# Patient Record
Sex: FEMALE | Race: WHITE
[De-identification: names, ages, dates, MRNs, and addresses within clinical notes are randomized per-mention and may not be internally consistent; named-entity substitution may affect disease eponyms.]

---

## 2017-08-21 ENCOUNTER — HOSPITAL ENCOUNTER (OUTPATIENT)
Dept: HOSPITAL 75 - LAB | Age: 52
End: 2017-08-21
Attending: INTERNAL MEDICINE
Payer: MEDICAID

## 2017-08-21 DIAGNOSIS — E10.65: Primary | ICD-10-CM

## 2017-08-21 LAB
ALBUMIN SERPL-MCNC: 3.6 GM/DL (ref 3.2–4.5)
ALT SERPL-CCNC: 109 U/L (ref 0–55)
ANION GAP SERPL CALC-SCNC: 6 MMOL/L (ref 5–14)
AST SERPL-CCNC: 54 U/L (ref 5–34)
BILIRUB SERPL-MCNC: 0.7 MG/DL (ref 0.1–1)
BUN SERPL-MCNC: 25 MG/DL (ref 7–18)
BUN/CREAT SERPL: 21
CALCIUM SERPL-MCNC: 8.8 MG/DL (ref 8.5–10.1)
CHLORIDE SERPL-SCNC: 110 MMOL/L (ref 98–107)
CHOLEST SERPL-MCNC: 153 MG/DL (ref ?–200)
CO2 SERPL-SCNC: 21 MMOL/L (ref 21–32)
CREAT SERPL-MCNC: 1.17 MG/DL (ref 0.6–1.3)
GFR SERPLBLD BASED ON 1.73 SQ M-ARVRAT: 48 ML/MIN
GLUCOSE SERPL-MCNC: 201 MG/DL (ref 70–105)
LDLC SERPL DIRECT ASSAY-MCNC: 97 MG/DL (ref 1–129)
POTASSIUM SERPL-SCNC: 4.6 MMOL/L (ref 3.6–5)
PROT SERPL-MCNC: 6.6 GM/DL (ref 6.4–8.2)
SODIUM SERPL-SCNC: 137 MMOL/L (ref 135–145)
TRIGL SERPL-MCNC: 169 MG/DL (ref ?–150)
VLDLC SERPL CALC-MCNC: 34 MG/DL (ref 5–40)

## 2017-08-21 PROCEDURE — 80053 COMPREHEN METABOLIC PANEL: CPT

## 2017-08-21 PROCEDURE — 80061 LIPID PANEL: CPT

## 2017-08-21 PROCEDURE — 82607 VITAMIN B-12: CPT

## 2017-08-21 PROCEDURE — 36415 COLL VENOUS BLD VENIPUNCTURE: CPT

## 2017-08-21 PROCEDURE — 82306 VITAMIN D 25 HYDROXY: CPT

## 2017-08-22 LAB — VITAMIN D 25-HYDROXY (TOTAL): 14 NG/ML (ref 30–100)

## 2017-09-05 ENCOUNTER — HOSPITAL ENCOUNTER (EMERGENCY)
Dept: HOSPITAL 75 - ER | Age: 52
Discharge: HOME | End: 2017-09-05
Payer: MEDICAID

## 2017-09-05 VITALS — DIASTOLIC BLOOD PRESSURE: 106 MMHG | SYSTOLIC BLOOD PRESSURE: 162 MMHG

## 2017-09-05 VITALS — WEIGHT: 205 LBS | BODY MASS INDEX: 34.16 KG/M2 | HEIGHT: 65 IN

## 2017-09-05 DIAGNOSIS — K21.9: ICD-10-CM

## 2017-09-05 DIAGNOSIS — Z86.718: ICD-10-CM

## 2017-09-05 DIAGNOSIS — R94.5: Primary | ICD-10-CM

## 2017-09-05 DIAGNOSIS — Z79.01: ICD-10-CM

## 2017-09-05 DIAGNOSIS — Z79.4: ICD-10-CM

## 2017-09-05 DIAGNOSIS — Z87.19: ICD-10-CM

## 2017-09-05 DIAGNOSIS — Z90.89: ICD-10-CM

## 2017-09-05 DIAGNOSIS — Z90.710: ICD-10-CM

## 2017-09-05 DIAGNOSIS — J45.909: ICD-10-CM

## 2017-09-05 DIAGNOSIS — Z82.49: ICD-10-CM

## 2017-09-05 DIAGNOSIS — R11.2: ICD-10-CM

## 2017-09-05 DIAGNOSIS — M06.9: ICD-10-CM

## 2017-09-05 DIAGNOSIS — M19.90: ICD-10-CM

## 2017-09-05 DIAGNOSIS — Z87.01: ICD-10-CM

## 2017-09-05 DIAGNOSIS — E11.9: ICD-10-CM

## 2017-09-05 LAB
ALBUMIN SERPL-MCNC: 4.1 GM/DL (ref 3.2–4.5)
ALT SERPL-CCNC: 230 U/L (ref 0–55)
ANION GAP SERPL CALC-SCNC: 14 MMOL/L (ref 5–14)
AST SERPL-CCNC: 99 U/L (ref 5–34)
BASOPHILS # BLD AUTO: 0 10^3/UL (ref 0–0.1)
BASOPHILS NFR BLD AUTO: 0 % (ref 0–10)
BILIRUB SERPL-MCNC: 0.9 MG/DL (ref 0.1–1)
BILIRUB UR QL STRIP: NEGATIVE
BUN SERPL-MCNC: 25 MG/DL (ref 7–18)
BUN/CREAT SERPL: 19
CALCIUM SERPL-MCNC: 9.6 MG/DL (ref 8.5–10.1)
CHLORIDE SERPL-SCNC: 104 MMOL/L (ref 98–107)
CO2 SERPL-SCNC: 24 MMOL/L (ref 21–32)
CREAT SERPL-MCNC: 1.29 MG/DL (ref 0.6–1.3)
EOSINOPHIL # BLD AUTO: 0.2 10^3/UL (ref 0–0.3)
EOSINOPHIL NFR BLD AUTO: 3 % (ref 0–10)
ERYTHROCYTE [DISTWIDTH] IN BLOOD BY AUTOMATED COUNT: 13.8 % (ref 10–14.5)
GFR SERPLBLD BASED ON 1.73 SQ M-ARVRAT: 43 ML/MIN
GLUCOSE SERPL-MCNC: 102 MG/DL (ref 70–105)
KETONES UR QL STRIP: (no result)
LEUKOCYTE ESTERASE UR QL STRIP: (no result)
LIPASE SERPL-CCNC: 144 U/L (ref 8–78)
LYMPHOCYTES # BLD AUTO: 2.4 X 10^3 (ref 1–4)
LYMPHOCYTES NFR BLD AUTO: 25 % (ref 12–44)
MCH RBC QN AUTO: 31 PG (ref 25–34)
MCHC RBC AUTO-ENTMCNC: 34 G/DL (ref 32–36)
MCV RBC AUTO: 93 FL (ref 80–99)
MONOCYTES # BLD AUTO: 0.8 X 10^3 (ref 0–1)
MONOCYTES NFR BLD AUTO: 8 % (ref 0–12)
NEUTROPHILS # BLD AUTO: 6 X 10^3 (ref 1.8–7.8)
NEUTROPHILS NFR BLD AUTO: 64 % (ref 42–75)
NITRITE UR QL STRIP: NEGATIVE
PH UR STRIP: 8 [PH] (ref 5–9)
PLATELET # BLD: 237 10^3/UL (ref 130–400)
PMV BLD AUTO: 10.1 FL (ref 7.4–10.4)
POTASSIUM SERPL-SCNC: 4.2 MMOL/L (ref 3.6–5)
PROT SERPL-MCNC: 7.9 GM/DL (ref 6.4–8.2)
PROT UR QL STRIP: (no result)
RBC # BLD AUTO: 4.92 10^6/UL (ref 4.35–5.85)
SODIUM SERPL-SCNC: 142 MMOL/L (ref 135–145)
SP GR UR STRIP: 1.01 (ref 1.02–1.02)
SQUAMOUS #/AREA URNS HPF: (no result) /HPF
UROBILINOGEN UR-MCNC: NORMAL MG/DL
WBC # BLD AUTO: 9.4 10^3/UL (ref 4.3–11)
WBC #/AREA URNS HPF: (no result) /HPF

## 2017-09-05 PROCEDURE — 81000 URINALYSIS NONAUTO W/SCOPE: CPT

## 2017-09-05 PROCEDURE — 80306 DRUG TEST PRSMV INSTRMNT: CPT

## 2017-09-05 PROCEDURE — 74176 CT ABD & PELVIS W/O CONTRAST: CPT

## 2017-09-05 PROCEDURE — 36415 COLL VENOUS BLD VENIPUNCTURE: CPT

## 2017-09-05 PROCEDURE — 80053 COMPREHEN METABOLIC PANEL: CPT

## 2017-09-05 PROCEDURE — 85025 COMPLETE CBC W/AUTO DIFF WBC: CPT

## 2017-09-05 PROCEDURE — 96361 HYDRATE IV INFUSION ADD-ON: CPT

## 2017-09-05 PROCEDURE — 83690 ASSAY OF LIPASE: CPT

## 2017-09-05 PROCEDURE — 96375 TX/PRO/DX INJ NEW DRUG ADDON: CPT

## 2017-09-05 PROCEDURE — 96374 THER/PROPH/DIAG INJ IV PUSH: CPT

## 2017-09-05 NOTE — DIAGNOSTIC IMAGING REPORT
PROCEDURE: CT urinary tract, rule out kidney stone.



TECHNIQUE: Multiple contiguous axial images were obtained through

the abdomen and pelvis without the use of intravenous contrast.



INDICATION: Lower abdominal pain with nausea and vomiting.

History of kidney failure.



COMPARISON is made to a prior CT from February 15, 2013.



FINDINGS:  

The visualized lung bases demonstrate minimal dependent

atelectasis and otherwise are clear. There is no pleural or

pericardial effusion.



The noncontrast appearance of the liver is unremarkable.

Gallbladder nondistended without radiodense gallstone or biliary

dilatation. The spleen is normal in size. Pancreas demonstrates

no focal abnormality. There is no adrenal mass. There are right

sided exophytic renal cysts. The kidneys appear nonobstructed

without evidence of urolithiasis. No stone evident within the

ureters or within the urinary bladder.



There are fluid-filled loops of both small and proximal large

bowel but no abnormal bowel dilation to suggest obstruction or

evidence of significant abnormal bowel thickening. There is no

focal inflammation evident within the omentum or mesentery and no

evidence of free fluid within the pelvis.



The patient is status post previous hysterectomy. There is no

pathologic adenopathy evident. Aorta demonstrates advanced

atherosclerosis. An IVC filter is noted.



There are no acute or suspicious osseous abnormalities.



IMPRESSION:

1. Fluid-filled loops of small bowel and proximal colon without

evidence of bowel dilation to suggest obstruction. There is no

significant bowel thickening demonstrated or evidence of focal

inflammation within the omentum or mesentery. No free air or free

fluid demonstrated

2. No evidence of urolithiasis or hydronephrosis. There is a

simple appearing low density right renal cyst.

3. Atherosclerosis An IVC filter is noted.

4. Previous hysterectomy.









Dictated by: 



  Dictated on workstation # [id number]

## 2017-09-05 NOTE — XMS REPORT
Clinical Summary

 Created on: 2017



Radha Kelly

External Reference #: [id number]

: 1965

Sex: Female



Demographics







 Address  [address] [place]  [address]

 

 Home Phone  [phone]

 

 Preferred Language  English

 

 Marital Status  Unknown

 

 Sabianist Affiliation  NON

 

 Race  Unknown

 

 Ethnic Group  Not  or 





Author







 Author  [facility]

 

 Organization  [facility]

 

 Address  Unknown

 

 Phone  Unavailable







Support







 Name  Relationship  Address  Phone

 

 , Contact,No  ECON  Unknown  [phone]







Care Team Providers







 Care Team Member Name  Role  Phone

 

  PCP  Unavailable







Source Comments

Some departments are not documenting in the electronic medical record.  If you 
do not see the information that you expected, contact Release of Information in 
the Health Information Management department at [phone] for further 
assistance in locating additional records.[facility]



Allergies

Not on File



Current Medications

Not on file



Active Problems





Not on file



Social History







    



  Tobacco Use   Types   Packs/Day   Years Used   Date

 

    



  Never Assessed    









 



  Sex Assigned at Birth   Date Recorded

 

 



  Not on file 







Last Filed Vital Signs

Not on file



Plan of Treatment







   



  Health Maintenance   Due Date   Last Done   Comments

 

   



  HEPATITIS C SCREENING   1965  

 

   



  PHYSICAL (COMPREHENSIVE)   1970  



  EXAM   

 

   



  PERTUSSIS VACCINE   1974  

 

   



  TETANUS VACCINE   1980  

 

   



  CERVICAL CANCER SCREENING   1993  

 

   



  BREAST CANCER SCREENING   2003  

 

   



  COLORECTAL CANCER   2013  



  SCREENING   

 

   



  INFLUENZA VACCINE   2017  







Results

Not on filefrom Last 3 Months

## 2017-09-05 NOTE — ED ABDOMINAL PAIN
General


Stated Complaint:  VOMITING


Source of Information:  Patient


Exam Limitations:  No Limitations





History of Present Illness


Time Seen By Provider:  19:45


Initial Comments


To ER with bilateral lower abdominal pain for the past several days.  She 

developed nausea and vomiting today.  No fevers or chills.  She reports a 

history of gastroparesis.


Timing/Duration:  1 Week, Getting Worse


Severity/Quality:  Moderate


Location:  Suprapubic


Radiation:  No Radiation


Activities at Onset:  None


Associated Symptoms:  Nausea/Vomiting





Allergies and Home Medications


Allergies


Coded Allergies:  


     Sulfa (Sulfonamide Antibiotics) (Unverified  Allergy, Unknown, 6/25/15)


     codeine (Verified  Allergy, Unknown, TAKES ULTRAM AT HOME, 1/2/09)


     ketorolac (Verified  Allergy, Unknown, 11/19/08)


     tramadol (Verified  Allergy, Unknown, 12/17/11)





Home Medications


Albuterol 8.5 Gm Hfa.aer.ad, 2 PUFF IH TID PRN for SHORTNESS OF BREATH, (

Reported)


   1 PUFFS 


Alprazolam 2 Mg Tab.rapdis, 2 MG PO TID PRN for ANXIETY, (Reported)


Amlodipine/Valsartan 1 Each Tablet, 1 EACH PO DAILY, (Reported)


Cetirizine HCl 10 Mg Tablet, 10 MG PO HS, (Reported)


Ezetimibe 10 Mg Tablet, 10 MG PO DAILY, (Reported)


Fluticasone Propionate 16 Gm Naspr, 2 SPRAYS NS DAILY, (Reported)


Hydrocodone Bit/Acetaminophen 1 Each Tablet, 1 EACH PO BID PRN for PAIN, (

Reported)


Insulin Aspart 100 Unit/1 Ml Insuln.pen, SQ PER SLIDING SCALE, (Reported)


Insulin Glargine,Hum.rec.anlog 100 Unit/1 Ml Vial, 22 UNITS SQ BID, (Reported)


Latanoprost 2.5 Ml Drops, 1 DROP OU HS, (Reported)


Metoprolol Tartrate 50 Mg Tablet, 50 MG PO BID, (Reported)


Nystatin 30 Gm Oint..gm., 0 TOP TID, (Reported)


   APPLY TO AFFECTED AREA(S) 


Omeprazole 40 Mg Capsule.dr, 40 MG PO before breakfast, (Reported)


Simvastatin 40 Mg Tablet, 40 MG PO HS, (Reported)


Timolol Maleate 15 Ml Btl, 1 DROP OU DAILY, #15 (Reported)


Warfarin Sodium 1 Mg Tablet, 3 MG PO 6 pm, (Reported)


   TAKE 3 (1 MG) TABLETS WITH THE 5 MG DOSE AT 6 PM 


Warfarin Sodium 5 Mg Tablet, 5 MG PO 6 pm, (Reported)


   TAKE 1 (5 MG) TABLET WITH 3 (1 MG) TABLETS AT 6 PM 





Review of Systems


Constitutional:  see HPI


EENTM:  No Symptoms Reported


Respiratory:  No Symptoms Reported


Cardiovascular:  No Symptoms Reported


Gastrointestinal:  See HPI, Abdominal Pain, Nausea, Vomiting


Genitourinary:  No Symptoms Reported


Musculoskeletal:  no symptoms reported


Skin:  no symptoms reported


Psychiatric/Neurological:  No Symptoms Reported





Past Medical-Social-Family Hx


Patient Social History


Recent Foreign Travel:  No


Contact w/Someone Who Travel:  No





Immunizations Up To Date


Tetanus Booster (TDap):  Unknown


Date of Pneumonia Vaccine:  Oct 1, 2012


Date of Influenza Vaccine:  Oct 1, 2012





Surgeries


Surgeries:  Adenoidectomy, Ear Surgery, Hysterectomy, Tonsillectomy





Respiratory


Respiratory Disorders:  Asthma, Pneumonia, Chronic Bronchitis





Cardiovascular


Cardiac Disorders:  Deep Vein Thrombosis





Reproductive System


Hx Reproductive Disorders:  Yes


Female Reproductive Disorders:  Menstrual Problems





Genitourinary


Genitourinary Disorders:  Renal Failure





Gastrointestinal


Gastrointestinal Disorders:  Gastroesophageal Reflux, Pancreatitis





Musculoskeletal


Musculoskeletal Disorders:  Arthritis, Rheumatoid Arthritis, Fractures





Endocrine


Endocrine Disorders:  Diabetes, Insulin dep





HEENT


HEENT Disorders:  Tonsilitis


Loss of Vision:  Denies


Hearing Impairment:  Denies





Psychosocial


Behavioral Health Disorders:  Personality Disorder





Blood Transfusions


Adverse Reaction to a Blood Tr:  No





Family Medical History


Significant Family History:  Heart Disease, Cancer, Diabetes





Physical Exam


Vital Signs





 VS - Last 72 Hours, by Label








 9/5/17





 19:27


 


Temp 97.3


 


Pulse 115


 


Resp 24


 


B/P (MAP) 178/120


 


Pulse Ox 97


 


O2 Delivery Room Air





Capillary Refill :


General Appearance:  WD/WN, no apparent distress


HEENT:  PERRL/EOMI, normal ENT inspection


Neck:  non-tender, full range of motion


Respiratory:  normal breath sounds, no respiratory distress, no accessory 

muscle use


Cardiovascular:  regular rate, rhythm, no murmur


Gastrointestinal:  normal bowel sounds, non tender, soft


Extremities:  normal range of motion, non-tender, normal inspection, no pedal 

edema


Pelvic:  normal external exam


Neurologic/Psychiatric:  alert, normal mood/affect, oriented x 3


Skin:  normal color, warm/dry





Progress/Results/Core Measures


Results/Orders


Lab Results





Laboratory Tests








Test


  9/5/17


19:45 9/5/17


20:00 Range/Units


 


 


White Blood Count


  9.4 


  


  4.3-11.0


10^3/uL


 


Red Blood Count


  4.92 


  


  4.35-5.85


10^6/uL


 


Hemoglobin 15.4   11.5-16.0  G/DL


 


Hematocrit 46   35-52  %


 


Mean Corpuscular Volume 93   80-99  FL


 


Mean Corpuscular Hemoglobin 31   25-34  PG


 


Mean Corpuscular Hemoglobin


Concent 34 


  


  32-36  G/DL


 


 


Red Cell Distribution Width 13.8   10.0-14.5  %


 


Platelet Count


  237 


  


  130-400


10^3/uL


 


Mean Platelet Volume 10.1   7.4-10.4  FL


 


Neutrophils (%) (Auto) 64   42-75  %


 


Lymphocytes (%) (Auto) 25   12-44  %


 


Monocytes (%) (Auto) 8   0-12  %


 


Eosinophils (%) (Auto) 3   0-10  %


 


Basophils (%) (Auto) 0   0-10  %


 


Neutrophils # (Auto) 6.0   1.8-7.8  X 10^3


 


Lymphocytes # (Auto) 2.4   1.0-4.0  X 10^3


 


Monocytes # (Auto) 0.8   0.0-1.0  X 10^3


 


Eosinophils # (Auto)


  0.2 


  


  0.0-0.3


10^3/uL


 


Basophils # (Auto)


  0.0 


  


  0.0-0.1


10^3/uL


 


Sodium Level 142   135-145  MMOL/L


 


Potassium Level 4.2   3.6-5.0  MMOL/L


 


Chloride Level 104     MMOL/L


 


Carbon Dioxide Level 24   21-32  MMOL/L


 


Anion Gap 14   5-14  MMOL/L


 


Blood Urea Nitrogen 25 H  7-18  MG/DL


 


Creatinine


  1.29 


  


  0.60-1.30


MG/DL


 


Estimat Glomerular Filtration


Rate 43 


  


   


 


 


BUN/Creatinine Ratio 19    


 


Glucose Level 102     MG/DL


 


Calcium Level 9.6   8.5-10.1  MG/DL


 


Total Bilirubin 0.9   0.1-1.0  MG/DL


 


Aspartate Amino Transf


(AST/SGOT) 99 H


  


  5-34  U/L


 


 


Alanine Aminotransferase


(ALT/SGPT) 230 H


  


  0-55  U/L


 


 


Alkaline Phosphatase 83     U/L


 


Total Protein 7.9   6.4-8.2  GM/DL


 


Albumin 4.1   3.2-4.5  GM/DL


 


Lipase 144 H  8-78  U/L


 


Urine Color  YELLOW   


 


Urine Clarity


  


  SLIGHTLY


CLOUDY  


 


 


Urine pH  8  5-9  


 


Urine Specific Gravity  1.010 L 1.016-1.022  


 


Urine Protein  3+ H NEGATIVE  


 


Urine Glucose (UA)  NEGATIVE  NEGATIVE  


 


Urine Ketones  1+ H NEGATIVE  


 


Urine Nitrite  NEGATIVE  NEGATIVE  


 


Urine Bilirubin  NEGATIVE  NEGATIVE  


 


Urine Urobilinogen  NORMAL  NORMAL  MG/DL


 


Urine Leukocyte Esterase  1+ H NEGATIVE  


 


Urine RBC (Auto)  2+ H NEGATIVE  


 


Urine RBC  5-10 H  /HPF


 


Urine WBC  0-2   /HPF


 


Urine Squamous Epithelial


Cells 


  0-2 


   /HPF


 


 


Urine Crystals  NONE   /LPF


 


Urine Bacteria  NONE   /HPF


 


Urine Casts  NONE   /LPF


 


Urine Mucus  NEGATIVE   /LPF


 


Urine Culture Indicated  NO   


 


Urine Opiates Screen  NEGATIVE  NEGATIVE  


 


Urine Oxycodone Screen  NEGATIVE  NEGATIVE  


 


Urine Methadone Screen  NEGATIVE  NEGATIVE  


 


Urine Propoxyphene Screen  NEGATIVE  NEGATIVE  


 


Urine Barbiturates Screen  NEGATIVE  NEGATIVE  


 


Ur Tricyclic Antidepressants


Screen 


  NEGATIVE 


  NEGATIVE  


 


 


Urine Phencyclidine Screen  NEGATIVE  NEGATIVE  


 


Urine Amphetamines Screen  NEGATIVE  NEGATIVE  


 


Urine Methamphetamines Screen  NEGATIVE  NEGATIVE  


 


Urine Benzodiazepines Screen  NEGATIVE  NEGATIVE  


 


Urine Cocaine Screen  NEGATIVE  NEGATIVE  


 


Urine Cannabinoids Screen  POSITIVE H NEGATIVE  








My Orders





Orders - LIAM GAONA APRN


Cbc With Automated Diff (9/5/17 19:44)


Comprehensive Metabolic Panel (9/5/17 19:44)


Ua Culture If Indicated (9/5/17 19:44)


Drug Screen Stat (Urine) (9/5/17 19:44)


Saline Lock/Iv-Start (9/5/17 19:44)


Lipase (9/5/17 19:44)


Ns Iv 1000 Ml (Sodium Chloride 0.9%) (9/5/17 19:45)


Prochlorperazine Injection (Compazine In (9/5/17 19:45)


Ct Abd/Pelvis Wo(Kidney Stone) (9/5/17 20:29)





Medications Given in ED





Current Medications








 Medications  Dose


 Ordered  Sig/Corin


 Route  Start Time


 Stop Time Status Last Admin


Dose Admin


 


 Prochlorperazine


 Edisylate  10 mg  ONCE  ONCE


 IV  9/5/17 19:45


 9/5/17 19:46 DC 9/5/17 19:59


10 MG








Vital Signs/I&O





Vital Sign - Last 12Hours








 9/5/17





 19:27


 


Temp 97.3


 


Pulse 115


 


Resp 24


 


B/P (MAP) 178/120


 


Pulse Ox 97


 


O2 Delivery Room Air














Intake and Output 


 


 9/6/17





 00:00


 


Intake Total 1000 ml


 


Balance 1000 ml











Departure


Communication (Admissions)


Progress Notes


2241-  patient is feeling better at this time.  Her abdominal pain is gone that 

was not given any pain medications.  She reports some intermittent lower 

abdominal cramping.  We'll give her some Zofran IV.





Impression


Impression:  


 Primary Impression:  


 Elevated liver enzymes


 Additional Impression:  


 Nausea


Disposition:  01 HOME, SELF-CARE


Condition:  Stable





Departure-Patient Inst.


Decision time for Depature:  22:42


Referrals:  


JULY VOSS DO (PCP/Family)


Primary Care Physician


Patient Instructions:  NO INSTRUCTIONS GIVEN





Add. Discharge Instructions:  


1.  Follow-up with Dr. Dr. Voss


2.  Return to ER for any concerns


3.


Scripts


Ondansetron (Zofran Odt) 8 Mg Tab.rapdis


8 MG PO Q6H Y for NAUSEA/VOMITING-1ST LINE, #14 TAB


   Prov: LIAM GAONA         9/5/17











LIAM GAONA Sep 5, 2017 19:46

## 2017-10-11 ENCOUNTER — HOSPITAL ENCOUNTER (OUTPATIENT)
Dept: HOSPITAL 75 - LAB | Age: 52
End: 2017-10-11
Attending: FAMILY MEDICINE
Payer: MEDICAID

## 2017-10-11 DIAGNOSIS — R74.8: Primary | ICD-10-CM

## 2017-10-11 DIAGNOSIS — R73.9: ICD-10-CM

## 2017-10-11 LAB
ALBUMIN SERPL-MCNC: 4.1 GM/DL (ref 3.2–4.5)
ALT SERPL-CCNC: 144 U/L (ref 0–55)
ANION GAP SERPL CALC-SCNC: 9 MMOL/L (ref 5–14)
AST SERPL-CCNC: 73 U/L (ref 5–34)
BASOPHILS # BLD AUTO: 0 10^3/UL (ref 0–0.1)
BASOPHILS NFR BLD AUTO: 1 % (ref 0–10)
BILIRUB SERPL-MCNC: 1.2 MG/DL (ref 0.1–1)
BUN SERPL-MCNC: 23 MG/DL (ref 7–18)
BUN/CREAT SERPL: 17
CALCIUM SERPL-MCNC: 9.9 MG/DL (ref 8.5–10.1)
CHLORIDE SERPL-SCNC: 103 MMOL/L (ref 98–107)
CHOLEST SERPL-MCNC: 191 MG/DL (ref ?–200)
CO2 SERPL-SCNC: 26 MMOL/L (ref 21–32)
CREAT SERPL-MCNC: 1.33 MG/DL (ref 0.6–1.3)
EOSINOPHIL # BLD AUTO: 0.2 10^3/UL (ref 0–0.3)
EOSINOPHIL NFR BLD AUTO: 3 % (ref 0–10)
ERYTHROCYTE [DISTWIDTH] IN BLOOD BY AUTOMATED COUNT: 13.7 % (ref 10–14.5)
GFR SERPLBLD BASED ON 1.73 SQ M-ARVRAT: 42 ML/MIN
GLUCOSE SERPL-MCNC: 186 MG/DL (ref 70–105)
LDLC SERPL DIRECT ASSAY-MCNC: 108 MG/DL (ref 1–129)
LYMPHOCYTES # BLD AUTO: 2.3 X 10^3 (ref 1–4)
LYMPHOCYTES NFR BLD AUTO: 33 % (ref 12–44)
MCH RBC QN AUTO: 31 PG (ref 25–34)
MCHC RBC AUTO-ENTMCNC: 34 G/DL (ref 32–36)
MCV RBC AUTO: 92 FL (ref 80–99)
MONOCYTES # BLD AUTO: 0.9 X 10^3 (ref 0–1)
MONOCYTES NFR BLD AUTO: 13 % (ref 0–12)
NEUTROPHILS # BLD AUTO: 3.6 X 10^3 (ref 1.8–7.8)
NEUTROPHILS NFR BLD AUTO: 51 % (ref 42–75)
PLATELET # BLD: 227 10^3/UL (ref 130–400)
PMV BLD AUTO: 10.3 FL (ref 7.4–10.4)
POTASSIUM SERPL-SCNC: 4.2 MMOL/L (ref 3.6–5)
PROT SERPL-MCNC: 8.3 GM/DL (ref 6.4–8.2)
RBC # BLD AUTO: 5.49 10^6/UL (ref 4.35–5.85)
SODIUM SERPL-SCNC: 138 MMOL/L (ref 135–145)
TRIGL SERPL-MCNC: 168 MG/DL (ref ?–150)
VLDLC SERPL CALC-MCNC: 34 MG/DL (ref 5–40)
WBC # BLD AUTO: 7 10^3/UL (ref 4.3–11)

## 2017-10-11 PROCEDURE — 80053 COMPREHEN METABOLIC PANEL: CPT

## 2017-10-11 PROCEDURE — 36415 COLL VENOUS BLD VENIPUNCTURE: CPT

## 2017-10-11 PROCEDURE — 85025 COMPLETE CBC W/AUTO DIFF WBC: CPT

## 2017-10-11 PROCEDURE — 83036 HEMOGLOBIN GLYCOSYLATED A1C: CPT

## 2017-10-11 PROCEDURE — 80061 LIPID PANEL: CPT

## 2018-05-30 ENCOUNTER — HOSPITAL ENCOUNTER (OUTPATIENT)
Dept: HOSPITAL 75 - LAB | Age: 53
End: 2018-05-30
Attending: INTERNAL MEDICINE
Payer: MEDICAID

## 2018-05-30 DIAGNOSIS — E10.65: Primary | ICD-10-CM

## 2018-05-30 DIAGNOSIS — N18.9: ICD-10-CM

## 2018-05-30 LAB
ALBUMIN SERPL-MCNC: 3.9 GM/DL (ref 3.2–4.5)
ALP SERPL-CCNC: 66 U/L (ref 40–136)
ALT SERPL-CCNC: 133 U/L (ref 0–55)
BILIRUB SERPL-MCNC: 0.8 MG/DL (ref 0.1–1)
BUN/CREAT SERPL: 21
CALCIUM SERPL-MCNC: 9.3 MG/DL (ref 8.5–10.1)
CHLORIDE SERPL-SCNC: 104 MMOL/L (ref 98–107)
CO2 SERPL-SCNC: 23 MMOL/L (ref 21–32)
CREAT SERPL-MCNC: 1.21 MG/DL (ref 0.6–1.3)
ERYTHROCYTE [DISTWIDTH] IN BLOOD BY AUTOMATED COUNT: 13.3 % (ref 10–14.5)
GFR SERPLBLD BASED ON 1.73 SQ M-ARVRAT: 46 ML/MIN
GLUCOSE SERPL-MCNC: 310 MG/DL (ref 70–105)
HCT VFR BLD CALC: 43 % (ref 35–52)
HGB BLD-MCNC: 15 G/DL (ref 11.5–16)
MCH RBC QN AUTO: 32 PG (ref 25–34)
MCHC RBC AUTO-ENTMCNC: 35 G/DL (ref 32–36)
MCV RBC AUTO: 93 FL (ref 80–99)
PLATELET # BLD: 232 10^3/UL (ref 130–400)
PMV BLD AUTO: 10.2 FL (ref 7.4–10.4)
POTASSIUM SERPL-SCNC: 4.5 MMOL/L (ref 3.6–5)
PROT SERPL-MCNC: 7.3 GM/DL (ref 6.4–8.2)
RBC # BLD AUTO: 4.66 10^6/UL (ref 4.35–5.85)
SODIUM SERPL-SCNC: 135 MMOL/L (ref 135–145)
WBC # BLD AUTO: 5.2 10^3/UL (ref 4.3–11)

## 2018-05-30 PROCEDURE — 82607 VITAMIN B-12: CPT

## 2018-05-30 PROCEDURE — 84443 ASSAY THYROID STIM HORMONE: CPT

## 2018-05-30 PROCEDURE — 82043 UR ALBUMIN QUANTITATIVE: CPT

## 2018-05-30 PROCEDURE — 85027 COMPLETE CBC AUTOMATED: CPT

## 2018-05-30 PROCEDURE — 82306 VITAMIN D 25 HYDROXY: CPT

## 2018-05-30 PROCEDURE — 80053 COMPREHEN METABOLIC PANEL: CPT

## 2018-05-30 PROCEDURE — 36415 COLL VENOUS BLD VENIPUNCTURE: CPT

## 2018-09-06 ENCOUNTER — HOSPITAL ENCOUNTER (OUTPATIENT)
Dept: HOSPITAL 75 - RAD | Age: 53
End: 2018-09-06
Attending: FAMILY MEDICINE
Payer: MEDICAID

## 2018-09-06 DIAGNOSIS — M25.522: ICD-10-CM

## 2018-09-06 DIAGNOSIS — M61.48: Primary | ICD-10-CM

## 2018-09-06 PROCEDURE — 73080 X-RAY EXAM OF ELBOW: CPT

## 2018-09-06 NOTE — DIAGNOSTIC IMAGING REPORT
EXAM: Left elbow at 11:22 a.m.



INDICATION: Injury, elbow pain



TECHNIQUE: 3 views were obtained. 



COMPARISON: There are no prior studies available for comparison.



FINDINGS:  

There is no fracture, dislocation or acute bony abnormality

evident. The lateral view does show a minute calcific density in

the soft tissues adjacent to the olecranon process of the ulna.

There is also a small amount of soft tissue edema in this area.

This finding could represent a very small avulsion fracture

although the age of this injury is indeterminate. Clinical

followup is recommended.



The elbow joint is fairly well-maintained. The posterior fat-pad

is not elevated.



IMPRESSION:

1. There is a small calcific density in the soft tissues along

the posterior aspect of the olecranon process of the ulna. This

may represent a minute avulsion fracture although the age of this

injury is indeterminate. Clinical followup is recommended.

2. There is no acute bony abnormality noted otherwise. 



Dictated by: 



  Dictated on workstation # [id number]

## 2019-01-15 ENCOUNTER — HOSPITAL ENCOUNTER (OUTPATIENT)
Dept: HOSPITAL 75 - LAB | Age: 54
End: 2019-01-15
Attending: INTERNAL MEDICINE
Payer: MEDICAID

## 2019-01-15 DIAGNOSIS — E10.65: Primary | ICD-10-CM

## 2019-01-15 LAB
ALBUMIN SERPL-MCNC: 4.2 GM/DL (ref 3.2–4.5)
ALP SERPL-CCNC: 92 U/L (ref 40–136)
ALT SERPL-CCNC: 81 U/L (ref 0–55)
BILIRUB SERPL-MCNC: 0.8 MG/DL (ref 0.1–1)
BUN/CREAT SERPL: 13
CALCIUM SERPL-MCNC: 10.1 MG/DL (ref 8.5–10.1)
CHLORIDE SERPL-SCNC: 97 MMOL/L (ref 98–107)
CHOLEST SERPL-MCNC: 280 MG/DL (ref ?–200)
CO2 SERPL-SCNC: 26 MMOL/L (ref 21–32)
CREAT SERPL-MCNC: 1.78 MG/DL (ref 0.6–1.3)
ERYTHROCYTE [DISTWIDTH] IN BLOOD BY AUTOMATED COUNT: 13.2 % (ref 10–14.5)
GFR SERPLBLD BASED ON 1.73 SQ M-ARVRAT: 30 ML/MIN
GLUCOSE SERPL-MCNC: 112 MG/DL (ref 70–105)
HCT VFR BLD CALC: 44 % (ref 35–52)
HDLC SERPL-MCNC: 54 MG/DL (ref 40–60)
HGB BLD-MCNC: 14.7 G/DL (ref 11.5–16)
MCH RBC QN AUTO: 31 PG (ref 25–34)
MCHC RBC AUTO-ENTMCNC: 33 G/DL (ref 32–36)
MCV RBC AUTO: 93 FL (ref 80–99)
PLATELET # BLD: 254 10^3/UL (ref 130–400)
PMV BLD AUTO: 10 FL (ref 7.4–10.4)
POTASSIUM SERPL-SCNC: 3.6 MMOL/L (ref 3.6–5)
PROT SERPL-MCNC: 8.7 GM/DL (ref 6.4–8.2)
RBC # BLD AUTO: 4.76 10^6/UL (ref 4.35–5.85)
SODIUM SERPL-SCNC: 137 MMOL/L (ref 135–145)
TRIGL SERPL-MCNC: 231 MG/DL (ref ?–150)
VLDLC SERPL CALC-MCNC: 46 MG/DL (ref 5–40)
WBC # BLD AUTO: 9.4 10^3/UL (ref 4.3–11)

## 2019-01-15 PROCEDURE — 84443 ASSAY THYROID STIM HORMONE: CPT

## 2019-01-15 PROCEDURE — 85027 COMPLETE CBC AUTOMATED: CPT

## 2019-01-15 PROCEDURE — 36415 COLL VENOUS BLD VENIPUNCTURE: CPT

## 2019-01-15 PROCEDURE — 80061 LIPID PANEL: CPT

## 2019-01-15 PROCEDURE — 80053 COMPREHEN METABOLIC PANEL: CPT

## 2019-02-19 ENCOUNTER — HOSPITAL ENCOUNTER (OUTPATIENT)
Dept: HOSPITAL 75 - LAB | Age: 54
End: 2019-02-19
Attending: INTERNAL MEDICINE
Payer: MEDICAID

## 2019-02-19 DIAGNOSIS — N18.9: Primary | ICD-10-CM

## 2019-08-11 ENCOUNTER — HOSPITAL ENCOUNTER (INPATIENT)
Dept: HOSPITAL 75 - ER | Age: 54
LOS: 3 days | Discharge: HOME | DRG: 637 | End: 2019-08-14
Attending: FAMILY MEDICINE | Admitting: INTERNAL MEDICINE
Payer: MEDICAID

## 2019-08-11 VITALS — SYSTOLIC BLOOD PRESSURE: 115 MMHG | DIASTOLIC BLOOD PRESSURE: 66 MMHG

## 2019-08-11 VITALS — DIASTOLIC BLOOD PRESSURE: 77 MMHG | SYSTOLIC BLOOD PRESSURE: 141 MMHG

## 2019-08-11 VITALS — DIASTOLIC BLOOD PRESSURE: 77 MMHG | SYSTOLIC BLOOD PRESSURE: 151 MMHG

## 2019-08-11 VITALS — DIASTOLIC BLOOD PRESSURE: 97 MMHG | SYSTOLIC BLOOD PRESSURE: 173 MMHG

## 2019-08-11 VITALS — DIASTOLIC BLOOD PRESSURE: 80 MMHG | SYSTOLIC BLOOD PRESSURE: 133 MMHG

## 2019-08-11 VITALS — DIASTOLIC BLOOD PRESSURE: 73 MMHG | SYSTOLIC BLOOD PRESSURE: 115 MMHG

## 2019-08-11 VITALS — SYSTOLIC BLOOD PRESSURE: 137 MMHG | DIASTOLIC BLOOD PRESSURE: 82 MMHG

## 2019-08-11 VITALS — SYSTOLIC BLOOD PRESSURE: 133 MMHG | DIASTOLIC BLOOD PRESSURE: 75 MMHG

## 2019-08-11 VITALS — DIASTOLIC BLOOD PRESSURE: 77 MMHG | SYSTOLIC BLOOD PRESSURE: 135 MMHG

## 2019-08-11 VITALS — SYSTOLIC BLOOD PRESSURE: 143 MMHG | DIASTOLIC BLOOD PRESSURE: 75 MMHG

## 2019-08-11 VITALS — SYSTOLIC BLOOD PRESSURE: 136 MMHG | DIASTOLIC BLOOD PRESSURE: 80 MMHG

## 2019-08-11 VITALS — WEIGHT: 151.06 LBS | HEIGHT: 65 IN | BODY MASS INDEX: 25.17 KG/M2

## 2019-08-11 VITALS — DIASTOLIC BLOOD PRESSURE: 85 MMHG | SYSTOLIC BLOOD PRESSURE: 162 MMHG

## 2019-08-11 VITALS — DIASTOLIC BLOOD PRESSURE: 86 MMHG | SYSTOLIC BLOOD PRESSURE: 158 MMHG

## 2019-08-11 VITALS — DIASTOLIC BLOOD PRESSURE: 76 MMHG | SYSTOLIC BLOOD PRESSURE: 148 MMHG

## 2019-08-11 DIAGNOSIS — Z88.2: ICD-10-CM

## 2019-08-11 DIAGNOSIS — M54.9: ICD-10-CM

## 2019-08-11 DIAGNOSIS — D72.829: ICD-10-CM

## 2019-08-11 DIAGNOSIS — G89.29: ICD-10-CM

## 2019-08-11 DIAGNOSIS — N18.9: ICD-10-CM

## 2019-08-11 DIAGNOSIS — J44.9: ICD-10-CM

## 2019-08-11 DIAGNOSIS — Z87.11: ICD-10-CM

## 2019-08-11 DIAGNOSIS — Z86.718: ICD-10-CM

## 2019-08-11 DIAGNOSIS — Z95.828: ICD-10-CM

## 2019-08-11 DIAGNOSIS — M06.9: ICD-10-CM

## 2019-08-11 DIAGNOSIS — R07.89: ICD-10-CM

## 2019-08-11 DIAGNOSIS — R11.10: ICD-10-CM

## 2019-08-11 DIAGNOSIS — J30.2: ICD-10-CM

## 2019-08-11 DIAGNOSIS — Z87.19: ICD-10-CM

## 2019-08-11 DIAGNOSIS — M19.91: ICD-10-CM

## 2019-08-11 DIAGNOSIS — E86.0: ICD-10-CM

## 2019-08-11 DIAGNOSIS — Z88.5: ICD-10-CM

## 2019-08-11 DIAGNOSIS — N17.0: ICD-10-CM

## 2019-08-11 DIAGNOSIS — E87.2: ICD-10-CM

## 2019-08-11 DIAGNOSIS — E10.43: ICD-10-CM

## 2019-08-11 DIAGNOSIS — Z79.4: ICD-10-CM

## 2019-08-11 DIAGNOSIS — Z91.14: ICD-10-CM

## 2019-08-11 DIAGNOSIS — F60.9: ICD-10-CM

## 2019-08-11 DIAGNOSIS — E10.10: Primary | ICD-10-CM

## 2019-08-11 DIAGNOSIS — F17.210: ICD-10-CM

## 2019-08-11 DIAGNOSIS — K21.9: ICD-10-CM

## 2019-08-11 DIAGNOSIS — E10.40: ICD-10-CM

## 2019-08-11 DIAGNOSIS — E78.5: ICD-10-CM

## 2019-08-11 LAB
ALBUMIN SERPL-MCNC: 3.4 GM/DL (ref 3.2–4.5)
ALP SERPL-CCNC: 127 U/L (ref 40–136)
ALT SERPL-CCNC: 74 U/L (ref 0–55)
APTT PPP: YELLOW S
ARTERIAL PATENCY WRIST A: (no result)
BACTERIA #/AREA URNS HPF: (no result) /HPF
BARBITURATES UR QL: NEGATIVE
BASE EXCESS STD BLDA CALC-SCNC: -25.7 MMOL/L (ref -2.5–2.5)
BASOPHILS # BLD AUTO: 0 10^3/UL (ref 0–0.1)
BASOPHILS NFR BLD AUTO: 0 % (ref 0–10)
BASOPHILS NFR BLD MANUAL: 0 %
BDY SITE: (no result)
BENZODIAZ UR QL SCN: NEGATIVE
BILIRUB SERPL-MCNC: 0.4 MG/DL (ref 0.1–1)
BILIRUB UR QL STRIP: NEGATIVE
BODY TEMPERATURE: 97.6
BUN/CREAT SERPL: 17
BUN/CREAT SERPL: 19
BUN/CREAT SERPL: 20
BUN/CREAT SERPL: 20
BUN/CREAT SERPL: 21
BUN/CREAT SERPL: 23
CALCIUM SERPL-MCNC: 8.3 MG/DL (ref 8.5–10.1)
CALCIUM SERPL-MCNC: 8.4 MG/DL (ref 8.5–10.1)
CALCIUM SERPL-MCNC: 8.5 MG/DL (ref 8.5–10.1)
CALCIUM SERPL-MCNC: 8.8 MG/DL (ref 8.5–10.1)
CALCIUM SERPL-MCNC: 8.9 MG/DL (ref 8.5–10.1)
CALCIUM SERPL-MCNC: 9.6 MG/DL (ref 8.5–10.1)
CHLORIDE SERPL-SCNC: 100 MMOL/L (ref 98–107)
CHLORIDE SERPL-SCNC: 104 MMOL/L (ref 98–107)
CHLORIDE SERPL-SCNC: 105 MMOL/L (ref 98–107)
CHLORIDE SERPL-SCNC: 107 MMOL/L (ref 98–107)
CHLORIDE SERPL-SCNC: 88 MMOL/L (ref 98–107)
CHLORIDE SERPL-SCNC: 94 MMOL/L (ref 98–107)
CO2 BLDA CALC-SCNC: 3.4 MMOL/L (ref 21–31)
CO2 SERPL-SCNC: 14 MMOL/L (ref 21–32)
CO2 SERPL-SCNC: 5 MMOL/L (ref 21–32)
CO2 SERPL-SCNC: < 5 MMOL/L (ref 21–32)
COCAINE UR QL: NEGATIVE
CREAT SERPL-MCNC: 1.89 MG/DL (ref 0.6–1.3)
CREAT SERPL-MCNC: 2.28 MG/DL (ref 0.6–1.3)
CREAT SERPL-MCNC: 2.42 MG/DL (ref 0.6–1.3)
CREAT SERPL-MCNC: 2.6 MG/DL (ref 0.6–1.3)
CREAT SERPL-MCNC: 2.74 MG/DL (ref 0.6–1.3)
CREAT SERPL-MCNC: 2.96 MG/DL (ref 0.6–1.3)
EOSINOPHIL # BLD AUTO: 0 10^3/UL (ref 0–0.3)
EOSINOPHIL NFR BLD AUTO: 0 % (ref 0–10)
EOSINOPHIL NFR BLD MANUAL: 0 %
ERYTHROCYTE [DISTWIDTH] IN BLOOD BY AUTOMATED COUNT: 13.3 % (ref 10–14.5)
ERYTHROCYTE [DISTWIDTH] IN BLOOD BY AUTOMATED COUNT: 13.7 % (ref 10–14.5)
FIBRINOGEN PPP-MCNC: CLEAR MG/DL
GFR SERPLBLD BASED ON 1.73 SQ M-ARVRAT: 16 ML/MIN
GFR SERPLBLD BASED ON 1.73 SQ M-ARVRAT: 18 ML/MIN
GFR SERPLBLD BASED ON 1.73 SQ M-ARVRAT: 19 ML/MIN
GFR SERPLBLD BASED ON 1.73 SQ M-ARVRAT: 21 ML/MIN
GFR SERPLBLD BASED ON 1.73 SQ M-ARVRAT: 22 ML/MIN
GFR SERPLBLD BASED ON 1.73 SQ M-ARVRAT: 28 ML/MIN
GLUCOSE SERPL-MCNC: 263 MG/DL (ref 70–105)
GLUCOSE SERPL-MCNC: 507 MG/DL (ref 70–105)
GLUCOSE SERPL-MCNC: 649 MG/DL (ref 70–105)
GLUCOSE SERPL-MCNC: 691 MG/DL (ref 70–105)
GLUCOSE SERPL-MCNC: 852 MG/DL (ref 70–105)
GLUCOSE SERPL-MCNC: 919 MG/DL (ref 70–105)
GLUCOSE UR STRIP-MCNC: (no result) MG/DL
HCT VFR BLD CALC: 40 % (ref 35–52)
HCT VFR BLD CALC: 44 % (ref 35–52)
HGB BLD-MCNC: 13.2 G/DL (ref 11.5–16)
HGB BLD-MCNC: 14.1 G/DL (ref 11.5–16)
KETONES UR QL STRIP: (no result)
LEUKOCYTE ESTERASE UR QL STRIP: NEGATIVE
LIPASE SERPL-CCNC: 215 U/L (ref 8–78)
LYMPHOCYTES # BLD AUTO: 3 X 10^3 (ref 1–4)
LYMPHOCYTES NFR BLD AUTO: 11 % (ref 12–44)
MANUAL DIFFERENTIAL PERFORMED BLD QL: YES
MCH RBC QN AUTO: 31 PG (ref 25–34)
MCH RBC QN AUTO: 31 PG (ref 25–34)
MCHC RBC AUTO-ENTMCNC: 32 G/DL (ref 32–36)
MCHC RBC AUTO-ENTMCNC: 33 G/DL (ref 32–36)
MCV RBC AUTO: 95 FL (ref 80–99)
MCV RBC AUTO: 97 FL (ref 80–99)
METHADONE UR QL SCN: NEGATIVE
METHAMPHETAMINE SCREEN URINE S: NEGATIVE
MONOCYTES # BLD AUTO: 1.8 X 10^3 (ref 0–1)
MONOCYTES NFR BLD AUTO: 7 % (ref 0–12)
MONOCYTES NFR BLD: 5 %
NEUTROPHILS # BLD AUTO: 21.2 X 10^3 (ref 1.8–7.8)
NEUTROPHILS NFR BLD AUTO: 81 % (ref 42–75)
NEUTS BAND NFR BLD MANUAL: 83 %
NEUTS BAND NFR BLD: 0 %
NITRITE UR QL STRIP: NEGATIVE
OPIATES UR QL SCN: NEGATIVE
OXYCODONE UR QL: NEGATIVE
PCO2 BLDA: 11 MMHG (ref 35–45)
PH BLDA: 7.07 [PH] (ref 7.37–7.43)
PH UR STRIP: 5 [PH] (ref 5–9)
PLATELET # BLD: 290 10^3/UL (ref 130–400)
PLATELET # BLD: 349 10^3/UL (ref 130–400)
PMV BLD AUTO: 10.6 FL (ref 7.4–10.4)
PMV BLD AUTO: 10.6 FL (ref 7.4–10.4)
PO2 BLDA: 127 MMHG (ref 79–93)
POTASSIUM SERPL-SCNC: 4.5 MMOL/L (ref 3.6–5)
POTASSIUM SERPL-SCNC: 4.7 MMOL/L (ref 3.6–5)
POTASSIUM SERPL-SCNC: 4.9 MMOL/L (ref 3.6–5)
POTASSIUM SERPL-SCNC: 5.1 MMOL/L (ref 3.6–5)
POTASSIUM SERPL-SCNC: 6 MMOL/L (ref 3.6–5)
POTASSIUM SERPL-SCNC: 6.3 MMOL/L (ref 3.6–5)
PROPOXYPH UR QL: NEGATIVE
PROT SERPL-MCNC: 7.4 GM/DL (ref 6.4–8.2)
PROT UR QL STRIP: (no result)
RBC #/AREA URNS HPF: (no result) /HPF
RBC MORPH BLD: NORMAL
SAO2 % BLDA FROM PO2: 98 % (ref 94–100)
SODIUM SERPL-SCNC: 129 MMOL/L (ref 135–145)
SODIUM SERPL-SCNC: 132 MMOL/L (ref 135–145)
SODIUM SERPL-SCNC: 134 MMOL/L (ref 135–145)
SODIUM SERPL-SCNC: 136 MMOL/L (ref 135–145)
SODIUM SERPL-SCNC: 136 MMOL/L (ref 135–145)
SODIUM SERPL-SCNC: 137 MMOL/L (ref 135–145)
SP GR UR STRIP: 1.02 (ref 1.02–1.02)
SQUAMOUS #/AREA URNS HPF: (no result) /HPF
TRICYCLICS UR QL SCN: NEGATIVE
UROBILINOGEN UR-MCNC: NORMAL MG/DL
VARIANT LYMPHS NFR BLD MANUAL: 12 %
VENTILATION MODE VENT: NO
WBC # BLD AUTO: 26.1 10^3/UL (ref 4.3–11)
WBC # BLD AUTO: 29 10^3/UL (ref 4.3–11)
WBC #/AREA URNS HPF: (no result) /HPF

## 2019-08-11 PROCEDURE — 84484 ASSAY OF TROPONIN QUANT: CPT

## 2019-08-11 PROCEDURE — 87040 BLOOD CULTURE FOR BACTERIA: CPT

## 2019-08-11 PROCEDURE — 83036 HEMOGLOBIN GLYCOSYLATED A1C: CPT

## 2019-08-11 PROCEDURE — 80053 COMPREHEN METABOLIC PANEL: CPT

## 2019-08-11 PROCEDURE — 83605 ASSAY OF LACTIC ACID: CPT

## 2019-08-11 PROCEDURE — 81000 URINALYSIS NONAUTO W/SCOPE: CPT

## 2019-08-11 PROCEDURE — 85027 COMPLETE CBC AUTOMATED: CPT

## 2019-08-11 PROCEDURE — 80048 BASIC METABOLIC PNL TOTAL CA: CPT

## 2019-08-11 PROCEDURE — 84100 ASSAY OF PHOSPHORUS: CPT

## 2019-08-11 PROCEDURE — 82962 GLUCOSE BLOOD TEST: CPT

## 2019-08-11 PROCEDURE — 83690 ASSAY OF LIPASE: CPT

## 2019-08-11 PROCEDURE — 36600 WITHDRAWAL OF ARTERIAL BLOOD: CPT

## 2019-08-11 PROCEDURE — 93005 ELECTROCARDIOGRAM TRACING: CPT

## 2019-08-11 PROCEDURE — 85610 PROTHROMBIN TIME: CPT

## 2019-08-11 PROCEDURE — 80306 DRUG TEST PRSMV INSTRMNT: CPT

## 2019-08-11 PROCEDURE — 87081 CULTURE SCREEN ONLY: CPT

## 2019-08-11 PROCEDURE — 36415 COLL VENOUS BLD VENIPUNCTURE: CPT

## 2019-08-11 PROCEDURE — 83735 ASSAY OF MAGNESIUM: CPT

## 2019-08-11 PROCEDURE — 71045 X-RAY EXAM CHEST 1 VIEW: CPT

## 2019-08-11 PROCEDURE — 85025 COMPLETE CBC W/AUTO DIFF WBC: CPT

## 2019-08-11 PROCEDURE — 82805 BLOOD GASES W/O2 SATURATION: CPT

## 2019-08-11 PROCEDURE — 85007 BL SMEAR W/DIFF WBC COUNT: CPT

## 2019-08-11 PROCEDURE — 82010 KETONE BODYS QUAN: CPT

## 2019-08-11 RX ADMIN — METOCLOPRAMIDE SCH MG: 10 TABLET ORAL at 16:39

## 2019-08-11 RX ADMIN — SODIUM CHLORIDE SCH MLS/HR: 4.5 INJECTION, SOLUTION INTRAVENOUS at 17:45

## 2019-08-11 RX ADMIN — POTASSIUM CHLORIDE SCH MLS/HR: 200 INJECTION, SOLUTION INTRAVENOUS at 17:45

## 2019-08-11 RX ADMIN — SODIUM CHLORIDE SCH MLS/HR: 4.5 INJECTION, SOLUTION INTRAVENOUS at 18:47

## 2019-08-11 RX ADMIN — SODIUM CHLORIDE SCH MLS/HR: 900 INJECTION, SOLUTION INTRAVENOUS at 08:31

## 2019-08-11 RX ADMIN — ONDANSETRON PRN MG: 2 INJECTION, SOLUTION INTRAMUSCULAR; INTRAVENOUS at 11:48

## 2019-08-11 RX ADMIN — SODIUM CHLORIDE SCH MLS/HR: 4.5 INJECTION, SOLUTION INTRAVENOUS at 21:36

## 2019-08-11 RX ADMIN — ATORVASTATIN CALCIUM SCH MG: 40 TABLET, FILM COATED ORAL at 19:59

## 2019-08-11 RX ADMIN — SODIUM CHLORIDE SCH MLS/HR: 900 INJECTION, SOLUTION INTRAVENOUS at 09:40

## 2019-08-11 RX ADMIN — POTASSIUM CHLORIDE SCH MLS/HR: 200 INJECTION, SOLUTION INTRAVENOUS at 16:39

## 2019-08-11 RX ADMIN — DEXTROSE MONOHYDRATE AND SODIUM CHLORIDE SCH MLS/HR: 5; .45 INJECTION, SOLUTION INTRAVENOUS at 13:51

## 2019-08-11 RX ADMIN — SODIUM CHLORIDE SCH MLS/HR: 900 INJECTION, SOLUTION INTRAVENOUS at 11:53

## 2019-08-11 RX ADMIN — SODIUM CHLORIDE SCH MLS/HR: 900 INJECTION, SOLUTION INTRAVENOUS at 10:53

## 2019-08-11 RX ADMIN — DEXTROSE MONOHYDRATE AND SODIUM CHLORIDE SCH MLS/HR: 5; .45 INJECTION, SOLUTION INTRAVENOUS at 23:57

## 2019-08-11 RX ADMIN — POTASSIUM CHLORIDE SCH MLS/HR: 200 INJECTION, SOLUTION INTRAVENOUS at 21:08

## 2019-08-11 RX ADMIN — POTASSIUM CHLORIDE SCH MLS/HR: 200 INJECTION, SOLUTION INTRAVENOUS at 14:23

## 2019-08-11 RX ADMIN — POTASSIUM CHLORIDE SCH MLS/HR: 200 INJECTION, SOLUTION INTRAVENOUS at 18:47

## 2019-08-11 RX ADMIN — METOCLOPRAMIDE SCH MG: 10 TABLET ORAL at 19:59

## 2019-08-11 RX ADMIN — SODIUM CHLORIDE SCH MLS/HR: 4.5 INJECTION, SOLUTION INTRAVENOUS at 14:23

## 2019-08-11 RX ADMIN — DEXTROSE MONOHYDRATE AND SODIUM CHLORIDE SCH MLS/HR: 5; .45 INJECTION, SOLUTION INTRAVENOUS at 17:36

## 2019-08-11 RX ADMIN — ONDANSETRON PRN MG: 2 INJECTION, SOLUTION INTRAMUSCULAR; INTRAVENOUS at 16:39

## 2019-08-11 RX ADMIN — SODIUM CHLORIDE SCH MLS/HR: 900 INJECTION, SOLUTION INTRAVENOUS at 13:37

## 2019-08-11 RX ADMIN — SODIUM CHLORIDE SCH MLS/HR: 900 INJECTION, SOLUTION INTRAVENOUS at 13:50

## 2019-08-11 RX ADMIN — DEXTROSE MONOHYDRATE AND SODIUM CHLORIDE SCH MLS/HR: 5; .45 INJECTION, SOLUTION INTRAVENOUS at 19:53

## 2019-08-11 RX ADMIN — POTASSIUM CHLORIDE SCH MLS/HR: 200 INJECTION, SOLUTION INTRAVENOUS at 23:30

## 2019-08-11 NOTE — XMS REPORT
Clinical Summary

                             Created on: 2019



Radha Kelly

External Reference #: [id number]

: 1965

Sex: Female



Demographics







                          Address                   [address] [place]  [address]

 

                          Home Phone                [phone]

 

                          Preferred Language        English

 

                          Marital Status            Unknown

 

                          Buddhism Affiliation     NON

 

                          Race                      Unknown

 

                          Ethnic Group              Not  or 





Author







                          Author                    [facility]

 

                          Organization              [facility]

 

                          Address                   Unknown

 

                          Phone                     Unavailable







Support







                Name            Relationship    Address         Phone

 

                No Contact      [employer]            Unknown         [phone]







Care Team Providers







                    Care Team Member Name    Role                Phone

 

                    No Pcp, Na          PCP                 Unavailable







Source Comments

Some departments are not documenting in the electronic medical record.  If you d
o not see the information that you expected, contact Release of Information in [facility] Health Information Management department at [phone] for further assistan
ce in locating additional records.[facility]



Allergies

Not on File



Medications

Not on file



Active Problems





Not on file



Social History







                                        Date



                 Tobacco Use     Types           Packs/Day       Years Used 

 

                                         



                                         Never Assessed    









 



                           Sex Assigned at Birth     Date Recorded

 

 



                                         Not on file 









                                        Industry



                           Job Start Date            Occupation 

 

                                        Not on file



                           Not on file               Not on file 









                                        Travel End



                           Travel History            Travel Start 













                                         No recent travel history available.







Last Filed Vital Signs

Not on file



Plan of Treatment







   



                 Health Maintenance     Due Date        Last Done       Comments

 

   



                           HEPATITIS C SCREENING     1965  

 

   



                           PHYSICAL (COMPREHENSIVE)     1970  



                                         EXAM   

 

   



                           HIV SCREENING             1978  

 

   



                           DTAP/TDAP VACCINES (1 -     1981  



                                         Tdap)   

 

   



                           CERVICAL CANCER SCREENING     1993  

 

   



                           BREAST CANCER SCREENING     2003  

 

   



                           COLORECTAL CANCER         2013  



                                         SCREENING   

 

   



                           SHINGLES RECOMBINANT      2013  



                                         VACCINE (1 of 2)   

 

   



                           INFLUENZA VACCINE         10/01/2019  







Results

Not on filefrom Last 3 Months



Insurance







                                        Type



            Payer      Benefit     Subscriber ID     Effective     Phone      Address 



                           Plan /                    Dates   



                                         Group     

 

                                         



                 [place]          xxxxxxxxx       4/15/2016-   



                           HEALTH                    Present   



                                         SERVICES     









     



            Guarantor Name     Account     Relation to     Date of     Phone      Billing Address



                     Type                Patient             Birth  

 

     



            Radha Kelly     Personal/F     Self       1965     [phone]     [address]               (Home)              [place] [address]







Advance Directives







                          Patient Representative    Explanation



                           Type                      Date Recorded  

 

                                                     



                           Advance                   2016 11:45 AM  



                                         Directive/DPOA

## 2019-08-11 NOTE — DIAGNOSTIC IMAGING REPORT
Examination: Single frontal view of the chest.



Indication: Vomiting.



Comparison: Multiple priors, most recent performed on 06/25/2015.



Findings:

The lungs are clear and the pulmonary vasculature is normal. No

pneumothorax or a large pleural effusion. The cardio mediastinal

silhouette is normal. No acute osseous abnormality is

appreciated. Radiopaque metallic density overlies the visualized

left upper abdomen, presumably outside the patient.



Impression:

No radiographic evidence of acute chest disease.



Radiopaque metallic density overlies the left upper abdomen,

presumably outside the patient.



Dictated by: 



  Dictated on workstation # [id number]

## 2019-08-11 NOTE — XMS REPORT
Continuity of Care Document

                             Created on: 2019



DAYTON BLISS

External Reference #: [id number]

: [id number]

Sex: Female



Demographics







                          Address                   [address] [place]  [address]

 

                          Home Phone                [phone] x

 

                          Preferred Language        Unknown

 

                          Marital Status            Unknown

 

                          Mormonism Affiliation     Unknown

 

                          Race                      Unknown

 

                          Ethnic Group              Unknown





Author







                          Organization              Unknown

 

                          Address                   Unknown

 

                          Phone                     Unavailable



              



Allergies

      





             Active              Description              Code              Type              Severity

                Reaction              Onset              Reported/Identified              Relationship

 to Patient                             Clinical Status        

 

                Yes              ketorolac              [id number]              Drug Allergy          

             Unknown              N/A                             2008                        

                                                 

 

             Yes              codeine              [id number]              Drug Allergy              

Unknown              TAKES ULTRAM AT                              2009              

                                                 

 

                Yes              tramadol              [id number]              Drug Allergy           

             Unknown              N/A                             2011                         

                                                 

 

                Yes              Sulfa (Sulfonamide Antibiotics)              [id number]              

Drug Allergy              Unknown              N/A                              2015

                                                             



                        



Medications

      



There is no data.                  



Problems

      





             Date Dx Coded              Attending              Type              Code              Diagnosis

                                        Diagnosed By        

 

             2011                             Ot              716.90              ARTHROPATHY NOS-

UNSPEC                                           

 

             2011                             Ot              787.03              VOMITING ALONE

                                                 

 

             2011                             Ot              789.00              ABDOMINAL PAIN,

 UNSPECIFIED SITE                                

 

             2011                             Ot              250.80              DIAB W OTH SPEC

 MANIFEST, TYPE II OR UNS                        

 

             2011                             Ot              305.20              CANNABIS ABUSE-

UNSPEC                                           

 

             2011                             Ot              305.50              OPIOID ABUSE-

UNSPEC                                           

 

             2011                             Ot              305.90              DRUG ABUSE NEC-

UNSPEC                                           

 

             2011                             Ot              V58.67              LONG-TERM (CURRENT)

 USE OF INSULIN                                  

 

             2011                             Ot              V58.69              OTH MED,LT,CURRENT

 USE                                             

 

             2011                             Ot              346.90              MIGRAINE UNSPECIFIED

 W/O INTRACT MGRN W/                             

 

             2011                             Ot              784.0              HEADACHE      

                                                 

 

             10/24/2011                             Ot              825.25              FX METATARSAL-

CLOSED                                           

 

             10/24/2011                             Ot              959.7              LOWER LEG INJURY

 NOS                                             

 

             10/24/2011                             Ot              E000.8              OTHER EXTERNAL

 CAUSE STATUS                                    

 

             10/24/2011                             Ot              E849.0              ACCIDENT IN HOME

                                                 

 

             10/24/2011                             Ot              E885.9              FALL FROM SLIPPING,

 TRIPPING, OR STUMBLI                            

 

             2011                             Ot              041.89              BACTERIAL INFECTION

 DUE TO OTHER SPECIFI                            

 

             2011                             Ot              250.00              DIAB KATHERIN WO 

COMPL, TYPE II OR UNSPEC TY                       

 

             2011                             Ot              272.4              HYPERLIPIDEMIA

 NEC/NOS                                         

 

             2011                             Ot              300.4              DYSTHYMIC DISORDER

                                                 

 

             2011                             Ot              401.9              HYPERTENSION NOS

                                                 

 

             2011                             Ot              453.41              ACUTE VENOUS 

EMBOLISM   THROMBOSIS DEEP                        

 

             2011                             Ot              530.81              ESOPHAGEAL REFLUX

                                                 

 

             2011                             Ot              593.9              RENAL   URETERAL

 DIS NOS                                         

 

             2011                             Ot              599.0              URIN TRACT INFECTION

 NOS                                             

 

             2011                             Ot              V45.89              POSTSURGICAL 

STATES NEC                                       

 

             2011                             Ot              V54.19              AFTERCARE HEALING

 TRAUMATIC FX OTHER BON                          

 

             2012                             Ot              453.40              ACUTE VENOUS 

EMBOLISM   THROMBOSIS UNSP                        

 

             2012                             Ot              346.90              MIGRAINE UNSPECIFIED

 W/O INTRACT MGRN W/                             

 

             2012                             Ot              453.40              ACUTE VENOUS 

EMBOLISM   THROMBOSIS UNSP                        

 

             2013                             Ot              250.61              DIAB W NEURO 

MANIFEST, TYPE I [JUVENILE                        

 

             2013                             Ot              305.1              TOBACCO USE DISORDER

                                                 

 

             2013                             Ot              401.9              HYPERTENSION NOS

                                                 

 

             2013                             Ot              493.90              ASTHMA, UNSPECIFIED

                                                 

 

             2013                             Ot              536.3              GASTROPARESIS 

                                                 

 

             2013                             Ot              558.9              NONINF GASTROENTERIT

 NEC                                             

 

             2013                             Ot              577.0              ACUTE PANCREATITIS

                                                 

 

             2013                             Ot              453.40              ACUTE VENOUS 

EMBOLISM   THROMBOSIS UNSP                        

 

                2014              JULY VOSS DO              Ot              453.40   

                          ACUTE VENOUS EMBOLISM   THROMBOSIS UNSP                        

 

                2014              JULY VOSS DO              Ot              453.40   

                          ACUTE VENOUS EMBOLISM   THROMBOSIS UNSP                        

 

           2015                             Ot              250.03                             

         

 

           2015                             Ot              250.41                             

         

 

           2015                             Ot              272.4                              

        

 

           2015                             Ot              403.10                             

         

 

           2015                             Ot              583.81                             

         

 

           2015                             Ot              585.2                              

        

 

           2015                             Ot              791.0                              

        

 

           2015                             Ot              453.40                             

         

 

                2015              JULY VOSS DO              Ot              453.40   

                                                             

 

                2015              JULY VOSS DO              Ot              250.80   

                          DIAB W OTH SPEC MANIFEST, TYPE II OR UNS                       

 

                2015              JULY VOSS DO              Ot              300.14   

                          DISSOCIATIVE IDENTITY DISORDER                       

 

                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   

                          BORDERLINE PERSONALITY DISORDER                       

 

                2015              JULY VOSS DO              Ot              458.9    

                          HYPOTENSION NOS                       

 

                2015              JULY VOSS DO              Ot              530.81   

                          ESOPHAGEAL REFLUX                       

 

                2015              SUMITDER , JULY QUIROS              Ot              584.9    

                          ACUTE RENAL FAILURE, UNSPECIFIED                       

 

                [facility]              [facility]              Ot              714.0    

                          RHEUMATOID ARTHRITIS                       

 

                2015              [facility], JULY QUIROS              Ot              780.97   

                          ALTERED MENTAL STATUS                       

 

                2015              [facility]              Ot              965.09   

                          POISONING-OPIATES NEC                       

 

                2015              [facility] DO, JULY QUIROS              Ot              969.4    

                          POIS-BENZODIAZEPINE RUBIO                       

 

                2015              [facility], JULY QUIROS              Ot              E849.0   

                          ACCIDENT IN HOME                       

 

                2015              [facility], JULY QUIROS              Ot              E850.2   

                          ACC POISON-OPIATES NEC                       

 

                2015              [facility]              Ot              E853.2   

                          ACC POISN-BENZDIAZ TRANQ                       

 

                2015              [facility], JULY QUIROS              Ot              V12.51   

                          HX-VENOUS THROMBOSIS EMBOLISM                       

 

                2015              [facility], JULY QUIROS              Ot              V58.67   

                          LONG-TERM (CURRENT) USE OF INSULIN                       

 

                2016              NANCY MENDEZ, YU S              Ot              E11.29    

                                                             

 

                2016              NANCY MENDEZ, JULIANNEMED S              Ot              E78.5     

                                                             

 

                2016              NANCY MENDEZ, JULIANNEMED S              Ot              I12.9     

                                                             

 

                2016              NANCY MENDEZ, AHMED S              Ot              N18.3     

                                                             

 

                2016              NANCY MENDEZ, AHMED S              Ot              R80.9     

                                                             

 

                2016              TIMOTHY MENDEZ, MELODY GRIJALVA              Ot              E86.0     

                          DEHYDRATION                        

 

                2016              TIMOTHY MENDEZ, MELODY GRIJALVA              Ot              F11.10    

                          OPIOID ABUSE, UNCOMPLICATED                       

 

                2016              TIMOTHY MENDEZ, MELODY GRIJALVA              Ot              F12.10    

                          CANNABIS ABUSE, UNCOMPLICATED                       

 

                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    

                          OTHER STIMULANT ABUSE, UNCOMPLICATED                       

 

           2016                             Ot              250.01                             

         

 

           2016                             Ot              272.4                              

        

 

           2016                             Ot              403.90                             

         

 

           2016                             Ot              583.81                             

         

 

           2016                             Ot              585.2                              

        

 

           2016                             Ot              791.0                              

        

 

           2016                             Ot              250.01                             

         

 

           2016                             Ot              272.4                              

        

 

           2016                             Ot              403.90                             

         

 

           2016                             Ot              583.81                             

         

 

           2016                             Ot              585.2                              

        

 

           2016                             Ot              791.0                              

        

 

           2016                             Ot              250.41                             

         

 

           2016                             Ot              272.4                              

        

 

           2016                             Ot              403.90                             

         

 

           2016                             Ot              583.81                             

         

 

           2016                             Ot              585.2                              

        

 

           2016                             Ot              791.0                              

        

 

           2016                             Ot              250.41                             

         

 

           2016                             Ot              272.4                              

        

 

           2016                             Ot              403.90                             

         

 

           2016                             Ot              583.81                             

         

 

           2016                             Ot              585.2                              

        

 

           2016                             Ot              791.0                              

        

 

           2016                             Ot              250.01                             

         

 

           2016                             Ot              272.4                              

        

 

           2016                             Ot              403.10                             

         

 

           2016                             Ot              583.81                             

         

 

           2016                             Ot              585.2                              

        

 

           2016                             Ot              791.0                              

        

 

           2016                             Ot              250.01                             

         

 

           2016                             Ot              272.4                              

        

 

           2016                             Ot              403.10                             

         

 

           2016                             Ot              583.81                             

         

 

           2016                             Ot              585.2                              

        

 

           2016                             Ot              791.0                              

        

 

           2016                             Ot              250.40                             

         

 

           2016                             Ot              272.4                              

        

 

           2016                             Ot              585.2                              

        

 

           2016                             Ot              791.0                              

        

 

           2016                             Ot              453.40                             

         

 

                2016              NANCY MENDEZ, GENESIS MELENDEZ              Ot              585.3     

                                                             

 

                2016              NANCY MENDEZ, GENESIS S              Ot              250.40    

                                                             

 

                2016              NANCY MENDEZ, GENESIS MELENDEZ              Ot              272.4     

                                                             

 

                2016              NANCY MENDEZ, GENESIS MELENDEZ              Ot              585.2     

                                                             

 

                2016              NANCY MENDEZ, GENESIS MELENDEZ              Ot              791.0     

                                                             

 

                2016              JULY VOSS DO              Ot              V76.12   

                                                             

 

                2016              JULY VOSS DO              Ot              611.72   

                                                             

 

                2016              NANCY MENDEZ, GENESIS MELENDEZ              Ot              250.01    

                                                             

 

                2016              NANCY MENDEZ, GENESIS MELENDEZ              Ot              272.4     

                                                             

 

                2016              NANCY MENDEZ, GENESIS MELENDEZ              Ot              403.10    

                                                             

 

                2016              NANCY MENDEZ, MED S              Ot              583.81    

                                                             

 

                2016              NANCY MENDEZ, MED S              Ot              585.2     

                                                             

 

                2016              NANCY MENDEZ, MED S              Ot              791.0     

                                                             

 

                2016              FELIZ GERMAIN DO              Ot              250.03    

                                                             

 

                2016              NANCY MENDEZ, MED S              Ot              250.01    

                                                             

 

                2016              NANCY MENDEZ, MED S              Ot              272.4     

                                                             

 

                2016              NANCY MENDEZ, MED S              Ot              403.10    

                                                             

 

                2016              NANCY MENDEZ, MED S              Ot              583.81    

                                                             

 

                2016              NANCY MENDZE, MED S              Ot              585.2     

                                                             

 

                2016              NANCY MENDEZ, [facility] S              Ot              791.0     

                                                             

 

                2016              FELIZ GERMAIN DO              Ot              250.03    

                                                             

 

                2016              JULY VOSS DO              Ot              453.40   

                                                             

 

           2016                             Ot              250.03                             

         

 

           2016                             Ot              250.41                             

         

 

           2016                             Ot              272.4                              

        

 

           2016                             Ot              403.10                             

         

 

           2016                             Ot              583.81                             

         

 

           2016                             Ot              585.2                              

        

 

           2016                             Ot              791.0                              

        

 

                2016              NANCY MENDEZ, GENESIS S              Ot              E11.29    

                                                             

 

                2016              NANCY MENDEZ, [facility] S              Ot              E78.5     

                                                             

 

                2016              NANCY MENDEZ, [facility] S              Ot              I12.9     

                                                             

 

                2016              NANCY MENDEZ, [facility] S              Ot              N18.3     

                                                             

 

                2016              NANCY MENDEZ, [facility] S              Ot              R80.9     

                                                             

 

           2016                             Ot              250.01                             

         

 

           2016                             Ot              272.4                              

        

 

           2016                             Ot              403.90                             

         

 

           2016                             Ot              583.81                             

         

 

           2016                             Ot              585.2                              

        

 

           2016                             Ot              791.0                              

        

 

           2016                             Ot              250.01                             

         

 

           2016                             Ot              272.4                              

        

 

           2016                             Ot              403.90                             

         

 

           2016                             Ot              583.81                             

         

 

           2016                             Ot              585.2                              

        

 

           2016                             Ot              791.0                              

        

 

           2016                             Ot              250.41                             

         

 

           2016                             Ot              272.4                              

        

 

           2016                             Ot              403.90                             

         

 

           2016                             Ot              583.81                             

         

 

           2016                             Ot              585.2                              

        

 

           2016                             Ot              791.0                              

        

 

           2016                             Ot              250.41                             

         

 

           2016                             Ot              272.4                              

        

 

           2016                             Ot              403.90                             

         

 

           2016                             Ot              583.81                             

         

 

           2016                             Ot              585.2                              

        

 

           2016                             Ot              791.0                              

        

 

           2016                             Ot              250.01                             

         

 

           2016                             Ot              272.4                              

        

 

           2016                             Ot              403.10                             

         

 

           2016                             Ot              583.81                             

         

 

           2016                             Ot              585.2                              

        

 

           2016                             Ot              791.0                              

        

 

           2016                             Ot              250.01                             

         

 

           2016                             Ot              272.4                              

        

 

           2016                             Ot              403.10                             

         

 

           2016                             Ot              583.81                             

         

 

           2016                             Ot              585.2                              

        

 

           2016                             Ot              791.0                              

        

 

           2016                             Ot              250.40                             

         

 

           2016                             Ot              272.4                              

        

 

           2016                             Ot              585.2                              

        

 

           2016                             Ot              791.0                              

        

 

           2016                             Ot              453.40                             

         

 

                2016              NANCY MENDEZ, GENESIS MELENDEZ              Ot              585.3     

                                                             

 

                2016              NANCY MENDEZ, GENESIS S              Ot              250.40    

                                                             

 

                2016              NANCY MENDEZ, GENESIS MELENDEZ              Ot              272.4     

                                                             

 

                2016              NANCY MENDEZ, GENESIS S              Ot              585.2     

                                                             

 

                2016              NANCY MENDEZ, GENESIS S              Ot              791.0     

                                                             

 

                2016              JULY VOSS DO A              Ot              V76.12   

                                                             

 

                2016              JULY VOSS DO A              Ot              611.72   

                                                             

 

                2016              NANCY MENDEZ, GENESIS MELENDEZ              Ot              250.01    

                                                             

 

                2016              NANCY MENDEZ, YU MELENDEZ              Ot              272.4     

                                                             

 

                2016              NANCY MENDEZ, GENESIS S              Ot              403.10    

                                                             

 

                2016              NANCY MENDEZ, GENESIS S              Ot              583.81    

                                                             

 

                2016              NANCY MENDEZ, MED S              Ot              585.2     

                                                             

 

                2016              NANCY MENDEZ, MED S              Ot              791.0     

                                                             

 

                2016              FELIZ GERMAIN DO              Ot              250.03    

                                                             

 

                2016              NANCY MENDEZ, MED S              Ot              250.01    

                                                             

 

                2016              NANCY MENDEZ, AHMED S              Ot              272.4     

                                                             

 

                2016              NANCY MENDEZ, MED S              Ot              403.10    

                                                             

 

                2016              NANCY MENDEZ, MED S              Ot              583.81    

                                                             

 

                2016              NANCY MENDEZ, MED S              Ot              585.2     

                                                             

 

                2016              NANCY MENDEZ, MED S              Ot              791.0     

                                                             

 

                2016              FELIZ GERMAIN DO              Ot              250.03    

                                                             

 

                2016              JULY VOSS DO              Ot              453.40   

                                                             

 

           2016                             Ot              250.03                             

         

 

           2016                             Ot              250.41                             

         

 

           2016                             Ot              272.4                              

        

 

           2016                             Ot              403.10                             

         

 

           2016                             Ot              583.81                             

         

 

           2016                             Ot              585.2                              

        

 

           2016                             Ot              791.0                              

        

 

                2016              NANCY MENDEZ, MED S              Ot              E11.29    

                                                             

 

                2016              NANCY MENDEZ, MED S              Ot              E78.5     

                                                             

 

                2016              NANCY MENDEZ, MED S              Ot              I12.9     

                                                             

 

                2016              NANCY MENDEZ, MED S              Ot              N18.3     

                                                             

 

                2016              NANCY MENDEZ, MED S              Ot              R80.9     

                                                             

 

                2016              TIMOTHY MENDEZ, MELODY GRIJALVA              Ot              E86.0     

                                                             

 

                2016              TIMOTHY MENDEZ, MELODY GRIJALVA              Ot              F11.10    

                                                             

 

                2016              TIMOTHY MENDEZ, MELODY GRIJALVA              Ot              F12.10    

                                                             

 

                2016              TIMOTHY MENDEZ, MELODY GRIJALVA              Ot              F15.10    

                                                             

 

                04/15/2016              TIMOTHY MENDEZ, MELODY GRIJALVA              Ot              E86.0     

                          DEHYDRATION                        

 

                04/15/2016              TIMOTHY MENDEZ, MELODY GRIJALVA              Ot              F11.10    

                          OPIOID ABUSE, UNCOMPLICATED                       

 

                04/15/2016              TIMOTHY MENDEZ, MELODY GRIJALVA              Ot              F12.10    

                          CANNABIS ABUSE, UNCOMPLICATED                       

 

                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    

                          OTHER STIMULANT ABUSE, UNCOMPLICATED                       

 

                2016              TIMOTHY MENDEZ, MELODY GRIJALVA              Ot              E86.0     

                          DEHYDRATION                        

 

                2016              MELODY AGUIRRE MD              Ot              F11.10    

                          OPIOID ABUSE, UNCOMPLICATED                       

 

                2016              TIMOTHY MENDEZ, MELODY GRIJALVA              Ot              F12.10    

                          CANNABIS ABUSE, UNCOMPLICATED                       

 

                2016              TIMOTHY MENDEZ, MELODY GRIAJLVA              Ot              F15.10    

                          OTHER STIMULANT ABUSE, UNCOMPLICATED                       

 

             2016                             Ot              250.01              DIAB KATHERIN WO 

COMPL, TYPE I [JUVENILE TYP                       

 

             2016                             Ot              272.4              HYPERLIPIDEMIA

 NEC/NOS                                         

 

             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

 DIS, UNSPEC, W CHR KD ST                        

 

             2016                             Ot              583.81              NEPHRITIS NOS

 IN OTH DIS                                      

 

             2016                             Ot              585.2              CHRONIC KIDNEY

 DISEASE, STAGE II (MILD)                        

 

             2016                             Ot              791.0              PROTEINURIA   

                                                 

 

             2016                             Ot              250.01              DIAB KATHERIN WO 

COMPL, TYPE I [JUVENILE TYP                       

 

             2016                             Ot              272.4              HYPERLIPIDEMIA

 NEC/NOS                                         

 

             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

 DIS, UNSPEC, W CHR KD ST                        

 

             2016                             Ot              583.81              NEPHRITIS NOS

 IN OTH DIS                                      

 

             2016                             Ot              585.2              CHRONIC KIDNEY

 DISEASE, STAGE II (MILD)                        

 

             2016                             Ot              791.0              PROTEINURIA   

                                                 

 

             2016                             Ot              250.41              DIAB W RENAL 

MANIFEST, TYPE I [JUVENILE                        

 

             2016                             Ot              272.4              HYPERLIPIDEMIA

 NEC/NOS                                         

 

             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

 DIS, UNSPEC, W CHR KD ST                        

 

             2016                             Ot              583.81              NEPHRITIS NOS

 IN OTH DIS                                      

 

             2016                             Ot              585.2              CHRONIC KIDNEY

 DISEASE, STAGE II (MILD)                        

 

             2016                             Ot              791.0              PROTEINURIA   

                                                 

 

             2016                             Ot              250.41              DIAB W RENAL 

MANIFEST, TYPE I [JUVENILE                        

 

             2016                             Ot              272.4              HYPERLIPIDEMIA

 NEC/NOS                                         

 

             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

 DIS, UNSPEC, W CHR KD ST                        

 

             2016                             Ot              583.81              NEPHRITIS NOS

 IN OTH DIS                                      

 

             2016                             Ot              585.2              CHRONIC KIDNEY

 DISEASE, STAGE II (MILD)                        

 

             2016                             Ot              791.0              PROTEINURIA   

                                                 

 

             2016                             Ot              250.01              DIAB KATHERIN WO 

COMPL, TYPE I [JUVENILE TYP                       

 

             2016                             Ot              272.4              HYPERLIPIDEMIA

 NEC/NOS                                         

 

             2016                             Ot              403.10              HYPTNSV CHR KID

 DIS, BENIGN, W CHR KD ST                        

 

             2016                             Ot              583.81              NEPHRITIS NOS

 IN OTH DIS                                      

 

             2016                             Ot              585.2              CHRONIC KIDNEY

 DISEASE, STAGE II (MILD)                        

 

             2016                             Ot              791.0              PROTEINURIA   

                                                 

 

             2016                             Ot              250.01              DIAB KATHERIN WO 

COMPL, TYPE I [JUVENILE TYP                       

 

             2016                             Ot              272.4              HYPERLIPIDEMIA

 NEC/NOS                                         

 

             2016                             Ot              403.10              HYPTNSV CHR KID

 DIS, BENIGN, W CHR KD ST                        

 

             2016                             Ot              583.81              NEPHRITIS NOS

 IN OTH DIS                                      

 

             2016                             Ot              585.2              CHRONIC KIDNEY

 DISEASE, STAGE II (MILD)                        

 

             2016                             Ot              791.0              PROTEINURIA   

                                                 

 

             2016                             Ot              250.40              DIAB W RENAL 

MANIFEST, TYPE II OR UNSPEC                       

 

             2016                             Ot              272.4              HYPERLIPIDEMIA

 NEC/NOS                                         

 

             2016                             Ot              585.2              CHRONIC KIDNEY

 DISEASE, STAGE II (MILD)                        

 

             2016                             Ot              791.0              PROTEINURIA   

                                                 

 

             2016                             Ot              453.40              ACUTE VENOUS 

EMBOLISM   THROMBOSIS UNSP                        

 

                2016              NANCY MENDEZ, JULIANNEMED S              Ot              585.3     

                          CHRONIC KIDNEY DISEASE, STAGE III (MODER                       

 

                2016              NANCY MENDEZ, AHMED S              Ot              250.40    

                          DIAB W RENAL MANIFEST, TYPE II OR UNSPEC                       

 

                2016              NANCY MENDEZ, AHMED S              Ot              272.4     

                          HYPERLIPIDEMIA NEC/NOS                       

 

                2016              NANCY MENDEZ, AHMED S              Ot              585.2     

                          CHRONIC KIDNEY DISEASE, STAGE II (MILD)                       

 

                2016              NANCY MENDEZ, AHMED S              Ot              791.0     

                          PROTEINURIA                        

 

                2016              JULY VOSS DO              Ot              V76.12   

                          OTH SCREEN MAMMO-MALIGN NEOPLASM OF BABATUNDE                       

 

                2016              JULY VOSS DO              Ot              611.72   

                          LUMP OR MASS IN BREAST                       

 

                2016              NANCY MENDEZ, JULIANNEMED S              Ot              250.01    

                          DIAB KATHERIN WO COMPL, TYPE I [JUVENILE TYP                       

 

                2016              NANCY MENDEZ, AHGENESIS S              Ot              272.4     

                          HYPERLIPIDEMIA NEC/NOS                       

 

                2016              NANCY MENDEZ, YU S              Ot              403.10    

                          HYPTNSV CHR KID DIS, BENIGN, W CHR KD ST                       

 

                2016              NANCY MENDEZ, AHMED S              Ot              583.81    

                          NEPHRITIS NOS IN OTH DIS                       

 

                2016              NANCY MENDEZ, AHMED S              Ot              585.2     

                          CHRONIC KIDNEY DISEASE, STAGE II (MILD)                       

 

                2016              NANCY MENDEZ, AHMED S              Ot              791.0     

                          PROTEINURIA                        

 

                2016              FELIZ GERMAIN DO              Ot              250.03    

                          DIAB KATHERIN WO COMPL, TYPE I [JUVENILE TYP                       

 

                2016              NANCY MENDEZ, AHMED S              Ot              250.01    

                          DIAB KATHERIN WO COMPL, TYPE I [JUVENILE TYP                       

 

                2016              NANCY MENDEZ, AHMED S              Ot              272.4     

                          HYPERLIPIDEMIA NEC/NOS                       

 

                2016              NANCY MENDEZ, AHMED S              Ot              403.10    

                          HYPTNSV [facility] KID DIS, BENIGN, W CHR KD ST                       

 

                2016              NANCY MENDEZ, JULIANNEMED S              Ot              583.81    

                          NEPHRITIS NOS IN OTH DIS                       

 

                2016              NANCY MENDEZ, AHMED S              Ot              585.2     

                          CHRONIC KIDNEY DISEASE, STAGE II (MILD)                       

 

                2016              NANCY MENDEZ, JULIANNEMED S              Ot              791.0     

                          PROTEINURIA                        

 

                2016              FELIZ GERMAIN DO              Ot              250.03    

                          DIAB KATHERIN WO COMPL, TYPE I [JUVENILE TYP                       

 

                2016              JULY VOSS DO              Ot              453.40   

                          ACUTE VENOUS EMBOLISM   THROMBOSIS UNSP                        

 

             2016                             Ot              250.03              DIAB KATHERIN WO 

COMPL, TYPE I [JUVENILE TYP                       

 

             2016                             Ot              250.41              DIAB W RENAL 

MANIFEST, TYPE I [JUVENILE                        

 

             2016                             Ot              272.4              HYPERLIPIDEMIA

 NEC/NOS                                         

 

             2016                             Ot              403.10              HYPTNSV [facility] KID

 DIS, BENIGN, W CHR KD ST                        

 

             2016                             Ot              583.81              NEPHRITIS NOS

 IN OTH DIS                                      

 

             2016                             Ot              585.2              CHRONIC KIDNEY

 DISEASE, STAGE II (MILD)                        

 

             2016                             Ot              791.0              PROTEINURIA   

                                                 

 

                2016              NANCY MENDEZ, AHMED S              Ot              E11.29    

                          TYPE 2 DIABETES MELLITUS W OT DIABETIC                        

 

                2016              NANCY MENDEZ, AHMED S              Ot              E78.5     

                          HYPERLIPIDEMIA, UNSPECIFIED                       

 

                2016              NANCY MENDEZ, JULIANNEMED S              Ot              I12.9     

                          HYPERTENSIVE CHRONIC KIDNEY DISEASE W ST                       

 

                2016              NANCY MENDEZ, AHMED S              Ot              N18.3     

                          CHRONIC KIDNEY DISEASE, STAGE 3 (MODERAT                       

 

                2016              NANCY MENDEZ, JULIANNEMED S              Ot              R80.9     

                          PROTEINURIA, UNSPECIFIED                       

 

                2016              NANCY MENDEZ, AHMED S              Ot              E11.21    

                          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                       

 

                2016              NANCY MENDEZ, YU S              Ot              E11.22    

                          TYPE 2 DIABETES MELLITUS W DIABETIC                        

 

                2016              NANCY MENDEZ, YU S              Ot              E78.5     

                          HYPERLIPIDEMIA, UNSPECIFIED                       

 

                2016              NANCY MENDEZ, AHMED S              Ot              I12.9     

                          HYPERTENSIVE CHRONIC KIDNEY DISEASE W ST                       

 

                2016              NANCY MENDEZ, JULIANNEMED S              Ot              N18.2     

                          CHRONIC KIDNEY DISEASE, STAGE 2 (MILD)                       

 

                2016              JULIANNE CRUZ MDMED S              Ot              R80.9     

                          PROTEINURIA, UNSPECIFIED                       

 

                2016              NANCY MENDEZ, AHMED S              Ot              Z79.899   

                          OTHER LONG TERM (CURRENT) DRUG THERAPY                       

 

                05/10/2016              NANCY MENDEZ, JULIANNEMED S              Ot              E11.21    

                          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                       

 

                05/10/2016              NANCY MENDEZ, AHMED S              Ot              E11.22    

                          TYPE 2 DIABETES MELLITUS W DIABETIC                        

 

                05/10/2016              YU CRUZ MD S              Ot              E78.5     

                          HYPERLIPIDEMIA, UNSPECIFIED                       

 

                05/10/2016              NANCY MENDEZ, JULIANNEMED S              Ot              I12.9     

                          HYPERTENSIVE CHRONIC KIDNEY DISEASE W ST                       

 

                05/10/2016              NANCY MENDEZ, AHMED S              Ot              N18.2     

                          CHRONIC KIDNEY DISEASE, STAGE 2 (MILD)                       

 

                05/10/2016              JULIANNE CRUZ MDMED S              Ot              R80.9     

                          PROTEINURIA, UNSPECIFIED                       

 

                05/10/2016              NANCY MENDEZ, AHMED S              Ot              Z79.899   

                          OTHER LONG TERM (CURRENT) DRUG THERAPY                       

 

                2016              FELIZ GERMAIN DO              Ot              E10.65    

                          TYPE 1 DIABETES MELLITUS WITH HYPERGLYCE                       

 

                2016              GREMAIN DO, FELIZ L              Ot              E78.5     

                          HYPERLIPIDEMIA, UNSPECIFIED                       

 

                2016              FELIZ GERMAIN DO L              Ot              E10.65    

                          TYPE 1 DIABETES MELLITUS WITH HYPERGLYCE                       

 

                2016              FELIZ GERMAIN DO L              Ot              E78.5     

                          HYPERLIPIDEMIA, UNSPECIFIED                       

 

             2016                             Ot              250.01              DIAB KATHERIN WO 

COMPL, TYPE I [JUVENILE TYP                       

 

             2016                             Ot              272.4              HYPERLIPIDEMIA

 NEC/NOS                                         

 

             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

 DIS, UNSPEC, W CHR KD ST                        

 

             2016                             Ot              583.81              NEPHRITIS NOS

 IN OTH DIS                                      

 

             2016                             Ot              585.2              CHRONIC KIDNEY

 DISEASE, STAGE II (MILD)                        

 

             2016                             Ot              791.0              PROTEINURIA   

                                                 

 

             2016                             Ot              250.41              DIAB W RENAL 

MANIFEST, TYPE I [JUVENILE                        

 

             2016                             Ot              272.4              HYPERLIPIDEMIA

 NEC/NOS                                         

 

             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

 DIS, UNSPEC, W CHR KD ST                        

 

             2016                             Ot              583.81              NEPHRITIS NOS

 IN OTH DIS                                      

 

             2016                             Ot              585.2              CHRONIC KIDNEY

 DISEASE, STAGE II (MILD)                        

 

             2016                             Ot              791.0              PROTEINURIA   

                                                 

 

             2016                             Ot              250.41              DIAB W RENAL 

MANIFEST, TYPE I [JUVENILE                        

 

             2016                             Ot              272.4              HYPERLIPIDEMIA

 NEC/NOS                                         

 

             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

 DIS, UNSPEC, W CHR KD ST                        

 

             2016                             Ot              583.81              NEPHRITIS NOS

 IN OTH DIS                                      

 

             2016                             Ot              585.2              CHRONIC KIDNEY

 DISEASE, STAGE II (MILD)                        

 

             2016                             Ot              791.0              PROTEINURIA   

                                                 

 

             2016                             Ot              250.01              DIAB KATHERIN WO 

COMPL, TYPE I [JUVENILE TYP                       

 

             2016                             Ot              272.4              HYPERLIPIDEMIA

 NEC/NOS                                         

 

             2016                             Ot              403.10              HYPTNSV CHR KID

 DIS, BENIGN, W CHR KD ST                        

 

             2016                             Ot              583.81              NEPHRITIS NOS

 IN OTH DIS                                      

 

             2016                             Ot              585.2              CHRONIC KIDNEY

 DISEASE, STAGE II (MILD)                        

 

             2016                             Ot              791.0              PROTEINURIA   

                                                 

 

             2016                             Ot              250.01              DIAB KATHERIN WO 

COMPL, TYPE I [JUVENILE TYP                       

 

             2016                             Ot              272.4              HYPERLIPIDEMIA

 NEC/NOS                                         

 

             2016                             Ot              403.10              HYPTNSV CHR KID

 DIS, BENIGN, W CHR KD ST                        

 

             2016                             Ot              583.81              NEPHRITIS NOS

 IN OTH DIS                                      

 

             2016                             Ot              585.2              CHRONIC KIDNEY

 DISEASE, STAGE II (MILD)                        

 

             2016                             Ot              791.0              PROTEINURIA   

                                                 

 

             2016                             Ot              250.40              DIAB W RENAL 

MANIFEST, TYPE II OR UNSPEC                       

 

             2016                             Ot              272.4              HYPERLIPIDEMIA

 NEC/NOS                                         

 

             2016                             Ot              585.2              CHRONIC KIDNEY

 DISEASE, STAGE II (MILD)                        

 

             2016                             Ot              791.0              PROTEINURIA   

                                                 

 

             2016                             Ot              453.40              ACUTE VENOUS 

EMBOLISM   THROMBOSIS UNSP                        

 

                2016              NANYC MENDEZ, YU S              Ot              585.3     

                          CHRONIC KIDNEY DISEASE, STAGE III (MODER                       

 

                2016              NANCY MENDEZ, AHGENESIS S              Ot              250.40    

                          DIAB W RENAL MANIFEST, TYPE II OR UNSPEC                       

 

                2016              NANCY MENDEZ, AHMED S              Ot              272.4     

                          HYPERLIPIDEMIA NEC/NOS                       

 

                2016              NANCY MENDEZ, AHMED S              Ot              585.2     

                          CHRONIC KIDNEY DISEASE, STAGE II (MILD)                       

 

                2016              NANCY MENDEZ, YU S              Ot              791.0     

                          PROTEINURIA                        

 

                2016              JULY VOSS DO              Ot              V76.12   

                          OTH SCREEN MAMMO-MALIGN NEOPLASM OF BABATUNDE                       

 

                2016              JULY VOSS DO              Ot              611.72   

                          LUMP OR MASS IN BREAST                       

 

                2016              NANCY MENDEZ, AHMED S              Ot              250.01    

                          DIAB KATHERIN WO COMPL, TYPE I [JUVENILE TYP                       

 

                2016              NANCY MENDEZ, JULIANNEMED S              Ot              272.4     

                          HYPERLIPIDEMIA NEC/NOS                       

 

                2016              NANCY MENDEZ, AHMED S              Ot              403.10    

                          HYPTNSV CHR KID DIS, BENIGN, W CHR KD ST                       

 

                2016              NANCY MENDEZ, YU S              Ot              583.81    

                          NEPHRITIS NOS IN OTH DIS                       

 

                2016              NANCY MENDEZ, AHMED S              Ot              585.2     

                          CHRONIC KIDNEY DISEASE, STAGE II (MILD)                       

 

                2016              NANCY MENDEZ, AHMED S              Ot              791.0     

                          PROTEINURIA                        

 

                2016              FELIZ GERMAIN DO              Ot              250.03    

                          DIAB KATHERIN WO COMPL, TYPE I [JUVENILE TYP                       

 

                2016              NANCY MENDEZ, AHMED S              Ot              250.01    

                          DIAB KATHERIN WO COMPL, TYPE I [JUVENILE TYP                       

 

                2016              NANCY MENDEZ, AHMED S              Ot              272.4     

                          HYPERLIPIDEMIA NEC/NOS                       

 

                2016              NANCY MENDEZ, AHMED S              Ot              403.10    

                          HYPTNSV CHR KID DIS, BENIGN, W CHR KD ST                       

 

                2016              NANCY MENDEZ, AHMED S              Ot              583.81    

                          NEPHRITIS NOS IN OTH DIS                       

 

                2016              NANCY MENDEZ, AHMED S              Ot              585.2     

                          CHRONIC KIDNEY DISEASE, STAGE II (MILD)                       

 

                2016              NANCY MENDEZ, YU S              Ot              791.0     

                          PROTEINURIA                        

 

                2016              FELIZ GERMAIN DO              Ot              250.03    

                          DIAB KATHERIN WO COMPL, TYPE I [JUVENILE TYP                       

 

                2016              BIPIN RAMIRESJULY              Ot              453.40   

                          ACUTE VENOUS EMBOLISM   THROMBOSIS UNSP                        

 

             2016                             Ot              250.03              DIAB KATHERIN WO 

COMPL, TYPE I [JUVENILE TYP                       

 

             2016                             Ot              250.41              DIAB W RENAL 

MANIFEST, TYPE I [JUVENILE                        

 

             2016                             Ot              272.4              HYPERLIPIDEMIA

 NEC/NOS                                         

 

             2016                             Ot              403.10              HYPTNSV [facility] KID

 DIS, BENIGN, W CHR KD ST                        

 

             2016                             Ot              583.81              NEPHRITIS NOS

 IN OTH DIS                                      

 

             2016                             Ot              585.2              CHRONIC KIDNEY

 DISEASE, STAGE II (MILD)                        

 

             2016                             Ot              791.0              PROTEINURIA   

                                                 

 

                2016              NANCY MENDEZ, YU S              Ot              E11.29    

                          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                        

 

                2016              NANCY MENDEZ, AHMED S              Ot              E78.5     

                          HYPERLIPIDEMIA, UNSPECIFIED                       

 

                2016              NANCY MENDEZ, AHMED S              Ot              I12.9     

                          HYPERTENSIVE CHRONIC KIDNEY DISEASE W ST                       

 

                2016              NANCY MENDEZ, AHMED S              Ot              N18.3     

                          CHRONIC KIDNEY DISEASE, STAGE 3 (MODERAT                       

 

                2016              NANCY MENDEZ, AHMED S              Ot              R80.9     

                          PROTEINURIA, UNSPECIFIED                       

 

                2016              NANCY MENDEZ, AHMED S              Ot              E11.21    

                          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                       

 

                2016              NANCY MENDEZ, AHMED S              Ot              E11.22    

                          TYPE 2 DIABETES MELLITUS W DIABETIC                        

 

                2016              NANCY MENDEZ, YU S              Ot              E78.5     

                          HYPERLIPIDEMIA, UNSPECIFIED                       

 

                2016              NANCY MENDEZ, YU MELENDEZ              Ot              I12.9     

                          HYPERTENSIVE CHRONIC KIDNEY DISEASE W ST                       

 

                2016              NACNY MENDEZ, YU MELENDEZ              Ot              N18.2     

                          CHRONIC KIDNEY DISEASE, STAGE 2 (MILD)                       

 

                2016              NANCY MENDEZ, YU MELENDEZ              Ot              R80.9     

                          PROTEINURIA, UNSPECIFIED                       

 

                2016              NANCY MENDEZ, YU S              Ot              Z79.899   

                          OTHER LONG TERM (CURRENT) DRUG THERAPY                       

 

                2016              GERMAIN DO, FELIZ L              Ot              E10.65    

                          TYPE 1 DIABETES MELLITUS WITH HYPERGLYCE                       

 

                2016              GERMAIN DO, FELIZ L              Ot              E78.5     

                          HYPERLIPIDEMIA, UNSPECIFIED                       

 

                2016              GERMAIN DO, FELIZ L              Ot              E10.65    

                          TYPE 1 DIABETES MELLITUS WITH HYPERGLYCE                       

 

                2016              GERMAIN DO, FELIZ L              Ot              E78.5     

                          HYPERLIPIDEMIA, UNSPECIFIED                       

 

                2016              GERMAIN DO, FELIZ L              Ot              E10.65    

                          TYPE 1 DIABETES MELLITUS WITH HYPERGLYCE                       

 

                2016              GERMAIN DO, FELIZ L              Ot              E78.5     

                          HYPERLIPIDEMIA, UNSPECIFIED                       

 

             2017                             Ot              250.01              DIAB KATHERIN WO 

COMPL, TYPE I [JUVENILE TYP                       

 

             2017                             Ot              272.4              HYPERLIPIDEMIA

 NEC/NOS                                         

 

             2017                             Ot              403.10              HYPTNSV CHR KID

 DIS, BENIGN, W CHR KD ST                        

 

             2017                             Ot              583.81              NEPHRITIS NOS

 IN OTH DIS                                      

 

             2017                             Ot              585.2              CHRONIC KIDNEY

 DISEASE, STAGE II (MILD)                        

 

             2017                             Ot              791.0              PROTEINURIA   

                                                 

 

             2017                             Ot              250.40              DIAB W RENAL 

MANIFEST, TYPE II OR UNSPEC                       

 

             2017                             Ot              272.4              HYPERLIPIDEMIA

 NEC/NOS                                         

 

             2017                             Ot              585.2              CHRONIC KIDNEY

 DISEASE, STAGE II (MILD)                        

 

             2017                             Ot              791.0              PROTEINURIA   

                                                 

 

             2017                             Ot              453.40              ACUTE VENOUS 

EMBOLISM   THROMBOSIS UNSP                        

 

                2017              NANCY MENDEZ, YU MELENDEZ              Ot              585.3     

                          CHRONIC KIDNEY DISEASE, STAGE III (MODER                       

 

                2017              NANCY MENDEZ, AHMED S              Ot              250.40    

                          DIAB W RENAL MANIFEST, TYPE II OR UNSPEC                       

 

                2017              NANCY MENDEZ, AHMED S              Ot              272.4     

                          HYPERLIPIDEMIA NEC/NOS                       

 

                2017              NANCY MENDEZ, AHMED S              Ot              585.2     

                          CHRONIC KIDNEY DISEASE, STAGE II (MILD)                       

 

                2017              NANCY MENDEZ, AHMED S              Ot              791.0     

                          PROTEINURIA                        

 

                2017              GELLENPETER RAMIRES JULY A              Ot              V76.12   

                          OTH SCREEN MAMMO-MALIGN NEOPLASM OF BABATUNDE                       

 

                2017              GELLOSDER DO, JULY A              Ot              611.72   

                          LUMP OR MASS IN BREAST                       

 

                2017              NANCY MENDEZ, AHMED S              Ot              250.01    

                          DIAB KATHERIN WO COMPL, TYPE I [JUVENILE TYP                       

 

                2017              NANCY MENDEZ, AHMED S              Ot              272.4     

                          HYPERLIPIDEMIA NEC/NOS                       

 

                2017              NANCY MENDEZ, AHMED S              Ot              403.10    

                          HYPTNSV [facility] KID DIS, BENIGN, W CHR KD ST                       

 

                2017              NANCY MENDEZ, AHMED S              Ot              583.81    

                          NEPHRITIS NOS IN OTH DIS                       

 

                2017              NANCY MENDEZ, AHMED S              Ot              585.2     

                          CHRONIC KIDNEY DISEASE, STAGE II (MILD)                       

 

                2017              NANCY MENDEZ, AHMED S              Ot              791.0     

                          PROTEINURIA                        

 

                2017              GERMAIN DO, FELIZ L              Ot              250.03    

                          DIAB KATHERIN WO COMPL, TYPE I [JUVENILE TYP                       

 

                2017              NANCY MENDEZ, AHMED S              Ot              250.01    

                          DIAB KATHERIN WO COMPL, TYPE I [JUVENILE TYP                       

 

                2017              NANCY MENDEZ, AHMED S              Ot              272.4     

                          HYPERLIPIDEMIA NEC/NOS                       

 

                2017              NANCY MENDEZ, AHMED S              Ot              403.10    

                          HYPTNSV [facility] KID DIS, BENIGN, W CHR KD ST                       

 

                2017              NANCY MENDEZ, AHMED S              Ot              583.81    

                          NEPHRITIS NOS IN OTH DIS                       

 

                2017              NANCY MENDEZ, AHMED S              Ot              585.2     

                          CHRONIC KIDNEY DISEASE, STAGE II (MILD)                       

 

                2017              NANCY MENDEZ, AHMED S              Ot              791.0     

                          PROTEINURIA                        

 

                2017              GERMAIN DO, FELIZ L              Ot              250.03    

                          DIAB KATHERIN WO COMPL, TYPE I [JUVENILE TYP                       

 

                2017              GELJULY WEEMS DO              Ot              453.40   

                          ACUTE VENOUS EMBOLISM   THROMBOSIS UNSP                        

 

             2017                             Ot              250.03              DIAB KATHERIN WO 

COMPL, TYPE I [JUVENILE TYP                       

 

             2017                             Ot              250.41              DIAB W RENAL 

MANIFEST, TYPE I [JUVENILE                        

 

             2017                             Ot              272.4              HYPERLIPIDEMIA

 NEC/NOS                                         

 

             2017                             Ot              403.10              HYPTNSV CHR KID

 DIS, BENIGN, W CHR KD ST                        

 

             2017                             Ot              583.81              NEPHRITIS NOS

 IN OTH DIS                                      

 

             2017                             Ot              585.2              CHRONIC KIDNEY

 DISEASE, STAGE II (MILD)                        

 

             2017                             Ot              791.0              PROTEINURIA   

                                                 

 

                2017              NANCY MENDEZ, JULIANNEMED S              Ot              E11.29    

                          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                        

 

                2017              NANCY MENDEZ, JULIANNEMED S              Ot              E78.5     

                          HYPERLIPIDEMIA, UNSPECIFIED                       

 

                2017              NANCY MENDEZ, AHMED S              Ot              I12.9     

                          HYPERTENSIVE CHRONIC KIDNEY DISEASE W ST                       

 

                2017              NANCY MENDEZ, AHMED S              Ot              N18.3     

                          CHRONIC KIDNEY DISEASE, STAGE 3 (MODERAT                       

 

                2017              NANCY MENDEZ, AHMED S              Ot              R80.9     

                          PROTEINURIA, UNSPECIFIED                       

 

                2017              NANCY MENDEZ, AHMED S              Ot              E11.21    

                          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                       

 

                2017              NANCY MENDEZ, AHMED S              Ot              E11.22    

                          TYPE 2 DIABETES MELLITUS W DIABETIC                        

 

                2017              NANCY MENDEZ, AHMED S              Ot              E78.5     

                          HYPERLIPIDEMIA, UNSPECIFIED                       

 

                2017              NANCY MENDEZ, AHMED S              Ot              I12.9     

                          HYPERTENSIVE CHRONIC KIDNEY DISEASE W ST                       

 

                2017              NANCY MENDEZ, AHMED S              Ot              N18.2     

                          CHRONIC KIDNEY DISEASE, STAGE 2 (MILD)                       

 

                2017              NANCY MENDEZ, AHMED S              Ot              R80.9     

                          PROTEINURIA, UNSPECIFIED                       

 

                2017              NANCY MENDEZ, AHMED S              Ot              Z79.899   

                          OTHER LONG TERM (CURRENT) DRUG THERAPY                       

 

                2017              FELIZ GERMAIN DO              Ot              E10.65    

                          TYPE 1 DIABETES MELLITUS WITH HYPERGLYCE                       

 

                2017              FELIZ GERMAIN DO              Ot              E78.5     

                          HYPERLIPIDEMIA, UNSPECIFIED                       

 

                2017              FELIZ GERMAIN DO              Ot              E10.65    

                          TYPE 1 DIABETES MELLITUS WITH HYPERGLYCE                       

 

                2017              FELIZ GERMAIN DO              Ot              E78.5     

                          HYPERLIPIDEMIA, UNSPECIFIED                       

 

                2017              LIAM GAONA              Ot              E11.9        

                          TYPE 2 DIABETES MELLITUS WITHOUT COMPLIC                       

 

                2017              LIAM GAONA              Ot              J45.909      

                          UNSPECIFIED ASTHMA, UNCOMPLICATED                       

 

                2017              LIAM GAONA              Ot              K21.9        

                          GASTRO-ESOPHAGEAL REFLUX DISEASE WITHOUT                       

 

                2017              LIAM GAONA              Ot              M06.9        

                          RHEUMATOID ARTHRITIS, UNSPECIFIED                       

 

                2017              LIAM GAONA              Ot              M19.90       

                          UNSPECIFIED OSTEOARTHRITIS, UNSPECIFIED                        

 

                2017              LIAM GAONA              Ot              R10.31       

                          RIGHT LOWER QUADRANT PAIN                       

 

                2017              LIAM AGONA              Ot              R11.2        

                          NAUSEA WITH VOMITING, UNSPECIFIED                       

 

                2017              LIAM GAONA              Ot              R94.5        

                          ABNORMAL RESULTS OF LIVER FUNCTION STUDI                       

 

                2017              LIAM GAONA              Ot              Z79.01       

                          LONG TERM (CURRENT) USE OF ANTICOAGULANT                       

 

                2017              LIAM GAONA              Ot              Z79.4        

                          LONG TERM (CURRENT) USE OF INSULIN                       

 

                2017              LIAM GAONA              Ot              Z82.49       

                          FAMILY HX OF ISCHEM HEART DIS AND OTH DI                       

 

                2017              LIAM GAONA              Ot              Z86.718      

                          PERSONAL HISTORY OF OTHER VENOUS THROMBO                       

 

                2017              LIAM GAONA              Ot              Z87.01       

                          PERSONAL HISTORY OF PNEUMONIA (RECURRENT                       

 

                2017              LIAM GAONA              Ot              Z87.19       

                          PERSONAL HISTORY OF OTHER DISEASES OF TH                       

 

                2017              LIAM GAONA              Ot              Z90.710      

                          ACQUIRED ABSENCE OF BOTH CERVIX AND UTER                       

 

                2017              LIAM GAONA              Ot              Z90.89       

                          ACQUIRED ABSENCE OF OTHER ORGANS                       

 

                2017              FELIZ GERMAIN DO              Ot              E10.65    

                          TYPE 1 DIABETES MELLITUS WITH HYPERGLYCE                       

 

                10/24/2017              JULY VOSS DO              Ot              R73.9    

                          HYPERGLYCEMIA, UNSPECIFIED                       

 

                10/24/2017              GELLENJULY GREEN DO              Ot              R74.8    

                          ABNORMAL LEVELS OF OTHER SERUM ENZYMES                       

 

                2018              GERMAIN DO, FELIZ L              Ot              E10.65    

                          TYPE 1 DIABETES MELLITUS WITH HYPERGLYCE                       

 

                2018              GERMAIN DO, FELIZ L              Ot              N18.9     

                          CHRONIC KIDNEY DISEASE, UNSPECIFIED                       

 

                2018              GERMAIN DO, FELIZ L              Ot              E10.65    

                          TYPE 1 DIABETES MELLITUS WITH HYPERGLYCE                       

 

                2018              GERMAIN DO, FELIZ L              Ot              N18.9     

                          CHRONIC KIDNEY DISEASE, UNSPECIFIED                       

 

             2018                             Ot              453.40              ACUTE VENOUS 

EMBOLISM   THROMBOSIS UNSP                        

 

                2018              NANCY MENDEZ, YU S              Ot              585.3     

                          CHRONIC KIDNEY DISEASE, STAGE III (MODER                       

 

                2018              NANCY MENDEZ, JULIANNEMED S              Ot              250.40    

                          DIAB W RENAL MANIFEST, TYPE II OR UNSPEC                       

 

                2018              NANCY MENDEZ, AHMED S              Ot              272.4     

                          HYPERLIPIDEMIA NEC/NOS                       

 

                2018              NANCY MENDEZ, YU S              Ot              585.2     

                          CHRONIC KIDNEY DISEASE, STAGE II (MILD)                       

 

                2018              NANCY MENDEZ, AHMED S              Ot              791.0     

                          PROTEINURIA                        

 

                2018              BIPIN JULY RAMIRES              Ot              V76.12   

                          OTH SCREEN MAMMO-MALIGN NEOPLASM OF BABATUNDE                       

 

                2018              JULY VOSS DO              Ot              611.72   

                          LUMP OR MASS IN BREAST                       

 

                2018              NANCY MENDEZ, AHMED S              Ot              250.01    

                          DIAB KATHERIN WO COMPL, TYPE I [JUVENILE TYP                       

 

                2018              NANCY MENDEZ, YU S              Ot              272.4     

                          HYPERLIPIDEMIA NEC/NOS                       

 

                2018              NANCY MENDEZ, AHMED S              Ot              403.10    

                          HYPTNSV [facility] KID DIS, BENIGN, W CHR KD ST                       

 

                2018              NANCY MENDEZ, AHMED S              Ot              583.81    

                          NEPHRITIS NOS IN OTH DIS                       

 

                2018              NANCY MENDEZ, AHGENESIS S              Ot              585.2     

                          CHRONIC KIDNEY DISEASE, STAGE II (MILD)                       

 

                2018              NANCY MENDEZ, JULIANNEMED S              Ot              791.0     

                          PROTEINURIA                        

 

                2018              GERMAIN DO, FELIZ L              Ot              250.03    

                          DIAB KATHERIN WO COMPL, TYPE I [JUVENILE TYP                       

 

                2018              NANCY MENDEZ, AHMED S              Ot              250.01    

                          DIAB KATHERIN WO COMPL, TYPE I [JUVENILE TYP                       

 

                2018              NANCY MENDEZ, AHMED S              Ot              272.4     

                          HYPERLIPIDEMIA NEC/NOS                       

 

                2018              NANCY MENDEZ, AHMED S              Ot              403.10    

                          HYPTNSV CHR KID DIS, BENIGN, W CHR KD ST                       

 

                2018              NANYC MENDEZ, AHMED S              Ot              583.81    

                          NEPHRITIS NOS IN OTH DIS                       

 

                2018              NANCY MENDEZ, AHMED S              Ot              585.2     

                          CHRONIC KIDNEY DISEASE, STAGE II (MILD)                       

 

                2018              NANCY MENDEZ, AHMED S              Ot              791.0     

                          PROTEINURIA                        

 

                2018              MARCELLUS RAMIRES FELIZ JL              Ot              250.03    

                          DIAB KATHERIN WO COMPL, TYPE I [JUVENILE TYP                       

 

                2018              BIPIN RAMIRES JULY A              Ot              453.40   

                          ACUTE VENOUS EMBOLISM   THROMBOSIS UNSP                        

 

             2018                             Ot              250.03              DIAB KATHERIN WO 

COMPL, TYPE I [JUVENILE TYP                       

 

             2018                             Ot              250.41              DIAB W RENAL 

MANIFEST, TYPE I [JUVENILE                        

 

             2018                             Ot              272.4              HYPERLIPIDEMIA

 NEC/NOS                                         

 

             2018                             Ot              403.10              HYPTNSV CHR KID

 DIS, BENIGN, W CHR KD ST                        

 

             2018                             Ot              583.81              NEPHRITIS NOS

 IN OTH DIS                                      

 

             2018                             Ot              585.2              CHRONIC KIDNEY

 DISEASE, STAGE II (MILD)                        

 

             2018                             Ot              791.0              PROTEINURIA   

                                                 

 

                2018              NANCY MENDEZ, AHMED S              Ot              E11.29    

                          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                        

 

                2018              NANCY MENDEZ, AHMED S              Ot              E78.5     

                          HYPERLIPIDEMIA, UNSPECIFIED                       

 

                2018              NANCY MENDEZ, AHMED S              Ot              I12.9     

                          HYPERTENSIVE CHRONIC KIDNEY DISEASE W ST                       

 

                2018              NANCY MENDEZ, AHMED S              Ot              N18.3     

                          CHRONIC KIDNEY DISEASE, STAGE 3 (MODERAT                       

 

                2018              NANCY MENDEZ, AHMED S              Ot              R80.9     

                          PROTEINURIA, UNSPECIFIED                       

 

                2018              NANCY MENDEZ, AHMED S              Ot              E11.21    

                          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                       

 

                2018              NANCY MENDEZ, AHMED S              Ot              E11.22    

                          TYPE 2 DIABETES MELLITUS W DIABETIC                        

 

                2018              NANCY MENDEZ, YU MELENDEZ              Ot              E78.5     

                          HYPERLIPIDEMIA, UNSPECIFIED                       

 

                2018              NANCY MENDEZ, YU MELENDEZ              Ot              I12.9     

                          HYPERTENSIVE CHRONIC KIDNEY DISEASE W ST                       

 

                2018              NANCY MENDEZ, YU S              Ot              N18.2     

                          CHRONIC KIDNEY DISEASE, STAGE 2 (MILD)                       

 

                2018              NANCY MENDEZ, YU MELENDEZ              Ot              R80.9     

                          PROTEINURIA, UNSPECIFIED                       

 

                2018              NANCY MENDEZ, YU S              Ot              Z79.899   

                          OTHER LONG TERM (CURRENT) DRUG THERAPY                       

 

                2018              GERMAIN DO, FELIZ L              Ot              E10.65    

                          TYPE 1 DIABETES MELLITUS WITH HYPERGLYCE                       

 

                2018              GERMAIN DO, FELIZ L              Ot              E78.5     

                          HYPERLIPIDEMIA, UNSPECIFIED                       

 

                2018              GERMAIN DO, FELIZ L              Ot              E10.65    

                          TYPE 1 DIABETES MELLITUS WITH HYPERGLYCE                       

 

                2018              GERMAIN DO, FELIZ L              Ot              E78.5     

                          HYPERLIPIDEMIA, UNSPECIFIED                       

 

                2018              GERMAIN DO, FELIZ L              Ot              E10.65    

                          TYPE 1 DIABETES MELLITUS WITH HYPERGLYCE                       

 

                2018              GELLENDER DO, JULY A              Ot              R73.9    

                          HYPERGLYCEMIA, UNSPECIFIED                       

 

                2018              GELLENDER DO, JULY A              Ot              R74.8    

                          ABNORMAL LEVELS OF OTHER SERUM ENZYMES                       

 

                2018              GERMAIN DO, FELIZ L              Ot              E10.65    

                          TYPE 1 DIABETES MELLITUS WITH HYPERGLYCE                       

 

                2018              GERMAIN DO, FELIZ L              Ot              N18.9     

                          CHRONIC KIDNEY DISEASE, UNSPECIFIED                       

 

                2018              GELLENDER DO, JULY A              Ot              M25.522  

                          PAIN IN LEFT ELBOW                       

 

                2018              GELLENDER DO, JULY A              Ot              M61.48   

                          OTHER CALCIFICATION OF MUSCLE, OTHER SIT                       

 

                01/15/2019              NANCY MENDEZ, YU S              Ot              585.3     

                          CHRONIC KIDNEY DISEASE, STAGE III (MODER                       

 

                01/15/2019              NANCY MENDEZ, YU S              Ot              250.40    

                          DIAB W RENAL MANIFEST, TYPE II OR UNSPEC                       

 

                01/15/2019              NANCY MENDEZ, YU S              Ot              272.4     

                          HYPERLIPIDEMIA NEC/NOS                       

 

                01/15/2019              NANCY MENDEZ, YU MELENDEZ              Ot              585.2     

                          CHRONIC KIDNEY DISEASE, STAGE II (MILD)                       

 

                01/15/2019              NANCY MENDEZ, AHMED S              Ot              791.0     

                          PROTEINURIA                        

 

                01/15/2019              BIPIN RAMIRESJULY              Ot              V76.12   

                          OTH SCREEN MAMMO-MALIGN NEOPLASM OF BABATUNDE                       

 

                01/15/2019              BIPIN JULY RAMIRES              Ot              611.72   

                          LUMP OR MASS IN BREAST                       

 

                01/15/2019              NANCY MENDEZ, AHMED S              Ot              250.01    

                          DIAB KATHERIN WO COMPL, TYPE I [JUVENILE TYP                       

 

                01/15/2019              NANCY MENDEZ, AHMED S              Ot              272.4     

                          HYPERLIPIDEMIA NEC/NOS                       

 

                01/15/2019              NANCY MENDEZ, AHMED S              Ot              403.10    

                          HYPTNSV [facility] KID DIS, BENIGN, W CHR KD ST                       

 

                01/15/2019              NANCY MENDEZ, AHMED S              Ot              583.81    

                          NEPHRITIS NOS IN OTH DIS                       

 

                01/15/2019              NANCY MENDEZ, AHMED S              Ot              585.2     

                          CHRONIC KIDNEY DISEASE, STAGE II (MILD)                       

 

                01/15/2019              NANCY MENDEZ, AHMED S              Ot              791.0     

                          PROTEINURIA                        

 

                01/15/2019              SHERIE GERMAIN DOISON L              Ot              250.03    

                          DIAB KATHERIN WO COMPL, TYPE I [JUVENILE TYP                       

 

                01/15/2019              NANCY MENDEZ, AHMED S              Ot              250.01    

                          DIAB KATHERIN WO COMPL, TYPE I [JUVENILE TYP                       

 

                01/15/2019              NANCY MENDEZ, AHMED S              Ot              272.4     

                          HYPERLIPIDEMIA NEC/NOS                       

 

                01/15/2019              NANCY MENDEZ, AHMED S              Ot              403.10    

                          HYPTNSV [facility] KID DIS, BENIGN, W CHR KD ST                       

 

                01/15/2019              NANCY MENDEZ, AHMED S              Ot              583.81    

                          NEPHRITIS NOS IN OTH DIS                       

 

                01/15/2019              NANCY MENDEZ, AHMED S              Ot              585.2     

                          CHRONIC KIDNEY DISEASE, STAGE II (MILD)                       

 

                01/15/2019              NANCY MENDEZ, AHMED S              Ot              791.0     

                          PROTEINURIA                        

 

                01/15/2019              SHERIE GERMAIN DOISON L              Ot              250.03    

                          DIAB KATHERIN WO COMPL, TYPE I [JUVENILE TYP                       

 

                01/15/2019              ASYALOSPETER RAMIRESJULY              Ot              453.40   

                          ACUTE VENOUS EMBOLISM   THROMBOSIS UNSP                        

 

             01/15/2019                             Ot              250.03              DIAB KATHERIN WO 

COMPL, TYPE I [JUVENILE TYP                       

 

             01/15/2019                             Ot              250.41              DIAB W RENAL 

MANIFEST, TYPE I [JUVENILE                        

 

             01/15/2019                             Ot              272.4              HYPERLIPIDEMIA

 NEC/NOS                                         

 

             01/15/2019                             Ot              403.10              HYPTNSV CHR KID

 DIS, BENIGN, W CHR KD ST                        

 

             01/15/2019                             Ot              583.81              NEPHRITIS NOS

 IN OTH DIS                                      

 

             01/15/2019                             Ot              585.2              CHRONIC KIDNEY

 DISEASE, STAGE II (MILD)                        

 

             01/15/2019                             Ot              791.0              PROTEINURIA   

                                                 

 

                01/15/2019              NANCY MENDEZ, AHMED S              Ot              E11.29    

                          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                        

 

                01/15/2019              NANCY MENDEZ, AHMED S              Ot              E78.5     

                          HYPERLIPIDEMIA, UNSPECIFIED                       

 

                01/15/2019              NANCY MENEDZ, AHMED S              Ot              I12.9     

                          HYPERTENSIVE CHRONIC KIDNEY DISEASE W ST                       

 

                01/15/2019              NANCY MENDEZ, AHMED S              Ot              N18.3     

                          CHRONIC KIDNEY DISEASE, STAGE 3 (MODERAT                       

 

                01/15/2019              NANCY MENDEZ, AHMED S              Ot              R80.9     

                          PROTEINURIA, UNSPECIFIED                       

 

                01/15/2019              NANCY MENDEZ, AHMED S              Ot              E11.21    

                          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                       

 

                01/15/2019              NANCY MENDEZ, AHMED S              Ot              E11.22    

                          TYPE 2 DIABETES MELLITUS W DIABETIC                        

 

                01/15/2019              NANCY MENDEZ, AHMED S              Ot              E78.5     

                          HYPERLIPIDEMIA, UNSPECIFIED                       

 

                01/15/2019              NANCY MENDEZ, AHMED S              Ot              I12.9     

                          HYPERTENSIVE CHRONIC KIDNEY DISEASE W ST                       

 

                01/15/2019              NANCY MENDEZ, AHMED S              Ot              N18.2     

                          CHRONIC KIDNEY DISEASE, STAGE 2 (MILD)                       

 

                01/15/2019              NANCY MENDEZ, AHMED S              Ot              R80.9     

                          PROTEINURIA, UNSPECIFIED                       

 

                01/15/2019              NANCY MENDEZ, AHMED S              Ot              Z79.899   

                          OTHER LONG TERM (CURRENT) DRUG THERAPY                       

 

                01/15/2019              GERMAIN DO, FELIZ L              Ot              E10.65    

                          TYPE 1 DIABETES MELLITUS WITH HYPERGLYCE                       

 

                01/15/2019              GERMAIN DO, FELIZ L              Ot              E78.5     

                          HYPERLIPIDEMIA, UNSPECIFIED                       

 

                01/15/2019              GERMAIN DO, FELIZ L              Ot              E10.65    

                          TYPE 1 DIABETES MELLITUS WITH HYPERGLYCE                       

 

                01/15/2019              GERMAIN DO, FELIZ L              Ot              E78.5     

                          HYPERLIPIDEMIA, UNSPECIFIED                       

 

                01/15/2019              GERMAIN DO, FELIZ L              Ot              E10.65    

                          TYPE 1 DIABETES MELLITUS WITH HYPERGLYCE                       

 

                01/15/2019              JULY VOSS DO              Ot              R73.9    

                          HYPERGLYCEMIA, UNSPECIFIED                       

 

                01/15/2019              JULY VOSS DO              Ot              R74.8    

                          ABNORMAL LEVELS OF OTHER SERUM ENZYMES                       

 

                01/15/2019              FELIZ GERMAIN DO              Ot              E10.65    

                          TYPE 1 DIABETES MELLITUS WITH HYPERGLYCE                       

 

                01/15/2019              MARCELLUS RAMIRES FELIZ PARIKH              Ot              N18.9     

                          CHRONIC KIDNEY DISEASE, UNSPECIFIED                       

 

                01/15/2019              BIPIN RAMIRES JULY QUIROS              Ot              M25.522  

                          PAIN IN LEFT ELBOW                       

 

                01/15/2019              BIPIN RAMIRES JULY QUIROS              Ot              M61.48   

                          OTHER CALCIFICATION OF MUSCLE, OTHER SIT                       

 

                2019              MARCELLUS RAMIRES FELIZ PARIKH              Ot              E10.65    

                          TYPE 1 DIABETES MELLITUS WITH HYPERGLYCE                       

 

                2019              MARCELLUS RAMIRES FELIZ PARIKH              Ot              E10.65    

                          TYPE 1 DIABETES MELLITUS WITH HYPERGLYCE                       

 

                2019              MARCELLUS RAMIRES FELIZ PARIKH              Ot              N18.9     

                          CHRONIC KIDNEY DISEASE, UNSPECIFIED                       



                                                                                
                                                                                
                                                                                
                                                                                
                                                                                
                                                                                
                                                                                
                                                                                
                                                                                
                                                                                
                                                                                
                                                                                
                                                                                
                                                                                
                                                                                
          



Procedures

      





                Code              Description              Performed By              Performed On     

   

 

                                38.7                                                                    

                                        12/15/2011        



                  



Results

      





                    Test                Result              Range        









                                        Comprehensive metabolic panel - 16 08:48         









                          Serum or plasma sodium measurement (moles/volume)              138 mmol/L         

                                        135-145        

 

                          Serum or plasma potassium measurement (moles/volume)              4.9 mmol/L      

                                        3.6-5.0        

 

                          Serum or plasma chloride measurement (moles/volume)              103 mmol/L       

                                                

 

                    Carbon dioxide              29 mmol/L              21-32        

 

                          Serum or plasma anion gap determination (moles/volume)              6 mmol/L      

                                        5-14        

 

                          Serum or plasma urea nitrogen measurement (mass/volume)              26 mg/dL     

                                        7-18        

 

                          Serum or plasma creatinine measurement (mass/volume)              1.30 mg/dL      

                                        0.60-1.30        

 

                    Serum or plasma urea nitrogen/creatinine mass ratio              20                  NRG

        

 

                                        Serum or plasma creatinine measurement with calculation of estimated glomerular 

filtration rate              43                        NRG        

 

                          Serum or plasma glucose measurement (mass/volume)              223 mg/dL          

                                                

 

                          Serum or plasma calcium measurement (mass/volume)              9.8 mg/dL          

                                        8.5-10.1        

 

                          Serum or plasma total bilirubin measurement (mass/volume)              0.6 mg/dL  

                                        0.1-1.0        

 

                                        Serum or plasma alkaline phosphatase measurement (enzymatic activity/volume)    

                          72 U/L                            

 

                                        Serum or plasma aspartate aminotransferase measurement (enzymatic activity/volume)

                          20 U/L                    5-34        

 

                                        Serum or plasma alanine aminotransferase measurement (enzymatic activity/volume)

                          28 U/L                    0-55        

 

                          Serum or plasma protein measurement (mass/volume)              7.2 g/dL           

                                        6.4-8.2        

 

                          Serum or plasma albumin measurement (mass/volume)              4.2 g/dL           

                                        3.2-4.5        









                                        Lipid 1996 panel - 16 08:48         









                          Serum or plasma triglyceride measurement (mass/volume)              118 mg/dL     

                                        <150        

 

                          Serum or plasma cholesterol measurement (mass/volume)              213 mg/dL      

                                        < 200        

 

                          Serum or plasma cholesterol in HDL measurement (mass/volume)              55 mg/dL

                                        40-60        

 

                          Cholesterol in LDL [mass/volume] in serum or plasma by direct assay              132

 mg/dL                                  1-129        

 

                          Serum or plasma cholesterol in VLDL measurement (mass/volume)              24 mg/dL

                                        5-40        









                                        THYROID STIMULATING HORMONE - 16 08:48         









                    THYROID STIMULATING HORMONE              0.80 u[iU]/mL              0.35-4.94       

 









                                        Comprehensive metabolic panel - 17 07:58         









                          Serum or plasma sodium measurement (moles/volume)              137 mmol/L         

                                        135-145        

 

                          Serum or plasma potassium measurement (moles/volume)              4.6 mmol/L      

                                        3.6-5.0        

 

                          Serum or plasma chloride measurement (moles/volume)              110 mmol/L       

                                                

 

                    Carbon dioxide              21 mmol/L              21-32        

 

                          Serum or plasma anion gap determination (moles/volume)              6 mmol/L      

                                        5-14        

 

                          Serum or plasma urea nitrogen measurement (mass/volume)              25 mg/dL     

                                        7-18        

 

                          Serum or plasma creatinine measurement (mass/volume)              1.17 mg/dL      

                                        0.60-1.30        

 

                    Serum or plasma urea nitrogen/creatinine mass ratio              21                  NRG

        

 

                                        Serum or plasma creatinine measurement with calculation of estimated glomerular 

filtration rate              48                        NRG        

 

                          Serum or plasma glucose measurement (mass/volume)              201 mg/dL          

                                                

 

                          Serum or plasma calcium measurement (mass/volume)              8.8 mg/dL          

                                        8.5-10.1        

 

                          Serum or plasma total bilirubin measurement (mass/volume)              0.7 mg/dL  

                                        0.1-1.0        

 

                                        Serum or plasma alkaline phosphatase measurement (enzymatic activity/volume)    

                          66 U/L                            

 

                                        Serum or plasma aspartate aminotransferase measurement (enzymatic activity/volume)

                          54 U/L                    5-34        

 

                                        Serum or plasma alanine aminotransferase measurement (enzymatic activity/volume)

                          109 U/L                   0-55        

 

                          Serum or plasma protein measurement (mass/volume)              6.6 g/dL           

                                        6.4-8.2        

 

                          Serum or plasma albumin measurement (mass/volume)              3.6 g/dL           

                                        3.2-4.5        









                                        Lipid 1996 panel - 17 07:58         









                          Serum or plasma triglyceride measurement (mass/volume)              169 mg/dL     

                                        <150        

 

                          Serum or plasma cholesterol measurement (mass/volume)              153 mg/dL      

                                        < 200        

 

                          Serum or plasma cholesterol in HDL measurement (mass/volume)              37 mg/dL

                                        40-60        

 

                          Cholesterol in LDL [mass/volume] in serum or plasma by direct assay              97

 mg/dL                                  1-129        

 

                          Serum or plasma cholesterol in VLDL measurement (mass/volume)              34 mg/dL

                                        5-40        









                                        Cyanocobalamin measurement - 17 07:58         









                    Vitamin B12              1904 pg/mL              200-1000        









                                        25-hydroxyvitamin D measurement - 17 07:58         









                    25-hydroxy vitamin D measurement              14 %                        









                                        Complete blood count (CBC) with automated white blood cell (WBC) differential - 

17 19:45         









                          Blood leukocytes automated count (number/volume)              9.4 10*3/uL         

                                        4.3-11.0        

 

                          Blood erythrocytes automated count (number/volume)              4.92 10*6/uL      

                                        4.35-5.85        

 

                          Venous blood hemoglobin measurement (mass/volume)              15.4 g/dL          

                                        11.5-16.0        

 

                    Blood hematocrit (volume fraction)              46 %                35-52        

 

                    Automated erythrocyte mean corpuscular volume              93 [foz_us]              

80-99        

 

                                        Automated erythrocyte mean corpuscular hemoglobin (mass per erythrocyte)        

                          31 pg                     25-34        

 

                                        Automated erythrocyte mean corpuscular hemoglobin concentration measurement (mass/volume)

                          34 g/dL                   32-36        

 

                    Automated erythrocyte distribution width ratio              13.8 %              10.0-

14.5        

 

                    Automated blood platelet count (count/volume)              237 10*3/uL              

130-400        

 

                          Automated blood platelet mean volume measurement              10.1 [foz_us]       

                                        7.4-10.4        

 

                    Automated blood neutrophils/100 leukocytes              64 %                42-75     

   

 

                    Automated blood lymphocytes/100 leukocytes              25 %                12-44     

   

 

                    Blood monocytes/100 leukocytes              8 %                 0-12        

 

                    Automated blood eosinophils/100 leukocytes              3 %                 0-10       

 

 

                    Automated blood basophils/100 leukocytes              0 %                 0-10        

 

                          Blood neutrophils automated count (number/volume)              6.0 10*3           

                                        1.8-7.8        

 

                          Blood lymphocytes automated count (number/volume)              2.4 10*3           

                                        1.0-4.0        

 

                    Blood monocytes automated count (number/volume)              0.8 10*3              0.0-

1.0        

 

                    Automated eosinophil count              0.2 10*3/uL              0.0-0.3        

 

                    Automated blood basophil count (count/volume)              0.0 10*3/uL              

0.0-0.1        









                                        Comprehensive metabolic panel - 17 19:45         









                          Serum or plasma sodium measurement (moles/volume)              142 mmol/L         

                                        135-145        

 

                          Serum or plasma potassium measurement (moles/volume)              4.2 mmol/L      

                                        3.6-5.0        

 

                          Serum or plasma chloride measurement (moles/volume)              104 mmol/L       

                                                

 

                    Carbon dioxide              24 mmol/L              21-32        

 

                          Serum or plasma anion gap determination (moles/volume)              14 mmol/L     

                                        5-14        

 

                          Serum or plasma urea nitrogen measurement (mass/volume)              25 mg/dL     

                                        7-18        

 

                          Serum or plasma creatinine measurement (mass/volume)              1.29 mg/dL      

                                        0.60-1.30        

 

                    Serum or plasma urea nitrogen/creatinine mass ratio              19                  NRG

        

 

                                        Serum or plasma creatinine measurement with calculation of estimated glomerular 

filtration rate              43                        NRG        

 

                          Serum or plasma glucose measurement (mass/volume)              102 mg/dL          

                                                

 

                          Serum or plasma calcium measurement (mass/volume)              9.6 mg/dL          

                                        8.5-10.1        

 

                          Serum or plasma total bilirubin measurement (mass/volume)              0.9 mg/dL  

                                        0.1-1.0        

 

                                        Serum or plasma alkaline phosphatase measurement (enzymatic activity/volume)    

                          83 U/L                            

 

                                        Serum or plasma aspartate aminotransferase measurement (enzymatic activity/volume)

                          99 U/L                    5-34        

 

                                        Serum or plasma alanine aminotransferase measurement (enzymatic activity/volume)

                          230 U/L                   0-55        

 

                          Serum or plasma protein measurement (mass/volume)              7.9 g/dL           

                                        6.4-8.2        

 

                          Serum or plasma albumin measurement (mass/volume)              4.1 g/dL           

                                        3.2-4.5        









                                        Lipase - 17 19:45         









                    Lipase              144 U/L              8-78        









                                        Urine drug screening test - 17 20:00         









                          Urine phencyclidine detection by screening method              NEGATIVE           

                                        NEGATIVE        

 

                          Urine benzodiazepines detection by screening method              NEGATIVE         

                                        NEGATIVE        

 

                    Urine cocaine detection              NEGATIVE               NEGATIVE        

 

                          Urine amphetamines detection by screening method              NEGATIVE            

                                        NEGATIVE        

 

                          Urine methamphetamine detection by screening method              NEGATIVE         

                                        NEGATIVE        

 

                          Urine cannabinoids detection by screening method              POSITIVE            

                                        NEGATIVE        

 

                    Urine opiates detection by screening method              NEGATIVE               NEGATIVE

        

 

                    Urine barbiturates detection              NEGATIVE               NEGATIVE        

 

                          Screening urine tricyclic antidepressants detection              NEGATIVE         

                                        NEGATIVE        

 

                    Urine methadone detection by screening method              NEGATIVE               NEGATIVE

        

 

                    Urine oxycodone detection              NEGATIVE               NEGATIVE        

 

                    Urine propoxyphene detection              NEGATIVE               NEGATIVE        









                                        Complete urinalysis with reflex to culture - 17 20:00         









                    Urine color determination              YELLOW               NRG        

 

                    Urine clarity determination              SLIGHTLY CLOUDY               NRG        

 

                    Urine pH measurement by test strip              8                   5-9        

 

                    Specific gravity of urine by test strip              1.010               1.016-1.022

        

 

                    Urine protein assay by test strip, semi-quantitative              3+                  NEGATIVE

        

 

                    Urine glucose detection by automated test strip              NEGATIVE               

NEGATIVE        

 

                          Erythrocytes detection in urine sediment by light microscopy              2+      

                                        NEGATIVE        

 

                    Urine ketones detection by automated test strip              1+                  NEGATIVE

        

 

                    Urine nitrite detection by test strip              NEGATIVE               NEGATIVE  

      

 

                    Urine total bilirubin detection by test strip              NEGATIVE               NEGATIVE

        

 

                          Urine urobilinogen measurement by automated test strip (mass/volume)              

NORMAL                                  NORMAL        

 

                    Urine leukocyte esterase detection by dipstick              1+                  NEGATIVE

        

 

                                        Automated urine sediment erythrocyte count by microscopy (number/high power field)

                           [HPF]                    NRG        

 

                                        Automated urine sediment leukocyte count by microscopy (number/high power field)

                           [HPF]                    NRG        

 

                          Bacteria detection in urine sediment by light microscopy              NONE        

                                        NRG        

 

                                        Squamous epithelial cells detection in urine sediment by light microscopy       

                          0-2                       NRG        

 

                          Crystals detection in urine sediment by light microscopy              NONE        

                                        NRG        

 

                          Casts detection in urine sediment by light microscopy              NONE           

                                        NRG        

 

                          Mucus detection in urine sediment by light microscopy              NEGATIVE       

                                        NRG        

 

                    Complete urinalysis with reflex to culture              NO                  NRG        











                                        Complete blood count (CBC) with automated white blood cell (WBC) differential - 

10/11/17 08:55         









                          Blood leukocytes automated count (number/volume)              7.0 10*3/uL         

                                        4.3-11.0        

 

                          Blood erythrocytes automated count (number/volume)              5.49 10*6/uL      

                                        4.35-5.85        

 

                          Venous blood hemoglobin measurement (mass/volume)              17.0 g/dL          

                                        11.5-16.0        

 

                    Blood hematocrit (volume fraction)              51 %                35-52        

 

                    Automated erythrocyte mean corpuscular volume              92 [foz_us]              

80-99        

 

                                        Automated erythrocyte mean corpuscular hemoglobin (mass per erythrocyte)        

                          31 pg                     25-34        

 

                                        Automated erythrocyte mean corpuscular hemoglobin concentration measurement (mass/volume)

                          34 g/dL                   32-36        

 

                    Automated erythrocyte distribution width ratio              13.7 %              10.0-

14.5        

 

                    Automated blood platelet count (count/volume)              227 10*3/uL              

130-400        

 

                          Automated blood platelet mean volume measurement              10.3 [foz_us]       

                                        7.4-10.4        

 

                    Automated blood neutrophils/100 leukocytes              51 %                42-75     

   

 

                    Automated blood lymphocytes/100 leukocytes              33 %                12-44     

   

 

                    Blood monocytes/100 leukocytes              13 %                0-12        

 

                    Automated blood eosinophils/100 leukocytes              3 %                 0-10       

 

 

                    Automated blood basophils/100 leukocytes              1 %                 0-10        

 

                          Blood neutrophils automated count (number/volume)              3.6 10*3           

                                        1.8-7.8        

 

                          Blood lymphocytes automated count (number/volume)              2.3 10*3           

                                        1.0-4.0        

 

                    Blood monocytes automated count (number/volume)              0.9 10*3              0.0-

1.0        

 

                    Automated eosinophil count              0.2 10*3/uL              0.0-0.3        

 

                    Automated blood basophil count (count/volume)              0.0 10*3/uL              

0.0-0.1        









                                        Comprehensive metabolic panel - 10/11/17 08:55         









                          Serum or plasma sodium measurement (moles/volume)              138 mmol/L         

                                        135-145        

 

                          Serum or plasma potassium measurement (moles/volume)              4.2 mmol/L      

                                        3.6-5.0        

 

                          Serum or plasma chloride measurement (moles/volume)              103 mmol/L       

                                                

 

                    Carbon dioxide              26 mmol/L              21-32        

 

                          Serum or plasma anion gap determination (moles/volume)              9 mmol/L      

                                        5-14        

 

                          Serum or plasma urea nitrogen measurement (mass/volume)              23 mg/dL     

                                        7-18        

 

                          Serum or plasma creatinine measurement (mass/volume)              1.33 mg/dL      

                                        0.60-1.30        

 

                    Serum or plasma urea nitrogen/creatinine mass ratio              17                  NRG

        

 

                                        Serum or plasma creatinine measurement with calculation of estimated glomerular 

filtration rate              42                        NRG        

 

                          Serum or plasma glucose measurement (mass/volume)              186 mg/dL          

                                                

 

                          Serum or plasma calcium measurement (mass/volume)              9.9 mg/dL          

                                        8.5-10.1        

 

                          Serum or plasma total bilirubin measurement (mass/volume)              1.2 mg/dL  

                                        0.1-1.0        

 

                                        Serum or plasma alkaline phosphatase measurement (enzymatic activity/volume)    

                          83 U/L                            

 

                                        Serum or plasma aspartate aminotransferase measurement (enzymatic activity/volume)

                          73 U/L                    5-34        

 

                                        Serum or plasma alanine aminotransferase measurement (enzymatic activity/volume)

                          144 U/L                   0-55        

 

                          Serum or plasma protein measurement (mass/volume)              8.3 g/dL           

                                        6.4-8.2        

 

                          Serum or plasma albumin measurement (mass/volume)              4.1 g/dL           

                                        3.2-4.5        









                                        Lipid 1996 panel - 10/11/17 08:55         









                          Serum or plasma triglyceride measurement (mass/volume)              168 mg/dL     

                                        <150        

 

                          Serum or plasma cholesterol measurement (mass/volume)              191 mg/dL      

                                        < 200        

 

                          Serum or plasma cholesterol in HDL measurement (mass/volume)              42 mg/dL

                                        40-60        

 

                          Cholesterol in LDL [mass/volume] in serum or plasma by direct assay              108

 mg/dL                                  1-129        

 

                          Serum or plasma cholesterol in VLDL measurement (mass/volume)              34 mg/dL

                                        5-40        









                                        Hemoglobin A1c - 10/11/17 08:55         









                    Hemoglobin A1c              10.7 %              4.5-6.2        









                                        Automated blood complete blood count (hemogram) panel - 18 08:18         









                          Blood leukocytes automated count (number/volume)              5.2 10*3/uL         

                                        4.3-11.0        

 

                          Blood erythrocytes automated count (number/volume)              4.66 10*6/uL      

                                        4.35-5.85        

 

                          Venous blood hemoglobin measurement (mass/volume)              15.0 g/dL          

                                        11.5-16.0        

 

                    Blood hematocrit (volume fraction)              43 %                35-52        

 

                    Automated erythrocyte mean corpuscular volume              93 [foz_us]              

80-99        

 

                                        Automated erythrocyte mean corpuscular hemoglobin (mass per erythrocyte)        

                          32 pg                     25-34        

 

                                        Automated erythrocyte mean corpuscular hemoglobin concentration measurement (mass/volume)

                          35 g/dL                   32-36        

 

                    Automated erythrocyte distribution width ratio              13.3 %              10.0-

14.5        

 

                    Automated blood platelet count (count/volume)              232 10*3/uL              

130-400        

 

                          Automated blood platelet mean volume measurement              10.2 [foz_us]       

                                        7.4-10.4        









                                        Comprehensive metabolic panel - 18 08:18         









                          Serum or plasma sodium measurement (moles/volume)              135 mmol/L         

                                        135-145        

 

                          Serum or plasma potassium measurement (moles/volume)              4.5 mmol/L      

                                        3.6-5.0        

 

                          Serum or plasma chloride measurement (moles/volume)              104 mmol/L       

                                                

 

                    Carbon dioxide              23 mmol/L              21-32        

 

                          Serum or plasma anion gap determination (moles/volume)              8 mmol/L      

                                        5-14        

 

                          Serum or plasma urea nitrogen measurement (mass/volume)              25 mg/dL     

                                        7-18        

 

                          Serum or plasma creatinine measurement (mass/volume)              1.21 mg/dL      

                                        0.60-1.30        

 

                    Serum or plasma urea nitrogen/creatinine mass ratio              21                  NRG

        

 

                                        Serum or plasma creatinine measurement with calculation of estimated glomerular 

filtration rate              46                        NRG        

 

                          Serum or plasma glucose measurement (mass/volume)              310 mg/dL          

                                                

 

                          Serum or plasma calcium measurement (mass/volume)              9.3 mg/dL          

                                        8.5-10.1        

 

                          Serum or plasma total bilirubin measurement (mass/volume)              0.8 mg/dL  

                                        0.1-1.0        

 

                                        Serum or plasma alkaline phosphatase measurement (enzymatic activity/volume)    

                          66 U/L                            

 

                                        Serum or plasma aspartate aminotransferase measurement (enzymatic activity/volume)

                          68 U/L                    5-34        

 

                                        Serum or plasma alanine aminotransferase measurement (enzymatic activity/volume)

                          133 U/L                   0-55        

 

                          Serum or plasma protein measurement (mass/volume)              7.3 g/dL           

                                        6.4-8.2        

 

                          Serum or plasma albumin measurement (mass/volume)              3.9 g/dL           

                                        3.2-4.5        









                                        THYROID STIMULATING HORMONE - 18 08:18         









                    THYROID STIMULATING HORMONE              0.73 u[iU]/mL              0.35-4.94       

 









                                        Urine microalbumin measurement by test strip (mass/volume) - 18 08:18     

    









                    Urine creatinine measurement (mass/volume)              166 %               NRG      

  

 

                    Microalbumin [mass/volume] in urine              739.8 %              0.0-20.0      

  

 

                    Microalbumin/creatinine [ratio] in urine              445.7 mg/g{Cre}              0.0-

30.0        









                                        Cyanocobalamin measurement - 18 08:18         









                    Vitamin B12              952 pg/mL              190-1100        









                                        VITAMIN D 25-HYDROXY - 18 08:18         









                    VITAMIN D 25-HYDROXY (TOTAL)              25.2 %              30.0-100.0        









                                        Automated blood complete blood count (hemogram) panel - 01/15/19 17:23         









                          Blood leukocytes automated count (number/volume)              9.4 10*3/uL         

                                        4.3-11.0        

 

                          Blood erythrocytes automated count (number/volume)              4.76 10*6/uL      

                                        4.35-5.85        

 

                          Venous blood hemoglobin measurement (mass/volume)              14.7 g/dL          

                                        11.5-16.0        

 

                    Blood hematocrit (volume fraction)              44 %                35-52        

 

                    Automated erythrocyte mean corpuscular volume              93 [foz_us]              

80-99        

 

                                        Automated erythrocyte mean corpuscular hemoglobin (mass per erythrocyte)        

                          31 pg                     25-34        

 

                                        Automated erythrocyte mean corpuscular hemoglobin concentration measurement (mass/volume)

                          33 g/dL                   32-36        

 

                    Automated erythrocyte distribution width ratio              13.2 %              10.0-

14.5        

 

                    Automated blood platelet count (count/volume)              254 10*3/uL              

130-400        

 

                          Automated blood platelet mean volume measurement              10.0 [foz_us]       

                                        7.4-10.4        









                                        Comprehensive metabolic panel - 01/15/19 17:23         









                          Serum or plasma sodium measurement (moles/volume)              137 mmol/L         

                                        135-145        

 

                          Serum or plasma potassium measurement (moles/volume)              3.6 mmol/L      

                                        3.6-5.0        

 

                          Serum or plasma chloride measurement (moles/volume)              97 mmol/L        

                                                

 

                    Carbon dioxide              26 mmol/L              21-32        

 

                          Serum or plasma anion gap determination (moles/volume)              14 mmol/L     

                                        5-14        

 

                          Serum or plasma urea nitrogen measurement (mass/volume)              24 mg/dL     

                                        7-18        

 

                          Serum or plasma creatinine measurement (mass/volume)              1.78 mg/dL      

                                        0.60-1.30        

 

                    Serum or plasma urea nitrogen/creatinine mass ratio              13                  NRG

        

 

                                        Serum or plasma creatinine measurement with calculation of estimated glomerular 

filtration rate              30                        NRG        

 

                          Serum or plasma glucose measurement (mass/volume)              112 mg/dL          

                                                

 

                          Serum or plasma calcium measurement (mass/volume)              10.1 mg/dL         

                                        8.5-10.1        

 

                          Serum or plasma total bilirubin measurement (mass/volume)              0.8 mg/dL  

                                        0.1-1.0        

 

                                        Serum or plasma alkaline phosphatase measurement (enzymatic activity/volume)    

                          92 U/L                            

 

                                        Serum or plasma aspartate aminotransferase measurement (enzymatic activity/volume)

                          42 U/L                    5-34        

 

                                        Serum or plasma alanine aminotransferase measurement (enzymatic activity/volume)

                          81 U/L                    0-55        

 

                          Serum or plasma protein measurement (mass/volume)              8.7 g/dL           

                                        6.4-8.2        

 

                          Serum or plasma albumin measurement (mass/volume)              4.2 g/dL           

                                        3.2-4.5        

 

                    CALCIUM CORRECTED              9.9 mg/dL              8.5-10.1        









                                        Lipid 1996 panel - 01/15/19 17:23         









                          Serum or plasma triglyceride measurement (mass/volume)              231 mg/dL     

                                        <150        

 

                          Serum or plasma cholesterol measurement (mass/volume)              280 mg/dL      

                                        < 200        

 

                          Serum or plasma cholesterol in HDL measurement (mass/volume)              54 mg/dL

                                        40-60        

 

                          Cholesterol in LDL [mass/volume] in serum or plasma by direct assay              179

 mg/dL                                  1-129        

 

                          Serum or plasma cholesterol in VLDL measurement (mass/volume)              46 mg/dL

                                        5-40        









                                        THYROID STIMULATING HORMONE - 01/15/19 17:23         









                    THYROID STIMULATING HORMONE              4.84 u[iU]/mL              0.35-4.94       

 



                                                                  



Encounters

      





                ACCT No.              Visit Date/Time              Discharge              Status      

                Pt. Type              Provider              Facility              Loc./Unit      

                                        Complaint        

 

                    [id number]              2019 13:00:00              2019 23:59:59      

                CLS              Preadmit              BRUCE VILLALOBOS MD              [facility]              RAD                       CKD STAGE 3        

 

                    [id number]              2019 10:30:00              2019 23:59:59      

                CLS              Outpatient              MARCELLUS RAMIRES FELIZ L              [facility] 

[facility]              LAB                       N18.9        

 

                    [id number]              01/15/2019 17:11:00              01/15/2019 23:59:59      

                CLS              Outpatient              MARCELLUS DO FELIZ L              [facility] 

[facility]              LAB                       E10.65        

 

                    [id number]              2018 10:23:00              2018 23:59:59      

                CLS              Outpatient              JULY VOSS DO A              [facility]

 [facility]              RAD                       TRAUMA LEFT ELBOW        



 

                    [id number]              2018 08:02:00              2018 23:59:59      

                CLS              Outpatient              MARCELLUS DO FELIZ L              [facility] 

[facility]              LAB                       E10.65        

 

                    [id number]              10/11/2017 08:40:00              10/11/2017 23:59:59      

                CLS              Outpatient              JULY VOSS DO              [facility]

 [facility]              LAB                       INC LIVER ENZYMES, HYPERGLYCEMIA

        

 

                    [id number]              2017 19:16:00              2017 23:17:00      

                DIS              Emergency              LIAM GAONA APRN              [facility]              ER                        VOMITING        

 

                    [id number]              2017 07:38:00              2017 23:59:59      

                CLS              Outpatient              MARCELLUS DO FELIZ L              [facility] 

[facility]              LAB                       E10.65        

 

                    [id number]              2016 08:31:00              2016 23:59:59      

                CLS              Outpatient              MARCELLUS DO FELIZ L              [facility] 

[facility]              LAB                       DIABETES MELLITUS TYPE 1  

      

 

                    [id number]              2016 09:58:00              2016 23:59:59      

                CLS              Outpatient              FELIZ GERMAIN DO              [facility] 

[facility]              LAB                                

 

                    [id number]              2016 09:39:00              2016 23:59:59      

                CLS              Outpatient              YU CRUZ MD              [facility] 

[facility]              LAB                       CHRONIC KIDNEY DISEASE,DIABETES

 TYPE I,PROTEINURIA        

 

                    [id number]              2016 15:41:00              2016 18:19:00      

                DIS              Emergency              TIMOTHY MENDEZ, MELODY GRIJALVA              [facility]              ER                        LOW BLOOD SUGAR,WITHDRAWAL        



 

                    [id number]              2016 13:02:00              2016 23:59:59      

                CLS              Outpatient              YU CRUZ MD              [facility] 

[facility]              LAB                       LONG TERM MED USE, CKD, HYPERLIPIDEMIA,

        

 

                    [id number]              2015 23:17:00              2015 14:00:00      

                DIS              Inpatient              JULY VOSS DO              [facility] 

[facility]              ICU                       OVERDOSE,AMS,HYPOTENSION,ACUTE

 KIDNEY INJURY        

 

                    [id number]              2014 00:41:00              2014 23:59:59      

                CLS              Preadmit              JULY VOSS DO              [facility]              LAB                       DVT        

 

                    [id number]              2014 13:24:00              2014 00:01:00      

                DIS              Outpatient              JULY VOSS DO              [facility]

 [facility]              LAB                       DVT        

 

                    [id number]              2014 13:19:00              2014 23:59:59      

                CLS              Outpatient              FELIZ GERMAIN DO              [facility] 

[facility]              LAB                       DIABETES        

 

                    [id number]              2014 11:53:00              2014 23:59:59      

                CLS              Outpatient              YU CRUZ MD              [facility] 

[facility]              LAB                       DIABETES,PROTIENURIA, HTN 

       

 

                    [id number]              2014 07:48:00              2014 23:59:59      

                CLS              Outpatient              FELIZ GERMAIN DO              [facility] 

[facility]              LAB                       DM I        

 

                    [id number]              2014 11:10:00              2014 23:59:59      

                CLS              Outpatient              YU CRUZ MD              Via 

[facility]              LAB                       DIABETES UNCOMPL TYPEI,PROTEINURIA,BENIGN

 ESS[place]        

 

                    [id number]              2014 11:12:00              2014 00:01:00      

                DIS              Outpatient              SUMITPETER  JULY CHULA              Via

 [facility]              LAB                       DVT        

 

                    [id number]              2013 08:20:00              2013 23:59:59      

                CLS              Outpatient              JULY VOSS DO              Via

 [facility]              RAD                       BREAST LUMP        

 

                    [id number]              10/10/2013 08:37:00              10/10/2013 23:59:59      

                CLS              Outpatient              ASYADANK  JULY QUIROS              Via

 [facility]              RAD                       SCREENING        

 

                    [id number]              10/09/2013 15:05:00              10/09/2013 23:59:59      

                CLS              Outpatient              YU CRUZ MD              Via 

[facility]              RAD                       CHRONIC KIDNEY        

 

                    [id number]              10/03/2013 11:01:00              10/03/2013 23:59:59      

                CLS              Outpatient              YU CRUZ MD              Via 

[facility]              LAB                       ABNORMALITIES OF BLOOD    

    

 

                [id number]              2015 07:49:00                                          

             Document Registration                                                                   

 

 

                [id number]              2015 07:45:00                                          

             Document Registration                                                                   

 

 

                [id number]              2013 00:00:00                                          

             Document Registration                                                                   

 

 

                [id number]              02/15/2013 06:00:00                                          

             Document Registration                                                                   

 

 

                [id number]              2013 12:50:00                                          

             Document Registration                                                                   

 

 

                [id number]              2013 12:44:00                                          

             Document Registration                                                                   

 

 

                [id number]              2012 11:00:00                                          

             Document Registration                                                                   

 

 

                [id number]              2012 08:25:00                                          

             Document Registration                                                                   

 

 

                [id number]              2012 11:54:00                                          

             Document Registration                                                                   

 

 

                [id number]              2012 11:44:00                                          

             Document Registration                                                                   

 

 

                [id number]              2012 12:34:00                                          

             Document Registration                                                                   

 

 

                [id number]              2012 08:50:00                                          

             Document Registration                                                                   

 

 

                [id number]              2011 14:13:00                                          

             Document Registration                                                                   

 

 

                [id number]              2011 10:27:00                                          

             Document Registration                                                                   

 

 

                [id number]              10/24/2011 11:35:00                                          

             Document Registration                                                                   

 

 

                [id number]              2011 10:06:00                                          

             Document Registration                                                                   

 

 

                [id number]              2011 11:54:00                                          

             Document Registration                                                                   

 

 

                [id number]              2011 11:35:00                                          

             Document Registration                                                                   

 

 

                [id number]              2011 11:51:00                                          

             Document Registration                                                                   

 

 

                [id number]              2011 12:55:00                                          

             Document Registration                                                                   

 

 

             [id number]              2015 21:59:41                             ACT              Document

 Registration

## 2019-08-11 NOTE — NUR
UNABLE TO GET 1200 BLOOD SUGAR RESULTS FROM GLUCOMETER, BLOOD DRAWN AND SENT TO LAB, RESULTS 
RECEIVED AT THIS TIME. NO CHANGE IN GTT PER PROTOCOL.

## 2019-08-11 NOTE — HISTORY & PHYSICAL-HOSPITALIST
History of Present Illness


HPI/Chief Complaint


Radha Kelly is a 54yoF with PMH IDDM (likely type 1) with a history of non-

adherence, HLD, seasonal allergies, methamphetamine abuse, who presented to the 

ER with weakness and vomiting. She reports a two day history of feeling ill. She

reports subjective fevers. She reports dyspnea. She denies cough. She denies 

chest pain. She denies dysuria. She reports vague abdominal pain. She also 

reports diffuse back pain. She is requesting narcotics to treat her pain. She 

reports taking her long acting insulin, but says she hasn't taken her short 

acting because she hasn't been eating. She denies methamphetamine use and other 

IV drug use. She reports using CBD oils and maybe some marijuana.


Source:  patient


Exam Limitations:  no limitations


Date Seen


19


Time Seen by a Provider:  16:00


Attending Physician


Campbell Hoang MD


PCP


Andrade Alvarado DO


Referring Physician





Date of Admission


Aug 11, 2019 at 09:18





Home Medications & Allergies


Home Medications


Reviewed patient Home Medication Reconciliation performed by pharmacy medication

reconciliations technician and/or nursing.


Patients Allergies have been reviewed.





Allergies





Allergies


Coded Allergies


  Sulfa (Sulfonamide Antibiotics) (Unverified Allergy, Unknown, 6/25/15)


  codeine (Verified Allergy, Unknown, TAKES ULTRAM AT HOME, 09)


  ketorolac (Verified Allergy, Unknown, 08)


  tramadol (Verified Allergy, Unknown, 11)








Past Medical-Social-Family Hx


Past Med/Social Hx:  Reviewed Nursing Past Med/Soc Hx


Patient Social History


Alcohol Use:  Occasionally Uses


Recreational Drug Use:  Yes


Drug of Choice:  THC


Smoking Status:  Current Everyday Smoker


Type Used:  Cigarettes


2nd Hand Smoke Exposure:  No


Recent Foreign Travel:  No


Contact w/other who traveled:  No


Recent Hopitalizations:  No


Recent Infectious Disease Expo:  No





Immunizations Up To Date


Tetanus Booster (TDap):  Unknown


Date of Pneumonia Vaccine:  Oct 1, 2012


Date of Influenza Vaccine:  Oct 1, 2012





Seasonal Allergies


Seasonal Allergies:  No





Past Medical History


Surgeries:  Adenoidectomy, Ear Surgery, Hysterectomy, Tonsillectomy


Cardiac:  Deep Vein Thrombosis


Neurological:  Neuropathy


Reproductive:  Yes


Female Reproductive Disorders:  Menstrual Problems


Genitourinary:  Renal Failure


Gastrointestinal:  Gastroesophageal Reflux, Pancreatitis, Ulcer


Musculoskeletal:  Arthritis, Rheumatoid Arthritis, Fractures


Endocrine:  Diabetes, Insulin dep


HEENT:  Tonsilitis


Loss of Vision:  Denies


Hearing Impairment:  Denies


Psychosocial:  Personality Disorder


History of Blood Disorders:  No


Adverse Reaction to Blood Ramirez:  No





Family History





FH: thyroid cancer


  G8 SISTER


FHx: macular degeneration


  19 MOTHER


Glaucoma


  G8 BROTHER


Heart Disease, Cancer, Diabetes





Review of Systems


Constitutional:  fever, malaise, weakness


EENTM:  no symptoms reported


Respiratory:  No cough; short of breath


Cardiovascular:  No chest pain


Gastrointestinal:  abdominal pain; No constipation, No diarrhea; nausea, 

vomiting


Genitourinary:  No dysuria


Musculoskeletal:  back pain, muscle pain


Skin:  no symptoms reported


Psychiatric/Neurological:  No Symptoms Reported





Physical Exam


Physical Exam


Vital Signs





Vital Signs - First Documented








 19





 08:26


 


Temp 97.4


 


Pulse 131


 


Resp 28


 


B/P (MAP) 129/65 (86)


 


Pulse Ox 100


 


O2 Delivery Room Air





Capillary Refill : Less Than 3 Seconds


Height, Weight, BMI


Height: 5'5.00"


Weight: 139lbs. 0oz. 63.599202rf; 33.30 BMI


Method:Stated


General Appearance:  No Apparent Distress, Thin, Other (resting comfortably on 

arrival)


HEENT:  PERRL/EOMI; No Moist Mucous Membranes


Neck:  Full Range of Motion, Supple


Respiratory:  Lungs Clear, Normal Breath Sounds, No Respiratory Distress; No 

Crackles, No Wheezing


Cardiovascular:  No Edema, No Murmur, Tachycardia


Gastrointestinal:  Normal Bowel Sounds, Soft, Tenderness


Back:  Normal Inspection, Vertebral Tenderness


Extremity:  Normal Inspection, Non Tender, No Pedal Edema


Neurologic/Psychiatric:  Alert, No Motor/Sensory Deficits; No Disoriented


Skin:  Normal Color, Warm/Dry


Lymphatic:  No Adenopathy





Results


Results/Procedures


Labs


Laboratory Tests


19 08:19








19 09:45








19 11:00








19 12:20








19 15:10








Patient resulted labs reviewed.


Imaging:  Reviewed Imaging Report





Assessment/Plan


Admission Diagnosis


Diabetic ketoacidosis


Admission Status:  Inpatient Order (span 2 midnights)


Reason for Inpatient Admission:  


Lactic acidosis


Acute kidney injury superimposed on chronic kidney disease


Leukocytosis





Assessment and Plan


Type 1 diabetes mellitus with ketoacidosis


-Blood sugar >900 on arrival


-Severely elevated anion gap acidosis, bicarb <5


-Beta hydroxybutyrate significantly elevated >12


-ABG with pH 7.1


-IV fluid bolus 2 L in ER


-Continue IV fluids per DKA protocol


-Started on insulin gtt


-Monitor BMP closely





SIRS


Leukocytosis


-SIRS+ with leukocytosis and tachycardia


-Infectious workup negative thus far, but with significant leukocytosis will 

start broad spectrum antibiotics with Vanc/Cefepime/Flagyl


-Plan to pare antibiotics as able once cultures return


-Blood cultures drawn





Lactic acidosis


-Lactic acid 5 on arrival, repeat down to 2





Acute kidney injury superimposed on chronic kidney disease


-Cr 2.96 on admission


-Likely a mixture of prerenal and ATN due to dehydration


-IV fluids ordered


-Continue to monitor





Critical Care


Critically Ill Patient





Diagnosis/Problems


Diagnosis/Problems





(1) DKA (diabetic ketoacidoses)


Status:  Acute


Qualifiers:  


   Diabetes mellitus type:  type 1  Diabetes mellitus complication detail:  

without coma  Qualified Codes:  E10.10 - Type 1 diabetes mellitus with ketoa

cidosis without coma


(2) Acute kidney injury superimposed on chronic kidney disease


Status:  Acute


(3) Lactic acidosis


Status:  Acute


(4) SIRS (systemic inflammatory response syndrome)


Status:  Acute


(5) Leukocytosis


Status:  Acute


(6) High anion gap metabolic acidosis


Status:  Acute





Clinical Quality Measures


DVT/VTE Risk/Contraindication:


Risk Factor Score Per Nursin


RFS Level Per Nursing on Admit:  2=Moderate











CAMPBELL HOANG MD              Aug 11, 2019 16:41

## 2019-08-11 NOTE — XMS REPORT
[facility]

                             Created on: 10/08/2017



AnjelicaRadha cordoba

External Reference #: [id number]

: 1965

Sex: Female



Demographics







                          Address                   [address] [place]  [address]

 

                          Preferred Language        Unknown

 

                          Marital Status            Unknown

 

                          Rastafari Affiliation     Unknown

 

                          Race                      Unknown

 

                          Ethnic Group              Unknown





Author







                          Author                    DEMETRIUS Woody

 

                          [facility]

 

                          Address                   Unknown

 

                          Phone                     [phone]







Care Team Providers







                    Care Team Member Name    Role                Phone

 

                    DEMETRIUS Woody    Unavailable         [phone]







PROBLEMS

Unknown Problems



ALLERGIES







             Substance    Reaction     Event Type    Date         Status

 

             Penicillin G Sodium    Unknown      Drug Allergy        Active

 

             methotrexate    Unknown      Non Drug Allergy        Active

 

             ultrum       Unknown      Non Drug Allergy        Active

 

             morphine     Unknown      Non Drug Allergy        Active

 

             sulfa        Unknown      Non Drug Allergy        Active







SOCIAL HISTORY

Never Assessed



PLAN OF CARE







                          Activity                  Details









                                         









                          Follow Up                 prn Reason:hygiene/santa







VITAL SIGNS







                    Blood pressure systolic    144 mmHg            2017

 

                    Blood pressure diastolic    69 mmHg             2017







MEDICATIONS







        Medication    Instructions    Dosage    Frequency    Start Date    End Date    Duration    Status



 

        Latanoprost                                                    Active

 

           Norco 5-325 MG    Orally every 6 hrs    1 tablet as needed    6h                              4 days                    Active

 

           Keflex 500 MG    Orally Four times a day    1 capsule    6h         

                          7 days                    Active

 

        Timolol                                                    Active

 

        Brimonidine Tartrate                                                    Active

 

        Saphris                                                    Active

 

        Lice Treatment                                                    Active

 

        Amlodipine-Valsartan-HCTZ                                                    Active

 

        Alprazolam                                                    Active

 

        Zetia                                                    Active

 

        Jantoven                                                    Active

 

        Oxcarbazepine                                                    Active

 

        Hydrocodone-APAP-Dietary Prod                                                    Active







RESULTS

No Results



PROCEDURES







                Procedure       Date Ordered    Result          Body Site

 

                LTD ORAL EVALUATION - PROBLEM FOCUS    2017                     

 

                INTRAORL-PERIAPICAL 1 FILM 06145    2017                     







IMMUNIZATIONS

No Known Immunizations



MEDICAL (GENERAL) HISTORY







                    Type                Description         Date

 

                    Medical History     High blood pressure     

 

                    Medical History     [place]             

 

                    Medical History     Asthma               

 

                    Medical History     Diabetes             

 

                    Medical History     Pt takes Blood thinners     

 

                    Medical History     Arthritits           

 

                    Medical History     9 chronic kidney diesease

## 2019-08-11 NOTE — XMS REPORT
Clinical Summary

                             Created on: 2019



Radha Kelly

External Reference #: [id number]

: 1965

Sex: Female



Demographics







                          Address                   [address] [place]  [address]

 

                          Home Phone                [phone]

 

                          Preferred Language        Unknown

 

                          Marital Status            

 

                          [employer]ian Affiliation     Unknown

 

                          Race                      Unknown

 

                          Ethnic Group              Unknown





Author







                          Author                    [facility]

 

                          Address                   Unknown

 

                          Phone                     Unavailable







Support







                Name            Relationship    Address         Phone

 

                Cameron Whittington    Unavailable     Unknown         [phone]







Care Team Providers







                    Care Team Member Name    Role                Phone

 

                          PP                        Unavailable







Allergies

Not on File



Medications

Not on file



Active Problems





Not on file



Social History







                                        Date



                 Tobacco Use     Types           Packs/Day       Years Used 

 

                                         



                                         Never Assessed    









 



                           Sex Assigned at Birth     Date Recorded

 

 



                                         Not on file 









                                        Industry



                           Job Start Date            Occupation 

 

                                        Not on file



                           Not on file               Not on file 









                                        Travel End



                           Travel History            Travel Start 













                                         No recent travel history available.







Plan of Treatment







   



                 Health Maintenance     Due Date        Last Done       Comments

 

   



                           Hepatitis C Screening     1965  

 

   



                           DTaP,Tdap,and Td Vaccines     1982  



                                         (1 - Tdap)   

 

   



                           MMR Vaccines-Adult        1982  

 

   



                           Cervical Cancer Screening     1984  

 

   



                           Breast Cancer             2013  



                                         Screening-Mammogram   

 

   



                           Colon Cancer Screening     2013  

 

   



                           Zoster Recombinant        2013  



                                         Vaccine (RZV,Shingrix) (1   



                                         of 2 - [facility] 2 Dose   



                                         Standard)   

 

   



                           Influenza Vaccine (#1)     2019  

 

   



                     Pneumo-Vaccine: Peds (0-5     Aged Out            No longer eligible based



                           Yrs) & At-Risk Patients       on patient's age to



                           (6-64 Yrs)                complete this topic







Results

Not on filefrom Last 3 Months

## 2019-08-11 NOTE — ED GI
General


Stated Complaint:  LETHARGY


Source of Information:  Patient, EMS


Exam Limitations:  No Limitations





History of Present Illness


Date Seen by Provider:  Aug 11, 2019


Time Seen by Provider:  08:20


Initial Comments


This 54-year-old white female diabetic presents with 2-3 day history of 

persistent vomiting.  This diabetic denies associated hematemesis or black or 

tarry stools.  The patient states that she's been noncompliant on her insulin.





Patient denies productive cough, dysuria, or flank pain.





Allergies and Home Medications


Allergies


Coded Allergies:  


     Sulfa (Sulfonamide Antibiotics) (Unverified  Allergy, Unknown, 6/25/15)


     codeine (Verified  Allergy, Unknown, TAKES ULTRAM AT HOME, 1/2/09)


     ketorolac (Verified  Allergy, Unknown, 11/19/08)


     tramadol (Verified  Allergy, Unknown, 12/17/11)





Home Medications


Albuterol 8.5 Gm Hfa.aer.ad, 2 PUFF IH TID PRN for SHORTNESS OF BREATH, (Rep

orted)


   1 PUFFS 


Alprazolam 2 Mg Tab.rapdis, 2 MG PO TID PRN for ANXIETY, (Reported)


Amlodipine/Valsartan 1 Each Tablet, 1 EACH PO DAILY, (Reported)


Cetirizine HCl 10 Mg Tablet, 10 MG PO HS, (Reported)


Ezetimibe 10 Mg Tablet, 10 MG PO DAILY, (Reported)


Fluticasone Propionate 16 Gm Naspr, 2 SPRAYS NS DAILY, (Reported)


Hydrocodone Bit/Acetaminophen 1 Each Tablet, 1 EACH PO BID PRN for PAIN, 

(Reported)


Insulin Aspart 100 Unit/1 Ml Insuln.pen, SQ PER SLIDING SCALE, (Reported)


Insulin Glargine,Hum.rec.anlog 100 Unit/1 Ml Vial, 22 UNITS SQ BID, (Reported)


Latanoprost 2.5 Ml Drops, 1 DROP OU HS, (Reported)


Metoprolol Tartrate 50 Mg Tablet, 50 MG PO BID, (Reported)


Nystatin 30 Gm Oint..gm., 0 TOP TID, (Reported)


   APPLY TO AFFECTED AREA(S) 


Omeprazole 40 Mg Capsule.dr, 40 MG PO before breakfast, (Reported)


Ondansetron 8 Mg Tab.rapdis, 8 MG PO Q6H PRN for NAUSEA/VOMITING-1ST LINE


   Prescribed by: LIAM GAONA on 9/5/17 7613


Simvastatin 40 Mg Tablet, 40 MG PO HS, (Reported)


Timolol Maleate 15 Ml Btl, 1 DROP OU DAILY, (Reported)


Warfarin Sodium 1 Mg Tablet, 3 MG PO 6 pm, (Reported)


   TAKE 3 (1 MG) TABLETS WITH THE 5 MG DOSE AT 6 PM 


Warfarin Sodium 5 Mg Tablet, 5 MG PO 6 pm, (Reported)


   TAKE 1 (5 MG) TABLET WITH 3 (1 MG) TABLETS AT 6 PM 





Patient Home Medication List


Home Medication List Reviewed:  Yes





Review of Systems


Review of Systems


Constitutional:  No chills, No fever; malaise


EENTM:  No Blurred Vision


Respiratory:  Denies Cough


Cardiovascular:  Denies Chest Pain


Gastrointestinal:  Denies Abdominal Pain, Denies Diarrhea; Nausea, Vomiting


Genitourinary:  Denies Burning, Denies Frequency


Musculoskeletal:  No back pain


Skin:  No change in color, No rash


Psychiatric/Neurological:  No Symptoms Reported


Endocrine:  No Symptoms Reported


Hematologic/Lymphatic:  No Symptoms Reported





Past Medical-Social-Family Hx


Past Med/Social Hx:  Reviewed Nursing Past Med/Soc Hx


Patient Social History


Type Used:  Cigarettes


2nd Hand Smoke Exposure:  No


Recent Foreign Travel:  No


Contact w/Someone Who Travel:  No


Recent Hopitalizations:  No





Immunizations Up To Date


Tetanus Booster (TDap):  Unknown


Date of Pneumonia Vaccine:  Oct 1, 2012


Date of Influenza Vaccine:  Oct 1, 2012





Seasonal Allergies


Seasonal Allergies:  No





Past Medical History


Surgeries:  Yes (renal stents)


Adenoidectomy, Ear Surgery, Hysterectomy, Tonsillectomy


Respiratory:  Yes


Asthma, Pneumonia, Chronic Bronchitis


Cardiac:  Yes


Deep Vein Thrombosis


Neurological:  Yes


Neuropathy


Reproductive Disorders:  Yes


Female Reproductive Disorders:  Menstrual Problems


Genitourinary:  Yes


Renal Failure


Gastrointestinal:  Yes (gastroparesis)


Gastroesophageal Reflux, Pancreatitis, Ulcer


Musculoskeletal:  Yes


Arthritis, Rheumatoid Arthritis, Fractures


Endocrine:  Yes


Diabetes, Insulin dep


HEENT:  No


Tonsilitis


Loss of Vision:  Denies


Hearing Impairment:  Denies


Cancer:  No


Psychosocial:  Yes


Personality Disorder


Integumentary:  No


Blood Disorders:  No


Adverse Reaction/Blood Tranf:  No





Family Medical History


Heart Disease, Cancer, Diabetes





Physical Exam


Vital Signs





Vital Signs - First Documented








 8/11/19





 08:26


 


Temp 97.4


 


Pulse 131


 


Resp 28


 


B/P (MAP) 129/65 (86)


 


Pulse Ox 100


 


O2 Delivery Room Air





Capillary Refill :


Height/Weight/BMI


Height: 5'5.00"


Weight: 205lbs. 0.0oz. 92.085230wv;  BMI


Method:Stated


General Appearance:  WD/WN, mild distress


HEENT:  normal ENT inspection


Neck:  full range of motion, supple


Respiratory:  lungs clear, normal breath sounds


Cardiovascular:  normal peripheral pulses, regular rate, rhythm, no murmur


Gastrointestinal:  normal bowel sounds, non tender, soft


Extremities:  normal range of motion, non-tender, normal inspection


Back:  normal inspection


Neurologic/Psychiatric:  no motor/sensory deficits, alert


Skin:  normal color, warm/dry





Progress/Results/Core Measures


Results/Orders


Lab Results





Laboratory Tests








Test


 8/11/19


08:19 Range/Units


 


 


White Blood Count


 26.1 H


 4.3-11.0


10^3/uL


 


Red Blood Count


 4.51 


 4.35-5.85


10^6/uL


 


Hemoglobin 14.1  11.5-16.0  G/DL


 


Hematocrit 44  35-52  %


 


Mean Corpuscular Volume 97  80-99  FL


 


Mean Corpuscular Hemoglobin 31  25-34  PG


 


Mean Corpuscular Hemoglobin


Concent 32 


 32-36  G/DL





 


Red Cell Distribution Width 13.7  10.0-14.5  %


 


Platelet Count


 349 


 130-400


10^3/uL


 


Mean Platelet Volume 10.6 H 7.4-10.4  FL


 


Neutrophils (%) (Auto) 81 H 42-75  %


 


Lymphocytes (%) (Auto) 11 L 12-44  %


 


Monocytes (%) (Auto) 7  0-12  %


 


Eosinophils (%) (Auto) 0  0-10  %


 


Basophils (%) (Auto) 0  0-10  %


 


Neutrophils # (Auto) 21.2 H 1.8-7.8  X 10^3


 


Lymphocytes # (Auto) 3.0  1.0-4.0  X 10^3


 


Monocytes # (Auto) 1.8 H 0.0-1.0  X 10^3


 


Eosinophils # (Auto)


 0.0 


 0.0-0.3


10^3/uL


 


Basophils # (Auto)


 0.0 


 0.0-0.1


10^3/uL


 


Neutrophils % (Manual) 83   %


 


Lymphocytes % (Manual) 12   %


 


Monocytes % (Manual) 5   %


 


Eosinophils % (Manual) 0   %


 


Basophils % (Manual) 0   %


 


Band Neutrophils 0   %


 


Blood Morphology Comment NORMAL   


 


Sodium Level 129 L 135-145  MMOL/L


 


Potassium Level 6.3 H 3.6-5.0  MMOL/L


 


Chloride Level 88 L   MMOL/L


 


Carbon Dioxide Level < 5 *L 21-32  MMOL/L


 


Anion Gap 37 H 5-14  MMOL/L


 


Blood Urea Nitrogen 51 H 7-18  MG/DL


 


Creatinine


 2.96 H


 0.60-1.30


MG/DL


 


Estimat Glomerular Filtration


Rate 16 


  





 


BUN/Creatinine Ratio 17   


 


Glucose Level 919 *H   MG/DL


 


Calcium Level 9.6  8.5-10.1  MG/DL


 


Corrected Calcium 10.1  8.5-10.1  MG/DL


 


Total Bilirubin 0.4  0.1-1.0  MG/DL


 


Aspartate Amino Transf


(AST/SGOT) 32 


 5-34  U/L





 


Alanine Aminotransferase


(ALT/SGPT) 74 H


 0-55  U/L





 


Alkaline Phosphatase 127    U/L


 


Total Protein 7.4  6.4-8.2  GM/DL


 


Albumin 3.4  3.2-4.5  GM/DL


 


Lipase 215 H 8-78  U/L








My Orders





Orders - RANDY CARDOSO MD


Cbc With Automated Diff (8/11/19 08:17)


Comprehensive Metabolic Panel (8/11/19 08:17)


Ua Culture If Indicated (8/11/19 08:17)


Ns Iv 1000 Ml (Sodium Chloride 0.9%) (8/11/19 08:30)


Lipase (8/11/19 08:17)


Ondansetron Injection (Zofran Injectio (8/11/19 08:30)


Manual Differential (8/11/19 08:19)


Arterial Blood Gas (8/11/19 09:08)


Insulin (Regular) Human (Humulin R (Per (8/11/19 09:15)


Ekg Tracing (8/11/19 09:15)


Troponin I (8/11/19 09:15)


Chest 1 View, Ap/Pa Only (8/11/19 09:15)


Blood Culture (8/11/19 09:15)


Lactic Acid Analyzer (8/11/19 09:15)





Medications Given in ED





Current Medications








 Medications  Dose


 Ordered  Sig/Corin


 Route  Start Time


 Stop Time Status Last Admin


Dose Admin


 


 Insulin Human


 Regular  10 unit  ONCE  ONCE


 IV  8/11/19 09:15


 8/11/19 09:16 DC 8/11/19 09:23


10 UNIT


 


 Ondansetron HCl  4 mg  ONCE  ONCE


 IVP  8/11/19 08:30


 8/11/19 08:31 DC 8/11/19 08:31


4 MG








Vital Signs/I&O











 8/11/19





 08:26


 


Temp 97.4


 


Pulse 131


 


Resp 28


 


B/P (MAP) 129/65 (86)


 


Pulse Ox 100


 


O2 Delivery Room Air











Progress


Progress Note :  


   Time:  09:30


Progress Note


The patient's glucose was 919.  Her potassium was north of 6.  Her CO2 was less 

than 5.  ABGs are pending.





Initial treatment consisted of 2 L bolus of normal saline.  10 units of regular 

insulin IV were given.





Telephone consultation with Dr. Garcia was undertaken.  The patient was 

admitted to the ICU.  I've written orders for her to have a third liter bolus of

normal saline.  I initiated her insulin drip at 5 units per hour with an 

additional 5 unit bolus of regular insulin IV.





Critical Care Note


Critical Care


Start Time:  09:32


Stop Time:  10:15


Total Time (minutes)


The patient's DKA was treated with rapid 2 L fluid  bolus of normal saline, IV 

regular insulin, an admission to the ICU.





Patient's nausea was treated with IV Zofran.





Repeat glucose at the bedside prior to transfer to the ICU was requested.





Departure


Communication (Admissions)


Time/Spoke to Admitting Phy:  09:34


Dr. Zavala





Impression





   Primary Impression:  


   DKA (diabetic ketoacidoses)


   Qualified Codes:  E10.10 - Type 1 diabetes mellitus with ketoacidosis without

   coma


Disposition:  09 ADMITTED AS INPATIENT


Condition:  Improved





Admissions


Decision to Admit Reason:  Admit from ER (General)


Decision to Admit/Date:  Aug 11, 2019


Time/Decision to Admit Time:  09:33





Departure-Patient Inst.


Referrals:  


JULY VOSS DO (PCP/Family)


Primary Care Physician











RANDY CARDOSO MD             Aug 11, 2019 08:25

## 2019-08-12 VITALS — DIASTOLIC BLOOD PRESSURE: 69 MMHG | SYSTOLIC BLOOD PRESSURE: 104 MMHG

## 2019-08-12 VITALS — DIASTOLIC BLOOD PRESSURE: 85 MMHG | SYSTOLIC BLOOD PRESSURE: 132 MMHG

## 2019-08-12 VITALS — DIASTOLIC BLOOD PRESSURE: 86 MMHG | SYSTOLIC BLOOD PRESSURE: 129 MMHG

## 2019-08-12 VITALS — DIASTOLIC BLOOD PRESSURE: 90 MMHG | SYSTOLIC BLOOD PRESSURE: 142 MMHG

## 2019-08-12 VITALS — SYSTOLIC BLOOD PRESSURE: 199 MMHG | DIASTOLIC BLOOD PRESSURE: 109 MMHG

## 2019-08-12 VITALS — DIASTOLIC BLOOD PRESSURE: 85 MMHG | SYSTOLIC BLOOD PRESSURE: 150 MMHG

## 2019-08-12 VITALS — SYSTOLIC BLOOD PRESSURE: 107 MMHG | DIASTOLIC BLOOD PRESSURE: 63 MMHG

## 2019-08-12 VITALS — DIASTOLIC BLOOD PRESSURE: 88 MMHG | SYSTOLIC BLOOD PRESSURE: 147 MMHG

## 2019-08-12 VITALS — DIASTOLIC BLOOD PRESSURE: 82 MMHG | SYSTOLIC BLOOD PRESSURE: 113 MMHG

## 2019-08-12 VITALS — DIASTOLIC BLOOD PRESSURE: 92 MMHG | SYSTOLIC BLOOD PRESSURE: 134 MMHG

## 2019-08-12 VITALS — SYSTOLIC BLOOD PRESSURE: 156 MMHG | DIASTOLIC BLOOD PRESSURE: 102 MMHG

## 2019-08-12 VITALS — SYSTOLIC BLOOD PRESSURE: 127 MMHG | DIASTOLIC BLOOD PRESSURE: 92 MMHG

## 2019-08-12 VITALS — SYSTOLIC BLOOD PRESSURE: 154 MMHG | DIASTOLIC BLOOD PRESSURE: 80 MMHG

## 2019-08-12 VITALS — SYSTOLIC BLOOD PRESSURE: 150 MMHG | DIASTOLIC BLOOD PRESSURE: 81 MMHG

## 2019-08-12 VITALS — SYSTOLIC BLOOD PRESSURE: 147 MMHG | DIASTOLIC BLOOD PRESSURE: 89 MMHG

## 2019-08-12 VITALS — DIASTOLIC BLOOD PRESSURE: 101 MMHG | SYSTOLIC BLOOD PRESSURE: 159 MMHG

## 2019-08-12 VITALS — SYSTOLIC BLOOD PRESSURE: 152 MMHG | DIASTOLIC BLOOD PRESSURE: 100 MMHG

## 2019-08-12 LAB
ALBUMIN SERPL-MCNC: 3 GM/DL (ref 3.2–4.5)
ALP SERPL-CCNC: 84 U/L (ref 40–136)
ALT SERPL-CCNC: 51 U/L (ref 0–55)
BASOPHILS # BLD AUTO: 0 10^3/UL (ref 0–0.1)
BASOPHILS NFR BLD AUTO: 0 % (ref 0–10)
BILIRUB SERPL-MCNC: 0.5 MG/DL (ref 0.1–1)
BUN/CREAT SERPL: 22
BUN/CREAT SERPL: 23
CALCIUM SERPL-MCNC: 8.3 MG/DL (ref 8.5–10.1)
CALCIUM SERPL-MCNC: 8.5 MG/DL (ref 8.5–10.1)
CHLORIDE SERPL-SCNC: 109 MMOL/L (ref 98–107)
CHLORIDE SERPL-SCNC: 109 MMOL/L (ref 98–107)
CO2 SERPL-SCNC: 19 MMOL/L (ref 21–32)
CO2 SERPL-SCNC: 19 MMOL/L (ref 21–32)
CREAT SERPL-MCNC: 1.4 MG/DL (ref 0.6–1.3)
CREAT SERPL-MCNC: 1.49 MG/DL (ref 0.6–1.3)
EOSINOPHIL # BLD AUTO: 0 10^3/UL (ref 0–0.3)
EOSINOPHIL NFR BLD AUTO: 0 % (ref 0–10)
ERYTHROCYTE [DISTWIDTH] IN BLOOD BY AUTOMATED COUNT: 13.3 % (ref 10–14.5)
GFR SERPLBLD BASED ON 1.73 SQ M-ARVRAT: 36 ML/MIN
GFR SERPLBLD BASED ON 1.73 SQ M-ARVRAT: 39 ML/MIN
GLUCOSE SERPL-MCNC: 112 MG/DL (ref 70–105)
GLUCOSE SERPL-MCNC: 159 MG/DL (ref 70–105)
HCT VFR BLD CALC: 37 % (ref 35–52)
HGB BLD-MCNC: 12.8 G/DL (ref 11.5–16)
INR PPP: 0.9 (ref 0.8–1.4)
LYMPHOCYTES # BLD AUTO: 2.2 X 10^3 (ref 1–4)
LYMPHOCYTES NFR BLD AUTO: 13 % (ref 12–44)
MAGNESIUM SERPL-MCNC: 1.8 MG/DL (ref 1.8–2.4)
MAGNESIUM SERPL-MCNC: 1.8 MG/DL (ref 1.8–2.4)
MANUAL DIFFERENTIAL PERFORMED BLD QL: NO
MCH RBC QN AUTO: 31 PG (ref 25–34)
MCHC RBC AUTO-ENTMCNC: 35 G/DL (ref 32–36)
MCV RBC AUTO: 90 FL (ref 80–99)
MONOCYTES # BLD AUTO: 0.6 X 10^3 (ref 0–1)
MONOCYTES NFR BLD AUTO: 4 % (ref 0–12)
NEUTROPHILS # BLD AUTO: 14 X 10^3 (ref 1.8–7.8)
NEUTROPHILS NFR BLD AUTO: 83 % (ref 42–75)
PHOSPHATE SERPL-MCNC: 1.7 MG/DL (ref 2.3–4.7)
PHOSPHATE SERPL-MCNC: 1.9 MG/DL (ref 2.3–4.7)
PLATELET # BLD: 230 10^3/UL (ref 130–400)
PMV BLD AUTO: 10.1 FL (ref 7.4–10.4)
POTASSIUM SERPL-SCNC: 4.2 MMOL/L (ref 3.6–5)
POTASSIUM SERPL-SCNC: 4.4 MMOL/L (ref 3.6–5)
PROT SERPL-MCNC: 6.1 GM/DL (ref 6.4–8.2)
PROTHROMBIN TIME: 12.1 SEC (ref 12.2–14.7)
SODIUM SERPL-SCNC: 136 MMOL/L (ref 135–145)
SODIUM SERPL-SCNC: 137 MMOL/L (ref 135–145)
WBC # BLD AUTO: 16.9 10^3/UL (ref 4.3–11)

## 2019-08-12 RX ADMIN — MONTELUKAST SCH MG: 10 TABLET, FILM COATED ORAL at 09:33

## 2019-08-12 RX ADMIN — ATORVASTATIN CALCIUM SCH MG: 40 TABLET, FILM COATED ORAL at 22:09

## 2019-08-12 RX ADMIN — METOCLOPRAMIDE SCH MG: 10 TABLET ORAL at 09:33

## 2019-08-12 RX ADMIN — METOCLOPRAMIDE SCH MG: 10 TABLET ORAL at 06:02

## 2019-08-12 RX ADMIN — INSULIN ASPART SCH UNIT: 100 INJECTION, SOLUTION INTRAVENOUS; SUBCUTANEOUS at 22:11

## 2019-08-12 RX ADMIN — INSULIN ASPART SCH UNIT: 100 INJECTION, SOLUTION INTRAVENOUS; SUBCUTANEOUS at 16:13

## 2019-08-12 RX ADMIN — DEXTROSE MONOHYDRATE AND SODIUM CHLORIDE SCH MLS/HR: 5; .45 INJECTION, SOLUTION INTRAVENOUS at 11:30

## 2019-08-12 RX ADMIN — SODIUM CHLORIDE SCH MLS/HR: 4.5 INJECTION, SOLUTION INTRAVENOUS at 11:29

## 2019-08-12 RX ADMIN — SODIUM CHLORIDE SCH MLS/HR: 4.5 INJECTION, SOLUTION INTRAVENOUS at 05:20

## 2019-08-12 RX ADMIN — POTASSIUM CHLORIDE SCH MLS/HR: 200 INJECTION, SOLUTION INTRAVENOUS at 06:03

## 2019-08-12 RX ADMIN — METOCLOPRAMIDE SCH MG: 10 TABLET ORAL at 22:10

## 2019-08-12 RX ADMIN — POTASSIUM CHLORIDE SCH MLS/HR: 200 INJECTION, SOLUTION INTRAVENOUS at 11:31

## 2019-08-12 RX ADMIN — METOPROLOL TARTRATE SCH MG: 50 TABLET, FILM COATED ORAL at 09:33

## 2019-08-12 RX ADMIN — DEXTROSE MONOHYDRATE AND SODIUM CHLORIDE SCH MLS/HR: 5; .45 INJECTION, SOLUTION INTRAVENOUS at 04:50

## 2019-08-12 RX ADMIN — FLUTICASONE PROPIONATE SCH SPRAY: 50 SPRAY, METERED NASAL at 11:29

## 2019-08-12 RX ADMIN — POTASSIUM CHLORIDE SCH MLS/HR: 200 INJECTION, SOLUTION INTRAVENOUS at 04:51

## 2019-08-12 RX ADMIN — INSULIN ASPART SCH UNIT: 100 INJECTION, SOLUTION INTRAVENOUS; SUBCUTANEOUS at 11:00

## 2019-08-12 RX ADMIN — METOCLOPRAMIDE SCH MG: 10 TABLET ORAL at 16:12

## 2019-08-12 RX ADMIN — POTASSIUM CHLORIDE SCH MLS/HR: 200 INJECTION, SOLUTION INTRAVENOUS at 11:30

## 2019-08-12 RX ADMIN — POTASSIUM CHLORIDE SCH MLS/HR: 200 INJECTION, SOLUTION INTRAVENOUS at 01:42

## 2019-08-12 NOTE — NUR
CM/SS spoke with patient in regards to SS consult. Patient states she is in need of a 
glucose monitor and supplies. She stated that she follows with Dr. Tipton ([place]) as an 
endocrinologist and sees her at [facility], [place], operated by [facility]. Patient does not have a PCP. She stated 
that she fired Dr. Alvarado this am, she refuses to see anyone at [facility], Dr. Estrada 
would not accept her as a patient and she is going to apply for Dr. Arambula.

## 2019-08-12 NOTE — PROGRESS NOTE
Progress Note


Assessment/Plan


Time Seen by Provider:  07:42


Events since last exam


Patient does not want to see me.


Dr. Andrade to take over patient


Assessment/Plan


Unable to assess





Vitals


Last set of Vitals Signs





Vital Signs








  Date Time  Temp Pulse Resp B/P (MAP) Pulse Ox O2 Delivery O2 Flow Rate FiO2


 


19 06:00  113 17 156/102 (120) 95 Room Air  


 


19 04:00 98.0       











I&O


I&O











Intake and Output 


 


 19





 00:00


 


Intake Total 5357.5 ml


 


Output Total 2200 ml


 


Balance 3157.5 ml


 


 


 


Intake Oral 820 ml


 


IV Total 4537.5 ml


 


Output Urine Total 2200 ml


 


Daily Weight Change Yes, 24-33 lbs











Labs


Laboratory Tests


19 08:19: 


White Blood Count 26.1H, Red Blood Count 4.51, Hemoglobin 14.1, Hematocrit 44, 

Mean Corpuscular Volume 97, Mean Corpuscular Hemoglobin 31, Mean Corpuscular H

emoglobin Concent 32, Red Cell Distribution Width 13.7, Platelet Count 349, Mean

Platelet Volume 10.6H, Neutrophils (%) (Auto) 81H, Lymphocytes (%) (Auto) 11L, 

Monocytes (%) (Auto) 7, Eosinophils (%) (Auto) 0, Basophils (%) (Auto) 0, 

Neutrophils # (Auto) 21.2H, Lymphocytes # (Auto) 3.0, Monocytes # (Auto) 1.8H, 

Eosinophils # (Auto) 0.0, Basophils # (Auto) 0.0, Neutrophils % (Manual) 83, 

Lymphocytes % (Manual) 12, Monocytes % (Manual) 5, Eosinophils % (Manual) 0, 

Basophils % (Manual) 0, Band Neutrophils 0, Blood Morphology Comment NORMAL, 

Sodium Level 129L, Potassium Level 6.3H, Chloride Level 88L, Carbon Dioxide 

Level < 5*L, Anion Gap 37H, Blood Urea Nitrogen 51H, Creatinine 2.96H, Estimat 

Glomerular Filtration Rate 16, BUN/Creatinine Ratio 17, Glucose Level 919*H, 

Calcium Level 9.6, Corrected Calcium 10.1, Total Bilirubin 0.4, Aspartate Amino 

Transf (AST/SGOT) 32, Alanine Aminotransferase (ALT/SGPT) 74H, Alkaline 

Phosphatase 127, Total Protein 7.4, Albumin 3.4, Lipase 215H


19 09:33: Glucometer > 600*H


19 09:34: 


Blood Gas Puncture Site R BRACHIAL, Blood Gas Patient Temperature 97.6, Arterial

Blood pH 7.07*L, Arterial Blood Partial Pressure CO2 11*L, Arterial Blood 

Partial Pressure O2 127H, Arterial Blood HCO3 3*L, Arterial Blood Total CO2 3.4L

, Arterial Blood Oxygen Saturation 98, Arterial Blood Base Excess -25.7L, Trent 

Test YES-POS, Blood Gas Ventilator Setting NO, Blood Gas Inspired Oxygen NA


19 09:45: 


Sodium Level 132L, Potassium Level 6.0H, Chloride Level 94L, Carbon Dioxide 

Level < 5*L, Anion Gap 33H, Blood Urea Nitrogen 52H, Creatinine 2.74H, Estimat 

Glomerular Filtration Rate 18, BUN/Creatinine Ratio 19, Glucose Level 852*H, 

Calcium Level 8.9, Lactic Acid Level 5.75*H, Troponin I < 0.028


19 11:00: 


White Blood Count 29.0H, Red Blood Count 4.23L, Hemoglobin 13.2, Hematocrit 40, 

Mean Corpuscular Volume 95, Mean Corpuscular Hemoglobin 31, Mean Corpuscular 

Hemoglobin Concent 33, Red Cell Distribution Width 13.3, Platelet Count 290, 

Mean Platelet Volume 10.6H, Sodium Level 134L, Potassium Level 5.1H, Chloride 

Level 100, Carbon Dioxide Level < 5*L, Anion Gap 29H, Blood Urea Nitrogen 51H, 

Creatinine 2.60H, Estimat Glomerular Filtration Rate 19, BUN/Creatinine Ratio 

20, Glucose Level 691*H, Calcium Level 8.3L, Beta-Hydroxybutyrate (Chem panel) 

12.57H


19 12:00: Lactic Acid Level 2.87*H


19 12:05: Glucometer > 600*H


19 12:20: 


Sodium Level 136, Potassium Level 4.5, Chloride Level 104, Carbon Dioxide Level 

< 5*L, Anion Gap 27H, Blood Urea Nitrogen 48H, Creatinine 2.42H, Estimat 

Glomerular Filtration Rate 21, BUN/Creatinine Ratio 20, Glucose Level 649*H, 

Calcium Level 8.4L


19 13:29: Glucometer 541*H


19 14:20: 


Urine Color YELLOW, Urine Clarity CLEAR, Urine pH 5, Urine Specific Gravity 

1.020, Urine Protein 3+H, Urine Glucose (UA) 4+H, Urine Ketones 4+H, Urine 

Nitrite NEGATIVE, Urine Bilirubin NEGATIVE, Urine Urobilinogen NORMAL, Urine 

Leukocyte Esterase NEGATIVE, Urine RBC (Auto) 2+H, Urine RBC 0-2, Urine WBC 

NONE, Urine Squamous Epithelial Cells 5-10, Urine Crystals NONE, Urine Bacteria 

TRACE, Urine Casts NONE, Urine Mucus NEGATIVE, Urine Culture Indicated NO, Urine

Opiates Screen NEGATIVE, Urine Oxycodone Screen NEGATIVE, Urine Methadone Screen

NEGATIVE, Urine Propoxyphene Screen NEGATIVE, Urine Barbiturates Screen 

NEGATIVE, Ur Tricyclic Antidepressants Screen NEGATIVE, Urine Phencyclidine 

Screen NEGATIVE, Urine Amphetamines Screen NEGATIVE, Urine Methamphetamines 

Screen NEGATIVE, Urine Benzodiazepines Screen NEGATIVE, Urine Cocaine Screen 

NEGATIVE, Urine Cannabinoids Screen NEGATIVE


19 14:32: Glucometer 464*H


19 15:10: 


Sodium Level 137, Potassium Level 4.7, Chloride Level 105, Carbon Dioxide Level 

5*L, Anion Gap 27H, Blood Urea Nitrogen 47H, Creatinine 2.28H, Estimat Glom

erular Filtration Rate 22, BUN/Creatinine Ratio 21, Glucose Level 507*H, Calcium

Level 8.8


19 15:37: Glucometer 432*H


19 16:30: Glucometer 377H


19 17:35: Glucometer 382H


19 18:41: Glucometer 282H


19 19:37: Glucometer 210H


19 20:05: 


Sodium Level 136, Potassium Level 4.9, Chloride Level 107, Carbon Dioxide Level 

14L, Anion Gap 15H, Blood Urea Nitrogen 43H, Creatinine 1.89H, Estimat 

Glomerular Filtration Rate 28, BUN/Creatinine Ratio 23, Glucose Level 263H, Chris

cium Level 8.5


19 20:26: Glucometer 286H


19 21:31: Glucometer 238H


19 22:44: Glucometer 244H


19 23:28: Glucometer 208H


19 00:35: Glucometer 173H


19 01:38: Glucometer 160H


19 02:34: Glucometer 155H


19 03:31: Glucometer 119H


19 03:39: 


White Blood Count 16.9H, Red Blood Count 4.07L, Hemoglobin 12.8, Hematocrit 37, 

Mean Corpuscular Volume 90, Mean Corpuscular Hemoglobin 31, Mean Corpuscular 

Hemoglobin Concent 35, Red Cell Distribution Width 13.3, Platelet Count 230, 

Mean Platelet Volume 10.1, Neutrophils (%) (Auto) 83H, Lymphocytes (%) (Auto) 

13, Monocytes (%) (Auto) 4, Eosinophils (%) (Auto) 0, Basophils (%) (Auto) 0, 

Neutrophils # (Auto) 14.0H, Lymphocytes # (Auto) 2.2, Monocytes # (Auto) 0.6, 

Eosinophils # (Auto) 0.0, Basophils # (Auto) 0.0, Sodium Level 136, Potassium 

Level 4.2, Chloride Level 109H, Carbon Dioxide Level 19L, Anion Gap 8, Blood 

Urea Nitrogen 35H, Creatinine 1.49H, Estimat Glomerular Filtration Rate 36, 

BUN/Creatinine Ratio 23, Glucose Level 112H, Calcium Level 8.5, Corrected 

Calcium 9.3, Phosphorus Level 1.9L, Magnesium Level 1.8, Total Bilirubin 0.5, 

Aspartate Amino Transf (AST/SGOT) 36H, Alanine Aminotransferase (ALT/SGPT) 51, 

Alkaline Phosphatase 84, Total Protein 6.1L, Albumin 3.0L, Beta-Hydroxybutyrate 

(Chem panel) 0.12


19 04:31: Glucometer 97


19 06:00: Glucometer 125H


19 06:59: Glucometer 129H


19 07:05: 


Sodium Level 137, Potassium Level 4.4, Chloride Level 109H, Carbon Dioxide Level

19L, Anion Gap 9, Blood Urea Nitrogen 31H, Creatinine 1.40H, Estimat Glomerular 

Filtration Rate 39, BUN/Creatinine Ratio 22, Glucose Level 159H, Calcium Level 

8.3L, Phosphorus Level 1.7L, Magnesium Level 1.8, Beta-Hydroxybutyrate (Chem 

panel) 0.85H





Focused Exam


Lactate Level


19 09:45: Lactic Acid Level 5.75*H


19 12:00: Lactic Acid Level 2.87*H





Clinical Quality Measures


DVT/VTE Risk/Contraindication:


Risk Factor Score Per Nursin


RFS Level Per Nursing on Admit:  2=Moderate











JULY VOSS DO         Aug 12, 2019 07:45

## 2019-08-12 NOTE — PROGRESS NOTE - HOSPITALIST
Subjective


HPI/CC On Admission


Date Seen by Provider:  Aug 12, 2019


Time Seen by Provider:  08:00


Radha Kelly is a 54yoF with PMH IDDM (likely type 1) with a history of non-

adherence, HLD, seasonal allergies, methamphetamine abuse, who presented to the 

ER with weakness and vomiting. She reports a two day history of feeling ill. She

reports subjective fevers. She reports dyspnea. She denies cough. She denies 

chest pain. She denies dysuria. She reports vague abdominal pain. She also 

reports diffuse back pain. She is requesting narcotics to treat her pain. She 

reports taking her long acting insulin, but says she hasn't taken her short 

acting because she hasn't been eating. She denies methamphetamine use and other 

IV drug use. She reports using CBD oils and maybe some marijuana.


Subjective/Events-last exam


Patient fired Dr Alvarado so I assumed care of patient.





Pt reports having back pain. This is chronic for which she normally state CBD 

oil only for it. She states she was told by her nephrologist that she cannot 

take Tylenol due to her kidneys. I attempted to clarify this and she simply 

responded by saying she had to refuse Tylenol. She otherwise has no complaints.





Focused Exam


Lactate Level


19 09:45: Lactic Acid Level 5.75*H


19 12:00: Lactic Acid Level 2.87*H








Objective


Exam


Vital Signs





Vital Signs








  Date Time  Temp Pulse Resp B/P (MAP) Pulse Ox O2 Delivery O2 Flow Rate FiO2


 


19 14:00  93 17 129/86 (100) 91 Room Air  


 


19 04:00 98.0       





Capillary Refill : Less Than 3 Seconds


General Appearance:  No Apparent Distress, Chronically ill


Respiratory:  Lungs Clear, No Respiratory Distress


Cardiovascular:  Regular Rate, Rhythm, No Murmur


Gastrointestinal:  Normal Bowel Sounds, Soft


Neurologic/Psychiatric:  Alert, Oriented x3, Normal Mood/Affect





Results/Procedures


Lab


Laboratory Tests


19 15:10








19 20:05








19 03:39








19 07:05








Patient resulted labs reviewed.


Imaging:  Reviewed Imaging Report





Assessment/Plan


Assessment and Plan


Assess & Plan/Chief Complaint


DKA- resolved


IDDMI


- transitioned to basal and prandial insulin


- A1c pending


transfer to the floor once off gtt





SIRS


Leukocytosis


- no evidence of sepsis


- Deescalate abx- currently on Cefepime only


- Cultures pending





Lactic acidosis


-Trended down





Acute kidney injury superimposed on chronic kidney disease


-Back to baseline- 1.4





Critical Care


Critically Ill Patient





Clinical Quality Measures


DVT/VTE Risk/Contraindication:


Risk Factor Score Per Nursin


RFS Level Per Nursing on Admit:  2=Moderate











MISHA DEVRIES MD             Aug 12, 2019 9:44 am

## 2019-08-12 NOTE — PULMONARY CONSULTATION
History of Present Illness


History of Present Illness


Date of Consultation


8/12/19


 08:25


Time Seen by Provider:  08:25


Date of Admission





History of Present Illness


55yo with PMH of IDDM type 1, medical noncompliance, methamphetamine use 

presented to ED secondary to worsening weakness, and vomiting. Symptoms started 

2 days prior. Denies CP, SOB, or productive cough. Denies current drug use. UDS 

is negative. I am consulted for ICU management.





Allergies and Home Medications


Allergies


Coded Allergies:  


     Sulfa (Sulfonamide Antibiotics) (Unverified  Allergy, Unknown, 6/25/15)


     codeine (Verified  Allergy, Unknown, TAKES ULTRAM AT HOME, 1/2/09)


     ketorolac (Verified  Allergy, Unknown, 11/19/08)


     tramadol (Verified  Allergy, Unknown, 12/17/11)





Home Medications


Albuterol 8.5 Gm Hfa.aer.ad, 2 PUFF IH TID PRN for SHORTNESS OF BREATH, 

(Reported)


   1 PUFFS 


Atorvastatin Calcium 40 Mg Tablet, 40 MG PO HS, (Reported)


Fluticasone Propionate 16 Gm Naspr, 2 SPRAYS NS DAILY, (Reported)


Insulin Aspart (Niacinamide) 100 Unit/1 Ml Insuln.pen, 15 UNITS SQ AC, 

(Reported)


Insulin Glargine,Hum.rec.anlog 300 Unit/1 Ml Insuln.pen, 50 UNITS SQ DAILY, 

(Reported)


Magnesium Chloride 64 Mg Tab, 2 TAB PO Q48H, (Reported)


Metoclopramide HCl 10 Mg Tablet, 10 MG PO QIDACHS, (Reported)


Metoprolol Tartrate 50 Mg Tablet, 50 MG PO DAILY, (Reported)


Montelukast Sodium 10 Mg Tablet, 10 MG PO DAILY, (Reported)


Niacinamide 500 Mg Tablet, 500 MG PO Q48H, (Reported)


Potassium Gluconate 99 Mg Tablet, 99 MG PO Q48H, (Reported)





Past Medical-Social-Family Hx


Past Med/Social Hx:  Reviewed Nursing Past Med/Soc Hx


Patient Social History


Alcohol Use:  Occasionally Uses


Recreational Drug Use:  Yes


Drug of Choice:  THC


Smoking Status:  Current Everyday Smoker


Type Used:  Cigarettes


2nd Hand Smoke Exposure:  No


Recent Foreign Travel:  No


Contact w/Someone Who Travel:  No


Recent Infectious Disease Expo:  No


Recent Hopitalizations:  No





Immunizations Up To Date


Tetanus Booster (TDap):  Unknown


Date of Pneumonia Vaccine:  Oct 1, 2012


Date of Influenza Vaccine:  Oct 1, 2012





Seasonal Allergies


Seasonal Allergies:  No





Past Medical History


Surgeries:  Yes (renal stents)


Adenoidectomy, Ear Surgery, Hysterectomy, Tonsillectomy


Respiratory:  Yes


Asthma, Pneumonia, Chronic Bronchitis


Cardiac:  Yes


Deep Vein Thrombosis


Neurological:  Yes


Neuropathy


Reproductive Disorders:  Yes


Female Reproductive Disorders:  Menstrual Problems


Genitourinary:  Yes


Renal Failure


Gastrointestinal:  Yes (gastroparesis)


Gastroesophageal Reflux, Pancreatitis, Ulcer


Musculoskeletal:  Yes


Arthritis, Rheumatoid Arthritis, Fractures


Endocrine:  Yes


Diabetes, Insulin dep


HEENT:  No


Tonsilitis


Loss of Vision:  Denies


Hearing Impairment:  Denies


Cancer:  No


Psychosocial:  Yes


Personality Disorder


Integumentary:  No


Blood Disorders:  No


Adverse Reaction/Blood Tranf:  No





Family Medical History





FH: thyroid cancer


  G8 SISTER


FHx: macular degeneration


  19 MOTHER


Glaucoma


  G8 BROTHER


Heart Disease, Cancer, Diabetes





Review of Systems


Time Seen by Provider:  08:28


Constitutional:  Weakness, Malaise; No: Fever, Chills, Sweats, Other


Eyes:  No: Pain, Vision change, Conjunctivae inflammation, Eyelid inflammation, 

Other, Redness


ENT:  Nose congestion; No: Ear pain, Ear discharge, Nose pain, Nose discharge, 

Mouth pain, Mouth swelling, Throat pain, Throat swelling, Other


Respiratory:  Shortness of breath, SOB with excertion


Gastrointestinal:  Nausea, Abdominal Pain, Constipation; No: Vomiting, Diarrhea


Genitourinary:  No Dysuria; Frequency; No Incontinence, No Hematuria, No 

Retention, No Other





Sepsis Event


Evaluation


Height, Weight, BMI


Height: 5'5.00"


Weight: 151lbs. 1.0oz. 68.329722mt; 33.30 BMI


Method:Stated





Exam


Exam





Vital Signs








  Date Time  Temp Pulse Resp B/P (MAP) Pulse Ox O2 Delivery O2 Flow Rate FiO2


 


8/12/19 07:00  106      


 


8/12/19 06:00  113 17 156/102 (120) 95 Room Air  


 


8/12/19 05:00  105  152/100 (117) 96 Room Air  


 


8/12/19 04:00 98.0       


 


8/12/19 04:00  100 16 134/92 (106) 94 Room Air  


 


8/12/19 04:00     95 Room Air  


 


8/12/19 03:00  98 20 132/85 (101) 93 Room Air  


 


8/12/19 02:00  105 15 147/88 (107) 93 Room Air  


 


8/12/19 01:00  107      


 


8/12/19 01:00  107 15 150/81 (104) 94 Room Air  


 


8/12/19 00:00 97.0       


 


8/12/19 00:00  105 16 142/90 (107) 93 Room Air  


 


8/12/19 00:00     95 Room Air  


 


8/11/19 23:00  104 16 115/66 (82) 94 Room Air  


 


8/11/19 22:00  114 13 115/73 (87) 92 Room Air  


 


8/11/19 21:00  113 16 133/80 (97) 92 Room Air  


 


8/11/19 20:00  107 17 137/82 (100) 92 Room Air  


 


8/11/19 20:00     95 Room Air  


 


8/11/19 19:00 98.3 121 13 135/77 (96) 95 Room Air  


 


8/11/19 19:00  122      


 


8/11/19 18:00  128 16 158/86 (110) 95 Room Air  


 


8/11/19 17:00  129 21 173/97 (122) 96 Room Air  


 


8/11/19 16:00  129 23 162/85 (110) 96 Room Air  


 


8/11/19 16:00     100 Room Air  


 


8/11/19 15:00  125 20 151/77 (101) 95 Room Air  


 


8/11/19 14:00  129 26 148/76 (100) 94 Room Air  


 


8/11/19 13:00  131 22 136/80 (98) 98 Room Air  


 


8/11/19 12:41  130      


 


8/11/19 12:00  131 21 133/75 (94) 100 Room Air  


 


8/11/19 12:00     98 Room Air  


 


8/11/19 11:00  130 20 143/75 (97) 100 Room Air  


 


8/11/19 10:52 97.4       


 


8/11/19 10:42  134      


 


8/11/19 10:20  136  141/77 (98) 99 Room Air  


 


8/11/19 10:20  136  141/77 (98) 99 Room Air  


 


8/11/19 10:15      Room Air  


 


8/11/19 10:03  133 22 149/75 (99) 100   


 


8/11/19 08:26 97.4 131 28 129/65 (86) 100 Room Air  














I & O 


 


 8/12/19





 07:00


 


Intake Total 6757.5 ml


 


Output Total 3900 ml


 


Balance 2857.5 ml








Height & Weight


Height: 5'5.00"


Weight: 151lbs. 1.0oz. 68.093228au; 33.30 BMI


Method:Stated


General Appearance:  No Apparent Distress, Thin, Other (resting comfortably on 

arrival)


HEENT:  PERRL/EOMI; No Moist Mucous Membranes


Neck:  Full Range of Motion, Supple


Respiratory:  Lungs Clear, Normal Breath Sounds, No Respiratory Distress; No 

Crackles, No Wheezing


Cardiovascular:  No Edema, No Murmur, Tachycardia


Capillary Refill:  Less Than 3 Seconds


Gastrointestinal:  normal bowel sounds, non tender, soft


Extremity:  Normal Inspection, Non Tender, No Pedal Edema


Neurologic/Psychiatric:  Alert, No Motor/Sensory Deficits; No Disoriented


Skin:  Normal Color, Warm/Dry


Lymphatic:  No Adenopathy





Results


Lab


Laboratory Tests


8/11/19 08:19








8/11/19 09:45








8/11/19 11:00








8/11/19 12:20








8/11/19 15:10








8/11/19 20:05








8/12/19 03:39








8/12/19 07:05











Assessment/Plan


Assessment/Plan


Acute DKA


   -Currently on DKA protocol 


   -repeat labs, 


Leukocytosis - probably secondary to DKA 


   -Currently on Zosyn, and Vanco


      -Will d/c Vancomycin 


   -UA is negative 


Acute on chronic  renal failure 


   -Monitor


   -JUAN ENGLAND DO              Aug 12, 2019 08:31

## 2019-08-12 NOTE — NUR
Arrived from ICU. report given by Hattie BOBO. Oriented to room and surroundings. instructed 
to call before getting up

## 2019-08-12 NOTE — NUR
MED REC WAS COMPLETED PRIOR TO MED REC TECH AVAILABILITY. I WENT OVER THE EXT MED HX AND 
VERIFIED HOW SHE TAKES EACH MEDICATION.



SHE TAKES THE FOLLOWING OTC:

POTASSIUM Q48H 

NIACIN Q48H

SLOW MAG Q48H



SHE ALSO STATES SHE USES A FLONASE NASAL SPRAY DAILY

## 2019-08-13 VITALS — SYSTOLIC BLOOD PRESSURE: 120 MMHG | DIASTOLIC BLOOD PRESSURE: 74 MMHG

## 2019-08-13 VITALS — DIASTOLIC BLOOD PRESSURE: 75 MMHG | SYSTOLIC BLOOD PRESSURE: 138 MMHG

## 2019-08-13 VITALS — DIASTOLIC BLOOD PRESSURE: 66 MMHG | SYSTOLIC BLOOD PRESSURE: 116 MMHG

## 2019-08-13 LAB
BASOPHILS # BLD AUTO: 0 10^3/UL (ref 0–0.1)
BASOPHILS NFR BLD AUTO: 0 % (ref 0–10)
BUN/CREAT SERPL: 22
CALCIUM SERPL-MCNC: 8.5 MG/DL (ref 8.5–10.1)
CHLORIDE SERPL-SCNC: 112 MMOL/L (ref 98–107)
CO2 SERPL-SCNC: 21 MMOL/L (ref 21–32)
CREAT SERPL-MCNC: 0.96 MG/DL (ref 0.6–1.3)
EOSINOPHIL # BLD AUTO: 0 10^3/UL (ref 0–0.3)
EOSINOPHIL NFR BLD AUTO: 0 % (ref 0–10)
ERYTHROCYTE [DISTWIDTH] IN BLOOD BY AUTOMATED COUNT: 14.9 % (ref 10–14.5)
GFR SERPLBLD BASED ON 1.73 SQ M-ARVRAT: 60 ML/MIN
GLUCOSE SERPL-MCNC: 93 MG/DL (ref 70–105)
HCT VFR BLD CALC: 37 % (ref 35–52)
HGB BLD-MCNC: 12.2 G/DL (ref 11.5–16)
LYMPHOCYTES # BLD AUTO: 1.7 X 10^3 (ref 1–4)
LYMPHOCYTES NFR BLD AUTO: 16 % (ref 12–44)
MANUAL DIFFERENTIAL PERFORMED BLD QL: NO
MCH RBC QN AUTO: 31 PG (ref 25–34)
MCHC RBC AUTO-ENTMCNC: 33 G/DL (ref 32–36)
MCV RBC AUTO: 93 FL (ref 80–99)
MONOCYTES # BLD AUTO: 0.5 X 10^3 (ref 0–1)
MONOCYTES NFR BLD AUTO: 5 % (ref 0–12)
NEUTROPHILS # BLD AUTO: 8.4 X 10^3 (ref 1.8–7.8)
NEUTROPHILS NFR BLD AUTO: 79 % (ref 42–75)
PLATELET # BLD: 194 10^3/UL (ref 130–400)
PMV BLD AUTO: 10 FL (ref 7.4–10.4)
POTASSIUM SERPL-SCNC: 3.5 MMOL/L (ref 3.6–5)
SODIUM SERPL-SCNC: 142 MMOL/L (ref 135–145)
WBC # BLD AUTO: 10.7 10^3/UL (ref 4.3–11)

## 2019-08-13 RX ADMIN — METOCLOPRAMIDE SCH MG: 10 TABLET ORAL at 06:25

## 2019-08-13 RX ADMIN — INSULIN ASPART SCH UNIT: 100 INJECTION, SOLUTION INTRAVENOUS; SUBCUTANEOUS at 11:25

## 2019-08-13 RX ADMIN — METOCLOPRAMIDE SCH MG: 10 TABLET ORAL at 20:30

## 2019-08-13 RX ADMIN — METOCLOPRAMIDE SCH MG: 10 TABLET ORAL at 11:20

## 2019-08-13 RX ADMIN — INSULIN ASPART SCH UNIT: 100 INJECTION, SOLUTION INTRAVENOUS; SUBCUTANEOUS at 06:20

## 2019-08-13 RX ADMIN — MONTELUKAST SCH MG: 10 TABLET, FILM COATED ORAL at 08:24

## 2019-08-13 RX ADMIN — FLUTICASONE PROPIONATE SCH SPRAY: 50 SPRAY, METERED NASAL at 11:22

## 2019-08-13 RX ADMIN — ATORVASTATIN CALCIUM SCH MG: 40 TABLET, FILM COATED ORAL at 20:30

## 2019-08-13 RX ADMIN — INSULIN ASPART SCH UNIT: 100 INJECTION, SOLUTION INTRAVENOUS; SUBCUTANEOUS at 16:42

## 2019-08-13 RX ADMIN — METOCLOPRAMIDE SCH MG: 10 TABLET ORAL at 16:38

## 2019-08-13 RX ADMIN — METOPROLOL TARTRATE SCH MG: 50 TABLET, FILM COATED ORAL at 11:03

## 2019-08-13 RX ADMIN — METOPROLOL TARTRATE SCH MG: 50 TABLET, FILM COATED ORAL at 08:24

## 2019-08-13 RX ADMIN — FLUTICASONE PROPIONATE SCH SPRAY: 50 SPRAY, METERED NASAL at 08:25

## 2019-08-13 NOTE — PROGRESS NOTE - HOSPITALIST
Subjective


HPI/CC On Admission


Date Seen by Provider:  Aug 13, 2019


Time Seen by Provider:  08:50


Radha Kelly is a 54yoF with PMH IDDM (likely type 1) with a history of non-

adherence, HLD, seasonal allergies, methamphetamine abuse, who presented to the 

ER with weakness and vomiting. She reports a two day history of feeling ill. She

reports subjective fevers. She reports dyspnea. She denies cough. She denies 

chest pain. She denies dysuria. She reports vague abdominal pain. She also 

reports diffuse back pain. She is requesting narcotics to treat her pain. She 

reports taking her long acting insulin, but says she hasn't taken her short 

acting because she hasn't been eating. She denies methamphetamine use and other 

IV drug use. She reports using CBD oils and maybe some marijuana.


Subjective/Events-last exam


Pt reports feeling better but still with muscles aches and abdominal pain. 

States some chest discomfort too. Thinks it's all due to retching from vomiting.





Focused Exam


Lactate Level


19 09:45: Lactic Acid Level 5.75*H


19 12:00: Lactic Acid Level 2.87*H








Objective


Exam


Vital Signs





Vital Signs








  Date Time  Temp Pulse Resp B/P (MAP) Pulse Ox O2 Delivery O2 Flow Rate FiO2


 


19 07:49 98.5 98 20 116/66 (83) 99 Room Air  





Capillary Refill : Less Than 3 SecondsLess Than 3 Seconds


General Appearance:  No Apparent Distress, Chronically ill


Respiratory:  Lungs Clear, No Respiratory Distress


Cardiovascular:  Regular Rate, Rhythm, No Murmur


Gastrointestinal:  Normal Bowel Sounds, Soft


Neurologic/Psychiatric:  Alert, Oriented x3, Normal Mood/Affect





Results/Procedures


Lab


Patient resulted labs reviewed.


Imaging:  Reviewed Imaging Report





Assessment/Plan


Assessment and Plan


Assess & Plan/Chief Complaint


DKA- resolved


IDDMI


- Had hypoglycemia this AM- will decreased Levemir dose


- A1c 14.3


- Needs outpatient follow up with Endocrinology








SIRS


Leukocytosis


- no evidence of sepsis


- DC abx and monitor 


- Cultures NGTD





Lactic acidosis- resolved





Acute kidney injury superimposed on chronic kidney disease


-Today's level pending





Chronic Pain


- Currently on oxycodone but takes CBD oil at home for managaement


- Pt refused Tylenol or Ibuprofen





Chest discomfort


- Like MSK related


- Will check troponin





Diagnosis/Problems


Diagnosis/Problems





(1) Chronic pain


(2) Acute kidney injury superimposed on chronic kidney disease


Status:  Acute


(3) DKA (diabetic ketoacidoses)


Status:  Acute


Qualifiers:  


   Diabetes mellitus type:  type 1  Diabetes mellitus complication detail:  

without coma  Qualified Codes:  E10.10 - Type 1 diabetes mellitus with 

ketoacidosis without coma


(4) SIRS (systemic inflammatory response syndrome)


Status:  Acute


(5) Leukocytosis


Status:  Acute





Clinical Quality Measures


DVT/VTE Risk/Contraindication:


Risk Factor Score Per Nursin


RFS Level Per Nursing on Admit:  2=Moderate











MISHA DEVRIES MD             Aug 13, 2019 8:56 am

## 2019-08-13 NOTE — NUR
pt accu ck this am is 42 denies s/s of hypoglycemia 240cc milk and 2 pkg fernanda crackers 
given. as well as pt is ordering breakfast..  notified dr jacob

## 2019-08-14 VITALS — SYSTOLIC BLOOD PRESSURE: 127 MMHG | DIASTOLIC BLOOD PRESSURE: 71 MMHG

## 2019-08-14 VITALS — DIASTOLIC BLOOD PRESSURE: 66 MMHG | SYSTOLIC BLOOD PRESSURE: 114 MMHG

## 2019-08-14 LAB
BUN/CREAT SERPL: 17
CALCIUM SERPL-MCNC: 7.8 MG/DL (ref 8.5–10.1)
CHLORIDE SERPL-SCNC: 105 MMOL/L (ref 98–107)
CO2 SERPL-SCNC: 24 MMOL/L (ref 21–32)
CREAT SERPL-MCNC: 0.92 MG/DL (ref 0.6–1.3)
GFR SERPLBLD BASED ON 1.73 SQ M-ARVRAT: > 60 ML/MIN
GLUCOSE SERPL-MCNC: 187 MG/DL (ref 70–105)
POTASSIUM SERPL-SCNC: 3.7 MMOL/L (ref 3.6–5)
SODIUM SERPL-SCNC: 136 MMOL/L (ref 135–145)

## 2019-08-14 RX ADMIN — METOCLOPRAMIDE SCH MG: 10 TABLET ORAL at 11:45

## 2019-08-14 RX ADMIN — MONTELUKAST SCH MG: 10 TABLET, FILM COATED ORAL at 08:03

## 2019-08-14 RX ADMIN — INSULIN ASPART SCH UNIT: 100 INJECTION, SOLUTION INTRAVENOUS; SUBCUTANEOUS at 00:28

## 2019-08-14 RX ADMIN — INSULIN ASPART SCH UNIT: 100 INJECTION, SOLUTION INTRAVENOUS; SUBCUTANEOUS at 11:45

## 2019-08-14 RX ADMIN — METOCLOPRAMIDE SCH MG: 10 TABLET ORAL at 06:12

## 2019-08-14 RX ADMIN — FLUTICASONE PROPIONATE SCH SPRAY: 50 SPRAY, METERED NASAL at 08:04

## 2019-08-14 RX ADMIN — INSULIN ASPART SCH UNIT: 100 INJECTION, SOLUTION INTRAVENOUS; SUBCUTANEOUS at 06:13

## 2019-08-14 RX ADMIN — METOPROLOL TARTRATE SCH MG: 50 TABLET, FILM COATED ORAL at 08:03

## 2019-08-14 RX ADMIN — FLUTICASONE PROPIONATE SCH SPRAY: 50 SPRAY, METERED NASAL at 08:03

## 2019-08-14 NOTE — NUR
PAIN MED GIVEN.. PT UPSET STATED SHE ASKED FOR A PAIN PILL AT 0400.. PT DID NOT CALL FOR 
PAIN MEDS SINCE 2230 WHEN LAST MED WAS GIVEN. FAMILY HAS BEEN IN AND OUT OF ROOM/FLOOR ALL 
THROUGH THE NIGHT.

## 2019-08-14 NOTE — DISCHARGE INST-SIMPLE/STANDARD
Discharge Inst-Standard


Patient Instructions/Follow Up


Plan of Care/Instructions/FU:  


Please continue to take your medications as written.  I have decreased


your insulin dose due to low blood sugars here in the hospital while on a


decreased dose from yourhome dose even.  Should your blood sugars


consistently stay above 200 please contact your endocrinologist.  Please


follow-up with primary care provider to follow up this hospital stay


Activity as Tolerated:  Yes


Discharge Diet:  ADA Diet


Return to The Hospital For:  


Blood sugars consistently over 300, confusion, fever, abdominal pain,


vomiting, chest pain, shortness of breath, if you feel you are getting


worse.











MISHA DEVRIES MD             Aug 14, 2019 12:51 pm

## 2019-08-14 NOTE — NUR
CM/SS, respond to consult and final discharge needs addressed.



DME:  Provided diabetic supplies through [facility] [place] [facility]:  glucometer, test strips, 
lancets, lancing device.  Patient understands she can get test strips and lancet 
replacements at [facility] as needed.



SUMMARY:  Patient indicates she doesn't have a PCP but that her daughter has an application 
for review from Dr. Arambula's office.  Patient stated she has not had diabetic 
monitor/maintenance supplies for over 4 months.  She has terminated service with 
physician(s) and/or limited choices, hopeful she will persistently pursue getting 
established with care because of chronic needs.



Daughter to provide transport home.

## 2019-08-14 NOTE — DISCHARGE SUMMARY
Diagnosis/Chief Complaint


Date of Admission


Aug 11, 2019 at 9:18 am


Date of Discharge





Discharge Date:  Aug 14, 2019


Admission Diagnosis


Diabetic ketoacidosis


Discharge Diagnosis





(1) DKA (diabetic ketoacidoses)


Status:  Acute


(2) Acute kidney injury superimposed on chronic kidney disease


Status:  Acute


(3) Chronic pain


(4) SIRS (systemic inflammatory response syndrome)


Status:  Acute


(5) Leukocytosis


Status:  Acute





Discharge Summary


Discharge Physical Exam


Allergies:  


Coded Allergies:  


     Sulfa (Sulfonamide Antibiotics) (Unverified  Allergy, Unknown, 6/25/15)


     codeine (Verified  Allergy, Unknown, TAKES ULTRAM AT HOME, 09)


     ketorolac (Verified  Allergy, Unknown, 08)


     tramadol (Verified  Allergy, Unknown, 11)


Vitals & I&Os





Vital Signs








  Date Time  Temp Pulse Resp B/P (MAP) Pulse Ox O2 Delivery O2 Flow Rate FiO2


 


19 08:05     99 Room Air  


 


19 07:35 98.9 96 18 114/66 (82)    








General Appearance:  No Apparent Distress, Chronically ill


Respiratory:  Lungs Clear, No Respiratory Distress


Cardiovascular:  Regular Rate, Rhythm, No Murmur


Neurologic/Psychiatric:  Alert, Oriented x3





Hospital Course


Pt was a 54yoCF with a PMH of IDDMI who presented to the ER with abdominal pain,

nausea, vomiting and found to be in severe DKA. She was admitted to the ICU for 

insulin gtt and responded well. She was transitioned to prandial and basal 

insulin and did well. Her a1c was 14.3%. She did have some hypoglycemia with 

transition back to bolus insulin so her home dose was decreased with return 

precautions for persistently elevated blood sugars. She states she is going to 

follow up with Dr Arambula once she is discharged. She will also follow up with 

her endocrinologist.


Labs (last 24 hrs)


Laboratory Tests


19 15:53: Glucometer 292H


19 20:15: Glucometer 152H


19 06:00: Glucometer 306H


19 11:29: Glucometer 184H


19 11:55: 


Sodium Level 136, Potassium Level 3.7, Chloride Level 105, Carbon Dioxide Level 

24, Anion Gap 7, Blood Urea Nitrogen 16, Creatinine 0.92, Estimat Glomerular 

Filtration Rate > 60, BUN/Creatinine Ratio 17, Glucose Level 187H, Calcium Level

7.8L, Beta-Hydroxybutyrate (Chem panel) 0.12





Microbiology


19 Blood Culture - Preliminary, Resulted


          No growth


19 MRSA Screen - Final, Complete


          MRSA not isolated


Patient resulted labs reviewed.


Pending Labs


Laboratory Tests


19 11:29: Glucometer 184


19 11:55: 


Sodium Level 136, Potassium Level 3.7, Chloride Level 105, Carbon Dioxide Level 

24, Anion Gap 7, Blood Urea Nitrogen 16, Creatinine 0.92, Estimat Glomerular 

Filtration Rate > 60, BUN/Creatinine Ratio 17, Glucose Level 187, Calcium Level 

7.8, Beta-Hydroxybutyrate (Chem panel) 0.12





Imaging:  Reviewed Imaging Report





Discussion & Recommendations


Discharge Planning:  >30 minutes discharge planning





Discharge


Home Medications:





Active Scripts


Active


Toujeo Solostar (Insulin Glargine,Hum.rec.anlog) 300 Unit/1 Ml Insuln.pen 5 

Units SQ DAILY


Fiasp 100 Unit/ml Flextouch (Insulin Aspart (Niacinamide)) 100 Unit/1 Ml 

Insuln.pen 5 Units SQ AC


Reported


Fluticasone Propionate 16 Gm Spray.susp 2 Sprays NS DAILY


Metoprolol Tartrate 50 Mg Tablet 50 Mg PO DAILY


Proair Hfa (Albuterol Sulfate) 1 Puff Puff 2 Puff IH TID PRN


Niacin (Niacinamide) 500 Mg Tablet 500 Mg PO Q48H


Slow-Mag (Magnesium Chloride) 64 Mg Tab 2 Tab PO Q48H


Potassium (Potassium Gluconate) 99 Mg Tablet 99 Mg PO Q48H


Atorvastatin Calcium 40 Mg Tablet 40 Mg PO HS


Metoclopramide HCl 10 Mg Tablet 10 Mg PO ACHS


Montelukast Sodium 10 Mg Tablet 10 Mg PO DAILY





Instructions to patient/family


Please see electronic discharge instructions given to patient.





Clinical Quality Measures


DVT/VTE Risk/Contraindication:


Risk Factor Score Per Nursin


RFS Level Per Nursing on Admit:  2=Moderate





Problem Qualifiers





(1) DKA (diabetic ketoacidoses):  


Diabetes mellitus type:  type 1  Diabetes mellitus complication detail:  without

coma  Qualified Codes:  E10.10 - Type 1 diabetes mellitus with ketoacidosis 

without coma








MISHA DEVRIES MD             Aug 14, 2019 1:05 pm

## 2020-01-03 ENCOUNTER — HOSPITAL ENCOUNTER (EMERGENCY)
Dept: HOSPITAL 75 - ER | Age: 55
Discharge: OTHER INSTITUTION | End: 2020-01-03
Payer: MEDICAID

## 2020-01-03 VITALS — DIASTOLIC BLOOD PRESSURE: 78 MMHG | SYSTOLIC BLOOD PRESSURE: 150 MMHG

## 2020-01-03 VITALS — WEIGHT: 159.17 LBS | HEIGHT: 64.96 IN | BODY MASS INDEX: 26.52 KG/M2

## 2020-01-03 DIAGNOSIS — F60.9: ICD-10-CM

## 2020-01-03 DIAGNOSIS — F17.210: ICD-10-CM

## 2020-01-03 DIAGNOSIS — Z88.2: ICD-10-CM

## 2020-01-03 DIAGNOSIS — Z90.710: ICD-10-CM

## 2020-01-03 DIAGNOSIS — J44.9: ICD-10-CM

## 2020-01-03 DIAGNOSIS — Z88.5: ICD-10-CM

## 2020-01-03 DIAGNOSIS — Z87.01: ICD-10-CM

## 2020-01-03 DIAGNOSIS — Z90.89: ICD-10-CM

## 2020-01-03 DIAGNOSIS — E11.43: ICD-10-CM

## 2020-01-03 DIAGNOSIS — K21.9: ICD-10-CM

## 2020-01-03 DIAGNOSIS — K31.84: ICD-10-CM

## 2020-01-03 DIAGNOSIS — Z80.8: ICD-10-CM

## 2020-01-03 DIAGNOSIS — Z79.4: ICD-10-CM

## 2020-01-03 DIAGNOSIS — Z86.718: ICD-10-CM

## 2020-01-03 DIAGNOSIS — E11.10: Primary | ICD-10-CM

## 2020-01-03 DIAGNOSIS — M06.9: ICD-10-CM

## 2020-01-03 LAB
ALBUMIN SERPL-MCNC: 2.6 GM/DL (ref 3.2–4.5)
ALP SERPL-CCNC: 160 U/L (ref 40–136)
ALT SERPL-CCNC: 131 U/L (ref 0–55)
AMORPH SED URNS QL MICRO: (no result) /LPF
APTT PPP: YELLOW S
ARTERIAL PATENCY WRIST A: POSITIVE
BACTERIA #/AREA URNS HPF: (no result) /HPF
BARBITURATES UR QL: NEGATIVE
BASE EXCESS STD BLDA CALC-SCNC: -10.7 MMOL/L (ref -2.5–2.5)
BASOPHILS # BLD AUTO: 0 10^3/UL (ref 0–0.1)
BASOPHILS NFR BLD AUTO: 0 % (ref 0–10)
BDY SITE: (no result)
BENZODIAZ UR QL SCN: NEGATIVE
BILIRUB SERPL-MCNC: 0.5 MG/DL (ref 0.1–1)
BILIRUB UR QL STRIP: NEGATIVE
BODY TEMPERATURE: 36.8
BUN/CREAT SERPL: 16
CALCIUM SERPL-MCNC: 8.8 MG/DL (ref 8.5–10.1)
CHLORIDE SERPL-SCNC: 94 MMOL/L (ref 98–107)
CO2 BLDA CALC-SCNC: 14.7 MMOL/L (ref 21–31)
CO2 SERPL-SCNC: 13 MMOL/L (ref 21–32)
COCAINE UR QL: NEGATIVE
CREAT SERPL-MCNC: 1.68 MG/DL (ref 0.6–1.3)
EOSINOPHIL # BLD AUTO: 0 10^3/UL (ref 0–0.3)
EOSINOPHIL NFR BLD AUTO: 0 % (ref 0–10)
ERYTHROCYTE [DISTWIDTH] IN BLOOD BY AUTOMATED COUNT: 13.3 % (ref 10–14.5)
FIBRINOGEN PPP-MCNC: CLEAR MG/DL
GFR SERPLBLD BASED ON 1.73 SQ M-ARVRAT: 32 ML/MIN
GLUCOSE SERPL-MCNC: 690 MG/DL (ref 70–105)
GLUCOSE UR STRIP-MCNC: (no result) MG/DL
HCT VFR BLD CALC: 39 % (ref 35–52)
HGB BLD-MCNC: 12.7 G/DL (ref 11.5–16)
INHALED O2 FLOW RATE: (no result) L/MIN
KETONES UR QL STRIP: (no result)
LEUKOCYTE ESTERASE UR QL STRIP: NEGATIVE
LYMPHOCYTES # BLD AUTO: 2.2 X 10^3 (ref 1–4)
LYMPHOCYTES NFR BLD AUTO: 24 % (ref 12–44)
MANUAL DIFFERENTIAL PERFORMED BLD QL: NO
MCH RBC QN AUTO: 30 PG (ref 25–34)
MCHC RBC AUTO-ENTMCNC: 33 G/DL (ref 32–36)
MCV RBC AUTO: 93 FL (ref 80–99)
METHADONE UR QL SCN: NEGATIVE
METHAMPHETAMINE SCREEN URINE S: POSITIVE
MONOCYTES # BLD AUTO: 0.6 X 10^3 (ref 0–1)
MONOCYTES NFR BLD AUTO: 7 % (ref 0–12)
NEUTROPHILS # BLD AUTO: 6.4 X 10^3 (ref 1.8–7.8)
NEUTROPHILS NFR BLD AUTO: 69 % (ref 42–75)
NITRITE UR QL STRIP: NEGATIVE
OPIATES UR QL SCN: NEGATIVE
OXYCODONE UR QL: NEGATIVE
PCO2 BLDA: 26 MMHG (ref 35–45)
PH BLDA: 7.34 [PH] (ref 7.37–7.43)
PH UR STRIP: 5.5 [PH] (ref 5–9)
PLATELET # BLD: 359 10^3/UL (ref 130–400)
PMV BLD AUTO: 9.6 FL (ref 7.4–10.4)
PO2 BLDA: 98 MMHG (ref 79–93)
POTASSIUM SERPL-SCNC: 3.7 MMOL/L (ref 3.6–5)
PROPOXYPH UR QL: NEGATIVE
PROT SERPL-MCNC: 6.4 GM/DL (ref 6.4–8.2)
PROT UR QL STRIP: (no result)
RBC #/AREA URNS HPF: (no result) /HPF
SAO2 % BLDA FROM PO2: 97 % (ref 94–100)
SODIUM SERPL-SCNC: 127 MMOL/L (ref 135–145)
SP GR UR STRIP: 1.02 (ref 1.02–1.02)
T4 FREE SERPL-MCNC: 0.84 NG/DL (ref 0.7–1.48)
TRICYCLICS UR QL SCN: NEGATIVE
VENTILATION MODE VENT: NO
WBC # BLD AUTO: 9.2 10^3/UL (ref 4.3–11)
WBC #/AREA URNS HPF: (no result) /HPF

## 2020-01-03 PROCEDURE — 82010 KETONE BODYS QUAN: CPT

## 2020-01-03 PROCEDURE — 74176 CT ABD & PELVIS W/O CONTRAST: CPT

## 2020-01-03 PROCEDURE — 87088 URINE BACTERIA CULTURE: CPT

## 2020-01-03 PROCEDURE — 83880 ASSAY OF NATRIURETIC PEPTIDE: CPT

## 2020-01-03 PROCEDURE — 84439 ASSAY OF FREE THYROXINE: CPT

## 2020-01-03 PROCEDURE — 81000 URINALYSIS NONAUTO W/SCOPE: CPT

## 2020-01-03 PROCEDURE — 36415 COLL VENOUS BLD VENIPUNCTURE: CPT

## 2020-01-03 PROCEDURE — 80306 DRUG TEST PRSMV INSTRMNT: CPT

## 2020-01-03 PROCEDURE — 96365 THER/PROPH/DIAG IV INF INIT: CPT

## 2020-01-03 PROCEDURE — 84443 ASSAY THYROID STIM HORMONE: CPT

## 2020-01-03 PROCEDURE — 82805 BLOOD GASES W/O2 SATURATION: CPT

## 2020-01-03 PROCEDURE — 85025 COMPLETE CBC W/AUTO DIFF WBC: CPT

## 2020-01-03 PROCEDURE — 80053 COMPREHEN METABOLIC PANEL: CPT

## 2020-01-03 NOTE — DIAGNOSTIC IMAGING REPORT
PROCEDURE: CT abdomen and pelvis without contrast.



TECHNIQUE: Multiple contiguous axial images were obtained through

the abdomen and pelvis without the use of intravenous contrast.

Auto Exposure Controls were utilized during the CT exam to meet

ALARA standards for radiation dose reduction. 



INDICATION:  Abdominal pain which radiates into the back.



Comparison is made study of 09/05/2017.



FINDINGS: Unenhanced images of the liver and spleen reveal no

focal abnormality. Gallbladder is surgically absent. No definite

pancreatic lesion is seen on the noncontrasted study. No adrenal

gland lesion is appreciated. There appears to be stents involving

both main renal arteries. Inferior vena caval filter is in place.

There is extensive aortoiliac atherosclerotic calcification.

There is diffuse edema and/or inflammation within the lower

mesentery and omentum with surgical suture in the expected

location of the cecum. There does appear to be mild pelvic free

fluid as well. An organized fluid collection is not seen to

indicate significant hematoma or abscess. The bladder is

distended without evidence of internal calcification. There is

mild diffuse subcutaneous edema and/or inflammation. There are

bulging discs seen at L1-L2 and L5-S1 levels.



IMPRESSION: Edema and/or inflammation in the omentum and

mesentery of the lower abdomen and pelvis which may be related to

surgery at the level of the cecum. Clinical correlation is

recommended. No drainable abscess is identified. No other acute

abnormality is seen.



Dictated by: 



  Dictated on workstation # [id number]

## 2020-01-03 NOTE — ED GI
General


Chief Complaint:  Abdominal/GI Problems


Stated Complaint:  KIDNEY PAIN


Nursing Triage Note:  


COMPLAINS OF BILAT LOWER ABD PAIN THAT RADIATES INTO BACK.  STATES SHE HAS 


GAINED 30# IN THE LAST FIVE DAYS.  COMPLAINS OF HEAD AND NECK PAIN ET DENIES 


INJURY.


Sepsis Screen:  No Definite Risk


Source of Information:  Patient


Exam Limitations:  No Limitations





History of Present Illness


Date Seen by Provider:  Ozzy 3, 2020


Time Seen by Provider:  18:44


Initial Comments


To ER with reports of lower abdominal pain that radiates into her back, that 

began a few days ago and has since now affected bilateral lower extremities she 

complains of pain to both legs. She states that she's had an unintentional 

weight gain over the past few days, reports a history of kidney failure. She 

states that she's been generally weak and in bed for about 2 weeks. She has only

had access to her fast acting insulin and has not had access to her Tujeo ins

ulin. Unclear whether or not she has actually been taking it. Her conversation 

is rambling and hard to follow.


Timing/Duration:  Other


Severity/Quality:  Moderate


Location:  Generalized Abdomen


Activities at Onset:  Rest


Modifying Factors:  Worsens With Resting


Associated Symptoms:  Weakness





Allergies and Home Medications


Allergies


Coded Allergies:  


     Sulfa (Sulfonamide Antibiotics) (Unverified  Allergy, Unknown, 6/25/15)


     codeine (Verified  Allergy, Unknown, TAKES ULTRAM AT HOME, 1/2/09)


     ketorolac (Verified  Allergy, Unknown, 11/19/08)


     tramadol (Verified  Allergy, Unknown, 12/17/11)





Home Medications


Albuterol Sulfate 1 Puff Puff, 2 PUFF IH TID PRN for SHORTNESS OF BREATH, 

(Reported)


Atorvastatin Calcium 40 Mg Tablet, 40 MG PO HS, (Reported)


Fluticasone Propionate 16 Gm Spray.susp, 2 SPRAYS NS DAILY, (Reported)


Insulin Aspart (Niacinamide) 100 Unit/1 Ml Insuln.pen, 5 UNITS SQ AC


   Prescribed by: MISHA DEVRIES on 8/14/19 1247


Insulin Glargine,Hum.rec.anlog 300 Unit/1 Ml Insuln.pen, 5 UNITS SQ DAILY


   Prescribed by: MISHA DEVRIES on 8/14/19 1247


Magnesium Chloride 64 Mg Tab, 2 TAB PO Q48H, (Reported)


Metoclopramide HCl 10 Mg Tablet, 10 MG PO ACHS, (Reported)


Metoprolol Tartrate 50 Mg Tablet, 50 MG PO DAILY, (Reported)


Montelukast Sodium 10 Mg Tablet, 10 MG PO DAILY, (Reported)


Niacinamide 500 Mg Tablet, 500 MG PO Q48H, (Reported)


Potassium Gluconate 99 Mg Tablet, 99 MG PO Q48H, (Reported)





Patient Home Medication List


Home Medication List Reviewed:  Yes





Review of Systems


Review of Systems


Constitutional:  see HPI


EENTM:  No Symptoms Reported


Respiratory:  No Symptoms Reported


Cardiovascular:  No Symptoms Reported


Gastrointestinal:  See HPI, Abdominal Pain


Genitourinary:  No Symptoms Reported


Musculoskeletal:  no symptoms reported


Skin:  no symptoms reported


Psychiatric/Neurological:  No Symptoms Reported


Endocrine:  No Symptoms Reported


Hematologic/Lymphatic:  No Symptoms Reported





Past Medical-Social-Family Hx


Patient Social History


Alcohol Use:  Occasionally Uses


Recreational Drug Use:  Yes (CBD OIL)


Drug of Choice:  THC


Smoking Status:  Current Everyday Smoker


Type Used:  Cigarettes


2nd Hand Smoke Exposure:  No


Recent Foreign Travel:  No


Contact w/Someone Who Travel:  No


Recent Infectious Disease Expo:  No


Recent Hopitalizations:  No





Immunizations Up To Date


Tetanus Booster (TDap):  Unknown


Date of Pneumonia Vaccine:  Oct 1, 2012


Date of Influenza Vaccine:  Oct 1, 2012





Seasonal Allergies


Seasonal Allergies:  No





Past Medical History


Surgeries:  Yes (renal stents)


Adenoidectomy, Ear Surgery, Hysterectomy, Tonsillectomy


Respiratory:  Yes


Asthma, Pneumonia, Chronic Bronchitis


Cardiac:  Yes


Deep Vein Thrombosis


Neurological:  Yes


Neuropathy


Reproductive Disorders:  Yes


Female Reproductive Disorders:  Menstrual Problems


Genitourinary:  Yes


Renal Failure


Gastrointestinal:  Yes (gastroparesis)


Gastroesophageal Reflux, Pancreatitis, Ulcer


Musculoskeletal:  Yes


Arthritis, Rheumatoid Arthritis, Fractures


Endocrine:  Yes


Diabetes, Insulin dep


HEENT:  No


Tonsilitis


Loss of Vision:  Denies


Hearing Impairment:  Denies


Cancer:  No


Psychosocial:  Yes


Personality Disorder


Integumentary:  No


Blood Disorders:  No


Adverse Reaction/Blood Tranf:  No





Family Medical History





FH: thyroid cancer


  G8 SISTER


FHx: macular degeneration


  19 MOTHER


Glaucoma


  G8 BROTHER


Heart Disease, Cancer, Diabetes





Physical Exam


Vital Signs





Vital Signs - First Documented








 1/3/20





 18:20


 


Temp 36.8


 


Pulse 100


 


Resp 16


 


B/P (MAP) 160/63 (95)


 


Pulse Ox 99


 


O2 Delivery Room Air





Capillary Refill : Less Than 3 Seconds


Height/Weight/BMI


Height: 5'5.00"


Weight: 151lbs. 1.0oz. [id number]; 26.00 BMI


Method:Stated


General Appearance:  WD/WN, no apparent distress


HEENT:  PERRL/EOMI, normal ENT inspection


Respiratory:  normal breath sounds, no respiratory distress, no accessory muscle

use


Cardiovascular:  no murmur, tachycardia


Gastrointestinal:  normal bowel sounds, non tender, soft


Extremities:  normal range of motion, non-tender


Neurologic/Psychiatric:  alert, other (conversation is rambling and difficult to

follow.)


Skin:  normal color, warm/dry





Progress/Results/Core Measures


Results/Orders


Lab Results





Laboratory Tests








Test


 1/3/20


18:40 1/3/20


18:45 Range/Units


 


 


White Blood Count


 9.2 


 


 4.3-11.0


10^3/uL


 


Red Blood Count


 4.17 L


 


 4.35-5.85


10^6/uL


 


Hemoglobin 12.7   11.5-16.0  G/DL


 


Hematocrit 39   35-52  %


 


Mean Corpuscular Volume 93   80-99  FL


 


Mean Corpuscular Hemoglobin 30   25-34  PG


 


Mean Corpuscular Hemoglobin


Concent 33 


 


 32-36  G/DL





 


Red Cell Distribution Width 13.3   10.0-14.5  %


 


Platelet Count


 359 


 


 130-400


10^3/uL


 


Mean Platelet Volume 9.6   7.4-10.4  FL


 


Neutrophils (%) (Auto) 69   42-75  %


 


Lymphocytes (%) (Auto) 24   12-44  %


 


Monocytes (%) (Auto) 7   0-12  %


 


Eosinophils (%) (Auto) 0   0-10  %


 


Basophils (%) (Auto) 0   0-10  %


 


Neutrophils # (Auto) 6.4   1.8-7.8  X 10^3


 


Lymphocytes # (Auto) 2.2   1.0-4.0  X 10^3


 


Monocytes # (Auto) 0.6   0.0-1.0  X 10^3


 


Eosinophils # (Auto)


 0.0 


 


 0.0-0.3


10^3/uL


 


Basophils # (Auto)


 0.0 


 


 0.0-0.1


10^3/uL


 


Sodium Level 127 L  135-145  MMOL/L


 


Potassium Level 3.7   3.6-5.0  MMOL/L


 


Chloride Level 94 L    MMOL/L


 


Carbon Dioxide Level 13 L  21-32  MMOL/L


 


Anion Gap 20 H  5-14  MMOL/L


 


Blood Urea Nitrogen 27 H  7-18  MG/DL


 


Creatinine


 1.68 H


 


 0.60-1.30


MG/DL


 


Estimat Glomerular Filtration


Rate 32 


 


  





 


BUN/Creatinine Ratio 16    


 


Glucose Level 690 *H    MG/DL


 


Calcium Level 8.8   8.5-10.1  MG/DL


 


Corrected Calcium 9.9   8.5-10.1  MG/DL


 


Total Bilirubin 0.5   0.1-1.0  MG/DL


 


Aspartate Amino Transf


(AST/SGOT) 80 H


 


 5-34  U/L





 


Alanine Aminotransferase


(ALT/SGPT) 131 H


 


 0-55  U/L





 


Alkaline Phosphatase 160 H    U/L


 


B-Type Natriuretic Peptide 244.9 H  <100.0  PG/ML


 


Total Protein 6.4   6.4-8.2  GM/DL


 


Albumin 2.6 L  3.2-4.5  GM/DL


 


Blood Gas Puncture Site  LEFT RADIAL   


 


Blood Gas Patient Temperature  36.8   


 


Arterial Blood pH  7.34 *L 7.37-7.43  


 


Arterial Blood Partial


Pressure CO2 


 26 L


 35-45  MMHG





 


Arterial Blood Partial


Pressure O2 


 98 H


 79-93  MMHG





 


Arterial Blood HCO3  14 *L 23-27  MMOL/L


 


Arterial Blood Total CO2


 


 14.7 L


 21.0-31.0


MMOL/L


 


Arterial Blood Oxygen


Saturation 


 97 


   %





 


Arterial Blood Base Excess


 


 -10.7 L


 -2.5-2.5


MMOL/L


 


Trent Test  POSITIVE   


 


Blood Gas Ventilator Setting  NO   


 


Blood Gas Inspired Oxygen  21% FI02   








My Orders





Orders - LIAM GAONA


Ua Culture If Indicated (1/3/20 18:24)


Drug Screen Stat (Urine) (1/3/20 18:24)


BNP (1/3/20 18:24)


Thyroid Stimulating Hormone (1/3/20 18:24)


Free T4 (Free Thyroxine) (1/3/20 18:24)


Cbc With Automated Diff (1/3/20 18:24)


Comprehensive Metabolic Panel (1/3/20 18:24)


Ed Iv/Invasive Line Start (1/3/20 18:24)


Ct Abdomen/Pelvis Wo (1/3/20 18:35)


Ns Iv 1000 Ml (Sodium Chloride 0.9%) (1/3/20 18:45)


Arterial Blood Gas (1/3/20 18:41)


Ns Iv 1000 Ml (Sodium Chloride 0.9%) (1/3/20 19:15)


Insulin Regular Tpn/Drip Only (Humulin R (1/3/20 19:15)


Potassium Cl 10meq/50ml Ivpb (Kcl 10 Meq (1/3/20 19:15)





Vital Signs/I&O











 1/3/20





 18:20


 


Temp 36.8


 


Pulse 100


 


Resp 16


 


B/P (MAP) 160/63 (95)


 


Pulse Ox 99


 


O2 Delivery Room Air














Blood Pressure Mean:                    95











Departure


Impression





   Primary Impression:  


   DKA (diabetic ketoacidoses)


   Qualified Codes:  E08.10 - Diabetes mellitus due to underlying condition with

   ketoacidosis without coma


Disposition:  09 ADMITTED AS INPATIENT


Condition:  Stable





Admissions


Decision to Admit Reason:  Admit from ER (General)


Decision to Admit/Date:  Ozzy 3, 2020


Time/Decision to Admit Time:  19:26





Departure-Patient Inst.


Referrals:  


NO,LOCAL PHYSICIAN (PCP/Family)


Primary Care Physician











LIAM GAONA              Ozzy 3, 2020 18:47

## 2020-02-10 ENCOUNTER — HOSPITAL ENCOUNTER (INPATIENT)
Dept: HOSPITAL 75 - ER | Age: 55
LOS: 5 days | Discharge: HOME | DRG: 208 | End: 2020-02-15
Attending: INTERNAL MEDICINE | Admitting: INTERNAL MEDICINE
Payer: MEDICAID

## 2020-02-10 VITALS — SYSTOLIC BLOOD PRESSURE: 67 MMHG | DIASTOLIC BLOOD PRESSURE: 41 MMHG

## 2020-02-10 VITALS — SYSTOLIC BLOOD PRESSURE: 104 MMHG | DIASTOLIC BLOOD PRESSURE: 48 MMHG

## 2020-02-10 VITALS — DIASTOLIC BLOOD PRESSURE: 56 MMHG | SYSTOLIC BLOOD PRESSURE: 90 MMHG

## 2020-02-10 VITALS — DIASTOLIC BLOOD PRESSURE: 41 MMHG | SYSTOLIC BLOOD PRESSURE: 90 MMHG

## 2020-02-10 VITALS — SYSTOLIC BLOOD PRESSURE: 169 MMHG | DIASTOLIC BLOOD PRESSURE: 53 MMHG

## 2020-02-10 VITALS — DIASTOLIC BLOOD PRESSURE: 67 MMHG | SYSTOLIC BLOOD PRESSURE: 135 MMHG

## 2020-02-10 VITALS — SYSTOLIC BLOOD PRESSURE: 99 MMHG | DIASTOLIC BLOOD PRESSURE: 45 MMHG

## 2020-02-10 VITALS — DIASTOLIC BLOOD PRESSURE: 47 MMHG | SYSTOLIC BLOOD PRESSURE: 98 MMHG

## 2020-02-10 VITALS — SYSTOLIC BLOOD PRESSURE: 97 MMHG | DIASTOLIC BLOOD PRESSURE: 44 MMHG

## 2020-02-10 VITALS — DIASTOLIC BLOOD PRESSURE: 48 MMHG | SYSTOLIC BLOOD PRESSURE: 114 MMHG

## 2020-02-10 VITALS — DIASTOLIC BLOOD PRESSURE: 47 MMHG | SYSTOLIC BLOOD PRESSURE: 133 MMHG

## 2020-02-10 VITALS — DIASTOLIC BLOOD PRESSURE: 53 MMHG | SYSTOLIC BLOOD PRESSURE: 156 MMHG

## 2020-02-10 VITALS — DIASTOLIC BLOOD PRESSURE: 49 MMHG | SYSTOLIC BLOOD PRESSURE: 138 MMHG

## 2020-02-10 VITALS — SYSTOLIC BLOOD PRESSURE: 111 MMHG | DIASTOLIC BLOOD PRESSURE: 48 MMHG

## 2020-02-10 VITALS — DIASTOLIC BLOOD PRESSURE: 46 MMHG | SYSTOLIC BLOOD PRESSURE: 81 MMHG

## 2020-02-10 VITALS — DIASTOLIC BLOOD PRESSURE: 48 MMHG | SYSTOLIC BLOOD PRESSURE: 105 MMHG

## 2020-02-10 VITALS — WEIGHT: 176.81 LBS | BODY MASS INDEX: 28.76 KG/M2 | HEIGHT: 65.75 IN

## 2020-02-10 VITALS — DIASTOLIC BLOOD PRESSURE: 48 MMHG | SYSTOLIC BLOOD PRESSURE: 129 MMHG

## 2020-02-10 VITALS — SYSTOLIC BLOOD PRESSURE: 76 MMHG | DIASTOLIC BLOOD PRESSURE: 45 MMHG

## 2020-02-10 VITALS — DIASTOLIC BLOOD PRESSURE: 49 MMHG | SYSTOLIC BLOOD PRESSURE: 140 MMHG

## 2020-02-10 VITALS — SYSTOLIC BLOOD PRESSURE: 135 MMHG | DIASTOLIC BLOOD PRESSURE: 50 MMHG

## 2020-02-10 VITALS — SYSTOLIC BLOOD PRESSURE: 75 MMHG | DIASTOLIC BLOOD PRESSURE: 43 MMHG

## 2020-02-10 VITALS — SYSTOLIC BLOOD PRESSURE: 112 MMHG | DIASTOLIC BLOOD PRESSURE: 48 MMHG

## 2020-02-10 VITALS — SYSTOLIC BLOOD PRESSURE: 80 MMHG | DIASTOLIC BLOOD PRESSURE: 54 MMHG

## 2020-02-10 VITALS — SYSTOLIC BLOOD PRESSURE: 81 MMHG | DIASTOLIC BLOOD PRESSURE: 46 MMHG

## 2020-02-10 VITALS — SYSTOLIC BLOOD PRESSURE: 152 MMHG | DIASTOLIC BLOOD PRESSURE: 53 MMHG

## 2020-02-10 VITALS — DIASTOLIC BLOOD PRESSURE: 44 MMHG | SYSTOLIC BLOOD PRESSURE: 71 MMHG

## 2020-02-10 VITALS — SYSTOLIC BLOOD PRESSURE: 90 MMHG | DIASTOLIC BLOOD PRESSURE: 54 MMHG

## 2020-02-10 VITALS — DIASTOLIC BLOOD PRESSURE: 44 MMHG | SYSTOLIC BLOOD PRESSURE: 108 MMHG

## 2020-02-10 VITALS — DIASTOLIC BLOOD PRESSURE: 42 MMHG | SYSTOLIC BLOOD PRESSURE: 100 MMHG

## 2020-02-10 VITALS — DIASTOLIC BLOOD PRESSURE: 64 MMHG | SYSTOLIC BLOOD PRESSURE: 134 MMHG

## 2020-02-10 VITALS — SYSTOLIC BLOOD PRESSURE: 123 MMHG | DIASTOLIC BLOOD PRESSURE: 106 MMHG

## 2020-02-10 VITALS — SYSTOLIC BLOOD PRESSURE: 103 MMHG | DIASTOLIC BLOOD PRESSURE: 48 MMHG

## 2020-02-10 DIAGNOSIS — I95.9: ICD-10-CM

## 2020-02-10 DIAGNOSIS — B37.1: ICD-10-CM

## 2020-02-10 DIAGNOSIS — E87.5: ICD-10-CM

## 2020-02-10 DIAGNOSIS — F60.9: ICD-10-CM

## 2020-02-10 DIAGNOSIS — K21.9: ICD-10-CM

## 2020-02-10 DIAGNOSIS — E10.11: ICD-10-CM

## 2020-02-10 DIAGNOSIS — E10.43: ICD-10-CM

## 2020-02-10 DIAGNOSIS — E87.1: ICD-10-CM

## 2020-02-10 DIAGNOSIS — E10.649: ICD-10-CM

## 2020-02-10 DIAGNOSIS — R57.1: ICD-10-CM

## 2020-02-10 DIAGNOSIS — M06.9: ICD-10-CM

## 2020-02-10 DIAGNOSIS — K92.2: ICD-10-CM

## 2020-02-10 DIAGNOSIS — N18.9: ICD-10-CM

## 2020-02-10 DIAGNOSIS — Z79.4: ICD-10-CM

## 2020-02-10 DIAGNOSIS — G93.49: ICD-10-CM

## 2020-02-10 DIAGNOSIS — N17.9: ICD-10-CM

## 2020-02-10 DIAGNOSIS — D64.9: ICD-10-CM

## 2020-02-10 DIAGNOSIS — F15.10: ICD-10-CM

## 2020-02-10 DIAGNOSIS — R00.0: ICD-10-CM

## 2020-02-10 DIAGNOSIS — Z86.718: ICD-10-CM

## 2020-02-10 DIAGNOSIS — Z91.81: ICD-10-CM

## 2020-02-10 DIAGNOSIS — J96.00: Primary | ICD-10-CM

## 2020-02-10 DIAGNOSIS — F17.210: ICD-10-CM

## 2020-02-10 DIAGNOSIS — J44.9: ICD-10-CM

## 2020-02-10 DIAGNOSIS — M19.91: ICD-10-CM

## 2020-02-10 DIAGNOSIS — E87.6: ICD-10-CM

## 2020-02-10 DIAGNOSIS — Z87.11: ICD-10-CM

## 2020-02-10 DIAGNOSIS — J13: ICD-10-CM

## 2020-02-10 LAB
ALBUMIN SERPL-MCNC: 1.9 GM/DL (ref 3.2–4.5)
ALBUMIN SERPL-MCNC: 2 GM/DL (ref 3.2–4.5)
ALBUMIN SERPL-MCNC: 2.1 GM/DL (ref 3.2–4.5)
ALBUMIN SERPL-MCNC: 2.2 GM/DL (ref 3.2–4.5)
ALBUMIN SERPL-MCNC: 2.8 GM/DL (ref 3.2–4.5)
ALP SERPL-CCNC: 105 U/L (ref 40–136)
ALP SERPL-CCNC: 130 U/L (ref 40–136)
ALP SERPL-CCNC: 83 U/L (ref 40–136)
ALP SERPL-CCNC: 86 U/L (ref 40–136)
ALP SERPL-CCNC: 86 U/L (ref 40–136)
ALP SERPL-CCNC: 89 U/L (ref 40–136)
ALP SERPL-CCNC: 95 U/L (ref 40–136)
ALT SERPL-CCNC: 28 U/L (ref 0–55)
ALT SERPL-CCNC: 29 U/L (ref 0–55)
ALT SERPL-CCNC: 29 U/L (ref 0–55)
ALT SERPL-CCNC: 30 U/L (ref 0–55)
ALT SERPL-CCNC: 31 U/L (ref 0–55)
ALT SERPL-CCNC: 32 U/L (ref 0–55)
ALT SERPL-CCNC: 39 U/L (ref 0–55)
AMORPH SED URNS QL MICRO: (no result) /LPF
AMORPH SED URNS QL MICRO: (no result) /LPF
APTT BLD: 27 SEC (ref 24–35)
APTT PPP: YELLOW S
APTT PPP: YELLOW S
ARTERIAL PATENCY WRIST A: (no result)
ARTERIAL PATENCY WRIST A: (no result)
ARTERIAL PATENCY WRIST A: POSITIVE
ARTERIAL PATENCY WRIST A: POSITIVE
BACTERIA #/AREA URNS HPF: (no result) /HPF
BACTERIA #/AREA URNS HPF: (no result) /HPF
BARBITURATES UR QL: NEGATIVE
BASE EXCESS STD BLDA CALC-SCNC: -11.7 MMOL/L (ref -2.5–2.5)
BASE EXCESS STD BLDA CALC-SCNC: -19.9 MMOL/L (ref -2.5–2.5)
BASE EXCESS STD BLDA CALC-SCNC: -26.3 MMOL/L (ref -2.5–2.5)
BASE EXCESS STD BLDA CALC-SCNC: -7.2 MMOL/L (ref -2.5–2.5)
BASOPHILS # BLD AUTO: 0 10^3/UL (ref 0–0.1)
BASOPHILS # BLD AUTO: 0.1 10^3/UL (ref 0–0.1)
BASOPHILS # BLD AUTO: 0.1 10^3/UL (ref 0–0.1)
BASOPHILS NFR BLD AUTO: 0 % (ref 0–10)
BDY SITE: (no result)
BENZODIAZ UR QL SCN: NEGATIVE
BILIRUB SERPL-MCNC: 0.2 MG/DL (ref 0.1–1)
BILIRUB SERPL-MCNC: 0.3 MG/DL (ref 0.1–1)
BILIRUB SERPL-MCNC: 0.3 MG/DL (ref 0.1–1)
BILIRUB UR QL STRIP: NEGATIVE
BILIRUB UR QL STRIP: NEGATIVE
BODY TEMPERATURE: 34.4
BODY TEMPERATURE: 36.5
BODY TEMPERATURE: 36.7
BODY TEMPERATURE: 37.3
BUN/CREAT SERPL: 19
BUN/CREAT SERPL: 21
BUN/CREAT SERPL: 22
CALCIUM SERPL-MCNC: 7.5 MG/DL (ref 8.5–10.1)
CALCIUM SERPL-MCNC: 7.5 MG/DL (ref 8.5–10.1)
CALCIUM SERPL-MCNC: 7.6 MG/DL (ref 8.5–10.1)
CALCIUM SERPL-MCNC: 7.9 MG/DL (ref 8.5–10.1)
CALCIUM SERPL-MCNC: 9.6 MG/DL (ref 8.5–10.1)
CHLORIDE SERPL-SCNC: 101 MMOL/L (ref 98–107)
CHLORIDE SERPL-SCNC: 103 MMOL/L (ref 98–107)
CHLORIDE SERPL-SCNC: 105 MMOL/L (ref 98–107)
CHLORIDE SERPL-SCNC: 82 MMOL/L (ref 98–107)
CHLORIDE SERPL-SCNC: 96 MMOL/L (ref 98–107)
CHLORIDE SERPL-SCNC: 96 MMOL/L (ref 98–107)
CHLORIDE SERPL-SCNC: 98 MMOL/L (ref 98–107)
CK SERPL-CCNC: 118 U/L (ref 29–168)
CO2 BLDA CALC-SCNC: 13.5 MMOL/L (ref 21–31)
CO2 BLDA CALC-SCNC: 17.8 MMOL/L (ref 21–31)
CO2 BLDA CALC-SCNC: 3.3 MMOL/L (ref 21–31)
CO2 BLDA CALC-SCNC: 8 MMOL/L (ref 21–31)
CO2 SERPL-SCNC: 15 MMOL/L (ref 21–32)
CO2 SERPL-SCNC: 17 MMOL/L (ref 21–32)
CO2 SERPL-SCNC: 17 MMOL/L (ref 21–32)
CO2 SERPL-SCNC: 19 MMOL/L (ref 21–32)
CO2 SERPL-SCNC: 7 MMOL/L (ref 21–32)
CO2 SERPL-SCNC: < 5 MMOL/L (ref 21–32)
CO2 SERPL-SCNC: < 5 MMOL/L (ref 21–32)
COCAINE UR QL: NEGATIVE
CREAT SERPL-MCNC: 2.53 MG/DL (ref 0.6–1.3)
CREAT SERPL-MCNC: 2.54 MG/DL (ref 0.6–1.3)
CREAT SERPL-MCNC: 2.57 MG/DL (ref 0.6–1.3)
CREAT SERPL-MCNC: 2.59 MG/DL (ref 0.6–1.3)
CREAT SERPL-MCNC: 2.6 MG/DL (ref 0.6–1.3)
CREAT SERPL-MCNC: 2.64 MG/DL (ref 0.6–1.3)
CREAT SERPL-MCNC: 3.05 MG/DL (ref 0.6–1.3)
EOSINOPHIL # BLD AUTO: 0 10^3/UL (ref 0–0.3)
EOSINOPHIL # BLD AUTO: 0.1 10^3/UL (ref 0–0.3)
EOSINOPHIL # BLD AUTO: 0.1 10^3/UL (ref 0–0.3)
EOSINOPHIL NFR BLD AUTO: 0 % (ref 0–10)
ERYTHROCYTE [DISTWIDTH] IN BLOOD BY AUTOMATED COUNT: 14 % (ref 10–14.5)
ERYTHROCYTE [DISTWIDTH] IN BLOOD BY AUTOMATED COUNT: 14 % (ref 10–14.5)
ERYTHROCYTE [DISTWIDTH] IN BLOOD BY AUTOMATED COUNT: 14.1 % (ref 10–14.5)
FIBRINOGEN PPP-MCNC: (no result) MG/DL
FIBRINOGEN PPP-MCNC: CLEAR MG/DL
GFR SERPLBLD BASED ON 1.73 SQ M-ARVRAT: 16 ML/MIN
GFR SERPLBLD BASED ON 1.73 SQ M-ARVRAT: 19 ML/MIN
GFR SERPLBLD BASED ON 1.73 SQ M-ARVRAT: 20 ML/MIN
GFR SERPLBLD BASED ON 1.73 SQ M-ARVRAT: 20 ML/MIN
GLUCOSE SERPL-MCNC: 1040 MG/DL (ref 70–105)
GLUCOSE SERPL-MCNC: 240 MG/DL (ref 70–105)
GLUCOSE SERPL-MCNC: 415 MG/DL (ref 70–105)
GLUCOSE SERPL-MCNC: 421 MG/DL (ref 70–105)
GLUCOSE SERPL-MCNC: 524 MG/DL (ref 70–105)
GLUCOSE SERPL-MCNC: 787 MG/DL (ref 70–105)
GLUCOSE SERPL-MCNC: 788 MG/DL (ref 70–105)
GLUCOSE UR STRIP-MCNC: (no result) MG/DL
GLUCOSE UR STRIP-MCNC: (no result) MG/DL
HCT VFR BLD CALC: 28 % (ref 35–52)
HCT VFR BLD CALC: 29 % (ref 35–52)
HCT VFR BLD CALC: 31 % (ref 35–52)
HCT VFR BLD CALC: 37 % (ref 35–52)
HGB BLD-MCNC: 10 G/DL (ref 11.5–16)
HGB BLD-MCNC: 12.1 G/DL (ref 11.5–16)
HGB BLD-MCNC: 9.6 G/DL (ref 11.5–16)
HGB BLD-MCNC: 9.9 G/DL (ref 11.5–16)
INHALED O2 FLOW RATE: (no result) L/MIN
INR PPP: 1 (ref 0.8–1.4)
KETONES UR QL STRIP: (no result)
KETONES UR QL STRIP: (no result)
LEUKOCYTE ESTERASE UR QL STRIP: NEGATIVE
LEUKOCYTE ESTERASE UR QL STRIP: NEGATIVE
LYMPHOCYTES # BLD AUTO: 4.8 X 10^3 (ref 1–4)
LYMPHOCYTES # BLD AUTO: 6.2 X 10^3 (ref 1–4)
LYMPHOCYTES # BLD AUTO: 6.9 X 10^3 (ref 1–4)
LYMPHOCYTES NFR BLD AUTO: 15 % (ref 12–44)
LYMPHOCYTES NFR BLD AUTO: 17 % (ref 12–44)
LYMPHOCYTES NFR BLD AUTO: 18 % (ref 12–44)
MAGNESIUM SERPL-MCNC: 1.7 MG/DL (ref 1.6–2.4)
MAGNESIUM SERPL-MCNC: 1.8 MG/DL (ref 1.6–2.4)
MAGNESIUM SERPL-MCNC: 1.9 MG/DL (ref 1.6–2.4)
MAGNESIUM SERPL-MCNC: 2 MG/DL (ref 1.6–2.4)
MAGNESIUM SERPL-MCNC: 2.3 MG/DL (ref 1.6–2.4)
MANUAL DIFFERENTIAL PERFORMED BLD QL: NO
MANUAL DIFFERENTIAL PERFORMED BLD QL: NO
MANUAL DIFFERENTIAL PERFORMED BLD QL: YES
MCH RBC QN AUTO: 32 PG (ref 25–34)
MCH RBC QN AUTO: 32 PG (ref 25–34)
MCH RBC QN AUTO: 33 PG (ref 25–34)
MCHC RBC AUTO-ENTMCNC: 33 G/DL (ref 32–36)
MCHC RBC AUTO-ENTMCNC: 33 G/DL (ref 32–36)
MCHC RBC AUTO-ENTMCNC: 34 G/DL (ref 32–36)
MCV RBC AUTO: 100 FL (ref 80–99)
MCV RBC AUTO: 96 FL (ref 80–99)
MCV RBC AUTO: 98 FL (ref 80–99)
METAMYELOCYTES NFR BLD: 2 %
METHADONE UR QL SCN: NEGATIVE
METHAMPHETAMINE SCREEN URINE S: POSITIVE
MONOCYTES # BLD AUTO: 1.7 X 10^3 (ref 0–1)
MONOCYTES # BLD AUTO: 1.7 X 10^3 (ref 0–1)
MONOCYTES # BLD AUTO: 1.9 X 10^3 (ref 0–1)
MONOCYTES NFR BLD AUTO: 5 % (ref 0–12)
MONOCYTES NFR BLD: 5 %
NEUTROPHILS # BLD AUTO: 25.9 X 10^3 (ref 1.8–7.8)
NEUTROPHILS # BLD AUTO: 27.5 X 10^3 (ref 1.8–7.8)
NEUTROPHILS # BLD AUTO: 28.5 X 10^3 (ref 1.8–7.8)
NEUTROPHILS NFR BLD AUTO: 76 % (ref 42–75)
NEUTROPHILS NFR BLD AUTO: 78 % (ref 42–75)
NEUTROPHILS NFR BLD AUTO: 80 % (ref 42–75)
NEUTS BAND NFR BLD MANUAL: 72 %
NEUTS BAND NFR BLD: 10 %
NITRITE UR QL STRIP: NEGATIVE
NITRITE UR QL STRIP: NEGATIVE
OPIATES UR QL SCN: NEGATIVE
OXYCODONE UR QL: NEGATIVE
PCO2 BLDA: 11 MMHG (ref 35–45)
PCO2 BLDA: 22 MMHG (ref 35–45)
PCO2 BLDA: 23 MMHG (ref 35–45)
PCO2 BLDA: 30 MMHG (ref 35–45)
PH BLDA: 7.05 [PH] (ref 7.37–7.43)
PH BLDA: 7.16 [PH] (ref 7.37–7.43)
PH BLDA: 7.36 [PH] (ref 7.37–7.43)
PH BLDA: 7.38 [PH] (ref 7.37–7.43)
PH UR STRIP: 5 [PH] (ref 5–9)
PH UR STRIP: 5 [PH] (ref 5–9)
PHOSPHATE SERPL-MCNC: 2.8 MG/DL (ref 2.3–4.7)
PHOSPHATE SERPL-MCNC: 3.4 MG/DL (ref 2.3–4.7)
PHOSPHATE SERPL-MCNC: 4 MG/DL (ref 2.3–4.7)
PHOSPHATE SERPL-MCNC: 4.3 MG/DL (ref 2.3–4.7)
PHOSPHATE SERPL-MCNC: 9.1 MG/DL (ref 2.3–4.7)
PLATELET # BLD: 262 10^3/UL (ref 130–400)
PLATELET # BLD: 293 10^3/UL (ref 130–400)
PLATELET # BLD: 326 10^3/UL (ref 130–400)
PMV BLD AUTO: 10 FL (ref 7.4–10.4)
PMV BLD AUTO: 10.2 FL (ref 7.4–10.4)
PMV BLD AUTO: 9.8 FL (ref 7.4–10.4)
PO2 BLDA: 114 MMHG (ref 79–93)
PO2 BLDA: 130 MMHG (ref 79–93)
PO2 BLDA: 79 MMHG (ref 79–93)
PO2 BLDA: 95 MMHG (ref 79–93)
POTASSIUM SERPL-SCNC: 4.3 MMOL/L (ref 3.6–5)
POTASSIUM SERPL-SCNC: 4.3 MMOL/L (ref 3.6–5)
POTASSIUM SERPL-SCNC: 4.4 MMOL/L (ref 3.6–5)
POTASSIUM SERPL-SCNC: 4.4 MMOL/L (ref 3.6–5)
POTASSIUM SERPL-SCNC: 4.8 MMOL/L (ref 3.6–5)
POTASSIUM SERPL-SCNC: 5 MMOL/L (ref 3.6–5)
POTASSIUM SERPL-SCNC: 6.1 MMOL/L (ref 3.6–5)
PROPOXYPH UR QL: NEGATIVE
PROT SERPL-MCNC: 4.4 GM/DL (ref 6.4–8.2)
PROT SERPL-MCNC: 4.6 GM/DL (ref 6.4–8.2)
PROT SERPL-MCNC: 4.7 GM/DL (ref 6.4–8.2)
PROT SERPL-MCNC: 4.7 GM/DL (ref 6.4–8.2)
PROT SERPL-MCNC: 4.9 GM/DL (ref 6.4–8.2)
PROT SERPL-MCNC: 5 GM/DL (ref 6.4–8.2)
PROT SERPL-MCNC: 6.4 GM/DL (ref 6.4–8.2)
PROT UR QL STRIP: (no result)
PROT UR QL STRIP: (no result)
PROTHROMBIN TIME: 14 SEC (ref 12.2–14.7)
RBC #/AREA URNS HPF: (no result) /HPF
RBC #/AREA URNS HPF: (no result) /HPF
RBC MORPH BLD: NORMAL
SAO2 % BLDA FROM PO2: 95 % (ref 94–100)
SAO2 % BLDA FROM PO2: 97 % (ref 94–100)
SAO2 % BLDA FROM PO2: 97 % (ref 94–100)
SAO2 % BLDA FROM PO2: 98 % (ref 94–100)
SODIUM SERPL-SCNC: 123 MMOL/L (ref 135–145)
SODIUM SERPL-SCNC: 128 MMOL/L (ref 135–145)
SODIUM SERPL-SCNC: 128 MMOL/L (ref 135–145)
SODIUM SERPL-SCNC: 132 MMOL/L (ref 135–145)
SODIUM SERPL-SCNC: 133 MMOL/L (ref 135–145)
SODIUM SERPL-SCNC: 134 MMOL/L (ref 135–145)
SODIUM SERPL-SCNC: 134 MMOL/L (ref 135–145)
SP GR UR STRIP: >=1.03 (ref 1.02–1.02)
SP GR UR STRIP: >=1.03 (ref 1.02–1.02)
SQUAMOUS #/AREA URNS HPF: (no result) /HPF
SQUAMOUS #/AREA URNS HPF: (no result) /HPF
TRICYCLICS UR QL SCN: NEGATIVE
TRIGL SERPL-MCNC: 927 MG/DL (ref ?–150)
VARIANT LYMPHS NFR BLD MANUAL: 11 %
VENTILATION MODE VENT: NO
VENTILATION MODE VENT: YES
WBC # BLD AUTO: 32.5 10^3/UL (ref 4.3–11)
WBC # BLD AUTO: 35.4 10^3/UL (ref 4.3–11)
WBC # BLD AUTO: 37.4 10^3/UL (ref 4.3–11)
WBC #/AREA URNS HPF: (no result) /HPF
WBC #/AREA URNS HPF: (no result) /HPF

## 2020-02-10 PROCEDURE — 85027 COMPLETE CBC AUTOMATED: CPT

## 2020-02-10 PROCEDURE — 83605 ASSAY OF LACTIC ACID: CPT

## 2020-02-10 PROCEDURE — 85025 COMPLETE CBC W/AUTO DIFF WBC: CPT

## 2020-02-10 PROCEDURE — 0BH17EZ INSERTION OF ENDOTRACHEAL AIRWAY INTO TRACHEA, VIA NATURAL OR ARTIFICIAL OPENING: ICD-10-PCS | Performed by: EMERGENCY MEDICINE

## 2020-02-10 PROCEDURE — 96375 TX/PRO/DX INJ NEW DRUG ADDON: CPT

## 2020-02-10 PROCEDURE — 80053 COMPREHEN METABOLIC PANEL: CPT

## 2020-02-10 PROCEDURE — 94640 AIRWAY INHALATION TREATMENT: CPT

## 2020-02-10 PROCEDURE — 85018 HEMOGLOBIN: CPT

## 2020-02-10 PROCEDURE — 87081 CULTURE SCREEN ONLY: CPT

## 2020-02-10 PROCEDURE — 5A1945Z RESPIRATORY VENTILATION, 24-96 CONSECUTIVE HOURS: ICD-10-PCS | Performed by: EMERGENCY MEDICINE

## 2020-02-10 PROCEDURE — 82550 ASSAY OF CK (CPK): CPT

## 2020-02-10 PROCEDURE — 81000 URINALYSIS NONAUTO W/SCOPE: CPT

## 2020-02-10 PROCEDURE — 31500 INSERT EMERGENCY AIRWAY: CPT

## 2020-02-10 PROCEDURE — 82805 BLOOD GASES W/O2 SATURATION: CPT

## 2020-02-10 PROCEDURE — 87040 BLOOD CULTURE FOR BACTERIA: CPT

## 2020-02-10 PROCEDURE — 87184 SC STD DISK METHOD PER PLATE: CPT

## 2020-02-10 PROCEDURE — 83036 HEMOGLOBIN GLYCOSYLATED A1C: CPT

## 2020-02-10 PROCEDURE — 87804 INFLUENZA ASSAY W/OPTIC: CPT

## 2020-02-10 PROCEDURE — 87181 SC STD AGAR DILUTION PER AGT: CPT

## 2020-02-10 PROCEDURE — 84100 ASSAY OF PHOSPHORUS: CPT

## 2020-02-10 PROCEDURE — 87205 SMEAR GRAM STAIN: CPT

## 2020-02-10 PROCEDURE — 94760 N-INVAS EAR/PLS OXIMETRY 1: CPT

## 2020-02-10 PROCEDURE — 80320 DRUG SCREEN QUANTALCOHOLS: CPT

## 2020-02-10 PROCEDURE — 84478 ASSAY OF TRIGLYCERIDES: CPT

## 2020-02-10 PROCEDURE — 85730 THROMBOPLASTIN TIME PARTIAL: CPT

## 2020-02-10 PROCEDURE — 96374 THER/PROPH/DIAG INJ IV PUSH: CPT

## 2020-02-10 PROCEDURE — 80306 DRUG TEST PRSMV INSTRMNT: CPT

## 2020-02-10 PROCEDURE — 36415 COLL VENOUS BLD VENIPUNCTURE: CPT

## 2020-02-10 PROCEDURE — 87077 CULTURE AEROBIC IDENTIFY: CPT

## 2020-02-10 PROCEDURE — 80048 BASIC METABOLIC PNL TOTAL CA: CPT

## 2020-02-10 PROCEDURE — 72125 CT NECK SPINE W/O DYE: CPT

## 2020-02-10 PROCEDURE — 36600 WITHDRAWAL OF ARTERIAL BLOOD: CPT

## 2020-02-10 PROCEDURE — 94799 UNLISTED PULMONARY SVC/PX: CPT

## 2020-02-10 PROCEDURE — 82962 GLUCOSE BLOOD TEST: CPT

## 2020-02-10 PROCEDURE — 85014 HEMATOCRIT: CPT

## 2020-02-10 PROCEDURE — 70450 CT HEAD/BRAIN W/O DYE: CPT

## 2020-02-10 PROCEDURE — 71045 X-RAY EXAM CHEST 1 VIEW: CPT

## 2020-02-10 PROCEDURE — 93005 ELECTROCARDIOGRAM TRACING: CPT

## 2020-02-10 PROCEDURE — 94002 VENT MGMT INPAT INIT DAY: CPT

## 2020-02-10 PROCEDURE — 84484 ASSAY OF TROPONIN QUANT: CPT

## 2020-02-10 PROCEDURE — 82010 KETONE BODYS QUAN: CPT

## 2020-02-10 PROCEDURE — 87088 URINE BACTERIA CULTURE: CPT

## 2020-02-10 PROCEDURE — 85610 PROTHROMBIN TIME: CPT

## 2020-02-10 PROCEDURE — 83735 ASSAY OF MAGNESIUM: CPT

## 2020-02-10 PROCEDURE — 85007 BL SMEAR W/DIFF WBC COUNT: CPT

## 2020-02-10 PROCEDURE — 87070 CULTURE OTHR SPECIMN AEROBIC: CPT

## 2020-02-10 RX ADMIN — SODIUM CHLORIDE SCH MLS/HR: 900 INJECTION, SOLUTION INTRAVENOUS at 07:45

## 2020-02-10 RX ADMIN — IPRATROPIUM BROMIDE AND ALBUTEROL SULFATE SCH ML: .5; 3 SOLUTION RESPIRATORY (INHALATION) at 13:38

## 2020-02-10 RX ADMIN — SODIUM CHLORIDE, SODIUM LACTATE, POTASSIUM CHLORIDE, AND CALCIUM CHLORIDE SCH MLS/HR: 600; 310; 30; 20 INJECTION, SOLUTION INTRAVENOUS at 22:23

## 2020-02-10 RX ADMIN — DEXMEDETOMIDINE HYDROCHLORIDE SCH MLS/HR: 100 INJECTION, SOLUTION, CONCENTRATE INTRAVENOUS at 23:12

## 2020-02-10 RX ADMIN — Medication SCH MLS/HR: at 06:27

## 2020-02-10 RX ADMIN — POTASSIUM CHLORIDE SCH MLS/HR: 200 INJECTION, SOLUTION INTRAVENOUS at 10:08

## 2020-02-10 RX ADMIN — Medication SCH MLS/HR: at 11:41

## 2020-02-10 RX ADMIN — SODIUM CHLORIDE, SODIUM LACTATE, POTASSIUM CHLORIDE, CALCIUM CHLORIDE, AND DEXTROSE MONOHYDRATE SCH MLS/HR: 600; 310; 30; 20; 5 INJECTION, SOLUTION INTRAVENOUS at 16:44

## 2020-02-10 RX ADMIN — DEXTROSE MONOHYDRATE AND SODIUM CHLORIDE SCH MLS/HR: 5; .45 INJECTION, SOLUTION INTRAVENOUS at 05:56

## 2020-02-10 RX ADMIN — SODIUM CHLORIDE, SODIUM LACTATE, POTASSIUM CHLORIDE, AND CALCIUM CHLORIDE SCH MLS/HR: 600; 310; 30; 20 INJECTION, SOLUTION INTRAVENOUS at 16:44

## 2020-02-10 RX ADMIN — POTASSIUM CHLORIDE SCH MLS/HR: 200 INJECTION, SOLUTION INTRAVENOUS at 12:35

## 2020-02-10 RX ADMIN — POTASSIUM CHLORIDE SCH MLS/HR: 200 INJECTION, SOLUTION INTRAVENOUS at 20:55

## 2020-02-10 RX ADMIN — DEXTROSE MONOHYDRATE AND SODIUM CHLORIDE SCH MLS/HR: 5; .45 INJECTION, SOLUTION INTRAVENOUS at 12:56

## 2020-02-10 RX ADMIN — PANTOPRAZOLE SODIUM SCH MG: 40 INJECTION, POWDER, FOR SOLUTION INTRAVENOUS at 10:30

## 2020-02-10 RX ADMIN — POTASSIUM CHLORIDE SCH MLS/HR: 200 INJECTION, SOLUTION INTRAVENOUS at 20:27

## 2020-02-10 RX ADMIN — IPRATROPIUM BROMIDE AND ALBUTEROL SULFATE PRN ML: .5; 3 SOLUTION RESPIRATORY (INHALATION) at 07:06

## 2020-02-10 RX ADMIN — SODIUM CHLORIDE, SODIUM LACTATE, POTASSIUM CHLORIDE, AND CALCIUM CHLORIDE SCH MLS/HR: 600; 310; 30; 20 INJECTION, SOLUTION INTRAVENOUS at 15:26

## 2020-02-10 RX ADMIN — DEXTROSE MONOHYDRATE SCH MLS/HR: 100 INJECTION, SOLUTION INTRAVENOUS at 15:26

## 2020-02-10 RX ADMIN — POTASSIUM CHLORIDE SCH MLS/HR: 200 INJECTION, SOLUTION INTRAVENOUS at 14:34

## 2020-02-10 RX ADMIN — SODIUM CHLORIDE, SODIUM LACTATE, POTASSIUM CHLORIDE, CALCIUM CHLORIDE, AND DEXTROSE MONOHYDRATE SCH MLS/HR: 600; 310; 30; 20; 5 INJECTION, SOLUTION INTRAVENOUS at 19:36

## 2020-02-10 RX ADMIN — Medication SCH MLS/HR: at 21:05

## 2020-02-10 RX ADMIN — IPRATROPIUM BROMIDE AND ALBUTEROL SULFATE SCH ML: .5; 3 SOLUTION RESPIRATORY (INHALATION) at 21:39

## 2020-02-10 RX ADMIN — POTASSIUM CHLORIDE SCH MLS/HR: 200 INJECTION, SOLUTION INTRAVENOUS at 18:49

## 2020-02-10 RX ADMIN — IPRATROPIUM BROMIDE AND ALBUTEROL SULFATE SCH ML: .5; 3 SOLUTION RESPIRATORY (INHALATION) at 18:09

## 2020-02-10 RX ADMIN — POTASSIUM CHLORIDE SCH MLS/HR: 200 INJECTION, SOLUTION INTRAVENOUS at 05:57

## 2020-02-10 RX ADMIN — SODIUM CHLORIDE SCH MLS/HR: 900 INJECTION, SOLUTION INTRAVENOUS at 14:37

## 2020-02-10 RX ADMIN — PANTOPRAZOLE SODIUM SCH MG: 40 INJECTION, POWDER, FOR SOLUTION INTRAVENOUS at 21:08

## 2020-02-10 RX ADMIN — SODIUM CHLORIDE, SODIUM LACTATE, POTASSIUM CHLORIDE, AND CALCIUM CHLORIDE SCH MLS/HR: 600; 310; 30; 20 INJECTION, SOLUTION INTRAVENOUS at 06:44

## 2020-02-10 RX ADMIN — DEXTROSE MONOHYDRATE AND SODIUM CHLORIDE SCH MLS/HR: 5; .45 INJECTION, SOLUTION INTRAVENOUS at 10:18

## 2020-02-10 RX ADMIN — DEXTROSE MONOHYDRATE SCH MLS/HR: 100 INJECTION, SOLUTION INTRAVENOUS at 05:56

## 2020-02-10 RX ADMIN — POTASSIUM CHLORIDE SCH MLS/HR: 200 INJECTION, SOLUTION INTRAVENOUS at 08:56

## 2020-02-10 RX ADMIN — Medication SCH MLS/HR: at 14:32

## 2020-02-10 RX ADMIN — POTASSIUM CHLORIDE SCH MLS/HR: 200 INJECTION, SOLUTION INTRAVENOUS at 16:44

## 2020-02-10 RX ADMIN — POTASSIUM CHLORIDE SCH MLS/HR: 200 INJECTION, SOLUTION INTRAVENOUS at 08:12

## 2020-02-10 RX ADMIN — Medication SCH MLS/HR: at 10:09

## 2020-02-10 RX ADMIN — SODIUM CHLORIDE, SODIUM LACTATE, POTASSIUM CHLORIDE, AND CALCIUM CHLORIDE SCH MLS/HR: 600; 310; 30; 20 INJECTION, SOLUTION INTRAVENOUS at 10:31

## 2020-02-10 RX ADMIN — DEXMEDETOMIDINE HYDROCHLORIDE SCH MLS/HR: 100 INJECTION, SOLUTION, CONCENTRATE INTRAVENOUS at 14:37

## 2020-02-10 RX ADMIN — DEXMEDETOMIDINE HYDROCHLORIDE SCH MLS/HR: 100 INJECTION, SOLUTION, CONCENTRATE INTRAVENOUS at 08:07

## 2020-02-10 RX ADMIN — IPRATROPIUM BROMIDE AND ALBUTEROL SULFATE SCH ML: .5; 3 SOLUTION RESPIRATORY (INHALATION) at 09:42

## 2020-02-10 NOTE — NUR
0220 IV 20Ga LAC established per EMANUEL Pedroza RN. Multiple attempts per EMS prior 
to arrival without success. Patients family member presented with EMS, assisted 
to the family room. 

0235 Mathur 18 fr./ UA collected

0240 lab at the bedside obtaining second set of cultures

Patient GCS continues to decline, patient thrashing in the exam bed.

0237 0.5mg Ativan administered

0239 50mcg Fentanyl administered

Dr. Santacruz elects to intubate the patient. 

0253 ABG obtained

0255 RT called

0307 20mg Etomidate administered

0309 50mg Rocuronium administered

0312 Intubation per Dr. Santacruz x 2, 8.0 ETT, 25 at the teeth

0317 16 Ga OG

0318 100mcg Fentanyl

0332 2.5mg Versed 

0337 diprivan drip initiated 30mcg/kg/min, 16ml/hr

0340 Patient taken to CT

0415 Patient returned from CT

0420 diprivan drip decreased to 22mcg/kg/min, 11.9 ml/hr

Patient pain and sedation control continued. Vitals as documented. Patients 
family member returned to the bedside. 

0445 Report called to Jabier B[facility]. 

0450 RT contacted to assist with transport to the floor

0515 Patient transported to the floor, diprivan infusing.

## 2020-02-10 NOTE — NUR
VANCOMYCIN DOSING



SCR 2.64; IBW 58KG; CRCL ~ 21; BOLUS VANC 20 MG/KG X 65 KG ~ 1250 MG THEN VANC 15 MG/KG ~ 1 
GM Q24H

CHECK TROUGH LEVEL 2/12 AT 0600

HOLD DOSE AND CONTACT PHARMACY IF LEVEL IS GREATER THAN 20

## 2020-02-10 NOTE — ED GENERAL
General


Stated Complaint:  ALT MENTAL STATUS


Source of Information:  Patient, EMS, Family (mom)


Exam Limitations:  Other (altered mental status clinical condition)





History of Present Illness


Date Seen by Provider:  Feb 10, 2020


Time Seen by Provider:  01:50


Initial Comments


Patient resents to ER by EMS from home where she is found by her son around 5:00

in the afternoon and had been laying on the floor for they suspect about 4-5 

hours. Patient was moaning and not answering questions appropriately. Release 

ago she was in the hospital for diabetic ketoacidosis. Her blood sugar read high

for EMS. Patient was profoundly thirsty and requesting lots of fluids so family 

had been giving her water by mouth. No vomiting or diarrhea. Had a fever ye

sterday according to family. Temperature of 93F per EMS on arrival.





Allergies and Home Medications


Allergies


Coded Allergies:  


     Sulfa (Sulfonamide Antibiotics) (Unverified  Allergy, Unknown, 6/25/15)


     codeine (Verified  Allergy, Unknown, TAKES ULTRAM AT HOME, 1/2/09)


     ketorolac (Verified  Allergy, Unknown, 11/19/08)


     tramadol (Verified  Allergy, Unknown, 12/17/11)





Home Medications


Albuterol Sulfate 1 Puff Puff, 2 PUFF IH TID PRN for SHORTNESS OF BREATH, 

(Reported)


Atorvastatin Calcium 40 Mg Tablet, 40 MG PO HS, (Reported)


Fluticasone Propionate 16 Gm Spray.susp, 2 SPRAYS NS DAILY, (Reported)


Insulin Aspart (Niacinamide) 100 Unit/1 Ml Insuln.pen, 5 UNITS SQ AC


   Prescribed by: MISHA DEVRIES on 8/14/19 1247


Insulin Glargine,Hum.rec.anlog 300 Unit/1 Ml Insuln.pen, 5 UNITS SQ DAILY


   Prescribed by: MISHA DEVRIES on 8/14/19 1247


Magnesium Chloride 64 Mg Tab, 2 TAB PO Q48H, (Reported)


Metoclopramide HCl 10 Mg Tablet, 10 MG PO ACHS, (Reported)


Metoprolol Tartrate 50 Mg Tablet, 50 MG PO DAILY, (Reported)


Montelukast Sodium 10 Mg Tablet, 10 MG PO DAILY, (Reported)


Niacinamide 500 Mg Tablet, 500 MG PO Q48H, (Reported)


Potassium Gluconate 99 Mg Tablet, 99 MG PO Q48H, (Reported)





Patient Home Medication List


Home Medication List Reviewed:  Yes





Review of Systems


Review of Systems


Constitutional:  see HPI (review of systems per mom); No chills, No diaphoresis;

fever


EENTM:  No ear discharge, No ear pain


Respiratory:  No cough, No short of breath


Cardiovascular:  No chest pain, No edema


Gastrointestinal:  No abdominal pain, No nausea, No vomiting


Genitourinary:  No discharge, No dysuria


Musculoskeletal:  No back pain, No joint pain


Skin:  No pruritus, No rash


Psychiatric/Neurological:  Denies Headache, Denies Numbness





All Other Systems Reviewed


Negative Unless Noted:  Yes





Past Medical-Social-Family Hx


Patient Social History


Recreational Drug Use:  Yes


Drug of Choice:  THC


Smoking Status:  Current Everyday Smoker


Type Used:  Cigarettes


2nd Hand Smoke Exposure:  No


Recent Foreign Travel:  No


Contact w/Someone Who Travel:  No


Recent Hopitalizations:  No





Immunizations Up To Date


Tetanus Booster (TDap):  Unknown


Date of Pneumonia Vaccine:  Oct 1, 2012


Date of Influenza Vaccine:  Oct 1, 2012





Seasonal Allergies


Seasonal Allergies:  No





Past Medical History


Surgeries:  Yes (renal stents)


Adenoidectomy, Ear Surgery, Hysterectomy, Tonsillectomy


Respiratory:  Yes


Asthma, Pneumonia, Chronic Bronchitis


Cardiac:  Yes


Deep Vein Thrombosis


Neurological:  Yes


Neuropathy


Reproductive Disorders:  Yes


Female Reproductive Disorders:  Menstrual Problems


Genitourinary:  Yes


Renal Failure


Gastrointestinal:  Yes (gastroparesis)


Gastroesophageal Reflux, Pancreatitis, Ulcer


Musculoskeletal:  Yes


Arthritis, Rheumatoid Arthritis, Fractures


Endocrine:  Yes


Diabetes, Insulin dep


HEENT:  No


Tonsilitis


Loss of Vision:  Denies


Hearing Impairment:  Denies


Cancer:  No


Psychosocial:  Yes


Personality Disorder


Integumentary:  No


Blood Disorders:  No


Adverse Reaction/Blood Tranf:  No





Family Medical History





FH: thyroid cancer


  G8 SISTER


FHx: macular degeneration


  19 MOTHER


Glaucoma


  G8 BROTHER


Heart Disease, Cancer, Diabetes





Physical Exam


Vital Signs





Vital Signs - First Documented








 2/10/20





 04:02


 


Pulse 130


 


Resp 16


 


B/P (MAP) 119/58


 


Pulse Ox 99


 


O2 Delivery Mechanical Ventilator


 


O2 Flow Rate 30.00





Capillary Refill :


Height, Weight, BMI


Height: 5'5.00"


Weight: 151lbs. 1.0oz. 68.364907lh; 26.00 BMI


Method:Stated


General Appearance:  Chronically ill, Moderate Distress


Eyes:  Bilateral Eye Normal Inspection, Bilateral Eye PERRL, Bilateral Eye EOMI


HEENT:  PERRL/EOMI, TMs Normal, Normal ENT Inspection; No Moist Mucous Membranes

(exceedingly dry oral mucosa)


Neck:  Full Range of Motion, Normal Inspection


Respiratory:  Lungs Clear, Normal Breath Sounds, No Accessory Muscle Use, No 

Respiratory Distress


Cardiovascular:  Regular Rate, Rhythm, No Edema, Normal Peripheral Pulses


Gastrointestinal:  Normal Bowel Sounds, Non Tender, Soft


Extremity:  Normal Capillary Refill, Normal Inspection, Non Tender, No Pedal 

Edema


Neurologic/Psychiatric:  Alert, No Motor/Sensory Deficits, Other (GCS 10)





Focused Exam


Sepsis Stage:  Ruled Out


Reason for ruling out sepsis:  DKA with amphetamines and methamphetamines.


Lactate Level


2/10/20 02:19: Lactic Acid Level 6.69*H


2/10/20 04:23: 





Lactic Acid Level





Laboratory Tests








Test


 2/10/20


02:19 2/10/20


04:23


 


Lactic Acid Level


 6.69 MMOL/L


(0.50-2.00)  *H 














Procedures/Interventions


Reason for Intubation:  respiratory failure secondary to DKA


Date of ETT Placement:  Feb 10, 2020


Time of ETT Placement:  03:12


Intubation Method:  orotracheal


Tube Size:  8.0


Medications:  Etomidate (20 mg), Rocuronium (50 mg)


Positive End Tide CO2:  Yes


Breath Sounds after Intubation:  bilateral-equal


Intubation Complications:  oral-unsuccessful attempt (1)


Post Intubation Xray:  Yes


1/2 cm above the cyrus in good position


Patient was sedated with 20 mg etomidate adequately and 100 mg of fentanyl. We 

then gave her 50 mg of rocuronium which achieved paralysis. First attempt using 

a Anais we were unable to achieve good view of the vocal cords because the 

patient's airway was so dry her epiglottis seem to be sticking. Would continued 

using the bag valve mask and her oxygen sats never fell below 100%. Second 

attempt we used the Fabian vision video laryngoscope. We're able to obtain a 

excellent view of the entire vocal cords and witnessed the 8.0 ET tube pass the 

vocal cords. Capnography paper then changed color and the tube fogged on 

expiration. We heard good, symmetric breath sounds bilaterally and no air sounds

over the epigastric region. Patient's oxygen sats still stayed at 100%.





Progress/Results/Core Measures


Suspected Sepsis


SIRS


Temperature: 


Pulse:  


Respiratory Rate: 


 


Laboratory Tests


2/10/20 02:19: White Blood Count 32.5*H


Blood Pressure  / 


Mean: 


 





2/10/20 02:19: Lactic Acid Level 6.69*H


2/10/20 04:23: 


Laboratory Tests


2/10/20 02:19: 


Creatinine 3.05H, INR Comment 1.0, Platelet Count 326, Total Bilirubin 0.2








Results/Orders


Lab Results





Laboratory Tests








Test


 2/10/20


02:13 2/10/20


02:19 2/10/20


02:31 2/10/20


02:53 Range/Units


 


 


Glucometer > 600 *H      MG/DL


 


White Blood Count


 


 32.5 *H


 


 


 4.3-11.0


10^3/uL


 


Red Blood Count


 


 3.73 L


 


 


 4.35-5.85


10^6/uL


 


Hemoglobin  12.1    11.5-16.0  G/DL


 


Hematocrit  37    35-52  %


 


Mean Corpuscular Volume  100 H   80-99  FL


 


Mean Corpuscular Hemoglobin  32    25-34  PG


 


Mean Corpuscular Hemoglobin


Concent 


 33 


 


 


 32-36  G/DL





 


Red Cell Distribution Width  14.1    10.0-14.5  %


 


Platelet Count


 


 326 


 


 


 130-400


10^3/uL


 


Mean Platelet Volume  10.2    7.4-10.4  FL


 


Neutrophils (%) (Auto)  80 H   42-75  %


 


Lymphocytes (%) (Auto)  15    12-44  %


 


Monocytes (%) (Auto)  5    0-12  %


 


Eosinophils (%) (Auto)  0    0-10  %


 


Basophils (%) (Auto)  0    0-10  %


 


Neutrophils # (Auto)  25.9 H   1.8-7.8  X 10^3


 


Lymphocytes # (Auto)  4.8 H   1.0-4.0  X 10^3


 


Monocytes # (Auto)  1.7 H   0.0-1.0  X 10^3


 


Eosinophils # (Auto)


 


 0.0 


 


 


 0.0-0.3


10^3/uL


 


Basophils # (Auto)


 


 0.1 


 


 


 0.0-0.1


10^3/uL


 


Neutrophils % (Manual)  72     %


 


Lymphocytes % (Manual)  11     %


 


Monocytes % (Manual)  5     %


 


Metamyelocytes %  2     %


 


Band Neutrophils  10     %


 


Blood Morphology Comment  NORMAL     


 


Prothrombin Time  14.0    12.2-14.7  SEC


 


INR Comment  1.0    0.8-1.4  


 


Activated Partial


Thromboplast Time 


 27 


 


 


 24-35  SEC





 


Sodium Level  123 *L   135-145  MMOL/L


 


Potassium Level  6.1 H   3.6-5.0  MMOL/L


 


Chloride Level  82 L     MMOL/L


 


Carbon Dioxide Level  < 5 *L   21-32  MMOL/L


 


Anion Gap  36 H   5-14  MMOL/L


 


Blood Urea Nitrogen  58 H   7-18  MG/DL


 


Creatinine


 


 3.05 H


 


 


 0.60-1.30


MG/DL


 


Estimat Glomerular Filtration


Rate 


 16 


 


 


  





 


BUN/Creatinine Ratio  19     


 


Glucose Level  1040 *H     MG/DL


 


Lactic Acid Level


 


 6.69 *H


 


 


 0.50-2.00


MMOL/L


 


Calcium Level  9.6    8.5-10.1  MG/DL


 


Corrected Calcium  10.6 H   8.5-10.1  MG/DL


 


Total Bilirubin  0.2    0.1-1.0  MG/DL


 


Aspartate Amino Transf


(AST/SGOT) 


 23 


 


 


 5-34  U/L





 


Alanine Aminotransferase


(ALT/SGPT) 


 39 


 


 


 0-55  U/L





 


Alkaline Phosphatase  130      U/L


 


Total Creatine Kinase  118      U/L


 


Troponin I  < 0.028    <0.028  NG/ML


 


Total Protein  6.4    6.4-8.2  GM/DL


 


Albumin  2.8 L   3.2-4.5  GM/DL


 


Serum Alcohol  < 10    <10  MG/DL


 


Urine Color   YELLOW    


 


Urine Clarity   SL CLOUDY    


 


Urine pH   5.0   5-9  


 


Urine Specific Gravity   >=1.030   1.016-1.022  


 


Urine Protein   3+ H  NEGATIVE  


 


Urine Glucose (UA)   3+ H  NEGATIVE  


 


Urine Ketones   2+ H  NEGATIVE  


 


Urine Nitrite   NEGATIVE   NEGATIVE  


 


Urine Bilirubin   NEGATIVE   NEGATIVE  


 


Urine Urobilinogen   0.2   < = 1.0  MG/DL


 


Urine Leukocyte Esterase   NEGATIVE   NEGATIVE  


 


Urine RBC (Auto)   1+ H  NEGATIVE  


 


Urine RBC   0-2    /HPF


 


Urine WBC   0-2    /HPF


 


Urine Squamous Epithelial


Cells 


 


 0-2 


 


  /HPF





 


Urine Crystals   PRESENT H   /LPF


 


Urine Amorphous Sediment


 


 


 MOD NATALIE


URATES H 


  /LPF





 


Urine Bacteria   TRACE    /HPF


 


Urine Casts   NONE    /LPF


 


Urine Mucus   NEGATIVE    /LPF


 


Urine Culture Indicated   YES    


 


Urine Opiates Screen   NEGATIVE   NEGATIVE  


 


Urine Oxycodone Screen   NEGATIVE   NEGATIVE  


 


Urine Methadone Screen   NEGATIVE   NEGATIVE  


 


Urine Propoxyphene Screen   NEGATIVE   NEGATIVE  


 


Urine Barbiturates Screen   NEGATIVE   NEGATIVE  


 


Ur Tricyclic Antidepressants


Screen 


 


 NEGATIVE 


 


 NEGATIVE  





 


Urine Phencyclidine Screen   NEGATIVE   NEGATIVE  


 


Urine Amphetamines Screen   POSITIVE H  NEGATIVE  


 


Urine Methamphetamines Screen   POSITIVE H  NEGATIVE  


 


Urine Benzodiazepines Screen   NEGATIVE   NEGATIVE  


 


Urine Cocaine Screen   NEGATIVE   NEGATIVE  


 


Urine Cannabinoids Screen   NEGATIVE   NEGATIVE  


 


Blood Gas Puncture Site    LEFT RADIAL   


 


Blood Gas Patient Temperature    34.4   


 


Arterial Blood pH    7.05 *L 7.37-7.43  


 


Arterial Blood Partial


Pressure CO2 


 


 


 11 *L


 35-45  MMHG





 


Arterial Blood Partial


Pressure O2 


 


 


 130 H


 79-93  MMHG





 


Arterial Blood HCO3    3 *L 23-27  MMOL/L


 


Arterial Blood Total CO2


 


 


 


 3.3 L


 21.0-31.0


MMOL/L


 


Arterial Blood Oxygen


Saturation 


 


 


 98 


   %





 


Arterial Blood Base Excess


 


 


 


 -26.3 L


 -2.5-2.5


MMOL/L


 


Trent Test    POSITIVE   


 


Blood Gas Ventilator Setting    NO   


 


Blood Gas Inspired Oxygen    2L O2   


 


Test


 2/10/20


04:20 2/10/20


04:23 


 


 Range/Units


 


 


Glucometer > 600 *H      MG/DL








Micro Results





Microbiology


2/10/20 Influenza Types A,B Antigen (KEYA) - Final, Complete


          





My Orders





Orders - SARAHI JOSEPH


Cbc With Automated Diff (2/10/20 02:07)


Comprehensive Metabolic Panel (2/10/20 02:07)


Blood Culture (2/10/20 02:07)


Sputum Culture (2/10/20 02:07)


Urinalysis (2/10/20 02:07)


Urine Culture (2/10/20 02:07)


Protime With Inr (2/10/20 02:07)


Partial Thromboplastin Time (2/10/20 02:07)


Chest 1 View, Ap/Pa Only (2/10/20 02:07)


Ed Iv/Invasive Line Start (2/10/20 02:07)


Ed Iv/Invasive Line Start (2/10/20 02:07)


Ekg Tracing (2/10/20 02:07)


Troponin I (2/10/20 02:07)


Vital Signs Adult Sepsis Patie Q15M (2/10/20 02:07)


Ondansetron Injection (Zofran Injectio (2/10/20 02:15)


O2 (2/10/20 02:07)


Remove Rings In Anticipation O (2/10/20 02:07)


Lactic Acid Analyzer (2/10/20 02:07)


Influenza A And B Antigens (2/10/20 02:07)


Ns Iv 1000 Ml (Sodium Chloride 0.9%) (2/10/20 02:07)


Cefepime Injection (Maxipime Injection) (2/10/20 02:15)


Ed Iv/Invasive Line Start (2/10/20 02:07)


Ns Iv 500 Ml (Sodium Chloride 0.9%) (2/10/20 02:07)


Ns Iv 1000 Ml (Sodium Chloride 0.9%) (2/10/20 02:07)


Creatine Kinase (2/10/20 02:07)


Ct Head/Cervical Spine Wo (2/10/20 02:07)


Catheter(Urinary) Insert & Ass 03,15 (2/10/20 02:29)


Manual Differential (2/10/20 02:19)


Lorazepam Injection (Ativan Injection) (2/10/20 02:45)


Fentanyl  Injection (Sublimaze Injection (2/10/20 02:45)


Arterial Blood Gas (2/10/20 02:52)


Drug Screen Stat (Urine) (2/10/20 03:02)


Alcohol (2/10/20 03:02)


Insulin (Regular) Human (Humulin R (Per (2/10/20 03:15)


Propofol Drip (Icu) (Diprivan Drip (Icu) (2/10/20 03:19)





Medications Given in ED





Current Medications








 Medications  Dose


 Ordered  Sig/Corin


 Route  Start Time


 Stop Time Status Last Admin


Dose Admin


 


 Cefepime HCl 1000


 mg/Sterile Water  10 ml @ 


 200 mls/hr  ONCE  ONCE


 IV  2/10/20 02:15


 2/10/20 02:17 DC 2/10/20 02:49


200 MLS/HR


 


 Fentanyl Citrate  50 mcg  ONCE  ONCE


 IVP  2/10/20 02:45


 2/10/20 02:46 DC 2/10/20 02:43


50 MCG


 


 Insulin Human


 Regular  10 unit  ONCE  ONCE


 IV  2/10/20 03:15


 2/10/20 03:16 DC 2/10/20 04:32


10 UNIT


 


 Lorazepam  0.5 mg  ONCE  ONCE


 IVP  2/10/20 02:45


 2/10/20 02:46 DC 2/10/20 02:36


0.5 MG


 


 Ondansetron HCl  4 mg  PRN  PRN


 IV  2/10/20 02:15


 2/10/20 02:22 DC 2/10/20 02:22


4 MG


 


 Propofol  100 ml @ ud  STK-MED ONCE


 IV  2/10/20 03:19


 2/10/20 03:24 DC 2/10/20 04:02


16 MLS/HR


 


 Sodium Chloride  500 ml @ 0


 mls/hr  Q0M ONCE


 IV  2/10/20 02:07


 2/10/20 02:12 DC 2/10/20 02:22


0 MLS/HR








Vital Signs/I&O











 2/10/20





 04:02


 


Pulse 130


 


Resp 16


 


B/P (MAP) 119/58


 


Pulse Ox 99


 


O2 Delivery Mechanical Ventilator


 


O2 Flow Rate 30.00





Capillary Refill :


Progress Note :  


   Time:  02:24


Progress Note


The patient is encephalopathic likely from diabetic ketoacidosis. We'll start 

with 30 mL/kg or 2500 cc. Once we get her electrolytes we can initiate insulin. 

Plan to get a CT of her head and neck case she had a fall and to rule out 

bleeds. We'll check alcohol and drug screen as well. Family denies that she uses

any recreational drugs or alcohol. Finally she is unable to answer so we'll 

obtain troponin and EKG. She is borderline needing intubation however if we can 

get some interventions and time she continued to vocalize and has good airway 

protection on her own. ABG





ECG


Initial ECG Impression Date:  Feb 10, 2020


Initial ECG Impression Time:  02:51


Initial ECG Rate:  127


Initial ECG Rhythm:  S.Tach


Initial ECG Intervals:  ND (244 ms)


Initial ECG Impression:  Normal, Nonspecific Changes


Comment


First-degree AV block with ND of 244 ms. No clinically relevant ST elevation or 

depression. Sinus tachycardia.





Diagnostic Imaging





   Diagonstic Imaging:  Xray


   Plain Films/CT/US/NM/MRI:  chest


Comments


ET tube is 1-1/2 cm above the cyrus with good position. Good parenchymal 

markings to the margins of the lung shadows. No evidence pneumothorax. No 

evidence of infiltrate or consolidation. No soft tissue acute findings. No acute

osseous findings.


   Reviewed:  Reviewed by Me








   Diagonstic Imaging:  CT (without IV contrast)


   Plain Films/CT/US/NM/MRI:  c-spine, head


Comments


CT head without intracranial hemorrhage, mass effect, tumor or midline shift. No

calvarial fracture.


CT C-spine without fracture, malalignment or subluxation.


   Reviewed:  Reviewed Night [facility] Study, Reviewed by Me





Departure


Communication (Admissions)


Time/Spoke to Admitting Phy:  04:05


Discussed the case lab imaging findings and intubation with Dr. Zavala and he 

agrees with ICU placement on DKA insulin drip protocol.





Impression





   Primary Impression:  


   Diabetic ketoacidosis


   Qualified Codes:  E11.11 - Type 2 diabetes mellitus with ketoacidosis with 

   coma


   Additional Impressions:  


   Methamphetamine abuse


   Acute respiratory failure requiring reintubation


Disposition:  09 ADMITTED AS INPATIENT


Condition:  Stable





Admissions


Decision to Admit Reason:  Admit from ER (General)


Decision to Admit/Date:  Feb 10, 2020


Time/Decision to Admit Time:  03:00





Departure-Patient Inst.


Referrals:  


NO,LOCAL PHYSICIAN (PCP/Family)


Primary Care Physician











SARAHI JOSEPH                 Feb 10, 2020 02:17

## 2020-02-10 NOTE — OCC THERAPY PROGRESS NOTE
Therapy Progress Note


Pt intubated/ sedated. OT to hold therapy on this day and complete eval/ treat 

when pt medically stable and able to participate in skilled therapy session.











BETTIE TORRES OTR                Feb 10, 2020 09:29

## 2020-02-10 NOTE — NUR
UNABLE TO SPEAK WITH THE PT, HOWEVER I DID GET A MED LIST FROM [facility] WHICH I WILL ATTACH 
TO THE PT'S CHART. USING THE MED LIST I COMPLETED THE MED REC AND WILL UPDATE IF/WHEN I CAN 
SPEAK WITH THE PT.

## 2020-02-10 NOTE — NUR
timeline note- central line placement

0550- 4 of versed administered per dr florence

0552- 5 ml propofol administered per dr florence

0600- central line placed by dr florence. stat chest xray ordered to confirm placement

## 2020-02-10 NOTE — DIAGNOSTIC IMAGING REPORT
EXAMINATION: Chest 1 view



INDICATION: Central line placement.



COMPARISON: Chest radiograph performed earlier the same date.



FINDINGS: 



There has been interval placement of a right internal jugular

central line with the tip overlying the mid SVC. Stable

configuration of the endotracheal tube and enteric tube.



Lung volumes are normal. Stable mildly prominent perihilar

interstitial markings. No focal consolidation. No large pleural

effusion or pneumothorax. The cardiac silhouette is stable. No

acute osseous abnormalities.



IMPRESSION: 



1. Interval placement of a right internal jugular central line

with the tip overlying the mid SVC. No pneumothorax. The

remainder of the chest is stable.



Dictated by: 



  Dictated on workstation # [id number]

## 2020-02-10 NOTE — NUR
Received dietary consult regarding pt's vent status. 



PMH: DVT; DM; GERD; pancreatitis; RA; CKD



Est. kcal needs: 0397-5219 kcal/kg

Est. Pro needs:  39-52 g Pro | 0.6-0.8 g Pro/kg (for renal pts)



If pt is to remain NPO for more than 3d, would recommend initiation of the following TF: 



Suplena 1.8 at a goal rate of 40ml/hr. Begin at 10ml/hr and increase by 10ml 16h as 
tolerated. Monitor gastric residuals for tolerance. 

At goal rate, provides 1728 kcal (27 kcal/kg); 43 g Pro (0.7 g Pro/kg); and 708ml free 
water. 

Flush with 75ml H2O q4h for hydration status. With flushes, provides 1158ml free water. 



Will continue to follow and reassess as pt needs and status change. 



NIKKI Lao MS, RD, LD

[id number]

C: [phone]

## 2020-02-10 NOTE — PHYSICAL THERAPY PROGRESS NOTE
Therapy Progress Note


Patient currently sedated and on mechanical ventilator.  PT to assess patient 

when medically stable and able to actively participate with skilled therapy.











HALIE SHERIDAN PT              Feb 10, 2020 09:58

## 2020-02-10 NOTE — PULMONARY CONSULTATION
History of Present Illness


History of Present Illness


Date Seen by Provider:  Feb 10, 2020


Time Seen by Provider:  06:06


Date of Admission





History of Present Illness


53yo found unresponsive around 5:00. suspect pt was unresponsive for 4-5hours 

prior to being found. While in the ED pt was found to have acute DKA and UDS is 

positive for methamphetamines. Pt was intubated in ED secondary to acute 

respiratory failure. Upon ICU admission pt is hypotensive and sedated on vent. 

Pt has limited IV access. Unable to obtain info from pt. Family is not currently

around.  I am consulted for ICU management.





Allergies and Home Medications


Allergies


Coded Allergies:  


     Sulfa (Sulfonamide Antibiotics) (Unverified  Allergy, Unknown, 6/25/15)


     codeine (Verified  Allergy, Unknown, TAKES ULTRAM AT HOME, 1/2/09)


     ketorolac (Verified  Allergy, Unknown, 11/19/08)


     tramadol (Verified  Allergy, Unknown, 12/17/11)





Home Medications


Albuterol Sulfate 1 Puff Puff, 2 PUFF IH TID PRN for SHORTNESS OF BREATH, 

(Reported)


Insulin Glargine,Hum.rec.anlog 100 Unit/1 Ml Insuln.pen, 20 UNITS SQ DAILY W/ 

BREAKFAST, (Reported)


Insulin Lispro 100 Unit/1 Ml Insuln.pen, 5 UNITS SQ TIDWM, (Reported)


Magnesium Chloride 64 Mg Tab, 2 TAB PO Q48H, (Reported)


Montelukast Sodium 10 Mg Tablet, 5 MG PO DAILY, (Reported)


   TAKES  OF A 10 MG TO EQUAL 5MG DAILY 


Niacinamide 500 Mg Tablet, 500 MG PO Q48H, (Reported)


Potassium Gluconate 99 Mg Tablet, 99 MG PO Q48H, (Reported)





Past Medical-Social-Family Hx


Patient Social History


Alcohol Use:  Regular Use


Recreational Drug Use:  Yes


Drug of Choice:  THC


Smoking Status:  Current Everyday Smoker


Type Used:  Cigarettes


2nd Hand Smoke Exposure:  No


Recent Foreign Travel:  No


Contact w/Someone Who Travel:  No


Recent Infectious Disease Expo:  No


Recent Hopitalizations:  No





Immunizations Up To Date


Tetanus Booster (TDap):  Unknown


Date of Pneumonia Vaccine:  Oct 1, 2012


Date of Influenza Vaccine:  Oct 1, 2012





Seasonal Allergies


Seasonal Allergies:  No





Past Medical History


Surgeries:  Yes (renal stents)


Adenoidectomy, Ear Surgery, Hysterectomy, Tonsillectomy


Respiratory:  Yes


Asthma, Pneumonia, Chronic Bronchitis


Cardiac:  Yes


Deep Vein Thrombosis


Neurological:  Yes


Neuropathy


Reproductive Disorders:  Yes


Female Reproductive Disorders:  Menstrual Problems


Genitourinary:  Yes


Renal Failure


Gastrointestinal:  Yes (gastroparesis)


Gastroesophageal Reflux, Pancreatitis, Ulcer


Musculoskeletal:  Yes


Arthritis, Rheumatoid Arthritis, Fractures


Endocrine:  Yes


Diabetes, Insulin dep


HEENT:  No


Tonsilitis


Loss of Vision:  Denies


Hearing Impairment:  Denies


Cancer:  No


Psychosocial:  Yes


Personality Disorder


Integumentary:  No


Blood Disorders:  No


Adverse Reaction/Blood Tranf:  No





Family Medical History





FH: thyroid cancer


  G8 SISTER


FHx: macular degeneration


  19 MOTHER


Glaucoma


  G8 BROTHER


Heart Disease, Cancer, Diabetes





Review of Systems


Time Seen by Provider:  06:11





Sepsis Event


Evaluation


Height, Weight, BMI


Height: 5'5.00"


Weight: 151lbs. 1.0oz. 68.319700rc; 32.00 BMI


Method:Stated





Exam


Exam





Vital Signs








  Date Time  Temp Pulse Resp B/P (MAP) Pulse Ox O2 Delivery O2 Flow Rate FiO2


 


2/10/20 05:58    80/54    


 


2/10/20 05:15 35.3 113 16 107/68 100 Mechanical Ventilator  


 


2/10/20 04:50     100 Nasal Cannula 2.00 100


 


2/10/20 04:02  130 16 119/58 99 Mechanical Ventilator 30.00 


 


2/10/20 03:18 34.4 113 30 135/67 (89) 100 Nasal Cannula  


 


2/10/20 02:08 34.4 130 30 135/67 (89) 99 Room Air  














I & O 


 


 2/10/20





 07:00


 


Intake Total 10 ml


 


Balance 10 ml








Height & Weight


Height: 5'5.00"


Weight: 151lbs. 1.0oz. 68.178383wq; 32.00 BMI


Method:Stated


General Appearance:  Chronically ill, Moderate Distress


HEENT:  PERRL/EOMI, TMs Normal, Normal ENT Inspection; No Moist Mucous Membranes

(exceedingly dry oral mucosa)


Neck:  Full Range of Motion, Normal Inspection


Respiratory:  Lungs Clear, Normal Breath Sounds, No Accessory Muscle Use, No 

Respiratory Distress


Cardiovascular:  Regular Rate, Rhythm, No Edema, Normal Peripheral Pulses


Capillary Refill:  Less Than 3 Seconds


Extremity:  Normal Capillary Refill, Normal Inspection, Non Tender, No Pedal 

Edema


Neurologic/Psychiatric:  Alert, No Motor/Sensory Deficits, Other (GCS 10)


Skin:  Normal Color, Warm/Dry


Lymphatic:  No Adenopathy





Results


Lab


Laboratory Tests


2/10/20 02:19











Assessment/Plan


Assessment/Plan


Acute respiratory failure


   -Intubated in ED


   -Repeat ABG 


Severe DKA 


   -DKA protocol 


   -Repeat labs pending 


   -Pt only received 10units in ED 


   -Will give 15 units reg insulin now then start insulin gtt 


   -Repeat Labs 


Hyperkalemia


   -DKA protocol 


Severe sepsis 


   -Start Zosyn and Vanco 


Hypotension 


   -Will place central line 


   -Give a 30cc/kg bolus of LR then start IVF per DKA protocol 


   -Start Levophed 


Sinus tach 


   -Monitor 


Methamphetamine use


   -education 


Acute renal failure 


   -IVF and monitor 


Metabolic acidosis 


   -IVF 


   -repeat LA 


Hyponatremia 


   -Monitor 


   -IVF NS 


Anemia


   -Monitor











JUAN ASHRAF DO              Feb 10, 2020 06:11

## 2020-02-10 NOTE — DIAGNOSTIC IMAGING REPORT
PROCEDURE: CT head and CT cervical spine without contrast.



TECHNIQUE: Multiple contiguous axial images were obtained through

the brain and cervical spine without the use of intravenous

contrast. Sagittal and coronal reformations through the cervical

spine were then performed. Auto Exposure Controls were utilized

during the CT exam to meet ALARA standards for radiation dose

reduction. 



INDICATION:  Altered mental status. Intubated.



COMPARISON: CT head on 01/21/2009



FINDINGS: 

CT head: The ventricles and cortical sulci are age-appropriate. 

There is no midline shift or mass-effect. No acute intracranial

hemorrhage is seen. There is no CT evidence of acute territorial

ischemia. No focal masses or collections are present.  



The calvarium is intact. The visualized paranasal sinuses are

clear.



CT cervical spine: No acute fracture or dislocation is seen in

the cervical spine. No focal osseous lesions. There is reversal

of the normal lordotic curvature of the cervical spine centered

at the C4 level. There is grade 1 anterolisthesis of C3 on C4.

Vertebral body heights are well-maintained. The craniocervical

junction is well-maintained. Moderate degenerative changes are

seen in the cervical spine with disc osteophyte complexes and

uncovertebral arthropathy. Soft tissues of the neck are

unremarkable. Endotracheal tube and enteric tube are noted.



IMPRESSION:  

1. No hemorrhage or focal intra-axial mass.  No CT evidence of

large acute territorial ischemia.  

2. No acute fracture or dislocation in the cervical spine.

3. Moderate degenerative changes in the cervical spine with grade

1 anterolisthesis of C3 on C4.



Agree with overnight report. 



 



Dictated by: 



  Dictated on workstation # [id number]

## 2020-02-10 NOTE — HISTORY & PHYSICAL-HOSPITALIST
History of Present Illness


HPI/Chief Complaint


Pt is a 54yoCF witha PMH of IDDMII, methamphetamine use, CKD who presented to 

the ER due to altered mental status. She is currently treated and sedated and 

unable to provide any review of systems.  Her mother is at bedside but is 

limited use in history other than that she is a type I diabetic and had been 

very thirsty lately..  All history is obtained from review of records.  

Reportedly she was found by her son on the ground unresponsive and moaning.  EMS

was called and she was found to have a temperature of 93F.  She was also found 

to have a blood sugar reading of high per their device.  On arrival to the ER evan broderick was found to be in severe DKA with blood sugars are greater than 1000 and a 

bicarbonate of less than 5.  She was found to be 7.05.  She was intubated in the

ER and admitted to the ICU for DKA protocol.


Source:  patient


Date Seen


2/10/20


Time Seen by a Provider:  08:00


Attending Physician


Inessa Zavala MD


PCP


No,Local Physician


Referring Physician





Date of Admission


Feb 10, 2020 at 04:00





Home Medications & Allergies


Home Medications


Reviewed patient Home Medication Reconciliation performed by pharmacy medication

reconciliations technician and/or nursing.


Patients Allergies have been reviewed.





Allergies





Allergies


Coded Allergies


  Sulfa (Sulfonamide Antibiotics) (Unverified Allergy, Unknown, 6/25/15)


  codeine (Verified Allergy, Unknown, TAKES ULTRAM AT HOME, 09)


  ketorolac (Verified Allergy, Unknown, 08)


  tramadol (Verified Allergy, Unknown, 11)








Past Medical-Social-Family Hx


Past Med/Social Hx:  Reviewed Nursing Past Med/Soc Hx


Patient Social History


Alcohol Use:  Regular Use


Recreational Drug Use:  Yes


Drug of Choice:  THC


Smoking Status:  Current Everyday Smoker


Type Used:  Cigarettes


2nd Hand Smoke Exposure:  No


Recent Foreign Travel:  No


Contact w/other who traveled:  No


Recent Hopitalizations:  No


Recent Infectious Disease Expo:  No





Immunizations Up To Date


Tetanus Booster (TDap):  Unknown


Date of Pneumonia Vaccine:  Oct 1, 2012


Date of Influenza Vaccine:  Oct 1, 2012





Seasonal Allergies


Seasonal Allergies:  No





Past Medical History


Surgeries:  Adenoidectomy, Ear Surgery, Hysterectomy, Tonsillectomy


Cardiac:  Deep Vein Thrombosis


Neurological:  Neuropathy


Reproductive:  Yes


Female Reproductive Disorders:  Menstrual Problems


Genitourinary:  Renal Failure


Gastrointestinal:  Gastroesophageal Reflux, Pancreatitis, Ulcer


Musculoskeletal:  Arthritis, Rheumatoid Arthritis, Fractures


Endocrine:  Diabetes, Insulin dep


HEENT:  Tonsilitis


Loss of Vision:  Denies


Hearing Impairment:  Denies


Psychosocial:  Personality Disorder


History of Blood Disorders:  No


Adverse Reaction to Blood Ramirez:  No





Family History





FH: thyroid cancer


  G8 SISTER


FHx: macular degeneration


  19 MOTHER


Glaucoma


  G8 BROTHER


Heart Disease, Cancer, Diabetes





Review of Systems


ROS-Unable to Obtain:  intuabted and sedated


Constitutional:  see HPI





Physical Exam


Physical Exam


Vital Signs





Vital Signs - First Documented








 2/10/20 2/10/20 2/10/20





 02:08 03:30 04:02


 


Temp 34.4  


 


Pulse 130  


 


Resp 30  


 


B/P (MAP) 135/67 (89)  


 


Pulse Ox 99  


 


O2 Delivery Room Air  


 


O2 Flow Rate   30.00


 


FiO2  30 





Capillary Refill : Less Than 3 Seconds


Height, Weight, BMI


Height: 5'5.00"


Weight: 151lbs. 1.0oz. 68.853350lq; 32.00 BMI


Method:Stated


General Appearance:  Moderate Distress, Other (intubated and sedated)


HEENT:  Other (ETT tube in place, PERRLA, dry mucus mebranes, OGT with dark 

contents)


Neck:  No Thyromegaly; Other (ETT in place)


Respiratory:  Lungs Clear, Other (on vent)


Cardiovascular:  Regular Rate, Rhythm, No Murmur


Gastrointestinal:  Normal Bowel Sounds, Non Tender, Soft


Extremity:  Swelling (LLE > RLE)


Neurologic/Psychiatric:  Other (sedated on vent)


Skin:  Normal Color, Warm/Dry





Results


Results/Procedures


Labs


Laboratory Tests


2/10/20 02:19








2/10/20 06:00








2/10/20 07:25








Patient resulted labs reviewed.


Imaging:  Reviewed Imaging Report


Imaging


Date of Exam:02/10/20





CT HEAD/CERVICAL SPINE WO








PROCEDURE: CT head and CT cervical spine without contrast.





TECHNIQUE: Multiple contiguous axial images were obtained through


the brain and cervical spine without the use of intravenous


contrast. Sagittal and coronal reformations through the cervical


spine were then performed. Auto Exposure Controls were utilized


during the CT exam to meet ALARA standards for radiation dose


reduction. 





INDICATION:  Altered mental status. Intubated.





COMPARISON: CT head on 2009





FINDINGS: 


CT head: The ventricles and cortical sulci are age-appropriate. 


There is no midline shift or mass-effect. No acute intracranial


hemorrhage is seen. There is no CT evidence of acute territorial


ischemia. No focal masses or collections are present.  





The calvarium is intact. The visualized paranasal sinuses are


clear.





CT cervical spine: No acute fracture or dislocation is seen in


the cervical spine. No focal osseous lesions. There is reversal


of the normal lordotic curvature of the cervical spine centered


at the C4 level. There is grade 1 anterolisthesis of C3 on C4.


Vertebral body heights are well-maintained. The craniocervical


junction is well-maintained. Moderate degenerative changes are


seen in the cervical spine with disc osteophyte complexes and


uncovertebral arthropathy. Soft tissues of the neck are


unremarkable. Endotracheal tube and enteric tube are noted.





IMPRESSION:  


1. No hemorrhage or focal intra-axial mass.  No CT evidence of


large acute territorial ischemia.  


2. No acute fracture or dislocation in the cervical spine.


3. Moderate degenerative changes in the cervical spine with grade


1 anterolisthesis of C3 on C4.





Assessment/Plan


Admission Diagnosis


DKA


Admission Status:  Inpatient Order (span 2 midnights)


Reason for Inpatient Admission:  


severe DKA, on vent, will take more than two midnights to stabilize for


discharge





Assessment and Plan


DKA


IDDMI


   Continue on insulin gtt


   s/p bicarb pushes x2


   LR for fluids given hypotension- transition to protocol fluids when HDS


   Check a1c


      last a1c from 2019 was 14.3


   Appears poor controlled as was just admitted at [facility] for DKA


   Q2 BMP





Hypovolemic shock


ADITI on CKD


   no evidence of sepsis


   Lactic elevation and leukocytosis due to DKA


   Continue LR


   Received 30cc/kg bolus in ER


   Monitor UOP





Acute Respiratory Failure


   On vent


   Management per Dr Andrade, appreciate recs





LLE edema


h/o of DVT


   USG ordered





Normocytic anemia


   OGT with dark content


   PPI started


   Consult Surgery, appreciate recs





Methamphetamine use


   Will discuss sedation when alert





Critical Care


Critically Ill Patient





Diagnosis/Problems


Diagnosis/Problems





(1) Acute respiratory failure requiring reintubation


Status:  Acute


(2) Methamphetamine abuse


Status:  Acute


(3) Diabetic ketoacidosis


Status:  Acute


Qualifiers:  


   Diabetes mellitus type:  type 2  Diabetes mellitus complication detail:  with

coma  Qualified Codes:  E11.11 - Type 2 diabetes mellitus with ketoacidosis with

coma


(4) Acute kidney injury superimposed on chronic kidney disease


Status:  Acute


(5) High anion gap metabolic acidosis


Status:  Acute


(6) Leukocytosis


Status:  Acute


(7) Lactic acidosis


Status:  Acute


(8) Dehydration


Status:  Acute


(9) Hypotension


Status:  Acute


(10) Altered mental status


Status:  Acute





Clinical Quality Measures


DVT/VTE Risk/Contraindication:


Risk Factor Score Per Nursin


RFS Level Per Nursing on Admit:  4+=Very High











MISHA DEVRIES MD              Feb 10, 2020 08:35

## 2020-02-10 NOTE — CONSULTATION - SURGERY
FARIDEH ARANGO [facility] 2/10/20 0915:


History of Present Illness


History of Present Illness


Patient Consulted On(denys/time)


2/10/20


 09:08


Date Seen by Provider:  Feb 10, 2020


Time Seen by Provider:  09:08


Reason for Visit:  hemoccult gastric content


History of Present Illness


This is a 53 y/o female w PMH of DM type I, chronic bronchitis, peptic ulcer dz,

GERD, Methamphetamine use, and CKD who presented to the ED w AMS and was 

admitted to ICU for DKA and severe sepsis. Pt has Acute respiratory failure and 

was intubated at the ED. She has tested positive for methamphetamines on UDS. 

Surgery was consulted to evaluate +hemoccult gastric content. Past abdominal 

surgical hx includes hysterectomy.  History of present illness is limited 2/2 

pt's clinical condition, All history is obtained from review of records.





Allergies and Home Medications


Allergies


Coded Allergies:  


     Sulfa (Sulfonamide Antibiotics) (Unverified  Allergy, Unknown, 6/25/15)


     codeine (Verified  Allergy, Unknown, TAKES ULTRAM AT HOME, 09)


     ketorolac (Verified  Allergy, Unknown, 08)


     tramadol (Verified  Allergy, Unknown, 11)





Home Medications


Albuterol Sulfate 1 Puff Puff, 2 PUFF IH TID PRN for SHORTNESS OF BREATH, 

(Reported)


Insulin Glargine,Hum.rec.anlog 100 Unit/1 Ml Insuln.pen, 20 UNITS SQ DAILY W/ 

BREAKFAST, (Reported)


Insulin Lispro 100 Unit/1 Ml Insuln.pen, 5 UNITS SQ TIDWM, (Reported)


Magnesium Chloride 64 Mg Tab, 2 TAB PO Q48H, (Reported)


Montelukast Sodium 10 Mg Tablet, 5 MG PO DAILY, (Reported)


   TAKES  OF A 10 MG TO EQUAL 5MG DAILY 


Niacinamide 500 Mg Tablet, 500 MG PO Q48H, (Reported)


Potassium Gluconate 99 Mg Tablet, 99 MG PO Q48H, (Reported)





Past Medical-Social-Family Hx


Patient Social History


Alcohol Use:  Regular Use


Recreational Drug Use:  Yes


Drug of Choice:  THC


Smoking Status:  Current Everyday Smoker


Type Used:  Cigarettes


2nd Hand Smoke Exposure:  No


Recent Foreign Travel:  No


Contact w/Someone Who Travel:  No


Recent Infectious Disease Expo:  No


Recent Hopitalizations:  No





Immunizations Up To Date


Tetanus Booster (TDap):  Unknown


Date of Pneumonia Vaccine:  Oct 1, 2012


Date of Influenza Vaccine:  Oct 1, 2012





Seasonal Allergies


Seasonal Allergies:  No





Surgeries


History of Surgeries:  Yes (renal stents)


Surgeries:  Adenoidectomy, Ear Surgery, Hysterectomy, Tonsillectomy





Respiratory


History of Respiratory Disorde:  Yes


Respiratory Disorders:  Asthma, Pneumonia, Chronic Bronchitis





Cardiovascular


History of Cardiac Disorders:  Yes


Cardiac Disorders:  Deep Vein Thrombosis





Neurological


History of Neurological Disord:  Yes


Neurological Disorders:  Neuropathy





Reproductive System


Hx Reproductive Disorders:  Yes


Female Reproductive Disorders:  Menstrual Problems





Genitourinary


History of Genitourinary Disor:  Yes


Genitourinary Disorders:  Renal Failure





Gastrointestinal


History of Gastrointestinal Di:  Yes (gastroparesis)


Gastrointestinal Disorders:  Gastroesophageal Reflux, Pancreatitis, Ulcer





Musculoskeletal


History of Musculoskeletal Dis:  Yes


Musculoskeletal Disorders:  Arthritis, Rheumatoid Arthritis, Fractures





Endocrine


History of Endocrine Disorders:  Yes


Endocrine Disorders:  Diabetes, Insulin dep





HEENT


History of HEENT Disorders:  No


HEENT Disorders:  Tonsilitis


Loss of Vision:  Denies


Hearing Impairment:  Denies





Cancer


History of Cancer:  No





Psychosocial


History of Psychiatric Problem:  Yes


Behavioral Health Disorders:  Personality Disorder





Integumentary


History of Skin or Integumenta:  No





Blood Transfusions


History of Blood Disorders:  No


Adverse Reaction to a Blood Tr:  No





Family Medical History


Significant Family History:  Heart Disease, Cancer, Diabetes


Family Medial History:  


FH: thyroid cancer


  G8 SISTER


FHx: macular degeneration


  19 MOTHER


Glaucoma


  G8 BROTHER





Review of Systems-General


ROS-Unable to Obtain:   clincial condition





Physical Exam-General Problems


Physical Exam


Vital Signs





Vital Signs - First Documented








 2/10/20 2/10/20 2/10/20





 02:08 03:30 04:02


 


Temp 34.4  


 


Pulse 130  


 


Resp 30  


 


B/P (MAP) 135/67 (89)  


 


Pulse Ox 99  


 


O2 Delivery Room Air  


 


O2 Flow Rate   30.00


 


FiO2  30 





Capillary Refill : Less Than 3 Seconds


General Appearance:  other (critically ill, Intubated, responds to pain)


Respiratory:  chest non-tender, respiratory distress


Cardiovascular:  tachycardia (sinus)


Gastrointestinal:  non tender, soft; No distended


Extremities:  normal inspection, no pedal edema


Neurologic/Psychiatric:  No alert (sedated with ET in place)


Skin:  pallor


Lymphatic:  no adenopathy





Data Review


Labs


Laboratory Tests


2/10/20 02:13: Glucometer > 600*H


2/10/20 02:19: 


White Blood Count 32.5*H, Red Blood Count 3.73L, Hemoglobin 12.1, Hematocrit 37,

Mean Corpuscular Volume 100H, Mean Corpuscular Hemoglobin 32, Mean Corpuscular 

Hemoglobin Concent 33, Red Cell Distribution Width 14.1, Platelet Count 326, 

Mean Platelet Volume 10.2, Neutrophils (%) (Auto) 80H, Lymphocytes (%) (Auto) 1

5, Monocytes (%) (Auto) 5, Eosinophils (%) (Auto) 0, Basophils (%) (Auto) 0, 

Neutrophils # (Auto) 25.9H, Lymphocytes # (Auto) 4.8H, Monocytes # (Auto) 1.7H, 

Eosinophils # (Auto) 0.0, Basophils # (Auto) 0.1, Neutrophils % (Manual) 72, 

Lymphocytes % (Manual) 11, Monocytes % (Manual) 5, Metamyelocytes % 2, Band 

Neutrophils 10, Blood Morphology Comment NORMAL, Prothrombin Time 14.0, INR 

Comment 1.0, Activated Partial Thromboplast Time 27, Sodium Level 123*L, 

Potassium Level 6.1H, Chloride Level 82L, Carbon Dioxide Level < 5*L, Anion Gap 

36H, Blood Urea Nitrogen 58H, Creatinine 3.05H, Estimat Glomerular Filtration 

Rate 16, BUN/Creatinine Ratio 19, Glucose Level 1040*H, Lactic Acid Level 6.69*H

, Calcium Level 9.6, Corrected Calcium 10.6H, Total Bilirubin 0.2, Aspartate 

Amino Transf (AST/SGOT) 23, Alanine Aminotransferase (ALT/SGPT) 39, Alkaline 

Phosphatase 130, Total Creatine Kinase 118, Troponin I < 0.028, Total Protein 

6.4, Albumin 2.8L, Serum Alcohol < 10


2/10/20 02:31: 


Urine Color YELLOW, Urine Clarity SL CLOUDY, Urine pH 5.0, Urine Specific 

Gravity >=1.030, Urine Protein 3+H, Urine Glucose (UA) 3+H, Urine Ketones 2+H, 

Urine Nitrite NEGATIVE, Urine Bilirubin NEGATIVE, Urine Urobilinogen 0.2, Urine 

Leukocyte Esterase NEGATIVE, Urine RBC (Auto) 1+H, Urine RBC 0-2, Urine WBC 0-2,

Urine Squamous Epithelial Cells 0-2, Urine Crystals PRESENTH, Urine Amorphous 

Sediment MOD NATALIE URATESH, Urine Bacteria TRACE, Urine Casts NONE, Urine Mucus 

NEGATIVE, Urine Culture Indicated YES, Urine Opiates Screen NEGATIVE, Urine 

Oxycodone Screen NEGATIVE, Urine Methadone Screen NEGATIVE, Urine Propoxyphene 

Screen NEGATIVE, Urine Barbiturates Screen NEGATIVE, Ur Tricyclic 

Antidepressants Screen NEGATIVE, Urine Phencyclidine Screen NEGATIVE, Urine 

Amphetamines Screen POSITIVEH, Urine Methamphetamines Screen POSITIVEH, Urine 

Benzodiazepines Screen NEGATIVE, Urine Cocaine Screen NEGATIVE, Urine 

Cannabinoids Screen NEGATIVE


2/10/20 02:53: 


Blood Gas Puncture Site LEFT RADIAL, Blood Gas Patient Temperature 34.4, 

Arterial Blood pH 7.05*L, Arterial Blood Partial Pressure CO2 11*L, Arterial 

Blood Partial Pressure O2 130H, Arterial Blood HCO3 3*L, Arterial Blood Total 

CO2 3.3L, Arterial Blood Oxygen Saturation 98, Arterial Blood Base Excess -26.3L

, Trent Test POSITIVE, Blood Gas Ventilator Setting NO, Blood Gas Inspired 

Oxygen 2L O2


2/10/20 04:20: Glucometer > 600*H


2/10/20 04:23: Lactic Acid Level 2.72*H


2/10/20 05:23: Glucometer > 600*H


2/10/20 06:00: 


White Blood Count 37.4*H, Red Blood Count 3.16L, Hemoglobin 10.0L, Hematocrit 

31L, Mean Corpuscular Volume 98, Mean Corpuscular Hemoglobin 32, Mean Co

rpuscular Hemoglobin Concent 33, Red Cell Distribution Width 14.0, Platelet 

Count 293, Mean Platelet Volume 10.0, Neutrophils (%) (Auto) 76H, Lymphocytes 

(%) (Auto) 18, Monocytes (%) (Auto) 5, Eosinophils (%) (Auto) 0, Basophils (%) 

(Auto) 0, Neutrophils # (Auto) 28.5H, Lymphocytes # (Auto) 6.9H, Monocytes # 

(Auto) 1.9H, Eosinophils # (Auto) 0.1, Basophils # (Auto) 0.1, Sodium Level 128L

, Potassium Level 5.0, Chloride Level 96L, Carbon Dioxide Level < 5*L, Anion Gap

27H, Blood Urea Nitrogen 56H, Creatinine 2.64#H, Estimat Glomerular Filtration 

Rate 19, BUN/Creatinine Ratio 21, Glucose Level 787*H, Calcium Level 7.6L, 

Corrected Calcium 9.0, Phosphorus Level 9.1H, Magnesium Level 2.3, Total 

Bilirubin 0.2, Aspartate Amino Transf (AST/SGOT) 21, Alanine Aminotransferase 

(ALT/SGPT) 32, Alkaline Phosphatase 105, Total Protein 5.0L, Albumin 2.2L, 

Triglycerides Level 927H, Beta-Hydroxybutyrate (Chem panel) 12.29H


2/10/20 06:19: 


Urine Color YELLOW, Urine Clarity CLEAR, Urine pH 5.0, Urine Specific Gravity 

>=1.030, Urine Protein 2+H, Urine Glucose (UA) 3+H, Urine Ketones 2+H, Urine 

Nitrite NEGATIVE, Urine Bilirubin NEGATIVE, Urine Urobilinogen 0.2, Urine 

Leukocyte Esterase NEGATIVE, Urine RBC (Auto) 1+H, Urine RBC 0-2, Urine WBC 0-2,

Urine Squamous Epithelial Cells 2-5, Urine Crystals PRESENTH, Urine Amorphous 

Sediment MOD NATALIE URATESH, Urine Bacteria TRACE, Urine Casts NONE, Urine Mucus 

NEGATIVE, Urine Culture Indicated NO


2/10/20 06:30: Glucometer > 600*H


2/10/20 07:00: 


Blood Gas Puncture Site L RADIAL, Blood Gas Patient Temperature 36.5, Arterial 

Blood pH 7.16*L, Arterial Blood Partial Pressure CO2 22L, Arterial Blood Partial

Pressure O2 95H, Arterial Blood HCO3 7*L, Arterial Blood Total CO2 8.0L, 

Arterial Blood Oxygen Saturation 97, Arterial Blood Base Excess -19.9L, Trent 

Test POSITIVE, Blood Gas Ventilator Setting YES, Blood Gas Inspired Oxygen 30% 

O2


2/10/20 07:25: 


White Blood Count 35.4*H, Red Blood Count 3.03L, Hemoglobin 9.9L, Hematocrit 29L

, Mean Corpuscular Volume 96, Mean Corpuscular Hemoglobin 33, Mean Corpuscular 

Hemoglobin Concent 34, Red Cell Distribution Width 14.0, Platelet Count 262, 

Mean Platelet Volume 9.8, Neutrophils (%) (Auto) 78H, Lymphocytes (%) (Auto) 17,

Monocytes (%) (Auto) 5, Eosinophils (%) (Auto) 0, Basophils (%) (Auto) 0, 

Neutrophils # (Auto) 27.5H, Lymphocytes # (Auto) 6.2H, Monocytes # (Auto) 1.7H, 

Eosinophils # (Auto) 0.1, Basophils # (Auto) 0.0, Sodium Level 128L, Potassium 

Level 4.3, Chloride Level 96L, Carbon Dioxide Level 7*L, Anion Gap 25H, Blood 

Urea Nitrogen 56H, Creatinine 2.59H, Estimat Glomerular Filtration Rate 19, 

BUN/Creatinine Ratio 22, Glucose Level 788*H, Lactic Acid Level 2.33*H, Calcium 

Level 7.9L, Corrected Calcium 9.4, Total Bilirubin 0.2, Aspartate Amino Transf 

(AST/SGOT) 23, Alanine Aminotransferase (ALT/SGPT) 31, Alkaline Phosphatase 95, 

Total Protein 4.9L, Albumin 2.1L


2/10/20 08:44: 


Blood Gas Puncture Site LR, Blood Gas Patient Temperature 36.7, Arterial Blood 

pH 7.36L, Arterial Blood Partial Pressure CO2 23L, Arterial Blood Partial 

Pressure O2 114H, Arterial Blood HCO3 13*L, Arterial Blood Total CO2 13.5L, 

Arterial Blood Oxygen Saturation 97, Arterial Blood Base Excess -11.7L, Trent T

est YES-POS, Blood Gas Ventilator Setting YES, Blood Gas Inspired Oxygen 30%





Microbiology


2/10/20 Influenza Types A,B Antigen (KEYA) - Final, Complete





Assessment/Plan


Severe DKA, metabolic acidosis, Managed by primary care team and Dr. Andrade





Acute respiratory failure, intubated, Managed by Dr. Andrade





Leukocytosis, On ABX, Managed by primary care team and Dr. Andrade





Hypernatremia, likely dilutional 2/2 hyperglycemia





Hypotension, central line in place, managed by Dr. Andrade


    


Acute on chronic renal failure





Anemia, +hemoccult gastric content, PPI, IV access, fluid resuscitation, type 

and cross, hx of GERD and PUD, endoscopic eval not recommended until more h

emodynamically stable





Clinical Quality Measures


DVT/VTE Risk/Contraindication:


Risk Factor Score Per Nursin


RFS Level Per Nursing on Admit:  4+=Very High





RIVERA CHAPMAN DO 2/10/20 1902:


History of Present Illness


Consult requested by Dr. Young for Anemia, coffee ground og tube drainage.





Patient is a 54 year old female type one diabetic.  She is intubated and sedated

at this time.  She had an OG place that returned coffee ground appearing gastric

content.  Patient requiring levophed.


Patient Hgb has had drop in hgb and was started on Protonix.  Patient not able 

to provide any information.





Allergies and Home Medications


Allergies


Coded Allergies:  


     Sulfa (Sulfonamide Antibiotics) (Unverified  Allergy, Unknown, 6/25/15)


     codeine (Verified  Allergy, Unknown, TAKES ULTRAM AT HOME, 09)


     ketorolac (Verified  Allergy, Unknown, 08)


     tramadol (Verified  Allergy, Unknown, 11)





Home Medications


Albuterol Sulfate 1 Puff Puff, 2 PUFF IH TID PRN for SHORTNESS OF BREATH, 

(Reported)


Insulin Glargine,Hum.rec.anlog 100 Unit/1 Ml Insuln.pen, 20 UNITS SQ DAILY W/ 

BREAKFAST, (Reported)


Insulin Lispro 100 Unit/1 Ml Insuln.pen, 5 UNITS SQ TIDWM, (Reported)


Magnesium Chloride 64 Mg Tab, 2 TAB PO Q48H, (Reported)


Montelukast Sodium 10 Mg Tablet, 5 MG PO DAILY, (Reported)


   TAKES  OF A 10 MG TO EQUAL 5MG DAILY 


Niacinamide 500 Mg Tablet, 500 MG PO Q48H, (Reported)


Potassium Gluconate 99 Mg Tablet, 99 MG PO Q48H, (Reported)





Patient Home Medication List


Home Medication List Reviewed:  Yes





Past Medical-Social-Family Hx


Family Medical History


Family Medial History:  


FH: thyroid cancer


  G8 SISTER


FHx: macular degeneration


  19 MOTHER


Glaucoma


  G8 BROTHER





Review of Systems-General


ROS-Unable to Obtain:  unable to provide secondary to being intubated.





Physical Exam-General Problems


Physical Exam


General Appearance:  other (critically ill, Intubated and sedated)


HEENT:  PERRL/EOMI, normal ENT inspection, TMs normal, pharynx normal


Neck:  normal inspection (intubated   contents of og tube coffeground 

appearance)


Respiratory:  respiratory distress (intubated/sedated)


Cardiovascular:  tachycardia (sinus)


Gastrointestinal:  non tender, soft; No distended


Rectal:  deferred


Neurologic/Psychiatric:  No alert, No normal mood/affect, No oriented x 3


Skin:  warm/dry, pallor


Lymphatic:  no adenopathy





Assessment/Plan


Severe DKA, metabolic acidosis, 


Acute respiratory failure, intubated


Leukocytosis


Hypernatremia,


Hypotension,     


Acute on chronic renal failure


Anemia,upper gi bleed with hx of ulcers,


PPI, IV access, fluid resuscitation, follow hgb and transfuse prbc as needed, hx

of GERD and PUD, 


no surgical intervention if continue to drop hgb would do egd, if stable 

continue conservative measures


will follow





Supervisory-Addendum Brief


Verification & Attestation


Participated in pt care:  history, MDM, physical


Personally performed:  exam, history, MDM, supervision of care


Care discussed with:  Medical Student


Procedures:  n/a


Results interpretation:  Verified all documentation


Verification and Attestation of Medical Student E/M Service





A medical student performed and documented this service in my presence. I 

reviewed and verified all information documented by the medical student and made

modifications to such information, when appropriate. I personally performed the 

physical exam and medical decision making. 





 Rivera Chapman, Feb 10, 2020,19:07











FARIDEH ARANGO [facility]  Feb 10, 2020 09:15


RIVERA CHAPMAN DO              Feb 10, 2020 19:02

## 2020-02-10 NOTE — DIAGNOSTIC IMAGING REPORT
PROCEDURE: US left lower extremity venous.



TECHNIQUE: Multiple real-time grayscale images were obtained over

the left lower extremity in various projections. Additional

duplex Doppler and color Doppler images were also obtained.



INDICATION: Pain and swelling.



FINDINGS: The left common femoral, superficial femoral, popliteal

veins and tibial veins demonstrate normal response to

compression, augmentation, and Valsalva. There are no abnormal

left lower extremity fluid collections or masses.



IMPRESSION: No evidence of deep venous thrombosis in the left

lower extremity.



Dictated by: 



  Dictated on workstation # [id number]

## 2020-02-10 NOTE — DIAGNOSTIC IMAGING REPORT
EXAMINATION: Chest 1 view



HISTORY: Intubation.



COMPARISON: 08/11/2019.



FINDINGS: Enteric tube and endotracheal tube are visualized in

appropriate configuration.



The lung volumes are normal. No focal consolidation is seen.

Mildly prominent perihilar interstitial markings are seen

bilaterally. No large pleural effusion or pneumothorax is seen.

The cardiomediastinal silhouette is normal in size and contour. 

There is calcified aortic atherosclerotic plaque. No acute

osseous abnormality is seen.



IMPRESSION: 



1. Mildly prominent perihilar interstitial markings, which may

represent edema or infection.

2. Appropriate configuration of the enteric tube and endotracheal

tube.



Dictated by: 



  Dictated on workstation # [id number]

## 2020-02-11 VITALS — SYSTOLIC BLOOD PRESSURE: 136 MMHG | DIASTOLIC BLOOD PRESSURE: 65 MMHG

## 2020-02-11 VITALS — DIASTOLIC BLOOD PRESSURE: 52 MMHG | SYSTOLIC BLOOD PRESSURE: 129 MMHG

## 2020-02-11 VITALS — SYSTOLIC BLOOD PRESSURE: 119 MMHG | DIASTOLIC BLOOD PRESSURE: 74 MMHG

## 2020-02-11 VITALS — SYSTOLIC BLOOD PRESSURE: 131 MMHG | DIASTOLIC BLOOD PRESSURE: 71 MMHG

## 2020-02-11 VITALS — DIASTOLIC BLOOD PRESSURE: 53 MMHG | SYSTOLIC BLOOD PRESSURE: 123 MMHG

## 2020-02-11 VITALS — DIASTOLIC BLOOD PRESSURE: 83 MMHG | SYSTOLIC BLOOD PRESSURE: 132 MMHG

## 2020-02-11 VITALS — SYSTOLIC BLOOD PRESSURE: 121 MMHG | DIASTOLIC BLOOD PRESSURE: 62 MMHG

## 2020-02-11 VITALS — DIASTOLIC BLOOD PRESSURE: 44 MMHG | SYSTOLIC BLOOD PRESSURE: 113 MMHG

## 2020-02-11 VITALS — SYSTOLIC BLOOD PRESSURE: 125 MMHG | DIASTOLIC BLOOD PRESSURE: 47 MMHG

## 2020-02-11 VITALS — SYSTOLIC BLOOD PRESSURE: 126 MMHG | DIASTOLIC BLOOD PRESSURE: 52 MMHG

## 2020-02-11 VITALS — DIASTOLIC BLOOD PRESSURE: 74 MMHG | SYSTOLIC BLOOD PRESSURE: 118 MMHG

## 2020-02-11 VITALS — DIASTOLIC BLOOD PRESSURE: 50 MMHG | SYSTOLIC BLOOD PRESSURE: 100 MMHG

## 2020-02-11 VITALS — DIASTOLIC BLOOD PRESSURE: 49 MMHG | SYSTOLIC BLOOD PRESSURE: 126 MMHG

## 2020-02-11 VITALS — DIASTOLIC BLOOD PRESSURE: 46 MMHG | SYSTOLIC BLOOD PRESSURE: 85 MMHG

## 2020-02-11 VITALS — SYSTOLIC BLOOD PRESSURE: 145 MMHG | DIASTOLIC BLOOD PRESSURE: 57 MMHG

## 2020-02-11 VITALS — DIASTOLIC BLOOD PRESSURE: 52 MMHG | SYSTOLIC BLOOD PRESSURE: 131 MMHG

## 2020-02-11 VITALS — DIASTOLIC BLOOD PRESSURE: 48 MMHG | SYSTOLIC BLOOD PRESSURE: 143 MMHG

## 2020-02-11 VITALS — SYSTOLIC BLOOD PRESSURE: 152 MMHG | DIASTOLIC BLOOD PRESSURE: 55 MMHG

## 2020-02-11 VITALS — SYSTOLIC BLOOD PRESSURE: 114 MMHG | DIASTOLIC BLOOD PRESSURE: 50 MMHG

## 2020-02-11 VITALS — SYSTOLIC BLOOD PRESSURE: 101 MMHG | DIASTOLIC BLOOD PRESSURE: 42 MMHG

## 2020-02-11 VITALS — DIASTOLIC BLOOD PRESSURE: 63 MMHG | SYSTOLIC BLOOD PRESSURE: 169 MMHG

## 2020-02-11 VITALS — DIASTOLIC BLOOD PRESSURE: 52 MMHG | SYSTOLIC BLOOD PRESSURE: 125 MMHG

## 2020-02-11 VITALS — DIASTOLIC BLOOD PRESSURE: 72 MMHG | SYSTOLIC BLOOD PRESSURE: 119 MMHG

## 2020-02-11 VITALS — DIASTOLIC BLOOD PRESSURE: 51 MMHG | SYSTOLIC BLOOD PRESSURE: 96 MMHG

## 2020-02-11 VITALS — DIASTOLIC BLOOD PRESSURE: 50 MMHG | SYSTOLIC BLOOD PRESSURE: 103 MMHG

## 2020-02-11 LAB
ALBUMIN SERPL-MCNC: 1.9 GM/DL (ref 3.2–4.5)
ALBUMIN SERPL-MCNC: 2.1 GM/DL (ref 3.2–4.5)
ALP SERPL-CCNC: 68 U/L (ref 40–136)
ALP SERPL-CCNC: 79 U/L (ref 40–136)
ALT SERPL-CCNC: 29 U/L (ref 0–55)
ALT SERPL-CCNC: 29 U/L (ref 0–55)
AMORPH SED URNS QL MICRO: (no result) /LPF
APTT PPP: YELLOW S
ARTERIAL PATENCY WRIST A: (no result)
BACTERIA #/AREA URNS HPF: (no result) /HPF
BASE EXCESS STD BLDA CALC-SCNC: -3 MMOL/L (ref -2.5–2.5)
BASOPHILS # BLD AUTO: 0 10^3/UL (ref 0–0.1)
BASOPHILS NFR BLD AUTO: 0 % (ref 0–10)
BDY SITE: (no result)
BILIRUB SERPL-MCNC: 0.2 MG/DL (ref 0.1–1)
BILIRUB SERPL-MCNC: 0.3 MG/DL (ref 0.1–1)
BILIRUB UR QL STRIP: (no result)
BODY TEMPERATURE: 36.9
BUN/CREAT SERPL: 19
BUN/CREAT SERPL: 20
CALCIUM SERPL-MCNC: 7.4 MG/DL (ref 8.5–10.1)
CALCIUM SERPL-MCNC: 7.6 MG/DL (ref 8.5–10.1)
CHLORIDE SERPL-SCNC: 106 MMOL/L (ref 98–107)
CHLORIDE SERPL-SCNC: 108 MMOL/L (ref 98–107)
CO2 BLDA CALC-SCNC: 22 MMOL/L (ref 21–31)
CO2 SERPL-SCNC: 18 MMOL/L (ref 21–32)
CO2 SERPL-SCNC: 19 MMOL/L (ref 21–32)
CREAT SERPL-MCNC: 2.16 MG/DL (ref 0.6–1.3)
CREAT SERPL-MCNC: 2.4 MG/DL (ref 0.6–1.3)
EOSINOPHIL # BLD AUTO: 0.1 10^3/UL (ref 0–0.3)
EOSINOPHIL NFR BLD AUTO: 0 % (ref 0–10)
ERYTHROCYTE [DISTWIDTH] IN BLOOD BY AUTOMATED COUNT: 14.4 % (ref 10–14.5)
FIBRINOGEN PPP-MCNC: CLEAR MG/DL
GFR SERPLBLD BASED ON 1.73 SQ M-ARVRAT: 21 ML/MIN
GFR SERPLBLD BASED ON 1.73 SQ M-ARVRAT: 24 ML/MIN
GLUCOSE SERPL-MCNC: 179 MG/DL (ref 70–105)
GLUCOSE SERPL-MCNC: 290 MG/DL (ref 70–105)
GLUCOSE UR STRIP-MCNC: NEGATIVE MG/DL
HCT VFR BLD CALC: 30 % (ref 35–52)
HGB BLD-MCNC: 10.4 G/DL (ref 11.5–16)
INHALED O2 FLOW RATE: (no result) L/MIN
KETONES UR QL STRIP: (no result)
LEUKOCYTE ESTERASE UR QL STRIP: NEGATIVE
LYMPHOCYTES # BLD AUTO: 2.5 X 10^3 (ref 1–4)
LYMPHOCYTES NFR BLD AUTO: 19 % (ref 12–44)
MAGNESIUM SERPL-MCNC: 1.6 MG/DL (ref 1.6–2.4)
MAGNESIUM SERPL-MCNC: 1.8 MG/DL (ref 1.6–2.4)
MANUAL DIFFERENTIAL PERFORMED BLD QL: NO
MCH RBC QN AUTO: 32 PG (ref 25–34)
MCHC RBC AUTO-ENTMCNC: 35 G/DL (ref 32–36)
MCV RBC AUTO: 91 FL (ref 80–99)
MONOCYTES # BLD AUTO: 0.7 X 10^3 (ref 0–1)
MONOCYTES NFR BLD AUTO: 5 % (ref 0–12)
NEUTROPHILS # BLD AUTO: 10.1 X 10^3 (ref 1.8–7.8)
NEUTROPHILS NFR BLD AUTO: 76 % (ref 42–75)
NITRITE UR QL STRIP: NEGATIVE
PCO2 BLDA: 34 MMHG (ref 35–45)
PH BLDA: 7.4 [PH] (ref 7.37–7.43)
PH UR STRIP: 6 [PH] (ref 5–9)
PHOSPHATE SERPL-MCNC: 2 MG/DL (ref 2.3–4.7)
PHOSPHATE SERPL-MCNC: 2.7 MG/DL (ref 2.3–4.7)
PLATELET # BLD: 216 10^3/UL (ref 130–400)
PMV BLD AUTO: 9.6 FL (ref 7.4–10.4)
PO2 BLDA: 87 MMHG (ref 79–93)
POTASSIUM SERPL-SCNC: 3.5 MMOL/L (ref 3.6–5)
POTASSIUM SERPL-SCNC: 4.4 MMOL/L (ref 3.6–5)
PROT SERPL-MCNC: 4.5 GM/DL (ref 6.4–8.2)
PROT SERPL-MCNC: 4.9 GM/DL (ref 6.4–8.2)
PROT UR QL STRIP: (no result)
RBC #/AREA URNS HPF: (no result) /HPF
SAO2 % BLDA FROM PO2: 96 % (ref 94–100)
SODIUM SERPL-SCNC: 134 MMOL/L (ref 135–145)
SODIUM SERPL-SCNC: 135 MMOL/L (ref 135–145)
SP GR UR STRIP: 1.02 (ref 1.02–1.02)
SQUAMOUS #/AREA URNS HPF: (no result) /HPF
VENTILATION MODE VENT: NO
WBC # BLD AUTO: 13.3 10^3/UL (ref 4.3–11)
WBC #/AREA URNS HPF: (no result) /HPF

## 2020-02-11 RX ADMIN — SODIUM CHLORIDE, SODIUM LACTATE, POTASSIUM CHLORIDE, AND CALCIUM CHLORIDE SCH MLS/HR: 600; 310; 30; 20 INJECTION, SOLUTION INTRAVENOUS at 02:29

## 2020-02-11 RX ADMIN — MAGNESIUM SULFATE IN DEXTROSE SCH MLS/HR: 10 INJECTION, SOLUTION INTRAVENOUS at 18:45

## 2020-02-11 RX ADMIN — MONTELUKAST SODIUM SCH MG: 5 TABLET, CHEWABLE ORAL at 19:57

## 2020-02-11 RX ADMIN — IPRATROPIUM BROMIDE AND ALBUTEROL SULFATE SCH ML: .5; 3 SOLUTION RESPIRATORY (INHALATION) at 18:02

## 2020-02-11 RX ADMIN — SODIUM CHLORIDE SCH MLS/HR: 900 INJECTION, SOLUTION INTRAVENOUS at 12:07

## 2020-02-11 RX ADMIN — PANTOPRAZOLE SODIUM SCH MG: 40 INJECTION, POWDER, FOR SOLUTION INTRAVENOUS at 09:56

## 2020-02-11 RX ADMIN — SODIUM CHLORIDE SCH MLS/HR: 900 INJECTION, SOLUTION INTRAVENOUS at 05:10

## 2020-02-11 RX ADMIN — SODIUM CHLORIDE SCH MLS/HR: 900 INJECTION, SOLUTION INTRAVENOUS at 19:57

## 2020-02-11 RX ADMIN — IPRATROPIUM BROMIDE AND ALBUTEROL SULFATE SCH ML: .5; 3 SOLUTION RESPIRATORY (INHALATION) at 10:35

## 2020-02-11 RX ADMIN — HYDROXYZINE HYDROCHLORIDE PRN MG: 10 TABLET ORAL at 12:47

## 2020-02-11 RX ADMIN — Medication SCH MLS/HR: at 05:00

## 2020-02-11 RX ADMIN — SODIUM CHLORIDE, SODIUM LACTATE, POTASSIUM CHLORIDE, AND CALCIUM CHLORIDE SCH MLS/HR: 600; 310; 30; 20 INJECTION, SOLUTION INTRAVENOUS at 18:44

## 2020-02-11 RX ADMIN — IPRATROPIUM BROMIDE AND ALBUTEROL SULFATE SCH ML: .5; 3 SOLUTION RESPIRATORY (INHALATION) at 06:49

## 2020-02-11 RX ADMIN — SODIUM CHLORIDE, SODIUM LACTATE, POTASSIUM CHLORIDE, AND CALCIUM CHLORIDE SCH MLS/HR: 600; 310; 30; 20 INJECTION, SOLUTION INTRAVENOUS at 14:40

## 2020-02-11 RX ADMIN — INSULIN ASPART SCH UNIT: 100 INJECTION, SOLUTION INTRAVENOUS; SUBCUTANEOUS at 16:15

## 2020-02-11 RX ADMIN — ACETAMINOPHEN PRN MG: 325 TABLET ORAL at 16:04

## 2020-02-11 RX ADMIN — PANTOPRAZOLE SODIUM SCH MG: 40 INJECTION, POWDER, FOR SOLUTION INTRAVENOUS at 19:57

## 2020-02-11 RX ADMIN — INSULIN ASPART SCH UNIT: 100 INJECTION, SOLUTION INTRAVENOUS; SUBCUTANEOUS at 09:56

## 2020-02-11 RX ADMIN — INSULIN ASPART SCH UNIT: 100 INJECTION, SOLUTION INTRAVENOUS; SUBCUTANEOUS at 23:19

## 2020-02-11 RX ADMIN — SODIUM CHLORIDE, SODIUM LACTATE, POTASSIUM CHLORIDE, AND CALCIUM CHLORIDE SCH MLS/HR: 600; 310; 30; 20 INJECTION, SOLUTION INTRAVENOUS at 22:15

## 2020-02-11 RX ADMIN — MAGNESIUM SULFATE IN DEXTROSE SCH MLS/HR: 10 INJECTION, SOLUTION INTRAVENOUS at 15:47

## 2020-02-11 RX ADMIN — Medication SCH MLS/HR: at 10:41

## 2020-02-11 RX ADMIN — IPRATROPIUM BROMIDE AND ALBUTEROL SULFATE SCH ML: .5; 3 SOLUTION RESPIRATORY (INHALATION) at 21:26

## 2020-02-11 RX ADMIN — SODIUM CHLORIDE, SODIUM LACTATE, POTASSIUM CHLORIDE, AND CALCIUM CHLORIDE SCH MLS/HR: 600; 310; 30; 20 INJECTION, SOLUTION INTRAVENOUS at 06:24

## 2020-02-11 RX ADMIN — INSULIN ASPART SCH UNIT: 100 INJECTION, SOLUTION INTRAVENOUS; SUBCUTANEOUS at 05:00

## 2020-02-11 RX ADMIN — SODIUM CHLORIDE, SODIUM LACTATE, POTASSIUM CHLORIDE, AND CALCIUM CHLORIDE SCH MLS/HR: 600; 310; 30; 20 INJECTION, SOLUTION INTRAVENOUS at 10:40

## 2020-02-11 RX ADMIN — Medication SCH MLS/HR: at 00:28

## 2020-02-11 RX ADMIN — DICLOFENAC SCH GM: 10 GEL TOPICAL at 19:57

## 2020-02-11 RX ADMIN — ENOXAPARIN SODIUM SCH MG: 30 INJECTION SUBCUTANEOUS at 09:56

## 2020-02-11 RX ADMIN — IPRATROPIUM BROMIDE AND ALBUTEROL SULFATE SCH ML: .5; 3 SOLUTION RESPIRATORY (INHALATION) at 14:18

## 2020-02-11 RX ADMIN — INSULIN ASPART SCH UNIT: 100 INJECTION, SOLUTION INTRAVENOUS; SUBCUTANEOUS at 00:28

## 2020-02-11 RX ADMIN — IPRATROPIUM BROMIDE AND ALBUTEROL SULFATE SCH ML: .5; 3 SOLUTION RESPIRATORY (INHALATION) at 02:03

## 2020-02-11 RX ADMIN — INSULIN ASPART SCH UNIT: 100 INJECTION, SOLUTION INTRAVENOUS; SUBCUTANEOUS at 19:56

## 2020-02-11 RX ADMIN — INSULIN ASPART SCH UNIT: 100 INJECTION, SOLUTION INTRAVENOUS; SUBCUTANEOUS at 12:07

## 2020-02-11 RX ADMIN — MAGNESIUM SULFATE IN DEXTROSE SCH MLS/HR: 10 INJECTION, SOLUTION INTRAVENOUS at 17:38

## 2020-02-11 NOTE — PULMONARY PROGRESS NOTE
Subjective


Time Seen by a Provider:  05:23


Subjective/Events-last exam


Sedated on vent





Sepsis Event


Evaluation


Height, Weight, BMI


Height: 5'5.00"


Weight: 151lbs. 1.0oz. [id number]; 32.00 BMI


Method:Stated





Focused Exam


Lactate Level


2/10/20 04:23: Lactic Acid Level 2.72*H


2/10/20 07:25: Lactic Acid Level 2.33*H


2/10/20 11:03: Lactic Acid Level 1.78





Exam


Exam





Vital Signs








  Date Time  Temp Pulse Resp B/P (MAP) Pulse Ox O2 Delivery O2 Flow Rate FiO2


 


2/11/20 04:00     95 Mechanical Ventilator  21


 


2/11/20 02:29 37.74779 92 16 125/52 97 Mechanical Ventilator 2.00 


 


2/11/20 02:04  92 16  97   21


 


2/11/20 01:00  98      


 


2/11/20 01:00 36.8 98 15 129/52 (77) 97 Mechanical Ventilator 21.00 


 


2/11/20 00:00 37.0 105 16 152/55 (87) 97 Mechanical Ventilator 21.00 


 


2/11/20 00:00     100 Mechanical Ventilator  21


 


2/10/20 23:00 37.1 103 16 152/53 (86) 97 Mechanical Ventilator 21.00 


 


2/10/20 22:00 37.2 99 16 133/47 (75) 96 Mechanical Ventilator 21.00 


 


2/10/20 21:39  98 15  100   21


 


2/10/20 21:00 37.3 102 16 169/53 (91) 100 Mechanical Ventilator 21.00 


 


2/10/20 20:00     100 Mechanical Ventilator  21


 


2/10/20 20:00 37.4 108 15 135/50 (78) 100 Mechanical Ventilator 21.00 


 


2/10/20 19:26 37.4 103 16 138/49 (78) 100 Mechanical Ventilator 21.00 


 


2/10/20 19:00 37.5 112 20 156/53 (87) 100 Mechanical Ventilator 21.00 


 


2/10/20 19:00  112      


 


2/10/20 18:50  115  88/38    


 


2/10/20 18:09  96 16  100   21


 


2/10/20 18:00  99 19 97/44 (61) 100 Mechanical Ventilator 30.00 


 


2/10/20 17:00  101 22 100/42 (61) 100 Mechanical Ventilator 30.00 


 


2/10/20 16:00  105 22 108/44 (65) 100 Mechanical Ventilator 30.00 


 


2/10/20 16:00     100 Mechanical Ventilator  21


 


2/10/20 15:00  105 23 114/48 (70) 100 Mechanical Ventilator 30.00 


 


2/10/20 14:00  102 18 112/48 (69) 100 Mechanical Ventilator 30.00 


 


2/10/20 13:38  101 19  100   21


 


2/10/20 13:00  103 21 98/47 (64) 100 Mechanical Ventilator 30.00 


 


2/10/20 12:40  103      


 


2/10/20 12:30     100 Mechanical Ventilator  21


 


2/10/20 12:00  105 19 105/48 (67) 100 Mechanical Ventilator 30.00 


 


2/10/20 11:10 37.2       


 


2/10/20 11:00  109 24 104/48 (66) 100 Mechanical Ventilator 30.00 


 


2/10/20 10:00  117 22 129/48 (75) 100 Mechanical Ventilator 30.00 


 


2/10/20 09:42  112 22  100   30


 


2/10/20 09:28  125      


 


2/10/20 09:00  113 24 103/48 (66) 100 Mechanical Ventilator 30.00 


 


2/10/20 08:11  121  123/106    


 


2/10/20 08:00 36.6 130 23 123/106 (112) 100 Mechanical Ventilator 30.00 


 


2/10/20 07:38     100 Mechanical Ventilator  30


 


2/10/20 07:30     100 Mechanical Ventilator  30


 


2/10/20 07:25 36.5       


 


2/10/20 07:06  125 29  100   30


 


2/10/20 07:00 36.4 126 29 81/46 (58) 100 Mechanical Ventilator 30.00 


 


2/10/20 06:45 36.5 125 24 76/45 (55) 100 Mechanical Ventilator 30.00 


 


2/10/20 06:30 36.5 125 26 71/44 (53) 100 Mechanical Ventilator 30.00 


 


2/10/20 06:15 36.4 122 23 67/41 (50) 98 Mechanical Ventilator 30.00 


 


2/10/20 06:00 36.4 126 22 81/46 (58) 99 Mechanical Ventilator 30.00 


 


2/10/20 05:58    80/54    


 


2/10/20 05:45 36.3 128 25 80/54 (63) 97 Mechanical Ventilator 30.00 


 


2/10/20 05:30 35.9 129 24 75/43 (54)  Mechanical Ventilator 30.00 














I & O 


 


 2/11/20





 07:00


 


Intake Total 6952.5 ml


 


Output Total 560 ml


 


Balance 6392.5 ml








Height & Weight


Height: 5'5.00"


Weight: 151lbs. 1.0oz. 68.382978zb; 32.00 BMI


Method:Stated


General Appearance:  Moderate Distress, Other (intubated and sedated)


HEENT:  Other (ETT tube in place, PERRLA, dry mucus mebranes, OGT with dark 

contents)


Neck:  No Thyromegaly; Other (ETT in place)


Respiratory:  Lungs Clear, Other (on vent)


Cardiovascular:  Regular Rate, Rhythm, No Murmur


Capillary Refill:  Less Than 3 Seconds


Gastrointestinal:  non tender, soft; No distended


Extremity:  Swelling (LLE > RLE)


Neurologic/Psychiatric:  Other (sedated on vent)


Skin:  Normal Color, Warm/Dry


Lymphatic:  No Adenopathy





Results


Lab


Laboratory Tests


2/10/20 02:19








2/10/20 06:00








2/10/20 07:25








2/10/20 11:03








2/10/20 13:30








2/10/20 16:20








2/10/20 21:45








2/11/20 03:00











Assessment/Plan


Assessment/Plan


Acute respiratory failure


   -currently on vent 


      -Wean vent


      -D/C propofol 


s/p Severe DKA 


   -DKA protocol - d/c'd 


Severe sepsis 


   -Continue Zosyn and Vanco 


Hypotension -improved 


   -monitor 


Sinus tach 


   -Monitor 


Methamphetamine use


   -education 


Acute renal failure 


   -IVF and monitor 


Metabolic acidosis 


   -IVF 


   -repeat LA 


Hyponatremia 


   -Monitor 


   -IVF NS 


Anemia











JUAN ASHRAF DO              Feb 11, 2020 05:28

## 2020-02-11 NOTE — PROGRESS NOTE - SURGERY
Subjective


Date Seen by a Provider:  2020


Time Seen by a Provider:  07:49


Subjective/Events-last exam


Sedated on vent








Patient has been extubated.  Having mild discomfort epigastric and into pain.  

Tolerating diet.  Hgb increased.


No other complaints at this time. Denies n/v fever sweats chills shortness of 

breath or chest pain.


Review of Systems


Sedated on vent





Focused Exam


Lactate Level


2/10/20 04:23: Lactic Acid Level 2.72*H


2/10/20 07:25: Lactic Acid Level 2.33*H


2/10/20 11:03: Lactic Acid Level 1.78





Objective


Exam





Vital Signs








  Date Time  Temp Pulse Resp B/P (MAP) Pulse Ox O2 Delivery O2 Flow Rate FiO2


 


20 07:15      Nasal Cannula 2.00 


 


20 06:49  121 29  96   21


 


20 06:00 36.9 91 16 136/65 (88) 97 Mechanical Ventilator 21.00 


 


20 05:00 36.9 89 16 123/53 (76) 97 Mechanical Ventilator 21.00 


 


20 04:00     95 Mechanical Ventilator  21


 


20 04:00 36.9 90 17 145/57 (86) 97 Mechanical Ventilator 21.00 


 


20 03:00 36.9 93 16 103/50 (67) 97 Mechanical Ventilator 21.00 


 


20 02:29 37.32525 92 16 125/52 97 Mechanical Ventilator 2.00 


 


20 02:04  92 16  97   21


 


20 02:00 36.9 91 15 126/52 (76) 97 Mechanical Ventilator 21.00 


 


20 01:00  98      


 


20 01:00 36.8 98 15 129/52 (77) 97 Mechanical Ventilator 21.00 


 


20 00:00 37.0 105 16 152/55 (87) 97 Mechanical Ventilator 21.00 


 


20 00:00     100 Mechanical Ventilator  21


 


2/10/20 23:00 37.1 103 16 152/53 (86) 97 Mechanical Ventilator 21.00 


 


2/10/20 22:00 37.2 99 16 133/47 (75) 96 Mechanical Ventilator 21.00 


 


2/10/20 21:39  98 15  100   21


 


2/10/20 21:00 37.3 102 16 169/53 (91) 100 Mechanical Ventilator 21.00 


 


2/10/20 20:00     100 Mechanical Ventilator  21


 


2/10/20 20:00 37.4 108 15 135/50 (78) 100 Mechanical Ventilator 21.00 


 


2/10/20 19:26 37.4 103 16 138/49 (78) 100 Mechanical Ventilator 21.00 


 


2/10/20 19:00 37.5 112 20 156/53 (87) 100 Mechanical Ventilator 21.00 


 


2/10/20 19:00  112      


 


2/10/20 18:50  115  88/38    


 


2/10/20 18:09  96 16  100   21


 


2/10/20 18:00  99 19 97/44 (61) 100 Mechanical Ventilator 30.00 


 


2/10/20 17:00  101 22 100/42 (61) 100 Mechanical Ventilator 30.00 


 


2/10/20 16:00  105 22 108/44 (65) 100 Mechanical Ventilator 30.00 


 


2/10/20 16:00     100 Mechanical Ventilator  21


 


2/10/20 15:00  105 23 114/48 (70) 100 Mechanical Ventilator 30.00 


 


2/10/20 14:00  102 18 112/48 (69) 100 Mechanical Ventilator 30.00 


 


2/10/20 13:38  101 19  100   21


 


2/10/20 13:00  103 21 98/47 (64) 100 Mechanical Ventilator 30.00 


 


2/10/20 12:40  103      


 


2/10/20 12:30     100 Mechanical Ventilator  21


 


2/10/20 12:00  105 19 105/48 (67) 100 Mechanical Ventilator 30.00 


 


2/10/20 11:10 37.2       


 


2/10/20 11:00  109 24 104/48 (66) 100 Mechanical Ventilator 30.00 


 


2/10/20 10:00  117 22 129/48 (75) 100 Mechanical Ventilator 30.00 


 


2/10/20 09:42  112 22  100   30


 


2/10/20 09:28  125      


 


2/10/20 09:00  113 24 103/48 (66) 100 Mechanical Ventilator 30.00 


 


2/10/20 08:11  121  123/106    


 


2/10/20 08:00 36.6 130 23 123/106 (112) 100 Mechanical Ventilator 30.00 














I & O 


 


 20





 07:00


 


Intake Total 6952.5 ml


 


Output Total 685 ml


 


Balance 6267.5 ml





Capillary Refill : Less Than 3 Seconds


General Appearance:  No Apparent Distress


HEENT:  PERRL/EOMI


Neck:  Carotid Bruit; No Thyromegaly


Respiratory:  Chest Non Tender, No Accessory Muscle Use, No Respiratory Distress


Cardiovascular:  Tachycardia


Gastrointestinal:  non tender, soft, no organomegaly; No distended


Extremity:  Swelling (LLE > RLE)


Neurologic/Psychiatric:  Alert, Oriented x3, No Motor/Sensory Deficits, Normal 

Mood/Affect, CNs II-XII Norm as Tested


Skin:  Normal Color, Warm/Dry


Lymphatic:  No Adenopathy





Results


Lab


Laboratory Tests


2/10/20 08:44: 


Blood Gas Puncture Site LR, Blood Gas Patient Temperature 36.7, Arterial Blood 

pH 7.36L, Arterial Blood Partial Pressure CO2 23L, Arterial Blood Partial 

Pressure O2 114H, Arterial Blood HCO3 13*L, Arterial Blood Total CO2 13.5L, 

Arterial Blood Oxygen Saturation 97, Arterial Blood Base Excess -11.7L, Trent 

Test YES-POS, Blood Gas Ventilator Setting YES, Blood Gas Inspired Oxygen 30%


2/10/20 09:59: Glucometer 569*H


2/10/20 11:03: 


Sodium Level 132L, Potassium Level 4.3, Chloride Level 98, Carbon Dioxide Level 

15L, Anion Gap 19H, Blood Urea Nitrogen 54H, Creatinine 2.54H, Estimat 

Glomerular Filtration Rate 20, BUN/Creatinine Ratio 21, Glucose Level 524*H, 

Lactic Acid Level 1.78, Calcium Level 7.5L, Corrected Calcium 9.1, Phosphorus 

Level 4.3, Magnesium Level 2.0, Total Bilirubin 0.2, Aspartate Amino Transf 

(AST/SGOT) 25, Alanine Aminotransferase (ALT/SGPT) 29, Alkaline Phosphatase 89, 

Total Protein 4.7L, Albumin 2.0L


2/10/20 11:14: 


Blood Gas Puncture Site LR, Blood Gas Patient Temperature 37.3, Arterial Blood 

pH 7.38, Arterial Blood Partial Pressure CO2 30L, Arterial Blood Partial 

Pressure O2 79, Arterial Blood HCO3 17*L, Arterial Blood Total CO2 17.8L, Ar

terial Blood Oxygen Saturation 95, Arterial Blood Base Excess -7.2L, Trent Test 

YES-POS, Blood Gas Ventilator Setting YES, Blood Gas Inspired Oxygen 21%


2/10/20 11:36: Glucometer 452*H


2/10/20 12:33: Glucometer 464*H


2/10/20 13:30: 


Hemoglobin 9.6L, Hematocrit 28L, Sodium Level 133L, Potassium Level 4.8, 

Chloride Level 101, Carbon Dioxide Level 17L, Anion Gap 15H, Blood Urea Nitrogen

54H, Creatinine 2.57H, Estimat Glomerular Filtration Rate 19, BUN/Creatinine 

Ratio 21, Glucose Level 421*H, Calcium Level 7.6L, Corrected Calcium 9.2, 

Phosphorus Level 4.0, Magnesium Level 1.9, Total Bilirubin 0.3, Aspartate Amino 

Transf (AST/SGOT) 26, Alanine Aminotransferase (ALT/SGPT) 28, Alkaline 

Phosphatase 86, Total Protein 4.6L, Albumin 2.0L


2/10/20 13:34: Glucometer 383H


2/10/20 14:30: Glucometer 388H


2/10/20 15:23: Glucometer 382H


2/10/20 16:20: 


Sodium Level 134L, Potassium Level 4.4, Chloride Level 103, Carbon Dioxide Level

17L, Anion Gap 14, Blood Urea Nitrogen 54H, Creatinine 2.60H, Estimat Glomerular

Filtration Rate 19, BUN/Creatinine Ratio 21, Glucose Level 415*H, Calcium Level 

7.5L, Corrected Calcium 9.2, Phosphorus Level 3.4, Magnesium Level 1.7, Total 

Bilirubin 0.3, Aspartate Amino Transf (AST/SGOT) 28, Alanine Aminotransferase 

(ALT/SGPT) 29, Alkaline Phosphatase 83, Total Protein 4.4L, Albumin 1.9L


2/10/20 16:22: Glucometer 425*H


2/10/20 17:37: Glucometer 349H


2/10/20 18:53: Glucometer 310H


2/10/20 19:32: Glucometer 222H


2/10/20 20:00: Glucometer 249H


2/10/20 21:11: Glucometer 250H


2/10/20 21:45: 


Sodium Level 134L, Potassium Level 4.4, Chloride Level 105, Carbon Dioxide Level

19L, Anion Gap 10, Blood Urea Nitrogen 52H, Creatinine 2.53H, Estimat Glomerular

Filtration Rate 20, BUN/Creatinine Ratio 21, Glucose Level 240H, Calcium Level 

7.6L, Corrected Calcium 9.2, Phosphorus Level 2.8, Magnesium Level 1.8, Total 

Bilirubin 0.2, Aspartate Amino Transf (AST/SGOT) 32, Alanine Aminotransferase 

(ALT/SGPT) 30, Alkaline Phosphatase 86, Total Protein 4.7L, Albumin 2.0L


2/10/20 22:09: Glucometer 223H


20 00:16: Glucometer 185H


20 03:00: 


White Blood Count 13.3H, Red Blood Count 3.25L, Hemoglobin 10.4L, Hematocrit 30L

, Mean Corpuscular Volume 91, Mean Corpuscular Hemoglobin 32, Mean Corpuscular 

Hemoglobin Concent 35, Red Cell Distribution Width 14.4, Platelet Count 216, 

Mean Platelet Volume 9.6, Neutrophils (%) (Auto) 76H, Lymphocytes (%) (Auto) 19,

Monocytes (%) (Auto) 5, Eosinophils (%) (Auto) 0, Basophils (%) (Auto) 0, 

Neutrophils # (Auto) 10.1H, Lymphocytes # (Auto) 2.5, Monocytes # (Auto) 0.7, 

Eosinophils # (Auto) 0.1, Basophils # (Auto) 0.0, Blood Gas Puncture Site LEFT 

ART LINE, Blood Gas Patient Temperature 36.9, Arterial Blood pH 7.40, Arterial 

Blood Partial Pressure CO2 34L, Arterial Blood Partial Pressure O2 87, Arterial 

Blood HCO3 21L, Arterial Blood Total CO2 22.0, Arterial Blood Oxygen Saturation 

96, Arterial Blood Base Excess -3.0L, Trent Test ART LINE, Blood Gas Ventilator 

Setting NO, Blood Gas Inspired Oxygen 21%, Sodium Level 134L, Potassium Level 

4.4, Chloride Level 106, Carbon Dioxide Level 18L, Anion Gap 10, Blood Urea 

Nitrogen 49H, Creatinine 2.40H, Estimat Glomerular Filtration Rate 21, BUN/Creat

inine Ratio 20, Glucose Level 179H, Calcium Level 7.6L, Corrected Calcium 9.1, 

Phosphorus Level 2.7, Magnesium Level 1.8, Total Bilirubin 0.2, Aspartate Amino 

Transf (AST/SGOT) 33, Alanine Aminotransferase (ALT/SGPT) 29, Alkaline 

Phosphatase 79, Total Protein 4.9L, Albumin 2.1L, Beta-Hydroxybutyrate (Chem 

panel) 2.10H


20 06:30: 


Urine Color YELLOW, Urine Clarity CLEAR, Urine pH 6.0, Urine Specific Gravity 

1.025H, Urine Protein 3+H, Urine Glucose (UA) NEGATIVE, Urine Ketones 1+H, Urine

Nitrite NEGATIVE, Urine Bilirubin 1+H, Urine Urobilinogen 0.2, Urine Leukocyte 

Esterase NEGATIVE, Urine RBC (Auto) 1+H, Urine RBC NONE, Urine WBC 0-2, Urine 

Squamous Epithelial Cells 5-10, Urine Crystals PRESENTH, Urine Amorphous 

Sediment FEW NATALIE URATESH, Urine Bacteria TRACE, Urine Casts NONE, Urine Mucus 

SMALLH, Urine Culture Indicated NO





Microbiology


2/10/20 Gram Stain - Final, Resulted


          


2/10/20 Sputum Culture, Resulted


          Pending





Assessment/Plan


 Severe DKA, metabolic acidosis, 


Acute respiratory failure, extubated


Leukocytosis, trending down


Hypotension, improved,


Acute on chronic renal failure


upper gi bleed with hx of ulcers, and GERD


Anemia, Hb improved


PPI, IV access, fluid resuscitation, follow hgb and transfuse prbc as needed,


no surgical intervention if continue to drop hgb would do egd, if stable 

continue conservative measures


will follow





 





Clinical Quality Measures


DVT/VTE Risk/Contraindication:


Risk Factor Score Per Nursin


RFS Level Per Nursing on Admit:  4+=Very High





Supervisory-Addendum Brief


Verification & Attestation


Participated in pt care:  history, MDM, physical


Personally performed:  exam, history, MDM, supervision of care


Care discussed with:  Medical Student


Procedures:  n/a


Results interpretation:  Verified all documentation


Verification and Attestation of Medical Student E/M Service





A medical student performed and documented this service in my presence. I 

reviewed and verified all information documented by the medical student and made

modifications to such information, when appropriate. I personally performed the 

physical exam and medical decision making. 





 Rivera Childs, 2020,21:03











FARIDEH ARANGO [facility]  2020 07:54


RIVERA CHILDS DO              2020 21:03

## 2020-02-11 NOTE — PHYSICAL THERAPY PROGRESS NOTE
Therapy Progress Note


Patient currently sedated and on mechanical ventilator.  PT will continue to 

monitor patient status and initiate assessment when medically stable and able to

actively participate with skilled therapy.











HALIE SHERIDAN PT              Feb 11, 2020 07:52

## 2020-02-11 NOTE — DIAGNOSTIC IMAGING REPORT
Clinical indication: Patient with DKA.



Exam: Portable chest x-ray upright view.



Comparisons: Portable chest x-ray dated 02/10/2020.



Findings:



There is slight improved aeration of the right upper lung field

with continued increased lung markings. There is interval

development of mild right basilar atelectasis. The remainder of

the lungs are clear.



There is no pleural effusion or pneumothorax. Pulmonary

vasculature and cardiac silhouettes within normal limits. Again

seen ET tube in stable good position. Right IJ central line in

stable good position. Feeding tube seen in good position. Bones

show no significant interval abnormality.



IMPRESSION:

1:  There is slight improved aeration of the right upper lung

field with continued increased lung markings. There is interval

development of mild right basilar atelectasis. The remainder of

the lungs are clear.



2:  Stable lines and tubes in good position.



Dictated by: 



  Dictated on workstation # [id number]

## 2020-02-11 NOTE — PROGRESS NOTE - HOSPITALIST
Subjective


HPI/CC On Admission


Date Seen by Provider:  2020


Time Seen by Provider:  07:54


Pt is a 54yoCF witha PMH of IDDMII, methamphetamine use, CKD who presented to 

the ER due to altered mental status. She is currently treated and sedated and 

unable to provide any review of systems.  Her mother is at bedside but is 

limited use in history other than that she is a type I diabetic and had been 

very thirsty lately..  All history is obtained from review of records.  

Reportedly she was found by her son on the ground unresponsive and moaning.  EMS

was called and she was found to have a temperature of 93F.  She was also found 

to have a blood sugar reading of high per their device.  On arrival to the ER 

she was found to be in severe DKA with blood sugars are greater than 1000 and a 

bicarbonate of less than 5.  She was found to be 7.05.  She was intubated in the

ER and admitted to the ICU for DKA protocol.


Subjective/Events-last exam


Pt reports feeling poorly and like she " was crunched." No specifics complaints.

Requesting something to drink. Was just extubated.





Focused Exam


Lactate Level


2/10/20 04:23: Lactic Acid Level 2.72*H


2/10/20 07:25: Lactic Acid Level 2.33*H


2/10/20 11:03: Lactic Acid Level 1.78








Objective


Exam


Vital Signs





Vital Signs








  Date Time  Temp Pulse Resp B/P (MAP) Pulse Ox O2 Delivery O2 Flow Rate FiO2


 


20 07:15      Nasal Cannula 2.00 


 


20 06:49  121 29  96   21


 


20 06:00 36.9   136/65 (88)    





Capillary Refill : Less Than 3 Seconds


General Appearance:  Chronically ill, Other (drowsy)


Respiratory:  Lungs Clear, No Respiratory Distress


Cardiovascular:  Regular Rate, Rhythm, No Murmur


Gastrointestinal:  Normal Bowel Sounds, Soft


Extremity:  Swelling (of feet and hands)


Neurologic/Psychiatric:  Other (alert and oriented to person)





Results/Procedures


Lab


Laboratory Tests


2/10/20 11:03








2/10/20 13:30








2/10/20 16:20








2/10/20 21:45








20 03:00








Patient resulted labs reviewed.


Imaging:  Reviewed Imaging Report





Assessment/Plan


Assessment and Plan


Assess & Plan/Chief Complaint


DKA


IDDMI


   Off insulin gtt since last night


   Continue bolus insulin


   A1c 14.8, reports not taking her insulin as prescribed





Hypovolemic shock


ADITI on CKD


   Continue LR


   Monitor UOP- dropped somewhat overnight, trend


   Still on levophed, wean as able





Acute Respiratory Failure- now extubated


   On vent


   Management per Dr Andrade, appreciate recs





LLE edema


h/o of DVT


   USG negative for DVT





Normocytic anemia


   OGT with dark content


   PPI


   Consult Surgery, appreciate recs- no plan for EGD at this time





Methamphetamine use


   Will discuss sedation when alert





Critical Care


Critically Ill Patient





Diagnosis/Problems


Diagnosis/Problems





(1) Acute respiratory failure requiring reintubation


Status:  Acute


(2) Methamphetamine abuse


Status:  Acute


(3) Diabetic ketoacidosis


Status:  Acute


Qualifiers:  


   Diabetes mellitus type:  type 2  Diabetes mellitus complication detail:  with

coma  Qualified Codes:  E11.11 - Type 2 diabetes mellitus with ketoacidosis with

coma


(4) Acute kidney injury superimposed on chronic kidney disease


Status:  Acute


(5) High anion gap metabolic acidosis


Status:  Acute


(6) Leukocytosis


Status:  Acute


(7) Lactic acidosis


Status:  Acute


(8) Dehydration


Status:  Acute


(9) Hypotension


Status:  Acute


(10) Altered mental status


Status:  Acute





Clinical Quality Measures


DVT/VTE Risk/Contraindication:


Risk Factor Score Per Nursin


RFS Level Per Nursing on Admit:  4+=Very High











MISHA DEVRIES MD              2020 07:58

## 2020-02-11 NOTE — OCC THERAPY PROGRESS NOTE
Therapy Progress Note


Pt is currently sedated and intubated. OT to hold therapy on this day and 

complete eval/ treat when pt medically stable and able to participate in skilled

therapy session.











ANAIS JEAN BAPTISTE OT          Feb 11, 2020 08:02

## 2020-02-11 NOTE — NUR
ARTERIAL LINE FROM LEFT WRIST REMOVED AFTER APPROVAL FROM DR ASHRAF WITH NO COMPLICATIONS.  
PT UP TO CHAIR WITH X 1 ASSIST.

## 2020-02-12 VITALS — DIASTOLIC BLOOD PRESSURE: 77 MMHG | SYSTOLIC BLOOD PRESSURE: 142 MMHG

## 2020-02-12 VITALS — SYSTOLIC BLOOD PRESSURE: 158 MMHG | DIASTOLIC BLOOD PRESSURE: 86 MMHG

## 2020-02-12 VITALS — SYSTOLIC BLOOD PRESSURE: 159 MMHG | DIASTOLIC BLOOD PRESSURE: 81 MMHG

## 2020-02-12 VITALS — SYSTOLIC BLOOD PRESSURE: 123 MMHG | DIASTOLIC BLOOD PRESSURE: 70 MMHG

## 2020-02-12 VITALS — SYSTOLIC BLOOD PRESSURE: 104 MMHG | DIASTOLIC BLOOD PRESSURE: 62 MMHG

## 2020-02-12 VITALS — SYSTOLIC BLOOD PRESSURE: 132 MMHG | DIASTOLIC BLOOD PRESSURE: 81 MMHG

## 2020-02-12 VITALS — DIASTOLIC BLOOD PRESSURE: 60 MMHG | SYSTOLIC BLOOD PRESSURE: 116 MMHG

## 2020-02-12 VITALS — SYSTOLIC BLOOD PRESSURE: 160 MMHG | DIASTOLIC BLOOD PRESSURE: 73 MMHG

## 2020-02-12 VITALS — DIASTOLIC BLOOD PRESSURE: 85 MMHG | SYSTOLIC BLOOD PRESSURE: 137 MMHG

## 2020-02-12 VITALS — DIASTOLIC BLOOD PRESSURE: 83 MMHG | SYSTOLIC BLOOD PRESSURE: 141 MMHG

## 2020-02-12 VITALS — SYSTOLIC BLOOD PRESSURE: 153 MMHG | DIASTOLIC BLOOD PRESSURE: 79 MMHG

## 2020-02-12 LAB
BASOPHILS # BLD AUTO: 0 10^3/UL (ref 0–0.1)
BASOPHILS NFR BLD AUTO: 0 % (ref 0–10)
BUN/CREAT SERPL: 17
CALCIUM SERPL-MCNC: 7.7 MG/DL (ref 8.5–10.1)
CHLORIDE SERPL-SCNC: 112 MMOL/L (ref 98–107)
CO2 SERPL-SCNC: 22 MMOL/L (ref 21–32)
CREAT SERPL-MCNC: 1.59 MG/DL (ref 0.6–1.3)
EOSINOPHIL # BLD AUTO: 0.1 10^3/UL (ref 0–0.3)
EOSINOPHIL NFR BLD AUTO: 0 % (ref 0–10)
ERYTHROCYTE [DISTWIDTH] IN BLOOD BY AUTOMATED COUNT: 15.1 % (ref 10–14.5)
GFR SERPLBLD BASED ON 1.73 SQ M-ARVRAT: 34 ML/MIN
GLUCOSE SERPL-MCNC: 32 MG/DL (ref 70–105)
HCT VFR BLD CALC: 28 % (ref 35–52)
HGB BLD-MCNC: 9.4 G/DL (ref 11.5–16)
LYMPHOCYTES # BLD AUTO: 1.7 X 10^3 (ref 1–4)
LYMPHOCYTES NFR BLD AUTO: 15 % (ref 12–44)
MAGNESIUM SERPL-MCNC: 1.9 MG/DL (ref 1.6–2.4)
MANUAL DIFFERENTIAL PERFORMED BLD QL: NO
MCH RBC QN AUTO: 32 PG (ref 25–34)
MCHC RBC AUTO-ENTMCNC: 34 G/DL (ref 32–36)
MCV RBC AUTO: 94 FL (ref 80–99)
MONOCYTES # BLD AUTO: 0.6 X 10^3 (ref 0–1)
MONOCYTES NFR BLD AUTO: 5 % (ref 0–12)
NEUTROPHILS # BLD AUTO: 9.1 X 10^3 (ref 1.8–7.8)
NEUTROPHILS NFR BLD AUTO: 79 % (ref 42–75)
PHOSPHATE SERPL-MCNC: 1.8 MG/DL (ref 2.3–4.7)
PLATELET # BLD: 155 10^3/UL (ref 130–400)
PMV BLD AUTO: 8.9 FL (ref 7.4–10.4)
POTASSIUM SERPL-SCNC: 2.8 MMOL/L (ref 3.6–5)
SODIUM SERPL-SCNC: 142 MMOL/L (ref 135–145)
WBC # BLD AUTO: 11.5 10^3/UL (ref 4.3–11)

## 2020-02-12 RX ADMIN — IPRATROPIUM BROMIDE AND ALBUTEROL SULFATE SCH ML: .5; 3 SOLUTION RESPIRATORY (INHALATION) at 10:43

## 2020-02-12 RX ADMIN — IPRATROPIUM BROMIDE AND ALBUTEROL SULFATE SCH ML: .5; 3 SOLUTION RESPIRATORY (INHALATION) at 21:39

## 2020-02-12 RX ADMIN — DICLOFENAC SCH GM: 10 GEL TOPICAL at 16:48

## 2020-02-12 RX ADMIN — AMOXICILLIN AND CLAVULANATE POTASSIUM SCH MG: 500; 125 TABLET, FILM COATED ORAL at 16:48

## 2020-02-12 RX ADMIN — IPRATROPIUM BROMIDE AND ALBUTEROL SULFATE SCH ML: .5; 3 SOLUTION RESPIRATORY (INHALATION) at 07:04

## 2020-02-12 RX ADMIN — PANTOPRAZOLE SODIUM SCH MG: 40 INJECTION, POWDER, FOR SOLUTION INTRAVENOUS at 08:18

## 2020-02-12 RX ADMIN — POTASSIUM CHLORIDE SCH MLS/HR: 200 INJECTION, SOLUTION INTRAVENOUS at 05:36

## 2020-02-12 RX ADMIN — DICLOFENAC SCH GM: 10 GEL TOPICAL at 21:02

## 2020-02-12 RX ADMIN — IPRATROPIUM BROMIDE AND ALBUTEROL SULFATE SCH ML: .5; 3 SOLUTION RESPIRATORY (INHALATION) at 18:37

## 2020-02-12 RX ADMIN — SODIUM CHLORIDE SCH MLS/HR: 900 INJECTION, SOLUTION INTRAVENOUS at 04:54

## 2020-02-12 RX ADMIN — POTASSIUM CHLORIDE SCH MLS/HR: 200 INJECTION, SOLUTION INTRAVENOUS at 04:55

## 2020-02-12 RX ADMIN — DICLOFENAC SCH GM: 10 GEL TOPICAL at 14:00

## 2020-02-12 RX ADMIN — POTASSIUM CHLORIDE SCH MLS/HR: 200 INJECTION, SOLUTION INTRAVENOUS at 04:54

## 2020-02-12 RX ADMIN — INSULIN ASPART SCH UNIT: 100 INJECTION, SOLUTION INTRAVENOUS; SUBCUTANEOUS at 21:01

## 2020-02-12 RX ADMIN — IPRATROPIUM BROMIDE AND ALBUTEROL SULFATE SCH ML: .5; 3 SOLUTION RESPIRATORY (INHALATION) at 01:34

## 2020-02-12 RX ADMIN — IPRATROPIUM BROMIDE AND ALBUTEROL SULFATE SCH ML: .5; 3 SOLUTION RESPIRATORY (INHALATION) at 14:57

## 2020-02-12 RX ADMIN — MAGNESIUM SULFATE IN DEXTROSE SCH MLS/HR: 10 INJECTION, SOLUTION INTRAVENOUS at 02:49

## 2020-02-12 RX ADMIN — ACETAMINOPHEN PRN MG: 325 TABLET ORAL at 00:40

## 2020-02-12 RX ADMIN — INSULIN ASPART SCH UNIT: 100 INJECTION, SOLUTION INTRAVENOUS; SUBCUTANEOUS at 16:48

## 2020-02-12 RX ADMIN — POTASSIUM CHLORIDE SCH MEQ: 1500 TABLET, EXTENDED RELEASE ORAL at 02:49

## 2020-02-12 RX ADMIN — POTASSIUM CHLORIDE SCH MLS/HR: 200 INJECTION, SOLUTION INTRAVENOUS at 05:37

## 2020-02-12 RX ADMIN — INSULIN ASPART SCH UNIT: 100 INJECTION, SOLUTION INTRAVENOUS; SUBCUTANEOUS at 03:09

## 2020-02-12 RX ADMIN — INSULIN ASPART SCH UNIT: 100 INJECTION, SOLUTION INTRAVENOUS; SUBCUTANEOUS at 05:39

## 2020-02-12 RX ADMIN — DICLOFENAC SCH GM: 10 GEL TOPICAL at 08:16

## 2020-02-12 RX ADMIN — IPRATROPIUM BROMIDE AND ALBUTEROL SULFATE SCH ML: .5; 3 SOLUTION RESPIRATORY (INHALATION) at 14:56

## 2020-02-12 RX ADMIN — PANTOPRAZOLE SODIUM SCH MG: 40 INJECTION, POWDER, FOR SOLUTION INTRAVENOUS at 21:00

## 2020-02-12 RX ADMIN — AMOXICILLIN AND CLAVULANATE POTASSIUM SCH MG: 500; 125 TABLET, FILM COATED ORAL at 08:16

## 2020-02-12 RX ADMIN — SODIUM CHLORIDE, SODIUM LACTATE, POTASSIUM CHLORIDE, AND CALCIUM CHLORIDE SCH MLS/HR: 600; 310; 30; 20 INJECTION, SOLUTION INTRAVENOUS at 01:53

## 2020-02-12 RX ADMIN — HYDROXYZINE HYDROCHLORIDE PRN MG: 10 TABLET ORAL at 08:17

## 2020-02-12 RX ADMIN — MONTELUKAST SODIUM SCH MG: 5 TABLET, CHEWABLE ORAL at 21:00

## 2020-02-12 RX ADMIN — INSULIN ASPART SCH UNIT: 100 INJECTION, SOLUTION INTRAVENOUS; SUBCUTANEOUS at 12:13

## 2020-02-12 RX ADMIN — ENOXAPARIN SODIUM SCH MG: 30 INJECTION SUBCUTANEOUS at 08:16

## 2020-02-12 NOTE — PULMONARY PROGRESS NOTE
Subjective


Time Seen by a Provider:  04:32


Subjective/Events-last exam


Pt had episodes of hypoglycemia last night.





Sepsis Event


Evaluation


Height, Weight, BMI


Height: 5'5.00"


Weight: 151lbs. 1.0oz. 68.282143wk; 32.00 BMI


Method:Stated





Focused Exam


Lactate Level


2/10/20 04:23: Lactic Acid Level 2.72*H


2/10/20 07:25: Lactic Acid Level 2.33*H


2/10/20 11:03: Lactic Acid Level 1.78





Exam


Exam





Vital Signs








  Date Time  Temp Pulse Resp B/P (MAP) Pulse Ox O2 Delivery O2 Flow Rate FiO2


 


2/12/20 03:34 36.0       


 


2/12/20 03:32     97 Room Air  


 


2/12/20 02:18  112 13  95 Nasal Cannula 2.00 


 


2/12/20 02:00  115 17 116/60 (78) 95 Room Air  


 


2/12/20 01:35     94 Room Air  


 


2/12/20 01:18  126      


 


2/12/20 01:00  121 23 123/70 (87) 93 Room Air  


 


2/12/20 00:00  123 30 132/81 (98) 93 Room Air  


 


2/11/20 23:14 36.6       


 


2/11/20 23:14     97 Room Air  


 


2/11/20 23:00  124 23 119/74 (89) 95 Room Air  


 


2/11/20 22:00  123 18 132/83 (99) 94 Room Air  


 


2/11/20 21:27     97 Room Air  


 


2/11/20 21:00  123 17 131/71 (91) 97 Room Air  


 


2/11/20 20:00     97 Room Air  


 


2/11/20 20:00 36.8       


 


2/11/20 20:00  123 26 121/62 (81) 97 Room Air  


 


2/11/20 19:03  120      


 


2/11/20 19:00  116 21 119/72 (88) 94 Room Air  


 


2/11/20 18:02     98 Room Air  


 


2/11/20 17:00  124 14 118/74 (89) 97 Nasal Cannula 2.00 


 


2/11/20 16:07     97 Room Air  


 


2/11/20 16:04 38.4       


 


2/11/20 16:00  122 20 169/63 (98) 96 Nasal Cannula 2.00 


 


2/11/20 16:00 38.4       


 


2/11/20 15:00  124 35 126/49 (74) 93 Nasal Cannula 2.00 


 


2/11/20 14:19     97 Room Air  


 


2/11/20 14:00  120 21 125/47 (73) 96 Nasal Cannula 2.00 


 


2/11/20 13:00  120 22 101/42 (61) 97 Nasal Cannula 2.00 


 


2/11/20 12:42  125      


 


2/11/20 12:30 37.7       


 


2/11/20 12:15     98 Nasal Cannula 3.00 


 


2/11/20 12:00  124 30 143/48 (79) 95 Nasal Cannula 2.00 


 


2/11/20 11:00 36.6 116 19 113/44 (67) 100 Nasal Cannula 2.00 


 


2/11/20 10:36     100 Nasal Cannula 3.00 


 


2/11/20 10:00 36.7 105 21 114/50 (71) 98 Nasal Cannula 2.00 


 


2/11/20 09:00 36.7 106 17 85/46 (59) 97 Nasal Cannula 2.00 


 


2/11/20 08:00 36.6 123 24 131/52 (78) 97 Nasal Cannula 2.00 


 


2/11/20 07:45     98 Nasal Cannula 3.00 


 


2/11/20 07:15      Nasal Cannula 2.00 


 


2/11/20 07:00 36.8 113 29 96/51 (66) 92 Mechanical Ventilator 21.00 


 


2/11/20 06:49  121 29  96   21


 


2/11/20 06:43  118      


 


2/11/20 06:00 36.9 91 16 136/65 (88) 97 Mechanical Ventilator 21.00 


 


2/11/20 05:00 36.9 89 16 123/53 (76) 97 Mechanical Ventilator 21.00 














I & O 


 


 2/12/20





 07:00


 


Intake Total 5180 ml


 


Output Total 4350 ml


 


Balance 830 ml








Height & Weight


Height: 5'5.00"


Weight: 151lbs. 1.0oz. 68.402860fe; 32.00 BMI


Method:Stated


General Appearance:  No Apparent Distress


HEENT:  PERRL/EOMI


Neck:  Carotid Bruit; No Thyromegaly


Respiratory:  Chest Non Tender, No Accessory Muscle Use, No Respiratory Distress


Cardiovascular:  Tachycardia


Capillary Refill:  Less Than 3 Seconds


Gastrointestinal:  non tender, soft, no organomegaly; No distended


Extremity:  Swelling (LLE > RLE)


Neurologic/Psychiatric:  Alert, Oriented x3, No Motor/Sensory Deficits, Normal 

Mood/Affect, CNs II-XII Norm as Tested


Skin:  Normal Color, Warm/Dry


Lymphatic:  No Adenopathy





Results


Lab


Laboratory Tests


2/10/20 06:00








2/10/20 07:25








2/10/20 11:03








2/10/20 13:30








2/10/20 16:20








2/10/20 21:45








2/11/20 03:00








2/11/20 13:17








2/12/20 02:21











Assessment/Plan


Assessment/Plan





s/p Severe DKA -- resolved 


   -DKA protocol - d/c'd 


Hypoglycemia 


   -Decrease SSI to SSI A


   -Decrease Levemir to 15 units 


   -Start D51/2 NS 


   -D/C central line today after electrolytes are given. 


Pulmonary edema secondary to IVF


   -Give 40lasix x 1 


Severe sepsis -- improving 


   - Zosyn -- Change to Augmentin 


Hypokalemia/hypophos 


   -replace 


Methamphetamine use


   -education 


Acute renal failure 


   -IVF and monitor 


Metabolic acidosis 


   -IVF 


Anemia











JUAN ASHRAF DO              Feb 12, 2020 04:33

## 2020-02-12 NOTE — OCCUPATIONAL THERAPY EVAL
OT Evaluation-General/PLF


Medical Diagnosis


Admission Date


Feb 10, 2020 at 04:00


Medical Diagnosis:  DKA


Onset Date:  Feb 10, 2020





Therapy Diagnosis


Therapy Diagnosis:  impaired ADLs/functional mobility





Height/Weight


Height (Feet):  5


Height (Inches):  5.00


Weight (Pounds):  151


Weight (Ounces):  1.0





Precautions


Precautions/Isolations:  Fall Prevention, Standard Precautions


Safety Interventions:  Notify Family, Place Restraint, Reorient-Attempt, 

Reorient-PRN





Referral


Physician:  Lupe


Referral Reason:  Evaluation/Treatment





Medical History


Pertinent Medical History:  DM


Additional Medical History


adenoidectomy, renal stents, renal failure, asthma, pneumonia, DVT, RA, 

fractures, IDDM, personality disorder, CKD, methamphetamine, ear surgery, 

hysterectomy, tonsillectomy, gastroesophageal reflux, DM


Current History


Per H&P: "Pt is a 54yoCF witha PMH of IDDMII, methamphetamine use, CKD who 

presented to the ER due to altered mental status. She is currently treated and 

sedated and unable to provide any review of systems.  Her mother is at bedside 

but is limited use in history other than that she is a type I diabetic and had 

been very thirsty lately..  All history is obtained from review of records.  

Reportedly she was found by her son on the ground unresponsive and moaning.  EMS

was called and she was found to have a temperature of 93F.  She was also found 

to have a blood sugar reading of high per their device.  On arrival to the ER 

she was found to be in severe DKA with blood sugars are greater than 1000 and a 

bicarbonate of less than 5.  She was found to be 7.05.  She was intubated in the

ER and admitted to the ICU for DKA protocol." 





Pt was  extubated on 2/11/2020


Reviewed History:  Yes





Social History


Home:  Single Level


Current Living Status:  Alone


Entry Into Home:  Stairs With Railing


 Steps Into Home:  3





ADL-Prior Level of Function


SCALE: Activities may be completed with or without assistive devices.





6-Indepedent-patient completes the activity by him/herself with no assistance 

from a helper.


5-Set-up or Clean-up Assistance-helper sets up or cleans up; patient completes 

activity. [place] assists only prior to or  


    following the activity.


4-Supervision or Touching Assistance-helper provides verbal cues and/or 

touching/steadying and/or contact guard assistance as patient completes 

activity. Assistance may be provided   


    throughout the activity or intermittently.


3-Partial/Moderate Assistance-helper does LESS THAN HALF the effort. [place] 

lifts, holds or supports trunk or limbs, but provides less than half the effort.


2-Substantial/Maximal Assistance-helper does MORE THAN HALF the effort. [place] 

lifts or holds trunk or limbs and provides more than half the effort.


8-Xekadfpcn-ewfmrd does ALL the effort. Patient does none of the effort to 

complete the activity. Or, the assistance of 2 or more helpers is required for 

the patient to complete the  


    activity.


If activity was not attempted, code reason:


7-Patient Refused.


9-Not Applicable-not attempted and the patient did not perform the activity 

before the current illness, exacerbation or injury.


10-Not Attempted due to Environmental Limitations-(lack of equipment, weather 

restraints, etc.).


88-Not Attempted due to Medical Conditions or Safety Concerns.


ADL PLOF Comments


Pt reports living alone. She has assistance with self care, cooking, and 

cleaning approximately 6 days a week, 5 hours each day. When asked approximately

how much assistance she needed with tasks, pt reports "50%". She uses a roller 

walker for functional mobility.


Self Care:  Needed Some Help


Functional Cognition:  Independent


DME/Equipment:  Tub/Shower





OT Current Status


Subjective


Pt laying in bed sleeping, easily awoken. She was agreeable to OT evaluation and

tx at this time. Pt reports having pain in her stomach area, but she has had it 

prior to hospitalization. She did not verbalize pain rating.





Mental Status/Objective


Patient Orientation:  Person, Place, Time, Situation


Attachments:  Mathur Catheter, IV, Telemetry





Current


Glasses/Contacts:  Yes


Hearing Aids:  No


Dentures/Partials:  No


Hand Dominance:  Right


Upper Extremity ROM


WFL, BUE shoulder flexion to approximately 160 degrees.


Upper Extremity Coordination


WFL


Upper Extremity Sensation


she reports tingling and numbness in her arms and hands, stating she had this 

prior to hospitalization as well. She also reports having tingling/numbness from

her feet up to her knees on occasion.





ADL-Treatment


Eating (QC):  6 (Pt able to bring cup to mouth and drink without issues.)





Other Treatments


Pt laying in bed at start of session. She provided information about PLOF and 

home set up. Her mother was present during evaluation/tx. OT educated pt and 

mother on purpose and benefits of OT and POC while she is admitted to the 

hospital. Pt was slightly impulsive throughout session, kicking her blankets 

off, then pulling them back on. She wanted to sit up in bed, but then requested 

to lay HOB down. Post OT session, pt laying in bed with call light in reach and 

all needs met, her mother present.





Education


OT Patient Education:  Correct positioning, Energy conservation, Modified ADL 

techniques, Progress toward Goal/Update tx plan, Purpose of tx/functional 

activities


Teaching Recipient:  Patient


Teaching Methods:  Discussion


Response to Teaching:  Verbalize Understanding





OT Long Term Goals


Long Term Goals


Time Frame:  Feb 21, 2020


Oral Hygiene (QC):  6


Toileting Hygiene (QC):  6


Shower/Bathe Self (QC):  3


Upper Body Dressing (QC):  4


Lower Body Dressing (QC):  3


On/Off Footwear (QC):  4


Additional Goals:  1-Demonstrate ADL Tasks, 2-Verbalize Understanding, 3-

ImproveStrength/Chi


1=Demonstrate adherence to instructed precautions during ADL tasks.


2=Patient will verbalize/demonstrate understanding of assistive 

devices/modifications for ADL.


3=Patient will improve strength/tolerance for activity to enable patient to 

perform ADL's.





OT Education/Plan


Problem List/Assessment


Assessment:  Decreased Activ Tolerance, Decreased UE Strength, Impaired Bed 

Mobility, Impaired Funct Balance, Impaired I ADL's, Impaired Self-Care Skills





Discharge Recommendations


Plan/Recommendations:  Continue POC





Treatment Plan/Plan of Care


Treatment,Training & Education:  Yes


Patient would benefit from OT for education, treatment and training to promote 

independence in ADL's, mobility, safety and/or upper extremity function for 

ADL's.


Plan of Care:  ADL Retraining, Functional Mobility, UE Funct Exercise/Act


Treatment Duration:  Feb 21, 2020


Frequency:  5 times per week


Estimated Hrs Per Day:  .25 hour per day


Agreement:  Yes


Rehab Potential:  Fair





Time/GCodes


Start Time:  13:35


Stop Time:  13:47


Total Time Billed (hr/min):  12


Billed Treatment Time


1, ANAIS NORMAN OT          Feb 12, 2020 14:53

## 2020-02-12 NOTE — NUR
PT TRANSPORTED TO ROOM 418 VIA W/C AFTER GIVING REPORT TO JUDY BOBO.  ALL PERSONAL 
BELONGINGS TO PATIENT ROOM.  [facility] GIVEN TO MOTHER FROM [place] TO TAKE HOME.  MOTHER LEFT 
HOSPITAL WITH LANTUS.  PATIENT IS AWARE MOTHER IS TAKING LANTUS.  PT SITTING UP IN RECLINER 
IN NAD UPON TRANSFER.  TELE APPLIED.

## 2020-02-12 NOTE — NUR
this Rn took over patient care at this time. patient alert and orientated. neck dressing 
clean and intact. no other needs at this time

## 2020-02-12 NOTE — DIAGNOSTIC IMAGING REPORT
INDICATION: 

Diabetic ketoacidosis.



COMPARISON:  

02/11/2020.



FINDINGS: 

There is cardiomegaly and some venous congestion. There is no

pleural effusion or pneumothorax. The mediastinum is

unremarkable. The ET and NG tubes have been removed. The right

internal jugular central venous catheter remains in satisfactory

position.



IMPRESSION: 

Cardiomegaly and some moderate central pulmonary venous

congestion.



Dictated by: 



  Dictated on workstation # [id number]

## 2020-02-12 NOTE — PROGRESS NOTE - HOSPITALIST
Subjective


HPI/CC On Admission


Date Seen by Provider:  2020


Time Seen by Provider:  07:50


Pt is a 54yoCF witha PMH of IDDMII, methamphetamine use, CKD who presented to 

the ER due to altered mental status. She is currently treated and sedated and 

unable to provide any review of systems.  Her mother is at bedside but is 

limited use in history other than that she is a type I diabetic and had been 

very thirsty lately..  All history is obtained from review of records.  

Reportedly she was found by her son on the ground unresponsive and moaning.  EMS

was called and she was found to have a temperature of 93F.  She was also found 

to have a blood sugar reading of high per their device.  On arrival to the ER 

she was found to be in severe DKA with blood sugars are greater than 1000 and a 

bicarbonate of less than 5.  She was found to be 7.05.  She was intubated in the

ER and admitted to the ICU for DKA protocol.


Subjective/Events-last exam


Pt reports feeling much better. Has some swelling in her hands. Otherwise no 

complaints.





Focused Exam


Lactate Level


2/10/20 04:23: Lactic Acid Level 2.72*H


2/10/20 07:25: Lactic Acid Level 2.33*H


2/10/20 11:03: Lactic Acid Level 1.78








Objective


Exam


Vital Signs





Vital Signs








  Date Time  Temp Pulse Resp B/P (MAP) Pulse Ox O2 Delivery O2 Flow Rate FiO2


 


20 12:43  119      


 


20 12:00   29 153/79 (103) 99 Nasal Cannula 2.00 


 


20 03:34 36.0       


 


20 06:49        21





Capillary Refill : Less Than 3 Seconds


General Appearance:  No Apparent Distress, Chronically ill


Cardiovascular:  Regular Rate, Rhythm, No Murmur


Gastrointestinal:  Normal Bowel Sounds, Soft


Neurologic/Psychiatric:  Alert, Oriented x3





Results/Procedures


Lab


Laboratory Tests


20 02:21








Patient resulted labs reviewed.


Imaging:  Reviewed Imaging Report





Assessment/Plan


Assessment and Plan


Assess & Plan/Chief Complaint


DKA- resolved


IDDMI


   Continue bolus insulin


   A1c 14.8, reports not taking her insulin as prescribed


   Was hypoglycemic overnight so Levemir decreased





Hypovolemic shock- resolved


ADITI on CKD


   Good UOP


   Lasix given today





Acute Respiratory Failure


   Resolved





LLE edema


h/o of DVT


   USG negative for DVT


   Lasix as above





Normocytic anemia


   OGT with dark content


   PPI


   Consult Surgery, appreciate recs


   Hgb stable





Methamphetamine use


   Reviewed UDS- has been positive for meth multiple times since 


    consulted





Critical Care


Critically Ill Patient





Diagnosis/Problems


Diagnosis/Problems





(1) Acute respiratory failure requiring reintubation


Status:  Acute


(2) Methamphetamine abuse


Status:  Acute


(3) Diabetic ketoacidosis


Status:  Acute


Qualifiers:  


   Diabetes mellitus type:  type 2  Diabetes mellitus complication detail:  with

coma  Qualified Codes:  E11.11 - Type 2 diabetes mellitus with ketoacidosis with

coma


(4) Acute kidney injury superimposed on chronic kidney disease


Status:  Acute


(5) High anion gap metabolic acidosis


Status:  Acute


(6) Leukocytosis


Status:  Acute


(7) Lactic acidosis


Status:  Acute


(8) Dehydration


Status:  Acute


(9) Hypotension


Status:  Acute


(10) Altered mental status


Status:  Acute





Clinical Quality Measures


DVT/VTE Risk/Contraindication:


Risk Factor Score Per Nursin


RFS Level Per Nursing on Admit:  4+=Very High











MISHA DEVRIES MD              2020 15:36

## 2020-02-12 NOTE — PHYSICAL THERAPY EVALUATION
PT Evaluation-General


Medical Diagnosis


Admission Date


Feb 10, 2020 at 04:00


Medical Diagnosis:  DKA


Onset Date:  Feb 10, 2020





Therapy Diagnosis


Therapy Diagnosis:  impaired mobility, strength, endurance





Height/Weight


Height (Feet):  5


Height (Inches):  5.00


Weight (Pounds):  151


Weight (Ounces):  1.0





Precautions


Precautions/Isolations:  Fall Prevention, Standard Precautions





Referral


Physician:  Lupe


Reason for Referral:  Evaluation/Treatment





Medical History


Additional Medical History


Past Medical History


Surgeries:  Adenoidectomy, Ear Surgery, Hysterectomy, Tonsillectomy


Cardiac:  Deep Vein Thrombosis


Neurological:  Neuropathy


Reproductive:  Yes


Female Reproductive Disorders:  Menstrual Problems


Genitourinary:  Renal Failure


Gastrointestinal:  Gastroesophageal Reflux, Pancreatitis, Ulcer


Musculoskeletal:  Arthritis, Rheumatoid Arthritis, Fractures


Endocrine:  Diabetes, Insulin dep


HEENT:  Tonsilitis


Loss of Vision:  Denies


Hearing Impairment:  Denies


Psychosocial:  Personality Disorder


History of Blood Disorders:  No


Adverse Reaction to Blood [place]:  No


Reviewed History:  Yes





Social History


Home:  Single Level


Current Living Status:  Alone


Entry Into Home:  Stairs With Railing


PT Steps Into Home:  3





Prior


Prior Level of Function


SCALE: Activities may be completed with or without assistive devices.





6-Indepedent-patient completes the activity by him/herself with no assistance 

from a helper.


5-Set-up or Clean-up Assistance-helper sets up or cleans up; patient completes 

activity. [place] assists only prior to or  


    following the activity.


4-Supervision or Touching Assistance-helper provides verbal cues and/or 

touching/steadying and/or contact guard assistance as patient completes 

activity. Assistance may be provided   


    throughout the activity or intermittently.


3-Partial/Moderate Assistance-helper does LESS THAN HALF the effort. [place] li

fts, holds or supports trunk or limbs, but provides less than half the effort.


2-Substantial/Maximal Assistance-helper does MORE THAN HALF the effort. [place] 

lifts or holds trunk or limbs and provides more than half the effort.


2-Aayfwtuei-hyjqif does ALL the effort. Patient does none of the effort to 

complete the activity. Or, the assistance of 2 or more helpers is required for 

the patient to complete the  


    activity.


If activity was not attempted, code reason:


7-Patient Refused.


9-Not Applicable-not attempted and the patient did not perform the activity 

before the current illness, exacerbation or injury.


10-Not Attempted due to Environmental Limitations-(lack of equipment, weather 

restraints, etc.).


88-Not Attempted due to Medical Conditions or Safety Concerns.


Bed Mobility:  6


Transfers (B,C,W/C):  6


Gait:  6


Stairs:  6


Indoor Mobility (Ambulation):  Independent


Stairs:  Independent


Patient states she started using a rolling walker shortly before coming here.





PT Evaluation-Current


Subjective


Patient in bed pre tx, agrees to PT, has severe back pain.  Impulsive, trying to

get out of bed with bedrail still up.





Pt/Family Goals


to be independent at home





Objective


Patient Orientation:  Person, Place, Situation


Attachments:  Mathur Catheter, IV


O2 97% on room air, O2 is off





ROM/Strength


ROM Lower Extremities


WNL


Strength Lower Extremities


3/5 gross BLE





Sensory


Vision:  Functional


Hearing:  Functional


Sensation Right Lower Extremit:  Impaired


Sensation Left Lower Extremity:  Impaired





Transfers


Roll Left to Right (QC):  6


Lying to Sitting/Side of Bed(Q:  6


Sit to Stand (QC):  3


Chair/Bed-to-Chair Xfer(QC):  3


Min assist for sit to stand and transfer.  Patient unsteady on feet, knees act 

like they are going to buckle but don't completely.





Gait


Distance:  3'


Gait Assistive Device:  FWW


Comments/Gait Description


Patient takes just a few steps to a recliner with min assist, uncoordinated 

steps, cues for safety





Balance


Sitting Static:  Good


Sitting Dynamic:  Good


Standing Static:  Fair


 Standing Dynamic:  Fair





Treatment


BLE seated exercises x20 (AP, LAQ)





Assessment/Needs


Patient has impaired mobility, strength, endurance.  She is in recliner post tx 

with nurse call, phone, tray, all needs met.  Patient instructed to call nurse 

if she needs to get back to bed and to not get up by herself.


Rehab Potential:  Fair





PT Long Term Goals


Long Term Goals


PT Long Term Goals Time Frame:  Feb 19, 2020


Roll Left & Right (QC):  6


Sit to Lying (QC):  6


Lying-Sitting on Side/Bed(QC):  6


Sit to Stand (QC):  4


Chair/Bed-to-Chair Xfer(QC):  4


Walk 10 feet (QC):  4





PT Plan


Problem List


Problem List:  Activity Tolerance, Functional Strength, Safety, Balance, Gait, 

Transfer, Bed Mobility





Treatment/Plan


Treatment Plan:  Continue Plan of Care


Treatment Plan:  Bed Mobility, Education, Functional Activity Chi, Functional 

Strength, Gait, Safety, Therapeutic Exercise, Transfers


Treatment Duration:  Feb 19, 2020


Frequency:  6 times per week


Estimated Hrs Per Day:  .25 hour per day


Patient and/or Family Agrees t:  Yes





Safety Risks/Education


Patient Education:  Gait Training, Transfer Techniques, Correct Positioning, 

Safety Issues


Teaching Recipient:  Patient


Teaching Methods:  Demonstration, Discussion


Response to Teaching:  Reinforcement Needed





Discharge Recommendations


Plan


Patient will perform bed mobility and transfer training, balance and endurance 

training, functional strengthening, stair training, gait training, and 

education, to improve functional mobility and independence at home.


Therapy Discharge Recommendati:  Home & Family





Time/GCodes


Time In:  0905


Time Out:  0925


Total Billed Treatment Time:  20


Total Billed Treatment


1 visit


RASHMI Fernandes'











LITZY BERNAL PT                Feb 12, 2020 09:56

## 2020-02-12 NOTE — NUR
pt called nurse into room due to patient feeling sweaty and uncomfortable. finger stick 
blood glucose revealed glucose level of 30. 1 amp d50 given per protocol. will continue to 
monitor.

## 2020-02-12 NOTE — NUR
CM/SS:  Visited with pt as to her plan for discharge 



Plan:  Pt will return home with [facility] hours in place with workers 
helping pt in the home.



Summary:  Pt reports that she is admitted to the hospital from home.  She reports that she 
has help in the home with [employer] providing hours - she has 30 hours per week and that she is 
needing a worker.  She is encouraged to have this set up prior to her discharge, and this 
worker visits with her about agency care.  She is opposed to this.  She does report that her 
worker through [employer] is Dutch Leo.  

This worker will follow up with Andre.



Phone Call to Andre Leo [phone].  He is aware pt is in the hospital and has 
talked with her as to her hours of in home care.  He will follow up with pt as to her being 
open to having a worker through an agency instead of being self direct.   This worker will 
follow up as to plan for discharge.

## 2020-02-12 NOTE — PROGRESS NOTE - SURGERY
FARIDEH ARANGO [facility] 20 0717:


Subjective


Date Seen by a Provider:  2020


Time Seen by a Provider:  07:12


Subjective/Events-last exam


pt extubated yesterday. alert now. c/o diaphoresis, and general discomfort. 

denies any abd pain, N/V, diarrhea, or constipation


Review of Systems


General:  No Chills; Night Sweats; No Fatigue


HEENT:  No Head Aches, No Visual Changes


Pulmonary:  Dyspnea; No Cough


Cardiovascular:  No: Chest Pain, Palpitations


Gastrointestinal:  No: Nausea, Vomiting, Abdominal Pain, Diarrhea





Focused Exam


Lactate Level


2/10/20 04:23: Lactic Acid Level 2.72*H


2/10/20 07:25: Lactic Acid Level 2.33*H


2/10/20 11:03: Lactic Acid Level 1.78





Objective


Exam





Vital Signs








  Date Time  Temp Pulse Resp B/P (MAP) Pulse Ox O2 Delivery O2 Flow Rate FiO2


 


20 07:04     96 Room Air  


 


20 06:00  109 33 142/77 (98) 97 Nasal Cannula 2.00 


 


20 05:00  105 14 141/83 (102) 98 Nasal Cannula 2.00 


 


20 04:00  109 14 158/86 (110) 99 Nasal Cannula 2.00 


 


20 03:34 36.0       


 


20 03:32     97 Room Air  


 


20 03:00  103 16 104/62 (76) 93 Nasal Cannula 2.00 


 


20 02:18  112 13  95 Nasal Cannula 2.00 


 


20 02:00  115 17 116/60 (78) 95 Room Air  


 


20 01:35     94 Room Air  


 


20 01:18  126      


 


20 01:00  121 23 123/70 (87) 93 Room Air  


 


20 00:00  123 30 132/81 (98) 93 Room Air  


 


20 23:14 36.6       


 


20 23:14     97 Room Air  


 


20 23:00  124 23 119/74 (89) 95 Room Air  


 


20 22:00  123 18 132/83 (99) 94 Room Air  


 


20 21:27     97 Room Air  


 


20 21:00  123 17 131/71 (91) 97 Room Air  


 


20 20:00     97 Room Air  


 


20 20:00 36.8       


 


20 20:00  123 26 121/62 (81) 97 Room Air  


 


20 19:03  120      


 


20 19:00  116 21 119/72 (88) 94 Room Air  


 


20 18:02     98 Room Air  


 


20 17:00  124 14 118/74 (89) 97 Nasal Cannula 2.00 


 


20 16:07     97 Room Air  


 


20 16:04 38.4       


 


20 16:00  122 20 169/63 (98) 96 Nasal Cannula 2.00 


 


20 16:00 38.4       


 


20 15:00  124 35 126/49 (74) 93 Nasal Cannula 2.00 


 


20 14:19     97 Room Air  


 


20 14:00  120 21 125/47 (73) 96 Nasal Cannula 2.00 


 


20 13:00  120 22 101/42 (61) 97 Nasal Cannula 2.00 


 


20 12:42  125      


 


20 12:30 37.7       


 


20 12:15     98 Nasal Cannula 3.00 


 


20 12:00  124 30 143/48 (79) 95 Nasal Cannula 2.00 


 


20 11:00 36.6 116 19 113/44 (67) 100 Nasal Cannula 2.00 


 


20 10:36     100 Nasal Cannula 3.00 


 


20 10:00 36.7 105 21 114/50 (71) 98 Nasal Cannula 2.00 


 


20 09:00 36.7 106 17 85/46 (59) 97 Nasal Cannula 2.00 


 


20 08:00 36.6 123 24 131/52 (78) 97 Nasal Cannula 2.00 


 


20 07:45     98 Nasal Cannula 3.00 


 


20 07:15      Nasal Cannula 2.00 














I & O 


 


 20





 07:00


 


Intake Total 5780 ml


 


Output Total 6300 ml


 


Balance -520 ml





Capillary Refill : Less Than 3 Seconds


General Appearance:  No Apparent Distress, WD/WN


Neck:  No Thyromegaly


Respiratory:  Chest Non Tender, No Accessory Muscle Use, No Respiratory Distress


Cardiovascular:  No JVD, No Murmur, Tachycardia


Gastrointestinal:  non tender, soft, no organomegaly; No distended


Extremity:  Swelling (LLE > RLE)


Neurologic/Psychiatric:  Alert, Oriented x3, No Motor/Sensory Deficits, Normal 

Mood/Affect, CNs II-XII Norm as Tested


Skin:  Normal Color, Warm/Dry


Lymphatic:  No Adenopathy





Results


Lab


Laboratory Tests


20 08:26: Glucometer 324H


20 11:35: Glucometer 296H


20 13:17: 


Sodium Level 135, Potassium Level 3.5L, Chloride Level 108H, Carbon Dioxide 

Level 19L, Anion Gap 8, Blood Urea Nitrogen 40H, Creatinine 2.16H, Estimat 

Glomerular Filtration Rate 24, BUN/Creatinine Ratio 19, Glucose Level 290H, 

Calcium Level 7.4L, Corrected Calcium 9.1, Phosphorus Level 2.0L, Magnesium 

Level 1.6, Total Bilirubin 0.3, Aspartate Amino Transf (AST/SGOT) 35H, Alanine 

Aminotransferase (ALT/SGPT) 29, Alkaline Phosphatase 68, Total Protein 4.5L, 

Albumin 1.9L


20 16:00: Glucometer 194H


20 19:40: Glucometer 196H


20 23:09: Glucometer 76


20 02:21: 


White Blood Count 11.5H, Red Blood Count 2.95L, Hemoglobin 9.4L, Hematocrit 28L,

Mean Corpuscular Volume 94, Mean Corpuscular Hemoglobin 32, Mean Corpuscular He

moglobin Concent 34, Red Cell Distribution Width 15.1H, Platelet Count 155, Mean

Platelet Volume 8.9, Neutrophils (%) (Auto) 79H, Lymphocytes (%) (Auto) 15, 

Monocytes (%) (Auto) 5, Eosinophils (%) (Auto) 0, Basophils (%) (Auto) 0, 

Neutrophils # (Auto) 9.1H, Lymphocytes # (Auto) 1.7, Monocytes # (Auto) 0.6, 

Eosinophils # (Auto) 0.1, Basophils # (Auto) 0.0, Sodium Level 142, Potassium 

Level 2.8L, Chloride Level 112H, Carbon Dioxide Level 22, Anion Gap 8, Blood 

Urea Nitrogen 27H, Creatinine 1.59H, Estimat Glomerular Filtration Rate 34, 

BUN/Creatinine Ratio 17, Glucose Level 32*L, Calcium Level 7.7L, Phosphorus 

Level 1.8L, Magnesium Level 1.9


20 02:55: Glucometer 31*L


20 03:12: Glucometer 113H


20 03:59: Glucometer 69L


20 05:35: Glucometer 101





Microbiology


2/10/20 Gram Stain - Final, Resulted


          


2/10/20 Sputum Culture - Preliminary, Resulted


          YEAST


          Usual upper respiratory kelli


2/10/20 Blood Culture - Preliminary, Resulted


          No growth


2/10/20 Urine Culture - Final, Complete


          NO GROWTH





Assessment/Plan


DKA resolved now


Acute respiratory failure, extubated


Leukocytosis, trending down


Hypoglycemic episodes, managed by Primary care team


Started on lasix 2/2 pulmonary edema 


Hypokalemia & Hypophosphatemia- replacement 


Acute renal failure, creatinine trending down, 


GIB-PPI, IV access, fluid resuscitation, follow hgb and transfuse prbc as 

needed,


Anemia, continue to monitor Hb


no surgical intervention if continue to drop hgb would do egd, if stable 

continue conservative measures





Clinical Quality Measures


DVT/VTE Risk/Contraindication:


Risk Factor Score Per Nursin


RFS Level Per Nursing on Admit:  4+=Very High





RIVERA CHILDS DO 20 1300:


Subjective


Subjective/Events-last exam


tired, minimal epigastric pain. no nausea or emesis.  tolerating diet.


Denies n/v fever sweats chills shortness of breath or chest pain.


hgb slight drop





Objective


Exam


General Appearance:  No Apparent Distress, WD/WN


HEENT:  PERRL/EOMI


Neck:  Normal Inspection, Non Tender


Respiratory:  Chest Non Tender, No Accessory Muscle Use, No Respiratory Distress


Cardiovascular:  Tachycardia


Gastrointestinal:  non tender, soft, no organomegaly


Extremity:  Swelling (LLE > RLE)


Neurologic/Psychiatric:  Alert, Oriented x3


Skin:  Normal Color, Warm/Dry


Lymphatic:  No Adenopathy





Assessment/Plan


DKA 


Acute respiratory failure, extubated


Leukocytosis, trending down


Hypoglycemic episodes, managed by Primary care team


Started on lasix 2/2 pulmonary edema 


Hypokalemia & Hypophosphatemia- replacement 


Acute renal failure, creatinine trending down, 


GIB, History of ulcers-PPI, IV access, fluid resuscitation, follow hgb and 

transfuse prbc as needed,


Anemia, continue to monitor Hb


no surgical intervention if continue to drop hgb would do egd, if stable 

continue conservative measures








Supervisory-Addendum Brief


Verification & Attestation


Participated in pt care:  history, MDM, physical


Personally performed:  exam, history, MDM, supervision of care


Care discussed with:  Medical Student


Procedures:  n/a


Results interpretation:  Verified all documentation


Verification and Attestation of Medical Student E/M Service





A medical student performed and documented this service in my presence. I 

reviewed and verified all information documented by the medical student and made

modifications to such information, when appropriate. I personally performed the 

physical exam and medical decision making. 





 Rivera Childs, 2020,13:00











FARIDEH ARANGO [facility]  2020 07:17


RIVERA CHILDS DO              2020 13:00

## 2020-02-12 NOTE — NUR
PT BLOOD SUGAR 347, AND THE ONE BEFORE . DR SHAH CARLSON CALLED REGARDING PT D5 
1/2 NS THAT'S RUNNING AT 50ML/HR. DR ORDERED TO DC FLUIDS IF PATIENT INTAKE IS GOOD. PT 
TOLERATING PO FLUIDS AT THIS TIME, 800ML FLUID INTAKE RECORDED.

## 2020-02-12 NOTE — NUR
accucheck at 0312- 113

accucheck at 0400- 69

orders given for d5lr at 150ml/hr, 1/2 amp of d50 and q2hr accuchecks per dr florence.

## 2020-02-13 VITALS — DIASTOLIC BLOOD PRESSURE: 83 MMHG | SYSTOLIC BLOOD PRESSURE: 185 MMHG

## 2020-02-13 VITALS — SYSTOLIC BLOOD PRESSURE: 164 MMHG | DIASTOLIC BLOOD PRESSURE: 75 MMHG

## 2020-02-13 VITALS — DIASTOLIC BLOOD PRESSURE: 80 MMHG | SYSTOLIC BLOOD PRESSURE: 156 MMHG

## 2020-02-13 VITALS — SYSTOLIC BLOOD PRESSURE: 136 MMHG | DIASTOLIC BLOOD PRESSURE: 66 MMHG

## 2020-02-13 VITALS — DIASTOLIC BLOOD PRESSURE: 93 MMHG | SYSTOLIC BLOOD PRESSURE: 150 MMHG

## 2020-02-13 VITALS — SYSTOLIC BLOOD PRESSURE: 184 MMHG | DIASTOLIC BLOOD PRESSURE: 86 MMHG

## 2020-02-13 VITALS — SYSTOLIC BLOOD PRESSURE: 124 MMHG | DIASTOLIC BLOOD PRESSURE: 66 MMHG

## 2020-02-13 LAB
BASOPHILS # BLD AUTO: 0 10^3/UL (ref 0–0.1)
BASOPHILS NFR BLD AUTO: 0 % (ref 0–10)
BUN/CREAT SERPL: 16
CALCIUM SERPL-MCNC: 8 MG/DL (ref 8.5–10.1)
CHLORIDE SERPL-SCNC: 104 MMOL/L (ref 98–107)
CO2 SERPL-SCNC: 24 MMOL/L (ref 21–32)
CREAT SERPL-MCNC: 1.28 MG/DL (ref 0.6–1.3)
EOSINOPHIL # BLD AUTO: 0.2 10^3/UL (ref 0–0.3)
EOSINOPHIL NFR BLD AUTO: 1 % (ref 0–10)
ERYTHROCYTE [DISTWIDTH] IN BLOOD BY AUTOMATED COUNT: 14.8 % (ref 10–14.5)
GFR SERPLBLD BASED ON 1.73 SQ M-ARVRAT: 43 ML/MIN
GLUCOSE SERPL-MCNC: 54 MG/DL (ref 70–105)
HCT VFR BLD CALC: 33 % (ref 35–52)
HGB BLD-MCNC: 10.8 G/DL (ref 11.5–16)
LYMPHOCYTES # BLD AUTO: 2.4 X 10^3 (ref 1–4)
LYMPHOCYTES NFR BLD AUTO: 19 % (ref 12–44)
MAGNESIUM SERPL-MCNC: 1.5 MG/DL (ref 1.6–2.4)
MANUAL DIFFERENTIAL PERFORMED BLD QL: NO
MCH RBC QN AUTO: 32 PG (ref 25–34)
MCHC RBC AUTO-ENTMCNC: 33 G/DL (ref 32–36)
MCV RBC AUTO: 96 FL (ref 80–99)
MONOCYTES # BLD AUTO: 0.5 X 10^3 (ref 0–1)
MONOCYTES NFR BLD AUTO: 4 % (ref 0–12)
NEUTROPHILS # BLD AUTO: 9.6 X 10^3 (ref 1.8–7.8)
NEUTROPHILS NFR BLD AUTO: 76 % (ref 42–75)
PHOSPHATE SERPL-MCNC: 2.1 MG/DL (ref 2.3–4.7)
PLATELET # BLD: 164 10^3/UL (ref 130–400)
PMV BLD AUTO: 9.9 FL (ref 7.4–10.4)
POTASSIUM SERPL-SCNC: 3.4 MMOL/L (ref 3.6–5)
SODIUM SERPL-SCNC: 138 MMOL/L (ref 135–145)
WBC # BLD AUTO: 12.7 10^3/UL (ref 4.3–11)

## 2020-02-13 RX ADMIN — AMOXICILLIN AND CLAVULANATE POTASSIUM SCH MG: 500; 125 TABLET, FILM COATED ORAL at 07:39

## 2020-02-13 RX ADMIN — INSULIN ASPART SCH UNIT: 100 INJECTION, SOLUTION INTRAVENOUS; SUBCUTANEOUS at 11:00

## 2020-02-13 RX ADMIN — PANTOPRAZOLE SODIUM SCH MG: 40 INJECTION, POWDER, FOR SOLUTION INTRAVENOUS at 08:09

## 2020-02-13 RX ADMIN — DICLOFENAC SCH GM: 10 GEL TOPICAL at 09:01

## 2020-02-13 RX ADMIN — POTASSIUM CHLORIDE SCH MLS/HR: 200 INJECTION, SOLUTION INTRAVENOUS at 07:24

## 2020-02-13 RX ADMIN — DICLOFENAC SCH GM: 10 GEL TOPICAL at 21:00

## 2020-02-13 RX ADMIN — IPRATROPIUM BROMIDE AND ALBUTEROL SULFATE SCH ML: .5; 3 SOLUTION RESPIRATORY (INHALATION) at 14:53

## 2020-02-13 RX ADMIN — PANTOPRAZOLE SODIUM SCH MG: 40 INJECTION, POWDER, FOR SOLUTION INTRAVENOUS at 20:59

## 2020-02-13 RX ADMIN — IPRATROPIUM BROMIDE AND ALBUTEROL SULFATE SCH ML: .5; 3 SOLUTION RESPIRATORY (INHALATION) at 18:16

## 2020-02-13 RX ADMIN — IPRATROPIUM BROMIDE AND ALBUTEROL SULFATE SCH ML: .5; 3 SOLUTION RESPIRATORY (INHALATION) at 11:29

## 2020-02-13 RX ADMIN — INSULIN ASPART SCH UNIT: 100 INJECTION, SOLUTION INTRAVENOUS; SUBCUTANEOUS at 16:26

## 2020-02-13 RX ADMIN — IPRATROPIUM BROMIDE AND ALBUTEROL SULFATE SCH ML: .5; 3 SOLUTION RESPIRATORY (INHALATION) at 07:15

## 2020-02-13 RX ADMIN — MAGNESIUM SULFATE IN DEXTROSE SCH MLS/HR: 10 INJECTION, SOLUTION INTRAVENOUS at 08:09

## 2020-02-13 RX ADMIN — POTASSIUM CHLORIDE SCH MEQ: 1500 TABLET, EXTENDED RELEASE ORAL at 07:23

## 2020-02-13 RX ADMIN — IPRATROPIUM BROMIDE AND ALBUTEROL SULFATE SCH ML: .5; 3 SOLUTION RESPIRATORY (INHALATION) at 04:19

## 2020-02-13 RX ADMIN — MONTELUKAST SODIUM SCH MG: 5 TABLET, CHEWABLE ORAL at 20:58

## 2020-02-13 RX ADMIN — HYDROXYZINE HYDROCHLORIDE PRN MG: 10 TABLET ORAL at 22:56

## 2020-02-13 RX ADMIN — ENOXAPARIN SODIUM SCH MG: 100 INJECTION SUBCUTANEOUS at 08:09

## 2020-02-13 RX ADMIN — IPRATROPIUM BROMIDE AND ALBUTEROL SULFATE SCH ML: .5; 3 SOLUTION RESPIRATORY (INHALATION) at 22:00

## 2020-02-13 RX ADMIN — AMOXICILLIN AND CLAVULANATE POTASSIUM SCH MG: 500; 125 TABLET, FILM COATED ORAL at 16:26

## 2020-02-13 RX ADMIN — INSULIN ASPART SCH UNIT: 100 INJECTION, SOLUTION INTRAVENOUS; SUBCUTANEOUS at 06:00

## 2020-02-13 RX ADMIN — INSULIN ASPART SCH UNIT: 100 INJECTION, SOLUTION INTRAVENOUS; SUBCUTANEOUS at 20:59

## 2020-02-13 RX ADMIN — DICLOFENAC SCH GM: 10 GEL TOPICAL at 17:00

## 2020-02-13 RX ADMIN — DICLOFENAC SCH GM: 10 GEL TOPICAL at 13:00

## 2020-02-13 RX ADMIN — MAGNESIUM SULFATE IN DEXTROSE SCH MLS/HR: 10 INJECTION, SOLUTION INTRAVENOUS at 07:38

## 2020-02-13 RX ADMIN — MAGNESIUM SULFATE IN DEXTROSE SCH MLS/HR: 10 INJECTION, SOLUTION INTRAVENOUS at 07:23

## 2020-02-13 NOTE — OCCUPATIONAL THER DAILY NOTE
OT Current Status-Daily Note


Subjective


Pt laying in bed with HOB elevated. She agreed to OT tx this AM, she did not 

report any pain during session.





Mental Status/Objective


Attachments:  Telemetry





ADL-Treatment


Therapy Code Descriptions/Definitions 





Functional [place] Measure:


0=Not Assessed/NA        4=Minimal Assistance


1=Total Assistance        5=Supervision or Setup


2=Maximal Assistance  6=Modified [place]


3=Moderate Assistance 7=Complete IndependenceSCALE: Activities may be completed 

with or without assistive devices.





6-Indepedent-patient completes the activity by him/herself with no assistance 

from a helper.


5-Set-up or Clean-up Assistance-helper sets up or cleans up; patient completes 

activity. [place] assists only prior to or  


    following the activity.


4-Supervision or Touching Assistance-helper provides verbal cues and/or touchin

g/steadying and/or contact guard assistance as patient completes activity. 

Assistance may be provided   


    throughout the activity or intermittently.


3-Partial/Moderate Assistance-helper does LESS THAN HALF the effort. [place] 

lifts, holds or supports trunk or limbs, but provides less than half the effort.


2-Substantial/Maximal Assistance-helper does MORE THAN HALF the effort. [place] 

lifts or holds trunk or limbs and provides more than half the effort.


6-Uejtywcpf-xmnewe does ALL the effort. Patient does none of the effort to 

complete the activity. Or, the assistance of 2 or more helpers is required for 

the patient to complete the  


    activity.


If activity was not attempted, code reason:


7-Patient Refused.


9-Not Applicable-not attempted and the patient did not perform the activity 

before the current illness, exacerbation or injury.


10-Not Attempted due to Environmental Limitations-(lack of equipment, weather 

restraints, etc.).


88-Not Attempted due to Medical Conditions or Safety Concerns.


Eating (QC):  6 (Pt able to open butter container, and use spoon to put butter 

in her oatmeal. Pt had no difficulty bringing food to her mouth.)


Upper Body Dressing (QC):  3 (Pt required min A with doffing gown due to BM on 

gown. Pt able to thread sleeves into gown and required assist tying gown.)


Toileting Hygiene (QC):  2 (Pt required max A to clean after BM accident. She 

was able to assist with cleansing part of periarea, requiring total assist for 

buttocks and back of legs.)





Other Treatment


Pt laying with HOB elevated, she prepared her oatmeal asking for it to be heated

up. OT microwaved oatmeal for pt and upon returning to pt's room, pt was crying.

When asked what was wrong, pt reported she had a BM accident, and she stuck her 

heel into it. OT assisted pt with cleaning up, and changing her gown. Pt stood 

at FWW during cleansing with SBA. She then transferred to recliner using FWW 

with CGA. Pt began eating her oatmeal as OT cleaned up from ADL tx. OT educated 

pt on using the call light if she needs assistance, pt verbalized understanding.

Post OT session, pt seated upright in recliner, call light in reach and all 

needs met.





Education


OT Patient Education:  Correct positioning, Energy conservation, Modified ADL 

techniques, Progress toward Goal/Update tx plan, Purpose of tx/functional 

activities, Transfer techniques


Teaching Recipient:  Patient


Teaching Methods:  Discussion


Response to Teaching:  Verbalize Understanding





OT Long Term Goals


Long Term Goals


Time Frame:  Feb 21, 2020


Oral Hygiene (QC):  6


Toileting Hygiene (QC):  6


Shower/Bathe Self (QC):  3


Upper Body Dressing (QC):  4


Lower Body Dressing (QC):  3


On/Off Footwear (QC):  4


Additional Goals:  1-Demonstrate ADL Tasks, 2-Verbalize Understanding, 3-

ImproveStrength/Chi


1=Demonstrate adherence to instructed precautions during ADL tasks.


2=Patient will verbalize/demonstrate understanding of assistive 

devices/modifications for ADL.


3=Patient will improve strength/tolerance for activity to enable patient to 

perform ADL's.





OT Education/Plan


Problem List/Assessment


Assessment:  Decreased Activ Tolerance, Decreased UE Strength, Impaired I ADL's,

Impaired Self-Care Skills





Discharge Recommendations


Plan/Recommendations:  Continue POC





Treatment Plan/Plan of Care


Patient would benefit from OT for education, treatment and training to promote 

independence in ADL's, mobility, safety and/or upper extremity function for 

ADL's.


Plan of Care:  ADL Retraining, Functional Mobility, UE Funct Exercise/Act


Treatment Duration:  Feb 21, 2020


Frequency:  5 times per week


Estimated Hrs Per Day:  .25 hour per day


Agreement:  Yes


Rehab Potential:  Fair





Time/GCodes


Start Time:  09:49


Stop Time:  10:13


Total Time Billed (hr/min):  24


Billed Treatment Time


1, ADL 2











ANAIS JEAN BAPTISTE OT          Feb 13, 2020 10:41

## 2020-02-13 NOTE — NUR
CM/SS:  This worker visited with pt as to plan for discharge



Plan:  Pt will return home with [facility] [employer] through her [facility] 
[place]



Summary:  Pt is encouraged to make contact with [employer] so that she can secure workers when she 
is released from hospital.  She is also asked about her drug use.  She does report using 
marijuana as well as meth in the past.  Pt denied any meth use prior to coming to hospital.  
Did test positive for meth at time of admission.  



Dutch Figueroa worker contacted [phone] contacted.  He has provided this worker 
with the phone number of the delivered meals when discharged for a week as [phone] and 
the \[facility]\"" number for worker as well.  This information is given to the pt so that she can 
make contact and have workers in place upon discharge.  



Dutch is aware it is likely pt will discharge on tomorrow from the hospital.

## 2020-02-13 NOTE — PROGRESS NOTE - SURGERY
Subjective


Date Seen by a Provider:  2020


Time Seen by a Provider:  17:20


Subjective/Events-last exam


Feeling better today.  No abdominal pain.  Hgb stable. Tolerating diet.


No nausea or emesis. Denies fever sweats chills shortness of breath or chest 

pain.





Objective


Exam





Vital Signs








  Date Time  Temp Pulse Resp B/P (MAP) Pulse Ox O2 Delivery O2 Flow Rate FiO2


 


20 19:00  107      


 


20 16:00 37.5 111 18 164/75 (104) 95 Room Air  


 


20 12:14  106      


 


20 12:08 37.1 102 20 156/80 (105) 97 Room Air  


 


20 09:00     95 Room Air 2.00 100


 


20 07:41 37.8 112 16 124/66 (85) 95 Room Air  


 


20 07:18     94 Room Air  


 


20 07:00  110      


 


20 04:45 37.1 111 22 136/66 (89) 94 Room Air  


 


20 01:00  104      


 


20 00:33 37.8 107 24 150/93 (112) 95 Room Air  


 


20 20:55      Room Air  


 


20 20:42 37.0 106 16 160/73 (102) 98 Room Air  














I & O 


 


 20





 07:00


 


Intake Total 2100 ml


 


Output Total 9500 ml


 


Balance -7400 ml





Capillary Refill : Less Than 3 Seconds


General Appearance:  No Apparent Distress, Chronically ill


HEENT:  PERRL/EOMI


Neck:  Normal Inspection, Non Tender


Respiratory:  Chest Non Tender, Lungs Clear, No Accessory Muscle Use, No 

Respiratory Distress


Cardiovascular:  Regular Rate, Rhythm, No Murmur


Gastrointestinal:  non tender, soft, no organomegaly


Extremity:  Non Tender


Neurologic/Psychiatric:  Alert, Oriented x3


Skin:  Normal Color, Warm/Dry


Lymphatic:  No Adenopathy





Results


Lab


Laboratory Tests


20 20:06: Glucometer 343H


20 05:32: Glucometer 53*L


20 05:48: 


White Blood Count 12.7H, Red Blood Count 3.39L, Hemoglobin 10.8L, Hematocrit 33L

, Mean Corpuscular Volume 96, Mean Corpuscular Hemoglobin 32, Mean Corpuscular 

Hemoglobin Concent 33, Red Cell Distribution Width 14.8H, Platelet Count 164, 

Mean Platelet Volume 9.9, Neutrophils (%) (Auto) 76H, Lymphocytes (%) (Auto) 19,

Monocytes (%) (Auto) 4, Eosinophils (%) (Auto) 1, Basophils (%) (Auto) 0, 

Neutrophils # (Auto) 9.6H, Lymphocytes # (Auto) 2.4, Monocytes # (Auto) 0.5, 

Eosinophils # (Auto) 0.2, Basophils # (Auto) 0.0, Sodium Level 138, Potassium Le

[place] 3.4L, Chloride Level 104, Carbon Dioxide Level 24, Anion Gap 10, Blood Urea 

Nitrogen 20H, Creatinine 1.28, Estimat Glomerular Filtration Rate 43, 

BUN/Creatinine Ratio 16, Glucose Level 54*L, Calcium Level 8.0L, Phosphorus 

Level 2.1L, Magnesium Level 1.5L


20 06:43: Glucometer 119H


20 10:47: Glucometer 342H


20 16:19: Glucometer 389H





Microbiology


2/10/20 Gram Stain - Final, Complete


          


2/10/20 Sputum Culture - Final, Complete


          Streptococcus pneumoniae


          YEAST


          Usual upper respiratory kelli


2/10/20 Blood Culture - Preliminary, Resulted


          No growth


2/10/20 Urine Culture - Final, Complete


          NO GROWTH





Assessment/Plan


DKA 


Acute respiratory failure, extubated


Leukocytosis, 


Hypoglycemic episodes, managed by Primary care team 


Hypokalemia & Hypophosphatemia- replacement 


Acute renal failure, creatinine trending down, 


GIB, History of ulcers-PPI,  hgb has been stable outpatient for endoscopic 

evaluation follow hgb and transfuse prbc as needed,


Anemia, continue to monitor Hb  stable


no surgical intervention if continue to drop hgb would do egd, 





will sign off, please call if needed.








Clinical Quality Measures


DVT/VTE Risk/Contraindication:


Risk Factor Score Per Nursin


RFS Level Per Nursing on Admit:  4+=Very High











RIVERA CHAPMAN DO              2020 19:41

## 2020-02-13 NOTE — PHYSICAL THERAPY DAILY NOTE
PT Daily Note-Current


Subjective


Patient is sitting up in recliner on her phone and agrees to PT.





Pain





   Numeric Pain Scale:  0-No Pain


   Location:  No Pain Reported





Mental Status


Patient Orientation:  Person, Time, Situation





Transfers


SCALE: Activities may be completed with or without assistive devices.





6-Indepedent-patient completes the activity by him/herself with no assistance 

from a helper.


5-Set-up or Clean-up Assistance-helper sets up or cleans up; patient completes 

activity. [place] assists only prior to or  


    following the activity.


4-Supervision or Touching Assistance-helper provides verbal cues and/or 

touching/steadying and/or contact guard assistance as patient completes 

activity. Assistance may be provided   


    throughout the activity or intermittently.


3-Partial/Moderate Assistance-helper does LESS THAN HALF the effort. [place] 

lifts, holds or supports trunk or limbs, but provides less than half the effort.


2-Substantial/Maximal Assistance-helper does MORE THAN HALF the effort. [place] 

lifts or holds trunk or limbs and provides more than half the effort.


9-Mgewpbrdm-edaymj does ALL the effort. Patient does none of the effort to 

complete the activity. Or, the assistance of 2 or more helpers is required for 

the patient to complete the  


    activity.


If activity was not attempted, code reason:


7-Patient Refused.


9-Not Applicable-not attempted and the patient did not perform the activity 

before the current illness, exacerbation or injury.


10-Not Attempted due to Environmental Limitations-(lack of equipment, weather 

restraints, etc.).


88-Not Attempted due to Medical Conditions or Safety Concerns.


Sit to Stand (QC):  5





Gait Training


Does the Patient Walk?:  Yes


Distance:  250'


Walk 10 feet (QC):  4


Walk 50 ft with 2 Turns(QC):  4


Walk 150 ft (QC):  4


Gait Assistive Device:  FWW


CGA for safety/slow, functional gait sequence





Assessment


Patient tolerated treatment well and remains up in recliner.  PT to increase ac

tivity as tolerated by patient.





PT Long Term Goals


Long Term Goals


PT Long Term Goals Time Frame:  Feb 19, 2020


Roll Left & Right (QC):  6


Sit to Lying (QC):  6


Lying-Sitting on Side/Bed(QC):  6


Sit to Stand (QC):  4


Chair/Bed-to-Chair Xfer(QC):  4


Walk 10 feet (QC):  4





PT Plan


Treatment/Plan


Treatment Plan:  Continue Plan of Care


Treatment Plan:  Bed Mobility, Education, Functional Activity Chi, Functional 

Strength, Gait, Safety, Therapeutic Exercise, Transfers


Treatment Duration:  Feb 19, 2020


Frequency:  6 times per week


Estimated Hrs Per Day:  .25 hour per day


Patient and/or Family Agrees t:  Yes





Time/GCodes


Time In:  1030


Time Out:  -1043


Total Billed Treatment Time:  13


Total Billed Treatment


1 visit


GT 13 min











HALIE SHERIDAN PT              Feb 13, 2020 11:32

## 2020-02-13 NOTE — NUR
PT ACCUCHECKS SHOWED BLOOD SUGAR 53, DR SHAH NOTIFIED. NO NEW ORDERS AT THIS TIME. APPLE 
JUICE, SUSIE CRACKERS AND PEANUT BUTTER GIVEN TO PATIENT

## 2020-02-13 NOTE — NUR
IRF Evaluation 



Order received to evaluate patient for the ARU. Chart review complete and it appears patient 
is ambulating (250ft, FWW) and transferring with set-up. Additionally, patient is requiring 
partial/moderate assistance for UE dressing, as well as maximum assistance with toileting 
hygiene. 



Although the patient requires a great deal of assistance with tested ADLs, patient is nearly 
independent with ambulation and transfers; therefore, patient does not meet criterion: 
"Require active and ongoing intervention of multiple therapy disciplines (PT, OT, SLP), at 
least one of which must be PT or OT." 



CM/SS notified. 



Thank you for this referral.

## 2020-02-13 NOTE — DIAGNOSTIC IMAGING REPORT
INDICATION: Diabetic ketoacidosis, respiratory failure,

methamphetamine, shortness of breath.



COMPARISON STUDY: Chest radiograph from yesterday.



FINDINGS: Portable upright view of the chest demonstrates

interval removal of central venous catheter. Heart size is

normal. Bibasilar infiltrates have slightly increased.

Vascularity has improved.



IMPRESSION: Mild bibasilar infiltrates are slightly worsened.

Pulmonary congestion has nearly resolved.



Dictated by: 



  Dictated on workstation # [id number]

## 2020-02-14 VITALS — DIASTOLIC BLOOD PRESSURE: 103 MMHG | SYSTOLIC BLOOD PRESSURE: 186 MMHG

## 2020-02-14 VITALS — DIASTOLIC BLOOD PRESSURE: 71 MMHG | SYSTOLIC BLOOD PRESSURE: 147 MMHG

## 2020-02-14 VITALS — SYSTOLIC BLOOD PRESSURE: 132 MMHG | DIASTOLIC BLOOD PRESSURE: 63 MMHG

## 2020-02-14 VITALS — DIASTOLIC BLOOD PRESSURE: 85 MMHG | SYSTOLIC BLOOD PRESSURE: 178 MMHG

## 2020-02-14 VITALS — DIASTOLIC BLOOD PRESSURE: 82 MMHG | SYSTOLIC BLOOD PRESSURE: 183 MMHG

## 2020-02-14 LAB
BASOPHILS # BLD AUTO: 0 10^3/UL (ref 0–0.1)
BASOPHILS NFR BLD AUTO: 0 % (ref 0–10)
BUN/CREAT SERPL: 13
CALCIUM SERPL-MCNC: 7.7 MG/DL (ref 8.5–10.1)
CHLORIDE SERPL-SCNC: 105 MMOL/L (ref 98–107)
CO2 SERPL-SCNC: 24 MMOL/L (ref 21–32)
CREAT SERPL-MCNC: 1.27 MG/DL (ref 0.6–1.3)
EOSINOPHIL # BLD AUTO: 0.3 10^3/UL (ref 0–0.3)
EOSINOPHIL NFR BLD AUTO: 3 % (ref 0–10)
ERYTHROCYTE [DISTWIDTH] IN BLOOD BY AUTOMATED COUNT: 14.4 % (ref 10–14.5)
GFR SERPLBLD BASED ON 1.73 SQ M-ARVRAT: 44 ML/MIN
GLUCOSE SERPL-MCNC: 311 MG/DL (ref 70–105)
HCT VFR BLD CALC: 31 % (ref 35–52)
HGB BLD-MCNC: 10 G/DL (ref 11.5–16)
LYMPHOCYTES # BLD AUTO: 2.4 X 10^3 (ref 1–4)
LYMPHOCYTES NFR BLD AUTO: 24 % (ref 12–44)
MAGNESIUM SERPL-MCNC: 1.5 MG/DL (ref 1.6–2.4)
MANUAL DIFFERENTIAL PERFORMED BLD QL: NO
MCH RBC QN AUTO: 32 PG (ref 25–34)
MCHC RBC AUTO-ENTMCNC: 33 G/DL (ref 32–36)
MCV RBC AUTO: 97 FL (ref 80–99)
MONOCYTES # BLD AUTO: 0.4 X 10^3 (ref 0–1)
MONOCYTES NFR BLD AUTO: 4 % (ref 0–12)
NEUTROPHILS # BLD AUTO: 7.1 X 10^3 (ref 1.8–7.8)
NEUTROPHILS NFR BLD AUTO: 70 % (ref 42–75)
PHOSPHATE SERPL-MCNC: 1.9 MG/DL (ref 2.3–4.7)
PLATELET # BLD: 159 10^3/UL (ref 130–400)
PMV BLD AUTO: 10 FL (ref 7.4–10.4)
POTASSIUM SERPL-SCNC: 4 MMOL/L (ref 3.6–5)
SODIUM SERPL-SCNC: 136 MMOL/L (ref 135–145)
WBC # BLD AUTO: 10.2 10^3/UL (ref 4.3–11)

## 2020-02-14 RX ADMIN — INSULIN ASPART SCH UNIT: 100 INJECTION, SOLUTION INTRAVENOUS; SUBCUTANEOUS at 11:26

## 2020-02-14 RX ADMIN — PANTOPRAZOLE SODIUM SCH MG: 40 TABLET, DELAYED RELEASE ORAL at 22:37

## 2020-02-14 RX ADMIN — MAGNESIUM SULFATE IN DEXTROSE SCH MLS/HR: 10 INJECTION, SOLUTION INTRAVENOUS at 08:41

## 2020-02-14 RX ADMIN — DICLOFENAC SCH GM: 10 GEL TOPICAL at 08:43

## 2020-02-14 RX ADMIN — INSULIN ASPART SCH UNIT: 100 INJECTION, SOLUTION INTRAVENOUS; SUBCUTANEOUS at 15:49

## 2020-02-14 RX ADMIN — MONTELUKAST SODIUM SCH MG: 5 TABLET, CHEWABLE ORAL at 22:37

## 2020-02-14 RX ADMIN — DICLOFENAC SCH GM: 10 GEL TOPICAL at 13:57

## 2020-02-14 RX ADMIN — PANTOPRAZOLE SODIUM SCH MG: 40 TABLET, DELAYED RELEASE ORAL at 08:41

## 2020-02-14 RX ADMIN — MAGNESIUM SULFATE IN DEXTROSE SCH MLS/HR: 10 INJECTION, SOLUTION INTRAVENOUS at 07:20

## 2020-02-14 RX ADMIN — DICLOFENAC SCH GM: 10 GEL TOPICAL at 15:49

## 2020-02-14 RX ADMIN — MAGNESIUM SULFATE IN DEXTROSE SCH MLS/HR: 10 INJECTION, SOLUTION INTRAVENOUS at 07:00

## 2020-02-14 RX ADMIN — AMOXICILLIN AND CLAVULANATE POTASSIUM SCH MG: 500; 125 TABLET, FILM COATED ORAL at 06:18

## 2020-02-14 RX ADMIN — DICLOFENAC SCH GM: 10 GEL TOPICAL at 22:37

## 2020-02-14 RX ADMIN — IPRATROPIUM BROMIDE AND ALBUTEROL SULFATE SCH ML: .5; 3 SOLUTION RESPIRATORY (INHALATION) at 07:32

## 2020-02-14 RX ADMIN — AMOXICILLIN AND CLAVULANATE POTASSIUM SCH MG: 500; 125 TABLET, FILM COATED ORAL at 15:49

## 2020-02-14 RX ADMIN — LISINOPRIL SCH MG: 20 TABLET ORAL at 08:41

## 2020-02-14 RX ADMIN — IPRATROPIUM BROMIDE AND ALBUTEROL SULFATE PRN ML: .5; 3 SOLUTION RESPIRATORY (INHALATION) at 02:03

## 2020-02-14 RX ADMIN — ENOXAPARIN SODIUM SCH MG: 100 INJECTION SUBCUTANEOUS at 08:41

## 2020-02-14 RX ADMIN — INSULIN ASPART SCH UNIT: 100 INJECTION, SOLUTION INTRAVENOUS; SUBCUTANEOUS at 06:19

## 2020-02-14 RX ADMIN — POTASSIUM CHLORIDE SCH MLS/HR: 200 INJECTION, SOLUTION INTRAVENOUS at 07:00

## 2020-02-14 RX ADMIN — INSULIN ASPART SCH UNIT: 100 INJECTION, SOLUTION INTRAVENOUS; SUBCUTANEOUS at 22:36

## 2020-02-14 RX ADMIN — POTASSIUM CHLORIDE SCH MEQ: 1500 TABLET, EXTENDED RELEASE ORAL at 07:00

## 2020-02-14 NOTE — PROGRESS NOTE - HOSPITALIST
Subjective


HPI/CC On Admission


Date Seen by Provider:  2020


Time Seen by Provider:  16:20


Pt is a 54yoCF witha PMH of IDDMII, methamphetamine use, CKD who presented to 

the ER due to altered mental status. She is currently treated and sedated and 

unable to provide any review of systems.  Her mother is at bedside but is 

limited use in history other than that she is a type I diabetic and had been 

very thirsty lately..  All history is obtained from review of records.  

Reportedly she was found by her son on the ground unresponsive and moaning.  EMS

was called and she was found to have a temperature of 93F.  She was also found 

to have a blood sugar reading of high per their device.  On arrival to the ER 

she was found to be in severe DKA with blood sugars are greater than 1000 and a 

bicarbonate of less than 5.  She was found to be 7.05.  She was intubated in the

ER and admitted to the ICU for DKA protocol.


Subjective/Events-last exam


Pt reports feeling exhausted. Not ready for discharge. Feels blood sugars aren't

yet controlled well enough.





Objective


Exam


Vital Signs





Vital Signs








  Date Time  Temp Pulse Resp B/P (MAP) Pulse Ox O2 Delivery O2 Flow Rate FiO2


 


20 12:43  117      


 


20 12:00 37.4  20 183/82 (115) 97 Room Air  


 


20 09:00       2.00 100





Capillary Refill : Less Than 3 Seconds


General Appearance:  No Apparent Distress, Chronically ill


Respiratory:  No Accessory Muscle Use, No Respiratory Distress


Neurologic/Psychiatric:  Alert, Oriented x3, Normal Mood/Affect





Results/Procedures


Lab


Laboratory Tests


20 06:28








Patient resulted labs reviewed.


Imaging:  Reviewed Imaging Report





Assessment/Plan


Assessment and Plan


Assess & Plan/Chief Complaint


DKA- resolved


IDDMI


   Continue bolus insulin


   A1c 14.8, reports not taking her insulin as prescribed


   Now blood sugars running high


   Will increase Levemir to 12 units QHS and monitor





Hypovolemic shock- resolved


ADITI on CKD


   Good UOP


   Creatinine improved





Acute Respiratory Failure


   Resolved


   Continue Augmentin





LLE edema- resolved


h/o of DVT


   USG negative for DVT





Normocytic anemia- stable


   PPI


   Consult Surgery, appreciate recs


   Hgb stable





Methamphetamine use


   Reviewed UDS- has been positive for meth multiple times since 


    consulted


   Patient reports no longer smoking but around it and that's why she thinks 

here UDS is positive





Debility


   PT/OT





Dispo: Plan for discharge tomorrow if continues to improve. She has called her 

workers and set them up and also set up meal delivery.





Critical Care


Critically Ill Patient





Diagnosis/Problems


Diagnosis/Problems





(1) Acute respiratory failure requiring reintubation


Status:  Acute


(2) Methamphetamine abuse


Status:  Acute


(3) Diabetic ketoacidosis


Status:  Acute


Qualifiers:  


   Diabetes mellitus type:  type 2  Diabetes mellitus complication detail:  with

coma  Qualified Codes:  E11.11 - Type 2 diabetes mellitus with ketoacidosis with

coma


(4) Acute kidney injury superimposed on chronic kidney disease


Status:  Acute


(5) High anion gap metabolic acidosis


Status:  Acute


(6) Leukocytosis


Status:  Acute


(7) Lactic acidosis


Status:  Acute


(8) Dehydration


Status:  Acute


(9) Hypotension


Status:  Acute


(10) Altered mental status


Status:  Acute





Clinical Quality Measures


DVT/VTE Risk/Contraindication:


Risk Factor Score Per Nursin


RFS Level Per Nursing on Admit:  4+=Very High











MISHA DEVRIES MD              2020 16:23

## 2020-02-14 NOTE — PHYSICAL THERAPY PROGRESS NOTE
Therapy Progress Note


PT to dismiss patient from services secondary to patient is up ad drew in hallway

with FWW without difficulty.  Patient reports she has ambulate x 5 during night 

and hopes to go home.  Patient has attained all functional goals.  


1 visit











HALIE SHERIDAN PT              Feb 14, 2020 10:06

## 2020-02-14 NOTE — NUR
RD ASSESSMENT 



PMHx: DVT; renal failure; pancreatitis; DM (type 1)



PT INTERACTION: Pt was awake and pleasant during dietary consult for DM education. Pt states 
current appetite is pretty good. Note avg PO intake >75% x3d, per chart review. Pt states 
following a low-CHO diet at home, and has some difficulty chewing d/t poor teeth. Pt states 
some recent episodes of nausea, usually brought about when coughing. Pt states no recent 
issues with constipation/diarrhea at this time. Note last BM was 2/14 and pt not currently 
on bowel regimen per chart review. Pt states recent fluctuations of weight recently and that 
her UBW is between 127-132#. Note recent 18# wt gain x1mon, per chart review. 



ABNORMAL NUTRITION-RELATED LAB VALUES

LOW: Ca 7.7; phos 1.9; Mg 1.5

HIGH: glu 311



Est. kcal needs: 9493-2319 kcal | 20-25 kcal/kg  

Est. Pro needs:  64-80 g Pro | 0.8-1.0 g Pro/kg 



PES STATEMENT: Food- and nutrition-related knowledge deficit (NB-1.1) related to 
unwilling/disinterested in learning/applying information as evidenced by pt interview | No 
prior knowledge of need for food- and nutrition-related recommendations



INTERVENTION:  

Continue with current diet order of CHO 60g/m 1snack diet. 

Provided and discussed handout on CHO counting. Provided examples of serving sizes and 
discussed fiber's role in glucose control. Discussed appropriate snack ideas and smartphone 
applications in CHO counting. Pt verbalized understanding of information and appeared 
confident to apply new information upon discharge. Provided contact information should pt 
have questions upon discharge. 

Will continue to follow and reassess as pt needs and status change. 



MONITOR/EVALUATE:  

PO Intake; Plan of Care; Hydration Status; Weight Status; Lab Values 





NIKKI Lao, MS, RD, LD

## 2020-02-14 NOTE — NUR
CM/SS:  Visited with pt as to plan for discharge



Plan:  Pt will discharge to home with Home Community Based Service hours through [facility] in 
place.



Summary:  Pt reports doing pretty good with her walking. Pt is reminded to call about her 
[employer] workers as to her care hours, and Call about her delivered meals for a week post 
hospital.  Pt has the information to do so, she reports she will do so.  Pt is given paper 
that she can take notes if needed.   Pt reports that her daughter can pick her up and she 
will not need to use [employer] transportation.

## 2020-02-15 VITALS — SYSTOLIC BLOOD PRESSURE: 142 MMHG | DIASTOLIC BLOOD PRESSURE: 77 MMHG

## 2020-02-15 VITALS — DIASTOLIC BLOOD PRESSURE: 72 MMHG | SYSTOLIC BLOOD PRESSURE: 175 MMHG

## 2020-02-15 VITALS — SYSTOLIC BLOOD PRESSURE: 127 MMHG | DIASTOLIC BLOOD PRESSURE: 76 MMHG

## 2020-02-15 LAB
BASOPHILS # BLD AUTO: 0 10^3/UL (ref 0–0.1)
BASOPHILS NFR BLD AUTO: 0 % (ref 0–10)
BUN/CREAT SERPL: 16
CALCIUM SERPL-MCNC: 7.8 MG/DL (ref 8.5–10.1)
CHLORIDE SERPL-SCNC: 107 MMOL/L (ref 98–107)
CO2 SERPL-SCNC: 21 MMOL/L (ref 21–32)
CREAT SERPL-MCNC: 1.22 MG/DL (ref 0.6–1.3)
EOSINOPHIL # BLD AUTO: 0.2 10^3/UL (ref 0–0.3)
EOSINOPHIL NFR BLD AUTO: 3 % (ref 0–10)
ERYTHROCYTE [DISTWIDTH] IN BLOOD BY AUTOMATED COUNT: 14 % (ref 10–14.5)
GFR SERPLBLD BASED ON 1.73 SQ M-ARVRAT: 46 ML/MIN
GLUCOSE SERPL-MCNC: 236 MG/DL (ref 70–105)
HCT VFR BLD CALC: 29 % (ref 35–52)
HGB BLD-MCNC: 9.5 G/DL (ref 11.5–16)
LYMPHOCYTES # BLD AUTO: 2.9 X 10^3 (ref 1–4)
LYMPHOCYTES NFR BLD AUTO: 33 % (ref 12–44)
MAGNESIUM SERPL-MCNC: 1.7 MG/DL (ref 1.6–2.4)
MANUAL DIFFERENTIAL PERFORMED BLD QL: NO
MCH RBC QN AUTO: 32 PG (ref 25–34)
MCHC RBC AUTO-ENTMCNC: 32 G/DL (ref 32–36)
MCV RBC AUTO: 98 FL (ref 80–99)
MONOCYTES # BLD AUTO: 0.6 X 10^3 (ref 0–1)
MONOCYTES NFR BLD AUTO: 7 % (ref 0–12)
NEUTROPHILS # BLD AUTO: 5.1 X 10^3 (ref 1.8–7.8)
NEUTROPHILS NFR BLD AUTO: 58 % (ref 42–75)
PHOSPHATE SERPL-MCNC: 2.2 MG/DL (ref 2.3–4.7)
PLATELET # BLD: 220 10^3/UL (ref 130–400)
PMV BLD AUTO: 10.2 FL (ref 7.4–10.4)
POTASSIUM SERPL-SCNC: 3.8 MMOL/L (ref 3.6–5)
SODIUM SERPL-SCNC: 137 MMOL/L (ref 135–145)
WBC # BLD AUTO: 8.8 10^3/UL (ref 4.3–11)

## 2020-02-15 RX ADMIN — DICLOFENAC SCH GM: 10 GEL TOPICAL at 12:50

## 2020-02-15 RX ADMIN — INSULIN ASPART SCH UNIT: 100 INJECTION, SOLUTION INTRAVENOUS; SUBCUTANEOUS at 08:25

## 2020-02-15 RX ADMIN — MAGNESIUM SULFATE IN DEXTROSE SCH MLS/HR: 10 INJECTION, SOLUTION INTRAVENOUS at 06:59

## 2020-02-15 RX ADMIN — PANTOPRAZOLE SODIUM SCH MG: 40 TABLET, DELAYED RELEASE ORAL at 09:34

## 2020-02-15 RX ADMIN — DICLOFENAC SCH GM: 10 GEL TOPICAL at 09:34

## 2020-02-15 RX ADMIN — INSULIN ASPART SCH UNIT: 100 INJECTION, SOLUTION INTRAVENOUS; SUBCUTANEOUS at 11:35

## 2020-02-15 RX ADMIN — LISINOPRIL SCH MG: 20 TABLET ORAL at 09:34

## 2020-02-15 RX ADMIN — POTASSIUM CHLORIDE SCH MEQ: 1500 TABLET, EXTENDED RELEASE ORAL at 06:34

## 2020-02-15 RX ADMIN — AMOXICILLIN AND CLAVULANATE POTASSIUM SCH MG: 500; 125 TABLET, FILM COATED ORAL at 06:50

## 2020-02-15 RX ADMIN — ENOXAPARIN SODIUM SCH MG: 100 INJECTION SUBCUTANEOUS at 08:24

## 2020-02-15 RX ADMIN — POTASSIUM CHLORIDE SCH MLS/HR: 200 INJECTION, SOLUTION INTRAVENOUS at 06:33

## 2020-02-15 NOTE — DISCHARGE INST-SIMPLE/STANDARD
Discharge Inst-Standard


Patient Instructions/Follow Up


Plan of Care/Instructions/FU:  


Please continue to take your medications as written. Please follow up with


your primary care doctor in the next week.


Activity as Tolerated:  Yes


Discharge Diet:  ADA Diet


Return to The Hospital For:  


Confusion, abdominal pain, nausea, vomiting, blood sugars greater than 400


consistently, if you feel you are


getting worse





Planned Outpatient Orders/Ref.


Pneu Vac Indicated:  Yes











MISHA DEVRIES MD              Feb 15, 2020 10:13

## 2020-02-15 NOTE — DISCHARGE SUMMARY
Diagnosis/Chief Complaint


Date of Admission


Feb 10, 2020 at 04:00


Date of Discharge





Discharge Date:  2020


Admission Diagnosis


DKA


Primary Care


No,Local Physician


Discharge Diagnosis





(1) Acute respiratory failure requiring reintubation


Status:  Acute


(2) Methamphetamine abuse


Status:  Acute


(3) Diabetic ketoacidosis


Status:  Acute


(4) Acute kidney injury superimposed on chronic kidney disease


Status:  Acute


(5) High anion gap metabolic acidosis


Status:  Acute


(6) Leukocytosis


Status:  Acute


(7) Lactic acidosis


Status:  Acute


(8) Dehydration


Status:  Acute


(9) Hypotension


Status:  Acute


(10) Altered mental status


Status:  Acute





Discharge Summary


Discharge Physical Exam


Allergies:  


Coded Allergies:  


     Sulfa (Sulfonamide Antibiotics) (Unverified  Allergy, Unknown, 6/25/15)


     codeine (Verified  Allergy, Unknown, TAKES ULTRAM AT HOME, 09)


     ketorolac (Verified  Allergy, Unknown, 08)


     tramadol (Verified  Allergy, Unknown, 11)


Vitals & I&Os





Vital Signs








  Date Time  Temp Pulse Resp B/P (MAP) Pulse Ox O2 Delivery O2 Flow Rate FiO2


 


2/15/20 09:02     97 Room Air  


 


2/15/20 08:00 36.3 110 20 127/76 (93)    


 


20 09:00       2.00 100











Hospital Course


Labs (last 24 hrs)


Laboratory Tests


20 11:16: Glucometer 363H


20 15:39: Glucometer 380H


20 20:29: Glucometer 480*H


2/15/20 03:44: Glucometer 302H


2/15/20 05:16: 


Sodium Level 137, Potassium Level 3.8, Chloride Level 107, Carbon Dioxide Level 

21, Anion Gap 9, Blood Urea Nitrogen 20H, Creatinine 1.22, Estimat Glomerular 

Filtration Rate 46, BUN/Creatinine Ratio 16, Glucose Level 236H, Calcium Level 

7.8L, Phosphorus Level 2.2L, Magnesium Level 1.7


2/15/20 05:18: 


White Blood Count 8.8, Red Blood Count 2.99L, Hemoglobin 9.5L, Hematocrit 29L, 

Mean Corpuscular Volume 98, Mean Corpuscular Hemoglobin 32, Mean Corpuscular 

Hemoglobin Concent 32, Red Cell Distribution Width 14.0, Platelet Count 220, 

Mean Platelet Volume 10.2, Neutrophils (%) (Auto) 58, Lymphocytes (%) (Auto) 33,

Monocytes (%) (Auto) 7, Eosinophils (%) (Auto) 3, Basophils (%) (Auto) 0, 

Neutrophils # (Auto) 5.1, Lymphocytes # (Auto) 2.9, Monocytes # (Auto) 0.6, 

Eosinophils # (Auto) 0.2, Basophils # (Auto) 0.0


2/15/20 05:31: Glucometer 208H





Microbiology


2/10/20 Gram Stain - Final, Complete


          


2/10/20 Sputum Culture - Final, Complete


          Streptococcus pneumoniae


          YEAST


          Usual upper respiratory kelli


2/10/20 Blood Culture - Preliminary, Resulted


          No growth


2/10/20 Urine Culture - Final, Complete


          NO GROWTH


Patient resulted labs reviewed.


Pending Labs


Laboratory Tests


2/15/20 03:44: Glucometer 302


2/15/20 05:16: 


Sodium Level 137, Potassium Level 3.8, Chloride Level 107, Carbon Dioxide Level 

21, Anion Gap 9, Blood Urea Nitrogen 20, Creatinine 1.22, Estimat Glomerular 

Filtration Rate 46, BUN/Creatinine Ratio 16, Glucose Level 236, Calcium Level 

7.8, Phosphorus Level 2.2, Magnesium Level 1.7


2/15/20 05:18: 


White Blood Count 8.8, Red Blood Count 2.99, Hemoglobin 9.5, Hematocrit 29, Mean

Corpuscular Volume 98, Mean Corpuscular Hemoglobin 32, Mean Corpuscular 

Hemoglobin Concent 32, Red Cell Distribution Width 14.0, Platelet Count 220, 

Mean Platelet Volume 10.2, Neutrophils (%) (Auto) 58, Lymphocytes (%) (Auto) 33,

Monocytes (%) (Auto) 7, Eosinophils (%) (Auto) 3, Basophils (%) (Auto) 0, 

Neutrophils # (Auto) 5.1, Lymphocytes # (Auto) 2.9, Monocytes # (Auto) 0.6, 

Eosinophils # (Auto) 0.2, Basophils # (Auto) 0.0


2/15/20 05:31: Glucometer 208





Imaging:  Reviewed Imaging Report





Discharge


Home Medications:





Active Scripts


Active


Lisinopril 20 Mg Tablet 20 Mg PO DAILY


Levemir (Insulin Determir) 1,000 Units/10 Ml Soln 13 Unit SQ HS


Amox Tr-K Clv 500-125 mg Tab (Amoxicillin/Potassium Clav) 1 Each Tablet 500 Mg 

PO BID WITH MEALS


Reported


Lantus Solostar (Insulin Glargine,Hum.rec.anlog) 100 Unit/1 Ml Insuln.pen 20 

Units SQ DAILY W/ BREAKFAST


Humalog Kwikpen (Insulin Lispro) 100 Unit/1 Ml Insuln.pen 5 Units SQ TIDWM


Proair Hfa (Albuterol Sulfate) 1 Puff Puff 2 Puff IH TID PRN


Niacin (Niacinamide) 500 Mg Tablet 500 Mg PO Q48H


Slow-Mag (Magnesium Chloride) 64 Mg Tab 2 Tab PO Q48H


Potassium (Potassium Gluconate) 99 Mg Tablet 99 Mg PO Q48H


Montelukast Sodium 10 Mg Tablet 5 Mg PO DAILY


     TAKES  OF A 10 MG TO EQUAL 5MG DAILY





Instructions to patient/family


Please see electronic discharge instructions given to patient.





Clinical Quality Measures


DVT/VTE Risk/Contraindication:


Risk Factor Score Per Nursin


RFS Level Per Nursing on Admit:  4+=Very High





Problem Qualifiers





(1) Diabetic ketoacidosis:  


Diabetes mellitus type:  type 2  Diabetes mellitus complication detail:  with 

coma  Qualified Codes:  E11.11 - Type 2 diabetes mellitus with ketoacidosis with

coma








MISHA DEVRIES MD              Feb 15, 2020 10:19

## 2020-02-15 NOTE — OCC THERAPY PROGRESS NOTE
Therapy Progress Note


Late Entry for 02/14/2020-Discharge patient from OT services secondary to 

patient is up ad drew and completing functional goals.











TRAVIS HASKINS               Feb 15, 2020 06:56

## 2020-02-17 NOTE — PHYSICIAN QUERY CLARIFICATION
PQ-Intro New Diagnosis


Admission/Discharge


Admission Date: Feb 10, 2020 at 04:00 


Discharge Date:  Feb 15, 2020 at 13:15


The medical record reflects the following clinical scenario:





History/Risk Factors:


DKA, acute respiratory failure, acute renal failure, metabolic acidosis





Clinical Findings:


sputum culture grew out streptococcus pneumoniae and yeast





Treatment:


IV Vancomycin and IV Zosyn





Question:  What condition best reflects the above clinical scenario? 


Please document a response in the Progress Noter or Discharge Summary.





1. strept pneumoniae and yeast are due to underlying condition (please list 

condition)





2. strept pneumoniae and yeast are contaminents, no respiratory infection





3. Other, with explanation of the clinical findings.





4. Clinically undetermined, no explanation for the clinical findings.





PHYSICIAN RESPONSE


What condition reflects above:  1 (CAP)


Please remember a lack of response to the above will prompt a phone page by 

CDI/Coding staff. 





In responding to this query, please exercise your independent professional 

judgment.  The purpose of this communication is to more accurately reflect the 

complexity of your patients condition. The fact that a question is asked does 

not imply that any particular answer is desired or expected.  





Thank you for your timely response to this clarification.      


   


Requestors name: Adam   





Phone # [phone]








THIS PHYSICIAN QUERY FORM IS A PERMANENT PART OF THE MEDICAL RECORD











ADAM HINES                 Feb 17, 2020 12:19


MISHA DEVRIES MD              Feb 20, 2020 11:29

## 2020-02-17 NOTE — PHYSICIAN QUERY CLARIFICATION
PQ-Conflicting Diagnosis


Admission/Discharge


Admission Date: Feb 10, 2020 at 04:00 


Discharge Date:  Feb 15, 2020 at 13:15








The medical record reflects the following clinical scenario:





History/Risk Factors:


DKA, Acute respiratory failure, hypovolemic shock, acute kidney failure, 

metabolic acidosis





Clinical Findings:


WBC 32.5, T 34.4, P 130, R 30, Lactic 6.69





Treatment:


IV Vancomycin, IV Zosyn





Question:


Do you agree with the impression of the severe sepsis per Dr. Andrade. Severe 

sepsis was not listed in final diagnoses.


Please document a response in Progress Note or Discharge Summary.





   1. Yes





   2. No





   3. Other, with explanation of clinical findings





   4. Clinically undetermined, no explanation for clinical findings.





PHYSICIAN RESPONSE


Do you agree w/Consulting Dx?:  No


Please remember a lack of response to the above will prompt a phone page by 

CDI/Coding staff. 





In responding to this query, please exercise your independent professional 

judgment.  The purpose of this communication is to more accurately reflect the 

complexity of your patients condition. The fact that a question is asked does 

not imply that any particular answer is desired or expected.  





Thank you for your timely response to this clarification.      


   


Requestors name: Adam   





Phone # [phone]








THIS PHYSICIAN QUERY FORM IS A PERMANENT PART OF THE MEDICAL RECORD











ADAM HINES                 Feb 17, 2020 12:11


MISHA DEVRIES MD              Feb 17, 2020 16:05

## 2020-06-17 ENCOUNTER — HOSPITAL ENCOUNTER (INPATIENT)
Dept: HOSPITAL 75 - ER | Age: 55
LOS: 4 days | Discharge: HOME | DRG: 638 | End: 2020-06-21
Attending: FAMILY MEDICINE | Admitting: FAMILY MEDICINE
Payer: MEDICAID

## 2020-06-17 VITALS — SYSTOLIC BLOOD PRESSURE: 109 MMHG | DIASTOLIC BLOOD PRESSURE: 58 MMHG

## 2020-06-17 VITALS — DIASTOLIC BLOOD PRESSURE: 50 MMHG | SYSTOLIC BLOOD PRESSURE: 109 MMHG

## 2020-06-17 VITALS — DIASTOLIC BLOOD PRESSURE: 53 MMHG | SYSTOLIC BLOOD PRESSURE: 119 MMHG

## 2020-06-17 VITALS — DIASTOLIC BLOOD PRESSURE: 65 MMHG | SYSTOLIC BLOOD PRESSURE: 103 MMHG

## 2020-06-17 VITALS — BODY MASS INDEX: 26.89 KG/M2 | HEIGHT: 65 IN | WEIGHT: 161.38 LBS

## 2020-06-17 VITALS — DIASTOLIC BLOOD PRESSURE: 56 MMHG | SYSTOLIC BLOOD PRESSURE: 98 MMHG

## 2020-06-17 VITALS — SYSTOLIC BLOOD PRESSURE: 117 MMHG | DIASTOLIC BLOOD PRESSURE: 55 MMHG

## 2020-06-17 VITALS — DIASTOLIC BLOOD PRESSURE: 66 MMHG | SYSTOLIC BLOOD PRESSURE: 101 MMHG

## 2020-06-17 VITALS — SYSTOLIC BLOOD PRESSURE: 99 MMHG | DIASTOLIC BLOOD PRESSURE: 47 MMHG

## 2020-06-17 DIAGNOSIS — E11.10: Primary | ICD-10-CM

## 2020-06-17 DIAGNOSIS — Z90.710: ICD-10-CM

## 2020-06-17 DIAGNOSIS — Z95.820: ICD-10-CM

## 2020-06-17 DIAGNOSIS — Z79.4: ICD-10-CM

## 2020-06-17 DIAGNOSIS — E11.22: ICD-10-CM

## 2020-06-17 DIAGNOSIS — E11.43: ICD-10-CM

## 2020-06-17 DIAGNOSIS — K21.9: ICD-10-CM

## 2020-06-17 DIAGNOSIS — F60.9: ICD-10-CM

## 2020-06-17 DIAGNOSIS — J44.9: ICD-10-CM

## 2020-06-17 DIAGNOSIS — I12.9: ICD-10-CM

## 2020-06-17 DIAGNOSIS — E11.40: ICD-10-CM

## 2020-06-17 DIAGNOSIS — M06.9: ICD-10-CM

## 2020-06-17 DIAGNOSIS — N17.9: ICD-10-CM

## 2020-06-17 DIAGNOSIS — F12.90: ICD-10-CM

## 2020-06-17 DIAGNOSIS — F17.210: ICD-10-CM

## 2020-06-17 DIAGNOSIS — Z90.89: ICD-10-CM

## 2020-06-17 DIAGNOSIS — Z86.718: ICD-10-CM

## 2020-06-17 DIAGNOSIS — N18.3: ICD-10-CM

## 2020-06-17 DIAGNOSIS — K31.84: ICD-10-CM

## 2020-06-17 DIAGNOSIS — M19.91: ICD-10-CM

## 2020-06-17 DIAGNOSIS — Z87.01: ICD-10-CM

## 2020-06-17 LAB
ALBUMIN SERPL-MCNC: 3.5 GM/DL (ref 3.2–4.5)
ALP SERPL-CCNC: 123 U/L (ref 40–136)
ALT SERPL-CCNC: 31 U/L (ref 0–55)
APTT PPP: YELLOW S
ARTERIAL PATENCY WRIST A: (no result)
BACTERIA #/AREA URNS HPF: (no result) /HPF
BARBITURATES UR QL: NEGATIVE
BASE EXCESS STD BLDA CALC-SCNC: -17.2 MMOL/L (ref -2.5–2.5)
BASOPHILS # BLD AUTO: 0 10^3/UL (ref 0–0.1)
BASOPHILS NFR BLD AUTO: 0 % (ref 0–10)
BDY SITE: (no result)
BENZODIAZ UR QL SCN: NEGATIVE
BILIRUB SERPL-MCNC: 0.5 MG/DL (ref 0.1–1)
BILIRUB UR QL STRIP: NEGATIVE
BODY TEMPERATURE: 37.6
BUN/CREAT SERPL: 24
BUN/CREAT SERPL: 24
CALCIUM SERPL-MCNC: 8.6 MG/DL (ref 8.5–10.1)
CALCIUM SERPL-MCNC: 9.6 MG/DL (ref 8.5–10.1)
CHLORIDE SERPL-SCNC: 79 MMOL/L (ref 98–107)
CHLORIDE SERPL-SCNC: 89 MMOL/L (ref 98–107)
CO2 BLDA CALC-SCNC: 8.3 MMOL/L (ref 21–31)
CO2 SERPL-SCNC: 14 MMOL/L (ref 21–32)
CO2 SERPL-SCNC: 8 MMOL/L (ref 21–32)
COCAINE UR QL: NEGATIVE
CREAT SERPL-MCNC: 3.11 MG/DL (ref 0.6–1.3)
CREAT SERPL-MCNC: 3.27 MG/DL (ref 0.6–1.3)
EOSINOPHIL # BLD AUTO: 0 10^3/UL (ref 0–0.3)
EOSINOPHIL NFR BLD AUTO: 0 % (ref 0–10)
ERYTHROCYTE [DISTWIDTH] IN BLOOD BY AUTOMATED COUNT: 13.5 % (ref 10–14.5)
FIBRINOGEN PPP-MCNC: CLEAR MG/DL
GFR SERPLBLD BASED ON 1.73 SQ M-ARVRAT: 15 ML/MIN
GFR SERPLBLD BASED ON 1.73 SQ M-ARVRAT: 15 ML/MIN
GLUCOSE SERPL-MCNC: 1322 MG/DL (ref 70–105)
GLUCOSE SERPL-MCNC: 929 MG/DL (ref 70–105)
GLUCOSE UR STRIP-MCNC: (no result) MG/DL
HCT VFR BLD CALC: 38 % (ref 35–52)
HGB BLD-MCNC: 11.8 G/DL (ref 11.5–16)
INHALED O2 FLOW RATE: (no result) L/MIN
KETONES UR QL STRIP: (no result)
LEUKOCYTE ESTERASE UR QL STRIP: NEGATIVE
LIPASE SERPL-CCNC: 71 U/L (ref 8–78)
LYMPHOCYTES # BLD AUTO: 1.1 X 10^3 (ref 1–4)
LYMPHOCYTES NFR BLD AUTO: 10 % (ref 12–44)
MANUAL DIFFERENTIAL PERFORMED BLD QL: YES
MCH RBC QN AUTO: 31 PG (ref 25–34)
MCHC RBC AUTO-ENTMCNC: 31 G/DL (ref 32–36)
MCV RBC AUTO: 99 FL (ref 80–99)
METHADONE UR QL SCN: NEGATIVE
METHAMPHETAMINE SCREEN URINE S: NEGATIVE
MONOCYTES # BLD AUTO: 0.1 X 10^3 (ref 0–1)
MONOCYTES NFR BLD AUTO: 0 % (ref 0–12)
MONOCYTES NFR BLD: 1 %
NEUTROPHILS # BLD AUTO: 10.1 X 10^3 (ref 1.8–7.8)
NEUTROPHILS NFR BLD AUTO: 90 % (ref 42–75)
NEUTS BAND NFR BLD MANUAL: 84 %
NEUTS BAND NFR BLD: 3 %
NITRITE UR QL STRIP: NEGATIVE
OPIATES UR QL SCN: NEGATIVE
OXYCODONE UR QL: NEGATIVE
PCO2 BLDA: 15 MMHG (ref 35–45)
PH BLDA: 7.34 [PH] (ref 7.37–7.43)
PH UR STRIP: 6 [PH] (ref 5–9)
PLATELET # BLD: 444 10^3/UL (ref 130–400)
PMV BLD AUTO: 10.1 FL (ref 7.4–10.4)
PO2 BLDA: 147 MMHG (ref 79–93)
POTASSIUM SERPL-SCNC: 4.4 MMOL/L (ref 3.6–5)
POTASSIUM SERPL-SCNC: 6.6 MMOL/L (ref 3.6–5)
PROPOXYPH UR QL: NEGATIVE
PROT SERPL-MCNC: 8.4 GM/DL (ref 6.4–8.2)
PROT UR QL STRIP: (no result)
RBC #/AREA URNS HPF: (no result) /HPF
RBC MORPH BLD: NORMAL
SAO2 % BLDA FROM PO2: 97 % (ref 94–100)
SODIUM SERPL-SCNC: 116 MMOL/L (ref 135–145)
SODIUM SERPL-SCNC: 123 MMOL/L (ref 135–145)
SP GR UR STRIP: 1.02 (ref 1.02–1.02)
SQUAMOUS #/AREA URNS HPF: (no result) /HPF
TRICYCLICS UR QL SCN: NEGATIVE
VARIANT LYMPHS NFR BLD MANUAL: 12 %
VENTILATION MODE VENT: NO
WBC # BLD AUTO: 11.3 10^3/UL (ref 4.3–11)
WBC #/AREA URNS HPF: (no result) /HPF

## 2020-06-17 PROCEDURE — 83690 ASSAY OF LIPASE: CPT

## 2020-06-17 PROCEDURE — 85025 COMPLETE CBC W/AUTO DIFF WBC: CPT

## 2020-06-17 PROCEDURE — 80053 COMPREHEN METABOLIC PANEL: CPT

## 2020-06-17 PROCEDURE — 83735 ASSAY OF MAGNESIUM: CPT

## 2020-06-17 PROCEDURE — 81000 URINALYSIS NONAUTO W/SCOPE: CPT

## 2020-06-17 PROCEDURE — 84100 ASSAY OF PHOSPHORUS: CPT

## 2020-06-17 PROCEDURE — 82947 ASSAY GLUCOSE BLOOD QUANT: CPT

## 2020-06-17 PROCEDURE — 96361 HYDRATE IV INFUSION ADD-ON: CPT

## 2020-06-17 PROCEDURE — 82805 BLOOD GASES W/O2 SATURATION: CPT

## 2020-06-17 PROCEDURE — 99291 CRITICAL CARE FIRST HOUR: CPT

## 2020-06-17 PROCEDURE — 85007 BL SMEAR W/DIFF WBC COUNT: CPT

## 2020-06-17 PROCEDURE — 83036 HEMOGLOBIN GLYCOSYLATED A1C: CPT

## 2020-06-17 PROCEDURE — 82962 GLUCOSE BLOOD TEST: CPT

## 2020-06-17 PROCEDURE — 96365 THER/PROPH/DIAG IV INF INIT: CPT

## 2020-06-17 PROCEDURE — 36415 COLL VENOUS BLD VENIPUNCTURE: CPT

## 2020-06-17 PROCEDURE — 80048 BASIC METABOLIC PNL TOTAL CA: CPT

## 2020-06-17 PROCEDURE — 82010 KETONE BODYS QUAN: CPT

## 2020-06-17 PROCEDURE — 87081 CULTURE SCREEN ONLY: CPT

## 2020-06-17 PROCEDURE — 80306 DRUG TEST PRSMV INSTRMNT: CPT

## 2020-06-17 PROCEDURE — 96375 TX/PRO/DX INJ NEW DRUG ADDON: CPT

## 2020-06-17 PROCEDURE — 85027 COMPLETE CBC AUTOMATED: CPT

## 2020-06-17 RX ADMIN — POTASSIUM CHLORIDE SCH MLS/HR: 200 INJECTION, SOLUTION INTRAVENOUS at 22:26

## 2020-06-17 RX ADMIN — INSULIN HUMAN SCH UNIT: 100 INJECTION, SOLUTION PARENTERAL at 18:23

## 2020-06-17 RX ADMIN — HYDROCODONE BITARTRATE AND ACETAMINOPHEN PRN TAB: 5; 325 TABLET ORAL at 22:26

## 2020-06-17 RX ADMIN — SODIUM CHLORIDE SCH MLS/HR: 4.5 INJECTION, SOLUTION INTRAVENOUS at 21:20

## 2020-06-17 NOTE — XMS REPORT
Patient Summarization Differential

                             Created on: 2020



DAYTON BLISS

External Reference #: [id number]

: 1965

Sex: Female



Demographics





                          Address                   [address] Phone                [phone]

 

                          Preferred Language        English

 

                          Marital Status            Unknown

 

                          Islam Affiliation     Unknown

 

                          Race                      White

 

                          Ethnic Group              Unknown





Author





                          Author                    [facility]              [facility]

 

                          Address                   [address] [place] [address]



 

                          Phone                     [phone]







Care Team Providers





                    Care Team Member Name Role                Phone

 

                    JULY VOSS CHULA Unavailable         [phone]

 

                    faithDEMETRIUS RODRIGUEZ     Unavailable         [phone]

 

                    JANAE MENDEZ, RANDY MELENEDZ  Unavailable         Unavailable

 

                    KAYCE MENDEZ, MISHA ROBERTS  Unavailable         Unavailable

 

                    NATALYA MENDEZ, CAMPBELL ROBERTS  Unavailable         Unavailable

 

                    NATALYA MENDEZ, CAMPBELL ROBERTS  Unavailable         Unavailable

 

                    DAYTON BARBER DO     Unavailable         Unavailable

 

                    LIAM MCDERMOTT Unavailable         Unavailable

 

                    SARAHI JOSEPH MD  Unavailable         Unavailable

 

                          Unavailable               Unavailable



                                            



Allergies

          No Information                                                        
 



Medications

          No Information                                                        
 



Problems

                      Active Problems            

            



      



           Problem   Normalized  Date Last  Normalized  Normalized  Provider  Fa

cility



              Classification  Problem(s)   Recorded     Problem      Problem Sta

tus  



                                         Duration   

 

      



            Other liver  Abnormal   Episodic   Active     JULY    Not Availab

le



                 diseases (3     levels of       GELLENDER , DO  (83432)



                           sources.)                 other serum     



                                         enzymes     

 

      



            Residual   Acquired   Episodic   Active     LIAM GAONA  Not Availab

le



                     codes;              absence of          (86053)



                           unclassified              both cervix     



                           (4 sources.)              and uterus     

 

      



            Residual   Acquired   Episodic   Active     JENA SAUNDERS Via



                 codes;          absence of      [facility]



                     unclassified        other organs        Hospital -



                           (1 source.)               [place]



                                         ([address])

 

      



            Acute and  Acute kidney   Episodic   Active     JULY    Not Avail

able



                 unspecified     failure,        GELLENDER , DO  (25943)



                           renal failure             unspecified     



                           (16 sources.)             Translations:     



                                         [ ACUTE KIDNEY     



                                         FAILURE WITH     



                                         TUBULAR     



                                         NECROS, ACUTE     



                                         KIDNEY     



                                         FAILURE,     



                                         UNSPECIFIED]     

 

      



            Respiratory  Acute      Episodic   Active     JENA CARDENAS Vi

a



                 failure;        respiratory     [facility]



                     insufficiency;      failure,            Hospital -



                     arrest (adult)      unspecified         [place]



                     (6 sources.)        whether with        (67580)



                                         hypoxia or     



                                         hypercapnia     

 

      



            Allergic   Allergy status   Episodic   Active     [facility] BEEBE Via



                 reactions (8    to narcotic     [facility]



                     sources.)           agent status        Hospital -



                           Translations:             [place]



                           [ ALLERGY                 (89583)



                                         STATUS TO     



                                         SULFONAMIDES     



                                         STATUS,     



                                         ALLERGY STATUS     



                                         TO NARCOTIC     



                                         AGENT STATUS]     

 

      



            Deficiency and  Anemia,    Episodic   Active     CAMPBELL NATALYA ,  [facility]

 Via



                 other anemia    unspecified     [facility]



                           (6 sources.)              Hospital [facility]



                                         (10187)

 

      



            Personality  Borderline   Chronic    Active     JULY    Not Avail

able



                 disorders (17   personality     BIPIN DO  (64931)



                           sources.)                 disorder     



                                         Translations:     



                                         [ PERSONALITY     



                                         DISORDER,     



                                         UNSPECIFIED]     

 

      



            Chronic kidney  Chronic kidney   Chronic    Active     AHMED      No

t Available



                 disease (22     disease, stage     ABOUL-MAGD ,    (61399)



                     sources.)           3 (moderate)        MD 



                                         Translations:     



                                         [ CHRONIC     



                                         KIDNEY     



                                         DISEASE, STAGE     



                                         II (MILD),     



                                         CHRONIC KIDNEY     



                                         DISEASE,     



                                         UNSPECIFIED,     



                                         CHRONIC KIDNEY     



                                         DISEASE, STAGE     



                                         III (MODER,     



                                         CHRONIC KIDNEY     



                                         DISEASE, STAGE     



                                         2 (MILD)]     

 

      



            Chronic    Chronic    Chronic    Active     CAMPBELL NATALYA ,  [facility]



                 obstructive     obstructive     [facility]



                     pulmonary           pulmonary           [facility] -



                     disease and         disease,            [place]



                     bronchiectasis      unspecified         (46406)



                                         (14 sources.)      

 

      



            Diabetes   Diabetes   Chronic    Active     MEICHELLE WOOD  Not Avai

lable



                     mellitus            mellitus            (02082)



                           without                   without     



                           complication              mention of     



                           (11 sources.)             complication,     



                                         type I     



                                         [juvenile     



                                         type], not     



                                         stated as     



                                         uncontrolled     



                                         Translations:     



                                         [ TYPE 2     



                                         DIABETES     



                                         MELLITUS     



                                         WITHOUT     



                                         COMPLIC]     

 

      



            Diabetes   Diabetes with   Chronic    Active     FELIZ    Not Avai

lable



                 mellitus with   other           DO MARCELLUS   (03117)



                           complications             specified     



                           (21 sources.)             manifestations     



                                         , type II or     



                                         unspecified     



                                         type, not     



                                         stated as     



                                         uncontrolled     



                                         Translations:     



                                         [ DIAB KATHERIN WO     



                                         COMPL, TYPE I     



                                         [JUVENILE TYP,     



                                         TYPE 1     



                                         DIABETES     



                                         MELLITUS WITH     



                                         HYPERGLYCE,     



                                         TYPE 2     



                                         DIABETES     



                                         MELLITUS W OTH     



                                         DIABETIC ,     



                                         DIAB W RENAL     



                                         MANIFEST, TYPE     



                                         I [JUVENILE ,     



                                         DIAB W RENAL     



                                         MANIFEST, TYPE     



                                         II OR UNSPEC,     



                                         TYPE 2     



                                         DIABETES     



                                         MELLITUS WITH     



                                         DIABETIC N,     



                                         TYPE 2     



                                         DIABETES     



                                         MELLITUS W     



                                         DIABETIC ,     



                                         DIAB W NEURO     



                                         MANIFEST, TYPE     



                                         I [JUVENILE ,     



                                         TYPE 1     



                                         DIABETES     



                                         MELLITUS WITH     



                                         KETOACIDOS,     



                                         TYPE 1     



                                         DIABETES     



                                         MELLITUS WITH     



                                         DIABETIC N,     



                                         TYPE 1     



                                         DIABETES W     



                                         DIABETIC     



                                         AUTONOMIC (PO,     



                                         TYPE 2     



                                         DIABETES     



                                         MELLITUS WITH     



                                         KETOACIDOS,     



                                         TYPE 2     



                                         DIABETES W     



                                         DIABETIC     



                                         AUTONOMIC (PO,     



                                         TYPE 1     



                                         DIABETES     



                                         MELLITUS WITH     



                                         KETOACIDOS,     



                                         TYPE 1     



                                         DIABETES     



                                         MELLITUS WITH     



                                         HYPOGLYCEM]     

 

      



            Unclassified  Dissociative   Chronic    Active     JULY    Not Av

ailable



                 (3 sources.)    identity        GELLOSDER , DO  (56795)



                                         disorder     

 

      



            Diseases of  Elevated white   Chronic    Active     CAMPBELL NATALYA ,  

[facility]



                 white blood     blood cell      [facility]



                     cells (7            count,              Hospital -



                     sources.)           unspecified         [place]



                                         (76132)

 

      



            Esophageal  Esophageal   Chronic    Active     JULY    Not Availa

ble



                 disorders (21   reflux          GELLENDER , DO  (12034)



                           sources.)                 Translations:     



                                         [     



                                         GASTRO-ESOPHAG     



                                         EAL REFLUX     



                                         DISEASE     



                                         WITHOUT]     

 

      



            Residual   Family history   Episodic   Active     LIAM GAONA  Not A

vailable



                     codes;              of ischemic         (83828)



                           unclassified              heart disease     



                           (3 sources.)              and other     



                                         diseases of     



                                         the     



                                         circulatory     



                                         system     

 

      



            Residual   Family history   Episodic   Active     LIAM GAONA  [facility]

 Via



                 codes;          [facility]     [facility]



                     unclassified        neoplasm of         Hospital -



                     (1 source.)         other organs        [place]



                           or systems                (44985)

 

      



            Gastrointestin  Gastrointestin   Episodic   Active     [facility]AMPBELL HOANG [facility]



                 al hemorrhage   al hemorrhage,     [facility]



                     (3 sources.)        unspecified         Hospital -



                                         [place]



                                         (36447)

 

      



            Other      Gastroparesis   Episodic   Active     LIAM GAONA [facility] 

Via



                     disorders of        [facility]                July



                           stomach and               Hospital -



                           duodenum (1               [place]



                           source.)                  (92574)

 

      



            Other injuries  History of   Episodic   Active     CAMPBELL HOANG  V

CH Via



                 and conditions  falling         [facility]



                           due to                    Hospital -



                           external                  [place]



                           causes (6                 (00744)



                                         sources.)      

 

      



            Diabetes   Hyperglycemia,   Episodic   Active     JULY    Not Brittanie

ilable



                 mellitus        unspecified     GELLENDER , DO  (57297)



                                         without      



                                         complication      



                                         (3 sources.)      

 

      



            Disorders of  Hyperlipidemia   Chronic    Active     AHMED      Not 

Available



                 lipid           , unspecified     ABOUL-MAGD ,    (82191)



                     metabolism (21      Translations:       MD 



                           sources.)                 [     



                                         HYPERLIPIDEMIA     



                                         NEC/NOS]     

 

      



            Hypertension  Hypertensive   Chronic    Active     MEICHELLE WOOD  N

ot Available



                     with                chronic kidney      (97435)



                           complications             disease with     



                           and secondary             stage 1     



                           hypertension              through stage     



                           (16 sources.)             4 chronic     



                                         kidney     



                                         disease, or     



                                         unspecified     



                                         chronic kidney     



                                         disease     



                                         Translations:     



                                         [ HYPTNSV CHR     



                                         KID DIS,     



                                         BENIGN, W CHR     



                                         KD ST]     

 

      



            Other      Hypotension,   Episodic   Active     [facility]AMPBELL HOANG [facility] 

Via



                 circulatory     unspecified     [facility]



                           disease (6                Hospital -



                           sources.)                 [place]



                                         (23430)

 

      



            Other      Hypotension,   Episodic   Active     JULY    Not Avail

able



                 circulatory     unspecified     GELLENDER , DO  (69213)



                                         disease (3      



                                         sources.)      

 

      



            Shock (6   Hypovolemic   Episodic   Active     JENA CARDENAS V

ia



                 sources.)       shock           [facility]



                                         [facility]



                                         (00538)

 

      



            Other      Long term   Episodic   Active     LIAM GAONA  Not Availa

ble



                     aftercare (4        (current) use       (10092)



                           sources.)                 of     



                                         anticoagulants     

 

      



            Other      Long term   Episodic   Active     LIAM GAONA  Not Availa

ble



                     aftercare (18       (current) use       (80376)



                           sources.)                 of insulin     

 

      



            Other      Long-term   Episodic   Active     JULY    Not Availabl

e



                 aftercare (3    (current) use     GELLENDER , DO  (44198)



                           sources.)                 of insulin     

 

      



            Nonmalignant  Lump or mass   Episodic   Active     JULY    Not Av

ailable



                 breast          in breast       GELLENDER , DO  (85223)



                                         conditions (3      



                                         sources.)      

 

      



            Headache;  Migraine,   Chronic    Active     MELODY    Not Availabl

e



                 including       unspecified,     MD TIMOTHY   (94274)



                           migraine (2               without     



                           sources.)                 mention of     



                                         intractable     



                                         migraine     



                                         without     



                                         mention of     



                                         status     



                                         migrainosus     

 

      



            Nephritis;  Nephritis and   Chronic    Active     MEICHELLE WOOD  No

t Available



                     nephrosis;          nephropathy,        (58570)



                           renal                     not specified     



                           sclerosis (10             as acute or     



                           sources.)                 chronic, in     



                                         diseases     



                                         classified     



                                         elsewhere     

 

      



            Other      Other      Episodic   Active     JULY    Not Available



                 connective      calcification     GELLENDER , DO  (48049)



                           tissue disease            of muscle,     



                           (2 sources.)              other site     

 

      



            Other nervous  Other chronic   Chronic    Active     MISHA DEVRIES 

 [facility] Via



                 system          pain            [facility]



                           disorders (3              Hospital -



                           sources.)                 [place]



                                         (15618)

 

      



            Other nervous  Other      Chronic    Active     JENA CARDENAS 

Via



                 system          encephalopathy     [facility]



                           disorders (6              Hospital -



                           sources.)                 [place]



                                         (53134)

 

      



            Other      Other long   Episodic   Active     AHMED      Not Availab

le



                 aftercare (3    term (current)     ABOUL-MAGD ,    (26034)



                     sources.)           drug therapy        MD 

 

      



            Unclassified  Other      Episodic   Active     JULY    Not Availa

ble



                 (7 sources.)    screening       GELDANK , DO  (81810)



                                         mammogram     



                                         Translations:     



                                         [ ABNORMAL     



                                         RESULTS OF     



                                         LIVER FUNCTION     



                                         STUDI]     

 

      



            Other upper  Other seasonal   Chronic    Active     CAMPBELL HOANG [facility]



                 respiratory     allergic        [facility]



                     disease (7          rhinitis            Hospital -



                           sources.)                 [place]



                                         (75514)

 

      



            Other      Pain in left   Episodic   Active     JULY    Not Avail

able



                 non-traumatic   elbow           GELLENDER , DO  (90512)



                                         joint      



                                         disorders (2      



                                         sources.)      

 

      



            Other      Personal   Episodic   Active     CAMPBLEL HOANG [facility]



                 circulatory     history of      [facility]



                     disease (3          other diseases      Hospital -



                     sources.)           of the              [place]



                           circulatory               (39108)



                                         system     

 

      



            Gastroduodenal  Personal   Episodic   Active     [facility]AMPBELL HOANG [facility]

 Via



                 ulcer (except   history of      [facility]



                     hemorrhage)         peptic ulcer        Hospital -



                     (13 sources.)       disease             [place]



                                         (43988)

 

      



            Other lower  Personal   Episodic   Active     LIAM DIXONES  Not Avail

able



                     respiratory         history of          (86427)



                           disease (5                pneumonia     



                           sources.)                 (recurrent)     

 

      



            Phlebitis;  Personal   Episodic   Active     JULY    Not Availabl

e



                 thrombophlebit  history of      GELLENDER , DO  (38421)



                           is and                    venous     



                           thromboembolis            thrombosis and     



                           m (20                     embolism     



                           sources.)                 Translations:     



                                         [ ACUTE VENOUS     



                                         EMBOLISM     



                                         THROMBOSIS     



                                         UNSP ,     



                                         PERSONAL     



                                         HISTORY OF     



                                         OTHER VENOUS     



                                         THROMBO]     

 

      



            Pneumonia  Pneumonia due   Episodic   Active     JENA CARDENAS

 Via



                 (except that    to              [facility]



                     caused by           Streptococcus       Hospital -



                     tuberculosis        pneumoniae          [place]



                     or sexually         Translations:       (09267)



                           transmitted               [ PULMONARY     



                           disease) (2               CANDIDIASIS]     



                                         sources.)      

 

      



            Poisoning by  Poisoning by   Episodic   Active     JULY    Not Av

ailable



                 psychotropic    benzodiazepine     GELLENDER , DO  (89537)



                           agents (3                 -based     



                           sources.)                 tranquilizers     

 

      



            Poisoning by  Poisoning by   Episodic   Active     JULY    Not Av

ailable



                 other           other opiates     GELLENDER , DO  (84023)



                           medications               and related     



                           and drugs (3              narcotics     



                                         sources.)      

 

      



            Other      Presence of   Chronic    Active     JENA CARDENAS V

ia



                 circulatory     other vascular     [facility]



                     disease (7          implants and        Hospital -



                     sources.)           grafts              [place]



                                         (35151)

 

      



            Genitourinary  Presence of   Chronic    Active     CAMPBELL HOANG  V

[facility]



                 symptoms and    urogenital      [facility]



                     ill-defined         implants            Hospital -



                           conditions (3             [place]



                           sources.)                 (01535)

 

      



            Genitourinary  Proteinuria,   Episodic   Active     MEICHELLE WOOD  

Not Available



                     symptoms and        unspecified         (52820)



                           ill-defined               Translations:     



                           conditions (20            [ PROTEINURIA]     



                                         sources.)      

 

      



            Rheumatoid  Rheumatoid   Chronic    Active     JULY    Not Availa

ble



                 arthritis and   arthritis       GELLENDER , DO  (83889)



                           related                   Translations:     



                           disease (21               [ RHEUMATOID     



                           sources.)                 ARTHRITIS,     



                                         UNSPECIFIED]     

 

      



            Abdominal pain  Right lower   Episodic   Active     LIAM GAONA  Not

 Available



                     (5 sources.)        quadrant pain       (26494)

 

      



            Septicemia  Sepsis,    Episodic   Active     JENA CARDENAS [facility]



                 (except in      unspecified     [facility]



                     labor) (4           organism            Hospital -



                     sources.)           Translations:       [place]



                           [ SEVERE                  (76305)



                                         SEPSIS WITHOUT     



                                         SEPTIC SHOCK]     

 

      



            Cardiac    Tachycardia,   Episodic   Active     [facility] 

[facility]



                 dysrhythmias    unspecified     [facility]



                           (6 sources.)              Hospital -



                                         [place]



                                         ([address]607)

 

      



            Asthma (5  Unspecified   Chronic    Active     JULY    Not Availa

ble



                 sources.)       asthma,         GELLENDER , DO  (52879)



                                         uncomplicated     



                                         Translations:     



                                         [ ASTHMA,     



                                         UNSPECIFIED]     

 

      



            Essential  Unspecified   Chronic    Active     JULY    Not Availa

ble



                 hypertension    essential       GELLENDER , DO  (98520)



                           (1 source.)               hypertension     

 

      



            Osteoarthritis  Unspecified   Chronic    Active     LIAM GAONA  Not

 Available



                     (17 sources.)       osteoarthritis      (04217)



                                         , unspecified     



                                         site     



                                         Translations:     



                                         [ PRIMARY     



                                         OSTEOARTHRITIS     



                                         , UNSPECIFIED     



                                         SITE]     



            

            Past or Other Problems            

            



      



           Problem   Normalized  Date Last  Normalized  Normalized  Provider  Fa

cility



              Classification  Problem(s)   Recorded     Problem      Problem Sta

tus  



                                         Duration   

 

      



            External   Accidental   no information  no information  JULY    N

ot Available



                 Injury -        poisoning by     GELLENDER , DO  (36479)



                           Poisoning (5              other opiates     



                           sources.)                 and related     



                                         narcotics     



                                         Translations:     



                                         [ ACC     



                                         POISN-BENZDIAZ     



                                         TRANQ]     

 

      



            Pancreatic  Acute      Episodic   Completed  JULY    Not Availabl

e



                 disorders (not  pancreatitis     GELLENDER , DO  (57943)



                                         diabetes) (1      



                                         source.)      

 

      



            Spondylosis;  Dorsalgia,   Episodic   Completed  [facility]

 Via



                 intervertebral  unspecified     [facility]



                           disc                      [facility] -



                           disorders;                [place]



                           other back                (43352)



                                         problems (7      



                                         sources.)      

 

      



            Other      Gastroparesis   Episodic   Completed  JULY    Not Avai

lable



                     disorders of        GELLENDER , DO      (06493)



                                         stomach and      



                                         duodenum (1      



                                         source.)      

 

      



            External   Home accidents   no information  no information  JULY 

   Not 

Available



                     Injury - Place      GELLENDER , DO      (82157)



                                         of occurrence      



                                         (3 sources.)      

 

      



            Noninfectious  Other and   Episodic   Completed  JULY    Not Avai

lable



                 gastroenteriti  unspecified     GELLENDER , DO  (64489)



                           s (1 source.)             noninfectious     



                                         gastroenteriti     



                                         s and colitis     

 

      



            Nonspecific  Other chest   Episodic   Completed  JENA BEEBE

 Via



                 chest pain (3   pain            [facility]



                           sources.)                 [facility]



                                         (62219)

 

      



            Residual   Patient's   Episodic   Completed  [facility]



                 codes;          [facility]



                     unclassified        noncompliance       Hospital -



                     (7 sources.)        with                [place]



                           medication                (06916)



                                         regimen     

 

      



            Other      Personal   Episodic   Completed  LIAM GAONA  Not Availab

le



                     gastrointestin      history of          (09627)



                           al disorders              other diseases     



                           (11 sources.)             of the     



                                         digestive     



                                         system     

 

      



            Nausea and  Vomiting,   no information  no information  JENA BEEBE [facility]



                 vomiting (3     unspecified     [facility]



                           sources.)                 [facility]



                                         ()



                                                                                
                                                                                
                                                                                
                                                                                
                                                                                
                                                                                
                                                                                
                                                                                
                                                                                



Procedures

                      



    



              Procedure    Normalized Procedure  Procedure Result  Performer    

Facility



                                         Date    

 

    



              02-   INSERTION OF  no information  no name      [facility] 

risti



                           ENDOTRACHEAL AIRWAY       [facility]



                           INTO TR                   ([id number])

 

    



              02-   RESPIRATORY  no information  no name      [facility]

isti



                           VENTILATION, 24-96        [facility]



                           CONSECUTI                 ([id number])

 

    



              02-   RESPIRATORY  no information  no name      [facility]

isti



                           VENTILATION, LESS THAN    [facility]



                           24 CO                     (50345)



                                                                                
                 



Immunizations

                      The data below is from unstructured sources            No 
Known Immunizations                                                             
      



Results

                      The data below is from unstructured sources            No 
Results                                                                    



Vital Signs

                      The data below is from unstructured sources            



                    Vital                   Response                   Date/Time

                

 

                          Temperature (Fahrenheit)                   98 degrees 

F (97.6 - 99.5)           

                                        2016 3:40pm                

 

                          Temperature (Calculated Celsius)                   36.

6696 degrees C (36.4 - 

37.5)                                   2016 3:40pm                

 

                    Temperature Source                   Tympanic               

    2016 

3:40pm                

 

                    Pulse Rate (adult)                   127 bpm (60 - 90)      

             

2016 3:40pm                

 

                    Respiratory Rate                   18 bpm (12 - 24)         

          2016

3:40pm                

 

                    O2 Sat by Pulse Oximetry                   98 % (88 - 100)  

                 

2016 3:40pm                

 

                    Blood Pressure                   135/106 mm Hg              

     2016 

3:40pm                

 

                     Blood Pressure Mean                   116 mm Hg            

       2016 

3:40pm                

 

                    Pain                                                       

 

                     Pain Intensity                   0                   2016 3:40pm         

      

 

                    Height (Feet)                   5 feet                    3:40pm      

         

 

                    Height (Inches)                   4 inches                  

 2016 3:40pm  

             

 

                          Height (Calculated Centimeters)                   162.

172727 cm                 

                                        2016 3:40pm                

 

                    Weight (Pounds)                   175 pounds                

   2016 3:40pm

               

 

                          Weight (Calculated Kilograms)                   79.378

666 kilograms             

                                        2016 3:40pm                

 

                    Height                   5 ft 4 in                          

         

 

                    Weight                   175 lb                             

      

 

                    Body Mass Index                   30.0 kg/m^2               

                    



            



                    Vital                   Response                   Date/Time

                

 

                          Temperature (Fahrenheit)                   97.1 degree

s F (97.6 - 99.5)         

                                                        

 

                          Temperature (Calculated Celsius)                   36.

95207 degrees C (36.4 - 

37.5)                                                   

 

                    Temperature Source                   Temporal               

                    



 

                    Pulse Rate (adult)                   96 bpm (60 - 90)       

                    

       

 

                    Respiratory Rate                   20 bpm (12 - 24)         

                    

     

 

                    O2 Sat by Pulse Oximetry                   100 % (88 - 100) 

                    

             

 

                    Blood Pressure                   113/74 mm Hg               

                    

 

                    Pain                                                       

 

                     Pain Intensity                   0                         

           

 

                    Height (Feet)                   5 feet                      

              

 

                    Height (Inches)                   3.00 inches               

                    



 

                          Height (Calculated Centimeters)                   160.

006795 cm                 

                                                        

 

                    Weight (Pounds)                   188 pounds                

                    

 

                    Weight (Ounces)                   0.0 oz                    

                

 

                    Weight (Calculated Grams)                   31363.366 gm    

                    

           

 

                          Weight (Calculated Kilograms)                   85.275

366 kilograms             

                                                        

 

                    Calculated BMI                   33.30                      

              



            



                    Vital                   Response                   Date/Time

                

 

                          Temperature (Fahrenheit)                   97.3 degree

s F (97.6 - 99.5)         

                                        2017 7:27pm                

 

                          Temperature (Calculated Celsius)                   36.

43545 degrees C (36.4 - 

37.5)                                   2017 7:27pm                

 

                    Temperature Source                   Temporal               

    2017 

7:27pm                

 

                    Pulse Rate (adult)                   115 bpm (60 - 90)      

             

2017 7:27pm                

 

                    Respiratory Rate                   24 bpm (12 - 24)         

          2017

7:27pm                

 

                    O2 Sat by Pulse Oximetry                   97 % (88 - 100)  

                 

2017 7:27pm                

 

                    Blood Pressure                   178/120 mm Hg              

     2017 

7:27pm                

 

                     Blood Pressure Mean                   139 mm Hg            

       2017 

7:27pm                

 

                    Pain                                                       

 

                     Numeric Pain Scale                   8                    7:27pm     

          

 

                    Height (Feet)                   5 feet                   2017 7:27pm      

         

 

                    Height (Inches)                   5.00 inches               

    2017 

7:27pm                

 

                          Height (Calculated Centimeters)                   165.

256289 cm                 

                                        2017 7:27pm                

 

                    Height Method                   Stated                   2017 7:27pm      

         

 

                    Weight (Pounds)                   205 pounds                

   2017 7:27pm

               

 

                          Weight (Calculated Kilograms)                   92.986

437 kilograms             

                                        2017 7:27pm                

 

                    Weight Method                   Stated                   2017 7:27pm      

         

 

                    Capillary Refill                                            

           

 

                     Capillary Refill                   Less Than 3 Seconds     

              

2017 7:27pm                

 

                    Height                   5 ft 5 in                   

017 7:27pm          

     

 

                    Weight                   205 lb                   2017

 7:27pm             

  

 

                    Body Mass Index                   34.1 kg/m^2               

    2017 

7:27pm                



            



                    Blood pressure systolic                   144 mmHg          

         2017 

              

 

                    Blood pressure diastolic                   69 mmHg          

         2017 

              



            



                    Blood pressure systolic                   144 mmHg          

         2017 

              

 

                    Blood pressure diastolic                   69 mmHg          

         2017 

              



                                                                    



Interventions

          No Information                                                        
 



Plan of Treatment

                      The data below is from unstructured sources            



                          Discharge Date                   16 6:19pm      

          

 

                          Disposition                   01 HOME, SELF-CARE      

          

 

                          Condition at Discharge                   Stable       

         

 

                          Instructions/Education Provided                   [facility]

dration (ED)              

     

Methamphetamine Abuse (ED)                                  

 

                          Prescriptions                   See Medication Section

                

 

                          Referrals                   JULY VOSS

[facility]     

          

 

                          Additional Instructions/Education                   Al

l discharge instructions 

reviewed with patient and/or family. Voiced                     

understanding.                     

                    

Drink plenty of fluids. You're cleared for incarceration. Follow-up with Dr. Voss after incarceration.                                   



            



                          Discharge Date                   06/25/15 2:00pm      

          

 

                          Disposition                   30 STILL A PATIENT      

          

 

                          Instructions/Education Provided                   [facility]

HOL AND SUBSTANCE ABUSE   

                

Schizophrenia (DC)                                  

 

                          Forms Provided                   [facility]          

      

 

                          Prescriptions                   See Medications Sectio

n                

 

                          Referrals                    (Unspecified)            

         

                    

Reason(s) for Referral:                    

FOLLOW UP WITH DR. VOSS ON  AT 3:00PM                      
           

 

                          Additional Instructions/Education                   PL

EASE FOLLOW UP WITH DR. VOSS AT 3PM ON                 



            



                          Discharge Date                   17 11:17pm     

           

 

                          Disposition                   01 HOME, SELF-CARE      

          

 

                          Condition at Discharge                   Stable       

         

 

                          Instructions/Education Provided                   NO I

NSTRUCTIONS GIVEN         

      

 

                          Prescriptions                   See Medication Section

                

 

                          Referrals                   JULY VOSS DO    

                

Order Date: Primary Care Physician                    

Address:                    

                                        [facility] [place] [address]                    

                                        [phone]                              

    

 

                          Additional Instructions/Education                   1.

 Follow-up with Dr. Dr. Voss                    

                                        2. Return to ER for any concerns        

            

                                        3.                                  



            



                          Activity                   Details                

 

                                                         

 

                          Follow Up                   prn Reason:hygiene/santa    

            



            



                          Activity                   Details                

 

                                                         

 

                          Follow Up                   prn Reason:hygiene/santa    

            



                                                                    



Goals

          No Information                                                        
 



Social History

          No Information                                                        
 



Functional Status

                      The data below is from unstructured sources            



                    Query                   Response                   Date Miguel

rded                

 

                          Patient Orientation                   Unable to Assess

                    

                                        2015 4:35pm                

 

                          Comprehension Ability                   Understands Co

ncepts                    

                                        2015 12:00pm                



                                                                    



Mental Status

          No Information                                                        
 



Encounters

                      



    



              Encounter    Normalized Encounter  Encounter Diagnosis  Care Provi

peter  

Organization



                           Date                      Type   

 

    



              2020   Emergency department  no information  SARAHI JOSEPH MD (no  [facility]



                     patient visit       phone)              [facility]



                                         (no phone)

 

    



              2020   Emergency department  no information  no name      no

 organization name



                           -                         patient visit   



                                         2020   Emergency department  no information  no name      no

 organization name



                                         patient visit   

 

    



              2012   Emergency department  no information  no name      no

 organization name



                           -                         patient visit   



                                         2012   Evaluation and  no information  CAMPBELL HOANG MD (no

  [facility] 

[facility]



                 -               management of   phone)          [facility]



                     02-          inpatient           (no phone)

 

    



              2019   Evaluation and  no information  no name      no organ

ization name



                           -                         management of   



                           2019                inpatient   

 

    



              2019   Evaluation and  no information  no name      no organ

ization name



                           -                         management of   



                           2019                inpatient   

 

    



              2015   Evaluation and  no information  no name      no organ

ization name



                           -                         management of   



                           2015                inpatient   

 

    



              02-   Evaluation and  no information  no name      no organ

ization name



                           -                         management of   



                           2013                inpatient   

 

    



              2018   Patient encounter  no information  no name      no or

ganization name

 

    



              2018   Patient encounter  no information  no name      no or

ganization name

 

    



              10-   Patient encounter  no information  no name      no or

ganization name

 

    



              2016   Patient encounter  no information  no name      no or

ganization name

 

    



              2016   Patient encounter  no information  no name      no or

ganization name

 

    



              2016   Patient encounter  no information  no name      no or

ganization name

 

    



              2016   Patient encounter  no information  no name      no or

ganization name

 

    



              2015   Patient encounter  no information  no name      no or

ganization name

 

    



              2014   Patient encounter  no information  no name      no or

ganization name

 

    



              2014   Patient encounter  no information  no name      no or

ganization name



                                         -    



                                         2014   Patient encounter  no information  no name      no or

ganization name

 

    



              2014   Patient encounter  no information  no name      no or

ganization name

 

    



              2014   Patient encounter  no information  no name      no or

ganization name

 

    



              2014   Patient encounter  no information  no name      no or

ganization name



                                         -    



                                         2014   Patient encounter  no information  no name      no or

ganization name

 

    



              10-   Patient encounter  no information  no name      no or

ganization name

 

    



              10-   Patient encounter  no information  no name      no or

ganization name

 

    



              10-   Patient encounter  no information  no name      no or

ganization name

 

    



              2013   Patient encounter  no information  no name      no or

ganization name

 

    



              2013   Patient encounter  no information  no name      no or

ganization name



                                         -    



                                         2013   Patient encounter  no information  CAMPBELL HOANG MD 

(no  [facility] 

July



                 -               procedure       phone)          [facility]



                           02-                (no phone)

 

    



              2020   Patient encounter  no information  no name      no or

ganization name



                                         procedure   

 

    



              2019   Patient encounter  no information  no name      no or

ganization name



                           -                         procedure   



                                         2019   Patient encounter  no information  no name      no or

ganization name



                                         procedure   

 

    



              01-   Patient encounter  no information  no name      no or

ganization name



                                         procedure   

 

    



              2012   Patient encounter  no information  no name      no or

ganization name



                           -                         procedure   



                                         2012   Patient encounter  no information  no name      no or

ganization name



                                         procedure   

 

    



              2012   Patient encounter  no information  no name      no or

ganization name



                           -                         procedure   



                                         2012    



                                                                                
                                                                                
                                                                                
                                                                                
                                                                                
                                 



Medical Equipment

          No Information                                                        
 



Payers

          No Information                                                        
 



Advance Directives

                      



                    Directive                   Response                   Recor

ded Date/Time       

        

 

                    Advance Directives                   No                    3:50pm       

        

 

                    Health Care Power of                    No          

         16 

3:50pm                

 

                    Organ Donor                   No                   16 

3:50pm              

 

 

                    Resuscitation Status                   Full Code            

       16 

3:50pm                



            



                    Directive                   Response                   Recor

ded Date/Time       

        

 

                    Advance Directives                   No                   06

/25/15 1:07am       

        

 

                    Health Care Power of                    No          

         06/25/15 

1:07am                

 

                    Organ Donor                   No                   06/25/15 

1:07am              

 

 

                    Resuscitation Status                   Full Code            

       06/25/15 

1:07am                



            



                    Directive                   Response                   Recor

ded Date/Time       

        

 

                    Advance Directives                   No                   02

/15/13 6:00am       

        

 

                    Health Care Power of                    No          

         02/15/13 

6:00am                

 

                    Organ Donor                   No                   02/15/13 

6:00am              

 



            



                    Directive                   Response                   Recor

ded Date/Time       

        

 

                    Advance Directives                   No                    7:27pm       

        

 

                    Health Care Power of                    No          

         17 

7:27pm                

 

                    Organ Donor                   No                   17 

7:27pm              

 

 

                    Resuscitation Status                   Full Code            

       17 

7:27pm                



                                                                    



Discharge Instructions

          No hospital discharge instructions.No hospital discharge 
instructions.No hospital discharge instructions.No hospital discharge 
instruction information available.                                              
 



Additional Source Comments

          This clinical document has been generated using [employer] 
software that has been certified by the Office of the National Coordinator for 
Health Information Technology (ONC 15.99.04.3023.Diam.31.00.0.879255) and the 
National Committee for  (NCQA, as an eMeasure certified 
technology).            

            

FOR RECORDS PERTAINING TO PATIENTS WHO ARE OR HAVE BEEN ENROLLED IN A CHEMICAL D
EPENDENCY/SUBSTANCE ABUSE PROGRAM, SOME INFORMATION MAY BE OMITTED. This clinica
l summary was aggregated from multiple sources.  Caution should be exercised in 
using it in the provision of clinical care. This summary normalizes information 
from multiple sources, and as a consequence, information in this document may ma
terially change the coding, format and clinical context of patient data. In china
tion, data may be omitted in some cases. CLINICAL DECISIONS SHOULD BE BASED ON T
HE PRIMARY CLINICAL RECORDS. [employer]. provides no warranty or guara
ntee of the accuracy or completeness of information in this document.The followi
ng information is based on time limited clinical information            



                                        UNRECOGNIZED CONTENT PROVIDED BELOW FOR 

UNRECOGNIZED SECTION MEDICAL (GENERAL) 

HISTORY                

 

                                                         



            



                    Type                   Description                   Date   

             

 

                    Medical History                   High blood pressure       

                    

        

 

                    Medical History                   [place]                  

                  

 

                    Medical History                   Asthma                    

                

 

                    Medical History                   Diabetes                  

                  

 

                    Medical History                   Pt takes Blood thinners   

                    

            

 

                    Medical History                   Arthritits                

                    

 

                    Medical History                   9 chronic kidney diesease

## 2020-06-17 NOTE — XMS REPORT
Clinical Summary

                             Created on: 2020



Radha Kelly

External Reference #: [id number]

: 1965

Sex: Female



Demographics





                          Address                   [address] [place]  [address]

 

                          Home Phone                [phone]

 

                          Preferred Language        English

 

                          Marital Status            Unknown

 

                          Jainism Affiliation     NON

 

                          Race                      Unknown

 

                          Ethnic Group              Not  or 





Author





                          Author                    [facility]

 

                          Organization              [facility]

 

                          Address                   Unknown

 

                          Phone                     Unavailable







Support





                Name            Relationship    Address         Phone

 

                No Contact      [employer]            Unknown         [phone]







Care Team Providers





                    Care Team Member Name Role                Phone

 

                    No Pcp, Na          PCP                 Unavailable







Source Comments

Some departments are not documenting in the electronic medical record.  If you d
o not see the information that you expected, contact Release of Information in [facility] Health Information Management department at [phone] for further assistan
ce in locating additional records.[facility]



Allergies

Not on File



Medications

Not on file



Active Problems





Not on file



Social History





                                        Date



                 Tobacco Use     Types           Packs/Day       Years Used 

 

                                         



                                         Never Assessed    







 



                           Sex Assigned at Birth     Date Recorded

 

 



                                         Not on file 







                                        Industry



                           Job Start Date            Occupation 

 

                                        Not on file



                           Not on file               Not on file 







                                        Travel End



                           Travel History            Travel Start 

 





                                         No recent travel history available.







Last Filed Vital Signs

Not on file



Plan of Treatment





   



                 Health Maintenance  Due Date        Last Done       Comments

 

   



                           HIV SCREENING             1978  

 

   



                           DTAP/TDAP VACCINES (1 -   1981  



                                         Tdap)   

 

   



                           HEPATITIS C SCREENING     1981  

 

   



                           PHYSICAL (COMPREHENSIVE)  1981  



                                         EXAM   

 

   



                           CERVICAL CANCER SCREENING  1984  

 

   



                           BREAST CANCER SCREENING   2003  

 

   



                           COLORECTAL CANCER         2013  



                                         SCREENING   

 

   



                           SHINGLES RECOMBINANT      2013  



                                         VACCINE (1 of 2)   

 

   



                           INFLUENZA VACCINE         10/01/2020  







Results

Not on filefrom Last 3 Months



Insurance





                                        Type



            Payer      Benefit    Subscriber ID  Effective  Phone      Address 



                           Plan /                    Dates   



                                         Group     

 

                                         



                 [facility]          [place]          xxxxxxxxx       4/15/2016-   



                           HEALTH                    Present   



                                         SERVICES     







     



            Guarantor Name  Account    Relation to  Date of    Phone      Augie

g Address



                     Type                Patient             Birth  

 

     



            Radha Kelly  Personal/F  Self       1965  [phone]  [address] 

[facility]               (Home)              [place] [address]







Advance Directives





                          Patient Representative    Explanation



                           Type                      Date Recorded  

 

                                                     



                           Advance                   2016 11:45 AM  



                                         Directive/DPOA

## 2020-06-17 NOTE — XMS REPORT
Patient Summarization Differential

                             Created on: 2020



DAYTON BLISS

External Reference #: [id number]

: 1965

Sex: Female



Demographics





                          Address                   [address] Phone                [phone]

 

                          Preferred Language        English

 

                          Marital Status            Unknown

 

                          Episcopalian Affiliation     Unknown

 

                          Race                      White

 

                          Ethnic Group              Unknown





Author





                          Author                    [facility]              [facility]

 

                          Address                   [address] [place] [address]



 

                          Phone                     [phone]







Care Team Providers





                    Care Team Member Name Role                Phone

 

                    JULY VOSS CHULA Unavailable         [phone]

 

                    fiathDEMETRIUS RODRIGUEZ     Unavailable         [phone]

 

                    JANAE MENDEZ, RANDY MELENDEZ  Unavailable         Unavailable

 

                    KAYCE MENDEZ, MISHA ROBERTS  Unavailable         Unavailable

 

                    NATALYA MENDEZ, CAMPBELL ROBRETS  Unavailable         Unavailable

 

                    NATALYA MENDEZ, CAMPBELL ROBERTS  Unavailable         Unavailable

 

                    DAYTON BARBER DO     Unavailable         Unavailable

 

                    LIAM MCDERMOTT Unavailable         Unavailable

 

                    SARAHI JOSEPH MD  Unavailable         Unavailable

 

                          Unavailable               Unavailable



                                            



Allergies

          No Information                                                        
 



Medications

          No Information                                                        
 



Problems

                      Active Problems            

            



      



           Problem   Normalized  Date Last  Normalized  Normalized  Provider  Fa

cility



              Classification  Problem(s)   Recorded     Problem      Problem Sta

tus  



                                         Duration   

 

      



            Other liver  Abnormal   Episodic   Active     JULY    Not Availab

le



                 diseases (3     levels of       GELLENDER , DO  (02098)



                           sources.)                 other serum     



                                         enzymes     

 

      



            Residual   Acquired   Episodic   Active     LIAM GAONA  Not Availab

le



                     codes;              absence of          (55068)



                           unclassified              both cervix     



                           (4 sources.)              and uterus     

 

      



            Residual   Acquired   Episodic   Active     JENA SAUNDERS [facility]



                 codes;          absence of      [facility]



                     unclassified        other organs        Hospital -



                           (1 source.)               [facility]



                                         (89506)

 

      



            Acute and  Acute kidney   Episodic   Active     JULY    Not Avail

able



                 unspecified     failure,        GELLENDER , DO  (16265)



                           renal failure             unspecified     



                           (16 sources.)             Translations:     



                                         [ ACUTE KIDNEY     



                                         FAILURE WITH     



                                         TUBULAR     



                                         NECROS, ACUTE     



                                         KIDNEY     



                                         FAILURE,     



                                         UNSPECIFIED]     

 

      



            Respiratory  Acute      Episodic   Active     JENA CARDENAS Vi

a



                 failure;        respiratory     [facility]



                     insufficiency;      failure,            Hospital -



                     arrest (adult)      unspecified         [facility]



                     (6 sources.)        whether with        (49188)



                                         hypoxia or     



                                         hypercapnia     

 

      



            Allergic   Allergy status   Episodic   Active     [facility] Via



                 reactions (8    to narcotic     [facility]



                     sources.)           agent status        Hospital -



                           Translations:             [facility]



                           [ ALLERGY                 (90113)



                                         STATUS TO     



                                         SULFONAMIDES     



                                         STATUS,     



                                         ALLERGY STATUS     



                                         TO NARCOTIC     



                                         AGENT STATUS]     

 

      



            Deficiency and  Anemia,    Episodic   Active     CAMPBELL NATALYA ,  [facility]

 Via



                 other anemia    unspecified     [facility]



                           (6 sources.)              [facility] [place]



                                         ([id number])

 

      



            Personality  Borderline   Chronic    Active     JULY    Not Avail

able



                 disorders (17   personality     BIPIN DO  (40000)



                           sources.)                 disorder     



                                         Translations:     



                                         [ PERSONALITY     



                                         DISORDER,     



                                         UNSPECIFIED]     

 

      



            Chronic kidney  Chronic kidney   Chronic    Active     AHMED      No

t Available



                 disease (22     disease, stage     ABOUL-MAGD ,    (40662)



                     sources.)           3 (moderate)        MD 



                                         Translations:     



                                         [ CHRONIC     



                                         KIDNEY     



                                         DISEASE, STAGE     



                                         II (MILD),     



                                         CHRONIC KIDNEY     



                                         DISEASE,     



                                         UNSPECIFIED,     



                                         CHRONIC KIDNEY     



                                         DISEASE, STAGE     



                                         III (MODER,     



                                         CHRONIC KIDNEY     



                                         DISEASE, STAGE     



                                         2 (MILD)]     

 

      



            Chronic    Chronic    Chronic    Active     CAMPBELL NATALYA ,  [facility]



                 obstructive     obstructive     [facility]



                     pulmonary           pulmonary           [facility] -



                     disease and         disease,            [facility]



                     bronchiectasis      unspecified         (85105)



                                         (14 sources.)      

 

      



            Diabetes   Diabetes   Chronic    Active     MEICHELLE WOOD  Not Avai

lable



                     mellitus            mellitus            (16267)



                           without                   without     



                           complication              mention of     



                           (11 sources.)             complication,     



                                         type I     



                                         [juvenile     



                                         type], not     



                                         stated as     



                                         uncontrolled     



                                         Translations:     



                                         [ TYPE 2     



                                         DIABETES     



                                         MELLITUS     



                                         WITHOUT     



                                         COMPLIC]     

 

      



            Diabetes   Diabetes with   Chronic    Active     FELIZ    Not Avai

lable



                 mellitus with   other           DO MARCELLUS   (63228)



                           complications             specified     



                           (21 sources.)             manifestations     



                                         , type II or     



                                         unspecified     



                                         type, not     



                                         stated as     



                                         uncontrolled     



                                         Translations:     



                                         [ DIAB AKTHERIN WO     



                                         COMPL, TYPE I     



                                         [JUVENILE TYP,     



                                         TYPE 1     



                                         DIABETES     



                                         MELLITUS WITH     



                                         HYPERGLYCE,     



                                         TYPE 2     



                                         DIABETES     



                                         MELLITUS W OTH     



                                         DIABETIC ,     



                                         DIAB W RENAL     



                                         MANIFEST, TYPE     



                                         I [JUVENILE ,     



                                         DIAB W RENAL     



                                         MANIFEST, TYPE     



                                         II OR UNSPEC,     



                                         TYPE 2     



                                         DIABETES     



                                         MELLITUS WITH     



                                         DIABETIC N,     



                                         TYPE 2     



                                         DIABETES     



                                         MELLITUS W     



                                         DIABETIC ,     



                                         DIAB W NEURO     



                                         MANIFEST, TYPE     



                                         I [JUVENILE ,     



                                         TYPE 1     



                                         DIABETES     



                                         MELLITUS WITH     



                                         KETOACIDOS,     



                                         TYPE 1     



                                         DIABETES     



                                         MELLITUS WITH     



                                         DIABETIC N,     



                                         TYPE 1     



                                         DIABETES W     



                                         DIABETIC     



                                         AUTONOMIC (PO,     



                                         TYPE 2     



                                         DIABETES     



                                         MELLITUS WITH     



                                         KETOACIDOS,     



                                         TYPE 2     



                                         DIABETES W     



                                         DIABETIC     



                                         AUTONOMIC (PO,     



                                         TYPE 1     



                                         DIABETES     



                                         MELLITUS WITH     



                                         KETOACIDOS,     



                                         TYPE 1     



                                         DIABETES     



                                         MELLITUS WITH     



                                         HYPOGLYCEM]     

 

      



            Unclassified  Dissociative   Chronic    Active     JULY    Not Av

ailable



                 (3 sources.)    identity        GELLOSDER , DO  (55000)



                                         disorder     

 

      



            Diseases of  Elevated white   Chronic    Active     CAMPBELL NATALYA ,  

[facility]



                 white blood     blood cell      [facility]



                     cells (7            count,              Hospital -



                     sources.)           unspecified         [facility]



                                         (90545)

 

      



            Esophageal  Esophageal   Chronic    Active     JULY    Not Availa

ble



                 disorders (21   reflux          GELLENDER , DO  (42032)



                           sources.)                 Translations:     



                                         [     



                                         GASTRO-ESOPHAG     



                                         EAL REFLUX     



                                         DISEASE     



                                         WITHOUT]     

 

      



            Residual   Family history   Episodic   Active     LIAM GAONA  Not A

vailable



                     codes;              of ischemic         (40582)



                           unclassified              heart disease     



                           (3 sources.)              and other     



                                         diseases of     



                                         the     



                                         circulatory     



                                         system     

 

      



            Residual   Family history   Episodic   Active     LIAM GAONA  [facility]

 Via



                 codes;          of malignant     [facility]



                     unclassified        neoplasm of         Hospital -



                     (1 source.)         other organs        [facility]



                           or systems                (07122)

 

      



            Gastrointestin  Gastrointestin   Episodic   Active     [facility] HOANG [facility]



                 al hemorrhage   al hemorrhage,     [facility]



                     (3 sources.)        unspecified         Hospital -



                                         [facility]



                                         (24255)

 

      



            Other      Gastroparesis   Episodic   Active     LIAM GAONA [facility] 

Via



                     disorders of        [facility]AY[facility]                July



                           stomach and               Hospital -



                           duodenum (1               [facility]



                           source.)                  (84739)

 

      



            Other injuries  History of   Episodic   Active     CAMPBELL HOANG  V

[facility] Via



                 and conditions  falling         [facility]



                           due to                    Hospital -



                           external                  [facility]



                           causes (6                 (31392)



                                         sources.)      

 

      



            Diabetes   Hyperglycemia,   Episodic   Active     JULY    Not Brittanie

ilable



                 mellitus        unspecified     GELLENDER , DO  (76287)



                                         without      



                                         complication      



                                         (3 sources.)      

 

      



            Disorders of  Hyperlipidemia   Chronic    Active     [facility]      Not 

Available



                 lipid           , unspecified     ABOUL-MAGD ,    (48004)



                     metabolism (21      Translations:       MD 



                           sources.)                 [     



                                         HYPERLIPIDEMIA     



                                         NEC/NOS]     

 

      



            Hypertension  Hypertensive   Chronic    Active     MEICHELLE WOOD  N

ot Available



                     with                chronic kidney      (81006)



                           complications             disease with     



                           and secondary             stage 1     



                           hypertension              through stage     



                           (16 sources.)             4 chronic     



                                         kidney     



                                         disease, or     



                                         unspecified     



                                         chronic kidney     



                                         disease     



                                         Translations:     



                                         [ HYPTNSV CHR     



                                         KID DIS,     



                                         BENIGN, W CHR     



                                         KD ST]     

 

      



            Other      Hypotension,   Episodic   Active     [facility]AMP[facility]LL HOANG [facility] 

Via



                 circulatory     unspecified     [facility]



                           disease (6                Hospital -



                           sources.)                 [facility]



                                         (02722)

 

      



            Other      Hypotension,   Episodic   Active     JULY    Not Avail

able



                 circulatory     unspecified     GELLENDER , DO  (55261)



                                         disease (3      



                                         sources.)      

 

      



            Shock (6   Hypovolemic   Episodic   Active     JENA CARDENAS V

ia



                 sources.)       shock           [facility]



                                         [facility]



                                         (38182)

 

      



            Other      Long term   Episodic   Active     LIAM GAONA  Not Availa

ble



                     aftercare (4        (current) use       (39235)



                           sources.)                 of     



                                         anticoagulants     

 

      



            Other      Long term   Episodic   Active     LIAM GAONA  Not Availa

ble



                     aftercare (18       (current) use       (60430)



                           sources.)                 of insulin     

 

      



            Other      Long-term   Episodic   Active     JULY    Not Availabl

e



                 aftercare (3    (current) use     GELLENDER , DO  (98054)



                           sources.)                 of insulin     

 

      



            Nonmalignant  Lump or mass   Episodic   Active     JULY    Not Av

ailable



                 breast          in breast       GELLENDER , DO  (90044)



                                         conditions (3      



                                         sources.)      

 

      



            Headache;  Migraine,   Chronic    Active     MELODY    Not Availabl

e



                 including       unspecified,     MD TIMOTHY   (61905)



                           migraine (2               without     



                           sources.)                 mention of     



                                         intractable     



                                         migraine     



                                         without     



                                         mention of     



                                         status     



                                         migrainosus     

 

      



            Nephritis;  Nephritis and   Chronic    Active     MEICHELLE WOOD  No

t Available



                     nephrosis;          nephropathy,        (51385)



                           renal                     not specified     



                           sclerosis (10             as acute or     



                           sources.)                 chronic, in     



                                         diseases     



                                         classified     



                                         elsewhere     

 

      



            Other      Other      Episodic   Active     JULY    Not Available



                 connective      calcification     GELLENDER , DO  (58565)



                           tissue disease            of muscle,     



                           (2 sources.)              other site     

 

      



            Other nervous  Other chronic   Chronic    Active     MISHA DEVRIES 

 [facility] Via



                 system          pain            [facility]



                           disorders (3              Hospital -



                           sources.)                 [facility]



                                         (25537)

 

      



            Other nervous  Other      Chronic    Active     JENA CARDENAS 

Via



                 system          encephalopathy     [facility]



                           disorders (6              Hospital -



                           sources.)                 [facility]



                                         (47980)

 

      



            Other      Other long   Episodic   Active     AHMED      Not Availab

le



                 aftercare (3    term (current)     ABOUL-MAGD ,    (62735)



                     sources.)           drug therapy        MD 

 

      



            Unclassified  Other      Episodic   Active     JULY    Not Availa

ble



                 (7 sources.)    screening       GELDANK , DO  (62679)



                                         mammogram     



                                         Translations:     



                                         [ ABNORMAL     



                                         RESULTS OF     



                                         LIVER FUNCTION     



                                         STUDI]     

 

      



            Other upper  Other seasonal   Chronic    Active     [facility]AMPBELL HOANG [facility]



                 respiratory     allergic        [facility]



                     disease (7          rhinitis            Hospital -



                           sources.)                 [facility]



                                         (10774)

 

      



            Other      Pain in left   Episodic   Active     JULY    Not Avail

able



                 non-traumatic   elbow           GELLENDER , DO  (80374)



                                         joint      



                                         disorders (2      



                                         sources.)      

 

      



            Other      Personal   Episodic   Active     [facility]AMPBELL HOANG [facility]



                 circulatory     history of      [facility]



                     disease (3          other diseases      Hospital -



                     sources.)           of the              [facility]



                           circulatory               (71228)



                                         system     

 

      



            Gastroduodenal  Personal   Episodic   Active     [facility]AMPBELL HOANG [facility]

 Via



                 ulcer (except   history of      [facility]



                     hemorrhage)         peptic ulcer        Hospital -



                     (13 sources.)       disease             [facility]



                                         (10003)

 

      



            Other lower  Personal   Episodic   Active     LIAM DIXONES  Not Avail

able



                     respiratory         history of          (11895)



                           disease (5                pneumonia     



                           sources.)                 (recurrent)     

 

      



            Phlebitis;  Personal   Episodic   Active     JULY    Not Availabl

e



                 thrombophlebit  history of      GELLENDER , DO  (16534)



                           is and                    venous     



                           thromboembolis            thrombosis and     



                           m (20                     embolism     



                           sources.)                 Translations:     



                                         [ ACUTE VENOUS     



                                         EMBOLISM     



                                         THROMBOSIS     



                                         UNSP ,     



                                         PERSONAL     



                                         HISTORY OF     



                                         OTHER VENOUS     



                                         THROMBO]     

 

      



            Pneumonia  Pneumonia due   Episodic   Active     JENA CARDENAS

 Via



                 (except that    to              [facility]



                     caused by           Streptococcus       Hospital -



                     tuberculosis        pneumoniae          [place]



                     or sexually         Translations:       (49299)



                           transmitted               [ PULMONARY     



                           disease) (2               CANDIDIASIS]     



                                         sources.)      

 

      



            Poisoning by  Poisoning by   Episodic   Active     JULY    Not Av

ailable



                 psychotropic    benzodiazepine     GELLENDER , DO  (30226)



                           agents (3                 -based     



                           sources.)                 tranquilizers     

 

      



            Poisoning by  Poisoning by   Episodic   Active     JULY    Not Av

ailable



                 other           other opiates     GELLENDER , DO  (73392)



                           medications               and related     



                           and drugs (3              narcotics     



                                         sources.)      

 

      



            Other      Presence of   Chronic    Active     JENA CARDENAS V

ia



                 circulatory     other vascular     [facility]



                     disease (7          implants and        Hospital -



                     sources.)           grafts              [place]



                                         (10568)

 

      



            Genitourinary  Presence of   Chronic    Active     CAMPBELL HOANG  V

[facility]



                 symptoms and    urogenital      [facility]



                     ill-defined         implants            Hospital -



                           conditions (3             [place]



                           sources.)                 (16662)

 

      



            Genitourinary  Proteinuria,   Episodic   Active     MEICHELLE WOOD  

Not Available



                     symptoms and        unspecified         (14061)



                           ill-defined               Translations:     



                           conditions (20            [ PROTEINURIA]     



                                         sources.)      

 

      



            Rheumatoid  Rheumatoid   Chronic    Active     JULY    Not Availa

ble



                 arthritis and   arthritis       GELLENDER , DO  (54628)



                           related                   Translations:     



                           disease (21               [ RHEUMATOID     



                           sources.)                 ARTHRITIS,     



                                         UNSPECIFIED]     

 

      



            Abdominal pain  Right lower   Episodic   Active     LIAM GAONA  Not

 Available



                     (5 sources.)        quadrant pain       (73513)

 

      



            Septicemia  Sepsis,    Episodic   Active     [facility] CARDENAS [facility]



                 (except in      unspecified     [facility]



                     labor) (4           organism            Hospital -



                     sources.)           Translations:       [place]



                           [ SEVERE                  (87193)



                                         SEPSIS WITHOUT     



                                         SEPTIC SHOCK]     

 

      



            Cardiac    Tachycardia,   Episodic   Active     [facility] 

V[facility]



                 dysrhythmias    unspecified     [facility]



                           (6 sources.)              Hospital -



                                         [facility]



                                         (54974)

 

      



            Asthma (5  Unspecified   Chronic    Active     JULY    Not Availa

ble



                 sources.)       asthma,         GELLENDER , DO  (75956)



                                         uncomplicated     



                                         Translations:     



                                         [ ASTHMA,     



                                         UNSPECIFIED]     

 

      



            Essential  Unspecified   Chronic    Active     JULY    Not Availa

ble



                 hypertension    essential       GELLENDER , DO  (43276)



                           (1 source.)               hypertension     

 

      



            Osteoarthritis  Unspecified   Chronic    Active     LIAM GAONA  Not

 Available



                     (17 sources.)       osteoarthritis      (02382)



                                         , unspecified     



                                         site     



                                         Translations:     



                                         [ PRIMARY     



                                         OSTEOARTHRITIS     



                                         , UNSPECIFIED     



                                         SITE]     



            

            Past or Other Problems            

            



      



           Problem   Normalized  Date Last  Normalized  Normalized  Provider  Fa

cility



              Classification  Problem(s)   Recorded     Problem      Problem Sta

tus  



                                         Duration   

 

      



            External   Accidental   no information  no information  JULY    N

ot Available



                 Injury -        poisoning by     GELLENDER , DO  (01001)



                           Poisoning (5              other opiates     



                           sources.)                 and related     



                                         narcotics     



                                         Translations:     



                                         [ ACC     



                                         POISN-BENZDIAZ     



                                         TRANQ]     

 

      



            Pancreatic  Acute      Episodic   Completed  JULY    Not Availabl

e



                 disorders (not  pancreatitis     GELLENDER , DO  (69986)



                                         diabetes) (1      



                                         source.)      

 

      



            Spondylosis;  Dorsalgia,   Episodic   Completed  [facility]

 Via



                 intervertebral  unspecified     [facility]



                           disc                      [facility] -



                           disorders;                [place]



                           other back                (05216)



                                         problems (7      



                                         sources.)      

 

      



            Other      Gastroparesis   Episodic   Completed  JULY    Not Avai

lable



                     disorders of        GELLENDER , DO      (55374)



                                         stomach and      



                                         duodenum (1      



                                         source.)      

 

      



            External   Home accidents   no information  no information  JULY 

   Not 

Available



                     Injury - Place      GELLENDER , DO      (94081)



                                         of occurrence      



                                         (3 sources.)      

 

      



            Noninfectious  Other and   Episodic   Completed  JULY    Not Avai

lable



                 gastroenteriti  unspecified     GELLENDER , DO  (24911)



                           s (1 source.)             noninfectious     



                                         gastroenteriti     



                                         s and colitis     

 

      



            Nonspecific  Other chest   Episodic   Completed  JENA BEEBE

 Via



                 chest pain (3   pain            [facility]



                           sources.)                 [facility]



                                         (08820)

 

      



            Residual   Patient's   Episodic   Completed  [facility]



                 codes;          [facility]



                     unclassified        noncompliance       Hospital -



                     (7 sources.)        with                [facility]



                           medication                (73108)



                                         regimen     

 

      



            Other      Personal   Episodic   Completed  LIAM GAONA  Not Availab

le



                     gastrointestin      history of          (68493)



                           al disorders              other diseases     



                           (11 sources.)             of the     



                                         digestive     



                                         system     

 

      



            Nausea and  Vomiting,   no information  no information  J[facility] BEEBE [facility]



                 vomiting (3     unspecified     [facility]



                           sources.)                 [facility]



                                         ()



                                                                                
                                                                                
                                                                                
                                                                                
                                                                                
                                                                                
                                                                                
                                                                                
                                                                                



Procedures

                      



    



              Procedure    Normalized Procedure  Procedure Result  Performer    

Facility



                                         Date    

 

    



              02-   INSERTION OF  no information  no name      [facility] 

risti



                           ENDOTRACHEAL AIRWAY       [facility]



                           INTO TR                   ([id number])

 

    



              02-   RESPIRATORY  no information  no name      [facility]

isti



                           VENTILATION, 24-96        [facility]



                           CONSECUTI                 (57046)

 

    



              02-   RESPIRATORY  no information  no name      [facility]

isti



                           VENTILATION, LESS THAN    [facility]



                           24 CO                     (05893)



                                                                                
                 



Immunizations

                      The data below is from unstructured sources            No 
Known Immunizations                                                             
      



Results

                      The data below is from unstructured sources            No 
Results                                                                    



Vital Signs

                      The data below is from unstructured sources            



                    Vital                   Response                   Date/Time

                

 

                          Temperature (Fahrenheit)                   98 degrees 

F (97.6 - 99.5)           

                                        2016 3:40pm                

 

                          Temperature (Calculated Celsius)                   36.

6696 degrees C (36.4 - 

37.5)                                   2016 3:40pm                

 

                    Temperature Source                   Tympanic               

    2016 

3:40pm                

 

                    Pulse Rate (adult)                   127 bpm (60 - 90)      

             

2016 3:40pm                

 

                    Respiratory Rate                   18 bpm (12 - 24)         

          2016

3:40pm                

 

                    O2 Sat by Pulse Oximetry                   98 % (88 - 100)  

                 

2016 3:40pm                

 

                    Blood Pressure                   135/106 mm Hg              

     2016 

3:40pm                

 

                     Blood Pressure Mean                   116 mm Hg            

       2016 

3:40pm                

 

                    Pain                                                       

 

                     Pain Intensity                   0                   2016 3:40pm         

      

 

                    Height (Feet)                   5 feet                    3:40pm      

         

 

                    Height (Inches)                   4 inches                  

 2016 3:40pm  

             

 

                          Height (Calculated Centimeters)                   162.

725199 cm                 

                                        2016 3:40pm                

 

                    Weight (Pounds)                   175 pounds                

   2016 3:40pm

               

 

                          Weight (Calculated Kilograms)                   79.378

666 kilograms             

                                        2016 3:40pm                

 

                    Height                   5 ft 4 in                          

         

 

                    Weight                   175 lb                             

      

 

                    Body Mass Index                   30.0 kg/m^2               

                    



            



                    Vital                   Response                   Date/Time

                

 

                          Temperature (Fahrenheit)                   97.1 degree

s F (97.6 - 99.5)         

                                                        

 

                          Temperature (Calculated Celsius)                   36.

86419 degrees C (36.4 - 

37.5)                                                   

 

                    Temperature Source                   Temporal               

                    



 

                    Pulse Rate (adult)                   96 bpm (60 - 90)       

                    

       

 

                    Respiratory Rate                   20 bpm (12 - 24)         

                    

     

 

                    O2 Sat by Pulse Oximetry                   100 % (88 - 100) 

                    

             

 

                    Blood Pressure                   113/74 mm Hg               

                    

 

                    Pain                                                       

 

                     Pain Intensity                   0                         

           

 

                    Height (Feet)                   5 feet                      

              

 

                    Height (Inches)                   3.00 inches               

                    



 

                          Height (Calculated Centimeters)                   160.

999709 cm                 

                                                        

 

                    Weight (Pounds)                   188 pounds                

                    

 

                    Weight (Ounces)                   0.0 oz                    

                

 

                    Weight (Calculated Grams)                   45379.366 gm    

                    

           

 

                          Weight (Calculated Kilograms)                   85.275

366 kilograms             

                                                        

 

                    Calculated BMI                   33.30                      

              



            



                    Vital                   Response                   Date/Time

                

 

                          Temperature (Fahrenheit)                   97.3 degree

s F (97.6 - 99.5)         

                                        2017 7:27pm                

 

                          Temperature (Calculated Celsius)                   36.

18140 degrees C (36.4 - 

37.5)                                   2017 7:27pm                

 

                    Temperature Source                   Temporal               

    2017 

7:27pm                

 

                    Pulse Rate (adult)                   115 bpm (60 - 90)      

             

2017 7:27pm                

 

                    Respiratory Rate                   24 bpm (12 - 24)         

          2017

7:27pm                

 

                    O2 Sat by Pulse Oximetry                   97 % (88 - 100)  

                 

2017 7:27pm                

 

                    Blood Pressure                   178/120 mm Hg              

     2017 

7:27pm                

 

                     Blood Pressure Mean                   139 mm Hg            

       2017 

7:27pm                

 

                    Pain                                                       

 

                     Numeric Pain Scale                   8                    7:27pm     

          

 

                    Height (Feet)                   5 feet                   2017 7:27pm      

         

 

                    Height (Inches)                   5.00 inches               

    2017 

7:27pm                

 

                          Height (Calculated Centimeters)                   165.

222996 cm                 

                                        2017 7:27pm                

 

                    Height Method                   Stated                   2017 7:27pm      

         

 

                    Weight (Pounds)                   205 pounds                

   2017 7:27pm

               

 

                          Weight (Calculated Kilograms)                   92.986

437 kilograms             

                                        2017 7:27pm                

 

                    Weight Method                   Stated                   2017 7:27pm      

         

 

                    Capillary Refill                                            

           

 

                     Capillary Refill                   Less Than 3 Seconds     

              

2017 7:27pm                

 

                    Height                   5 ft 5 in                   

017 7:27pm          

     

 

                    Weight                   205 lb                   2017

 7:27pm             

  

 

                    Body Mass Index                   34.1 kg/m^2               

    2017 

7:27pm                



            



                    Blood pressure systolic                   144 mmHg          

         2017 

              

 

                    Blood pressure diastolic                   69 mmHg          

         2017 

              



            



                    Blood pressure systolic                   144 mmHg          

         2017 

              

 

                    Blood pressure diastolic                   69 mmHg          

         2017 

              



                                                                    



Interventions

          No Information                                                        
 



Plan of Treatment

                      The data below is from unstructured sources            



                          Discharge Date                   16 6:19pm      

          

 

                          Disposition                   01 HOME, SELF-CARE      

          

 

                          Condition at Discharge                   Stable       

         

 

                          Instructions/Education Provided                   [facility]

dration (ED)              

     

Methamphetamine Abuse (ED)                                  

 

                          Prescriptions                   See Medication Section

                

 

                          Referrals                   JULY VOSS

[facility] Physician     

          

 

                          Additional Instructions/Education                   Al

l discharge instructions 

reviewed with patient and/or family. Voiced                     

understanding.                     

                    

Drink plenty of fluids. You're cleared for incarceration. Follow-up with Dr. Voss after incarceration.                                   



            



                          Discharge Date                   06/25/15 2:00pm      

          

 

                          Disposition                   30 STILL A PATIENT      

          

 

                          Instructions/Education Provided                   [facility]

HOL AND SUBSTANCE ABUSE   

                

Schizophrenia (DC)                                  

 

                          Forms Provided                   [facility]          

      

 

                          Prescriptions                   See Medications Sectio

n                

 

                          Referrals                    (Unspecified)            

         

                    

Reason(s) for Referral:                    

FOLLOW UP WITH DR. VOSS ON  AT 3:00PM                      
           

 

                          Additional Instructions/Education                   PL

EASE FOLLOW UP WITH DR. VOSS AT 3PM ON                 



            



                          Discharge Date                   17 11:17pm     

           

 

                          Disposition                   01 HOME, SELF-CARE      

          

 

                          Condition at Discharge                   Stable       

         

 

                          Instructions/Education Provided                   NO I

NSTRUCTIONS GIVEN         

      

 

                          Prescriptions                   See Medication Section

                

 

                          Referrals                   JULY VOSS DO    

                

Order Date: Primary Care Physician                    

Address:                    

                                        [facility] [place] [address]                    

                                        [phone]                              

    

 

                          Additional Instructions/Education                   1.

 Follow-up with Dr. Dr. Voss                    

                                        2. Return to ER for any concerns        

            

                                        3.                                  



            



                          Activity                   Details                

 

                                                         

 

                          Follow Up                   prn Reason:hygiene/santa    

            



            



                          Activity                   Details                

 

                                                         

 

                          Follow Up                   prn Reason:hygiene/santa    

            



                                                                    



Goals

          No Information                                                        
 



Social History

          No Information                                                        
 



Functional Status

                      The data below is from unstructured sources            



                    Query                   Response                   Date Miguel

rded                

 

                          Patient Orientation                   Unable to Assess

                    

                                        2015 4:35pm                

 

                          Comprehension Ability                   Understands Co

ncepts                    

                                        2015 12:00pm                



                                                                    



Mental Status

          No Information                                                        
 



Encounters

                      



    



              Encounter    Normalized Encounter  Encounter Diagnosis  Care Provi

peter  

Organization



                           Date                      Type   

 

    



              2020   Emergency department  no information  SARAHI JOSEPH MD (no  [facility]



                     patient visit       phone)              [facility]



                                         (no phone)

 

    



              2020   Emergency department  no information  no name      no

 organization name



                           -                         patient visit   



                                         2020   Emergency department  no information  no name      no

 organization name



                                         patient visit   

 

    



              2012   Emergency department  no information  no name      no

 organization name



                           -                         patient visit   



                                         2012   Evaluation and  no information  CAMPBELL HOANG MD (no

  [facility] 

[facility]



                 -               management of   phone)          [facility]



                     02-          inpatient           (no phone)

 

    



              2019   Evaluation and  no information  no name      no organ

ization name



                           -                         management of   



                           2019                inpatient   

 

    



              2019   Evaluation and  no information  no name      no organ

ization name



                           -                         management of   



                           2019                inpatient   

 

    



              2015   Evaluation and  no information  no name      no organ

ization name



                           -                         management of   



                           2015                inpatient   

 

    



              02-   Evaluation and  no information  no name      no organ

ization name



                           -                         management of   



                           2013                inpatient   

 

    



              2018   Patient encounter  no information  no name      no or

ganization name

 

    



              2018   Patient encounter  no information  no name      no or

ganization name

 

    



              10-   Patient encounter  no information  no name      no or

ganization name

 

    



              2016   Patient encounter  no information  no name      no or

ganization name

 

    



              2016   Patient encounter  no information  no name      no or

ganization name

 

    



              2016   Patient encounter  no information  no name      no or

ganization name

 

    



              2016   Patient encounter  no information  no name      no or

ganization name

 

    



              2015   Patient encounter  no information  no name      no or

ganization name

 

    



              2014   Patient encounter  no information  no name      no or

ganization name

 

    



              2014   Patient encounter  no information  no name      no or

ganization name



                                         -    



                                         2014   Patient encounter  no information  no name      no or

ganization name

 

    



              2014   Patient encounter  no information  no name      no or

ganization name

 

    



              2014   Patient encounter  no information  no name      no or

ganization name

 

    



              2014   Patient encounter  no information  no name      no or

ganization name



                                         -    



                                         2014   Patient encounter  no information  no name      no or

ganization name

 

    



              10-   Patient encounter  no information  no name      no or

ganization name

 

    



              10-   Patient encounter  no information  no name      no or

ganization name

 

    



              10-   Patient encounter  no information  no name      no or

ganization name

 

    



              2013   Patient encounter  no information  no name      no or

ganization name

 

    



              2013   Patient encounter  no information  no name      no or

ganization name



                                         -    



                                         2013   Patient encounter  no information  CAMPBELL HOANG MD 

(no  [facility] 

July



                 -               procedure       phone)          [facility]



                           02-                (no phone)

 

    



              2020   Patient encounter  no information  no name      no or

ganization name



                                         procedure   

 

    



              2019   Patient encounter  no information  no name      no or

ganization name



                           -                         procedure   



                                         2019   Patient encounter  no information  no name      no or

ganization name



                                         procedure   

 

    



              01-   Patient encounter  no information  no name      no or

ganization name



                                         procedure   

 

    



              2012   Patient encounter  no information  no name      no or

ganization name



                           -                         procedure   



                                         2012   Patient encounter  no information  no name      no or

ganization name



                                         procedure   

 

    



              2012   Patient encounter  no information  no name      no or

ganization name



                           -                         procedure   



                                         2012    



                                                                                
                                                                                
                                                                                
                                                                                
                                                                                
                                 



Medical Equipment

          No Information                                                        
 



Payers

          No Information                                                        
 



Advance Directives

                      



                    Directive                   Response                   Recor

ded Date/Time       

        

 

                    Advance Directives                   No                    3:50pm       

        

 

                    Health Care Power of                    No          

         16 

3:50pm                

 

                    Organ Donor                   No                   16 

3:50pm              

 

 

                    Resuscitation Status                   Full Code            

       16 

3:50pm                



            



                    Directive                   Response                   Recor

ded Date/Time       

        

 

                    Advance Directives                   No                   06

/25/15 1:07am       

        

 

                    Health Care Power of                    No          

         06/25/15 

1:07am                

 

                    Organ Donor                   No                   06/25/15 

1:07am              

 

 

                    Resuscitation Status                   Full Code            

       06/25/15 

1:07am                



            



                    Directive                   Response                   Recor

ded Date/Time       

        

 

                    Advance Directives                   No                   02

/15/13 6:00am       

        

 

                    Health Care Power of                    No          

         02/15/13 

6:00am                

 

                    Organ Donor                   No                   02/15/13 

6:00am              

 



            



                    Directive                   Response                   Recor

ded Date/Time       

        

 

                    Advance Directives                   No                    7:27pm       

        

 

                    Health Care Power of                    No          

         17 

7:27pm                

 

                    Organ Donor                   No                   17 

7:27pm              

 

 

                    Resuscitation Status                   Full Code            

       17 

7:27pm                



                                                                    



Discharge Instructions

          No hospital discharge instructions.No hospital discharge 
instructions.No hospital discharge instructions.No hospital discharge 
instruction information available.                                              
 



Additional Source Comments

          This clinical document has been generated using [employer] 
software that has been certified by the Office of the National Coordinator for 
Health Information Technology (ONC 15.99.04.3023.Diam.31.00.0.894553) and the 
National Committee for  (NCQA, as an eMeasure certified 
technology).            

            

FOR RECORDS PERTAINING TO PATIENTS WHO ARE OR HAVE BEEN ENROLLED IN A CHEMICAL D
EPENDENCY/SUBSTANCE ABUSE PROGRAM, SOME INFORMATION MAY BE OMITTED. This clinica
l summary was aggregated from multiple sources.  Caution should be exercised in 
using it in the provision of clinical care. This summary normalizes information 
from multiple sources, and as a consequence, information in this document may ma
terially change the coding, format and clinical context of patient data. In china
tion, data may be omitted in some cases. CLINICAL DECISIONS SHOULD BE BASED ON T
HE PRIMARY CLINICAL RECORDS. [employer] provides no warranty or guara
ntee of the accuracy or completeness of information in this document.The followi
ng information is based on time limited clinical information            



                                        UNRECOGNIZED CONTENT PROVIDED BELOW FOR 

UNRECOGNIZED SECTION MEDICAL (GENERAL) 

HISTORY                

 

                                                         



            



                    Type                   Description                   Date   

             

 

                    Medical History                   High blood pressure       

                    

        

 

                    Medical History                   [place]                  

                  

 

                    Medical History                   Asthma                    

                

 

                    Medical History                   Diabetes                  

                  

 

                    Medical History                   Pt takes Blood thinners   

                    

            

 

                    Medical History                   Arthritits                

                    

 

                    Medical History                   9 chronic kidney diesease

## 2020-06-17 NOTE — XMS REPORT
Clinical Summary

                             Created on: 2020



Radha Kelly

External Reference #: [id number]

: 1965

Sex: Female



Demographics





                          Address                   [address] [place]  [address]

 

                          Home Phone                [phone]

 

                          Preferred Language        Unknown

 

                          Marital Status            

 

                          Baptism Affiliation     Unknown

 

                          Race                      White

 

                          Ethnic Group              Unknown





Author





                          Author                    [facility]

 

                          Address                   Unknown

 

                          Phone                     Unavailable







Support





                Name            Relationship    Address         Phone

 

                Cameron Whittington   [employer]            Unknown         [phone]







Care Team Providers





                    Care Team Member Name Role                Phone

 

                          PCP                       Unavailable







Allergies

Not on File



Medications

Not on file



Active Problems





Not on file



Social History





                                        Date



                 Tobacco Use     Types           Packs/Day       Years Used 

 

                                         



                                         Never Assessed    







 



                           Sex Assigned at Birth     Date Recorded

 

 



                                         Not on file 







                                        Industry



                           Job Start Date            Occupation 

 

                                        Not on file



                           Not on file               Not on file 







                                        Travel End



                           Travel History            Travel Start 

 





                                         No recent travel history available.







Last Filed Vital Signs

Not on file



Plan of Treatment





   



                 Health Maintenance  Due Date        Last Done       Comments

 

   



                           Hepatitis C Screening     1965  

 

   



                           DTaP,Tdap,and Td Vaccines  1982  



                                         (1 - Tdap)   

 

   



                           MMR Vaccines-Adult        1982  

 

   



                           Cervical Cancer Screening  1984  

 

   



                           Breast Cancer             2013  



                                         Screening-Mammogram   

 

   



                           Colon Cancer Screening    2013  

 

   



                           Zoster Recombinant        2013  



                                         Vaccine (RZV,Shingrix) (1   



                                         of 2 - [facility] 2 Dose   



                                         Standard)   

 

   



                           Influenza Vaccine (Season  2020  



                                         Ended)   

 

   



                     Pneumo-Vaccine: Peds (0-5  Aged Out            No longer el

igible based on patient's age

to



                           Yrs) & At-Risk Patients    complete this topic



                                         (6-64 Yrs)   







Results

Not on filefrom Last 3 Months

## 2020-06-17 NOTE — XMS REPORT
Clinical Summary

                             Created on: 2020



Radha Kelly

External Reference #: [id number]

: 1965

Sex: Female



Demographics





                          Address                   [address] [place]  [address]

 

                          Home Phone                [phone]

 

                          Preferred Language        English

 

                          Marital Status            Unknown

 

                          Yarsani Affiliation     NON

 

                          Race                      Unknown

 

                          Ethnic Group              Not  or 





Author





                          Author                    [facility]

 

                          Organization              [facility]

 

                          Address                   Unknown

 

                          Phone                     Unavailable







Support





                Name            Relationship    Address         Phone

 

                No Contact      [employer]            Unknown         [phone]







Care Team Providers





                    Care Team Member Name Role                Phone

 

                    No Pcp, Na          PCP                 Unavailable







Source Comments

Some departments are not documenting in the electronic medical record.  If you d
o not see the information that you expected, contact Release of Information in [facility] Health Information Management department at [phone] for further assistan
ce in locating additional records.[facility]



Allergies

Not on File



Medications

Not on file



Active Problems





Not on file



Social History





                                        Date



                 Tobacco Use     Types           Packs/Day       Years Used 

 

                                         



                                         Never Assessed    







 



                           Sex Assigned at Birth     Date Recorded

 

 



                                         Not on file 







                                        Industry



                           Job Start Date            Occupation 

 

                                        Not on file



                           Not on file               Not on file 







                                        Travel End



                           Travel History            Travel Start 

 





                                         No recent travel history available.







Last Filed Vital Signs

Not on file



Plan of Treatment





   



                 Health Maintenance  Due Date        Last Done       Comments

 

   



                           HIV SCREENING             1978  

 

   



                           DTAP/TDAP VACCINES (1 -   1981  



                                         Tdap)   

 

   



                           HEPATITIS C SCREENING     1981  

 

   



                           PHYSICAL (COMPREHENSIVE)  1981  



                                         EXAM   

 

   



                           CERVICAL CANCER SCREENING  1984  

 

   



                           BREAST CANCER SCREENING   2003  

 

   



                           COLORECTAL CANCER         2013  



                                         SCREENING   

 

   



                           SHINGLES RECOMBINANT      2013  



                                         VACCINE (1 of 2)   

 

   



                           INFLUENZA VACCINE         10/01/2020  







Results

Not on filefrom Last 3 Months



Insurance





                                        Type



            Payer      Benefit    Subscriber ID  Effective  Phone      Address 



                           Plan /                    Dates   



                                         Group     

 

                                         



                 [facility]          [place]          xxxxxxxxx       4/15/2016-   



                           HEALTH                    Present   



                                         SERVICES     







     



            Guarantor Name  Account    Relation to  Date of    Phone      Augie

g Address



                     Type                Patient             Birth  

 

     



            Radha Kelly  Personal/F  Self       1965  [phone]  [address] 

[facility]               (Home)              [place] [address]







Advance Directives





                          Patient Representative    Explanation



                           Type                      Date Recorded  

 

                                                     



                           Advance                   2016 11:45 AM  



                                         Directive/DPOA

## 2020-06-17 NOTE — XMS REPORT
Continuity of Care Document

                             Created on: 2020



DAYTON BLISS

External Reference #: [id number]

: 1965

Sex: Female



Demographics





                          Address                   [address] [place]  [address]

 

                          Home Phone                [phone] x

 

                          Preferred Language        Unknown

 

                          Marital Status            Unknown

 

                          Moravian Affiliation     Unknown

 

                          Race                      Unknown

 

                          Ethnic Group              Unknown





Author





                          Organization              Unknown

 

                          Address                   Unknown

 

                          Phone                     Unavailable



              



Allergies

      



             Active           Description           Code           Type         

  Severity   

                Reaction           Onset           Reported/Identified          

 

Relationship to Patient                 Clinical Status        

 

             Yes           ketorolac           [id number]           Drug Allergy

           

Unknown           N/A                        2008                         

  

     

 

             Yes           codeine           [id number]           Drug Allergy  

         

Unknown           TAKES ULTRAM AT                           2009          

    

                                                 

 

             Yes           tramadol           [id number]           Drug Allergy 

          

Unknown           N/A                        2011                         

  

     

 

                Yes             Sulfa (Sulfonamide Antibiotics)           [id number]

0491           

Drug Allergy           Unknown           N/A                             

015   

                                                             



                        



Medications

      



There is no data.                  



Problems

      



             Date Dx Coded           Attending           Type           Code    

       

Diagnosis                               Diagnosed By        

 

             2011                        Ot           716.90           ART

HROPATHY NOS-

UNSPEC                                           

 

             2011                        Ot           787.03           VOM

ITING ALONE   

                                                 

 

             2011                        Ot           789.00           ABD

OMINAL PAIN, 

UNSPECIFIED SITE                                 

 

             2011                        Ot           250.80           J CARLOS

B W OTH SPEC 

MANIFEST, TYPE II OR UNS                         

 

             2011                        Ot           305.20           CAN

NABIS ABUSE-

UNSPEC                                           

 

             2011                        Ot           305.50           OPI

OID ABUSE-

UNSPEC                                           

 

             2011                        Ot           305.90           KYA

G ABUSE NEC-

UNSPEC                                           

 

             2011                        Ot           V58.67           MUSA

G-TERM 

(CURRENT) USE OF INSULIN                         

 

             2011                        Ot           V58.69           OTH

 

MED,LT,CURRENT USE                               

 

             2011                        Ot           346.90           NIRAV

CIRA 

UNSPECIFIED W/O INTRACT MGRN W/                    

 

             2011                        Ot           784.0           HEAD

ACHE          

                                                 

 

             10/24/2011                        Ot           825.25           FX 

METATARSAL-

CLOSED                                           

 

             10/24/2011                        Ot           959.7           LOWE

R LEG INJURY 

NOS                                              

 

             10/24/2011                        Ot           E000.8           OTH

ER EXTERNAL 

CAUSE STATUS                                     

 

             10/24/2011                        Ot           E849.0           ACC

IDENT IN HOME 

                                                 

 

             10/24/2011                        Ot           E885.9           FAL

L FROM 

SLIPPING, TRIPPING, OR STUMBLI                    

 

             2011                        Ot           041.89           SONYA

TERIAL 

INFECTION DUE TO OTHER SPECIFI                    

 

             2011                        Ot           250.00           J CARLOS

B KATHERIN WO 

COMPL, TYPE II OR UNSPEC TY                      

 

             2011                        Ot           272.4           HYPE

RLIPIDEMIA 

NEC/NOS                                          

 

             2011                        Ot           300.4           DYST

HYMIC DISORDER

                                                 

 

             2011                        Ot           401.9           HYPE

RTENSION NOS  

                                                 

 

             2011                        Ot           453.41           ACU

TE VENOUS 

EMBOLISM   THROMBOSIS DEEP                       

 

             2011                        Ot           530.81           ESO

PHAGEAL REFLUX

                                                 

 

             2011                        Ot           593.9           HUEY

L   URETERAL 

DIS NOS                                          

 

             2011                        Ot           599.0           URIN

 TRACT 

INFECTION NOS                                    

 

             2011                        Ot           V45.89           POS

TSURGICAL 

STATES NEC                                       

 

             2011                        Ot           V54.19           AFT

ERCARE HEALING

TRAUMATIC FX OTHER BON                           

 

             2012                        Ot           453.40           ACU

TE VENOUS 

EMBOLISM   THROMBOSIS UNSP                       

 

             2012                        Ot           346.90           NIRAV

CIRA 

UNSPECIFIED W/O INTRACT MGRN W/                    

 

             2012                        Ot           453.40           ACU

TE VENOUS 

EMBOLISM   THROMBOSIS UNSP                       

 

             2013                        Ot           250.61           J CARLOS

B W NEURO 

MANIFEST, TYPE I [JUVENILE                       

 

             2013                        Ot           305.1           TOBA

CCO USE 

DISORDER                                         

 

             2013                        Ot           401.9           HYPE

RTENSION NOS  

                                                 

 

             2013                        Ot           493.90           AST

HMA, 

UNSPECIFIED                                      

 

             2013                        Ot           536.3           YIMI

ROPARESIS     

                                                 

 

             2013                        Ot           558.9           DOC

NF 

GASTROENTERIT NEC                                

 

             2013                        Ot           577.0           ACUT

E PANCREATITIS

                                                 

 

             2013                        Ot           453.40           ACU

TE VENOUS 

EMBOLISM   THROMBOSIS UNSP                       

 

                2014           JULY VOSS DO           Ot       

       453.40      

                          ACUTE VENOUS EMBOLISM   THROMBOSIS UNSP               

      

 

                2014           JULY VOSS DO           Ot       

       453.40      

                          ACUTE VENOUS EMBOLISM   THROMBOSIS UNSP               

      

 

           2015                       Ot           250.03                 

           

    

 

           2015                       Ot           250.41                 

           

    

 

           2015                       Ot           272.4                  

           

   

 

           2015                       Ot           403.10                 

           

    

 

           2015                       Ot           583.81                 

           

    

 

           2015                       Ot           585.2                  

           

   

 

           2015                       Ot           791.0                  

           

   

 

           2015                       Ot           453.40                 

           

    

 

                2015           JULY VOSS DO           Ot       

       453.40      

                                                             

 

                2015           JULY VOSS DO           Ot       

       250.80      

                          DIAB W OTH SPEC MANIFEST, TYPE II OR UNS              

      

 

                2015           JULY VOSS DO           Ot       

       300.14      

                          DISSOCIATIVE IDENTITY DISORDER                    

 

                2015           [facility]           [facility]       

       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      

                          BORDERLINE PERSONALITY DISORDER                    

 

                2015           BIPIN RAMIRES, JULY QUIROS           Ot       

       458.9       

                          HYPOTENSION NOS                    

 

                2015           [facility]           Ot       

       530.81      

                          ESOPHAGEAL REFLUX                    

 

                2015           [facility]           [facility]       

       584.9       

                          ACUTE RENAL FAILURE, UNSPECIFIED                    

 

                2015           [facility]           [facility]       

       714.0       

                          RHEUMATOID ARTHRITIS                    

 

                2015           [facility]           Ot       

       780.97      

                          ALTERED MENTAL STATUS                    

 

                2015           [facility]           Ot       

       965.09      

                          POISONING-OPIATES NEC                    

 

                2015           [facility], JULY QUIROS           Ot       

       969.4       

                          POIS-BENZODIAZEPINE RUBIO                    

 

                2015           [facility]           Ot       

       E849.0      

                          ACCIDENT IN HOME                    

 

                2015           [facility]           Ot       

       E850.2      

                          ACC POISON-OPIATES NEC                    

 

                2015           [facility], JULY QUIROS           Ot       

       E853.2      

                          ACC POISN-BENZDIAZ TRANQ                    

 

                2015           [facility], JULY QUIROS           Ot       

       V12.51      

                          HX-VENOUS THROMBOSIS EMBOLISM                    

 

                2015           [facility], JULY QUIROS           Ot       

       V58.67      

                          LONG-TERM (CURRENT) USE OF INSULIN                    

 

                2016           NANCY MENDEZ, MED S           Ot        

      E11.29       

                                                             

 

                2016           NANCY MENDEZ, MED S           Ot        

      E78.5        

                                                             

 

                2016           NANCY MENDEZ, MED S           Ot        

      I12.9        

                                                             

 

                2016           NANCY MENDEZ, MED S           Ot        

      N18.3        

                                                             

 

                2016           NANCY MENDEZ, MED S           Ot        

      R80.9        

                                                             

 

                2016           TIMOTHY MENDEZ, MELODY GRIJALVA           Ot        

      E86.0        

                          DEHYDRATION                        

 

                2016           TIMOTHY MENDEZ, MELODY GRIJALVA           Ot        

      F11.10       

                          OPIOID ABUSE, UNCOMPLICATED                    

 

                2016           TIMOTHY MENDEZ, MELODY GRIJALVA           Ot        

      F12.10       

                          CANNABIS ABUSE, UNCOMPLICATED                    

 

                2016           TIMOTHY MENDEZ, MELODY GRIJALVA           Ot        

      F15.10       

                          OTHER STIMULANT ABUSE, UNCOMPLICATED                  

  

 

           2016                       Ot           250.01                 

           

    

 

           2016                       Ot           272.4                  

           

   

 

           2016                       Ot           403.90                 

           

    

 

           2016                       Ot           583.81                 

           

    

 

           2016                       Ot           585.2                  

           

   

 

           2016                       Ot           791.0                  

           

   

 

           2016                       Ot           250.01                 

           

    

 

           2016                       Ot           272.4                  

           

   

 

           2016                       Ot           403.90                 

           

    

 

           2016                       Ot           583.81                 

           

    

 

           2016                       Ot           585.2                  

           

   

 

           2016                       Ot           791.0                  

           

   

 

           2016                       Ot           250.41                 

           

    

 

           2016                       Ot           272.4                  

           

   

 

           2016                       Ot           403.90                 

           

    

 

           2016                       Ot           583.81                 

           

    

 

           2016                       Ot           585.2                  

           

   

 

           2016                       Ot           791.0                  

           

   

 

           2016                       Ot           250.41                 

           

    

 

           2016                       Ot           272.4                  

           

   

 

           2016                       Ot           403.90                 

           

    

 

           2016                       Ot           583.81                 

           

    

 

           2016                       Ot           585.2                  

           

   

 

           2016                       Ot           791.0                  

           

   

 

           2016                       Ot           250.01                 

           

    

 

           2016                       Ot           272.4                  

           

   

 

           2016                       Ot           403.10                 

           

    

 

           2016                       Ot           583.81                 

           

    

 

           2016                       Ot           585.2                  

           

   

 

           2016                       Ot           791.0                  

           

   

 

           2016                       Ot           250.01                 

           

    

 

           2016                       Ot           272.4                  

           

   

 

           2016                       Ot           403.10                 

           

    

 

           2016                       Ot           583.81                 

           

    

 

           2016                       Ot           585.2                  

           

   

 

           2016                       Ot           791.0                  

           

   

 

           2016                       Ot           250.40                 

           

    

 

           2016                       Ot           272.4                  

           

   

 

           2016                       Ot           585.2                  

           

   

 

           2016                       Ot           791.0                  

           

   

 

           2016                       Ot           453.40                 

           

    

 

                2016           NANCY MENDEZ, YU MELENDEZ           Ot        

      585.3        

                                                             

 

                2016           NANCY MENDEZ, YU MELENDEZ           Ot        

      250.40       

                                                             

 

                2016           NANCY MENDEZ, YU MELENDEZ           Ot        

      272.4        

                                                             

 

                2016           NANCY MENDEZ, YU MELENDEZ           Ot        

      585.2        

                                                             

 

                2016           NANCY MENDEZ, YU MELENDEZ           Ot        

      791.0        

                                                             

 

                2016           JULY VOSS DO           Ot       

       V76.12      

                                                             

 

                2016           JULY VOSS DO           Ot       

       611.72      

                                                             

 

                2016           NANCY MENDEZ, AHMED S           Ot        

      250.01       

                                                             

 

                2016           NANCY MENDEZ, MED S           Ot        

      272.4        

                                                             

 

                2016           NANCY MENDEZ, MED S           Ot        

      403.10       

                                                             

 

                2016           NANCY MENDEZ, MED S           Ot        

      583.81       

                                                             

 

                2016           NANCY MENDEZ, MED S           Ot        

      585.2        

                                                             

 

                2016           NANCY MENDEZ, MED S           Ot        

      791.0        

                                                             

 

                2016           FELIZ GERMAIN DO           Ot        

      250.03       

                                                             

 

                2016           NANCY MENDEZ, MED S           Ot        

      250.01       

                                                             

 

                2016           NANCY MENDEZ, MED S           Ot        

      272.4        

                                                             

 

                2016           NANCY MENDEZ, MED S           Ot        

      403.10       

                                                             

 

                2016           NANCY MENDEZ, MED S           Ot        

      583.81       

                                                             

 

                2016           NANCY MENDEZ, MED S           Ot        

      585.2        

                                                             

 

                2016           NANCY MENDEZ, [facility] S           Ot        

      791.0        

                                                             

 

                2016           FELIZ GERMAIN DO           Ot        

      250.03       

                                                             

 

                2016           JULY VOSS DO           Ot       

       453.40      

                                                             

 

           2016                       Ot           250.03                 

           

    

 

           2016                       Ot           250.41                 

           

    

 

           2016                       Ot           272.4                  

           

   

 

           2016                       Ot           403.10                 

           

    

 

           2016                       Ot           583.81                 

           

    

 

           2016                       Ot           585.2                  

           

   

 

           2016                       Ot           791.0                  

           

   

 

                2016           NANCY MENDEZ, MED S           Ot        

      E11.29       

                                                             

 

                2016           NANCY MENDEZ, MED S           Ot        

      E78.5        

                                                             

 

                2016           NANCY MENDEZ, MED S           Ot        

      I12.9        

                                                             

 

                2016           NANCY MENDEZ, GENESIS S           Ot        

      N18.3        

                                                             

 

                2016           NANCY MENDEZ, GENESIS S           Ot        

      R80.9        

                                                             

 

           2016                       Ot           250.01                 

           

    

 

           2016                       Ot           272.4                  

           

   

 

           2016                       Ot           403.90                 

           

    

 

           2016                       Ot           583.81                 

           

    

 

           2016                       Ot           585.2                  

           

   

 

           2016                       Ot           791.0                  

           

   

 

           2016                       Ot           250.01                 

           

    

 

           2016                       Ot           272.4                  

           

   

 

           2016                       Ot           403.90                 

           

    

 

           2016                       Ot           583.81                 

           

    

 

           2016                       Ot           585.2                  

           

   

 

           2016                       Ot           791.0                  

           

   

 

           2016                       Ot           250.41                 

           

    

 

           2016                       Ot           272.4                  

           

   

 

           2016                       Ot           403.90                 

           

    

 

           2016                       Ot           583.81                 

           

    

 

           2016                       Ot           585.2                  

           

   

 

           2016                       Ot           791.0                  

           

   

 

           2016                       Ot           250.41                 

           

    

 

           2016                       Ot           272.4                  

           

   

 

           2016                       Ot           403.90                 

           

    

 

           2016                       Ot           583.81                 

           

    

 

           2016                       Ot           585.2                  

           

   

 

           2016                       Ot           791.0                  

           

   

 

           2016                       Ot           250.01                 

           

    

 

           2016                       Ot           272.4                  

           

   

 

           2016                       Ot           403.10                 

           

    

 

           2016                       Ot           583.81                 

           

    

 

           2016                       Ot           585.2                  

           

   

 

           2016                       Ot           791.0                  

           

   

 

           2016                       Ot           250.01                 

           

    

 

           2016                       Ot           272.4                  

           

   

 

           2016                       Ot           403.10                 

           

    

 

           2016                       Ot           583.81                 

           

    

 

           2016                       Ot           585.2                  

           

   

 

           2016                       Ot           791.0                  

           

   

 

           2016                       Ot           250.40                 

           

    

 

           2016                       Ot           272.4                  

           

   

 

           2016                       Ot           585.2                  

           

   

 

           2016                       Ot           791.0                  

           

   

 

           2016                       Ot           453.40                 

           

    

 

                2016           NANCY MENDEZ, YU MELENDEZ           Ot        

      585.3        

                                                             

 

                2016           NANCY MENDEZ, GENESIS MELENDEZ           Ot        

      250.40       

                                                             

 

                2016           NANCY MENDEZ, GENESIS MELENDEZ           Ot        

      272.4        

                                                             

 

                2016           NANCY MENDEZ, GENESIS MELENDEZ           Ot        

      585.2        

                                                             

 

                2016           NANCY MENDEZ, GENESIS MELENDEZ           Ot        

      791.0        

                                                             

 

                2016           JULY VOSS DO           Ot       

       V76.12      

                                                             

 

                2016           JULY VOSS DO           Ot       

       611.72      

                                                             

 

                2016           NANCY MENDEZ, YU MELENDEZ           Ot        

      250.01       

                                                             

 

                2016           NANCY MENDEZ, GENESIS MELENDEZ           Ot        

      272.4        

                                                             

 

                2016           NANCY MENDEZ, MED S           Ot        

      403.10       

                                                             

 

                2016           NANCY MENDEZ, MED S           Ot        

      583.81       

                                                             

 

                2016           NANCY MENDEZ, MED S           Ot        

      585.2        

                                                             

 

                2016           NANCY MENDEZ, MED S           Ot        

      791.0        

                                                             

 

                2016           FELIZ GERMAIN DO           Ot        

      250.03       

                                                             

 

                2016           NANCY MENDEZ, MED S           Ot        

      250.01       

                                                             

 

                2016           NANCY MENDEZ, MED S           Ot        

      272.4        

                                                             

 

                2016           NANCY MENDEZ, MED S           Ot        

      403.10       

                                                             

 

                2016           NANCY MENDEZ, MED S           Ot        

      583.81       

                                                             

 

                2016           NANCY MENDEZ, MED S           Ot        

      585.2        

                                                             

 

                2016           NANCY MENDEZ, MED S           Ot        

      791.0        

                                                             

 

                2016           FELIZ GERMAIN DO           Ot        

      250.03       

                                                             

 

                2016           JULY VOSS DO           Ot       

       453.40      

                                                             

 

           2016                       Ot           250.03                 

           

    

 

           2016                       Ot           250.41                 

           

    

 

           2016                       Ot           272.4                  

           

   

 

           2016                       Ot           403.10                 

           

    

 

           2016                       Ot           583.81                 

           

    

 

           2016                       Ot           585.2                  

           

   

 

           2016                       Ot           791.0                  

           

   

 

                2016           NANCY MENDEZ, MED S           Ot        

      E11.29       

                                                             

 

                2016           NANCY MENDEZ, MED S           Ot        

      E78.5        

                                                             

 

                2016           NANCY MENDEZ, MED S           Ot        

      I12.9        

                                                             

 

                2016           NANCY MENDEZ, MED S           Ot        

      N18.3        

                                                             

 

                2016           NANCY MENDEZ, MED S           Ot        

      R80.9        

                                                             

 

                2016           TIMOTHY MENDEZ, MELODY D           Ot        

      E86.0        

                                                             

 

                2016           TIMOTHY MENDEZ, MELODY D           Ot        

      F11.10       

                                                             

 

                2016           TIMOTHY MENDEZ, MELODY D           Ot        

      F12.10       

                                                             

 

                2016           TIMOTHY MENDEZ, MELODY D           Ot        

      F15.10       

                                                             

 

                04/15/2016           TIMOTHY MENDEZ, MELODY D           Ot        

      E86.0        

                          DEHYDRATION                        

 

                04/15/2016           TIMOTHY MENDEZ, MELODY GRIJALVA           Ot        

      F11.10       

                          OPIOID ABUSE, UNCOMPLICATED                    

 

                04/15/2016           MELODY AGUIRRE MD           Ot        

      F12.10       

                          CANNABIS ABUSE, UNCOMPLICATED                    

 

                04/15/2016           TIMOTHY MENDEZ, MELODY GRIJALVA           Ot        

      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       

                          OTHER STIMULANT ABUSE, UNCOMPLICATED                  

  

 

                2016           TIMOTHY MENDEZ, MELODY GRIJALVA           Ot        

      E86.0        

                          DEHYDRATION                        

 

                2016           MELODY AGUIRRE MD           Ot        

      F11.10       

                          OPIOID ABUSE, UNCOMPLICATED                    

 

                2016           MELODY AGUIRRE MD           Ot        

      F12.10       

                          CANNABIS ABUSE, UNCOMPLICATED                    

 

                2016           TIMOTHY MENDEZ, MELODY GRIJALVA           Ot        

      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       

                          OTHER STIMULANT ABUSE, UNCOMPLICATED                  

  

 

             2016                        Ot           250.01           J CARLOS

B KATHERIN WO 

COMPL, TYPE I [JUVENILE TYP                      

 

             2016                        Ot           272.4           HYPE

RLIPIDEMIA 

NEC/NOS                                          

 

             2016                        Ot           403.90           HYP

TNSV CHR KID 

DIS, UNSPEC, [facility] ST                         

 

             2016                        Ot           583.81           NEP

HRITIS NOS IN 

OTH DIS                                          

 

             2016                        Ot           585.2           

DAWN KIDNEY 

DISEASE, STAGE II (MILD)                         

 

             2016                        Ot           791.0           PROT

EINURIA       

                                                 

 

             2016                        Ot           250.01           J CARLOS

B KATHERIN WO 

COMPL, TYPE I [JUVENILE TYP                      

 

             2016                        Ot           272.4           HYPE

RLIPIDEMIA 

NEC/NOS                                          

 

             2016                        Ot           403.90           HYP

TNSV CHR KID 

DIS, UNSPEC, [facility]                         

 

             2016                        Ot           583.81           NEP

HRITIS NOS IN 

OTH DIS                                          

 

             2016                        Ot           585.2           

DAWN KIDNEY 

DISEASE, STAGE II (MILD)                         

 

             2016                        Ot           791.0           PROT

EINURIA       

                                                 

 

             2016                        Ot           250.41           J CARLOS

B W RENAL 

MANIFEST, TYPE I [JUVENILE                       

 

             2016                        Ot           272.4           HYPE

RLIPIDEMIA 

NEC/NOS                                          

 

             2016                        Ot           403.90           HYP

TNSV CHR KID 

DIS, UNSPEC, W [facility]                         

 

             2016                        Ot           583.81           NEP

HRITIS NOS IN 

OTH DIS                                          

 

             2016                        Ot           585.2           

DAWN KIDNEY 

DISEASE, STAGE II (MILD)                         

 

             2016                        Ot           791.0           PROT

EINURIA       

                                                 

 

             2016                        Ot           250.41           J CARLOS

B W RENAL 

MANIFEST, TYPE I [JUVENILE                       

 

             2016                        Ot           272.4           HYPE

RLIPIDEMIA 

NEC/NOS                                          

 

             2016                        Ot           403.90           HYP

TNSV CHR KID 

DIS, UNSPEC, W [facility]                         

 

             2016                        Ot           583.81           NEP

HRITIS NOS IN 

OTH DIS                                          

 

             2016                        Ot           585.2           

DAWN KIDNEY 

DISEASE, STAGE II (MILD)                         

 

             2016                        Ot           791.0           PROT

EINURIA       

                                                 

 

             2016                        Ot           250.01           J CARLOS

B KATHERIN WO 

COMPL, TYPE I [JUVENILE TYP                      

 

             2016                        Ot           272.4           HYPE

RLIPIDEMIA 

NEC/NOS                                          

 

             2016                        Ot           403.10           HYP

TNSV CHR KID 

DIS, BENIGN, W [facility]                         

 

             2016                        Ot           583.81           NEP

HRITIS NOS IN 

OTH DIS                                          

 

             2016                        Ot           585.2           

DAWN KIDNEY 

DISEASE, STAGE II (MILD)                         

 

             2016                        Ot           791.0           PROT

EINURIA       

                                                 

 

             2016                        Ot           250.01           J CARLOS

B KATHERIN WO 

COMPL, TYPE I [JUVENILE TYP                      

 

             2016                        Ot           272.4           HYPE

RLIPIDEMIA 

NEC/NOS                                          

 

             2016                        Ot           403.10           HYP

TNSV CHR KID 

DIS, BENIGN, W [facility]                         

 

             2016                        Ot           583.81           NEP

HRITIS NOS IN 

OTH DIS                                          

 

             2016                        Ot           585.2           

DAWN KIDNEY 

DISEASE, STAGE II (MILD)                         

 

             2016                        Ot           791.0           PROT

EINURIA       

                                                 

 

             2016                        Ot           250.40           J CARLOS

B W RENAL 

MANIFEST, TYPE II OR UNSPEC                      

 

             2016                        Ot           272.4           HYPE

RLIPIDEMIA 

NEC/NOS                                          

 

             2016                        Ot           585.2           

DAWN KIDNEY 

DISEASE, STAGE II (MILD)                         

 

             2016                        Ot           791.0           PROT

EINURIA       

                                                 

 

             2016                        Ot           453.40           ACU

TE VENOUS 

EMBOLISM   THROMBOSIS UNSP                       

 

                2016           NANCY MENDEZ, YU MELENDEZ           Ot        

      585.3        

                          CHRONIC KIDNEY DISEASE, STAGE III (MODER              

      

 

                2016           NANCY MENDEZ, YU MELENDEZ           Ot        

      250.40       

                          DIAB W RENAL MANIFEST, TYPE II OR UNSPEC              

      

 

                2016           NANCY MENDEZ, YU MELENDEZ           Ot        

      272.4        

                          HYPERLIPIDEMIA NEC/NOS                    

 

                2016           NANCY MENDEZ, AHMED S           Ot        

      585.2        

                          CHRONIC KIDNEY DISEASE, STAGE II (MILD)               

     

 

                2016           NANCY MENDEZ, AHMED S           Ot        

      791.0        

                          PROTEINURIA                        

 

                2016           JULY VOSS DO           Ot       

       V76.12      

                          OTH SCREEN MAMMO-MALIGN NEOPLASM OF BABATUNDE              

      

 

                2016           JULY VOSS DO           Ot       

       611.72      

                          LUMP OR MASS IN BREAST                    

 

                2016           NANCY MENDEZ, AHMED S           Ot        

      250.01       

                          DIAB KATHERIN WO COMPL, TYPE I [JUVENILE TYP              

      

 

                2016           NANCY MENDEZ, AHMED S           Ot        

      272.4        

                          HYPERLIPIDEMIA NEC/NOS                    

 

                2016           NANCY MENDEZ, AHMED S           Ot        

      403.10       

                          HYPTNSV [facility] KID DIS, BENIGN, W [facility]R [facility]              

      

 

                2016           NANCY MENDEZ, AHMED S           Ot        

      583.81       

                          NEPHRITIS NOS IN OTH DIS                    

 

                2016           NANCY MENDEZ, AHMED S           Ot        

      585.2        

                          CHRONIC KIDNEY DISEASE, STAGE II (MILD)               

     

 

                2016           NANCY MENDEZ, AHMED S           Ot        

      791.0        

                          PROTEINURIA                        

 

                2016           GERMAIN , FELIZ L           Ot        

      250.03       

                          DIAB KATHERIN WO COMPL, TYPE I [JUVENILE TYP              

      

 

                2016           NANCY MENDEZ, AHMED S           Ot        

      250.01       

                          DIAB KATHERIN WO COMPL, TYPE I [JUVENILE TYP              

      

 

                2016           NANCY MENDEZ, AHMED S           Ot        

      272.4        

                          HYPERLIPIDEMIA NEC/NOS                    

 

                2016           NANCY MENDEZ, AHMED S           Ot        

      403.10       

                          HYPTNSV [facility] KID DIS, BENIGN, W CHR [facility]              

      

 

                2016           NANCY MENDEZ, AHMED S           Ot        

      583.81       

                          NEPHRITIS NOS IN OTH DIS                    

 

                2016           NANCY MENDEZ, AHMED S           Ot        

      585.2        

                          CHRONIC KIDNEY DISEASE, STAGE II (MILD)               

     

 

                2016           NANCY MENDEZ, AHMED S           Ot        

      791.0        

                          PROTEINURIA                        

 

                2016           GERMAIN DO, FELIZ L           Ot        

      250.03       

                          DIAB KATHERIN WO COMPL, TYPE I [JUVENILE TYP              

      

 

                2016           JULY VOSS DO           Ot       

       453.40      

                          ACUTE VENOUS EMBOLISM   THROMBOSIS UNSP               

      

 

             2016                        Ot           250.03           J CARLOS

B KATHERIN WO 

COMPL, TYPE I [JUVENILE TYP                      

 

             2016                        Ot           250.41           J CARLOS

B W RENAL 

MANIFEST, TYPE I [JUVENILE                       

 

             2016                        Ot           272.4           HYPE

RLIPIDEMIA 

NEC/NOS                                          

 

             2016                        Ot           403.10           HYP

TNSV CHR KID 

DIS, BENIGN, W CHR [facility]                         

 

             2016                        Ot           583.81           NEP

HRITIS NOS IN 

OTH DIS                                          

 

             2016                        Ot           585.2           

DAWN KIDNEY 

DISEASE, STAGE II (MILD)                         

 

             2016                        Ot           791.0           PROT

EINURIA       

                                                 

 

                2016           NANCY MENDEZ, YU S           Ot        

      E11.29       

                          TYPE 2 DIABETES MELLITUS W H DIABETIC               

      

 

                2016           NANCY MENDEZ, JULIANNEMED S           Ot        

      E78.5        

                          HYPERLIPIDEMIA, UNSPECIFIED                    

 

                2016           NANCY MENDEZ, JULIANNEMED S           Ot        

      I12.9        

                          HYPERTENSIVE CHRONIC KIDNEY DISEASE W ST              

      

 

                2016           NANCY MENDEZ, YU S           Ot        

      N18.3        

                          CHRONIC KIDNEY DISEASE, STAGE 3 (MODERAT              

      

 

                2016           NANCY MENDEZ, JULIANNEMED S           Ot        

      R80.9        

                          PROTEINURIA, UNSPECIFIED                    

 

                2016           NANCY MENDEZ, JULIANNEMED S           Ot        

      E11.21       

                          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              

      

 

                2016           NANCY MENDEZ, YU S           Ot        

      E11.22       

                          TYPE 2 DIABETES MELLITUS W DIABETIC               

      

 

                2016           NANCY MENDEZ, YU S           Ot        

      E78.5        

                          HYPERLIPIDEMIA, UNSPECIFIED                    

 

                2016           NANCY MENDEZ, YU S           Ot        

      I12.9        

                          HYPERTENSIVE CHRONIC KIDNEY DISEASE W ST              

      

 

                2016           NANCY MENDEZ, JULIANNEMED S           Ot        

      N18.2        

                          CHRONIC KIDNEY DISEASE, STAGE 2 (MILD)                

    

 

                2016           NANCY MENDEZ, JULIANNEMED S           Ot        

      R80.9        

                          PROTEINURIA, UNSPECIFIED                    

 

                2016           NANCY MENDEZ, AHMED S           Ot        

      Z79.899      

                          OTHER LONG TERM (CURRENT) DRUG THERAPY                

    

 

                05/10/2016           NANCY MENDEZ, YU S           Ot        

      E11.21       

                          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              

      

 

                05/10/2016           NANCY MENDEZ, YU S           Ot        

      E11.22       

                          TYPE 2 DIABETES MELLITUS W DIABETIC               

      

 

                05/10/2016           NANCY MENDEZ, YU S           Ot        

      E78.5        

                          HYPERLIPIDEMIA, UNSPECIFIED                    

 

                05/10/2016           NANCY MENDEZ, JULIANNEGENESIS S           Ot        

      I12.9        

                          HYPERTENSIVE CHRONIC KIDNEY DISEASE W ST              

      

 

                05/10/2016           NANCY MENDEZ, YU S           Ot        

      N18.2        

                          CHRONIC KIDNEY DISEASE, STAGE 2 (MILD)                

    

 

                05/10/2016           NANCY MENDEZ, JULIANNEGENESIS S           Ot        

      R80.9        

                          PROTEINURIA, UNSPECIFIED                    

 

                05/10/2016           NANCY MENDEZ, YU S           Ot        

      Z79.899      

                          OTHER LONG TERM (CURRENT) DRUG THERAPY                

    

 

                2016           GERMAIN DO, FELIZ L           Ot        

      E10.65       

                          TYPE 1 DIABETES MELLITUS WITH HYPERGLYCE              

      

 

                2016           GERMAIN DO, FELIZ L           Ot        

      E78.5        

                          HYPERLIPIDEMIA, UNSPECIFIED                    

 

                2016           GERMAIN DO, FELIZ L           Ot        

      E10.65       

                          TYPE 1 DIABETES MELLITUS WITH HYPERGLYCE              

      

 

                2016           GERMAIN DO, FELIZ L           Ot        

      E78.5        

                          HYPERLIPIDEMIA, UNSPECIFIED                    

 

             2016                        Ot           250.01           J CARLOS

B KATHERIN WO 

COMPL, TYPE I [JUVENILE TYP                      

 

             2016                        Ot           272.4           HYPE

RLIPIDEMIA 

NEC/NOS                                          

 

             2016                        Ot           403.90           HYP

TNSV CHR KID 

DIS, UNSPEC, W CHR KD ST                         

 

             2016                        Ot           583.81           NEP

HRITIS NOS IN 

OTH DIS                                          

 

             2016                        Ot           585.2           

DAWN KIDNEY 

DISEASE, STAGE II (MILD)                         

 

             2016                        Ot           791.0           PROT

EINURIA       

                                                 

 

             2016                        Ot           250.41           J CARLOS

B W RENAL 

MANIFEST, TYPE I [JUVENILE                       

 

             2016                        Ot           272.4           HYPE

RLIPIDEMIA 

NEC/NOS                                          

 

             2016                        Ot           403.90           HYP

TNSV CHR KID 

DIS, UNSPEC, W CHR KD ST                         

 

             2016                        Ot           583.81           NEP

HRITIS NOS IN 

OTH DIS                                          

 

             2016                        Ot           585.2           

DAWN KIDNEY 

DISEASE, STAGE II (MILD)                         

 

             2016                        Ot           791.0           PROT

EINURIA       

                                                 

 

             2016                        Ot           250.41           J CARLOS

B W RENAL 

MANIFEST, TYPE I [JUVENILE                       

 

             2016                        Ot           272.4           HYPE

RLIPIDEMIA 

NEC/NOS                                          

 

             2016                        Ot           403.90           HYP

TNSV CHR KID 

DIS, UNSPEC, W CHR KD ST                         

 

             2016                        Ot           583.81           NEP

HRITIS NOS IN 

OTH DIS                                          

 

             2016                        Ot           585.2           

DAWN KIDNEY 

DISEASE, STAGE II (MILD)                         

 

             2016                        Ot           791.0           PROT

EINURIA       

                                                 

 

             2016                        Ot           250.01           J CARLOS

B KATHERIN WO 

COMPL, TYPE I [JUVENILE TYP                      

 

             2016                        Ot           272.4           HYPE

RLIPIDEMIA 

NEC/NOS                                          

 

             2016                        Ot           403.10           HYP

TNSV CHR KID 

DIS, BENIGN, W CHR KD ST                         

 

             2016                        Ot           583.81           NEP

HRITIS NOS IN 

OTH DIS                                          

 

             2016                        Ot           585.2           

DAWN KIDNEY 

DISEASE, STAGE II (MILD)                         

 

             2016                        Ot           791.0           PROT

EINURIA       

                                                 

 

             2016                        Ot           250.01           J CARLOS

B KATHERIN WO 

COMPL, TYPE I [JUVENILE TYP                      

 

             2016                        Ot           272.4           HYPE

RLIPIDEMIA 

NEC/NOS                                          

 

             2016                        Ot           403.10           HYP

TNSV CHR KID 

DIS, BENIGN, W CHR KD ST                         

 

             2016                        Ot           583.81           NEP

HRITIS NOS IN 

OTH DIS                                          

 

             2016                        Ot           585.2           

DAWN KIDNEY 

DISEASE, STAGE II (MILD)                         

 

             2016                        Ot           791.0           PROT

EINURIA       

                                                 

 

             2016                        Ot           250.40           J CARLOS

B W RENAL 

MANIFEST, TYPE II OR UNSPEC                      

 

             2016                        Ot           272.4           HYPE

RLIPIDEMIA 

NEC/NOS                                          

 

             2016                        Ot           585.2           

DAWN KIDNEY 

DISEASE, STAGE II (MILD)                         

 

             2016                        Ot           791.0           PROT

EINURIA       

                                                 

 

             2016                        Ot           453.40           ACU

TE VENOUS 

EMBOLISM   THROMBOSIS UNSP                       

 

                2016           NANCY MENDEZ, YU S           Ot        

      585.3        

                          CHRONIC KIDNEY DISEASE, STAGE III (MODER              

      

 

                2016           NANCY MENDEZ, YU S           Ot        

      250.40       

                          DIAB W RENAL MANIFEST, TYPE II OR UNSPEC              

      

 

                2016           NANCY MENDEZ, YU S           Ot        

      272.4        

                          HYPERLIPIDEMIA NEC/NOS                    

 

                2016           NANCY MENDEZ, YU S           Ot        

      585.2        

                          CHRONIC KIDNEY DISEASE, STAGE II (MILD)               

     

 

                2016           NANCY MENDEZ, YU S           Ot        

      791.0        

                          PROTEINURIA                        

 

                2016           JULY VOSS DO           Ot       

       V76.12      

                          OT SCREEN MAMMO-MALIGN NEOPLASM OF BABATUNDE              

      

 

                2016           JULY VOSS DO           Ot       

       611.72      

                          LUMP OR MASS IN BREAST                    

 

                2016           NANCY MENDEZ, AHMED S           Ot        

      250.01       

                          DIAB KATHERIN WO COMPL, TYPE I [JUVENILE TYP              

      

 

                2016           NANCY MENDEZ, AHMED S           Ot        

      272.4        

                          HYPERLIPIDEMIA NEC/NOS                    

 

                2016           NANCY MENDEZ, AHMED S           Ot        

      403.10       

                          HYPTNSV [facility] KID DIS, BENIGN, W CHR KD ST              

      

 

                2016           NANCY MENDEZ, AHMED S           Ot        

      583.81       

                          NEPHRITIS NOS IN OTH DIS                    

 

                2016           NANCY MENDEZ, AHMED S           Ot        

      585.2        

                          CHRONIC KIDNEY DISEASE, STAGE II (MILD)               

     

 

                2016           NANCY MENDEZ, AHMED S           Ot        

      791.0        

                          PROTEINURIA                        

 

                2016           FELIZ GERMAIN DO L           Ot        

      250.03       

                          DIAB KATHERIN WO COMPL, TYPE I [JUVENILE TYP              

      

 

                2016           NANCY MENDEZ, AHMED S           Ot        

      250.01       

                          DIAB KATHERIN WO COMPL, TYPE I [JUVENILE TYP              

      

 

                2016           NANCY MENDEZ, AHMED S           Ot        

      272.4        

                          HYPERLIPIDEMIA NEC/NOS                    

 

                2016           NANCY MENDEZ, AHMED S           Ot        

      403.10       

                          HYPTNSV [facility] KID DIS, BENIGN, W CHR KD ST              

      

 

                2016           NANCY MENDEZ, AHMED S           Ot        

      583.81       

                          NEPHRITIS NOS IN OTH DIS                    

 

                2016           NANCY MENDEZ, AHMED S           Ot        

      585.2        

                          CHRONIC KIDNEY DISEASE, STAGE II (MILD)               

     

 

                2016           NANCY MENDEZ, AHMED S           Ot        

      791.0        

                          PROTEINURIA                        

 

                2016           SHERIE GERMAIN DOISON L           Ot        

      250.03       

                          DIAB KATHERIN WO COMPL, TYPE I [JUVENILE TYP              

      

 

                2016           JULY VOSS DO           Ot       

       453.40      

                          ACUTE VENOUS EMBOLISM   THROMBOSIS UNSP               

      

 

             2016                        Ot           250.03           J CARLOS

B KATHERIN WO 

COMPL, TYPE I [JUVENILE TYP                      

 

             2016                        Ot           250.41           J CARLOS

B W RENAL 

MANIFEST, TYPE I [JUVENILE                       

 

             2016                        Ot           272.4           HYPE

RLIPIDEMIA 

NEC/NOS                                          

 

             2016                        Ot           403.10           HYP

TNSV CHR KID 

DIS, BENIGN, W CHR KD ST                         

 

             2016                        Ot           583.81           NEP

HRITIS NOS IN 

OTH DIS                                          

 

             2016                        Ot           585.2           

DAWN KIDNEY 

DISEASE, STAGE II (MILD)                         

 

             2016                        Ot           791.0           PROT

EINURIA       

                                                 

 

                2016           NANCY MENDEZ, JULIANNEMED S           Ot        

      E11.29       

                          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               

      

 

                2016           NANCY MENDEZ, AHMED S           Ot        

      E78.5        

                          HYPERLIPIDEMIA, UNSPECIFIED                    

 

                2016           NANCY MENDEZ, AHMED S           Ot        

      I12.9        

                          HYPERTENSIVE CHRONIC KIDNEY DISEASE W ST              

      

 

                2016           NANCY MENDEZ, AHMED S           Ot        

      N18.3        

                          CHRONIC KIDNEY DISEASE, STAGE 3 (MODERAT              

      

 

                2016           NANCY MENDEZ, AHMED S           Ot        

      R80.9        

                          PROTEINURIA, UNSPECIFIED                    

 

                2016           NANCY MENDEZ, AHMED S           Ot        

      E11.21       

                          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              

      

 

                2016           NANCY MENDEZ, AHMED S           Ot        

      E11.22       

                          TYPE 2 DIABETES MELLITUS W DIABETIC               

      

 

                2016           NANCY MENDEZ, AHMED S           Ot        

      E78.5        

                          HYPERLIPIDEMIA, UNSPECIFIED                    

 

                2016           NANCY MENDEZ, AHMED S           Ot        

      I12.9        

                          HYPERTENSIVE CHRONIC KIDNEY DISEASE W ST              

      

 

                2016           NANCY MENDEZ, AHMED S           Ot        

      N18.2        

                          CHRONIC KIDNEY DISEASE, STAGE 2 (MILD)                

    

 

                2016           NANCY MENDEZ, AHMED S           Ot        

      R80.9        

                          PROTEINURIA, UNSPECIFIED                    

 

                2016           NANCY MENDEZ, AHMED S           Ot        

      Z79.899      

                          OTHER LONG TERM (CURRENT) DRUG THERAPY                

    

 

                2016           MARCELLUS RAMIRES, FELIZ L           Ot        

      E10.65       

                          TYPE 1 DIABETES MELLITUS WITH HYPERGLYCE              

      

 

                2016           MARCELLUS RAMIRES, FELIZ L           Ot        

      E78.5        

                          HYPERLIPIDEMIA, UNSPECIFIED                    

 

                2016           MARCELLUS DO, FELIZ L           Ot        

      E10.65       

                          TYPE 1 DIABETES MELLITUS WITH HYPERGLYCE              

      

 

                2016           MARCELLUS RAMIRES, FELIZ L           Ot        

      E78.5        

                          HYPERLIPIDEMIA, UNSPECIFIED                    

 

                2016           MARCELLUS RAMIRES, FELIZ L           Ot        

      E10.65       

                          TYPE 1 DIABETES MELLITUS WITH HYPERGLYCE              

      

 

                2016           FELIZ GERMAIN DO           Ot        

      E78.5        

                          HYPERLIPIDEMIA, UNSPECIFIED                    

 

             2017                        Ot           250.01           J CARLOS

B KATHERIN WO 

COMPL, TYPE I [JUVENILE TYP                      

 

             2017                        Ot           272.4           HYPE

RLIPIDEMIA 

NEC/NOS                                          

 

             2017                        Ot           403.10           HYP

TNSV CHR KID 

DIS, BENIGN, W CHR KD ST                         

 

             2017                        Ot           583.81           NEP

HRITIS NOS IN 

OTH DIS                                          

 

             2017                        Ot           585.2           

DAWN KIDNEY 

DISEASE, STAGE II (MILD)                         

 

             2017                        Ot           791.0           PROT

EINURIA       

                                                 

 

             2017                        Ot           250.40           J CARLOS

B W RENAL 

MANIFEST, TYPE II OR UNSPEC                      

 

             2017                        Ot           272.4           HYPE

RLIPIDEMIA 

NEC/NOS                                          

 

             2017                        Ot           585.2           

DAWN KIDNEY 

DISEASE, STAGE II (MILD)                         

 

             2017                        Ot           791.0           PROT

EINURIA       

                                                 

 

             2017                        Ot           453.40           ACU

TE VENOUS 

EMBOLISM   THROMBOSIS UNSP                       

 

                2017           NANCY MENDEZ, AHMED S           Ot        

      585.3        

                          CHRONIC KIDNEY DISEASE, STAGE III (MODER              

      

 

                2017           NANCY MENDEZ, JULIANNEMED S           Ot        

      250.40       

                          DIAB W RENAL MANIFEST, TYPE II OR UNSPEC              

      

 

                2017           NANCY MENDEZ, AHMED S           Ot        

      272.4        

                          HYPERLIPIDEMIA NEC/NOS                    

 

                2017           NANCY MENDEZ, AHMED S           Ot        

      585.2        

                          CHRONIC KIDNEY DISEASE, STAGE II (MILD)               

     

 

                2017           NANCY MENDEZ, AHMED S           Ot        

      791.0        

                          PROTEINURIA                        

 

                2017           JULY VOSS DO           Ot       

       V76.12      

                          OTH SCREEN MAMMO-MALIGN NEOPLASM OF BABATUNDE              

      

 

                2017           JULY VOSS DO           Ot       

       611.72      

                          LUMP OR MASS IN BREAST                    

 

                2017           NANCY MENDEZ, AHMED S           Ot        

      250.01       

                          DIAB KATHERIN WO COMPL, TYPE I [JUVENILE TYP              

      

 

                2017           NANCY MENDEZ, AHMED S           Ot        

      272.4        

                          HYPERLIPIDEMIA NEC/NOS                    

 

                2017           NANCY MENDEZ, YU S           Ot        

      403.10       

                          HYPTNSV CHR KID DIS, BENIGN, W CHR KD ST              

      

 

                2017           NANCY MENDEZ, YU S           Ot        

      583.81       

                          NEPHRITIS NOS IN OTH DIS                    

 

                2017           NANCY MENDEZ, AHMED S           Ot        

      585.2        

                          CHRONIC KIDNEY DISEASE, STAGE II (MILD)               

     

 

                2017           NANCY MENDEZ, AHMED S           Ot        

      791.0        

                          PROTEINURIA                        

 

                2017           FELIZ GERMAIN DO           Ot        

      250.03       

                          DIAB KATHERIN WO COMPL, TYPE I [JUVENILE TYP              

      

 

                2017           NANCY MENDEZ, AHMED S           Ot        

      250.01       

                          DIAB KATHERIN WO COMPL, TYPE I [JUVENILE TYP              

      

 

                2017           NANCY MENDEZ, AHMED S           Ot        

      272.4        

                          HYPERLIPIDEMIA NEC/NOS                    

 

                2017           NANCY MENDEZ, AHMED S           Ot        

      403.10       

                          HYPTNSV [facility] KID DIS, BENIGN, W CHR KD ST              

      

 

                2017           NANCY MENDEZ, AHMED S           Ot        

      583.81       

                          NEPHRITIS NOS IN OTH DIS                    

 

                2017           NANCY MENDEZ, AHMED S           Ot        

      585.2        

                          CHRONIC KIDNEY DISEASE, STAGE II (MILD)               

     

 

                2017           NANCY MENDEZ, AHMED S           Ot        

      791.0        

                          PROTEINURIA                        

 

                2017           FELIZ GERMAIN DO           Ot        

      250.03       

                          DIAB KATHERIN WO COMPL, TYPE I [JUVENILE TYP              

      

 

                2017           JULY VOSS DO           Ot       

       453.40      

                          ACUTE VENOUS EMBOLISM   THROMBOSIS UNSP               

      

 

             2017                        Ot           250.03           J CARLOS

B KATHERIN WO 

COMPL, TYPE I [JUVENILE TYP                      

 

             2017                        Ot           250.41           J CARLOS

B W RENAL 

MANIFEST, TYPE I [JUVENILE                       

 

             2017                        Ot           272.4           HYPE

RLIPIDEMIA 

NEC/NOS                                          

 

             2017                        Ot           403.10           HYP

TNSV CHR KID 

DIS, BENIGN, W CHR KD ST                         

 

             2017                        Ot           583.81           NEP

HRITIS NOS IN 

OTH DIS                                          

 

             2017                        Ot           585.2           

DAWN KIDNEY 

DISEASE, STAGE II (MILD)                         

 

             2017                        Ot           791.0           PROT

EINURIA       

                                                 

 

                2017           NANCY MENDEZ, AHMED S           Ot        

      E11.29       

                          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               

      

 

                2017           NANCY MENDEZ, AHMED S           Ot        

      E78.5        

                          HYPERLIPIDEMIA, UNSPECIFIED                    

 

                2017           NANCY MENDEZ, YU S           Ot        

      I12.9        

                          HYPERTENSIVE CHRONIC KIDNEY DISEASE W ST              

      

 

                2017           NANCY MENDEZ, JULIANNEMED S           Ot        

      N18.3        

                          CHRONIC KIDNEY DISEASE, STAGE 3 (MODERAT              

      

 

                2017           NANCY MENDEZ, AHMED S           Ot        

      R80.9        

                          PROTEINURIA, UNSPECIFIED                    

 

                2017           NANCY MENDEZ, JULIANNEMED S           Ot        

      E11.21       

                          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              

      

 

                2017           NANCY MENDEZ, JULIANNEMED S           Ot        

      E11.22       

                          TYPE 2 DIABETES MELLITUS W DIABETIC               

      

 

                2017           NANCY MENDEZ, JULIANNEMED S           Ot        

      E78.5        

                          HYPERLIPIDEMIA, UNSPECIFIED                    

 

                2017           NANCY MENDEZ, JULIANNEMED S           Ot        

      I12.9        

                          HYPERTENSIVE CHRONIC KIDNEY DISEASE W ST              

      

 

                2017           NANCY MENDEZ, AHMED S           Ot        

      N18.2        

                          CHRONIC KIDNEY DISEASE, STAGE 2 (MILD)                

    

 

                2017           JULIANNE CRUZ MDMED S           Ot        

      R80.9        

                          PROTEINURIA, UNSPECIFIED                    

 

                2017           NANCY MENDEZ, AHMED S           Ot        

      Z79.899      

                          OTHER LONG TERM (CURRENT) DRUG THERAPY                

    

 

                2017           MARCELLUS RAMIRES, FELIZ L           Ot        

      E10.65       

                          TYPE 1 DIABETES MELLITUS WITH HYPERGLYCE              

      

 

                2017           MARCELLUS RAMIRES, FELIZ L           Ot        

      E78.5        

                          HYPERLIPIDEMIA, UNSPECIFIED                    

 

                2017           MARCELLUS RAMIRES, FELIZ L           Ot        

      E10.65       

                          TYPE 1 DIABETES MELLITUS WITH HYPERGLYCE              

      

 

                2017           MARCELLUS RAMIRES, FELIZ L           Ot        

      E78.5        

                          HYPERLIPIDEMIA, UNSPECIFIED                    

 

             2017           LIAM GAONA APRN           Ot           E11

.9           

TYPE 2 DIABETES MELLITUS WITHOUT COMPLIC                    

 

                2017           LIAM GAONA APRN           Ot           

   J45.909         

                          UNSPECIFIED ASTHMA, UNCOMPLICATED                    

 

             2017           LIAM GAONA APRN           Ot           K21

.9           

GASTRO-ESOPHAGEAL REFLUX DISEASE WITHOUT                    

 

             2017           LIAM GAONA APRN           Ot           M06

.9           

RHEUMATOID ARTHRITIS, UNSPECIFIED                    

 

                2017           LIAM GAONA APRN           Ot           

   M19.90          

                          UNSPECIFIED OSTEOARTHRITIS, UNSPECIFIED               

      

 

                2017           LIAM GAONA APRN           Ot           

   R10.31          

                          RIGHT LOWER QUADRANT PAIN                    

 

             2017           LIAM GAONA APRN           Ot           R11

.2           

NAUSEA WITH VOMITING, UNSPECIFIED                    

 

             2017           LIAM GAONA           Ot           R94

.5           

ABNORMAL RESULTS OF LIVER FUNCTION STUDI                    

 

                2017           LIAM GAONA           Ot           

   Z79.01          

                          LONG TERM (CURRENT) USE OF ANTICOAGULANT              

      

 

             2017           LIAM GAONA           Ot           Z79

.4           

LONG TERM (CURRENT) USE OF INSULIN                    

 

                2017           LIAM GAONA           Ot           

   Z82.49          

                          FAMILY HX OF ISCHEM HEART DIS AND OTH DI              

      

 

                2017           LIAM GAONA           Ot           

   Z86.718         

                          PERSONAL HISTORY OF OTHER VENOUS THROMBO              

      

 

                2017           LIAM GAONA           Ot           

   Z87.01          

                          PERSONAL HISTORY OF PNEUMONIA (RECURRENT              

      

 

                2017           LIAM GAONA           Ot           

   Z87.19          

                          PERSONAL HISTORY OF OTHER DISEASES OF TH              

      

 

                2017           LIAM GAONA           Ot           

   Z90.710         

                          ACQUIRED ABSENCE OF BOTH CERVIX AND UTER              

      

 

                2017           LIAM GAONA           Ot           

   Z90.89          

                          ACQUIRED ABSENCE OF OTHER ORGANS                    

 

                2017           FELIZ GERMAIN DO           Ot        

      E10.65       

                          TYPE 1 DIABETES MELLITUS WITH HYPERGLYCE              

      

 

                10/24/2017           JULY VOSS DO           Ot       

       R73.9       

                          HYPERGLYCEMIA, UNSPECIFIED                    

 

                10/24/2017           JULY VOSS DO           Ot       

       R74.8       

                          ABNORMAL LEVELS OF OTHER SERUM ENZYMES                

    

 

                2018           FELIZ GERMAIN DO           Ot        

      E10.65       

                          TYPE 1 DIABETES MELLITUS WITH HYPERGLYCE              

      

 

                2018           FELIZ GERMAIN DO           Ot        

      N18.9        

                          CHRONIC KIDNEY DISEASE, UNSPECIFIED                   

 

 

                2018           FELIZ GERMAIN DO           Ot        

      E10.65       

                          TYPE 1 DIABETES MELLITUS WITH HYPERGLYCE              

      

 

                2018           FELIZ GERMAIN DO           Ot        

      N18.9        

                          CHRONIC KIDNEY DISEASE, UNSPECIFIED                   

 

 

             2018                        Ot           453.40           ACU

TE VENOUS 

EMBOLISM   THROMBOSIS UNSP                       

 

                2018           NANCY MENDEZ, YU MELENDEZ           Ot        

      585.3        

                          CHRONIC KIDNEY DISEASE, STAGE III (MODER              

      

 

                2018           NANCY MENDEZ, YU S           Ot        

      250.40       

                          DIAB W RENAL MANIFEST, TYPE II OR UNSPEC              

      

 

                2018           YU CRUZ MD S           Ot        

      272.4        

                          HYPERLIPIDEMIA NEC/NOS                    

 

                2018           NANCY MENDEZ, AHMED S           Ot        

      585.2        

                          CHRONIC KIDNEY DISEASE, STAGE II (MILD)               

     

 

                2018           NANCY MENDEZ, JULIANNEMED S           Ot        

      791.0        

                          PROTEINURIA                        

 

                2018           BIPIN JULY RAMIRES           Ot       

       V76.12      

                          OTH SCREEN MAMMO-MALIGN NEOPLASM OF BABATUNDE              

      

 

                2018           BIPIN JULY RAMIRES           Ot       

       611.72      

                          LUMP OR MASS IN BREAST                    

 

                2018           NANCY MENDEZ, AHMED S           Ot        

      250.01       

                          DIAB KATHERIN WO COMPL, TYPE I [JUVENILE TYP              

      

 

                2018           NANCY MENDEZ, AHMED S           Ot        

      272.4        

                          HYPERLIPIDEMIA NEC/NOS                    

 

                2018           NANCY MENDEZ, AHMED S           Ot        

      403.10       

                          HYPTNSV [facility] KID DIS, BENIGN, W [facility] KD ST              

      

 

                2018           NANCY MENDEZ, AHMED S           Ot        

      583.81       

                          NEPHRITIS NOS IN OTH DIS                    

 

                2018           NANCY MENDEZ, AHMED S           Ot        

      585.2        

                          CHRONIC KIDNEY DISEASE, STAGE II (MILD)               

     

 

                2018           NANCY MENDEZ, AHMED S           Ot        

      791.0        

                          PROTEINURIA                        

 

                2018           GERMAIN DO, FELIZ L           Ot        

      250.03       

                          DIAB KATHERIN WO COMPL, TYPE I [JUVENILE TYP              

      

 

                2018           NANCY MENDEZ, AHMED S           Ot        

      250.01       

                          DIAB KATHERIN WO COMPL, TYPE I [JUVENILE TYP              

      

 

                2018           NANCY MENDEZ, AHMED S           Ot        

      272.4        

                          HYPERLIPIDEMIA NEC/NOS                    

 

                2018           NANCY MENDEZ, AHMED S           Ot        

      403.10       

                          HYPTNSV [facility] KID DIS, BENIGN, W [facility]              

      

 

                2018           NANCY MENDEZ, AHMED S           Ot        

      583.81       

                          NEPHRITIS NOS IN OTH DIS                    

 

                2018           NANCY MENDEZ, AHMED S           Ot        

      585.2        

                          CHRONIC KIDNEY DISEASE, STAGE II (MILD)               

     

 

                2018           NANCY MENDEZ, AHMED S           Ot        

      791.0        

                          PROTEINURIA                        

 

                2018           GERMAIN DO, FELIZ L           Ot        

      250.03       

                          DIAB KATHERIN WO COMPL, TYPE I [JUVENILE TYP              

      

 

                2018           ASYADANK DOJULY           Ot       

       453.40      

                          ACUTE VENOUS EMBOLISM   THROMBOSIS UNSP               

      

 

             2018                        Ot           250.03           J CARLOS

B KATHERIN WO 

COMPL, TYPE I [JUVENILE TYP                      

 

             2018                        Ot           250.41           J CARLOS

B W RENAL 

MANIFEST, TYPE I [JUVENILE                       

 

             2018                        Ot           272.4           HYPE

RLIPIDEMIA 

NEC/NOS                                          

 

             2018                        Ot           403.10           HYP

TNSV CHR KID 

DIS, BENIGN, W [facility]                         

 

             2018                        Ot           583.81           NEP

HRITIS NOS IN 

OTH DIS                                          

 

             2018                        Ot           585.2           

DAWN KIDNEY 

DISEASE, STAGE II (MILD)                         

 

             2018                        Ot           791.0           PROT

EINURIA       

                                                 

 

                2018           NANCY MENDEZ, JULIANNEMED S           Ot        

      E11.29       

                          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               

      

 

                2018           NANCY MENDEZ, JULIANNEMED S           Ot        

      E78.5        

                          HYPERLIPIDEMIA, UNSPECIFIED                    

 

                2018           NANCY MENDEZ, AHMED S           Ot        

      I12.9        

                          HYPERTENSIVE CHRONIC KIDNEY DISEASE W ST              

      

 

                2018           NANCY MENDEZ, AHMED S           Ot        

      N18.3        

                          CHRONIC KIDNEY DISEASE, STAGE 3 (MODERAT              

      

 

                2018           NANCY MENDEZ, AHMED S           Ot        

      R80.9        

                          PROTEINURIA, UNSPECIFIED                    

 

                2018           NANCY MENDEZ, AHMED S           Ot        

      E11.21       

                          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              

      

 

                2018           NANCY MENDEZ, AHMED S           Ot        

      E11.22       

                          TYPE 2 DIABETES MELLITUS W DIABETIC               

      

 

                2018           NANCY MENDEZ, AHMED S           Ot        

      E78.5        

                          HYPERLIPIDEMIA, UNSPECIFIED                    

 

                2018           NANCY MENDEZ, AHMED S           Ot        

      I12.9        

                          HYPERTENSIVE CHRONIC KIDNEY DISEASE W ST              

      

 

                2018           NANCY MENDEZ, AHMED S           Ot        

      N18.2        

                          CHRONIC KIDNEY DISEASE, STAGE 2 (MILD)                

    

 

                2018           NANCY MENDEZ, AHMED S           Ot        

      R80.9        

                          PROTEINURIA, UNSPECIFIED                    

 

                2018           NANCY MENDEZ, AHMED S           Ot        

      Z79.899      

                          OTHER LONG TERM (CURRENT) DRUG THERAPY                

    

 

                2018           SHERIE GERMAIN DOISON L           Ot        

      E10.65       

                          TYPE 1 DIABETES MELLITUS WITH HYPERGLYCE              

      

 

                2018           SHERIE GERMAIN DOISON L           Ot        

      E78.5        

                          HYPERLIPIDEMIA, UNSPECIFIED                    

 

                2018           SHERIE GERMAIN DOISON L           Ot        

      E10.65       

                          TYPE 1 DIABETES MELLITUS WITH HYPERGLYCE              

      

 

                2018           GERMAIN DO, FELIZ PARIKH           Ot        

      E78.5        

                          HYPERLIPIDEMIA, UNSPECIFIED                    

 

                2018           GERMAIN DO, FELIZ L           Ot        

      E10.65       

                          TYPE 1 DIABETES MELLITUS WITH HYPERGLYCE              

      

 

                2018           ASYALENDER DO, JULY QUIROS           Ot       

       R73.9       

                          HYPERGLYCEMIA, UNSPECIFIED                    

 

                2018           ASYALENDER DO, JULY QUIROS           Ot       

       R74.8       

                          ABNORMAL LEVELS OF OTHER SERUM ENZYMES                

    

 

                2018           MARCELLUS DO, FELIZ PARIKH           Ot        

      E10.65       

                          TYPE 1 DIABETES MELLITUS WITH HYPERGLYCE              

      

 

                2018           GERMAIN DO, FELIZ PARIKH           Ot        

      N18.9        

                          CHRONIC KIDNEY DISEASE, UNSPECIFIED                   

 

 

                2018           ASYALENDER DO, JULY QUIROS           Ot       

       M25.522     

                          PAIN IN LEFT ELBOW                    

 

                2018           BIPIN RAMIRES, JULY QUIROS           Ot       

       M61.48      

                          OTHER CALCIFICATION OF MUSCLE, OTHER SIT              

      

 

                01/15/2019           NANCY MENDEZ, YU S           Ot        

      585.3        

                          CHRONIC KIDNEY DISEASE, STAGE III (MODER              

      

 

                01/15/2019           NANCY MENDEZ, JULIANNEMED S           Ot        

      250.40       

                          DIAB W RENAL MANIFEST, TYPE II OR UNSPEC              

      

 

                01/15/2019           NANCY MENDEZ, YU S           Ot        

      272.4        

                          HYPERLIPIDEMIA NEC/NOS                    

 

                01/15/2019           NANCY MENDEZ, YU S           Ot        

      585.2        

                          CHRONIC KIDNEY DISEASE, STAGE II (MILD)               

     

 

                01/15/2019           NANCY MENDEZ, JULIANNEMED S           Ot        

      791.0        

                          PROTEINURIA                        

 

                01/15/2019           BIPIN RAMIRESJULY           Ot       

       V76.12      

                          OT SCREEN MAMMO-MALIGN NEOPLASM OF BABATUNDE              

      

 

                01/15/2019           BIPIN RAMIRESJULY           Ot       

       611.72      

                          LUMP OR MASS IN BREAST                    

 

                01/15/2019           NANCY MENDEZ, AHMED S           Ot        

      250.01       

                          DIAB KATHERIN WO COMPL, TYPE I [JUVENILE TYP              

      

 

                01/15/2019           NANCY MENDEZ, YU S           Ot        

      272.4        

                          HYPERLIPIDEMIA NEC/NOS                    

 

                01/15/2019           NANCY MENDEZ, YU S           Ot        

      403.10       

                          HYPTNSV CHR KID DIS, BENIGN, W [facility]R [facility]              

      

 

                01/15/2019           YU CRUZ MD S           Ot        

      583.81       

                          NEPHRITIS NOS IN OTH DIS                    

 

                01/15/2019           NANCY MENDEZ, AHGENESIS S           Ot        

      585.2        

                          CHRONIC KIDNEY DISEASE, STAGE II (MILD)               

     

 

                01/15/2019           NANCY MENDEZ, AHMED S           Ot        

      791.0        

                          PROTEINURIA                        

 

                01/15/2019           FELIZ GERMAIN DO           Ot        

      250.03       

                          DIAB KATHERIN WO COMPL, TYPE I [JUVENILE TYP              

      

 

                01/15/2019           NANCY MENDEZ, AHMED S           Ot        

      250.01       

                          DIAB KATHERIN WO COMPL, TYPE I [JUVENILE TYP              

      

 

                01/15/2019           NANCY MENDEZ, AHMED S           Ot        

      272.4        

                          HYPERLIPIDEMIA NEC/NOS                    

 

                01/15/2019           NANCY MENDEZ, AHMED S           Ot        

      403.10       

                          HYPTNSV CHR KID DIS, BENIGN, W CHR KD ST              

      

 

                01/15/2019           NANCY MENDEZ, AHMED S           Ot        

      583.81       

                          NEPHRITIS NOS IN OTH DIS                    

 

                01/15/2019           NANCY MENDEZ, AHMED S           Ot        

      585.2        

                          CHRONIC KIDNEY DISEASE, STAGE II (MILD)               

     

 

                01/15/2019           NANCY MENDEZ, AHMED S           Ot        

      791.0        

                          PROTEINURIA                        

 

                01/15/2019           FELIZ GERMAIN DO           Ot        

      250.03       

                          DIAB KATHERIN WO COMPL, TYPE I [JUVENILE TYP              

      

 

                01/15/2019           JULY VOSS DO           Ot       

       453.40      

                          ACUTE VENOUS EMBOLISM   THROMBOSIS UNSP               

      

 

             01/15/2019                        Ot           250.03           J CARLOS

B KATHERIN WO 

COMPL, TYPE I [JUVENILE TYP                      

 

             01/15/2019                        Ot           250.41           J CARLOS

B W RENAL 

MANIFEST, TYPE I [JUVENILE                       

 

             01/15/2019                        Ot           272.4           HYPE

RLIPIDEMIA 

NEC/NOS                                          

 

             01/15/2019                        Ot           403.10           HYP

TNSV CHR KID 

DIS, BENIGN, W CHR KD ST                         

 

             01/15/2019                        Ot           583.81           NEP

HRITIS NOS IN 

OTH DIS                                          

 

             01/15/2019                        Ot           585.2           

DAWN KIDNEY 

DISEASE, STAGE II (MILD)                         

 

             01/15/2019                        Ot           791.0           PROT

EINURIA       

                                                 

 

                01/15/2019           NANCY MENDEZ, AHMED S           Ot        

      E11.29       

                          TYPE 2 DIABETES MELLITUS W [facility] DIABETIC               

      

 

                01/15/2019           NANCY MENDEZ, AHMED S           Ot        

      E78.5        

                          HYPERLIPIDEMIA, UNSPECIFIED                    

 

                01/15/2019           NANCY MENDEZ, AHMED S           Ot        

      I12.9        

                          HYPERTENSIVE CHRONIC KIDNEY DISEASE W ST              

      

 

                01/15/2019           NANCY MENDEZ, AHMED S           Ot        

      N18.3        

                          CHRONIC KIDNEY DISEASE, STAGE 3 (MODERAT              

      

 

                01/15/2019           NANCY MENDEZ, AHMED S           Ot        

      R80.9        

                          PROTEINURIA, UNSPECIFIED                    

 

                01/15/2019           NANCY MENDEZ, AHMED S           Ot        

      E11.21       

                          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              

      

 

                01/15/2019           NANCY MENDEZ, AHMED S           Ot        

      E11.22       

                          TYPE 2 DIABETES MELLITUS W DIABETIC               

      

 

                01/15/2019           NANCY MENDEZ, JULIANNEMED S           Ot        

      E78.5        

                          HYPERLIPIDEMIA, UNSPECIFIED                    

 

                01/15/2019           NANCY MENDEZ, AHMED S           Ot        

      I12.9        

                          HYPERTENSIVE CHRONIC KIDNEY DISEASE W ST              

      

 

                01/15/2019           NANCY MENDEZ, AHMED S           Ot        

      N18.2        

                          CHRONIC KIDNEY DISEASE, STAGE 2 (MILD)                

    

 

                01/15/2019           NANCY MENDEZ, AHMED S           Ot        

      R80.9        

                          PROTEINURIA, UNSPECIFIED                    

 

                01/15/2019           NANCY MENDEZ, AHMED S           Ot        

      Z79.899      

                          OTHER LONG TERM (CURRENT) DRUG THERAPY                

    

 

                01/15/2019           GERMAIN DO, FELIZ L           Ot        

      E10.65       

                          TYPE 1 DIABETES MELLITUS WITH HYPERGLYCE              

      

 

                01/15/2019           GERMAIN DO, FELIZ L           Ot        

      E78.5        

                          HYPERLIPIDEMIA, UNSPECIFIED                    

 

                01/15/2019           GERMAIN DO, FELIZ L           Ot        

      E10.65       

                          TYPE 1 DIABETES MELLITUS WITH HYPERGLYCE              

      

 

                01/15/2019           GERMAIN DO, FELIZ L           Ot        

      E78.5        

                          HYPERLIPIDEMIA, UNSPECIFIED                    

 

                01/15/2019           GERMAIN DO, FELIZ L           Ot        

      E10.65       

                          TYPE 1 DIABETES MELLITUS WITH HYPERGLYCE              

      

 

                01/15/2019           GELLENDER DO, JULY CHULA           Ot       

       R73.9       

                          HYPERGLYCEMIA, UNSPECIFIED                    

 

                01/15/2019           GELLENDER DO, JULY CHULA           Ot       

       R74.8       

                          ABNORMAL LEVELS OF OTHER SERUM ENZYMES                

    

 

                01/15/2019           GERMAIN DO, FELIZ L           Ot        

      E10.65       

                          TYPE 1 DIABETES MELLITUS WITH HYPERGLYCE              

      

 

                01/15/2019           GERMAIN DO, FELIZ L           Ot        

      N18.9        

                          CHRONIC KIDNEY DISEASE, UNSPECIFIED                   

 

 

                01/15/2019           GELLENDER DO, JULY CHULA           Ot       

       M25.522     

                          PAIN IN LEFT ELBOW                    

 

                01/15/2019           GELLENDER DO, JULY QUIROS           Ot       

       M61.48      

                          OTHER CALCIFICATION OF MUSCLE, OTHER SIT              

      

 

                2019           GERMAIN DO, FELIZ L           Ot        

      E10.65       

                          TYPE 1 DIABETES MELLITUS WITH HYPERGLYCE              

      

 

                2019           GERMAIN DO, FELIZ L           Ot        

      E10.65       

                          TYPE 1 DIABETES MELLITUS WITH HYPERGLYCE              

      

 

                2019           FELIZ GERMAIN DO           Ot        

      N18.9        

                          CHRONIC KIDNEY DISEASE, UNSPECIFIED                   

 

 

                2019           MISHA DEVRIES MD           Ot            

  D72.829          

                          ELEVATED WHITE BLOOD CELL COUNT, UNSPECI              

      

 

             2019           MISHA DEVRIES MD           Ot           E10.

10           

TYPE 1 DIABETES MELLITUS WITH KETOACIDOS                    

 

             2019           MISHA DEVRIES MD           Ot           E10.

40           

TYPE 1 DIABETES MELLITUS WITH DIABETIC N                    

 

             2019           MISHA DEVRIES MD           Ot           E10.

43           

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                    

 

             2019           MISHA DEVRIES MD           Ot           E78.

5           

HYPERLIPIDEMIA, UNSPECIFIED                      

 

             2019           MISHA DEVRIES MD           Ot           E86.

0           

DEHYDRATION                                      

 

             2019           MISHA DEVRIES MD           Ot           E87.

2           

ACIDOSIS                                         

 

                2019           MISHA DEVRIES MD           Ot            

  F17.210          

                          NICOTINE DEPENDENCE, CIGARETTES, UNCOMPL              

      

 

             2019           MISHA DEVRIES MD           Ot           F60.

9           

PERSONALITY DISORDER, UNSPECIFIED                    

 

             2019           MISHA DEVRIES MD           Ot           J30.

2           

OTHER SEASONAL ALLERGIC RHINITIS                    

 

             2019           MISHA DEVRIES MD           Ot           J44.

9           

CHRONIC OBSTRUCTIVE PULMONARY DISEASE, U                    

 

             2019           MISHA DEVRIES MD           Ot           K21.

9           

GASTRO-ESOPHAGEAL REFLUX DISEASE WITHOUT                    

 

             2019           MISHA DEVRIES MD           Ot           M06.

9           

RHEUMATOID ARTHRITIS, UNSPECIFIED                    

 

             2019           MISHA DEVRIES MD           Ot           M19.

91           

PRIMARY OSTEOARTHRITIS, UNSPECIFIED SITE                    

 

             2019           MISHA DEVRIES MD           Ot           M54.

9           

DORSALGIA, UNSPECIFIED                           

 

             2019           MISHA DEVRIES MD           Ot           N17.

0           

ACUTE KIDNEY FAILURE WITH TUBULAR NECROS                    

 

             2019           MISHA DEVRIES MD, Ot           N18.

9           

CHRONIC KIDNEY DISEASE, UNSPECIFIED                    

 

             2019           MISHA DEVRIES MD           Ot           R11.

10           

VOMITING, UNSPECIFIED                            

 

             2019           MISHA DEVRIES MD           Ot           Z79.

4           

LONG TERM (CURRENT) USE OF INSULIN                    

 

                2019           MISHA DEVRIES MD, Ot            

  Z86.718          

                          PERSONAL HISTORY OF OTHER VENOUS THROMBO              

      

 

             2019           MISHA DEVRIES MD, Ot           Z87.

11           

PERSONAL HISTORY OF PEPTIC ULCER DISEASE                    

 

             2019           MISHA DEVRIES MD, Ot           Z87.

19           

PERSONAL HISTORY OF OTHER DISEASES OF TH                    

 

             2019           MISHA DEVRIES MD, Ot           Z91.

14           

PATIENT'S OTHER NONCOMPLIANCE WITH MEDIC                    

 

                2019           MISHA DEVRIES MD, Ot            

  Z95.828          

                          PRESENCE OF OTHER VASCULAR IMPLANTS AND               

      

 

                2019           MISHA DEVRIES MD, Ot            

  D72.829          

                          ELEVATED WHITE BLOOD CELL COUNT, UNSPECI              

      

 

             2019           MISHA DEVRIES MD           Ot           E10.

10           

TYPE 1 DIABETES MELLITUS WITH KETOACIDOS                    

 

             2019           MISHA DEVRIES MD           Ot           E10.

40           

TYPE 1 DIABETES MELLITUS WITH DIABETIC N                    

 

             2019           MISHA DEVRIES MD           Ot           E10.

43           

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                    

 

             2019           MISHA DEVRIES MD, Ot           E78.

5           

HYPERLIPIDEMIA, UNSPECIFIED                      

 

             2019           MISHA DEVRIES MD, Ot           E86.

0           

DEHYDRATION                                      

 

             2019           MISHA DEVRIES MD           Ot           E87.

2           

ACIDOSIS                                         

 

                2019           MISHA DEVRIES MD           Ot            

  F17.210          

                          NICOTINE DEPENDENCE, CIGARETTES, UNCOMPL              

      

 

             2019           MISHA DEVRIES MD           Ot           F60.

9           

PERSONALITY DISORDER, UNSPECIFIED                    

 

             2019           MISHA DEVRIES MD, Ot           J30.

2           

OTHER SEASONAL ALLERGIC RHINITIS                    

 

             2019           MISHA DEVRIES MD, Ot           J44.

9           

CHRONIC OBSTRUCTIVE PULMONARY DISEASE, U                    

 

             2019           MISHA DEVRIES MD, Ot           K21.

9           

GASTRO-ESOPHAGEAL REFLUX DISEASE WITHOUT                    

 

             2019           MISHA DEVRIES MD, Ot           M06.

9           

RHEUMATOID ARTHRITIS, UNSPECIFIED                    

 

             2019           MISHA DEVRIES MD, Ot           M19.

91           

PRIMARY OSTEOARTHRITIS, UNSPECIFIED SITE                    

 

             2019           MISHA DEVRIES MD, Ot           M54.

9           

DORSALGIA, UNSPECIFIED                           

 

             2019           MISHA DEVRIES MD           Ot           N17.

0           

ACUTE KIDNEY FAILURE WITH TUBULAR NECROS                    

 

             2019           KAYCE MD, MISHA M           Ot           N18.

9           

CHRONIC KIDNEY DISEASE, UNSPECIFIED                    

 

             2019           MISHA DEVRIES MD, Ot           R11.

10           

VOMITING, UNSPECIFIED                            

 

             2019           MISHA DEVRIES MD, Ot           Z79.

4           

LONG TERM (CURRENT) USE OF INSULIN                    

 

                2019           MISHA DEVRIES MD, Ot            

  Z86.718          

                          PERSONAL HISTORY OF OTHER VENOUS THROMBO              

      

 

             2019           MISHA DEVRIES MD, Ot           Z87.

11           

PERSONAL HISTORY OF PEPTIC ULCER DISEASE                    

 

             2019           MISHA DEVRIES MD, Ot           Z87.

19           

PERSONAL HISTORY OF OTHER DISEASES OF TH                    

 

             2019           MISHA DEVRIES MD, Ot           Z91.

14           

PATIENT'S OTHER NONCOMPLIANCE WITH MEDIC                    

 

                2019           MISHA DEVRIES MD, Ot            

  Z95.828          

                          PRESENCE OF OTHER VASCULAR IMPLANTS AND               

      

 

                2019           MISHA DEVRIES MD, Ot            

  D72.829          

                          ELEVATED WHITE BLOOD CELL COUNT, UNSPECI              

      

 

             2019           MISHA DEVRIES MD           Ot           E10.

10           

TYPE 1 DIABETES MELLITUS WITH KETOACIDOS                    

 

             2019           MISHA DEVRIES MD           Ot           E10.

40           

TYPE 1 DIABETES MELLITUS WITH DIABETIC N                    

 

             2019           MISHA DEVRIES MD           Ot           E10.

43           

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                    

 

             2019           MISHA DEVRIES MD           Ot           E78.

5           

HYPERLIPIDEMIA, UNSPECIFIED                      

 

             2019           MISHA DEVRIES MD           Ot           E86.

0           

DEHYDRATION                                      

 

             2019           MISHA DEVRIES MD           Ot           E87.

2           

ACIDOSIS                                         

 

                2019           MISHA DEVRIES MD           Ot            

  F17.210          

                          NICOTINE DEPENDENCE, CIGARETTES, UNCOMPL              

      

 

             2019           MISHA DEVRIES MD, Ot           F60.

9           

PERSONALITY DISORDER, UNSPECIFIED                    

 

             2019           MISHA DEVRIES MD, Ot           J30.

2           

OTHER SEASONAL ALLERGIC RHINITIS                    

 

             2019           MISHA DEVRIES MD, Ot           J44.

9           

CHRONIC OBSTRUCTIVE PULMONARY DISEASE, U                    

 

             2019           MISHA DEVRIES MD, Ot           K21.

9           

GASTRO-ESOPHAGEAL REFLUX DISEASE WITHOUT                    

 

             2019           MISHA DEVRIES MD, Ot           M06.

9           

RHEUMATOID ARTHRITIS, UNSPECIFIED                    

 

             2019           KAYCE MD, MISHA M           Ot           M19.

91           

PRIMARY OSTEOARTHRITIS, UNSPECIFIED SITE                    

 

             2019           MISHA DEVRIES MD, Ot           M54.

9           

DORSALGIA, UNSPECIFIED                           

 

             2019           MISHA DEVRIES MD, Ot           N17.

0           

ACUTE KIDNEY FAILURE WITH TUBULAR NECROS                    

 

             2019           MISHA DEVRIES MD, Ot           N18.

9           

CHRONIC KIDNEY DISEASE, UNSPECIFIED                    

 

             2019           MISHA DEVRIES MD           Ot           R11.

10           

VOMITING, UNSPECIFIED                            

 

             2019           MISHA DEVRIES MD, Ot           Z79.

4           

LONG TERM (CURRENT) USE OF INSULIN                    

 

                2019           MISHA DEVRIES MD, Ot            

  Z86.718          

                          PERSONAL HISTORY OF OTHER VENOUS THROMBO              

      

 

             2019           MISHA DEVRIES MD, Ot           Z87.

11           

PERSONAL HISTORY OF PEPTIC ULCER DISEASE                    

 

             2019           MISHA DEVRIES MD, Ot           Z87.

19           

PERSONAL HISTORY OF OTHER DISEASES OF TH                    

 

             2019           MISHA DEVRIES MD, Ot           Z91.

14           

PATIENT'S OTHER NONCOMPLIANCE WITH MEDIC                    

 

                2019           MISHA DEVRIES MD           Ot            

  Z95.828          

                          PRESENCE OF OTHER VASCULAR IMPLANTS AND               

      

 

                2019           MISHA DEVRIES MD, Ot            

  D72.829          

                          ELEVATED WHITE BLOOD CELL COUNT, UNSPECI              

      

 

             2019           MISHA DEVRIES MD           Ot           E10.

10           

TYPE 1 DIABETES MELLITUS WITH KETOACIDOS                    

 

             2019           MISHA DEVRIES MD           Ot           E10.

40           

TYPE 1 DIABETES MELLITUS WITH DIABETIC N                    

 

             2019           MISHA DEVRIES MD           Ot           E10.

43           

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                    

 

             2019           MISHA DEVRIES MD           Ot           E78.

5           

HYPERLIPIDEMIA, UNSPECIFIED                      

 

             2019           MISHA DEVRIES MD, Ot           E86.

0           

DEHYDRATION                                      

 

             2019           MISHA DEVRIES MD, Ot           E87.

2           

ACIDOSIS                                         

 

                2019           MISHA DEVRIES MD, Ot            

  F17.210          

                          NICOTINE DEPENDENCE, CIGARETTES, UNCOMPL              

      

 

             2019           MISHA DEVRIES MD, Ot           F60.

9           

PERSONALITY DISORDER, UNSPECIFIED                    

 

             2019           MISHA DEVRIES MD           Ot           G89.

29           

OTHER CHRONIC PAIN                               

 

             2019           KAYCE MD, MISHA M           Ot           J30.

2           

OTHER SEASONAL ALLERGIC RHINITIS                    

 

             2019           MISHA DEVRIES MD, Ot           J44.

9           

CHRONIC OBSTRUCTIVE PULMONARY DISEASE, U                    

 

             2019           MISHA DEVRIES MD, Ot           K21.

9           

GASTRO-ESOPHAGEAL REFLUX DISEASE WITHOUT                    

 

             2019           MISHA DEVRIES MD, Ot           M06.

9           

RHEUMATOID ARTHRITIS, UNSPECIFIED                    

 

             2019           MISHA DEVRIES MD, Ot           M19.

91           

PRIMARY OSTEOARTHRITIS, UNSPECIFIED SITE                    

 

             2019           MISHA DEVRIES MD, Ot           M54.

9           

DORSALGIA, UNSPECIFIED                           

 

             2019           MISHA DEVRIES MD, Ot           N17.

0           

ACUTE KIDNEY FAILURE WITH TUBULAR NECROS                    

 

             2019           MISHA DEVRIES MD, Ot           N18.

9           

CHRONIC KIDNEY DISEASE, UNSPECIFIED                    

 

             2019           MISHA DEVRIES MD           Ot           R07.

89           

OTHER CHEST PAIN                                 

 

             2019           MISHA DEVRIES MD, Ot           R11.

10           

VOMITING, UNSPECIFIED                            

 

             2019           MISHA DEVRIES MD, Ot           Z79.

4           

LONG TERM (CURRENT) USE OF INSULIN                    

 

                2019           MISHA DEVRIES MD, Ot            

  Z86.718          

                          PERSONAL HISTORY OF OTHER VENOUS THROMBO              

      

 

             2019           MISHA DEVRIES MD, Ot           Z87.

11           

PERSONAL HISTORY OF PEPTIC ULCER DISEASE                    

 

             2019           MISHA DEVRIES MD, Ot           Z87.

19           

PERSONAL HISTORY OF OTHER DISEASES OF TH                    

 

             2019           MISHA DEVRIES MD           Ot           Z88.

2           

ALLERGY STATUS TO SULFONAMIDES STATUS                    

 

             2019           MISHA DEVRIES MD, Ot           Z88.

5           

ALLERGY STATUS TO NARCOTIC AGENT STATUS                    

 

             2019           MISHA DEVRIES MD           Ot           Z91.

14           

PATIENT'S OTHER NONCOMPLIANCE WITH MEDIC                    

 

                2019           MISHA DEVRIES MD           Ot            

  Z95.828          

                          PRESENCE OF OTHER VASCULAR IMPLANTS AND               

      

 

                2020           LIAM GAONA           Ot           

   E11.10          

                          TYPE 2 DIABETES MELLITUS WITH KETOACIDOS              

      

 

                2020           LIAM GAONA           Ot           

   E11.43          

                          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              

      

 

                2020           LIAM GAONA           Ot           

   F17.210         

                          NICOTINE DEPENDENCE, CIGARETTES, UNCOMPL              

      

 

             2020           LIAM GAONA           Ot           F60

.9           

PERSONALITY DISORDER, UNSPECIFIED                    

 

             2020           LIAM GAONA           Ot           J44

.9           

CHRONIC OBSTRUCTIVE PULMONARY DISEASE, U                    

 

             2020           LIAM GAONA           Ot           K21

.9           

GASTRO-ESOPHAGEAL REFLUX DISEASE WITHOUT                    

 

                2020           LIAM GAONA           Ot           

   K31.84          

                          GASTROPARESIS                      

 

             2020           LIAM GAONA           Ot           M06

.9           

RHEUMATOID ARTHRITIS, UNSPECIFIED                    

 

                2020           LIAM GAONA           Ot           

   R10.31          

                          RIGHT LOWER QUADRANT PAIN                    

 

             2020           LIAM GAONA           Ot           Z79

.4           

LONG TERM (CURRENT) USE OF INSULIN                    

 

             2020           LIAM GAONA           Ot           Z80

.8           

FAMILY HISTORY OF MALIGNANT NEOPLASM OF                     

 

                2020           LIAM GAONA           Ot           

   Z86.718         

                          PERSONAL HISTORY OF OTHER VENOUS THROMBO              

      

 

                2020           LIAM GAONA           Ot           

   Z87.01          

                          PERSONAL HISTORY OF PNEUMONIA (RECURRENT              

      

 

             2020           LIAM GAONA           Ot           Z88

.2           

ALLERGY STATUS TO SULFONAMIDES STATUS                    

 

             2020           LIAM GAONA           Ot           Z88

.5           

ALLERGY STATUS TO NARCOTIC AGENT STATUS                    

 

                2020           LIAM GAONA           Ot           

   Z90.710         

                          ACQUIRED ABSENCE OF BOTH CERVIX AND UTER              

      

 

                2020           LIAM GAONA           Ot           

   Z90.89          

                          ACQUIRED ABSENCE OF OTHER ORGANS                    

 

             2020           CAMPBELL HOANG MD           Ot           D64.

9           

ANEMIA, UNSPECIFIED                              

 

             2020           CAMPBELL HOANG MD           Ot           E10.

11           

TYPE 1 DIABETES MELLITUS WITH KETOACIDOS                    

 

             2020           CAMPBELL HOANG MD           Ot           E10.

43           

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                    

 

             2020           CAMPBELL HOANG MD           Ot           E86.

0           

DEHYDRATION                                      

 

             2020           CAMPBELL HOANG MD           Ot           E87.

1           

HYPO-OSMOLALITY AND HYPONATREMIA                    

 

             2020           CAMPBELL HOANG MD           Ot           E87.

2           

ACIDOSIS                                         

 

             2020           CAMPBELL HOANG MD           Ot           E87.

5           

HYPERKALEMIA                                     

 

             2020           CAMPBELL HOANG MD           Ot           F15.

10           

OTHER STIMULANT ABUSE, UNCOMPLICATED                    

 

                2020           CAMPBELL HOANG MD           Ot            

  F17.210          

                          NICOTINE DEPENDENCE, CIGARETTES, UNCOMPL              

      

 

             2020           CAMPBELL HOANG MD           Ot           F60.

9           

PERSONALITY DISORDER, UNSPECIFIED                    

 

             2020           CAMPBELL HOANG MD           Ot           G93.

49           

OTHER ENCEPHALOPATHY                             

 

             2020           CAMPBELL HOANG MD           Ot           I95.

9           

HYPOTENSION, UNSPECIFIED                         

 

             2020           CAMPBELL HOANG MD, Ot           J44.

9           

CHRONIC OBSTRUCTIVE PULMONARY DISEASE, U                    

 

             2020           CAMPBELL HOANG MD, Ot           J96.

00           

ACUTE RESPIRATORY FAILURE, UNSP W HYPOXI                    

 

             2020           CAMPBELL HOANG MD, Ot           K21.

9           

GASTRO-ESOPHAGEAL REFLUX DISEASE WITHOUT                    

 

             2020           CAMPBELL HOANG MD, Ot           M06.

9           

RHEUMATOID ARTHRITIS, UNSPECIFIED                    

 

             2020           CAMPBELL HOANG MD, Ot           M19.

91           

PRIMARY OSTEOARTHRITIS, UNSPECIFIED SITE                    

 

             2020           CAMPBELL HOANG MD, Ot           N17.

9           

ACUTE KIDNEY FAILURE, UNSPECIFIED                    

 

             2020           CAMPBELL HOANG MD, Ot           N18.

9           

CHRONIC KIDNEY DISEASE, UNSPECIFIED                    

 

             2020           CAMPBELL HOANG MD           Ot           R00.

0           

TACHYCARDIA, UNSPECIFIED                         

 

             2020           CAMPBELL HOANG MD           Ot           R57.

1           

HYPOVOLEMIC SHOCK                                

 

             2020           CAMPBELL HOANG MD           Ot           Z79.

4           

LONG TERM (CURRENT) USE OF INSULIN                    

 

                2020           CAMPBELL HOANG MD           Ot            

  Z86.718          

                          PERSONAL HISTORY OF OTHER VENOUS THROMBO              

      

 

             2020           CAMPBELL HOANG MD           Ot           Z86.

79           

PERSONAL HISTORY OF OTHER DISEASES OF TH                    

 

             2020           CAMPBELL HOANG MD           Ot           Z87.

11           

PERSONAL HISTORY OF PEPTIC ULCER DISEASE                    

 

             2020           CAMPBELL HOANG MD           Ot           Z91.

81           

HISTORY OF FALLING                               

 

             2020           CAMPBELL HOANG MD           Ot           Z96.

0           

PRESENCE OF UROGENITAL IMPLANTS                    

 

             2020           CAMPBELL HOANG MD           Ot           D64.

9           

ANEMIA, UNSPECIFIED                              

 

             2020           CAMPBELL HOANG MD           Ot           E10.

11           

TYPE 1 DIABETES MELLITUS WITH KETOACIDOS                    

 

             2020           CAMPBELL HOANG MD           Ot           E10.

43           

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                    

 

             2020           CAMPBELL HOANG MD           Ot           E86.

0           

DEHYDRATION                                      

 

             2020           CAMPBELL HOANG MD           Ot           E87.

1           

HYPO-OSMOLALITY AND HYPONATREMIA                    

 

             2020           CAMPBELL HOANG MD           Ot           E87.

2           

ACIDOSIS                                         

 

             2020           CAMPBELL HOANG MD           Ot           E87.

5           

HYPERKALEMIA                                     

 

             2020           CAMPBELL HOANG MD           Ot           F15.

10           

OTHER STIMULANT ABUSE, UNCOMPLICATED                    

 

                2020           CAMPBELL HOANG MD           Ot            

  F17.210          

                          NICOTINE DEPENDENCE, CIGARETTES, UNCOMPL              

      

 

             2020           CAMPBELL HOANG MD           Ot           F60.

9           

PERSONALITY DISORDER, UNSPECIFIED                    

 

             2020           CAMPBELL HOANG MD           Ot           G93.

49           

OTHER ENCEPHALOPATHY                             

 

             2020           CAMPBELL HOANG MD           Ot           I95.

9           

HYPOTENSION, UNSPECIFIED                         

 

             2020           CAMPBELL HOANG MD, Ot           J44.

9           

CHRONIC OBSTRUCTIVE PULMONARY DISEASE, U                    

 

             2020           CAMPBELL HOANG MD           Ot           J96.

00           

ACUTE RESPIRATORY FAILURE, UNSP W HYPOXI                    

 

             2020           CAMPBELL HOANG MD           Ot           K21.

9           

GASTRO-ESOPHAGEAL REFLUX DISEASE WITHOUT                    

 

             2020           CAMPBELL HOANG MD           Ot           M06.

9           

RHEUMATOID ARTHRITIS, UNSPECIFIED                    

 

             2020           CAMPBELL HOANG MD           Ot           M19.

91           

PRIMARY OSTEOARTHRITIS, UNSPECIFIED SITE                    

 

             2020           CAMPBELL HOANG MD           Ot           N17.

9           

ACUTE KIDNEY FAILURE, UNSPECIFIED                    

 

             2020           CAMPBELL HOANG MD           Ot           N18.

9           

CHRONIC KIDNEY DISEASE, UNSPECIFIED                    

 

             2020           CAMPBELL HOANG MD           Ot           R00.

0           

TACHYCARDIA, UNSPECIFIED                         

 

             2020           CAMPBELL HOANG MD           Ot           R57.

1           

HYPOVOLEMIC SHOCK                                

 

             2020           CAMPBELL HOANG MD           Ot           Z79.

4           

LONG TERM (CURRENT) USE OF INSULIN                    

 

                2020           CAMPBELL HOANG MD           Ot            

  Z86.718          

                          PERSONAL HISTORY OF OTHER VENOUS THROMBO              

      

 

             2020           CAMPBELL HOANG MD           Ot           Z86.

79           

PERSONAL HISTORY OF OTHER DISEASES OF TH                    

 

             2020           CAMPBELL HOANG MD           Ot           Z87.

11           

PERSONAL HISTORY OF PEPTIC ULCER DISEASE                    

 

             2020           CAMPBELL HOANG MD           Ot           Z91.

81           

HISTORY OF FALLING                               

 

             2020           CAMPBELL HONAG MD           Ot           Z96.

0           

PRESENCE OF UROGENITAL IMPLANTS                    

 

             02/15/2020           CAMPBELL HOANG MD           Ot           A41.

9           

SEPSIS, UNSPECIFIED ORGANISM                     

 

             02/15/2020           CAMPBELL HOANG MD           Ot           B37.

1           

PULMONARY CANDIDIASIS                            

 

             02/15/2020           CAMPBELL HOANG MD           Ot           D64.

9           

ANEMIA, UNSPECIFIED                              

 

             02/15/2020           CAMPBELL HOANG MD           Ot           E10.

11           

TYPE 1 DIABETES MELLITUS WITH KETOACIDOS                    

 

             02/15/2020           CAMPBELL HOANG MD           Ot           E10.

43           

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                    

 

                02/15/2020           CAMPBELL HOANG MD           Ot            

  E10.649          

                          TYPE 1 DIABETES MELLITUS WITH HYPOGLYCEM              

      

 

             02/15/2020           CAMPBELL HOANG MD           Ot           E86.

0           

DEHYDRATION                                      

 

             02/15/2020           CAMPBELL HOANG MD           Ot           E87.

1           

HYPO-OSMOLALITY AND HYPONATREMIA                    

 

             02/15/2020           CAMPBELL HOANG MD           Ot           E87.

2           

ACIDOSIS                                         

 

             02/15/2020           CAMPBELL HOANG MD           Ot           E87.

5           

HYPERKALEMIA                                     

 

             02/15/2020           CAMPBELL HOANG MD           Ot           E87.

6           

HYPOKALEMIA                                      

 

             02/15/2020           CAMPBELL HOANG MD           Ot           F15.

10           

OTHER STIMULANT ABUSE, UNCOMPLICATED                    

 

                02/15/2020           CAMPBELL HOANG MD           Ot            

  F17.210          

                          NICOTINE DEPENDENCE, CIGARETTES, UNCOMPL              

      

 

             02/15/2020           CAMPBELL HOANG MD           Ot           F60.

9           

PERSONALITY DISORDER, UNSPECIFIED                    

 

             02/15/2020           CAMPBELL OHANG MD           Ot           G93.

49           

OTHER ENCEPHALOPATHY                             

 

             02/15/2020           CAMPBELL HOANG MD           Ot           I95.

9           

HYPOTENSION, UNSPECIFIED                         

 

             02/15/2020           CAMPBELL HOANG MD           Ot           J13 

          

PNEUMONIA DUE TO STREPTOCOCCUS PNEUMONIA                    

 

             02/15/2020           CAMPBELL HOANG MD           Ot           J44.

9           

CHRONIC OBSTRUCTIVE PULMONARY DISEASE, U                    

 

             02/15/2020           CAMPBELL HOANG MD           Ot           J96.

00           

ACUTE RESPIRATORY FAILURE, UNSP W HYPOXI                    

 

             02/15/2020           CAMPBELL HOANG MD           Ot           K21.

9           

GASTRO-ESOPHAGEAL REFLUX DISEASE WITHOUT                    

 

             02/15/2020           CAMPBELL HOANG MD           Ot           K92.

2           

GASTROINTESTINAL HEMORRHAGE, UNSPECIFIED                    

 

             02/15/2020           CAMPBELL HOANG MD           Ot           M06.

9           

RHEUMATOID ARTHRITIS, UNSPECIFIED                    

 

             02/15/2020           NATALYACAMPBELL ANN MD, Ot           M19.

91           

PRIMARY OSTEOARTHRITIS, UNSPECIFIED SITE                    

 

             02/15/2020           CAMPBELL HOANG MD, Ot           N17.

9           

ACUTE KIDNEY FAILURE, UNSPECIFIED                    

 

             02/15/2020           CAMPBELL HOANG MD, Ot           N18.

9           

CHRONIC KIDNEY DISEASE, UNSPECIFIED                    

 

             02/15/2020           CAMPBELL HOANG MD, Ot           R00.

0           

TACHYCARDIA, UNSPECIFIED                         

 

             02/15/2020           CAMPBELL HOANG MD, Ot           R57.

1           

HYPOVOLEMIC SHOCK                                

 

             02/15/2020           CAMPBELL HOANG MD, Ot           R65.

20           

SEVERE SEPSIS WITHOUT SEPTIC SHOCK                    

 

             02/15/2020           CAMPBELL HOANG MD, Ot           Z79.

4           

LONG TERM (CURRENT) USE OF INSULIN                    

 

                02/15/2020           CAMPBELL HOANG MD, Ot            

  Z86.718          

                          PERSONAL HISTORY OF OTHER VENOUS THROMBO              

      

 

             02/15/2020           CAMPBELL HOANG MD, Ot           Z87.

11           

PERSONAL HISTORY OF PEPTIC ULCER DISEASE                    

 

             02/15/2020           CAMPBELL HOANG MD, Ot           Z91.

81           

HISTORY OF FALLING                               



                                                                                
                                                                                
                                                                                
                                                                                
                                                                                
                                                                                
                                                                                
                                                                                
                                                                                
                                                                                
                                                                                
                                                                                
                                                                                
                                                                                
                                                                                
                                                                                
                                                                                
                                                                                
                                                                                
                                                                                
                



Procedures

      



                Code            Description           Performed By           Per

milan On        

 

                                38.7                                            

                    

                                        12/15/2011        

 

                                      [id number]                                 IN

SERTION OF ENDOTRACHEAL 

AIRWAY INTO TR                                               02/10/2020        

 

                                      [id number]                                 RE

SPIRATORY VENTILATION, 

LESS THAN 24 CO                                               02/10/2020        

 

                                      [id number]                                 RE

SPIRATORY VENTILATION, 24-

96 CONSECUTI                                               02/10/2020        



                        



Results

      



                    Test                Result              Range        

 

                                        Comprehensive metabolic panel - 16

 08:48         

 

                          Serum or plasma sodium measurement (moles/volume)     

      138 mmol/L          

                                        135-145        

 

                          Serum or plasma potassium measurement (moles/volume)  

         4.9 mmol/L       

                                        3.6-5.0        

 

                          Serum or plasma chloride measurement (moles/volume)   

        103 mmol/L        

                                                

 

                    Carbon dioxide           29 mmol/L           21-32        

 

                          Serum or plasma anion gap determination (moles/volume)

           6 mmol/L       

                                        5-14        

 

                          Serum or plasma urea nitrogen measurement (mass/volume

)           26 mg/dL      

                                        7-18        

 

                          Serum or plasma creatinine measurement (mass/volume)  

         1.30 mg/dL       

                                        0.60-1.30        

 

                    Serum or plasma urea nitrogen/creatinine mass ratio         

  20                  NRG 

       

 

                                        Serum or plasma creatinine measurement w

ith calculation of estimated glomerular 

filtration rate           43                        NRG        

 

                    Serum or plasma glucose measurement (mass/volume)           

223 mg/dL           

        

 

                    Serum or plasma calcium measurement (mass/volume)           

9.8 mg/dL           

8.5-10.1        

 

                          Serum or plasma total bilirubin measurement (mass/volu

me)           0.6 mg/dL   

                                        0.1-1.0        

 

                                        Serum or plasma alkaline phosphatase kim

surement (enzymatic activity/volume)    

                          72 U/L                            

 

                                        Serum or plasma aspartate aminotransfera

se measurement (enzymatic 

activity/volume)           20 U/L                    5-34        

 

                                        Serum or plasma alanine aminotransferase

 measurement (enzymatic activity/volume)

                          28 U/L                    0-55        

 

                    Serum or plasma protein measurement (mass/volume)           

7.2 g/dL            

6.4-8.2        

 

                    Serum or plasma albumin measurement (mass/volume)           

4.2 g/dL            

3.2-4.5        

 

                                        Lipid 1996 panel - 16 08:48       

  

 

                          Serum or plasma triglyceride measurement (mass/volume)

           118 mg/dL      

                                        <150        

 

                          Serum or plasma cholesterol measurement (mass/volume) 

          213 mg/dL       

                                        < 200        

 

                          Serum or plasma cholesterol in HDL measurement (mass/v

olume)           55 mg/dL 

                                        40-60        

 

                          Cholesterol in LDL [mass/volume] in serum or plasma by

 direct assay           

132 mg/dL                               1-129        

 

                          Serum or plasma cholesterol in VLDL measurement (mass/

volume)           24 mg/dL

                                        5-40        

 

                                        THYROID STIMULATING HORMONE - 16 0

8:48         

 

                    THYROID STIMULATING HORMONE           0.80 u[iU]/mL         

  0.35-4.94        

 

                                        Comprehensive metabolic panel - 17

 07:58         

 

                          Serum or plasma sodium measurement (moles/volume)     

      137 mmol/L          

                                        135-145        

 

                          Serum or plasma potassium measurement (moles/volume)  

         4.6 mmol/L       

                                        3.6-5.0        

 

                          Serum or plasma chloride measurement (moles/volume)   

        110 mmol/L        

                                                

 

                    Carbon dioxide           21 mmol/L           21-32        

 

                          Serum or plasma anion gap determination (moles/volume)

           6 mmol/L       

                                        5-14        

 

                          Serum or plasma urea nitrogen measurement (mass/volume

)           25 mg/dL      

                                        7-18        

 

                          Serum or plasma creatinine measurement (mass/volume)  

         1.17 mg/dL       

                                        0.60-1.30        

 

                    Serum or plasma urea nitrogen/creatinine mass ratio         

  21                  NRG 

       

 

                                        Serum or plasma creatinine measurement w

ith calculation of estimated glomerular 

filtration rate           48                        NRG        

 

                    Serum or plasma glucose measurement (mass/volume)           

201 mg/dL           

        

 

                    Serum or plasma calcium measurement (mass/volume)           

8.8 mg/dL           

8.5-10.1        

 

                          Serum or plasma total bilirubin measurement (mass/volu

me)           0.7 mg/dL   

                                        0.1-1.0        

 

                                        Serum or plasma alkaline phosphatase kim

surement (enzymatic activity/volume)    

                          66 U/L                            

 

                                        Serum or plasma aspartate aminotransfera

se measurement (enzymatic 

activity/volume)           54 U/L                    5-34        

 

                                        Serum or plasma alanine aminotransferase

 measurement (enzymatic activity/volume)

                          109 U/L                   0-55        

 

                    Serum or plasma protein measurement (mass/volume)           

6.6 g/dL            

6.4-8.2        

 

                    Serum or plasma albumin measurement (mass/volume)           

3.6 g/dL            

3.2-4.5        

 

                                        Lipid 1996 panel - 17 07:58       

  

 

                          Serum or plasma triglyceride measurement (mass/volume)

           169 mg/dL      

                                        <150        

 

                          Serum or plasma cholesterol measurement (mass/volume) 

          153 mg/dL       

                                        < 200        

 

                          Serum or plasma cholesterol in HDL measurement (mass/v

olume)           37 mg/dL 

                                        40-60        

 

                          Cholesterol in LDL [mass/volume] in serum or plasma by

 direct assay           97

 mg/dL                                  1-129        

 

                          Serum or plasma cholesterol in VLDL measurement (mass/

volume)           34 mg/dL

                                        5-40        

 

                                        Cyanocobalamin measurement - 17 07

:58         

 

                    Vitamin B12           1904 pg/mL           200-1000        

 

                                        25-hydroxyvitamin D measurement -  07:58         

 

                    25-hydroxy vitamin D measurement           14 %             

           

 

                                        Complete blood count (CBC) with automate

d white blood cell (WBC) differential - 

17 19:45         

 

                          Blood leukocytes automated count (number/volume)      

     9.4 10*3/uL          

                                        4.3-11.0        

 

                          Blood erythrocytes automated count (number/volume)    

       4.92 10*6/uL       

                                        4.35-5.85        

 

                    Venous blood hemoglobin measurement (mass/volume)           

15.4 g/dL           

11.5-16.0        

 

                    Blood hematocrit (volume fraction)           46 %           

     35-52        

 

                    Automated erythrocyte mean corpuscular volume           93 [

foz_us]           

80-99        

 

                                        Automated erythrocyte mean corpuscular h

emoglobin (mass per erythrocyte)        

                          31 pg                     25-34        

 

                                        Automated erythrocyte mean corpuscular h

emoglobin concentration measurement 

(mass/volume)             34 g/dL                   32-36        

 

                    Automated erythrocyte distribution width ratio           13.

8 %              10.0-

14.5        

 

                    Automated blood platelet count (count/volume)           237 

10*3/uL           

130-400        

 

                          Automated blood platelet mean volume measurement      

     10.1 [foz_us]        

                                        7.4-10.4        

 

                    Automated blood neutrophils/100 leukocytes           64 %   

             42-75       

 

 

                    Automated blood lymphocytes/100 leukocytes           25 %   

             12-44       

 

 

                    Blood monocytes/100 leukocytes           8 %                

 0-12        

 

                    Automated blood eosinophils/100 leukocytes           3 %    

             0-10        

 

                    Automated blood basophils/100 leukocytes           0 %      

           0-10        

 

                    Blood neutrophils automated count (number/volume)           

6.0 10*3            

1.8-7.8        

 

                    Blood lymphocytes automated count (number/volume)           

2.4 10*3            

1.0-4.0        

 

                    Blood monocytes automated count (number/volume)           0.

8 10*3            

0.0-1.0        

 

                    Automated eosinophil count           0.2 10*3/uL           0

.0-0.3        

 

                    Automated blood basophil count (count/volume)           0.0 

10*3/uL           

0.0-0.1        

 

                                        Comprehensive metabolic panel - 17

 19:45         

 

                          Serum or plasma sodium measurement (moles/volume)     

      142 mmol/L          

                                        135-145        

 

                          Serum or plasma potassium measurement (moles/volume)  

         4.2 mmol/L       

                                        3.6-5.0        

 

                          Serum or plasma chloride measurement (moles/volume)   

        104 mmol/L        

                                                

 

                    Carbon dioxide           24 mmol/L           21-32        

 

                          Serum or plasma anion gap determination (moles/volume)

           14 mmol/L      

                                        5-14        

 

                          Serum or plasma urea nitrogen measurement (mass/volume

)           25 mg/dL      

                                        7-18        

 

                          Serum or plasma creatinine measurement (mass/volume)  

         1.29 mg/dL       

                                        0.60-1.30        

 

                    Serum or plasma urea nitrogen/creatinine mass ratio         

  19                  NRG 

       

 

                                        Serum or plasma creatinine measurement w

ith calculation of estimated glomerular 

filtration rate           43                        NRG        

 

                    Serum or plasma glucose measurement (mass/volume)           

102 mg/dL           

        

 

                    Serum or plasma calcium measurement (mass/volume)           

9.6 mg/dL           

8.5-10.1        

 

                          Serum or plasma total bilirubin measurement (mass/volu

me)           0.9 mg/dL   

                                        0.1-1.0        

 

                                        Serum or plasma alkaline phosphatase kim

surement (enzymatic activity/volume)    

                          83 U/L                            

 

                                        Serum or plasma aspartate aminotransfera

se measurement (enzymatic 

activity/volume)           99 U/L                    5-34        

 

                                        Serum or plasma alanine aminotransferase

 measurement (enzymatic activity/volume)

                          230 U/L                   0-55        

 

                    Serum or plasma protein measurement (mass/volume)           

7.9 g/dL            

6.4-8.2        

 

                    Serum or plasma albumin measurement (mass/volume)           

4.1 g/dL            

3.2-4.5        

 

                                        Lipase - 09/05/17 19:45         

 

                    Lipase              144 U/L             8-78        

 

                                        Urine drug screening test - 17 20:

00         

 

                    Urine phencyclidine detection by screening method           

NEGATIVE            

NEGATIVE        

 

                          Urine benzodiazepines detection by screening method   

        NEGATIVE          

                                        NEGATIVE        

 

                    Urine cocaine detection           NEGATIVE            NEGATI

VE        

 

                    Urine amphetamines detection by screening method           N

EGATIVE            

NEGATIVE        

 

                          Urine methamphetamine detection by screening method   

        NEGATIVE          

                                        NEGATIVE        

 

                    Urine cannabinoids detection by screening method           P

OSITIVE            

NEGATIVE        

 

                    Urine opiates detection by screening method           NEGATI

VE            

NEGATIVE        

 

                    Urine barbiturates detection           NEGATIVE            N

EGATIVE        

 

                          Screening urine tricyclic antidepressants detection   

        NEGATIVE          

                                        NEGATIVE        

 

                    Urine methadone detection by screening method           NEGA

TIVE            

NEGATIVE        

 

                    Urine oxycodone detection           NEGATIVE            NEGA

TIVE        

 

                    Urine propoxyphene detection           NEGATIVE            N

EGATIVE        

 

                                        Complete urinalysis with reflex to cultu

re - 17 20:00         

 

                    Urine color determination           YELLOW              NRG 

       

 

                    Urine clarity determination           SLIGHTLY CLOUDY       

     NRG        

 

                    Urine pH measurement by test strip           8              

     5-9        

 

                    Specific gravity of urine by test strip           1.010     

          1.016-1.022  

      

 

                    Urine protein assay by test strip, semi-quantitative        

   3+                  

NEGATIVE        

 

                    Urine glucose detection by automated test strip           NE

GATIVE            

NEGATIVE        

 

                          Erythrocytes detection in urine sediment by light micr

oscopy           2+       

                                        NEGATIVE        

 

                    Urine ketones detection by automated test strip           1+

                  NEGATIVE

        

 

                    Urine nitrite detection by test strip           NEGATIVE    

        NEGATIVE    

    

 

                    Urine total bilirubin detection by test strip           NEGA

TIVE            

NEGATIVE        

 

                          Urine urobilinogen measurement by automated test strip

 (mass/volume)           

NORMAL                                  NORMAL        

 

                    Urine leukocyte esterase detection by dipstick           1+ 

                 NEGATIVE 

       

 

                                        Automated urine sediment erythrocyte cou

nt by microscopy (number/high power 

field)                     [HPF]                    NRG        

 

                                        Automated urine sediment leukocyte count

 by microscopy (number/high power field)

                           [HPF]                    NRG        

 

                          Bacteria detection in urine sediment by light microsco

py           NONE         

                                        NRG        

 

                                        Squamous epithelial cells detection in u

rine sediment by light microscopy       

                          0-2                       NRG        

 

                          Crystals detection in urine sediment by light microsco

py           NONE         

                                        NRG        

 

                    Casts detection in urine sediment by light microscopy       

    NONE                

NRG        

 

                          Mucus detection in urine sediment by light microscopy 

          NEGATIVE        

                                        NRG        

 

                    Complete urinalysis with reflex to culture           NO     

             NRG        

 

                                        Complete blood count (CBC) with automate

d white blood cell (WBC) differential - 

10/11/17 08:55         

 

                          Blood leukocytes automated count (number/volume)      

     7.0 10*3/uL          

                                        4.3-11.0        

 

                          Blood erythrocytes automated count (number/volume)    

       5.49 10*6/uL       

                                        4.35-5.85        

 

                    Venous blood hemoglobin measurement (mass/volume)           

17.0 g/dL           

11.5-16.0        

 

                    Blood hematocrit (volume fraction)           51 %           

     35-52        

 

                    Automated erythrocyte mean corpuscular volume           92 [

foz_us]           

80-99        

 

                                        Automated erythrocyte mean corpuscular h

emoglobin (mass per erythrocyte)        

                          31 pg                     25-34        

 

                                        Automated erythrocyte mean corpuscular h

emoglobin concentration measurement 

(mass/volume)             34 g/dL                   32-36        

 

                    Automated erythrocyte distribution width ratio           13.

7 %              10.0-

14.5        

 

                    Automated blood platelet count (count/volume)           227 

10*3/uL           

130-400        

 

                          Automated blood platelet mean volume measurement      

     10.3 [foz_us]        

                                        7.4-10.4        

 

                    Automated blood neutrophils/100 leukocytes           51 %   

             42-75       

 

 

                    Automated blood lymphocytes/100 leukocytes           33 %   

             12-44       

 

 

                    Blood monocytes/100 leukocytes           13 %               

 0-12        

 

                    Automated blood eosinophils/100 leukocytes           3 %    

             0-10        

 

                    Automated blood basophils/100 leukocytes           1 %      

           0-10        

 

                    Blood neutrophils automated count (number/volume)           

3.6 10*3            

1.8-7.8        

 

                    Blood lymphocytes automated count (number/volume)           

2.3 10*3            

1.0-4.0        

 

                    Blood monocytes automated count (number/volume)           0.

9 10*3            

0.0-1.0        

 

                    Automated eosinophil count           0.2 10*3/uL           0

.0-0.3        

 

                    Automated blood basophil count (count/volume)           0.0 

10*3/uL           

0.0-0.1        

 

                                        Comprehensive metabolic panel - 10/11/17

 08:55         

 

                          Serum or plasma sodium measurement (moles/volume)     

      138 mmol/L          

                                        135-145        

 

                          Serum or plasma potassium measurement (moles/volume)  

         4.2 mmol/L       

                                        3.6-5.0        

 

                          Serum or plasma chloride measurement (moles/volume)   

        103 mmol/L        

                                                

 

                    Carbon dioxide           26 mmol/L           21-32        

 

                          Serum or plasma anion gap determination (moles/volume)

           9 mmol/L       

                                        5-14        

 

                          Serum or plasma urea nitrogen measurement (mass/volume

)           23 mg/dL      

                                        7-18        

 

                          Serum or plasma creatinine measurement (mass/volume)  

         1.33 mg/dL       

                                        0.60-1.30        

 

                    Serum or plasma urea nitrogen/creatinine mass ratio         

  17                  NRG 

       

 

                                        Serum or plasma creatinine measurement w

ith calculation of estimated glomerular 

filtration rate           42                        NRG        

 

                    Serum or plasma glucose measurement (mass/volume)           

186 mg/dL           

        

 

                    Serum or plasma calcium measurement (mass/volume)           

9.9 mg/dL           

8.5-10.1        

 

                          Serum or plasma total bilirubin measurement (mass/volu

me)           1.2 mg/dL   

                                        0.1-1.0        

 

                                        Serum or plasma alkaline phosphatase kim

surement (enzymatic activity/volume)    

                          83 U/L                            

 

                                        Serum or plasma aspartate aminotransfera

se measurement (enzymatic 

activity/volume)           73 U/L                    5-34        

 

                                        Serum or plasma alanine aminotransferase

 measurement (enzymatic activity/volume)

                          144 U/L                   0-55        

 

                    Serum or plasma protein measurement (mass/volume)           

8.3 g/dL            

6.4-8.2        

 

                    Serum or plasma albumin measurement (mass/volume)           

4.1 g/dL            

3.2-4.5        

 

                                        Lipid 1996 panel - 10/11/17 08:55       

  

 

                          Serum or plasma triglyceride measurement (mass/volume)

           168 mg/dL      

                                        <150        

 

                          Serum or plasma cholesterol measurement (mass/volume) 

          191 mg/dL       

                                        < 200        

 

                          Serum or plasma cholesterol in HDL measurement (mass/v

olume)           42 mg/dL 

                                        40-60        

 

                          Cholesterol in LDL [mass/volume] in serum or plasma by

 direct assay           

108 mg/dL                               1-129        

 

                          Serum or plasma cholesterol in VLDL measurement (mass/

volume)           34 mg/dL

                                        5-40        

 

                                        Hemoglobin A1c - 10/11/17 08:55         

 

                    Hemoglobin A1c           10.7 %              4.5-6.2        

 

                                        Automated blood complete blood count (he

mogram) panel - 18 08:18         

 

                          Blood leukocytes automated count (number/volume)      

     5.2 10*3/uL          

                                        4.3-11.0        

 

                          Blood erythrocytes automated count (number/volume)    

       4.66 10*6/uL       

                                        4.35-5.85        

 

                    Venous blood hemoglobin measurement (mass/volume)           

15.0 g/dL           

11.5-16.0        

 

                    Blood hematocrit (volume fraction)           43 %           

     35-52        

 

                    Automated erythrocyte mean corpuscular volume           93 [

foz_us]           

80-99        

 

                                        Automated erythrocyte mean corpuscular h

emoglobin (mass per erythrocyte)        

                          32 pg                     25-34        

 

                                        Automated erythrocyte mean corpuscular h

emoglobin concentration measurement 

(mass/volume)             35 g/dL                   32-36        

 

                    Automated erythrocyte distribution width ratio           13.

3 %              10.0-

14.5        

 

                    Automated blood platelet count (count/volume)           232 

10*3/uL           

130-400        

 

                          Automated blood platelet mean volume measurement      

     10.2 [foz_us]        

                                        7.4-10.4        

 

                                        Comprehensive metabolic panel - 18

 08:18         

 

                          Serum or plasma sodium measurement (moles/volume)     

      135 mmol/L          

                                        135-145        

 

                          Serum or plasma potassium measurement (moles/volume)  

         4.5 mmol/L       

                                        3.6-5.0        

 

                          Serum or plasma chloride measurement (moles/volume)   

        104 mmol/L        

                                                

 

                    Carbon dioxide           23 mmol/L           21-32        

 

                          Serum or plasma anion gap determination (moles/volume)

           8 mmol/L       

                                        5-14        

 

                          Serum or plasma urea nitrogen measurement (mass/volume

)           25 mg/dL      

                                        7-18        

 

                          Serum or plasma creatinine measurement (mass/volume)  

         1.21 mg/dL       

                                        0.60-1.30        

 

                    Serum or plasma urea nitrogen/creatinine mass ratio         

  21                  NRG 

       

 

                                        Serum or plasma creatinine measurement w

ith calculation of estimated glomerular 

filtration rate           46                        NRG        

 

                    Serum or plasma glucose measurement (mass/volume)           

310 mg/dL           

        

 

                    Serum or plasma calcium measurement (mass/volume)           

9.3 mg/dL           

8.5-10.1        

 

                          Serum or plasma total bilirubin measurement (mass/volu

me)           0.8 mg/dL   

                                        0.1-1.0        

 

                                        Serum or plasma alkaline phosphatase kim

surement (enzymatic activity/volume)    

                          66 U/L                            

 

                                        Serum or plasma aspartate aminotransfera

se measurement (enzymatic 

activity/volume)           68 U/L                    5-34        

 

                                        Serum or plasma alanine aminotransferase

 measurement (enzymatic activity/volume)

                          133 U/L                   0-55        

 

                    Serum or plasma protein measurement (mass/volume)           

7.3 g/dL            

6.4-8.2        

 

                    Serum or plasma albumin measurement (mass/volume)           

3.9 g/dL            

3.2-4.5        

 

                                        THYROID STIMULATING HORMONE - 18 0

8:18         

 

                    THYROID STIMULATING HORMONE           0.73 u[iU]/mL         

  0.35-4.94        

 

                                        Urine microalbumin measurement by test s

trip (mass/volume) - 18 08:18     

    

 

                    Urine creatinine measurement (mass/volume)           166 %  

             NRG        

 

                    Microalbumin [mass/volume] in urine           739.8 %       

      0.0-20.0        

 

                    Microalbumin/creatinine [ratio] in urine           445.7 mg/

g{Cre}           

0.0-30.0        

 

                                        Cyanocobalamin measurement - 18 08

:18         

 

                    Vitamin B12           952 pg/mL           190-1100        

 

                                        VITAMIN D 25-HYDROXY - 18 08:18   

      

 

                    VITAMIN D 25-HYDROXY (TOTAL)           25.2 %              3

0.0-100.0        

 

                                        Automated blood complete blood count (he

mogram) panel - 01/15/19 17:23         

 

                          Blood leukocytes automated count (number/volume)      

     9.4 10*3/uL          

                                        4.3-11.0        

 

                          Blood erythrocytes automated count (number/volume)    

       4.76 10*6/uL       

                                        4.35-5.85        

 

                    Venous blood hemoglobin measurement (mass/volume)           

14.7 g/dL           

11.5-16.0        

 

                    Blood hematocrit (volume fraction)           44 %           

     35-52        

 

                    Automated erythrocyte mean corpuscular volume           93 [

foz_us]           

80-99        

 

                                        Automated erythrocyte mean corpuscular h

emoglobin (mass per erythrocyte)        

                          31 pg                     25-34        

 

                                        Automated erythrocyte mean corpuscular h

emoglobin concentration measurement 

(mass/volume)             33 g/dL                   32-36        

 

                    Automated erythrocyte distribution width ratio           13.

2 %              10.0-

14.5        

 

                    Automated blood platelet count (count/volume)           254 

10*3/uL           

130-400        

 

                          Automated blood platelet mean volume measurement      

     10.0 [foz_us]        

                                        7.4-10.4        

 

                                        Comprehensive metabolic panel - 01/15/19

 17:23         

 

                          Serum or plasma sodium measurement (moles/volume)     

      137 mmol/L          

                                        135-145        

 

                          Serum or plasma potassium measurement (moles/volume)  

         3.6 mmol/L       

                                        3.6-5.0        

 

                          Serum or plasma chloride measurement (moles/volume)   

        97 mmol/L         

                                                

 

                    Carbon dioxide           26 mmol/L           21-32        

 

                          Serum or plasma anion gap determination (moles/volume)

           14 mmol/L      

                                        5-14        

 

                          Serum or plasma urea nitrogen measurement (mass/volume

)           24 mg/dL      

                                        7-18        

 

                          Serum or plasma creatinine measurement (mass/volume)  

         1.78 mg/dL       

                                        0.60-1.30        

 

                    Serum or plasma urea nitrogen/creatinine mass ratio         

  13                  NRG 

       

 

                                        Serum or plasma creatinine measurement w

ith calculation of estimated glomerular 

filtration rate           30                        NRG        

 

                    Serum or plasma glucose measurement (mass/volume)           

112 mg/dL           

        

 

                          Serum or plasma calcium measurement (mass/volume)     

      10.1 mg/dL          

                                        8.5-10.1        

 

                          Serum or plasma total bilirubin measurement (mass/volu

me)           0.8 mg/dL   

                                        0.1-1.0        

 

                                        Serum or plasma alkaline phosphatase kim

surement (enzymatic activity/volume)    

                          92 U/L                            

 

                                        Serum or plasma aspartate aminotransfera

se measurement (enzymatic 

activity/volume)           42 U/L                    5-34        

 

                                        Serum or plasma alanine aminotransferase

 measurement (enzymatic activity/volume)

                          81 U/L                    0-55        

 

                    Serum or plasma protein measurement (mass/volume)           

8.7 g/dL            

6.4-8.2        

 

                    Serum or plasma albumin measurement (mass/volume)           

4.2 g/dL            

3.2-4.5        

 

                    CALCIUM CORRECTED           9.9 mg/dL           8.5-10.1    

    

 

                                        Lipid 1996 panel - 01/15/19 17:23       

  

 

                          Serum or plasma triglyceride measurement (mass/volume)

           231 mg/dL      

                                        <150        

 

                          Serum or plasma cholesterol measurement (mass/volume) 

          280 mg/dL       

                                        < 200        

 

                          Serum or plasma cholesterol in HDL measurement (mass/v

olume)           54 mg/dL 

                                        40-60        

 

                          Cholesterol in LDL [mass/volume] in serum or plasma by

 direct assay           

179 mg/dL                               1-129        

 

                          Serum or plasma cholesterol in VLDL measurement (mass/

volume)           46 mg/dL

                                        5-40        

 

                                        THYROID STIMULATING HORMONE - 01/15/19 1

7:23         

 

                    THYROID STIMULATING HORMONE           4.84 u[iU]/mL         

  0.35-4.94        

 

                                        Complete blood count (CBC) with automate

d white blood cell (WBC) differential - 

19 08:19         

 

                          Blood leukocytes automated count (number/volume)      

     26.1 10*3/uL         

                                        4.3-11.0        

 

                          Blood erythrocytes automated count (number/volume)    

       4.51 10*6/uL       

                                        4.35-5.85        

 

                    Venous blood hemoglobin measurement (mass/volume)           

14.1 g/dL           

11.5-16.0        

 

                    Blood hematocrit (volume fraction)           44 %           

     35-52        

 

                    Automated erythrocyte mean corpuscular volume           97 [

foz_us]           

80-99        

 

                                        Automated erythrocyte mean corpuscular h

emoglobin (mass per erythrocyte)        

                          31 pg                     25-34        

 

                                        Automated erythrocyte mean corpuscular h

emoglobin concentration measurement 

(mass/volume)             32 g/dL                   32-36        

 

                    Automated erythrocyte distribution width ratio           13.

7 %              10.0-

14.5        

 

                    Automated blood platelet count (count/volume)           349 

10*3/uL           

130-400        

 

                          Automated blood platelet mean volume measurement      

     10.6 [foz_us]        

                                        7.4-10.4        

 

                    Automated blood neutrophils/100 leukocytes           81 %   

             42-75       

 

 

                    Automated blood lymphocytes/100 leukocytes           11 %   

             12-44       

 

 

                    Blood monocytes/100 leukocytes           7 %                

 0-12        

 

                    Automated blood eosinophils/100 leukocytes           0 %    

             0-10        

 

                    Automated blood basophils/100 leukocytes           0 %      

           0-10        

 

                    Blood neutrophils automated count (number/volume)           

21.2 10*3           

1.8-7.8        

 

                    Blood lymphocytes automated count (number/volume)           

3.0 10*3            

1.0-4.0        

 

                    Blood monocytes automated count (number/volume)           1.

8 10*3            

0.0-1.0        

 

                    Automated eosinophil count           0.0 10*3/uL           0

.0-0.3        

 

                    Automated blood basophil count (count/volume)           0.0 

10*3/uL           

0.0-0.1        

 

                                        Manual absolute plasma cell count -  08:19         

 

                    Blood monocytes/100 leukocytes           5 %                

 NRG        

 

                    Manual blood segmented neutrophils/100 leukocytes           

83 %                NRG  

      

 

                    Blood band neutrophils/100 leukocytes           0 %         

        NRG        

 

                    Manual blood lymphocytes/100 leukocytes           12 %      

          NRG        

 

                    Manual eosinophils/100 leukocytes in nose           0 %     

            NRG        

 

                    Manual blood basophils/100 leukocytes           0 %         

        NRG        

 

                    Blood erythrocyte morphology finding identification         

  NORMAL              

NRG        

 

                                        Comprehensive metabolic panel - 19

 08:19         

 

                          Serum or plasma sodium measurement (moles/volume)     

      129 mmol/L          

                                        135-145        

 

                          Serum or plasma potassium measurement (moles/volume)  

         6.3 mmol/L       

                                        3.6-5.0        

 

                          Serum or plasma chloride measurement (moles/volume)   

        88 mmol/L         

                                                

 

                    Carbon dioxide           < mmol/L            21-32        

 

                          Serum or plasma anion gap determination (moles/volume)

           37 mmol/L      

                                        5-14        

 

                          Serum or plasma urea nitrogen measurement (mass/volume

)           51 mg/dL      

                                        7-18        

 

                          Serum or plasma creatinine measurement (mass/volume)  

         2.96 mg/dL       

                                        0.60-1.30        

 

                    Serum or plasma urea nitrogen/creatinine mass ratio         

  17                  NRG 

       

 

                                        Serum or plasma creatinine measurement w

ith calculation of estimated glomerular 

filtration rate           16                        NRG        

 

                    Serum or plasma glucose measurement (mass/volume)           

919 mg/dL           

        

 

                    Serum or plasma calcium measurement (mass/volume)           

9.6 mg/dL           

8.5-10.1        

 

                          Serum or plasma total bilirubin measurement (mass/volu

me)           0.4 mg/dL   

                                        0.1-1.0        

 

                                        Serum or plasma alkaline phosphatase kim

surement (enzymatic activity/volume)    

                          127 U/L                           

 

                                        Serum or plasma aspartate aminotransfera

se measurement (enzymatic 

activity/volume)           32 U/L                    5-34        

 

                                        Serum or plasma alanine aminotransferase

 measurement (enzymatic activity/volume)

                          74 U/L                    0-55        

 

                    Serum or plasma protein measurement (mass/volume)           

7.4 g/dL            

6.4-8.2        

 

                    Serum or plasma albumin measurement (mass/volume)           

3.4 g/dL            

3.2-4.5        

 

                    CALCIUM CORRECTED           10.1 mg/dL           8.5-10.1   

     

 

                                        Lipase - 19 08:19         

 

                    Lipase              215 U/L             8-78        

 

                                        Capillary blood glucose measurement by g

lucometer (mass/volume) - 19 09:33

         

 

                          Capillary blood glucose measurement by glucometer (mas

s/volume)           > 

mg/dL                                           

 

                                        Arterial blood gas measurement -  09:34         

 

                    Blood pCO2           11 mm[Hg]           35-45        

 

                    Blood pO2           127 mm[Hg]           79-93        

 

                          Arterial blood bicarbonate measurement (moles/volume) 

          3 mmol/L        

                                        23-27        

 

                    Arterial blood base excess by calculation           -25.7 mm

ol/L           

-2.5-2.5        

 

                    Arterial blood oxygen saturation measurement           98 % 

                   

    

 

                    * Inhaled oxygen flow rate           NA                  NRG

        

 

                          Arterial blood pH measurement with patient temperature

 correction           7.07

                                        7.37-7.43        

 

                          Arterial blood carbon dioxide, total measurement (mole

s/volume)           3.4 

mmol/L                                  21.0-31.0        

 

                    Body site           R BRACHIAL            NRG        

 

                          Assessment of wrist artery patency prior to arterial p

uncture           YES-POS 

                                        NRG        

 

                    Setting of ventilation mode           NO                  NR

G        

 

                    Measurement of body temperature           97.6              

  NRG        

 

                                        Whole blood basic metabolic panel -  09:45         

 

                          Serum or plasma sodium measurement (moles/volume)     

      132 mmol/L          

                                        135-145        

 

                          Serum or plasma potassium measurement (moles/volume)  

         6.0 mmol/L       

                                        3.6-5.0        

 

                          Serum or plasma chloride measurement (moles/volume)   

        94 mmol/L         

                                                

 

                    Carbon dioxide           < mmol/L            21-32        

 

                          Serum or plasma anion gap determination (moles/volume)

           33 mmol/L      

                                        5-14        

 

                          Serum or plasma urea nitrogen measurement (mass/volume

)           52 mg/dL      

                                        7-18        

 

                          Serum or plasma creatinine measurement (mass/volume)  

         2.74 mg/dL       

                                        0.60-1.30        

 

                    Serum or plasma urea nitrogen/creatinine mass ratio         

  19                  NRG 

       

 

                                        Serum or plasma creatinine measurement w

ith calculation of estimated glomerular 

filtration rate           18                        NRG        

 

                    Serum or plasma glucose measurement (mass/volume)           

852 mg/dL           

        

 

                    Serum or plasma calcium measurement (mass/volume)           

8.9 mg/dL           

8.5-10.1        

 

                                        Blood lactic acid measurement (moles/vol

ume) - 19 09:45         

 

                    Blood lactic acid measurement (moles/volume)           5.75 

mmol/L           

0.50-2.00        

 

                                        Serum or plasma troponin i.cardiac measu

rement (mass/volume) - 19 09:45   

      

 

                          Serum or plasma troponin i.cardiac measurement (mass/v

olume)           < ng/mL  

                                        <0.028        

 

                                        Bacterial blood culture - 19 10:15

         

 

                    Bacterial blood culture           NG                  NRG   

     

 

                                        Bacterial blood culture - 19 10:20

         

 

                    Bacterial blood culture           NG                  NRG   

     

 

                                        Automated blood complete blood count (he

mogram) panel - 19 11:00         

 

                          Blood leukocytes automated count (number/volume)      

     29.0 10*3/uL         

                                        4.3-11.0        

 

                          Blood erythrocytes automated count (number/volume)    

       4.23 10*6/uL       

                                        4.35-5.85        

 

                    Venous blood hemoglobin measurement (mass/volume)           

13.2 g/dL           

11.5-16.0        

 

                    Blood hematocrit (volume fraction)           40 %           

     35-52        

 

                    Automated erythrocyte mean corpuscular volume           95 [

foz_us]           

80-99        

 

                                        Automated erythrocyte mean corpuscular h

emoglobin (mass per erythrocyte)        

                          31 pg                     25-34        

 

                                        Automated erythrocyte mean corpuscular h

emoglobin concentration measurement 

(mass/volume)             33 g/dL                   32-36        

 

                    Automated erythrocyte distribution width ratio           13.

3 %              10.0-

14.5        

 

                    Automated blood platelet count (count/volume)           290 

10*3/uL           

130-400        

 

                          Automated blood platelet mean volume measurement      

     10.6 [foz_us]        

                                        7.4-10.4        

 

                                        Whole blood basic metabolic panel -  11:00         

 

                          Serum or plasma sodium measurement (moles/volume)     

      134 mmol/L          

                                        135-145        

 

                          Serum or plasma potassium measurement (moles/volume)  

         5.1 mmol/L       

                                        3.6-5.0        

 

                          Serum or plasma chloride measurement (moles/volume)   

        100 mmol/L        

                                                

 

                    Carbon dioxide           < mmol/L            21-32        

 

                          Serum or plasma anion gap determination (moles/volume)

           29 mmol/L      

                                        5-14        

 

                          Serum or plasma urea nitrogen measurement (mass/volume

)           51 mg/dL      

                                        7-18        

 

                          Serum or plasma creatinine measurement (mass/volume)  

         2.60 mg/dL       

                                        0.60-1.30        

 

                    Serum or plasma urea nitrogen/creatinine mass ratio         

  20                  NRG 

       

 

                                        Serum or plasma creatinine measurement w

ith calculation of estimated glomerular 

filtration rate           19                        NRG        

 

                    Serum or plasma glucose measurement (mass/volume)           

691 mg/dL           

        

 

                    Serum or plasma calcium measurement (mass/volume)           

8.3 mg/dL           

8.5-10.1        

 

                                        Beta-hydroxybutyric acid measurement - 0

19 11:00         

 

                    Beta-hydroxybutyric acid measurement           12.57 mmol/L 

          0.00-0.27 

       

 

                                        Hemoglobin A1c measurement - 19 11

:00         

 

                    Blood hemoglobin A1C measurement (mass/volume)           14.

3 %              4.0-

5.6        

 

                    MEAN BLOOD GLUCOSE           364 %               <=126      

  

 

                                        Serum or plasma lactate measurement (mol

es/volume) - 19 12:00         

 

                          Serum or plasma lactate measurement (moles/volume)    

       2.87 mmol/L        

                                        0.50-2.00        

 

                                        Methicillin resistant Staphylococcus aur

eus (MRSA) screening culture - 19 

12:00         

 

                          Methicillin resistant Staphylococcus aureus (MRSA) scr

eening culture           

NEG                                     NRG        

 

                                        Capillary blood glucose measurement by g

lucometer (mass/volume) - 19 12:05

         

 

                          Capillary blood glucose measurement by glucometer (mas

s/volume)           > 

mg/dL                                           

 

                                        Whole blood basic metabolic panel -  12:20         

 

                          Serum or plasma sodium measurement (moles/volume)     

      136 mmol/L          

                                        135-145        

 

                          Serum or plasma potassium measurement (moles/volume)  

         4.5 mmol/L       

                                        3.6-5.0        

 

                          Serum or plasma chloride measurement (moles/volume)   

        104 mmol/L        

                                                

 

                    Carbon dioxide           < mmol/L            21-32        

 

                          Serum or plasma anion gap determination (moles/volume)

           27 mmol/L      

                                        5-14        

 

                          Serum or plasma urea nitrogen measurement (mass/volume

)           48 mg/dL      

                                        7-18        

 

                          Serum or plasma creatinine measurement (mass/volume)  

         2.42 mg/dL       

                                        0.60-1.30        

 

                    Serum or plasma urea nitrogen/creatinine mass ratio         

  20                  NRG 

       

 

                                        Serum or plasma creatinine measurement w

ith calculation of estimated glomerular 

filtration rate           21                        NRG        

 

                    Serum or plasma glucose measurement (mass/volume)           

649 mg/dL           

        

 

                    Serum or plasma calcium measurement (mass/volume)           

8.4 mg/dL           

8.5-10.1        

 

                                        Capillary blood glucose measurement by g

lucometer (mass/volume) - 19 13:29

         

 

                          Capillary blood glucose measurement by glucometer (mas

s/volume)           541 

mg/dL                                           

 

                                        Complete urinalysis with reflex to cultu

re - 19 14:20         

 

                    Urine color determination           YELLOW              NRG 

       

 

                    Urine clarity determination           CLEAR               NR

G        

 

                    Urine pH measurement by test strip           5              

     5-9        

 

                    Specific gravity of urine by test strip           1.020     

          1.016-1.022  

      

 

                    Urine protein assay by test strip, semi-quantitative        

   3+                  

NEGATIVE        

 

                    Urine glucose detection by automated test strip           4+

                  NEGATIVE

        

 

                          Erythrocytes detection in urine sediment by light micr

oscopy           2+       

                                        NEGATIVE        

 

                    Urine ketones detection by automated test strip           4+

                  NEGATIVE

        

 

                    Urine nitrite detection by test strip           NEGATIVE    

        NEGATIVE    

    

 

                    Urine total bilirubin detection by test strip           NEGA

TIVE            

NEGATIVE        

 

                          Urine urobilinogen measurement by automated test strip

 (mass/volume)           

NORMAL                                  NORMAL        

 

                    Urine leukocyte esterase detection by dipstick           NEG

ATIVE            

NEGATIVE        

 

                                        Automated urine sediment erythrocyte cou

nt by microscopy (number/high power 

field)                     [HPF]                    NRG        

 

                                        Automated urine sediment leukocyte count

 by microscopy (number/high power field)

                          NONE                      NRG        

 

                          Bacteria detection in urine sediment by light microsco

py           TRACE        

                                        NRG        

 

                                        Squamous epithelial cells detection in u

rine sediment by light microscopy       

                          5-10                      NRG        

 

                          Crystals detection in urine sediment by light microsco

py           NONE         

                                        NRG        

 

                    Casts detection in urine sediment by light microscopy       

    NONE                

NRG        

 

                          Mucus detection in urine sediment by light microscopy 

          NEGATIVE        

                                        NRG        

 

                    Complete urinalysis with reflex to culture           NO     

             NRG        

 

                                        Urine drug screening test - 19 14:

20         

 

                    Urine phencyclidine detection by screening method           

NEGATIVE            

NEGATIVE        

 

                          Urine benzodiazepines detection by screening method   

        NEGATIVE          

                                        NEGATIVE        

 

                    Urine cocaine detection           NEGATIVE            NEGATI

VE        

 

                    Urine amphetamines detection by screening method           N

EGATIVE            

NEGATIVE        

 

                          Urine methamphetamine detection by screening method   

        NEGATIVE          

                                        NEGATIVE        

 

                    Urine cannabinoids detection by screening method           N

EGATIVE            

NEGATIVE        

 

                    Urine opiates detection by screening method           NEGATI

VE            

NEGATIVE        

 

                    Urine barbiturates detection           NEGATIVE            N

EGATIVE        

 

                          Screening urine tricyclic antidepressants detection   

        NEGATIVE          

                                        NEGATIVE        

 

                    Urine methadone detection by screening method           NEGA

TIVE            

NEGATIVE        

 

                    Urine oxycodone detection           NEGATIVE            NEGA

TIVE        

 

                    Urine propoxyphene detection           NEGATIVE            N

EGATIVE        

 

                                        Capillary blood glucose measurement by g

lucometer (mass/volume) - 19 14:32

         

 

                          Capillary blood glucose measurement by glucometer (mas

s/volume)           464 

mg/dL                                           

 

                                        Whole blood basic metabolic panel -  15:10         

 

                          Serum or plasma sodium measurement (moles/volume)     

      137 mmol/L          

                                        135-145        

 

                          Serum or plasma potassium measurement (moles/volume)  

         4.7 mmol/L       

                                        3.6-5.0        

 

                          Serum or plasma chloride measurement (moles/volume)   

        105 mmol/L        

                                                

 

                    Carbon dioxide           5 mmol/L            -32        

 

                          Serum or plasma anion gap determination (moles/volume)

           27 mmol/L      

                                        5-14        

 

                          Serum or plasma urea nitrogen measurement (mass/volume

)           47 mg/dL      

                                        7-18        

 

                          Serum or plasma creatinine measurement (mass/volume)  

         2.28 mg/dL       

                                        0.60-1.30        

 

                    Serum or plasma urea nitrogen/creatinine mass ratio         

  21                  NRG 

       

 

                                        Serum or plasma creatinine measurement w

ith calculation of estimated glomerular 

filtration rate           22                        NRG        

 

                    Serum or plasma glucose measurement (mass/volume)           

507 mg/dL           

        

 

                    Serum or plasma calcium measurement (mass/volume)           

8.8 mg/dL           

8.5-10.1        

 

                                        Capillary blood glucose measurement by g

lucometer (mass/volume) - 19 15:37

         

 

                          Capillary blood glucose measurement by glucometer (mas

s/volume)           432 

mg/dL                                           

 

                                        Capillary blood glucose measurement by g

lucometer (mass/volume) - 19 16:30

         

 

                          Capillary blood glucose measurement by glucometer (mas

s/volume)           377 

mg/dL                                           

 

                                        Capillary blood glucose measurement by g

lucometer (mass/volume) - 19 17:35

         

 

                          Capillary blood glucose measurement by glucometer (mas

s/volume)           382 

mg/dL                                           

 

                                        Capillary blood glucose measurement by g

lucometer (mass/volume) - 19 18:41

         

 

                          Capillary blood glucose measurement by glucometer (mas

s/volume)           282 

mg/dL                                           

 

                                        Capillary blood glucose measurement by g

lucometer (mass/volume) - 19 19:37

         

 

                          Capillary blood glucose measurement by glucometer (mas

s/volume)           210 

mg/dL                                           

 

                                        Whole blood basic metabolic panel -  20:05         

 

                          Serum or plasma sodium measurement (moles/volume)     

      136 mmol/L          

                                        135-145        

 

                          Serum or plasma potassium measurement (moles/volume)  

         4.9 mmol/L       

                                        3.6-5.0        

 

                          Serum or plasma chloride measurement (moles/volume)   

        107 mmol/L        

                                                

 

                    Carbon dioxide           14 mmol/L           -32        

 

                          Serum or plasma anion gap determination (moles/volume)

           15 mmol/L      

                                        5-14        

 

                          Serum or plasma urea nitrogen measurement (mass/volume

)           43 mg/dL      

                                        7-18        

 

                          Serum or plasma creatinine measurement (mass/volume)  

         1.89 mg/dL       

                                        0.60-1.30        

 

                    Serum or plasma urea nitrogen/creatinine mass ratio         

  23                  NRG 

       

 

                                        Serum or plasma creatinine measurement w

ith calculation of estimated glomerular 

filtration rate           28                        NRG        

 

                    Serum or plasma glucose measurement (mass/volume)           

263 mg/dL           

        

 

                    Serum or plasma calcium measurement (mass/volume)           

8.5 mg/dL           

8.5-10.1        

 

                                        Capillary blood glucose measurement by g

lucometer (mass/volume) - 19 20:26

         

 

                          Capillary blood glucose measurement by glucometer (mas

s/volume)           286 

mg/dL                                           

 

                                        Capillary blood glucose measurement by g

lucometer (mass/volume) - 19 21:31

         

 

                          Capillary blood glucose measurement by glucometer (mas

s/volume)           238 

mg/dL                                           

 

                                        Capillary blood glucose measurement by g

lucometer (mass/volume) - 19 22:44

         

 

                          Capillary blood glucose measurement by glucometer (mas

s/volume)           244 

mg/dL                                           

 

                                        Capillary blood glucose measurement by g

lucometer (mass/volume) - 19 23:28

         

 

                          Capillary blood glucose measurement by glucometer (mas

s/volume)           208 

mg/dL                                           

 

                                        Capillary blood glucose measurement by g

lucometer (mass/volume) - 19 00:35

         

 

                          Capillary blood glucose measurement by glucometer (mas

s/volume)           173 

mg/dL                                           

 

                                        Capillary blood glucose measurement by g

lucometer (mass/volume) - 19 01:38

         

 

                          Capillary blood glucose measurement by glucometer (mas

s/volume)           160 

mg/dL                                           

 

                                        Capillary blood glucose measurement by g

lucometer (mass/volume) - 19 02:34

         

 

                          Capillary blood glucose measurement by glucometer (mas

s/volume)           155 

mg/dL                                           

 

                                        Capillary blood glucose measurement by g

lucometer (mass/volume) - 19 03:31

         

 

                          Capillary blood glucose measurement by glucometer (mas

s/volume)           119 

mg/dL                                           

 

                                        Complete blood count (CBC) with automate

d white blood cell (WBC) differential - 

19 03:39         

 

                          Blood leukocytes automated count (number/volume)      

     16.9 10*3/uL         

                                        4.3-11.0        

 

                          Blood erythrocytes automated count (number/volume)    

       4.07 10*6/uL       

                                        4.35-5.85        

 

                    Venous blood hemoglobin measurement (mass/volume)           

12.8 g/dL           

11.5-16.0        

 

                    Blood hematocrit (volume fraction)           37 %           

     35-52        

 

                    Automated erythrocyte mean corpuscular volume           90 [

foz_us]           

80-99        

 

                                        Automated erythrocyte mean corpuscular h

emoglobin (mass per erythrocyte)        

                          31 pg                     25-34        

 

                                        Automated erythrocyte mean corpuscular h

emoglobin concentration measurement 

(mass/volume)             35 g/dL                   32-36        

 

                    Automated erythrocyte distribution width ratio           13.

3 %              10.0-

14.5        

 

                    Automated blood platelet count (count/volume)           230 

10*3/uL           

130-400        

 

                          Automated blood platelet mean volume measurement      

     10.1 [foz_us]        

                                        7.4-10.4        

 

                    Automated blood neutrophils/100 leukocytes           83 %   

             42-75       

 

 

                    Automated blood lymphocytes/100 leukocytes           13 %   

             12-44       

 

 

                    Blood monocytes/100 leukocytes           4 %                

 0-12        

 

                    Automated blood eosinophils/100 leukocytes           0 %    

             0-10        

 

                    Automated blood basophils/100 leukocytes           0 %      

           0-10        

 

                    Blood neutrophils automated count (number/volume)           

14.0 10*3           

1.8-7.8        

 

                    Blood lymphocytes automated count (number/volume)           

2.2 10*3            

1.0-4.0        

 

                    Blood monocytes automated count (number/volume)           0.

6 10*3            

0.0-1.0        

 

                    Automated eosinophil count           0.0 10*3/uL           0

.0-0.3        

 

                    Automated blood basophil count (count/volume)           0.0 

10*3/uL           

0.0-0.1        

 

                                        Comprehensive metabolic panel - 19

 03:39         

 

                          Serum or plasma sodium measurement (moles/volume)     

      136 mmol/L          

                                        135-145        

 

                          Serum or plasma potassium measurement (moles/volume)  

         4.2 mmol/L       

                                        3.6-5.0        

 

                          Serum or plasma chloride measurement (moles/volume)   

        109 mmol/L        

                                                

 

                    Carbon dioxide           19 mmol/L           21-32        

 

                          Serum or plasma anion gap determination (moles/volume)

           8 mmol/L       

                                        5-14        

 

                          Serum or plasma urea nitrogen measurement (mass/volume

)           35 mg/dL      

                                        7-18        

 

                          Serum or plasma creatinine measurement (mass/volume)  

         1.49 mg/dL       

                                        0.60-1.30        

 

                    Serum or plasma urea nitrogen/creatinine mass ratio         

  23                  NRG 

       

 

                                        Serum or plasma creatinine measurement w

ith calculation of estimated glomerular 

filtration rate           36                        NRG        

 

                    Serum or plasma glucose measurement (mass/volume)           

112 mg/dL           

        

 

                    Serum or plasma calcium measurement (mass/volume)           

8.5 mg/dL           

8.5-10.1        

 

                          Serum or plasma total bilirubin measurement (mass/volu

me)           0.5 mg/dL   

                                        0.1-1.0        

 

                                        Serum or plasma alkaline phosphatase kim

surement (enzymatic activity/volume)    

                          84 U/L                            

 

                                        Serum or plasma aspartate aminotransfera

se measurement (enzymatic 

activity/volume)           36 U/L                    5-34        

 

                                        Serum or plasma alanine aminotransferase

 measurement (enzymatic activity/volume)

                          51 U/L                    0-55        

 

                    Serum or plasma protein measurement (mass/volume)           

6.1 g/dL            

6.4-8.2        

 

                    Serum or plasma albumin measurement (mass/volume)           

3.0 g/dL            

3.2-4.5        

 

                    CALCIUM CORRECTED           9.3 mg/dL           8.5-10.1    

    

 

                                        Serum or plasma phosphate measurement (m

ass/volume) - 19 03:39         

 

                          Serum or plasma phosphate measurement (mass/volume)   

        1.9 mg/dL         

                                        2.3-4.7        

 

                                        Magnesium - 19 03:39         

 

                    Magnesium           1.8 mg/dL           1.8-2.4        

 

                                        Beta-hydroxybutyric acid measurement - 0

19 03:39         

 

                    Beta-hydroxybutyric acid measurement           0.12 mmol/L  

         0.00-0.27  

      

 

                                        PT panel in platelet poor plasma by coag

ulation assay - 19 03:39         

 

                          Prothrombin time (PT) in platelet poor plasma by coagu

lation assay           

12.1 s                                  12.2-14.7        

 

                          INR in platelet poor plasma or blood by coagulation as

say           0.9         

                                        0.8-1.4        

 

                                        Capillary blood glucose measurement by g

lucometer (mass/volume) - 19 04:31

         

 

                          Capillary blood glucose measurement by glucometer (mas

s/volume)           97 

mg/dL                                           

 

                                        Capillary blood glucose measurement by g

lucometer (mass/volume) - 19 06:00

         

 

                          Capillary blood glucose measurement by glucometer (mas

s/volume)           125 

mg/dL                                           

 

                                        Capillary blood glucose measurement by g

lucometer (mass/volume) - 19 06:59

         

 

                          Capillary blood glucose measurement by glucometer (mas

s/volume)           129 

mg/dL                                           

 

                                        Whole blood basic metabolic panel -  07:05         

 

                          Serum or plasma sodium measurement (moles/volume)     

      137 mmol/L          

                                        135-145        

 

                          Serum or plasma potassium measurement (moles/volume)  

         4.4 mmol/L       

                                        3.6-5.0        

 

                          Serum or plasma chloride measurement (moles/volume)   

        109 mmol/L        

                                                

 

                    Carbon dioxide           19 mmol/L           21-32        

 

                          Serum or plasma anion gap determination (moles/volume)

           9 mmol/L       

                                        5-14        

 

                          Serum or plasma urea nitrogen measurement (mass/volume

)           31 mg/dL      

                                        7-18        

 

                          Serum or plasma creatinine measurement (mass/volume)  

         1.40 mg/dL       

                                        0.60-1.30        

 

                    Serum or plasma urea nitrogen/creatinine mass ratio         

  22                  NRG 

       

 

                                        Serum or plasma creatinine measurement w

ith calculation of estimated glomerular 

filtration rate           39                        NRG        

 

                    Serum or plasma glucose measurement (mass/volume)           

159 mg/dL           

        

 

                    Serum or plasma calcium measurement (mass/volume)           

8.3 mg/dL           

8.5-10.1        

 

                                        Serum or plasma phosphate measurement (m

ass/volume) - 19 07:05         

 

                          Serum or plasma phosphate measurement (mass/volume)   

        1.7 mg/dL         

                                        2.3-4.7        

 

                                        Magnesium - 19 07:05         

 

                    Magnesium           1.8 mg/dL           1.8-2.4        

 

                                        Beta-hydroxybutyric acid measurement - 0

19 07:05         

 

                    Beta-hydroxybutyric acid measurement           0.85 mmol/L  

         0.00-0.27  

      

 

                                        Capillary blood glucose measurement by g

lucometer (mass/volume) - 19 10:15

         

 

                          Capillary blood glucose measurement by glucometer (mas

s/volume)           175 

mg/dL                                           

 

                                        Capillary blood glucose measurement by g

lucometer (mass/volume) - 19 11:22

         

 

                          Capillary blood glucose measurement by glucometer (mas

s/volume)           169 

mg/dL                                           

 

                                        Capillary blood glucose measurement by g

lucometer (mass/volume) - 19 13:13

         

 

                          Capillary blood glucose measurement by glucometer (mas

s/volume)           203 

mg/dL                                           

 

                                        Capillary blood glucose measurement by g

lucometer (mass/volume) - 19 15:52

         

 

                          Capillary blood glucose measurement by glucometer (mas

s/volume)           229 

mg/dL                                           

 

                                        Capillary blood glucose measurement by g

lucometer (mass/volume) - 19 21:16

         

 

                          Capillary blood glucose measurement by glucometer (mas

s/volume)           194 

mg/dL                                           

 

                                        Capillary blood glucose measurement by g

lucometer (mass/volume) - 19 06:12

         

 

                          Capillary blood glucose measurement by glucometer (mas

s/volume)           48 

mg/dL                                           

 

                                        Complete blood count (CBC) with automate

d white blood cell (WBC) differential - 

19 08:40         

 

                          Blood leukocytes automated count (number/volume)      

     10.7 10*3/uL         

                                        4.3-11.0        

 

                          Blood erythrocytes automated count (number/volume)    

       3.97 10*6/uL       

                                        4.35-5.85        

 

                    Venous blood hemoglobin measurement (mass/volume)           

12.2 g/dL           

11.5-16.0        

 

                    Blood hematocrit (volume fraction)           37 %           

     35-52        

 

                    Automated erythrocyte mean corpuscular volume           93 [

foz_us]           

80-99        

 

                                        Automated erythrocyte mean corpuscular h

emoglobin (mass per erythrocyte)        

                          31 pg                     25-34        

 

                                        Automated erythrocyte mean corpuscular h

emoglobin concentration measurement 

(mass/volume)             33 g/dL                   32-36        

 

                    Automated erythrocyte distribution width ratio           14.

9 %              10.0-

14.5        

 

                    Automated blood platelet count (count/volume)           194 

10*3/uL           

130-400        

 

                          Automated blood platelet mean volume measurement      

     10.0 [foz_us]        

                                        7.4-10.4        

 

                    Automated blood neutrophils/100 leukocytes           79 %   

             42-75       

 

 

                    Automated blood lymphocytes/100 leukocytes           16 %   

             12-44       

 

 

                    Blood monocytes/100 leukocytes           5 %                

 0-12        

 

                    Automated blood eosinophils/100 leukocytes           0 %    

             0-10        

 

                    Automated blood basophils/100 leukocytes           0 %      

           0-10        

 

                    Blood neutrophils automated count (number/volume)           

8.4 10*3            

1.8-7.8        

 

                    Blood lymphocytes automated count (number/volume)           

1.7 10*3            

1.0-4.0        

 

                    Blood monocytes automated count (number/volume)           0.

5 10*3            

0.0-1.0        

 

                    Automated eosinophil count           0.0 10*3/uL           0

.0-0.3        

 

                    Automated blood basophil count (count/volume)           0.0 

10*3/uL           

0.0-0.1        

 

                                        Whole blood basic metabolic panel - 08/1

3/19 08:40         

 

                          Serum or plasma sodium measurement (moles/volume)     

      142 mmol/L          

                                        135-145        

 

                          Serum or plasma potassium measurement (moles/volume)  

         3.5 mmol/L       

                                        3.6-5.0        

 

                          Serum or plasma chloride measurement (moles/volume)   

        112 mmol/L        

                                                

 

                    Carbon dioxide           21 mmol/L           21-32        

 

                          Serum or plasma anion gap determination (moles/volume)

           9 mmol/L       

                                        5-14        

 

                          Serum or plasma urea nitrogen measurement (mass/volume

)           21 mg/dL      

                                        7-18        

 

                          Serum or plasma creatinine measurement (mass/volume)  

         0.96 mg/dL       

                                        0.60-1.30        

 

                    Serum or plasma urea nitrogen/creatinine mass ratio         

  22                  NRG 

       

 

                                        Serum or plasma creatinine measurement w

ith calculation of estimated glomerular 

filtration rate           60                        NRG        

 

                    Serum or plasma glucose measurement (mass/volume)           

93 mg/dL            

        

 

                    Serum or plasma calcium measurement (mass/volume)           

8.5 mg/dL           

8.5-10.1        

 

                                        Beta-hydroxybutyric acid measurement - 0

19 08:40         

 

                    Beta-hydroxybutyric acid measurement           0.11 mmol/L  

         0.00-0.27  

      

 

                                        Serum or plasma troponin i.cardiac measu

rement (mass/volume) - 19 08:40   

      

 

                          Serum or plasma troponin i.cardiac measurement (mass/v

olume)           < ng/mL  

                                        <0.028        

 

                                        Capillary blood glucose measurement by g

lucometer (mass/volume) - 19 10:15

         

 

                          Capillary blood glucose measurement by glucometer (mas

s/volume)           129 

mg/dL                                           

 

                                        Capillary blood glucose measurement by g

lucometer (mass/volume) - 19 15:53

         

 

                          Capillary blood glucose measurement by glucometer (mas

s/volume)           292 

mg/dL                                           

 

                                        Capillary blood glucose measurement by g

lucometer (mass/volume) - 19 20:15

         

 

                          Capillary blood glucose measurement by glucometer (mas

s/volume)           152 

mg/dL                                           

 

                                        Capillary blood glucose measurement by g

lucometer (mass/volume) - 19 06:00

         

 

                          Capillary blood glucose measurement by glucometer (mas

s/volume)           306 

mg/dL                                           

 

                                        Capillary blood glucose measurement by g

lucometer (mass/volume) - 19 11:29

         

 

                          Capillary blood glucose measurement by glucometer (mas

s/volume)           184 

mg/dL                                           

 

                                        Whole blood basic metabolic panel -  11:55         

 

                          Serum or plasma sodium measurement (moles/volume)     

      136 mmol/L          

                                        135-145        

 

                          Serum or plasma potassium measurement (moles/volume)  

         3.7 mmol/L       

                                        3.6-5.0        

 

                          Serum or plasma chloride measurement (moles/volume)   

        105 mmol/L        

                                                

 

                    Carbon dioxide           24 mmol/L           21-32        

 

                          Serum or plasma anion gap determination (moles/volume)

           7 mmol/L       

                                        5-14        

 

                          Serum or plasma urea nitrogen measurement (mass/volume

)           16 mg/dL      

                                        7-18        

 

                          Serum or plasma creatinine measurement (mass/volume)  

         0.92 mg/dL       

                                        0.60-1.30        

 

                    Serum or plasma urea nitrogen/creatinine mass ratio         

  17                  NRG 

       

 

                                        Serum or plasma creatinine measurement w

ith calculation of estimated glomerular 

filtration rate           >                         NRG        

 

                    Serum or plasma glucose measurement (mass/volume)           

187 mg/dL           

        

 

                    Serum or plasma calcium measurement (mass/volume)           

7.8 mg/dL           

8.5-10.1        

 

                                        Beta-hydroxybutyric acid measurement - 0

19 11:55         

 

                    Beta-hydroxybutyric acid measurement           0.12 mmol/L  

         0.00-0.27  

      

 

                                        Complete blood count (CBC) with automate

d white blood cell (WBC) differential - 

20 18:40         

 

                          Blood leukocytes automated count (number/volume)      

     9.2 10*3/uL          

                                        4.3-11.0        

 

                          Blood erythrocytes automated count (number/volume)    

       4.17 10*6/uL       

                                        4.35-5.85        

 

                    Venous blood hemoglobin measurement (mass/volume)           

12.7 g/dL           

11.5-16.0        

 

                    Blood hematocrit (volume fraction)           39 %           

     35-52        

 

                    Automated erythrocyte mean corpuscular volume           93 [

foz_us]           

80-99        

 

                                        Automated erythrocyte mean corpuscular h

emoglobin (mass per erythrocyte)        

                          30 pg                     25-34        

 

                                        Automated erythrocyte mean corpuscular h

emoglobin concentration measurement 

(mass/volume)             33 g/dL                   32-36        

 

                    Automated erythrocyte distribution width ratio           13.

3 %              10.0-

14.5        

 

                    Automated blood platelet count (count/volume)           359 

10*3/uL           

130-400        

 

                          Automated blood platelet mean volume measurement      

     9.6 [foz_us]         

                                        7.4-10.4        

 

                    Automated blood neutrophils/100 leukocytes           69 %   

             42-75       

 

 

                    Automated blood lymphocytes/100 leukocytes           24 %   

             12-44       

 

 

                    Blood monocytes/100 leukocytes           7 %                

 0-12        

 

                    Automated blood eosinophils/100 leukocytes           0 %    

             0-10        

 

                    Automated blood basophils/100 leukocytes           0 %      

           0-10        

 

                    Blood neutrophils automated count (number/volume)           

6.4 10*3            

1.8-7.8        

 

                    Blood lymphocytes automated count (number/volume)           

2.2 10*3            

1.0-4.0        

 

                    Blood monocytes automated count (number/volume)           0.

6 10*3            

0.0-1.0        

 

                    Automated eosinophil count           0.0 10*3/uL           0

.0-0.3        

 

                    Automated blood basophil count (count/volume)           0.0 

10*3/uL           

0.0-0.1        

 

                                        Comprehensive metabolic panel - 20

 18:40         

 

                          Serum or plasma sodium measurement (moles/volume)     

      127 mmol/L          

                                        135-145        

 

                          Serum or plasma potassium measurement (moles/volume)  

         3.7 mmol/L       

                                        3.6-5.0        

 

                          Serum or plasma chloride measurement (moles/volume)   

        94 mmol/L         

                                                

 

                    Carbon dioxide           13 mmol/L           21-32        

 

                          Serum or plasma anion gap determination (moles/volume)

           20 mmol/L      

                                        5-14        

 

                          Serum or plasma urea nitrogen measurement (mass/volume

)           27 mg/dL      

                                        7-18        

 

                          Serum or plasma creatinine measurement (mass/volume)  

         1.68 mg/dL       

                                        0.60-1.30        

 

                    Serum or plasma urea nitrogen/creatinine mass ratio         

  16                  NRG 

       

 

                                        Serum or plasma creatinine measurement w

ith calculation of estimated glomerular 

filtration rate           32                        NRG        

 

                    Serum or plasma glucose measurement (mass/volume)           

690 mg/dL           

        

 

                    Serum or plasma calcium measurement (mass/volume)           

8.8 mg/dL           

8.5-10.1        

 

                          Serum or plasma total bilirubin measurement (mass/volu

me)           0.5 mg/dL   

                                        0.1-1.0        

 

                                        Serum or plasma alkaline phosphatase kim

surement (enzymatic activity/volume)    

                          160 U/L                           

 

                                        Serum or plasma aspartate aminotransfera

se measurement (enzymatic 

activity/volume)           80 U/L                    5-34        

 

                                        Serum or plasma alanine aminotransferase

 measurement (enzymatic activity/volume)

                          131 U/L                   0-55        

 

                    Serum or plasma protein measurement (mass/volume)           

6.4 g/dL            

6.4-8.2        

 

                    Serum or plasma albumin measurement (mass/volume)           

2.6 g/dL            

3.2-4.5        

 

                    CALCIUM CORRECTED           9.9 mg/dL           8.5-10.1    

    

 

                                        Serum or plasma lithium measurement (mol

es/volume) - 20 18:40         

 

                    BNP PT              244.9 pg/mL           <100.0        

 

                                        THYROID STIMULATING HORMONE - 20 1

8:40         

 

                    THYROID STIMULATING HORMONE           1.02 u[iU]/mL         

  0.35-4.94        

 

                                        Serum or plasma thyroxine (T4) free cristian

urement (mass/volume) - 20 18:40  

       

 

                          Serum or plasma thyroxine (T4) free measurement (mass/

volume)           0.84 

ng/dL                                   0.70-1.48        

 

                                        Beta-hydroxybutyric acid measurement - 0

20 18:40         

 

                    Beta-hydroxybutyric acid measurement           5.37 mmol/L  

         0.00-0.27  

      

 

                                        Arterial blood gas measurement - 

0 18:45         

 

                    Blood pCO2           26 mm[Hg]           35-45        

 

                    Blood pO2           98 mm[Hg]           79-93        

 

                          Arterial blood bicarbonate measurement (moles/volume) 

          14 mmol/L       

                                        23-27        

 

                    Arterial blood base excess by calculation           -10.7 mm

ol/L           

-2.5-2.5        

 

                    Arterial blood oxygen saturation measurement           97 % 

                   

    

 

                    * Inhaled oxygen flow rate           21% FI02            NRG

        

 

                          Arterial blood pH measurement with patient temperature

 correction           7.34

                                        7.37-7.43        

 

                          Arterial blood carbon dioxide, total measurement (mole

s/volume)           14.7 

mmol/L                                  21.0-31.0        

 

                    Body site           LEFT RADIAL            NRG        

 

                          Assessment of wrist artery patency prior to arterial p

uncture           POSITIVE

                                        NRG        

 

                    Setting of ventilation mode           NO                  NR

G        

 

                    Measurement of body temperature           36.8              

  NRG        

 

                                        Urine drug screening test - 20 20:

05         

 

                    Urine phencyclidine detection by screening method           

NEGATIVE            

NEGATIVE        

 

                          Urine benzodiazepines detection by screening method   

        NEGATIVE          

                                        NEGATIVE        

 

                    Urine cocaine detection           NEGATIVE            NEGATI

VE        

 

                    Urine amphetamines detection by screening method           P

OSITIVE            

NEGATIVE        

 

                          Urine methamphetamine detection by screening method   

        POSITIVE          

                                        NEGATIVE        

 

                    Urine cannabinoids detection by screening method           N

EGATIVE            

NEGATIVE        

 

                    Urine opiates detection by screening method           NEGATI

VE            

NEGATIVE        

 

                    Urine barbiturates detection           NEGATIVE            N

EGATIVE        

 

                          Screening urine tricyclic antidepressants detection   

        NEGATIVE          

                                        NEGATIVE        

 

                    Urine methadone detection by screening method           NEGA

TIVE            

NEGATIVE        

 

                    Urine oxycodone detection           NEGATIVE            NEGA

TIVE        

 

                    Urine propoxyphene detection           NEGATIVE            N

EGATIVE        

 

                                        Complete urinalysis with reflex to cultu

re - 20 20:05         

 

                    Urine color determination           YELLOW              NRG 

       

 

                    Urine clarity determination           CLEAR               NR

G        

 

                    Urine pH measurement by test strip           5.5            

     5-9        

 

                    Specific gravity of urine by test strip           1.020     

          1.016-1.022  

      

 

                    Urine protein assay by test strip, semi-quantitative        

   2+                  

NEGATIVE        

 

                    Urine glucose detection by automated test strip           2+

                  NEGATIVE

        

 

                          Erythrocytes detection in urine sediment by light micr

oscopy           1+       

                                        NEGATIVE        

 

                    Urine ketones detection by automated test strip           1+

                  NEGATIVE

        

 

                    Urine nitrite detection by test strip           NEGATIVE    

        NEGATIVE    

    

 

                    Urine total bilirubin detection by test strip           NEGA

TIVE            

NEGATIVE        

 

                          Urine urobilinogen measurement by automated test strip

 (mass/volume)           

0.2 mg/dL                               < = 1.0        

 

                    Urine leukocyte esterase detection by dipstick           NEG

ATIVE            

NEGATIVE        

 

                                        Automated urine sediment erythrocyte cou

nt by microscopy (number/high power 

field)                     [HPF]                    NRG        

 

                                        Automated urine sediment leukocyte count

 by microscopy (number/high power field)

                           [HPF]                    NRG        

 

                          Bacteria detection in urine sediment by light microsco

py           FEW          

                                        NRG        

 

                                        Squamous epithelial cells detection in u

rine sediment by light microscopy       

                          10-25                     NRG        

 

                          Crystals detection in urine sediment by light microsco

py           PRESENT      

                                        NRG        

 

                    Casts detection in urine sediment by light microscopy       

    NONE                

NRG        

 

                          Mucus detection in urine sediment by light microscopy 

          NEGATIVE        

                                        NRG        

 

                    Complete urinalysis with reflex to culture           YES    

             NRG        

 

                          Amorphous sediment detection in urine sediment by ligh

t microscopy           FEW

 NATALIE URATES                            NRG        

 

                                        Bacterial urine culture - 20 20:05

         

 

                    Bacterial urine culture           3 OR MORE            NRG  

      

 

                    COLONY COUNT           50,000 CFU/ML            NRG        

 

                    FTX;REPORTABLE           GRAM POSITIVE ISOLATES            N

RG        

 

                                        Capillary blood glucose measurement by g

lucometer (mass/volume) - 02/10/20 02:13

         

 

                          Capillary blood glucose measurement by glucometer (mas

s/volume)           > 

mg/dL                                           

 

                                        Complete blood count (CBC) with automate

d white blood cell (WBC) differential - 

02/10/20 02:19         

 

                          Blood leukocytes automated count (number/volume)      

     32.5 10*3/uL         

                                        4.3-11.0        

 

                          Blood erythrocytes automated count (number/volume)    

       3.73 10*6/uL       

                                        4.35-5.85        

 

                    Venous blood hemoglobin measurement (mass/volume)           

12.1 g/dL           

11.5-16.0        

 

                    Blood hematocrit (volume fraction)           37 %           

     35-52        

 

                    Automated erythrocyte mean corpuscular volume           100 

[foz_us]           

80-99        

 

                                        Automated erythrocyte mean corpuscular h

emoglobin (mass per erythrocyte)        

                          32 pg                     25-34        

 

                                        Automated erythrocyte mean corpuscular h

emoglobin concentration measurement 

(mass/volume)             33 g/dL                   32-36        

 

                    Automated erythrocyte distribution width ratio           14.

1 %              10.0-

14.5        

 

                    Automated blood platelet count (count/volume)           326 

10*3/uL           

130-400        

 

                          Automated blood platelet mean volume measurement      

     10.2 [foz_us]        

                                        7.4-10.4        

 

                    Automated blood neutrophils/100 leukocytes           80 %   

             42-75       

 

 

                    Automated blood lymphocytes/100 leukocytes           15 %   

             12-44       

 

 

                    Blood monocytes/100 leukocytes           5 %                

 0-12        

 

                    Automated blood eosinophils/100 leukocytes           0 %    

             0-10        

 

                    Automated blood basophils/100 leukocytes           0 %      

           0-10        

 

                    Blood neutrophils automated count (number/volume)           

25.9 10*3           

1.8-7.8        

 

                    Blood lymphocytes automated count (number/volume)           

4.8 10*3            

1.0-4.0        

 

                    Blood monocytes automated count (number/volume)           1.

7 10*3            

0.0-1.0        

 

                    Automated eosinophil count           0.0 10*3/uL           0

.0-0.3        

 

                    Automated blood basophil count (count/volume)           0.1 

10*3/uL           

0.0-0.1        

 

                                        PT panel in platelet poor plasma by coag

ulation assay - 02/10/20 02:19         

 

                          Prothrombin time (PT) in platelet poor plasma by coagu

lation assay           

14.0 s                                  12.2-14.7        

 

                          INR in platelet poor plasma or blood by coagulation as

say           1.0         

                                        0.8-1.4        

 

                                        Activated partial thromboplastin time (a

PTT) in platelet poor plasma 

bycoagulation assay - 02/10/20 02:19         

 

                                        Activated partial thromboplastin time (a

PTT) in platelet poor plasma 

bycoagulation assay           27 s                      24-35        

 

                                        Manual absolute plasma cell count -  02:19         

 

                    Blood monocytes/100 leukocytes           5 %                

 NRG        

 

                    Manual blood segmented neutrophils/100 leukocytes           

72 %                NRG  

      

 

                    Blood band neutrophils/100 leukocytes           10 %        

        NRG        

 

                    Manual blood lymphocytes/100 leukocytes           11 %      

          NRG        

 

                    Blood erythrocyte morphology finding identification         

  NORMAL              

NRG        

 

                    Manual blood metamyelocytes/100 leukocytes           2 %    

             NRG        

 

                                        Influenza virus A and B antigen detectio

n - 02/10/20 02:19         

 

                    FLU RESULT           NEGATIVE FOR INFLUENZA A AND B ANTIGENS

 BY IA            

NRG        

 

                                        Blood lactic acid measurement (moles/vol

ume) - 02/10/20 02:19         

 

                    Blood lactic acid measurement (moles/volume)           6.69 

mmol/L           

0.50-2.00        

 

                                        Comprehensive metabolic panel - 02/10/20

 02:19         

 

                          Serum or plasma sodium measurement (moles/volume)     

      123 mmol/L          

                                        135-145        

 

                          Serum or plasma potassium measurement (moles/volume)  

         6.1 mmol/L       

                                        3.6-5.0        

 

                          Serum or plasma chloride measurement (moles/volume)   

        82 mmol/L         

                                                

 

                    Carbon dioxide           < mmol/L            21-32        

 

                          Serum or plasma anion gap determination (moles/volume)

           36 mmol/L      

                                        5-14        

 

                          Serum or plasma urea nitrogen measurement (mass/volume

)           58 mg/dL      

                                        7-18        

 

                          Serum or plasma creatinine measurement (mass/volume)  

         3.05 mg/dL       

                                        0.60-1.30        

 

                    Serum or plasma urea nitrogen/creatinine mass ratio         

  19                  NRG 

       

 

                                        Serum or plasma creatinine measurement w

ith calculation of estimated glomerular 

filtration rate           16                        NRG        

 

                          Serum or plasma glucose measurement (mass/volume)     

      1040 mg/dL          

                                                

 

                    Serum or plasma calcium measurement (mass/volume)           

9.6 mg/dL           

8.5-10.1        

 

                          Serum or plasma total bilirubin measurement (mass/volu

me)           0.2 mg/dL   

                                        0.1-1.0        

 

                                        Serum or plasma alkaline phosphatase kim

surement (enzymatic activity/volume)    

                          130 U/L                           

 

                                        Serum or plasma aspartate aminotransfera

se measurement (enzymatic 

activity/volume)           23 U/L                    5-34        

 

                                        Serum or plasma alanine aminotransferase

 measurement (enzymatic activity/volume)

                          39 U/L                    0-55        

 

                    Serum or plasma protein measurement (mass/volume)           

6.4 g/dL            

6.4-8.2        

 

                    Serum or plasma albumin measurement (mass/volume)           

2.8 g/dL            

3.2-4.5        

 

                    CALCIUM CORRECTED           10.6 mg/dL           8.5-10.1   

     

 

                                        Serum or plasma creatine kinase measurem

ent (enzymatic activity/volume) - 

02/10/20 02:19         

 

                                        Serum or plasma creatine kinase measurem

ent (enzymatic activity/volume)         

                          118 U/L                           

 

                                        Serum or plasma troponin i.cardiac measu

rement (mass/volume) - 02/10/20 02:19   

      

 

                          Serum or plasma troponin i.cardiac measurement (mass/v

olume)           < ng/mL  

                                        <0.028        

 

                                        Serum or plasma ethanol measurement (mas

s/volume) - 02/10/20 02:19         

 

                    Serum or plasma ethanol measurement (mass/volume)           

< mg/dL             

<10        

 

                                        Bacterial blood culture - 02/10/20 02:19

         

 

                    Bacterial blood culture           NG                  NRG   

     

 

                                        Complete urinalysis with reflex to cultu

re - 02/10/20 02:31         

 

                    Urine color determination           YELLOW              NRG 

       

 

                    Urine clarity determination           SL CLOUDY            N

RG        

 

                    Urine pH measurement by test strip           5.0            

     5-9        

 

                    Specific gravity of urine by test strip           >=        

          1.016-1.022     

   

 

                    Urine protein assay by test strip, semi-quantitative        

   3+                  

NEGATIVE        

 

                    Urine glucose detection by automated test strip           3+

                  NEGATIVE

        

 

                          Erythrocytes detection in urine sediment by light micr

oscopy           1+       

                                        NEGATIVE        

 

                    Urine ketones detection by automated test strip           2+

                  NEGATIVE

        

 

                    Urine nitrite detection by test strip           NEGATIVE    

        NEGATIVE    

    

 

                    Urine total bilirubin detection by test strip           NEGA

TIVE            

NEGATIVE        

 

                          Urine urobilinogen measurement by automated test strip

 (mass/volume)           

0.2 mg/dL                               < = 1.0        

 

                    Urine leukocyte esterase detection by dipstick           NEG

ATIVE            

NEGATIVE        

 

                                        Automated urine sediment erythrocyte cou

nt by microscopy (number/high power 

field)                     [HPF]                    NRG        

 

                                        Automated urine sediment leukocyte count

 by microscopy (number/high power field)

                           [HPF]                    NRG        

 

                          Bacteria detection in urine sediment by light microsco

py           TRACE        

                                        NRG        

 

                                        Squamous epithelial cells detection in u

rine sediment by light microscopy       

                          0-2                       NRG        

 

                          Crystals detection in urine sediment by light microsco

py           PRESENT      

                                        NRG        

 

                    Casts detection in urine sediment by light microscopy       

    NONE                

NRG        

 

                          Mucus detection in urine sediment by light microscopy 

          NEGATIVE        

                                        NRG        

 

                    Complete urinalysis with reflex to culture           YES    

             NRG        

 

                          Amorphous sediment detection in urine sediment by ligh

t microscopy           MOD

 NATALIE URATES                            NRG        

 

                                        Urine drug screening test - 02/10/20 02:

31         

 

                    Urine phencyclidine detection by screening method           

NEGATIVE            

NEGATIVE        

 

                          Urine benzodiazepines detection by screening method   

        NEGATIVE          

                                        NEGATIVE        

 

                    Urine cocaine detection           NEGATIVE            NEGATI

VE        

 

                    Urine amphetamines detection by screening method           P

OSITIVE            

NEGATIVE        

 

                          Urine methamphetamine detection by screening method   

        POSITIVE          

                                        NEGATIVE        

 

                    Urine cannabinoids detection by screening method           N

EGATIVE            

NEGATIVE        

 

                    Urine opiates detection by screening method           NEGATI

VE            

NEGATIVE        

 

                    Urine barbiturates detection           NEGATIVE            N

EGATIVE        

 

                          Screening urine tricyclic antidepressants detection   

        NEGATIVE          

                                        NEGATIVE        

 

                    Urine methadone detection by screening method           NEGA

TIVE            

NEGATIVE        

 

                    Urine oxycodone detection           NEGATIVE            NEGA

TIVE        

 

                    Urine propoxyphene detection           NEGATIVE            N

EGATIVE        

 

                                        Bacterial urine culture - 02/10/20 02:31

         

 

                    Bacterial urine culture           NG                  NRG   

     

 

                                        Bacterial blood culture - 02/10/20 02:40

         

 

                    Bacterial blood culture           NG                  NRG   

     

 

                                        Arterial blood gas measurement - 02/10/2

0 02:53         

 

                    Blood pCO2           11 mm[Hg]           35-45        

 

                    Blood pO2           130 mm[Hg]           79-93        

 

                          Arterial blood bicarbonate measurement (moles/volume) 

          3 mmol/L        

                                        23-27        

 

                    Arterial blood base excess by calculation           -26.3 mm

ol/L           

-2.5-2.5        

 

                    Arterial blood oxygen saturation measurement           98 % 

                   

    

 

                    * Inhaled oxygen flow rate           2L O2               NRG

        

 

                          Arterial blood pH measurement with patient temperature

 correction           7.05

                                        7.37-7.43        

 

                          Arterial blood carbon dioxide, total measurement (mole

s/volume)           3.3 

mmol/L                                  21.0-31.0        

 

                    Body site           LEFT RADIAL            NRG        

 

                          Assessment of wrist artery patency prior to arterial p

uncture           POSITIVE

                                        NRG        

 

                    Setting of ventilation mode           NO                  NR

G        

 

                    Measurement of body temperature           34.4              

  NRG        

 

                                        Capillary blood glucose measurement by g

lucometer (mass/volume) - 02/10/20 04:20

         

 

                          Capillary blood glucose measurement by glucometer (mas

s/volume)           > 

mg/dL                                           

 

                                        Serum or plasma lactate measurement (mol

es/volume) - 02/10/20 04:23         

 

                          Serum or plasma lactate measurement (moles/volume)    

       2.72 mmol/L        

                                        0.50-2.00        

 

                                        Capillary blood glucose measurement by g

lucometer (mass/volume) - 02/10/20 05:23

         

 

                          Capillary blood glucose measurement by glucometer (mas

s/volume)           > 

mg/dL                                           

 

                                        Complete blood count (CBC) with automate

d white blood cell (WBC) differential - 

02/10/20 06:00         

 

                          Blood leukocytes automated count (number/volume)      

     37.4 10*3/uL         

                                        4.3-11.0        

 

                          Blood erythrocytes automated count (number/volume)    

       3.16 10*6/uL       

                                        4.35-5.85        

 

                    Venous blood hemoglobin measurement (mass/volume)           

10.0 g/dL           

11.5-16.0        

 

                    Blood hematocrit (volume fraction)           31 %           

     35-52        

 

                    Automated erythrocyte mean corpuscular volume           98 [

foz_us]           

80-99        

 

                                        Automated erythrocyte mean corpuscular h

emoglobin (mass per erythrocyte)        

                          32 pg                     25-34        

 

                                        Automated erythrocyte mean corpuscular h

emoglobin concentration measurement 

(mass/volume)             33 g/dL                   32-36        

 

                    Automated erythrocyte distribution width ratio           14.

0 %              10.0-

14.5        

 

                    Automated blood platelet count (count/volume)           293 

10*3/uL           

130-400        

 

                          Automated blood platelet mean volume measurement      

     10.0 [foz_us]        

                                        7.4-10.4        

 

                    Automated blood neutrophils/100 leukocytes           76 %   

             42-75       

 

 

                    Automated blood lymphocytes/100 leukocytes           18 %   

             12-44       

 

 

                    Blood monocytes/100 leukocytes           5 %                

 0-12        

 

                    Automated blood eosinophils/100 leukocytes           0 %    

             0-10        

 

                    Automated blood basophils/100 leukocytes           0 %      

           0-10        

 

                    Blood neutrophils automated count (number/volume)           

28.5 10*3           

1.8-7.8        

 

                    Blood lymphocytes automated count (number/volume)           

6.9 10*3            

1.0-4.0        

 

                    Blood monocytes automated count (number/volume)           1.

9 10*3            

0.0-1.0        

 

                    Automated eosinophil count           0.1 10*3/uL           0

.0-0.3        

 

                    Automated blood basophil count (count/volume)           0.1 

10*3/uL           

0.0-0.1        

 

                                        Comprehensive metabolic panel - 02/10/20

 06:00         

 

                          Serum or plasma sodium measurement (moles/volume)     

      128 mmol/L          

                                        135-145        

 

                          Serum or plasma potassium measurement (moles/volume)  

         5.0 mmol/L       

                                        3.6-5.0        

 

                          Serum or plasma chloride measurement (moles/volume)   

        96 mmol/L         

                                                

 

                    Carbon dioxide           < mmol/L            21-32        

 

                          Serum or plasma anion gap determination (moles/volume)

           27 mmol/L      

                                        5-14        

 

                          Serum or plasma urea nitrogen measurement (mass/volume

)           56 mg/dL      

                                        7-18        

 

                          Serum or plasma creatinine measurement (mass/volume)  

         2.64 mg/dL       

                                        0.60-1.30        

 

                    Serum or plasma urea nitrogen/creatinine mass ratio         

  21                  NRG 

       

 

                                        Serum or plasma creatinine measurement w

ith calculation of estimated glomerular 

filtration rate           19                        NRG        

 

                    Serum or plasma glucose measurement (mass/volume)           

787 mg/dL           

        

 

                    Serum or plasma calcium measurement (mass/volume)           

7.6 mg/dL           

8.5-10.1        

 

                          Serum or plasma total bilirubin measurement (mass/volu

me)           0.2 mg/dL   

                                        0.1-1.0        

 

                                        Serum or plasma alkaline phosphatase kim

surement (enzymatic activity/volume)    

                          105 U/L                           

 

                                        Serum or plasma aspartate aminotransfera

se measurement (enzymatic 

activity/volume)           21 U/L                    5-34        

 

                                        Serum or plasma alanine aminotransferase

 measurement (enzymatic activity/volume)

                          32 U/L                    0-55        

 

                    Serum or plasma protein measurement (mass/volume)           

5.0 g/dL            

6.4-8.2        

 

                    Serum or plasma albumin measurement (mass/volume)           

2.2 g/dL            

3.2-4.5        

 

                    CALCIUM CORRECTED           9.0 mg/dL           8.5-10.1    

    

 

                                        Serum or plasma phosphate measurement (m

ass/volume) - 02/10/20 06:00         

 

                          Serum or plasma phosphate measurement (mass/volume)   

        9.1 mg/dL         

                                        2.3-4.7        

 

                                        Magnesium - 02/10/20 06:00         

 

                    Magnesium           2.3 mg/dL           1.6-2.4        

 

                                        Serum or plasma triglyceride measurement

 (mass/volume) - 02/10/20 06:00         

 

                          Serum or plasma triglyceride measurement (mass/volume)

           927 mg/dL      

                                        <150        

 

                                        Beta-hydroxybutyric acid measurement - 0

2/10/20 06:00         

 

                    Beta-hydroxybutyric acid measurement           12.29 mmol/L 

          0.00-0.27 

       

 

                                        Hemoglobin A1c measurement - 02/10/20 06

:00         

 

                    Blood hemoglobin A1C measurement (mass/volume)           14.

8 %              4.0-

5.6        

 

                    MEAN BLOOD GLUCOSE           378 %               <=126      

  

 

                                        Methicillin resistant Staphylococcus aur

eus (MRSA) screening culture - 02/10/20 

06:00         

 

                          Methicillin resistant Staphylococcus aureus (MRSA) scr

eening culture           

NEG                                     NRG        

 

                                        Complete urinalysis with reflex to cultu

re - 02/10/20 06:19         

 

                    Urine color determination           YELLOW              NRG 

       

 

                    Urine clarity determination           CLEAR               NR

G        

 

                    Urine pH measurement by test strip           5.0            

     5-9        

 

                    Specific gravity of urine by test strip           >=        

          1.016-1.022     

   

 

                    Urine protein assay by test strip, semi-quantitative        

   2+                  

NEGATIVE        

 

                    Urine glucose detection by automated test strip           3+

                  NEGATIVE

        

 

                          Erythrocytes detection in urine sediment by light micr

oscopy           1+       

                                        NEGATIVE        

 

                    Urine ketones detection by automated test strip           2+

                  NEGATIVE

        

 

                    Urine nitrite detection by test strip           NEGATIVE    

        NEGATIVE    

    

 

                    Urine total bilirubin detection by test strip           NEGA

TIVE            

NEGATIVE        

 

                          Urine urobilinogen measurement by automated test strip

 (mass/volume)           

0.2 mg/dL                               < = 1.0        

 

                    Urine leukocyte esterase detection by dipstick           NEG

ATIVE            

NEGATIVE        

 

                                        Automated urine sediment erythrocyte cou

nt by microscopy (number/high power 

field)                     [HPF]                    NRG        

 

                                        Automated urine sediment leukocyte count

 by microscopy (number/high power field)

                           [HPF]                    NRG        

 

                          Bacteria detection in urine sediment by light microsco

py           TRACE        

                                        NRG        

 

                                        Squamous epithelial cells detection in u

rine sediment by light microscopy       

                          2-5                       NRG        

 

                          Crystals detection in urine sediment by light microsco

py           PRESENT      

                                        NRG        

 

                    Casts detection in urine sediment by light microscopy       

    NONE                

NRG        

 

                          Mucus detection in urine sediment by light microscopy 

          NEGATIVE        

                                        NRG        

 

                    Complete urinalysis with reflex to culture           NO     

             NRG        

 

                          Amorphous sediment detection in urine sediment by ligh

t microscopy           MOD

 NATALIE URATES                            NRG        

 

                                        Capillary blood glucose measurement by g

lucometer (mass/volume) - 02/10/20 06:30

         

 

                          Capillary blood glucose measurement by glucometer (mas

s/volume)           > 

mg/dL                                           

 

                                        Arterial blood gas measurement - 02/10/2

0 07:00         

 

                    Blood pCO2           22 mm[Hg]           35-45        

 

                    Blood pO2           95 mm[Hg]           79-93        

 

                          Arterial blood bicarbonate measurement (moles/volume) 

          7 mmol/L        

                                        23-27        

 

                    Arterial blood base excess by calculation           -19.9 mm

ol/L           

-2.5-2.5        

 

                    Arterial blood oxygen saturation measurement           97 % 

                   

    

 

                    * Inhaled oxygen flow rate           30% O2              NRG

        

 

                          Arterial blood pH measurement with patient temperature

 correction           7.16

                                        7.37-7.43        

 

                          Arterial blood carbon dioxide, total measurement (mole

s/volume)           8.0 

mmol/L                                  21.0-31.0        

 

                    Body site           L RADIAL            NRG        

 

                          Assessment of wrist artery patency prior to arterial p

uncture           POSITIVE

                                        NRG        

 

                    Setting of ventilation mode           YES                 NR

G        

 

                    Measurement of body temperature           36.5              

  NRG        

 

                                        Sputum Gram stain - 02/10/20 07:06      

   

 

                    Sputum Gram stain           Mixed Bacterial Terrie           

 NRG        

 

                                        Bacterial sputum culture - 02/10/20 07:0

6         

 

                    FREE TEXT EXTERNAL           SUSCEPTIBILITY REPORTED 20            

NRG        

 

                    QUANTITY OF GROWTH           .                   NRG        

 

                    FREE TEXT ENTRY 2           SEE COMMENT            NRG      

  

 

                    Bacterial sputum culture           USUAL RESP            NRG

        

 

                                        Complete blood count (CBC) with automate

d white blood cell (WBC) differential - 

02/10/20 07:25         

 

                          Blood leukocytes automated count (number/volume)      

     35.4 10*3/uL         

                                        4.3-11.0        

 

                          Blood erythrocytes automated count (number/volume)    

       3.03 10*6/uL       

                                        4.35-5.85        

 

                    Venous blood hemoglobin measurement (mass/volume)           

9.9 g/dL            

11.5-16.0        

 

                    Blood hematocrit (volume fraction)           29 %           

     35-52        

 

                    Automated erythrocyte mean corpuscular volume           96 [

foz_us]           

80-99        

 

                                        Automated erythrocyte mean corpuscular h

emoglobin (mass per erythrocyte)        

                          33 pg                     25-34        

 

                                        Automated erythrocyte mean corpuscular h

emoglobin concentration measurement 

(mass/volume)             34 g/dL                   32-36        

 

                    Automated erythrocyte distribution width ratio           14.

0 %              10.0-

14.5        

 

                    Automated blood platelet count (count/volume)           262 

10*3/uL           

130-400        

 

                          Automated blood platelet mean volume measurement      

     9.8 [foz_us]         

                                        7.4-10.4        

 

                    Automated blood neutrophils/100 leukocytes           78 %   

             42-75       

 

 

                    Automated blood lymphocytes/100 leukocytes           17 %   

             12-44       

 

 

                    Blood monocytes/100 leukocytes           5 %                

 0-12        

 

                    Automated blood eosinophils/100 leukocytes           0 %    

             0-10        

 

                    Automated blood basophils/100 leukocytes           0 %      

           0-10        

 

                    Blood neutrophils automated count (number/volume)           

27.5 10*3           

1.8-7.8        

 

                    Blood lymphocytes automated count (number/volume)           

6.2 10*3            

1.0-4.0        

 

                    Blood monocytes automated count (number/volume)           1.

7 10*3            

0.0-1.0        

 

                    Automated eosinophil count           0.1 10*3/uL           0

.0-0.3        

 

                    Automated blood basophil count (count/volume)           0.0 

10*3/uL           

0.0-0.1        

 

                                        Blood lactic acid measurement (moles/vol

ume) - 02/10/20 07:25         

 

                    Blood lactic acid measurement (moles/volume)           2.33 

mmol/L           

0.50-2.00        

 

                                        Comprehensive metabolic panel - 02/10/20

 07:25         

 

                          Serum or plasma sodium measurement (moles/volume)     

      128 mmol/L          

                                        135-145        

 

                          Serum or plasma potassium measurement (moles/volume)  

         4.3 mmol/L       

                                        3.6-5.0        

 

                          Serum or plasma chloride measurement (moles/volume)   

        96 mmol/L         

                                                

 

                          Serum or plasma urea nitrogen measurement (mass/volume

)           56 mg/dL      

                                        7-18        

 

                    Serum or plasma urea nitrogen/creatinine mass ratio         

  22                  NRG 

       

 

                                        Serum or plasma creatinine measurement w

ith calculation of estimated glomerular 

filtration rate           19                        NRG        

 

                    Serum or plasma calcium measurement (mass/volume)           

7.9 mg/dL           

8.5-10.1        

 

                          Serum or plasma total bilirubin measurement (mass/volu

me)           0.2 mg/dL   

                                        0.1-1.0        

 

                                        Serum or plasma alkaline phosphatase kim

surement (enzymatic activity/volume)    

                          95 U/L                            

 

                                        Serum or plasma aspartate aminotransfera

se measurement (enzymatic 

activity/volume)           23 U/L                    5-34        

 

                                        Serum or plasma alanine aminotransferase

 measurement (enzymatic activity/volume)

                          31 U/L                    0-55        

 

                    Serum or plasma protein measurement (mass/volume)           

4.9 g/dL            

6.4-8.2        

 

                    Serum or plasma albumin measurement (mass/volume)           

2.1 g/dL            

3.2-4.5        

 

                    CALCIUM CORRECTED           9.4 mg/dL           8.5-10.1    

    

 

                                        Capillary blood glucose measurement by g

lucometer (mass/volume) - 02/10/20 09:59

         

 

                          Capillary blood glucose measurement by glucometer (mas

s/volume)           569 

mg/dL                                           

 

                                        Serum or plasma phosphate measurement (m

ass/volume) - 02/10/20 11:03         

 

                          Serum or plasma phosphate measurement (mass/volume)   

        4.3 mg/dL         

                                        2.3-4.7        

 

                                        Magnesium - 02/10/20 11:03         

 

                    Magnesium           2.0 mg/dL           1.6-2.4        

 

                                        Comprehensive metabolic panel - 02/10/20

 11:03         

 

                          Serum or plasma sodium measurement (moles/volume)     

      132 mmol/L          

                                        135-145        

 

                          Serum or plasma potassium measurement (moles/volume)  

         4.3 mmol/L       

                                        3.6-5.0        

 

                          Serum or plasma chloride measurement (moles/volume)   

        98 mmol/L         

                                                

 

                    Carbon dioxide           15 mmol/L           21-32        

 

                          Serum or plasma anion gap determination (moles/volume)

           19 mmol/L      

                                        5-14        

 

                          Serum or plasma urea nitrogen measurement (mass/volume

)           54 mg/dL      

                                        7-18        

 

                          Serum or plasma creatinine measurement (mass/volume)  

         2.54 mg/dL       

                                        0.60-1.30        

 

                    Serum or plasma urea nitrogen/creatinine mass ratio         

  21                  NRG 

       

 

                                        Serum or plasma creatinine measurement w

ith calculation of estimated glomerular 

filtration rate           20                        NRG        

 

                    Serum or plasma glucose measurement (mass/volume)           

524 mg/dL           

        

 

                    Serum or plasma calcium measurement (mass/volume)           

7.5 mg/dL           

8.5-10.1        

 

                          Serum or plasma total bilirubin measurement (mass/volu

me)           0.2 mg/dL   

                                        0.1-1.0        

 

                                        Serum or plasma alkaline phosphatase kim

surement (enzymatic activity/volume)    

                          89 U/L                            

 

                                        Serum or plasma aspartate aminotransfera

se measurement (enzymatic 

activity/volume)           25 U/L                    5-34        

 

                                        Serum or plasma alanine aminotransferase

 measurement (enzymatic activity/volume)

                          29 U/L                    0-55        

 

                    Serum or plasma protein measurement (mass/volume)           

4.7 g/dL            

6.4-8.2        

 

                    Serum or plasma albumin measurement (mass/volume)           

2.0 g/dL            

3.2-4.5        

 

                    CALCIUM CORRECTED           9.1 mg/dL           8.5-10.1    

    

 

                                        Serum or plasma lactate measurement (mol

es/volume) - 02/10/20 11:03         

 

                          Serum or plasma lactate measurement (moles/volume)    

       1.78 mmol/L        

                                        0.50-2.00        

 

                                        Arterial blood gas measurement - 02/10/2

0 11:14         

 

                    Blood pCO2           30 mm[Hg]           35-45        

 

                    Blood pO2           79 mm[Hg]           79-93        

 

                          Arterial blood bicarbonate measurement (moles/volume) 

          17 mmol/L       

                                        23-27        

 

                    Arterial blood base excess by calculation           -7.2 mmo

l/L           

-2.5-2.5        

 

                    Arterial blood oxygen saturation measurement           95 % 

                   

    

 

                    * Inhaled oxygen flow rate           21%                 NRG

        

 

                          Arterial blood pH measurement with patient temperature

 correction           7.38

                                        7.37-7.43        

 

                          Arterial blood carbon dioxide, total measurement (mole

s/volume)           17.8 

mmol/L                                  21.0-31.0        

 

                    Body site           LR                  NRG        

 

                          Assessment of wrist artery patency prior to arterial p

uncture           YES-POS 

                                        NRG        

 

                    Setting of ventilation mode           YES                 NR

G        

 

                    Measurement of body temperature           37.3              

  NRG        

 

                                        Capillary blood glucose measurement by g

lucometer (mass/volume) - 02/10/20 11:36

         

 

                          Capillary blood glucose measurement by glucometer (mas

s/volume)           452 

mg/dL                                           

 

                                        Capillary blood glucose measurement by g

lucometer (mass/volume) - 02/10/20 12:33

         

 

                          Capillary blood glucose measurement by glucometer (mas

s/volume)           464 

mg/dL                                           

 

                                        Comprehensive metabolic panel - 02/10/20

 13:30         

 

                          Serum or plasma sodium measurement (moles/volume)     

      133 mmol/L          

                                        135-145        

 

                          Serum or plasma potassium measurement (moles/volume)  

         4.8 mmol/L       

                                        3.6-5.0        

 

                          Serum or plasma chloride measurement (moles/volume)   

        101 mmol/L        

                                                

 

                    Carbon dioxide           17 mmol/L           21-32        

 

                          Serum or plasma anion gap determination (moles/volume)

           15 mmol/L      

                                        5-14        

 

                          Serum or plasma urea nitrogen measurement (mass/volume

)           54 mg/dL      

                                        7-18        

 

                          Serum or plasma creatinine measurement (mass/volume)  

         2.57 mg/dL       

                                        0.60-1.30        

 

                    Serum or plasma urea nitrogen/creatinine mass ratio         

  21                  NRG 

       

 

                                        Serum or plasma creatinine measurement w

ith calculation of estimated glomerular 

filtration rate           19                        NRG        

 

                    Serum or plasma glucose measurement (mass/volume)           

421 mg/dL           

        

 

                    Serum or plasma calcium measurement (mass/volume)           

7.6 mg/dL           

8.5-10.1        

 

                          Serum or plasma total bilirubin measurement (mass/volu

me)           0.3 mg/dL   

                                        0.1-1.0        

 

                                        Serum or plasma alkaline phosphatase kim

surement (enzymatic activity/volume)    

                          86 U/L                            

 

                                        Serum or plasma aspartate aminotransfera

se measurement (enzymatic 

activity/volume)           26 U/L                    5-34        

 

                                        Serum or plasma alanine aminotransferase

 measurement (enzymatic activity/volume)

                          28 U/L                    0-55        

 

                    Serum or plasma protein measurement (mass/volume)           

4.6 g/dL            

6.4-8.2        

 

                    Serum or plasma albumin measurement (mass/volume)           

2.0 g/dL            

3.2-4.5        

 

                    CALCIUM CORRECTED           9.2 mg/dL           8.5-10.1    

    

 

                                        Serum or plasma phosphate measurement (m

ass/volume) - 02/10/20 13:30         

 

                          Serum or plasma phosphate measurement (mass/volume)   

        4.0 mg/dL         

                                        2.3-4.7        

 

                                        Magnesium - 02/10/20 13:30         

 

                    Magnesium           1.9 mg/dL           1.6-2.4        

 

                                        Whole blood hemoglobin and hematocrit pa

jordana - 02/10/20 13:30         

 

                    Venous blood hemoglobin measurement (mass/volume)           

9.6 g/dL            

11.5-16.0        

 

                    Blood hematocrit (volume fraction)           28 %           

     35-52        

 

                                        Capillary blood glucose measurement by g

lucometer (mass/volume) - 02/10/20 13:34

         

 

                          Capillary blood glucose measurement by glucometer (mas

s/volume)           383 

mg/dL                                           

 

                                        Capillary blood glucose measurement by g

lucometer (mass/volume) - 02/10/20 14:30

         

 

                          Capillary blood glucose measurement by glucometer (mas

s/volume)           388 

mg/dL                                           

 

                                        Capillary blood glucose measurement by g

lucometer (mass/volume) - 02/10/20 15:23

         

 

                          Capillary blood glucose measurement by glucometer (mas

s/volume)           382 

mg/dL                                           

 

                                        Comprehensive metabolic panel - 02/10/20

 16:20         

 

                          Serum or plasma sodium measurement (moles/volume)     

      134 mmol/L          

                                        135-145        

 

                          Serum or plasma potassium measurement (moles/volume)  

         4.4 mmol/L       

                                        3.6-5.0        

 

                          Serum or plasma chloride measurement (moles/volume)   

        103 mmol/L        

                                                

 

                    Carbon dioxide           17 mmol/L           21-32        

 

                          Serum or plasma anion gap determination (moles/volume)

           14 mmol/L      

                                        5-14        

 

                          Serum or plasma urea nitrogen measurement (mass/volume

)           54 mg/dL      

                                        7-18        

 

                          Serum or plasma creatinine measurement (mass/volume)  

         2.60 mg/dL       

                                        0.60-1.30        

 

                    Serum or plasma urea nitrogen/creatinine mass ratio         

  21                  NRG 

       

 

                                        Serum or plasma creatinine measurement w

ith calculation of estimated glomerular 

filtration rate           19                        NRG        

 

                    Serum or plasma glucose measurement (mass/volume)           

415 mg/dL           

        

 

                    Serum or plasma calcium measurement (mass/volume)           

7.5 mg/dL           

8.5-10.1        

 

                          Serum or plasma total bilirubin measurement (mass/volu

me)           0.3 mg/dL   

                                        0.1-1.0        

 

                                        Serum or plasma alkaline phosphatase kim

surement (enzymatic activity/volume)    

                          83 U/L                            

 

                                        Serum or plasma aspartate aminotransfera

se measurement (enzymatic 

activity/volume)           28 U/L                    5-34        

 

                                        Serum or plasma alanine aminotransferase

 measurement (enzymatic activity/volume)

                          29 U/L                    0-55        

 

                    Serum or plasma protein measurement (mass/volume)           

4.4 g/dL            

6.4-8.2        

 

                    Serum or plasma albumin measurement (mass/volume)           

1.9 g/dL            

3.2-4.5        

 

                    CALCIUM CORRECTED           9.2 mg/dL           8.5-10.1    

    

 

                                        Serum or plasma phosphate measurement (m

ass/volume) - 02/10/20 16:20         

 

                          Serum or plasma phosphate measurement (mass/volume)   

        3.4 mg/dL         

                                        2.3-4.7        

 

                                        Magnesium - 02/10/20 16:20         

 

                    Magnesium           1.7 mg/dL           1.6-2.4        

 

                                        Capillary blood glucose measurement by g

lucometer (mass/volume) - 02/10/20 16:22

         

 

                          Capillary blood glucose measurement by glucometer (mas

s/volume)           425 

mg/dL                                           

 

                                        Capillary blood glucose measurement by g

lucometer (mass/volume) - 02/10/20 17:37

         

 

                          Capillary blood glucose measurement by glucometer (mas

s/volume)           349 

mg/dL                                           

 

                                        Capillary blood glucose measurement by g

lucometer (mass/volume) - 02/10/20 18:53

         

 

                          Capillary blood glucose measurement by glucometer (mas

s/volume)           310 

mg/dL                                           

 

                                        Capillary blood glucose measurement by g

lucometer (mass/volume) - 02/10/20 19:32

         

 

                          Capillary blood glucose measurement by glucometer (mas

s/volume)           222 

mg/dL                                           

 

                                        Capillary blood glucose measurement by g

lucometer (mass/volume) - 02/10/20 20:00

         

 

                          Capillary blood glucose measurement by glucometer (mas

s/volume)           249 

mg/dL                                           

 

                                        Capillary blood glucose measurement by g

lucometer (mass/volume) - 02/10/20 21:11

         

 

                          Capillary blood glucose measurement by glucometer (mas

s/volume)           250 

mg/dL                                           

 

                                        Comprehensive metabolic panel - 02/10/20

 21:45         

 

                          Serum or plasma sodium measurement (moles/volume)     

      134 mmol/L          

                                        135-145        

 

                          Serum or plasma potassium measurement (moles/volume)  

         4.4 mmol/L       

                                        3.6-5.0        

 

                          Serum or plasma chloride measurement (moles/volume)   

        105 mmol/L        

                                                

 

                    Carbon dioxide           19 mmol/L           21-32        

 

                          Serum or plasma anion gap determination (moles/volume)

           10 mmol/L      

                                        5-14        

 

                          Serum or plasma urea nitrogen measurement (mass/volume

)           52 mg/dL      

                                        7-18        

 

                          Serum or plasma creatinine measurement (mass/volume)  

         2.53 mg/dL       

                                        0.60-1.30        

 

                    Serum or plasma urea nitrogen/creatinine mass ratio         

  21                  NRG 

       

 

                                        Serum or plasma creatinine measurement w

ith calculation of estimated glomerular 

filtration rate           20                        NRG        

 

                    Serum or plasma glucose measurement (mass/volume)           

240 mg/dL           

        

 

                    Serum or plasma calcium measurement (mass/volume)           

7.6 mg/dL           

8.5-10.1        

 

                          Serum or plasma total bilirubin measurement (mass/volu

me)           0.2 mg/dL   

                                        0.1-1.0        

 

                                        Serum or plasma alkaline phosphatase kim

surement (enzymatic activity/volume)    

                          86 U/L                            

 

                                        Serum or plasma aspartate aminotransfera

se measurement (enzymatic 

activity/volume)           32 U/L                    5-34        

 

                                        Serum or plasma alanine aminotransferase

 measurement (enzymatic activity/volume)

                          30 U/L                    0-55        

 

                    Serum or plasma protein measurement (mass/volume)           

4.7 g/dL            

6.4-8.2        

 

                    Serum or plasma albumin measurement (mass/volume)           

2.0 g/dL            

3.2-4.5        

 

                    CALCIUM CORRECTED           9.2 mg/dL           8.5-10.1    

    

 

                                        Serum or plasma phosphate measurement (m

ass/volume) - 02/10/20 21:45         

 

                          Serum or plasma phosphate measurement (mass/volume)   

        2.8 mg/dL         

                                        2.3-4.7        

 

                                        Magnesium - 02/10/20 21:45         

 

                    Magnesium           1.8 mg/dL           1.6-2.4        

 

                                        Capillary blood glucose measurement by g

lucometer (mass/volume) - 02/10/20 22:09

         

 

                          Capillary blood glucose measurement by glucometer (mas

s/volume)           223 

mg/dL                                           

 

                                        Capillary blood glucose measurement by g

lucometer (mass/volume) - 20 00:16

         

 

                          Capillary blood glucose measurement by glucometer (mas

s/volume)           185 

mg/dL                                           

 

                                        Arterial blood gas measurement - 

0 03:00         

 

                    Blood pCO2           34 mm[Hg]           35-45        

 

                    Blood pO2           87 mm[Hg]           79-93        

 

                          Arterial blood bicarbonate measurement (moles/volume) 

          21 mmol/L       

                                        23-27        

 

                    Arterial blood base excess by calculation           -3.0 mmo

l/L           

-2.5-2.5        

 

                    Arterial blood oxygen saturation measurement           96 % 

                   

    

 

                    * Inhaled oxygen flow rate           21%                 NRG

        

 

                          Arterial blood pH measurement with patient temperature

 correction           7.40

                                        7.37-7.43        

 

                          Arterial blood carbon dioxide, total measurement (mole

s/volume)           22.0 

mmol/L                                  21.0-31.0        

 

                    Body site           LEFT ART LINE            NRG        

 

                          Assessment of wrist artery patency prior to arterial p

uncture           ART LINE

                                        NRG        

 

                    Setting of ventilation mode           NO                  NR

G        

 

                    Measurement of body temperature           36.9              

  NRG        

 

                                        Complete blood count (CBC) with automate

d white blood cell (WBC) differential - 

20 03:00         

 

                          Blood leukocytes automated count (number/volume)      

     13.3 10*3/uL         

                                        4.3-11.0        

 

                          Blood erythrocytes automated count (number/volume)    

       3.25 10*6/uL       

                                        4.35-5.85        

 

                    Venous blood hemoglobin measurement (mass/volume)           

10.4 g/dL           

11.5-16.0        

 

                    Blood hematocrit (volume fraction)           30 %           

     35-52        

 

                    Automated erythrocyte mean corpuscular volume           91 [

foz_us]           

80-99        

 

                                        Automated erythrocyte mean corpuscular h

emoglobin (mass per erythrocyte)        

                          32 pg                     25-34        

 

                                        Automated erythrocyte mean corpuscular h

emoglobin concentration measurement 

(mass/volume)             35 g/dL                   32-36        

 

                    Automated erythrocyte distribution width ratio           14.

4 %              10.0-

14.5        

 

                    Automated blood platelet count (count/volume)           216 

10*3/uL           

130-400        

 

                          Automated blood platelet mean volume measurement      

     9.6 [foz_us]         

                                        7.4-10.4        

 

                    Automated blood neutrophils/100 leukocytes           76 %   

             42-75       

 

 

                    Automated blood lymphocytes/100 leukocytes           19 %   

             12-44       

 

 

                    Blood monocytes/100 leukocytes           5 %                

 0-12        

 

                    Automated blood eosinophils/100 leukocytes           0 %    

             0-10        

 

                    Automated blood basophils/100 leukocytes           0 %      

           0-10        

 

                    Blood neutrophils automated count (number/volume)           

10.1 10*3           

1.8-7.8        

 

                    Blood lymphocytes automated count (number/volume)           

2.5 10*3            

1.0-4.0        

 

                    Blood monocytes automated count (number/volume)           0.

7 10*3            

0.0-1.0        

 

                    Automated eosinophil count           0.1 10*3/uL           0

.0-0.3        

 

                    Automated blood basophil count (count/volume)           0.0 

10*3/uL           

0.0-0.1        

 

                                        Comprehensive metabolic panel - 20

 03:00         

 

                          Serum or plasma sodium measurement (moles/volume)     

      134 mmol/L          

                                        135-145        

 

                          Serum or plasma potassium measurement (moles/volume)  

         4.4 mmol/L       

                                        3.6-5.0        

 

                          Serum or plasma chloride measurement (moles/volume)   

        106 mmol/L        

                                                

 

                    Carbon dioxide           18 mmol/L           21-32        

 

                          Serum or plasma anion gap determination (moles/volume)

           10 mmol/L      

                                        5-14        

 

                          Serum or plasma urea nitrogen measurement (mass/volume

)           49 mg/dL      

                                        7-18        

 

                          Serum or plasma creatinine measurement (mass/volume)  

         2.40 mg/dL       

                                        0.60-1.30        

 

                    Serum or plasma urea nitrogen/creatinine mass ratio         

  20                  NRG 

       

 

                                        Serum or plasma creatinine measurement w

ith calculation of estimated glomerular 

filtration rate           21                        NRG        

 

                    Serum or plasma glucose measurement (mass/volume)           

179 mg/dL           

        

 

                    Serum or plasma calcium measurement (mass/volume)           

7.6 mg/dL           

8.5-10.1        

 

                          Serum or plasma total bilirubin measurement (mass/volu

me)           0.2 mg/dL   

                                        0.1-1.0        

 

                                        Serum or plasma alkaline phosphatase kim

surement (enzymatic activity/volume)    

                          79 U/L                            

 

                                        Serum or plasma aspartate aminotransfera

se measurement (enzymatic 

activity/volume)           33 U/L                    5-34        

 

                                        Serum or plasma alanine aminotransferase

 measurement (enzymatic activity/volume)

                          29 U/L                    0-55        

 

                    Serum or plasma protein measurement (mass/volume)           

4.9 g/dL            

6.4-8.2        

 

                    Serum or plasma albumin measurement (mass/volume)           

2.1 g/dL            

3.2-4.5        

 

                    CALCIUM CORRECTED           9.1 mg/dL           8.5-10.1    

    

 

                                        Serum or plasma phosphate measurement (m

ass/volume) - 20 03:00         

 

                          Serum or plasma phosphate measurement (mass/volume)   

        2.7 mg/dL         

                                        2.3-4.7        

 

                                        Magnesium - 20 03:00         

 

                    Magnesium           1.8 mg/dL           1.6-2.4        

 

                                        Beta-hydroxybutyric acid measurement - 0

20 03:00         

 

                    Beta-hydroxybutyric acid measurement           2.10 mmol/L  

         0.00-0.27  

      

 

                                        Complete urinalysis with reflex to cultu

re - 20 06:30         

 

                    Urine color determination           YELLOW              NRG 

       

 

                    Urine clarity determination           CLEAR               NR

G        

 

                    Urine pH measurement by test strip           6.0            

     5-9        

 

                    Specific gravity of urine by test strip           1.025     

          1.016-1.022  

      

 

                    Urine protein assay by test strip, semi-quantitative        

   3+                  

NEGATIVE        

 

                    Urine glucose detection by automated test strip           NE

GATIVE            

NEGATIVE        

 

                          Erythrocytes detection in urine sediment by light micr

oscopy           1+       

                                        NEGATIVE        

 

                    Urine ketones detection by automated test strip           1+

                  NEGATIVE

        

 

                    Urine nitrite detection by test strip           NEGATIVE    

        NEGATIVE    

    

 

                    Urine total bilirubin detection by test strip           1+  

                NEGATIVE  

      

 

                          Urine urobilinogen measurement by automated test strip

 (mass/volume)           

0.2 mg/dL                               < = 1.0        

 

                    Urine leukocyte esterase detection by dipstick           NEG

ATIVE            

NEGATIVE        

 

                                        Automated urine sediment erythrocyte cou

nt by microscopy (number/high power 

field)                    NONE                      NRG        

 

                                        Automated urine sediment leukocyte count

 by microscopy (number/high power field)

                           [HPF]                    NRG        

 

                          Bacteria detection in urine sediment by light microsco

py           TRACE        

                                        NRG        

 

                                        Squamous epithelial cells detection in u

rine sediment by light microscopy       

                          5-10                      NRG        

 

                          Crystals detection in urine sediment by light microsco

py           PRESENT      

                                        NRG        

 

                    Casts detection in urine sediment by light microscopy       

    NONE                

NRG        

 

                          Mucus detection in urine sediment by light microscopy 

          SMALL           

                                        NRG        

 

                    Complete urinalysis with reflex to culture           NO     

             NRG        

 

                          Amorphous sediment detection in urine sediment by ligh

t microscopy           FEW

 NATALIE URATES                            NRG        

 

                                        Capillary blood glucose measurement by g

lucometer (mass/volume) - 20 08:26

         

 

                          Capillary blood glucose measurement by glucometer (mas

s/volume)           324 

mg/dL                                           

 

                                        Capillary blood glucose measurement by g

lucometer (mass/volume) - 20 11:35

         

 

                          Capillary blood glucose measurement by glucometer (mas

s/volume)           296 

mg/dL                                           

 

                                        Comprehensive metabolic panel - 20

 13:17         

 

                          Serum or plasma sodium measurement (moles/volume)     

      135 mmol/L          

                                        135-145        

 

                          Serum or plasma potassium measurement (moles/volume)  

         3.5 mmol/L       

                                        3.6-5.0        

 

                          Serum or plasma chloride measurement (moles/volume)   

        108 mmol/L        

                                                

 

                    Carbon dioxide           19 mmol/L           21-32        

 

                          Serum or plasma anion gap determination (moles/volume)

           8 mmol/L       

                                        5-14        

 

                          Serum or plasma urea nitrogen measurement (mass/volume

)           40 mg/dL      

                                        7-18        

 

                          Serum or plasma creatinine measurement (mass/volume)  

         2.16 mg/dL       

                                        0.60-1.30        

 

                    Serum or plasma urea nitrogen/creatinine mass ratio         

  19                  NRG 

       

 

                                        Serum or plasma creatinine measurement w

ith calculation of estimated glomerular 

filtration rate           24                        NRG        

 

                    Serum or plasma glucose measurement (mass/volume)           

290 mg/dL           

        

 

                    Serum or plasma calcium measurement (mass/volume)           

7.4 mg/dL           

8.5-10.1        

 

                          Serum or plasma total bilirubin measurement (mass/volu

me)           0.3 mg/dL   

                                        0.1-1.0        

 

                                        Serum or plasma alkaline phosphatase kim

surement (enzymatic activity/volume)    

                          68 U/L                            

 

                                        Serum or plasma aspartate aminotransfera

se measurement (enzymatic 

activity/volume)           35 U/L                    5-34        

 

                                        Serum or plasma alanine aminotransferase

 measurement (enzymatic activity/volume)

                          29 U/L                    0-55        

 

                    Serum or plasma protein measurement (mass/volume)           

4.5 g/dL            

6.4-8.2        

 

                    Serum or plasma albumin measurement (mass/volume)           

1.9 g/dL            

3.2-4.5        

 

                    CALCIUM CORRECTED           9.1 mg/dL           8.5-10.1    

    

 

                                        Serum or plasma phosphate measurement (m

ass/volume) - 20 13:17         

 

                          Serum or plasma phosphate measurement (mass/volume)   

        2.0 mg/dL         

                                        2.3-4.7        

 

                                        Magnesium - 20 13:17         

 

                    Magnesium           1.6 mg/dL           1.6-2.4        

 

                                        Capillary blood glucose measurement by g

lucometer (mass/volume) - 20 16:00

         

 

                          Capillary blood glucose measurement by glucometer (mas

s/volume)           194 

mg/dL                                           

 

                                        Capillary blood glucose measurement by g

lucometer (mass/volume) - 20 19:40

         

 

                          Capillary blood glucose measurement by glucometer (mas

s/volume)           196 

mg/dL                                           

 

                                        Capillary blood glucose measurement by g

lucometer (mass/volume) - 20 23:09

         

 

                          Capillary blood glucose measurement by glucometer (mas

s/volume)           76 

mg/dL                                           

 

                                        Complete blood count (CBC) with automate

d white blood cell (WBC) differential - 

20 02:21         

 

                          Blood leukocytes automated count (number/volume)      

     11.5 10*3/uL         

                                        4.3-11.0        

 

                          Blood erythrocytes automated count (number/volume)    

       2.95 10*6/uL       

                                        4.35-5.85        

 

                    Venous blood hemoglobin measurement (mass/volume)           

9.4 g/dL            

11.5-16.0        

 

                    Blood hematocrit (volume fraction)           28 %           

     35-52        

 

                    Automated erythrocyte mean corpuscular volume           94 [

foz_us]           

80-99        

 

                                        Automated erythrocyte mean corpuscular h

emoglobin (mass per erythrocyte)        

                          32 pg                     25-34        

 

                                        Automated erythrocyte mean corpuscular h

emoglobin concentration measurement 

(mass/volume)             34 g/dL                   32-36        

 

                    Automated erythrocyte distribution width ratio           15.

1 %              10.0-

14.5        

 

                    Automated blood platelet count (count/volume)           155 

10*3/uL           

130-400        

 

                          Automated blood platelet mean volume measurement      

     8.9 [foz_us]         

                                        7.4-10.4        

 

                    Automated blood neutrophils/100 leukocytes           79 %   

             42-75       

 

 

                    Automated blood lymphocytes/100 leukocytes           15 %   

             12-44       

 

 

                    Blood monocytes/100 leukocytes           5 %                

 0-12        

 

                    Automated blood eosinophils/100 leukocytes           0 %    

             0-10        

 

                    Automated blood basophils/100 leukocytes           0 %      

           0-10        

 

                    Blood neutrophils automated count (number/volume)           

9.1 10*3            

1.8-7.8        

 

                    Blood lymphocytes automated count (number/volume)           

1.7 10*3            

1.0-4.0        

 

                    Blood monocytes automated count (number/volume)           0.

6 10*3            

0.0-1.0        

 

                    Automated eosinophil count           0.1 10*3/uL           0

.0-0.3        

 

                    Automated blood basophil count (count/volume)           0.0 

10*3/uL           

0.0-0.1        

 

                                        Whole blood basic metabolic panel -  02:21         

 

                          Serum or plasma sodium measurement (moles/volume)     

      142 mmol/L          

                                        135-145        

 

                          Serum or plasma potassium measurement (moles/volume)  

         2.8 mmol/L       

                                        3.6-5.0        

 

                          Serum or plasma chloride measurement (moles/volume)   

        112 mmol/L        

                                                

 

                    Carbon dioxide           22 mmol/L           21-32        

 

                          Serum or plasma anion gap determination (moles/volume)

           8 mmol/L       

                                        5-14        

 

                          Serum or plasma urea nitrogen measurement (mass/volume

)           27 mg/dL      

                                        7-18        

 

                          Serum or plasma creatinine measurement (mass/volume)  

         1.59 mg/dL       

                                        0.60-1.30        

 

                    Serum or plasma urea nitrogen/creatinine mass ratio         

  17                  NRG 

       

 

                                        Serum or plasma creatinine measurement w

ith calculation of estimated glomerular 

filtration rate           34                        NRG        

 

                    Serum or plasma glucose measurement (mass/volume)           

32 mg/dL            

        

 

                    Serum or plasma calcium measurement (mass/volume)           

7.7 mg/dL           

8.5-10.1        

 

                                        Serum or plasma phosphate measurement (m

ass/volume) - 20 02:21         

 

                          Serum or plasma phosphate measurement (mass/volume)   

        1.8 mg/dL         

                                        2.3-4.7        

 

                                        Magnesium - 20 02:21         

 

                    Magnesium           1.9 mg/dL           1.6-2.4        

 

                                        Capillary blood glucose measurement by g

lucometer (mass/volume) - 20 02:55

         

 

                          Capillary blood glucose measurement by glucometer (mas

s/volume)           31 

mg/dL                                           

 

                                        Capillary blood glucose measurement by g

lucometer (mass/volume) - 20 03:12

         

 

                          Capillary blood glucose measurement by glucometer (mas

s/volume)           113 

mg/dL                                           

 

                                        Capillary blood glucose measurement by g

lucometer (mass/volume) - 20 03:59

         

 

                          Capillary blood glucose measurement by glucometer (mas

s/volume)           69 

mg/dL                                           

 

                                        Capillary blood glucose measurement by g

lucometer (mass/volume) - 20 05:35

         

 

                          Capillary blood glucose measurement by glucometer (mas

s/volume)           101 

mg/dL                                           

 

                                        Capillary blood glucose measurement by g

lucometer (mass/volume) - 20 08:38

         

 

                          Capillary blood glucose measurement by glucometer (mas

s/volume)           102 

mg/dL                                           

 

                                        Capillary blood glucose measurement by g

lucometer (mass/volume) - 20 11:36

         

 

                          Capillary blood glucose measurement by glucometer (mas

s/volume)           115 

mg/dL                                           

 

                                        Capillary blood glucose measurement by g

lucometer (mass/volume) - 20 16:07

         

 

                          Capillary blood glucose measurement by glucometer (mas

s/volume)           347 

mg/dL                                           

 

                                        Capillary blood glucose measurement by g

lucometer (mass/volume) - 02/12/20 20:06

         

 

                          Capillary blood glucose measurement by glucometer (mas

s/volume)           343 

mg/dL                                           

 

                                        Capillary blood glucose measurement by g

lucometer (mass/volume) - 20 05:32

         

 

                          Capillary blood glucose measurement by glucometer (mas

s/volume)           53 

mg/dL                                           

 

                                        Complete blood count (CBC) with automate

d white blood cell (WBC) differential - 

20 05:48         

 

                          Blood leukocytes automated count (number/volume)      

     12.7 10*3/uL         

                                        4.3-11.0        

 

                          Blood erythrocytes automated count (number/volume)    

       3.39 10*6/uL       

                                        4.35-5.85        

 

                    Venous blood hemoglobin measurement (mass/volume)           

10.8 g/dL           

11.5-16.0        

 

                    Blood hematocrit (volume fraction)           33 %           

     35-52        

 

                    Automated erythrocyte mean corpuscular volume           96 [

foz_us]           

80-99        

 

                                        Automated erythrocyte mean corpuscular h

emoglobin (mass per erythrocyte)        

                          32 pg                     25-34        

 

                                        Automated erythrocyte mean corpuscular h

emoglobin concentration measurement 

(mass/volume)             33 g/dL                   32-36        

 

                    Automated erythrocyte distribution width ratio           14.

8 %              10.0-

14.5        

 

                    Automated blood platelet count (count/volume)           164 

10*3/uL           

130-400        

 

                          Automated blood platelet mean volume measurement      

     9.9 [foz_us]         

                                        7.4-10.4        

 

                    Automated blood neutrophils/100 leukocytes           76 %   

             42-75       

 

 

                    Automated blood lymphocytes/100 leukocytes           19 %   

             12-44       

 

 

                    Blood monocytes/100 leukocytes           4 %                

 0-12        

 

                    Automated blood eosinophils/100 leukocytes           1 %    

             0-10        

 

                    Automated blood basophils/100 leukocytes           0 %      

           0-10        

 

                    Blood neutrophils automated count (number/volume)           

9.6 10*3            

1.8-7.8        

 

                    Blood lymphocytes automated count (number/volume)           

2.4 10*3            

1.0-4.0        

 

                    Blood monocytes automated count (number/volume)           0.

5 10*3            

0.0-1.0        

 

                    Automated eosinophil count           0.2 10*3/uL           0

.0-0.3        

 

                    Automated blood basophil count (count/volume)           0.0 

10*3/uL           

0.0-0.1        

 

                                        Whole blood basic metabolic panel - 02/1

3/20 05:48         

 

                          Serum or plasma sodium measurement (moles/volume)     

      138 mmol/L          

                                        135-145        

 

                          Serum or plasma potassium measurement (moles/volume)  

         3.4 mmol/L       

                                        3.6-5.0        

 

                          Serum or plasma chloride measurement (moles/volume)   

        104 mmol/L        

                                                

 

                    Carbon dioxide           24 mmol/L           21-32        

 

                          Serum or plasma anion gap determination (moles/volume)

           10 mmol/L      

                                        5-14        

 

                          Serum or plasma urea nitrogen measurement (mass/volume

)           20 mg/dL      

                                        7-18        

 

                          Serum or plasma creatinine measurement (mass/volume)  

         1.28 mg/dL       

                                        0.60-1.30        

 

                    Serum or plasma urea nitrogen/creatinine mass ratio         

  16                  NRG 

       

 

                                        Serum or plasma creatinine measurement w

ith calculation of estimated glomerular 

filtration rate           43                        NRG        

 

                    Serum or plasma glucose measurement (mass/volume)           

54 mg/dL            

        

 

                    Serum or plasma calcium measurement (mass/volume)           

8.0 mg/dL           

8.5-10.1        

 

                                        Serum or plasma phosphate measurement (m

ass/volume) - 20 05:48         

 

                          Serum or plasma phosphate measurement (mass/volume)   

        2.1 mg/dL         

                                        2.3-4.7        

 

                                        Magnesium - 20 05:48         

 

                    Magnesium           1.5 mg/dL           1.6-2.4        

 

                                        Capillary blood glucose measurement by g

lucometer (mass/volume) - 20 06:43

         

 

                          Capillary blood glucose measurement by glucometer (mas

s/volume)           119 

mg/dL                                           

 

                                        Capillary blood glucose measurement by g

lucometer (mass/volume) - 20 10:47

         

 

                          Capillary blood glucose measurement by glucometer (mas

s/volume)           342 

mg/dL                                           

 

                                        Capillary blood glucose measurement by g

lucometer (mass/volume) - 20 16:19

         

 

                          Capillary blood glucose measurement by glucometer (mas

s/volume)           389 

mg/dL                                           

 

                                        Capillary blood glucose measurement by g

lucometer (mass/volume) - 20 20:36

         

 

                          Capillary blood glucose measurement by glucometer (mas

s/volume)           377 

mg/dL                                           

 

                                        Capillary blood glucose measurement by g

lucometer (mass/volume) - 20 06:07

         

 

                          Capillary blood glucose measurement by glucometer (mas

s/volume)           289 

mg/dL                                           

 

                                        Complete blood count (CBC) with automate

d white blood cell (WBC) differential - 

20 06:28         

 

                          Blood leukocytes automated count (number/volume)      

     10.2 10*3/uL         

                                        4.3-11.0        

 

                          Blood erythrocytes automated count (number/volume)    

       3.13 10*6/uL       

                                        4.35-5.85        

 

                    Venous blood hemoglobin measurement (mass/volume)           

10.0 g/dL           

11.5-16.0        

 

                    Blood hematocrit (volume fraction)           31 %           

     35-52        

 

                    Automated erythrocyte mean corpuscular volume           97 [

foz_us]           

80-99        

 

                                        Automated erythrocyte mean corpuscular h

emoglobin (mass per erythrocyte)        

                          32 pg                     25-34        

 

                                        Automated erythrocyte mean corpuscular h

emoglobin concentration measurement 

(mass/volume)             33 g/dL                   32-36        

 

                    Automated erythrocyte distribution width ratio           14.

4 %              10.0-

14.5        

 

                    Automated blood platelet count (count/volume)           159 

10*3/uL           

130-400        

 

                          Automated blood platelet mean volume measurement      

     10.0 [foz_us]        

                                        7.4-10.4        

 

                    Automated blood neutrophils/100 leukocytes           70 %   

             42-75       

 

 

                    Automated blood lymphocytes/100 leukocytes           24 %   

             12-44       

 

 

                    Blood monocytes/100 leukocytes           4 %                

 0-12        

 

                    Automated blood eosinophils/100 leukocytes           3 %    

             0-10        

 

                    Automated blood basophils/100 leukocytes           0 %      

           0-10        

 

                    Blood neutrophils automated count (number/volume)           

7.1 10*3            

1.8-7.8        

 

                    Blood lymphocytes automated count (number/volume)           

2.4 10*3            

1.0-4.0        

 

                    Blood monocytes automated count (number/volume)           0.

4 10*3            

0.0-1.0        

 

                    Automated eosinophil count           0.3 10*3/uL           0

.0-0.3        

 

                    Automated blood basophil count (count/volume)           0.0 

10*3/uL           

0.0-0.1        

 

                                        Whole blood basic metabolic panel -  06:28         

 

                          Serum or plasma sodium measurement (moles/volume)     

      136 mmol/L          

                                        135-145        

 

                          Serum or plasma potassium measurement (moles/volume)  

         4.0 mmol/L       

                                        3.6-5.0        

 

                          Serum or plasma chloride measurement (moles/volume)   

        105 mmol/L        

                                                

 

                    Carbon dioxide           24 mmol/L           21-32        

 

                          Serum or plasma anion gap determination (moles/volume)

           7 mmol/L       

                                        5-14        

 

                          Serum or plasma urea nitrogen measurement (mass/volume

)           16 mg/dL      

                                        7-18        

 

                          Serum or plasma creatinine measurement (mass/volume)  

         1.27 mg/dL       

                                        0.60-1.30        

 

                    Serum or plasma urea nitrogen/creatinine mass ratio         

  13                  NRG 

       

 

                                        Serum or plasma creatinine measurement w

ith calculation of estimated glomerular 

filtration rate           44                        NRG        

 

                    Serum or plasma glucose measurement (mass/volume)           

311 mg/dL           

        

 

                    Serum or plasma calcium measurement (mass/volume)           

7.7 mg/dL           

8.5-10.1        

 

                                        Serum or plasma phosphate measurement (m

ass/volume) - 20 06:28         

 

                          Serum or plasma phosphate measurement (mass/volume)   

        1.9 mg/dL         

                                        2.3-4.7        

 

                                        Magnesium - 20 06:28         

 

                    Magnesium           1.5 mg/dL           1.6-2.4        

 

                                        Capillary blood glucose measurement by g

lucometer (mass/volume) - 20 11:16

         

 

                          Capillary blood glucose measurement by glucometer (mas

s/volume)           363 

mg/dL                                           

 

                                        Capillary blood glucose measurement by g

lucometer (mass/volume) - 20 15:39

         

 

                          Capillary blood glucose measurement by glucometer (mas

s/volume)           380 

mg/dL                                           

 

                                        Capillary blood glucose measurement by g

lucometer (mass/volume) - 20 20:29

         

 

                          Capillary blood glucose measurement by glucometer (mas

s/volume)           480 

mg/dL                                           

 

                                        Capillary blood glucose measurement by g

lucometer (mass/volume) - 02/15/20 03:44

         

 

                          Capillary blood glucose measurement by glucometer (mas

s/volume)           302 

mg/dL                                           

 

                                        Whole blood basic metabolic panel -  05:16         

 

                          Serum or plasma sodium measurement (moles/volume)     

      137 mmol/L          

                                        135-145        

 

                          Serum or plasma potassium measurement (moles/volume)  

         3.8 mmol/L       

                                        3.6-5.0        

 

                          Serum or plasma chloride measurement (moles/volume)   

        107 mmol/L        

                                                

 

                    Carbon dioxide           21 mmol/L           21-32        

 

                          Serum or plasma anion gap determination (moles/volume)

           9 mmol/L       

                                        5-14        

 

                          Serum or plasma urea nitrogen measurement (mass/volume

)           20 mg/dL      

                                        7-18        

 

                          Serum or plasma creatinine measurement (mass/volume)  

         1.22 mg/dL       

                                        0.60-1.30        

 

                    Serum or plasma urea nitrogen/creatinine mass ratio         

  16                  NRG 

       

 

                                        Serum or plasma creatinine measurement w

ith calculation of estimated glomerular 

filtration rate           46                        NRG        

 

                    Serum or plasma glucose measurement (mass/volume)           

236 mg/dL           

        

 

                    Serum or plasma calcium measurement (mass/volume)           

7.8 mg/dL           

8.5-10.1        

 

                                        Serum or plasma phosphate measurement (m

ass/volume) - 02/15/20 05:16         

 

                          Serum or plasma phosphate measurement (mass/volume)   

        2.2 mg/dL         

                                        2.3-4.7        

 

                                        Magnesium - 02/15/20 05:16         

 

                    Magnesium           1.7 mg/dL           1.6-2.4        

 

                                        Complete blood count (CBC) with automate

d white blood cell (WBC) differential - 

02/15/20 05:18         

 

                          Blood leukocytes automated count (number/volume)      

     8.8 10*3/uL          

                                        4.3-11.0        

 

                          Blood erythrocytes automated count (number/volume)    

       2.99 10*6/uL       

                                        4.35-5.85        

 

                    Venous blood hemoglobin measurement (mass/volume)           

9.5 g/dL            

11.5-16.0        

 

                    Blood hematocrit (volume fraction)           29 %           

     35-52        

 

                    Automated erythrocyte mean corpuscular volume           98 [

foz_us]           

80-99        

 

                                        Automated erythrocyte mean corpuscular h

emoglobin (mass per erythrocyte)        

                          32 pg                     25-34        

 

                                        Automated erythrocyte mean corpuscular h

emoglobin concentration measurement 

(mass/volume)             32 g/dL                   32-36        

 

                    Automated erythrocyte distribution width ratio           14.

0 %              10.0-

14.5        

 

                    Automated blood platelet count (count/volume)           220 

10*3/uL           

130-400        

 

                          Automated blood platelet mean volume measurement      

     10.2 [foz_us]        

                                        7.4-10.4        

 

                    Automated blood neutrophils/100 leukocytes           58 %   

             42-75       

 

 

                    Automated blood lymphocytes/100 leukocytes           33 %   

             12-44       

 

 

                    Blood monocytes/100 leukocytes           7 %                

 0-12        

 

                    Automated blood eosinophils/100 leukocytes           3 %    

             0-10        

 

                    Automated blood basophils/100 leukocytes           0 %      

           0-10        

 

                    Blood neutrophils automated count (number/volume)           

5.1 10*3            

1.8-7.8        

 

                    Blood lymphocytes automated count (number/volume)           

2.9 10*3            

1.0-4.0        

 

                    Blood monocytes automated count (number/volume)           0.

6 10*3            

0.0-1.0        

 

                    Automated eosinophil count           0.2 10*3/uL           0

.0-0.3        

 

                    Automated blood basophil count (count/volume)           0.0 

10*3/uL           

0.0-0.1        

 

                                        Capillary blood glucose measurement by g

lucometer (mass/volume) - 02/15/20 05:31

         

 

                          Capillary blood glucose measurement by glucometer (mas

s/volume)           208 

mg/dL                                           

 

                                        Capillary blood glucose measurement by g

lucometer (mass/volume) - 02/15/20 10:57

         

 

                          Capillary blood glucose measurement by glucometer (mas

s/volume)           297 

mg/dL                                           

 

                                        Capillary blood glucose measurement by g

lucometer (mass/volume) - 20 18:02

         

 

                          Capillary blood glucose measurement by glucometer (mas

s/volume)           > 

mg/dL                                           

 

                                        Comprehensive metabolic panel - 20

 18:15         

 

                          Serum or plasma sodium measurement (moles/volume)     

      116 mmol/L          

                                        135-145        

 

                          Serum or plasma potassium measurement (moles/volume)  

         6.6 mmol/L       

                                        3.6-5.0        

 

                          Serum or plasma chloride measurement (moles/volume)   

        79 mmol/L         

                                                

 

                    Carbon dioxide           8 mmol/L            21-32        

 

                          Serum or plasma anion gap determination (moles/volume)

           29 mmol/L      

                                        5-14        

 

                          Serum or plasma urea nitrogen measurement (mass/volume

)           77 mg/dL      

                                        7-18        

 

                          Serum or plasma creatinine measurement (mass/volume)  

         3.27 mg/dL       

                                        0.60-1.30        

 

                    Serum or plasma urea nitrogen/creatinine mass ratio         

  24                  NRG 

       

 

                                        Serum or plasma creatinine measurement w

ith calculation of estimated glomerular 

filtration rate           15                        NRG        

 

                          Serum or plasma glucose measurement (mass/volume)     

      1322 mg/dL          

                                                

 

                    Serum or plasma calcium measurement (mass/volume)           

9.6 mg/dL           

8.5-10.1        

 

                          Serum or plasma total bilirubin measurement (mass/volu

me)           0.5 mg/dL   

                                        0.1-1.0        

 

                                        Serum or plasma alkaline phosphatase kim

surement (enzymatic activity/volume)    

                          123 U/L                           

 

                                        Serum or plasma aspartate aminotransfera

se measurement (enzymatic 

activity/volume)           22 U/L                    5-34        

 

                                        Serum or plasma alanine aminotransferase

 measurement (enzymatic activity/volume)

                          31 U/L                    0-55        

 

                    Serum or plasma protein measurement (mass/volume)           

8.4 g/dL            

6.4-8.2        

 

                    Serum or plasma albumin measurement (mass/volume)           

3.5 g/dL            

3.2-4.5        

 

                    CALCIUM CORRECTED           10.0 mg/dL           8.5-10.1   

     

 

                                        Lipase - 20 18:15         

 

                    Lipase              71 U/L              8-78        

 

                                        Beta-hydroxybutyric acid measurement - 0

20 18:15         

 

                    Beta-hydroxybutyric acid measurement           8.53 mmol/L  

         0.00-0.27  

      

 

                                        Arterial blood gas measurement - 

0 18:22         

 

                    Blood pCO2           15 mm[Hg]           35-45        

 

                    Blood pO2           147 mm[Hg]           79-93        

 

                          Arterial blood bicarbonate measurement (moles/volume) 

          8 mmol/L        

                                        23-27        

 

                    Arterial blood base excess by calculation           -17.2 mm

ol/L           

-2.5-2.5        

 

                    Arterial blood oxygen saturation measurement           97 % 

                   

    

 

                    * Inhaled oxygen flow rate           RA                  NRG

        

 

                          Arterial blood pH measurement with patient temperature

 correction           7.34

                                        7.37-7.43        

 

                          Arterial blood carbon dioxide, total measurement (mole

s/volume)           8.3 

mmol/L                                  21.0-31.0        

 

                    Body site           R RAD               NRG        

 

                          Assessment of wrist artery patency prior to arterial p

uncture           YES-POS 

                                        NRG        

 

                    Setting of ventilation mode           NO                  NR

G        

 

                    Measurement of body temperature           37.6              

  NRG        

 

                                        Complete blood count (CBC) with automate

d white blood cell (WBC) differential - 

20 18:34         

 

                          Blood leukocytes automated count (number/volume)      

     11.3 10*3/uL         

                                        4.3-11.0        

 

                          Blood erythrocytes automated count (number/volume)    

       3.80 10*6/uL       

                                        4.35-5.85        

 

                    Venous blood hemoglobin measurement (mass/volume)           

11.8 g/dL           

11.5-16.0        

 

                    Blood hematocrit (volume fraction)           38 %           

     35-52        

 

                    Automated erythrocyte mean corpuscular volume           99 [

foz_us]           

80-99        

 

                                        Automated erythrocyte mean corpuscular h

emoglobin (mass per erythrocyte)        

                          31 pg                     25-34        

 

                                        Automated erythrocyte mean corpuscular h

emoglobin concentration measurement 

(mass/volume)             31 g/dL                   32-36        

 

                    Automated erythrocyte distribution width ratio           13.

5 %              10.0-

14.5        

 

                    Automated blood platelet count (count/volume)           444 

10*3/uL           

130-400        

 

                          Automated blood platelet mean volume measurement      

     10.1 [foz_us]        

                                        7.4-10.4        

 

                    Automated blood neutrophils/100 leukocytes           90 %   

             42-75       

 

 

                    Automated blood lymphocytes/100 leukocytes           10 %   

             12-44       

 

 

                    Blood monocytes/100 leukocytes           0 %                

 0-12        

 

                    Automated blood eosinophils/100 leukocytes           0 %    

             0-10        

 

                    Automated blood basophils/100 leukocytes           0 %      

           0-10        

 

                    Blood neutrophils automated count (number/volume)           

10.1 10*3           

1.8-7.8        

 

                    Blood lymphocytes automated count (number/volume)           

1.1 10*3            

1.0-4.0        

 

                    Blood monocytes automated count (number/volume)           0.

1 10*3            

0.0-1.0        

 

                    Automated eosinophil count           0.0 10*3/uL           0

.0-0.3        

 

                    Automated blood basophil count (count/volume)           0.0 

10*3/uL           

0.0-0.1        

 

                                        Manual absolute plasma cell count -  18:34         

 

                    Blood monocytes/100 leukocytes           1 %                

 NRG        

 

                    Manual blood segmented neutrophils/100 leukocytes           

84 %                NRG  

      

 

                    Blood band neutrophils/100 leukocytes           3 %         

        NRG        

 

                    Manual blood lymphocytes/100 leukocytes           12 %      

          NRG        

 

                    Blood erythrocyte morphology finding identification         

  NORMAL              

NRG        

 

                                        Complete urinalysis with reflex to cultu

re - 20 19:49         

 

                    Urine color determination           YELLOW              NRG 

       

 

                    Urine clarity determination           CLEAR               NR

G        

 

                    Urine pH measurement by test strip           6.0            

     5-9        

 

                    Specific gravity of urine by test strip           1.020     

          1.016-1.022  

      

 

                    Urine protein assay by test strip, semi-quantitative        

   3+                  

NEGATIVE        

 

                    Urine glucose detection by automated test strip           3+

                  NEGATIVE

        

 

                          Erythrocytes detection in urine sediment by light micr

oscopy           TRACE-I  

                                        NEGATIVE        

 

                    Urine ketones detection by automated test strip           1+

                  NEGATIVE

        

 

                    Urine nitrite detection by test strip           NEGATIVE    

        NEGATIVE    

    

 

                    Urine total bilirubin detection by test strip           NEGA

TIVE            

NEGATIVE        

 

                          Urine urobilinogen measurement by automated test strip

 (mass/volume)           

0.2 mg/dL                               < = 1.0        

 

                    Urine leukocyte esterase detection by dipstick           NEG

ATIVE            

NEGATIVE        

 

                                        Automated urine sediment erythrocyte cou

nt by microscopy (number/high power 

field)                    NONE                      NRG        

 

                                        Automated urine sediment leukocyte count

 by microscopy (number/high power field)

                           [HPF]                    NRG        

 

                          Bacteria detection in urine sediment by light microsco

py           TRACE        

                                        NRG        

 

                                        Squamous epithelial cells detection in u

rine sediment by light microscopy       

                          0-2                       NRG        

 

                          Crystals detection in urine sediment by light microsco

py           NONE         

                                        NRG        

 

                    Casts detection in urine sediment by light microscopy       

    NONE                

NRG        

 

                          Mucus detection in urine sediment by light microscopy 

          SMALL           

                                        NRG        

 

                    Complete urinalysis with reflex to culture           NO     

             NRG        

 

                                        Urine drug screening test - 20 19:

49         

 

                    Urine phencyclidine detection by screening method           

NEGATIVE            

NEGATIVE        

 

                          Urine benzodiazepines detection by screening method   

        NEGATIVE          

                                        NEGATIVE        

 

                    Urine cocaine detection           NEGATIVE            NEGATI

VE        

 

                    Urine amphetamines detection by screening method           N

EGATIVE            

NEGATIVE        

 

                          Urine methamphetamine detection by screening method   

        NEGATIVE          

                                        NEGATIVE        

 

                    Urine cannabinoids detection by screening method           P

OSITIVE            

NEGATIVE        

 

                    Urine opiates detection by screening method           NEGATI

VE            

NEGATIVE        

 

                    Urine barbiturates detection           NEGATIVE            N

EGATIVE        

 

                          Screening urine tricyclic antidepressants detection   

        NEGATIVE          

                                        NEGATIVE        

 

                    Urine methadone detection by screening method           NEGA

TIVE            

NEGATIVE        

 

                    Urine oxycodone detection           NEGATIVE            NEGA

TIVE        

 

                    Urine propoxyphene detection           NEGATIVE            N

EGATIVE        

 

                                        Whole blood basic metabolic panel -  21:05         

 

                          Serum or plasma sodium measurement (moles/volume)     

      123 mmol/L          

                                        135-145        

 

                          Serum or plasma potassium measurement (moles/volume)  

         4.4 mmol/L       

                                        3.6-5.0        

 

                          Serum or plasma chloride measurement (moles/volume)   

        89 mmol/L         

                                                

 

                    Carbon dioxide           14 mmol/L           21-32        

 

                          Serum or plasma anion gap determination (moles/volume)

           20 mmol/L      

                                        5-14        

 

                          Serum or plasma urea nitrogen measurement (mass/volume

)           75 mg/dL      

                                        7-18        

 

                          Serum or plasma creatinine measurement (mass/volume)  

         3.11 mg/dL       

                                        0.60-1.30        

 

                    Serum or plasma urea nitrogen/creatinine mass ratio         

  24                  NRG 

       

 

                                        Serum or plasma creatinine measurement w

ith calculation of estimated glomerular 

filtration rate           15                        NRG        

 

                    Serum or plasma glucose measurement (mass/volume)           

929 mg/dL           

        

 

                    Serum or plasma calcium measurement (mass/volume)           

8.6 mg/dL           

8.5-10.1        



                                                                                
                                                                                
                                                                                
                                                                                
                                                                                
                                                                          



Encounters

      



                ACCT No.           Visit Date/Time           Discharge          

 Status         

             Pt. Type           Provider           Facility           Loc./Unit 

          

Complaint        

 

                    [id number]           02/10/2020 04:00:00           02/15/2

020 13:15:00        

                DIS             Outpatient           NATALYA MENDEZ, CAMPBELL ROBERTS         

  [facility]           4TH                       DKA,METHAMPHETAMINE,RES

P FAILURE   

     

 

                    [id number]           2020 18:13:00           

020 20:32:00        

                DIS             Outpatient           LIAM GAONA        

   [facility]           ER                        KIDNEY PAIN        

 

                    [id number]           2019 09:18:00            14:35:00        

                DIS             Inpatient           KAYCE MENDEZ, MISHA ROBERTS          

 [facility]           4TH                       DKA        

 

                    [id number]           2019 13:00:00            23:59:59        

                CLS             Preadmit           WILFREDO MENDEZ, BRUCE          

 [facility]           RAD                       CKD STAGE 3        

 

                    [id number]           2019 10:30:00            23:59:59        

                CLS             Outpatient           FELIZ GERMAIN DO     

      [facility] 

[facility]           LAB                       N18.9        

 

                    [id number]           01/15/2019 17:11:00           01/15/2

019 23:59:59        

                CLS             Outpatient           MARCELLUS DO, FELIZ L     

      [facility] 

[facility]           LAB                       E10.65        

 

                    [id number]           2018 10:23:00           

018 23:59:59        

                CLS             Outpatient           JULY VOSS DO    

       [facility] 

[facility]           RAD                       TRAUMA LEFT ELB

OW        

 

                    [id number]           2018 08:02:00           

018 23:59:59        

                CLS             Outpatient           MARCELLUS DOFELIZ L     

      [facility] 

[facility]           LAB                       E10.65        

 

                    [id number]           10/11/2017 08:40:00           10/11/2

017 23:59:59        

                CLS             Outpatient           JULY VOSS DO    

       [facility] 

[facility]           LAB                       INC LIVER ENZYM

ES, 

HYPERGLYCEMIA        

 

                    [id number]           2017 19:16:00           

017 23:17:00        

                DIS             Emergency           LIAM GAONA         

  [facility]           ER                        VOMITING        

 

                    [id number]           2017 07:38:00           

017 23:59:59        

                CLS             Outpatient           GERMAIN DO, FELIZ L     

      [facility] 

[facility]           LAB                       E10.65        

 

                    [id number]           2016 08:31:00           

016 23:59:59        

                CLS             Outpatient           MARCELLUS DO, FELIZ L     

      [facility] 

[facility]           LAB                       DIABETES MELLIT

US TYPE 1   

     

 

                    [id number]           2016 09:58:00           

016 23:59:59        

                CLS             Outpatient           GERMAIN DO, FELIZ L     

      [facility] 

[facility]           LAB                                

 

                    [id number]           2016 09:39:00           

016 23:59:59        

                CLS             Outpatient           YU CRUZ MD     

      [facility] 

[facility]           LAB                       CHRONIC KIDNEY 

DISEASE,DIABETES TYPE I,PROTEINURIA        

 

                    [id number]           2016 15:41:00           

016 18:19:00        

                DIS             Emergency           MELODY AGUIRRE MD      

     [facility] 

[facility]           ER                        LOW BLOOD SUGAR

,WITHDRAWAL  

      

 

                    [id number]           2016 13:02:00           01/26/2

016 23:59:59        

                CLS             Outpatient           NANCY MENDEZ, YU MELENDEZ     

      [facility] 

[facility]           LAB                       LONG TERM MED U

SE, CKD, 

HYPERLIPIDEMIA,        

 

                    [id number]           2015 23:17:00           

015 14:00:00        

                DIS             Inpatient           JULY VOSS DO     

      [facility] 

[facility]           ICU                       

OVERDOSE,AMS,HYPOTENSION,ACUTE KIDNEY INJURY        

 

                    [id number]           2014 00:41:00            23:59:59        

                CLS             Preadmit           JULY VOSS DO      

     [facility] 

[facility]           LAB                       DVT        

 

                    [id number]           2014 13:24:00           

014 00:01:00        

                DIS             Outpatient           JULY VOSS DO    

       [facility] 

[facility]           LAB                       DVT        

 

                    [id number]           2014 13:19:00           

014 23:59:59        

                CLS             Outpatient           FELIZ GERMAIN DO     

      [facility] 

[facility]           LAB                       DIABETES       

 

 

                    [id number]           2014 11:53:00            23:59:59        

                CLS             Outpatient           YU CRUZ MD     

      [facility] 

[facility]           LAB                       DIABETES,PROTIE

PALOMA, HTN  

      

 

                    [id number]           2014 07:48:00            23:59:59        

                CLS             Outpatient           FELIZ GERMAIN DO     

      [facility] 

[facility]           LAB                       DM I        

 

                    [id number]           2014 11:10:00            23:59:59        

                CLS             Outpatient           YU CRUZ MD     

      [facility] 

[facility]           LAB                       DIABETES UNCOMP

L 

TYPEI,PROTEINURIA,BENIGN ESSENTIA        

 

                    [id number]           2014 11:12:00           

014 00:01:00        

                DIS             Outpatient           JULY VOSS DO    

       [facility] 

[facility]           LAB                       DVT        

 

                    [id number]           2013 08:20:00           

013 23:59:59        

                CLS             Outpatient           ASYALENJULY GREEN DO    

       [facility] 

[facility]           RAD                       BREAST LUMP    

    

 

                    [id number]           10/10/2013 08:37:00           10/10/2

013 23:59:59        

                CLS             Outpatient           BIPIN RAMIRES, JULY A    

       [facility] 

[facility]           RAD                       SCREENING      

  

 

                    [id number]           10/09/2013 15:05:00           10/09/2

013 23:59:59        

                CLS             Outpatient           YU CRUZ MD     

      [facility] 

[facility]           RAD                       CHRONIC KIDNEY 

       

 

                    [id number]           10/03/2013 11:01:00           10/03/2

013 23:59:59        

                CLS             Outpatient           YU CRUZ MD     

      [facility] 

[facility]           LAB                       ABNORMALITIES O

F BLOOD     

   

 

             [id number]           2020 18:54:00                        A

CT           

Inpatient                 MISHA DEVRIES MD           [facility]                 ICU                       DKA        

 

             [id number]           2015 07:49:00                         

            

Document Registration                                                           

         

 

             [id number]           2015 07:45:00                         

            

Document Registration                                                           

         

 

             [id number]           2013 00:00:00                         

            

Document Registration                                                           

         

 

             [id number]           02/15/2013 06:00:00                         

            

Document Registration                                                           

         

 

             [id number]           2013 12:50:00                         

            

Document Registration                                                           

         

 

             [id number]           2013 12:44:00                         

            

Document Registration                                                           

         

 

             [id number]           2012 11:00:00                         

            

Document Registration                                                           

         

 

             [id number]           2012 08:25:00                         

            

Document Registration                                                           

         

 

             [id number]           2012 11:54:00                         

            

Document Registration                                                           

         

 

             [id number]           2012 11:44:00                         

            

Document Registration                                                           

         

 

             [id number]           2012 12:34:00                         

            

Document Registration                                                           

         

 

             [id number]           2012 08:50:00                         

            

Document Registration                                                           

         

 

             [id number]           2011 14:13:00                         

            

Document Registration                                                           

         

 

             [id number]           2011 10:27:00                         

            

Document Registration                                                           

         

 

             [id number]           10/24/2011 11:35:00                         

            

Document Registration                                                           

         

 

             [id number]           2011 10:06:00                         

            

Document Registration                                                           

         

 

             [id number]           2011 11:54:00                         

            

Document Registration                                                           

         

 

             [id number]           2011 11:35:00                         

            

Document Registration                                                           

         

 

             [id number]           2011 11:51:00                         

            

Document Registration                                                           

         

 

             [id number]           2011 12:55:00                         

            

Document Registration

## 2020-06-17 NOTE — XMS REPORT
Continuity of Care Document

                             Created on: 2020



DAYTON BLISS

External Reference #: [id number]

: 1965

Sex: Female



Demographics





                          Address                   [address] [place]  [address]

 

                          Home Phone                [phone] x

 

                          Preferred Language        Unknown

 

                          Marital Status            Unknown

 

                          Amish Affiliation     Unknown

 

                          Race                      Unknown

 

                          Ethnic Group              Unknown





Author





                          Organization              Unknown

 

                          Address                   Unknown

 

                          Phone                     Unavailable



              



Allergies

      



             Active           Description           Code           Type         

  Severity   

                Reaction           Onset           Reported/Identified          

 

Relationship to Patient                 Clinical Status        

 

             Yes           ketorolac           [id number]           Drug Allergy

           

Unknown           N/A                        2008                         

  

     

 

             Yes           codeine           [id number]           Drug Allergy  

         

Unknown           TAKES ULTRAM AT                           2009          

    

                                                 

 

             Yes           tramadol           [id number]           Drug Allergy 

          

Unknown           N/A                        2011                         

  

     

 

                Yes             Sulfa (Sulfonamide Antibiotics)           [id number]

0491           

Drug Allergy           Unknown           N/A                             

015   

                                                             



                        



Medications

      



There is no data.                  



Problems

      



             Date Dx Coded           Attending           Type           Code    

       

Diagnosis                               Diagnosed By        

 

             2011                        Ot           716.90           ART

HROPATHY NOS-

UNSPEC                                           

 

             2011                        Ot           787.03           VOM

ITING ALONE   

                                                 

 

             2011                        Ot           789.00           ABD

OMINAL PAIN, 

UNSPECIFIED SITE                                 

 

             2011                        Ot           250.80           J CARLOS

B W OTH SPEC 

MANIFEST, TYPE II OR UNS                         

 

             2011                        Ot           305.20           CAN

NABIS ABUSE-

UNSPEC                                           

 

             2011                        Ot           305.50           OPI

OID ABUSE-

UNSPEC                                           

 

             2011                        Ot           305.90           KYA

G ABUSE NEC-

UNSPEC                                           

 

             2011                        Ot           V58.67           MUSA

G-TERM 

(CURRENT) USE OF INSULIN                         

 

             2011                        Ot           V58.69           OTH

 

MED,LT,CURRENT USE                               

 

             2011                        Ot           346.90           NIRAV

CIRA 

UNSPECIFIED W/O INTRACT MGRN W/                    

 

             2011                        Ot           784.0           HEAD

ACHE          

                                                 

 

             10/24/2011                        Ot           825.25           FX 

METATARSAL-

CLOSED                                           

 

             10/24/2011                        Ot           959.7           LOWE

R LEG INJURY 

NOS                                              

 

             10/24/2011                        Ot           E000.8           OTH

ER EXTERNAL 

CAUSE STATUS                                     

 

             10/24/2011                        Ot           E849.0           ACC

IDENT IN HOME 

                                                 

 

             10/24/2011                        Ot           E885.9           FAL

L FROM 

SLIPPING, TRIPPING, OR STUMBLI                    

 

             2011                        Ot           041.89           SONYA

TERIAL 

INFECTION DUE TO OTHER SPECIFI                    

 

             2011                        Ot           250.00           J CARLOS

B KATHERIN WO 

COMPL, TYPE II OR UNSPEC TY                      

 

             2011                        Ot           272.4           HYPE

RLIPIDEMIA 

NEC/NOS                                          

 

             2011                        Ot           300.4           DYST

HYMIC DISORDER

                                                 

 

             2011                        Ot           401.9           HYPE

RTENSION NOS  

                                                 

 

             2011                        Ot           453.41           ACU

TE VENOUS 

EMBOLISM   THROMBOSIS DEEP                       

 

             2011                        Ot           530.81           ESO

PHAGEAL REFLUX

                                                 

 

             2011                        Ot           593.9           HUEY

L   URETERAL 

DIS NOS                                          

 

             2011                        Ot           599.0           URIN

 TRACT 

INFECTION NOS                                    

 

             2011                        Ot           V45.89           POS

TSURGICAL 

STATES NEC                                       

 

             2011                        Ot           V54.19           AFT

ERCARE HEALING

TRAUMATIC FX OTHER BON                           

 

             2012                        Ot           453.40           ACU

TE VENOUS 

EMBOLISM   THROMBOSIS UNSP                       

 

             2012                        Ot           346.90           NIRAV

CIRA 

UNSPECIFIED W/O INTRACT MGRN W/                    

 

             2012                        Ot           453.40           ACU

TE VENOUS 

EMBOLISM   THROMBOSIS UNSP                       

 

             2013                        Ot           250.61           J CARLOS

B W NEURO 

MANIFEST, TYPE I [JUVENILE                       

 

             2013                        Ot           305.1           TOBA

CCO USE 

DISORDER                                         

 

             2013                        Ot           401.9           HYPE

RTENSION NOS  

                                                 

 

             2013                        Ot           493.90           AST

HMA, 

UNSPECIFIED                                      

 

             2013                        Ot           536.3           YIMI

ROPARESIS     

                                                 

 

             2013                        Ot           558.9           DOC

NF 

GASTROENTERIT NEC                                

 

             2013                        Ot           577.0           ACUT

E PANCREATITIS

                                                 

 

             2013                        Ot           453.40           ACU

TE VENOUS 

EMBOLISM   THROMBOSIS UNSP                       

 

                2014           JULY VOSS DO           Ot       

       453.40      

                          ACUTE VENOUS EMBOLISM   THROMBOSIS UNSP               

      

 

                2014           JULY VOSS DO           Ot       

       453.40      

                          ACUTE VENOUS EMBOLISM   THROMBOSIS UNSP               

      

 

           2015                       Ot           250.03                 

           

    

 

           2015                       Ot           250.41                 

           

    

 

           2015                       Ot           272.4                  

           

   

 

           2015                       Ot           403.10                 

           

    

 

           2015                       Ot           583.81                 

           

    

 

           2015                       Ot           585.2                  

           

   

 

           2015                       Ot           791.0                  

           

   

 

           2015                       Ot           453.40                 

           

    

 

                2015           JULY VOSS DO           Ot       

       453.40      

                                                             

 

                2015           JULY VOSS DO           Ot       

       250.80      

                          DIAB W OTH SPEC MANIFEST, TYPE II OR UNS              

      

 

                2015           JULY VOSS DO           Ot       

       300.14      

                          DISSOCIATIVE IDENTITY DISORDER                    

 

                2015           [facility]           [facility]       

       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      

                          BORDERLINE PERSONALITY DISORDER                    

 

                2015           BIPIN RAMIRES, JULY QUIROS           Ot       

       458.9       

                          HYPOTENSION NOS                    

 

                2015           [facility]           Ot       

       530.81      

                          ESOPHAGEAL REFLUX                    

 

                2015           [facility]           [facility]       

       584.9       

                          ACUTE RENAL FAILURE, UNSPECIFIED                    

 

                2015           [facility]           Ot       

       714.0       

                          RHEUMATOID ARTHRITIS                    

 

                2015           [facility]           Ot       

       780.97      

                          ALTERED MENTAL STATUS                    

 

                2015           [facility]           Ot       

       965.09      

                          POISONING-OPIATES NEC                    

 

                2015           [facility], JULY QUIROS           Ot       

       969.4       

                          POIS-BENZODIAZEPINE RUBIO                    

 

                2015           [facility]           Ot       

       E849.0      

                          ACCIDENT IN HOME                    

 

                2015           [facility]           Ot       

       E850.2      

                          ACC POISON-OPIATES NEC                    

 

                2015           [facility], JULY QUIROS           Ot       

       E853.2      

                          ACC POISN-BENZDIAZ TRANQ                    

 

                2015           [facility], JULY QUIROS           Ot       

       V12.51      

                          HX-VENOUS THROMBOSIS EMBOLISM                    

 

                2015           [facility], JULY QUIROS           Ot       

       V58.67      

                          LONG-TERM (CURRENT) USE OF INSULIN                    

 

                2016           NANCY MENDEZ, MED S           Ot        

      E11.29       

                                                             

 

                2016           NANCY MENDEZ, MED S           Ot        

      E78.5        

                                                             

 

                2016           NANCY MENDEZ, MED S           Ot        

      I12.9        

                                                             

 

                2016           NANCY MENDEZ, MED S           Ot        

      N18.3        

                                                             

 

                2016           NANCY MENDEZ, MED S           Ot        

      R80.9        

                                                             

 

                2016           TIMOTHY MENDEZ, MELODY GRIJALVA           Ot        

      E86.0        

                          DEHYDRATION                        

 

                2016           TIMOTHY MENDEZ, MELODY GRIJALVA           Ot        

      F11.10       

                          OPIOID ABUSE, UNCOMPLICATED                    

 

                2016           TIMOTHY MENDEZ, MELODY GRIJALVA           Ot        

      F12.10       

                          CANNABIS ABUSE, UNCOMPLICATED                    

 

                2016           TIMOTHY MENDEZ, MELODY GRIJALVA           Ot        

      F15.10       

                          OTHER STIMULANT ABUSE, UNCOMPLICATED                  

  

 

           2016                       Ot           250.01                 

           

    

 

           2016                       Ot           272.4                  

           

   

 

           2016                       Ot           403.90                 

           

    

 

           2016                       Ot           583.81                 

           

    

 

           2016                       Ot           585.2                  

           

   

 

           2016                       Ot           791.0                  

           

   

 

           2016                       Ot           250.01                 

           

    

 

           2016                       Ot           272.4                  

           

   

 

           2016                       Ot           403.90                 

           

    

 

           2016                       Ot           583.81                 

           

    

 

           2016                       Ot           585.2                  

           

   

 

           2016                       Ot           791.0                  

           

   

 

           2016                       Ot           250.41                 

           

    

 

           2016                       Ot           272.4                  

           

   

 

           2016                       Ot           403.90                 

           

    

 

           2016                       Ot           583.81                 

           

    

 

           2016                       Ot           585.2                  

           

   

 

           2016                       Ot           791.0                  

           

   

 

           2016                       Ot           250.41                 

           

    

 

           2016                       Ot           272.4                  

           

   

 

           2016                       Ot           403.90                 

           

    

 

           2016                       Ot           583.81                 

           

    

 

           2016                       Ot           585.2                  

           

   

 

           2016                       Ot           791.0                  

           

   

 

           2016                       Ot           250.01                 

           

    

 

           2016                       Ot           272.4                  

           

   

 

           2016                       Ot           403.10                 

           

    

 

           2016                       Ot           583.81                 

           

    

 

           2016                       Ot           585.2                  

           

   

 

           2016                       Ot           791.0                  

           

   

 

           2016                       Ot           250.01                 

           

    

 

           2016                       Ot           272.4                  

           

   

 

           2016                       Ot           403.10                 

           

    

 

           2016                       Ot           583.81                 

           

    

 

           2016                       Ot           585.2                  

           

   

 

           2016                       Ot           791.0                  

           

   

 

           2016                       Ot           250.40                 

           

    

 

           2016                       Ot           272.4                  

           

   

 

           2016                       Ot           585.2                  

           

   

 

           2016                       Ot           791.0                  

           

   

 

           2016                       Ot           453.40                 

           

    

 

                2016           NANCY MENDEZ, YU MELENDEZ           Ot        

      585.3        

                                                             

 

                2016           NANCY MENDEZ, YU MELENDEZ           Ot        

      250.40       

                                                             

 

                2016           NANCY MENDEZ, YU MELENDEZ           Ot        

      272.4        

                                                             

 

                2016           NANCY MENDEZ, YU MELENDEZ           Ot        

      585.2        

                                                             

 

                2016           NANCY MENDEZ, YU MELENDEZ           Ot        

      791.0        

                                                             

 

                2016           JULY VOSS DO           Ot       

       V76.12      

                                                             

 

                2016           JULY VOSS DO           Ot       

       611.72      

                                                             

 

                2016           NANCY MENDEZ, AHMED S           Ot        

      250.01       

                                                             

 

                2016           NANCY MENDEZ, MED S           Ot        

      272.4        

                                                             

 

                2016           NANCY MENDEZ, MED S           Ot        

      403.10       

                                                             

 

                2016           NANCY MENDEZ, MED S           Ot        

      583.81       

                                                             

 

                2016           NANCY MENDEZ, MED S           Ot        

      585.2        

                                                             

 

                2016           NANCY MENDEZ, MED S           Ot        

      791.0        

                                                             

 

                2016           FELIZ GERMAIN DO           Ot        

      250.03       

                                                             

 

                2016           NANCY MENDEZ, MED S           Ot        

      250.01       

                                                             

 

                2016           NANCY MENDEZ, MED S           Ot        

      272.4        

                                                             

 

                2016           NANCY MENDEZ, MED S           Ot        

      403.10       

                                                             

 

                2016           NANCY MENDEZ, MED S           Ot        

      583.81       

                                                             

 

                2016           NANCY MENDEZ, MED S           Ot        

      585.2        

                                                             

 

                2016           NANCY MENDEZ, [facility] S           Ot        

      791.0        

                                                             

 

                2016           FELIZ GERMAIN DO           Ot        

      250.03       

                                                             

 

                2016           JULY VOSS DO           Ot       

       453.40      

                                                             

 

           2016                       Ot           250.03                 

           

    

 

           2016                       Ot           250.41                 

           

    

 

           2016                       Ot           272.4                  

           

   

 

           2016                       Ot           403.10                 

           

    

 

           2016                       Ot           583.81                 

           

    

 

           2016                       Ot           585.2                  

           

   

 

           2016                       Ot           791.0                  

           

   

 

                2016           NANCY MENDEZ, MED S           Ot        

      E11.29       

                                                             

 

                2016           NANCY MENDEZ, MED S           Ot        

      E78.5        

                                                             

 

                2016           NANCY MENDEZ, MED S           Ot        

      I12.9        

                                                             

 

                2016           NANCY MENDEZ, GENESIS S           Ot        

      N18.3        

                                                             

 

                2016           NANCY MENDEZ, GENESIS S           Ot        

      R80.9        

                                                             

 

           2016                       Ot           250.01                 

           

    

 

           2016                       Ot           272.4                  

           

   

 

           2016                       Ot           403.90                 

           

    

 

           2016                       Ot           583.81                 

           

    

 

           2016                       Ot           585.2                  

           

   

 

           2016                       Ot           791.0                  

           

   

 

           2016                       Ot           250.01                 

           

    

 

           2016                       Ot           272.4                  

           

   

 

           2016                       Ot           403.90                 

           

    

 

           2016                       Ot           583.81                 

           

    

 

           2016                       Ot           585.2                  

           

   

 

           2016                       Ot           791.0                  

           

   

 

           2016                       Ot           250.41                 

           

    

 

           2016                       Ot           272.4                  

           

   

 

           2016                       Ot           403.90                 

           

    

 

           2016                       Ot           583.81                 

           

    

 

           2016                       Ot           585.2                  

           

   

 

           2016                       Ot           791.0                  

           

   

 

           2016                       Ot           250.41                 

           

    

 

           2016                       Ot           272.4                  

           

   

 

           2016                       Ot           403.90                 

           

    

 

           2016                       Ot           583.81                 

           

    

 

           2016                       Ot           585.2                  

           

   

 

           2016                       Ot           791.0                  

           

   

 

           2016                       Ot           250.01                 

           

    

 

           2016                       Ot           272.4                  

           

   

 

           2016                       Ot           403.10                 

           

    

 

           2016                       Ot           583.81                 

           

    

 

           2016                       Ot           585.2                  

           

   

 

           2016                       Ot           791.0                  

           

   

 

           2016                       Ot           250.01                 

           

    

 

           2016                       Ot           272.4                  

           

   

 

           2016                       Ot           403.10                 

           

    

 

           2016                       Ot           583.81                 

           

    

 

           2016                       Ot           585.2                  

           

   

 

           2016                       Ot           791.0                  

           

   

 

           2016                       Ot           250.40                 

           

    

 

           2016                       Ot           272.4                  

           

   

 

           2016                       Ot           585.2                  

           

   

 

           2016                       Ot           791.0                  

           

   

 

           2016                       Ot           453.40                 

           

    

 

                2016           NANCY MENDEZ, YU MELENDEZ           Ot        

      585.3        

                                                             

 

                2016           NANCY MENDEZ, GENESIS MELENDEZ           Ot        

      250.40       

                                                             

 

                2016           NANCY MENDEZ, GENESIS MELENDEZ           Ot        

      272.4        

                                                             

 

                2016           NANCY MENDEZ, GENESIS MELENDEZ           Ot        

      585.2        

                                                             

 

                2016           NANCY MENDEZ, GENESIS MELENDEZ           Ot        

      791.0        

                                                             

 

                2016           JULY VOSS DO           Ot       

       V76.12      

                                                             

 

                2016           JULY VOSS DO           Ot       

       611.72      

                                                             

 

                2016           NANCY MENDEZ, YU MELENDEZ           Ot        

      250.01       

                                                             

 

                2016           NANCY MENDEZ, GENESIS MELENDEZ           Ot        

      272.4        

                                                             

 

                2016           NANCY MENDEZ, MED S           Ot        

      403.10       

                                                             

 

                2016           NANCY MENDEZ, MED S           Ot        

      583.81       

                                                             

 

                2016           NANCY MENDEZ, MED S           Ot        

      585.2        

                                                             

 

                2016           NANCY MENDEZ, MED S           Ot        

      791.0        

                                                             

 

                2016           FELIZ GERMAIN DO           Ot        

      250.03       

                                                             

 

                2016           NANCY MENDEZ, MED S           Ot        

      250.01       

                                                             

 

                2016           NANCY MENDEZ, MED S           Ot        

      272.4        

                                                             

 

                2016           NANCY MENDEZ, MED S           Ot        

      403.10       

                                                             

 

                2016           NANCY MENDEZ, MED S           Ot        

      583.81       

                                                             

 

                2016           NANCY MENDEZ, MED S           Ot        

      585.2        

                                                             

 

                2016           NANCY MENDEZ, MED S           Ot        

      791.0        

                                                             

 

                2016           FELIZ GERMAIN DO           Ot        

      250.03       

                                                             

 

                2016           JULY VOSS DO           Ot       

       453.40      

                                                             

 

           2016                       Ot           250.03                 

           

    

 

           2016                       Ot           250.41                 

           

    

 

           2016                       Ot           272.4                  

           

   

 

           2016                       Ot           403.10                 

           

    

 

           2016                       Ot           583.81                 

           

    

 

           2016                       Ot           585.2                  

           

   

 

           2016                       Ot           791.0                  

           

   

 

                2016           NANCY MENDEZ, MED S           Ot        

      E11.29       

                                                             

 

                2016           NANCY MENDEZ, MED S           Ot        

      E78.5        

                                                             

 

                2016           NANCY MENDEZ, MED S           Ot        

      I12.9        

                                                             

 

                2016           NANCY MENDEZ, MED S           Ot        

      N18.3        

                                                             

 

                2016           NANCY MENDEZ, MED S           Ot        

      R80.9        

                                                             

 

                2016           TIMOTHY MENDEZ, MELODY D           Ot        

      E86.0        

                                                             

 

                2016           TIMOTHY MENDEZ, MELODY D           Ot        

      F11.10       

                                                             

 

                2016           TIMOTHY MENDEZ, MELODY D           Ot        

      F12.10       

                                                             

 

                2016           TIMOTHY MENDEZ, MELODY D           Ot        

      F15.10       

                                                             

 

                04/15/2016           TIMOTHY MENDEZ, MELODY D           Ot        

      E86.0        

                          DEHYDRATION                        

 

                04/15/2016           TIMOTHY MENDEZ, MELODY GRIJALVA           Ot        

      F11.10       

                          OPIOID ABUSE, UNCOMPLICATED                    

 

                04/15/2016           MELODY AGUIRRE MD           Ot        

      F12.10       

                          CANNABIS ABUSE, UNCOMPLICATED                    

 

                04/15/2016           TIMOTHY MENDEZ, MELODY GRIJALVA           Ot        

      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       

                          OTHER STIMULANT ABUSE, UNCOMPLICATED                  

  

 

                2016           TIMOTHY MENDEZ, MELODY GRIJALVA           Ot        

      E86.0        

                          DEHYDRATION                        

 

                2016           MELODY AUGIRRE MD           Ot        

      F11.10       

                          OPIOID ABUSE, UNCOMPLICATED                    

 

                2016           MELODY AGUIRRE MD           Ot        

      F12.10       

                          CANNABIS ABUSE, UNCOMPLICATED                    

 

                2016           TIMOTHY MENDEZ, MELODY GRIJALVA           Ot        

      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       

                          OTHER STIMULANT ABUSE, UNCOMPLICATED                  

  

 

             2016                        Ot           250.01           J CARLOS

B KATHERIN WO 

COMPL, TYPE I [JUVENILE TYP                      

 

             2016                        Ot           272.4           HYPE

RLIPIDEMIA 

NEC/NOS                                          

 

             2016                        Ot           403.90           HYP

TNSV CHR KID 

DIS, UNSPEC, W [facility] KD ST                         

 

             2016                        Ot           583.81           NEP

HRITIS NOS IN 

OTH DIS                                          

 

             2016                        Ot           585.2           

DAWN KIDNEY 

DISEASE, STAGE II (MILD)                         

 

             2016                        Ot           791.0           PROT

EINURIA       

                                                 

 

             2016                        Ot           250.01           J CARLOS

B KATHERIN WO 

COMPL, TYPE I [JUVENILE TYP                      

 

             2016                        Ot           272.4           HYPE

RLIPIDEMIA 

NEC/NOS                                          

 

             2016                        Ot           403.90           HYP

TNSV CHR KID 

DIS, UNSPEC, W [facility] KD ST                         

 

             2016                        Ot           583.81           NEP

HRITIS NOS IN 

OTH DIS                                          

 

             2016                        Ot           585.2           

DAWN KIDNEY 

DISEASE, STAGE II (MILD)                         

 

             2016                        Ot           791.0           PROT

EINURIA       

                                                 

 

             2016                        Ot           250.41           J CARLOS

B W RENAL 

MANIFEST, TYPE I [JUVENILE                       

 

             2016                        Ot           272.4           HYPE

RLIPIDEMIA 

NEC/NOS                                          

 

             2016                        Ot           403.90           HYP

TNSV CHR KID 

DIS, UNSPEC, W [facility]R [facility]                         

 

             2016                        Ot           583.81           NEP

HRITIS NOS IN 

OTH DIS                                          

 

             2016                        Ot           585.2           

DAWN KIDNEY 

DISEASE, STAGE II (MILD)                         

 

             2016                        Ot           791.0           PROT

EINURIA       

                                                 

 

             2016                        Ot           250.41           J CARLOS

B W RENAL 

MANIFEST, TYPE I [JUVENILE                       

 

             2016                        Ot           272.4           HYPE

RLIPIDEMIA 

NEC/NOS                                          

 

             2016                        Ot           403.90           HYP

TNSV CHR KID 

DIS, UNSPEC, W CHR [facility]                         

 

             2016                        Ot           583.81           NEP

HRITIS NOS IN 

OTH DIS                                          

 

             2016                        Ot           585.2           

DAWN KIDNEY 

DISEASE, STAGE II (MILD)                         

 

             2016                        Ot           791.0           PROT

EINURIA       

                                                 

 

             2016                        Ot           250.01           J CARLOS

B KATHERIN WO 

COMPL, TYPE I [JUVENILE TYP                      

 

             2016                        Ot           272.4           HYPE

RLIPIDEMIA 

NEC/NOS                                          

 

             2016                        Ot           403.10           HYP

TNSV CHR KID 

DIS, BENIGN, W CHR [facility]                         

 

             2016                        Ot           583.81           NEP

HRITIS NOS IN 

OTH DIS                                          

 

             2016                        Ot           585.2           

DAWN KIDNEY 

DISEASE, STAGE II (MILD)                         

 

             2016                        Ot           791.0           PROT

EINURIA       

                                                 

 

             2016                        Ot           250.01           J CARLOS

B KATHERIN WO 

COMPL, TYPE I [JUVENILE TYP                      

 

             2016                        Ot           272.4           HYPE

RLIPIDEMIA 

NEC/NOS                                          

 

             2016                        Ot           403.10           HYP

TNSV CHR KID 

DIS, BENIGN, W CHR [facility]                         

 

             2016                        Ot           583.81           NEP

HRITIS NOS IN 

OTH DIS                                          

 

             2016                        Ot           585.2           

DAWN KIDNEY 

DISEASE, STAGE II (MILD)                         

 

             2016                        Ot           791.0           PROT

EINURIA       

                                                 

 

             2016                        Ot           250.40           J CARLOS

B W RENAL 

MANIFEST, TYPE II OR UNSPEC                      

 

             2016                        Ot           272.4           HYPE

RLIPIDEMIA 

NEC/NOS                                          

 

             2016                        Ot           585.2           

DAWN KIDNEY 

DISEASE, STAGE II (MILD)                         

 

             2016                        Ot           791.0           PROT

EINURIA       

                                                 

 

             2016                        Ot           453.40           ACU

TE VENOUS 

EMBOLISM   THROMBOSIS UNSP                       

 

                2016           NANCY MENDEZ, YU MELENDEZ           Ot        

      585.3        

                          CHRONIC KIDNEY DISEASE, STAGE III (MODER              

      

 

                2016           NANCY MENDEZ, YU MELENDEZ           Ot        

      250.40       

                          DIAB W RENAL MANIFEST, TYPE II OR UNSPEC              

      

 

                2016           NANCY MENDEZ, YU MELENDEZ           Ot        

      272.4        

                          HYPERLIPIDEMIA NEC/NOS                    

 

                2016           NANCY MENDEZ, AHMED S           Ot        

      585.2        

                          CHRONIC KIDNEY DISEASE, STAGE II (MILD)               

     

 

                2016           NANCY MENDEZ, AHMED S           Ot        

      791.0        

                          PROTEINURIA                        

 

                2016           JULY VOSS DO           Ot       

       V76.12      

                          OTH SCREEN MAMMO-MALIGN NEOPLASM OF BABATUNDE              

      

 

                2016           JULY VOSS DO           Ot       

       611.72      

                          LUMP OR MASS IN BREAST                    

 

                2016           NANCY MENDEZ, AHMED S           Ot        

      250.01       

                          DIAB KATHERIN WO COMPL, TYPE I [JUVENILE TYP              

      

 

                2016           NANCY MENDEZ, AHMED S           Ot        

      272.4        

                          HYPERLIPIDEMIA NEC/NOS                    

 

                2016           NANCY MENDEZ, AHMED S           Ot        

      403.10       

                          HYPTNSV [facility] KID DIS, BENIGN, W CHR KD ST              

      

 

                2016           NANCY MENDEZ, AHMED S           Ot        

      583.81       

                          NEPHRITIS NOS IN OTH DIS                    

 

                2016           NANCY MENDEZ, AHMED S           Ot        

      585.2        

                          CHRONIC KIDNEY DISEASE, STAGE II (MILD)               

     

 

                2016           NANCY MENDEZ, AHMED S           Ot        

      791.0        

                          PROTEINURIA                        

 

                2016           GERMAIN , FELIZ L           Ot        

      250.03       

                          DIAB KATHERIN WO COMPL, TYPE I [JUVENILE TYP              

      

 

                2016           NANCY MENDEZ, AHMED S           Ot        

      250.01       

                          DIAB KATHERIN WO COMPL, TYPE I [JUVENILE TYP              

      

 

                2016           NANCY MENDEZ, AHMED S           Ot        

      272.4        

                          HYPERLIPIDEMIA NEC/NOS                    

 

                2016           NANCY MENDEZ, AHMED S           Ot        

      403.10       

                          HYPTNSV [facility] KID DIS, BENIGN, W CHR KD ST              

      

 

                2016           NANCY MENDEZ, AHMED S           Ot        

      583.81       

                          NEPHRITIS NOS IN OTH DIS                    

 

                2016           NANCY MENDEZ, AHMED S           Ot        

      585.2        

                          CHRONIC KIDNEY DISEASE, STAGE II (MILD)               

     

 

                2016           NANCY MENDEZ, AHMED S           Ot        

      791.0        

                          PROTEINURIA                        

 

                2016           GERMAIN DO, FELIZ L           Ot        

      250.03       

                          DIAB KATHERIN WO COMPL, TYPE I [JUVENILE TYP              

      

 

                2016           JULY VOSS DO           Ot       

       453.40      

                          ACUTE VENOUS EMBOLISM   THROMBOSIS UNSP               

      

 

             2016                        Ot           250.03           J CARLOS

B KATHERIN WO 

COMPL, TYPE I [JUVENILE TYP                      

 

             2016                        Ot           250.41           J CARLOS

B W RENAL 

MANIFEST, TYPE I [JUVENILE                       

 

             2016                        Ot           272.4           HYPE

RLIPIDEMIA 

NEC/NOS                                          

 

             2016                        Ot           403.10           HYP

TNSV CHR KID 

DIS, BENIGN, W CHR KD ST                         

 

             2016                        Ot           583.81           NEP

HRITIS NOS IN 

OTH DIS                                          

 

             2016                        Ot           585.2           

DAWN KIDNEY 

DISEASE, STAGE II (MILD)                         

 

             2016                        Ot           791.0           PROT

EINURIA       

                                                 

 

                2016           NANCY MENDEZ, YU S           Ot        

      E11.29       

                          TYPE 2 DIABETES MELLITUS W H DIABETIC               

      

 

                2016           NANCY MENDEZ, JULIANNEMED S           Ot        

      E78.5        

                          HYPERLIPIDEMIA, UNSPECIFIED                    

 

                2016           NANCY MENDEZ, JULIANNEMED S           Ot        

      I12.9        

                          HYPERTENSIVE CHRONIC KIDNEY DISEASE W ST              

      

 

                2016           NANCY MENDEZ, YU S           Ot        

      N18.3        

                          CHRONIC KIDNEY DISEASE, STAGE 3 (MODERAT              

      

 

                2016           NANCY MENDEZ, JULIANNEMED S           Ot        

      R80.9        

                          PROTEINURIA, UNSPECIFIED                    

 

                2016           NANCY MENDEZ, JULIANNEMED S           Ot        

      E11.21       

                          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              

      

 

                2016           NANCY MENDEZ, YU S           Ot        

      E11.22       

                          TYPE 2 DIABETES MELLITUS W DIABETIC               

      

 

                2016           NANCY MENDEZ, YU S           Ot        

      E78.5        

                          HYPERLIPIDEMIA, UNSPECIFIED                    

 

                2016           NANCY MENDEZ, YU S           Ot        

      I12.9        

                          HYPERTENSIVE CHRONIC KIDNEY DISEASE W ST              

      

 

                2016           NANCY MENDEZ, JULIANNEMED S           Ot        

      N18.2        

                          CHRONIC KIDNEY DISEASE, STAGE 2 (MILD)                

    

 

                2016           NANCY MENDEZ, JULIANNEMED S           Ot        

      R80.9        

                          PROTEINURIA, UNSPECIFIED                    

 

                2016           NANCY MENDEZ, AHMED S           Ot        

      Z79.899      

                          OTHER LONG TERM (CURRENT) DRUG THERAPY                

    

 

                05/10/2016           NANCY MENDEZ, YU S           Ot        

      E11.21       

                          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              

      

 

                05/10/2016           NANCY MNEDEZ, YU S           Ot        

      E11.22       

                          TYPE 2 DIABETES MELLITUS W DIABETIC               

      

 

                05/10/2016           NANCY MENDEZ, YU S           Ot        

      E78.5        

                          HYPERLIPIDEMIA, UNSPECIFIED                    

 

                05/10/2016           NANCY MENDEZ, JULIANNEGENESIS S           Ot        

      I12.9        

                          HYPERTENSIVE CHRONIC KIDNEY DISEASE W ST              

      

 

                05/10/2016           NANCY MENDEZ, YU S           Ot        

      N18.2        

                          CHRONIC KIDNEY DISEASE, STAGE 2 (MILD)                

    

 

                05/10/2016           NANCY MENDEZ, JULIANNEGENESIS S           Ot        

      R80.9        

                          PROTEINURIA, UNSPECIFIED                    

 

                05/10/2016           NANCY MENDEZ, YU S           Ot        

      Z79.899      

                          OTHER LONG TERM (CURRENT) DRUG THERAPY                

    

 

                2016           GERMAIN DO, FELIZ L           Ot        

      E10.65       

                          TYPE 1 DIABETES MELLITUS WITH HYPERGLYCE              

      

 

                2016           GERMAIN DO, FELIZ L           Ot        

      E78.5        

                          HYPERLIPIDEMIA, UNSPECIFIED                    

 

                2016           GERMAIN DO, FELIZ L           Ot        

      E10.65       

                          TYPE 1 DIABETES MELLITUS WITH HYPERGLYCE              

      

 

                2016           GERMAIN DO, FELIZ L           Ot        

      E78.5        

                          HYPERLIPIDEMIA, UNSPECIFIED                    

 

             2016                        Ot           250.01           J CARLOS

B KATHERIN WO 

COMPL, TYPE I [JUVENILE TYP                      

 

             2016                        Ot           272.4           HYPE

RLIPIDEMIA 

NEC/NOS                                          

 

             2016                        Ot           403.90           HYP

TNSV CHR KID 

DIS, UNSPEC, W CHR KD ST                         

 

             2016                        Ot           583.81           NEP

HRITIS NOS IN 

OTH DIS                                          

 

             2016                        Ot           585.2           

DAWN KIDNEY 

DISEASE, STAGE II (MILD)                         

 

             2016                        Ot           791.0           PROT

EINURIA       

                                                 

 

             2016                        Ot           250.41           J CARLOS

B W RENAL 

MANIFEST, TYPE I [JUVENILE                       

 

             2016                        Ot           272.4           HYPE

RLIPIDEMIA 

NEC/NOS                                          

 

             2016                        Ot           403.90           HYP

TNSV CHR KID 

DIS, UNSPEC, W CHR KD ST                         

 

             2016                        Ot           583.81           NEP

HRITIS NOS IN 

OTH DIS                                          

 

             2016                        Ot           585.2           

DAWN KIDNEY 

DISEASE, STAGE II (MILD)                         

 

             2016                        Ot           791.0           PROT

EINURIA       

                                                 

 

             2016                        Ot           250.41           J CARLOS

B W RENAL 

MANIFEST, TYPE I [JUVENILE                       

 

             2016                        Ot           272.4           HYPE

RLIPIDEMIA 

NEC/NOS                                          

 

             2016                        Ot           403.90           HYP

TNSV CHR KID 

DIS, UNSPEC, W CHR KD ST                         

 

             2016                        Ot           583.81           NEP

HRITIS NOS IN 

OTH DIS                                          

 

             2016                        Ot           585.2           

DAWN KIDNEY 

DISEASE, STAGE II (MILD)                         

 

             2016                        Ot           791.0           PROT

EINURIA       

                                                 

 

             2016                        Ot           250.01           J CARLOS

B KATHERIN WO 

COMPL, TYPE I [JUVENILE TYP                      

 

             2016                        Ot           272.4           HYPE

RLIPIDEMIA 

NEC/NOS                                          

 

             2016                        Ot           403.10           HYP

TNSV CHR KID 

DIS, BENIGN, W CHR KD ST                         

 

             2016                        Ot           583.81           NEP

HRITIS NOS IN 

OTH DIS                                          

 

             2016                        Ot           585.2           

DAWN KIDNEY 

DISEASE, STAGE II (MILD)                         

 

             2016                        Ot           791.0           PROT

EINURIA       

                                                 

 

             2016                        Ot           250.01           J CARLOS

B KATHERIN WO 

COMPL, TYPE I [JUVENILE TYP                      

 

             2016                        Ot           272.4           HYPE

RLIPIDEMIA 

NEC/NOS                                          

 

             2016                        Ot           403.10           HYP

TNSV CHR KID 

DIS, BENIGN, W CHR KD ST                         

 

             2016                        Ot           583.81           NEP

HRITIS NOS IN 

OTH DIS                                          

 

             2016                        Ot           585.2           

DAWN KIDNEY 

DISEASE, STAGE II (MILD)                         

 

             2016                        Ot           791.0           PROT

EINURIA       

                                                 

 

             2016                        Ot           250.40           J CARLOS

B W RENAL 

MANIFEST, TYPE II OR UNSPEC                      

 

             2016                        Ot           272.4           HYPE

RLIPIDEMIA 

NEC/NOS                                          

 

             2016                        Ot           585.2           

DAWN KIDNEY 

DISEASE, STAGE II (MILD)                         

 

             2016                        Ot           791.0           PROT

EINURIA       

                                                 

 

             2016                        Ot           453.40           ACU

TE VENOUS 

EMBOLISM   THROMBOSIS UNSP                       

 

                2016           NANCY MENDEZ, YU S           Ot        

      585.3        

                          CHRONIC KIDNEY DISEASE, STAGE III (MODER              

      

 

                2016           NANCY MENDEZ, YU S           Ot        

      250.40       

                          DIAB W RENAL MANIFEST, TYPE II OR UNSPEC              

      

 

                2016           NANCY MENDEZ, YU S           Ot        

      272.4        

                          HYPERLIPIDEMIA NEC/NOS                    

 

                2016           NANCY MENDEZ, YU S           Ot        

      585.2        

                          CHRONIC KIDNEY DISEASE, STAGE II (MILD)               

     

 

                2016           NANCY MENDEZ, YU S           Ot        

      791.0        

                          PROTEINURIA                        

 

                2016           JULY VOSS DO           Ot       

       V76.12      

                          OT SCREEN MAMMO-MALIGN NEOPLASM OF BABATUNDE              

      

 

                2016           JULY VOSS DO           Ot       

       611.72      

                          LUMP OR MASS IN BREAST                    

 

                2016           NANCY MENDEZ, AHMED S           Ot        

      250.01       

                          DIAB KATHERIN WO COMPL, TYPE I [JUVENILE TYP              

      

 

                2016           NANCY MENDEZ, AHMED S           Ot        

      272.4        

                          HYPERLIPIDEMIA NEC/NOS                    

 

                2016           NANCY MENDEZ, AHMED S           Ot        

      403.10       

                          HYPTNSV [facility] KID DIS, BENIGN, W CHR KD ST              

      

 

                2016           NANCY MENDEZ, AHMED S           Ot        

      583.81       

                          NEPHRITIS NOS IN OTH DIS                    

 

                2016           NANCY MENDEZ, AHMED S           Ot        

      585.2        

                          CHRONIC KIDNEY DISEASE, STAGE II (MILD)               

     

 

                2016           NANCY MENDEZ, AHMED S           Ot        

      791.0        

                          PROTEINURIA                        

 

                2016           FELIZ GERMAIN DO L           Ot        

      250.03       

                          DIAB KATHERIN WO COMPL, TYPE I [JUVENILE TYP              

      

 

                2016           NANCY MENDEZ, AHMED S           Ot        

      250.01       

                          DIAB KATHERIN WO COMPL, TYPE I [JUVENILE TYP              

      

 

                2016           NANCY MENDEZ, AHMED S           Ot        

      272.4        

                          HYPERLIPIDEMIA NEC/NOS                    

 

                2016           NANCY MENDEZ, AHMED S           Ot        

      403.10       

                          HYPTNSV [facility] KID DIS, BENIGN, W CHR KD ST              

      

 

                2016           NANCY MENDEZ, AHMED S           Ot        

      583.81       

                          NEPHRITIS NOS IN OTH DIS                    

 

                2016           NANCY MENDEZ, AHMED S           Ot        

      585.2        

                          CHRONIC KIDNEY DISEASE, STAGE II (MILD)               

     

 

                2016           NANCY MENDEZ, AHMED S           Ot        

      791.0        

                          PROTEINURIA                        

 

                2016           SHERIE GERMAIN DOISON L           Ot        

      250.03       

                          DIAB KATHERIN WO COMPL, TYPE I [JUVENILE TYP              

      

 

                2016           JULY VOSS DO           Ot       

       453.40      

                          ACUTE VENOUS EMBOLISM   THROMBOSIS UNSP               

      

 

             2016                        Ot           250.03           J CARLOS

B KATHERIN WO 

COMPL, TYPE I [JUVENILE TYP                      

 

             2016                        Ot           250.41           J CARLOS

B W RENAL 

MANIFEST, TYPE I [JUVENILE                       

 

             2016                        Ot           272.4           HYPE

RLIPIDEMIA 

NEC/NOS                                          

 

             2016                        Ot           403.10           HYP

TNSV CHR KID 

DIS, BENIGN, W CHR KD ST                         

 

             2016                        Ot           583.81           NEP

HRITIS NOS IN 

OTH DIS                                          

 

             2016                        Ot           585.2           

DAWN KIDNEY 

DISEASE, STAGE II (MILD)                         

 

             2016                        Ot           791.0           PROT

EINURIA       

                                                 

 

                2016           NANCY MENDEZ, JULIANNEMED S           Ot        

      E11.29       

                          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               

      

 

                2016           NANCY MENDEZ, AHMED S           Ot        

      E78.5        

                          HYPERLIPIDEMIA, UNSPECIFIED                    

 

                2016           NANCY MENDEZ, AHMED S           Ot        

      I12.9        

                          HYPERTENSIVE CHRONIC KIDNEY DISEASE W ST              

      

 

                2016           NANCY MENDEZ, AHMED S           Ot        

      N18.3        

                          CHRONIC KIDNEY DISEASE, STAGE 3 (MODERAT              

      

 

                2016           NANCY MENDEZ, AHMED S           Ot        

      R80.9        

                          PROTEINURIA, UNSPECIFIED                    

 

                2016           NANCY MENDEZ, AHMED S           Ot        

      E11.21       

                          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              

      

 

                2016           NANCY MENDEZ, AHMED S           Ot        

      E11.22       

                          TYPE 2 DIABETES MELLITUS W DIABETIC               

      

 

                2016           NANCY MENDEZ, AHMED S           Ot        

      E78.5        

                          HYPERLIPIDEMIA, UNSPECIFIED                    

 

                2016           NANCY MENDEZ, AHMED S           Ot        

      I12.9        

                          HYPERTENSIVE CHRONIC KIDNEY DISEASE W ST              

      

 

                2016           NANCY MENDEZ, AHMED S           Ot        

      N18.2        

                          CHRONIC KIDNEY DISEASE, STAGE 2 (MILD)                

    

 

                2016           NANCY MENDEZ, AHMED S           Ot        

      R80.9        

                          PROTEINURIA, UNSPECIFIED                    

 

                2016           NANCY MENDEZ, AHMED S           Ot        

      Z79.899      

                          OTHER LONG TERM (CURRENT) DRUG THERAPY                

    

 

                2016           MARCELLUS RAMIRES, FELIZ L           Ot        

      E10.65       

                          TYPE 1 DIABETES MELLITUS WITH HYPERGLYCE              

      

 

                2016           MARCELLUS RAMIRES, FELIZ L           Ot        

      E78.5        

                          HYPERLIPIDEMIA, UNSPECIFIED                    

 

                2016           MARCELLUS DO, FELIZ L           Ot        

      E10.65       

                          TYPE 1 DIABETES MELLITUS WITH HYPERGLYCE              

      

 

                2016           MARCELLUS RAMIRES, FELIZ L           Ot        

      E78.5        

                          HYPERLIPIDEMIA, UNSPECIFIED                    

 

                2016           MARCELLUS RAMIRES, FELIZ L           Ot        

      E10.65       

                          TYPE 1 DIABETES MELLITUS WITH HYPERGLYCE              

      

 

                2016           FELIZ GEMRAIN DO           Ot        

      E78.5        

                          HYPERLIPIDEMIA, UNSPECIFIED                    

 

             2017                        Ot           250.01           J CARLOS

B KATHERIN WO 

COMPL, TYPE I [JUVENILE TYP                      

 

             2017                        Ot           272.4           HYPE

RLIPIDEMIA 

NEC/NOS                                          

 

             2017                        Ot           403.10           HYP

TNSV CHR KID 

DIS, BENIGN, W CHR KD ST                         

 

             2017                        Ot           583.81           NEP

HRITIS NOS IN 

OTH DIS                                          

 

             2017                        Ot           585.2           

DAWN KIDNEY 

DISEASE, STAGE II (MILD)                         

 

             2017                        Ot           791.0           PROT

EINURIA       

                                                 

 

             2017                        Ot           250.40           J CARLOS

B W RENAL 

MANIFEST, TYPE II OR UNSPEC                      

 

             2017                        Ot           272.4           HYPE

RLIPIDEMIA 

NEC/NOS                                          

 

             2017                        Ot           585.2           

DAWN KIDNEY 

DISEASE, STAGE II (MILD)                         

 

             2017                        Ot           791.0           PROT

EINURIA       

                                                 

 

             2017                        Ot           453.40           ACU

TE VENOUS 

EMBOLISM   THROMBOSIS UNSP                       

 

                2017           NANCY MENDEZ, AHMED S           Ot        

      585.3        

                          CHRONIC KIDNEY DISEASE, STAGE III (MODER              

      

 

                2017           NANCY MENDEZ, JULIANNEMED S           Ot        

      250.40       

                          DIAB W RENAL MANIFEST, TYPE II OR UNSPEC              

      

 

                2017           NANCY MENDEZ, AHMED S           Ot        

      272.4        

                          HYPERLIPIDEMIA NEC/NOS                    

 

                2017           NANCY MENDEZ, AHMED S           Ot        

      585.2        

                          CHRONIC KIDNEY DISEASE, STAGE II (MILD)               

     

 

                2017           NANCY MENDEZ, AHMED S           Ot        

      791.0        

                          PROTEINURIA                        

 

                2017           JULY VOSS DO           Ot       

       V76.12      

                          OTH SCREEN MAMMO-MALIGN NEOPLASM OF BABATUNDE              

      

 

                2017           JULY VOSS DO           Ot       

       611.72      

                          LUMP OR MASS IN BREAST                    

 

                2017           NANCY MENDEZ, AHMED S           Ot        

      250.01       

                          DIAB KATHERIN WO COMPL, TYPE I [JUVENILE TYP              

      

 

                2017           NANCY MENDEZ, AHMED S           Ot        

      272.4        

                          HYPERLIPIDEMIA NEC/NOS                    

 

                2017           NANCY MENDEZ, YU S           Ot        

      403.10       

                          HYPTNSV CHR KID DIS, BENIGN, W CHR KD ST              

      

 

                2017           NANCY MENDEZ, YU S           Ot        

      583.81       

                          NEPHRITIS NOS IN OTH DIS                    

 

                2017           NANCY MENDEZ, AHMED S           Ot        

      585.2        

                          CHRONIC KIDNEY DISEASE, STAGE II (MILD)               

     

 

                2017           NANCY MENDEZ, AHMED S           Ot        

      791.0        

                          PROTEINURIA                        

 

                2017           FELIZ GERMAIN DO           Ot        

      250.03       

                          DIAB KATHERIN WO COMPL, TYPE I [JUVENILE TYP              

      

 

                2017           NANCY MENDEZ, AHMED S           Ot        

      250.01       

                          DIAB KATHERIN WO COMPL, TYPE I [JUVENILE TYP              

      

 

                2017           NANCY MENDEZ, AHMED S           Ot        

      272.4        

                          HYPERLIPIDEMIA NEC/NOS                    

 

                2017           NANCY MENDEZ, AHMED S           Ot        

      403.10       

                          HYPTNSV [facility] KID DIS, BENIGN, W CHR KD ST              

      

 

                2017           NANCY MENDEZ, AHMED S           Ot        

      583.81       

                          NEPHRITIS NOS IN OTH DIS                    

 

                2017           NANCY MENDEZ, AHMED S           Ot        

      585.2        

                          CHRONIC KIDNEY DISEASE, STAGE II (MILD)               

     

 

                2017           NANCY MENDEZ, AHMED S           Ot        

      791.0        

                          PROTEINURIA                        

 

                2017           FELIZ GERMAIN DO           Ot        

      250.03       

                          DIAB KATHERIN WO COMPL, TYPE I [JUVENILE TYP              

      

 

                2017           JULY VOSS DO           Ot       

       453.40      

                          ACUTE VENOUS EMBOLISM   THROMBOSIS UNSP               

      

 

             2017                        Ot           250.03           J CARLOS

B KATHERIN WO 

COMPL, TYPE I [JUVENILE TYP                      

 

             2017                        Ot           250.41           J CARLOS

B W RENAL 

MANIFEST, TYPE I [JUVENILE                       

 

             2017                        Ot           272.4           HYPE

RLIPIDEMIA 

NEC/NOS                                          

 

             2017                        Ot           403.10           HYP

TNSV CHR KID 

DIS, BENIGN, W CHR KD ST                         

 

             2017                        Ot           583.81           NEP

HRITIS NOS IN 

OTH DIS                                          

 

             2017                        Ot           585.2           

DAWN KIDNEY 

DISEASE, STAGE II (MILD)                         

 

             2017                        Ot           791.0           PROT

EINURIA       

                                                 

 

                2017           NANCY MENDEZ, AHMED S           Ot        

      E11.29       

                          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               

      

 

                2017           NANCY MENDEZ, AHMED S           Ot        

      E78.5        

                          HYPERLIPIDEMIA, UNSPECIFIED                    

 

                2017           NANCY MENDEZ, YU S           Ot        

      I12.9        

                          HYPERTENSIVE CHRONIC KIDNEY DISEASE W ST              

      

 

                2017           NANCY MENDEZ, JULIANNEMED S           Ot        

      N18.3        

                          CHRONIC KIDNEY DISEASE, STAGE 3 (MODERAT              

      

 

                2017           NANCY MENDEZ, AHMED S           Ot        

      R80.9        

                          PROTEINURIA, UNSPECIFIED                    

 

                2017           NANCY MENDEZ, JULIANNEMED S           Ot        

      E11.21       

                          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              

      

 

                2017           NANCY MENDEZ, JULIANNEMED S           Ot        

      E11.22       

                          TYPE 2 DIABETES MELLITUS W DIABETIC               

      

 

                2017           NANCY MENDEZ, JULIANNEMED S           Ot        

      E78.5        

                          HYPERLIPIDEMIA, UNSPECIFIED                    

 

                2017           NANCY MENDEZ, JULIANNEMED S           Ot        

      I12.9        

                          HYPERTENSIVE CHRONIC KIDNEY DISEASE W ST              

      

 

                2017           NANCY MENDEZ, AHMED S           Ot        

      N18.2        

                          CHRONIC KIDNEY DISEASE, STAGE 2 (MILD)                

    

 

                2017           JULIANNE CRUZ MDMED S           Ot        

      R80.9        

                          PROTEINURIA, UNSPECIFIED                    

 

                2017           NANCY MENDEZ, AHMED S           Ot        

      Z79.899      

                          OTHER LONG TERM (CURRENT) DRUG THERAPY                

    

 

                2017           MARCELLUS RAMIRES, FELIZ L           Ot        

      E10.65       

                          TYPE 1 DIABETES MELLITUS WITH HYPERGLYCE              

      

 

                2017           MARCELLUS RAMIRES, FELIZ L           Ot        

      E78.5        

                          HYPERLIPIDEMIA, UNSPECIFIED                    

 

                2017           MARCELLUS RAMIRES, FELIZ L           Ot        

      E10.65       

                          TYPE 1 DIABETES MELLITUS WITH HYPERGLYCE              

      

 

                2017           MARCELLUS RAMIRES, FELIZ L           Ot        

      E78.5        

                          HYPERLIPIDEMIA, UNSPECIFIED                    

 

             2017           LIAM GAONA APRN           Ot           E11

.9           

TYPE 2 DIABETES MELLITUS WITHOUT COMPLIC                    

 

                2017           LIAM GAONA APRN           Ot           

   J45.909         

                          UNSPECIFIED ASTHMA, UNCOMPLICATED                    

 

             2017           LIAM GAONA APRN           Ot           K21

.9           

GASTRO-ESOPHAGEAL REFLUX DISEASE WITHOUT                    

 

             2017           LIAM GAONA APRN           Ot           M06

.9           

RHEUMATOID ARTHRITIS, UNSPECIFIED                    

 

                2017           LIAM GAONA APRN           Ot           

   M19.90          

                          UNSPECIFIED OSTEOARTHRITIS, UNSPECIFIED               

      

 

                2017           LIAM GAONA APRN           Ot           

   R10.31          

                          RIGHT LOWER QUADRANT PAIN                    

 

             2017           LIAM GAONA APRN           Ot           R11

.2           

NAUSEA WITH VOMITING, UNSPECIFIED                    

 

             2017           LIAM GAONA           Ot           R94

.5           

ABNORMAL RESULTS OF LIVER FUNCTION STUDI                    

 

                2017           LIAM GAONA           Ot           

   Z79.01          

                          LONG TERM (CURRENT) USE OF ANTICOAGULANT              

      

 

             2017           LIAM GAONA           Ot           Z79

.4           

LONG TERM (CURRENT) USE OF INSULIN                    

 

                2017           LIAM GAONA           Ot           

   Z82.49          

                          FAMILY HX OF ISCHEM HEART DIS AND OTH DI              

      

 

                2017           LIAM GAONA           Ot           

   Z86.718         

                          PERSONAL HISTORY OF OTHER VENOUS THROMBO              

      

 

                2017           LIAM GAONA           Ot           

   Z87.01          

                          PERSONAL HISTORY OF PNEUMONIA (RECURRENT              

      

 

                2017           LIAM GAONA           Ot           

   Z87.19          

                          PERSONAL HISTORY OF OTHER DISEASES OF TH              

      

 

                2017           LIAM GAONA           Ot           

   Z90.710         

                          ACQUIRED ABSENCE OF BOTH CERVIX AND UTER              

      

 

                2017           LIAM GAONA           Ot           

   Z90.89          

                          ACQUIRED ABSENCE OF OTHER ORGANS                    

 

                2017           FELIZ GERMAIN DO           Ot        

      E10.65       

                          TYPE 1 DIABETES MELLITUS WITH HYPERGLYCE              

      

 

                10/24/2017           JULY VOSS DO           Ot       

       R73.9       

                          HYPERGLYCEMIA, UNSPECIFIED                    

 

                10/24/2017           JULY VOSS DO           Ot       

       R74.8       

                          ABNORMAL LEVELS OF OTHER SERUM ENZYMES                

    

 

                2018           FELIZ GERMAIN DO           Ot        

      E10.65       

                          TYPE 1 DIABETES MELLITUS WITH HYPERGLYCE              

      

 

                2018           FELIZ GERMAIN DO           Ot        

      N18.9        

                          CHRONIC KIDNEY DISEASE, UNSPECIFIED                   

 

 

                2018           FELIZ GERMAIN DO           Ot        

      E10.65       

                          TYPE 1 DIABETES MELLITUS WITH HYPERGLYCE              

      

 

                2018           FELIZ GERMAIN DO           Ot        

      N18.9        

                          CHRONIC KIDNEY DISEASE, UNSPECIFIED                   

 

 

             2018                        Ot           453.40           ACU

TE VENOUS 

EMBOLISM   THROMBOSIS UNSP                       

 

                2018           NANCY MENDEZ, YU MELENDEZ           Ot        

      585.3        

                          CHRONIC KIDNEY DISEASE, STAGE III (MODER              

      

 

                2018           NANCY MENDEZ, YU S           Ot        

      250.40       

                          DIAB W RENAL MANIFEST, TYPE II OR UNSPEC              

      

 

                2018           YU CRUZ MD S           Ot        

      272.4        

                          HYPERLIPIDEMIA NEC/NOS                    

 

                2018           NANCY MENDEZ, AHMED S           Ot        

      585.2        

                          CHRONIC KIDNEY DISEASE, STAGE II (MILD)               

     

 

                2018           NANCY MENDEZ, JULIANNEMED S           Ot        

      791.0        

                          PROTEINURIA                        

 

                2018           BIPIN JULY RAMIRES           Ot       

       V76.12      

                          OTH SCREEN MAMMO-MALIGN NEOPLASM OF BABATUNDE              

      

 

                2018           BIPIN JULY RAMIRES           Ot       

       611.72      

                          LUMP OR MASS IN BREAST                    

 

                2018           NANCY MENDEZ, AHMED S           Ot        

      250.01       

                          DIAB KATHERIN WO COMPL, TYPE I [JUVENILE TYP              

      

 

                2018           NANCY MENDEZ, AHMED S           Ot        

      272.4        

                          HYPERLIPIDEMIA NEC/NOS                    

 

                2018           NANCY MENDEZ, AHMED S           Ot        

      403.10       

                          HYPTNSV [facility] KID DIS, BENIGN, W [facility] KD ST              

      

 

                2018           NANCY MENDEZ, AHMED S           Ot        

      583.81       

                          NEPHRITIS NOS IN OTH DIS                    

 

                2018           NANCY MENDEZ, AHMED S           Ot        

      585.2        

                          CHRONIC KIDNEY DISEASE, STAGE II (MILD)               

     

 

                2018           NANCY MENDEZ, AHMED S           Ot        

      791.0        

                          PROTEINURIA                        

 

                2018           GERMAIN DO, FELIZ L           Ot        

      250.03       

                          DIAB KATHERIN WO COMPL, TYPE I [JUVENILE TYP              

      

 

                2018           NANCY MENDEZ, AHMED S           Ot        

      250.01       

                          DIAB KATHERIN WO COMPL, TYPE I [JUVENILE TYP              

      

 

                2018           NANCY MENDEZ, AHMED S           Ot        

      272.4        

                          HYPERLIPIDEMIA NEC/NOS                    

 

                2018           NANCY MENDEZ, AHMED S           Ot        

      403.10       

                          HYPTNSV [facility] KID DIS, BENIGN, W [facility]              

      

 

                2018           NANCY MENDEZ, AHMED S           Ot        

      583.81       

                          NEPHRITIS NOS IN OTH DIS                    

 

                2018           NANCY MENDEZ, AHMED S           Ot        

      585.2        

                          CHRONIC KIDNEY DISEASE, STAGE II (MILD)               

     

 

                2018           NANCY MENDEZ, AHMED S           Ot        

      791.0        

                          PROTEINURIA                        

 

                2018           GERMAIN DO, FELIZ L           Ot        

      250.03       

                          DIAB KATHERIN WO COMPL, TYPE I [JUVENILE TYP              

      

 

                2018           ASYADANK DOJULY           Ot       

       453.40      

                          ACUTE VENOUS EMBOLISM   THROMBOSIS UNSP               

      

 

             2018                        Ot           250.03           J CARLOS

B KATHERIN WO 

COMPL, TYPE I [JUVENILE TYP                      

 

             2018                        Ot           250.41           J CARLOS

B W RENAL 

MANIFEST, TYPE I [JUVENILE                       

 

             2018                        Ot           272.4           HYPE

RLIPIDEMIA 

NEC/NOS                                          

 

             2018                        Ot           403.10           HYP

TNSV CHR KID 

DIS, BENIGN, W CHR KD ST                         

 

             2018                        Ot           583.81           NEP

HRITIS NOS IN 

OTH DIS                                          

 

             2018                        Ot           585.2           

DAWN KIDNEY 

DISEASE, STAGE II (MILD)                         

 

             2018                        Ot           791.0           PROT

EINURIA       

                                                 

 

                2018           NANCY MENDEZ, JULIANNEMED S           Ot        

      E11.29       

                          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               

      

 

                2018           NANCY MENDEZ, JULIANNEMED S           Ot        

      E78.5        

                          HYPERLIPIDEMIA, UNSPECIFIED                    

 

                2018           NANCY MENDEZ, AHMED S           Ot        

      I12.9        

                          HYPERTENSIVE CHRONIC KIDNEY DISEASE W ST              

      

 

                2018           NANCY MENDEZ, AHMED S           Ot        

      N18.3        

                          CHRONIC KIDNEY DISEASE, STAGE 3 (MODERAT              

      

 

                2018           NANCY MENDEZ, AHMED S           Ot        

      R80.9        

                          PROTEINURIA, UNSPECIFIED                    

 

                2018           NANCY MENDEZ, AHMED S           Ot        

      E11.21       

                          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              

      

 

                2018           NANCY MENDEZ, AHMED S           Ot        

      E11.22       

                          TYPE 2 DIABETES MELLITUS W DIABETIC               

      

 

                2018           NANCY MENDEZ, AHMED S           Ot        

      E78.5        

                          HYPERLIPIDEMIA, UNSPECIFIED                    

 

                2018           NANCY MENDEZ, AHMED S           Ot        

      I12.9        

                          HYPERTENSIVE CHRONIC KIDNEY DISEASE W ST              

      

 

                2018           NANCY MENDEZ, AHMED S           Ot        

      N18.2        

                          CHRONIC KIDNEY DISEASE, STAGE 2 (MILD)                

    

 

                2018           NANCY MENDEZ, AHMED S           Ot        

      R80.9        

                          PROTEINURIA, UNSPECIFIED                    

 

                2018           NANCY MENDEZ, AHMED S           Ot        

      Z79.899      

                          OTHER LONG TERM (CURRENT) DRUG THERAPY                

    

 

                2018           SHERIE GERMAIN DOISON L           Ot        

      E10.65       

                          TYPE 1 DIABETES MELLITUS WITH HYPERGLYCE              

      

 

                2018           SHERIE GERMAIN DOISON L           Ot        

      E78.5        

                          HYPERLIPIDEMIA, UNSPECIFIED                    

 

                2018           SHERIE GERMAIN DOISON L           Ot        

      E10.65       

                          TYPE 1 DIABETES MELLITUS WITH HYPERGLYCE              

      

 

                2018           GERMAIN DO, FELIZ PARIKH           Ot        

      E78.5        

                          HYPERLIPIDEMIA, UNSPECIFIED                    

 

                2018           GERMAIN DO, FELIZ L           Ot        

      E10.65       

                          TYPE 1 DIABETES MELLITUS WITH HYPERGLYCE              

      

 

                2018           ASYALENDER DO, JULY QUIROS           Ot       

       R73.9       

                          HYPERGLYCEMIA, UNSPECIFIED                    

 

                2018           ASYALENDER DO, JULY QUIROS           Ot       

       R74.8       

                          ABNORMAL LEVELS OF OTHER SERUM ENZYMES                

    

 

                2018           MARCELLUS DO, FELIZ PARIKH           Ot        

      E10.65       

                          TYPE 1 DIABETES MELLITUS WITH HYPERGLYCE              

      

 

                2018           GERMAIN DO, FELIZ PARIKH           Ot        

      N18.9        

                          CHRONIC KIDNEY DISEASE, UNSPECIFIED                   

 

 

                2018           ASYALENDER DO, JULY QUIROS           Ot       

       M25.522     

                          PAIN IN LEFT ELBOW                    

 

                2018           BIPIN RAMIRES, JULY QUIROS           Ot       

       M61.48      

                          OTHER CALCIFICATION OF MUSCLE, OTHER SIT              

      

 

                01/15/2019           NANCY MENDEZ, YU S           Ot        

      585.3        

                          CHRONIC KIDNEY DISEASE, STAGE III (MODER              

      

 

                01/15/2019           NANCY MENDEZ, JULIANNEMED S           Ot        

      250.40       

                          DIAB W RENAL MANIFEST, TYPE II OR UNSPEC              

      

 

                01/15/2019           NANCY MENDEZ, YU S           Ot        

      272.4        

                          HYPERLIPIDEMIA NEC/NOS                    

 

                01/15/2019           NANCY MENDEZ, YU S           Ot        

      585.2        

                          CHRONIC KIDNEY DISEASE, STAGE II (MILD)               

     

 

                01/15/2019           NANCY MENDEZ, JULIANNEMED S           Ot        

      791.0        

                          PROTEINURIA                        

 

                01/15/2019           BIPIN RAMIRESJULY           Ot       

       V76.12      

                          OT SCREEN MAMMO-MALIGN NEOPLASM OF BABATUNDE              

      

 

                01/15/2019           BIPIN RAMIRESJULY           Ot       

       611.72      

                          LUMP OR MASS IN BREAST                    

 

                01/15/2019           NANCY MENDEZ, AHMED S           Ot        

      250.01       

                          DIAB KATHERIN WO COMPL, TYPE I [JUVENILE TYP              

      

 

                01/15/2019           NANCY MENDEZ, YU S           Ot        

      272.4        

                          HYPERLIPIDEMIA NEC/NOS                    

 

                01/15/2019           NANCY MENDEZ, YU S           Ot        

      403.10       

                          HYPTNSV CHR KID DIS, BENIGN, W CHR KD ST              

      

 

                01/15/2019           YU CRUZ MD S           Ot        

      583.81       

                          NEPHRITIS NOS IN OTH DIS                    

 

                01/15/2019           NANCY MENDEZ, AHGENESIS S           Ot        

      585.2        

                          CHRONIC KIDNEY DISEASE, STAGE II (MILD)               

     

 

                01/15/2019           NANCY MENDEZ, AHMED S           Ot        

      791.0        

                          PROTEINURIA                        

 

                01/15/2019           FELIZ GERMAIN DO           Ot        

      250.03       

                          DIAB KATHERIN WO COMPL, TYPE I [JUVENILE TYP              

      

 

                01/15/2019           NANCY MENDEZ, AHMED S           Ot        

      250.01       

                          DIAB KATHERIN WO COMPL, TYPE I [JUVENILE TYP              

      

 

                01/15/2019           NANCY MENDEZ, AHMED S           Ot        

      272.4        

                          HYPERLIPIDEMIA NEC/NOS                    

 

                01/15/2019           NANCY MENDEZ, AHMED S           Ot        

      403.10       

                          HYPTNSV CHR KID DIS, BENIGN, W CHR KD ST              

      

 

                01/15/2019           NANCY MENDEZ, AHMED S           Ot        

      583.81       

                          NEPHRITIS NOS IN OTH DIS                    

 

                01/15/2019           NANCY MENDEZ, AHMED S           Ot        

      585.2        

                          CHRONIC KIDNEY DISEASE, STAGE II (MILD)               

     

 

                01/15/2019           NANCY MENDEZ, AHMED S           Ot        

      791.0        

                          PROTEINURIA                        

 

                01/15/2019           FELIZ GERMAIN DO           Ot        

      250.03       

                          DIAB KATHERIN WO COMPL, TYPE I [JUVENILE TYP              

      

 

                01/15/2019           JULY VOSS DO           Ot       

       453.40      

                          ACUTE VENOUS EMBOLISM   THROMBOSIS UNSP               

      

 

             01/15/2019                        Ot           250.03           J CARLOS

B KATHERIN WO 

COMPL, TYPE I [JUVENILE TYP                      

 

             01/15/2019                        Ot           250.41           J CARLOS

B W RENAL 

MANIFEST, TYPE I [JUVENILE                       

 

             01/15/2019                        Ot           272.4           HYPE

RLIPIDEMIA 

NEC/NOS                                          

 

             01/15/2019                        Ot           403.10           HYP

TNSV CHR KID 

DIS, BENIGN, W CHR KD ST                         

 

             01/15/2019                        Ot           583.81           NEP

HRITIS NOS IN 

OTH DIS                                          

 

             01/15/2019                        Ot           585.2           

DAWN KIDNEY 

DISEASE, STAGE II (MILD)                         

 

             01/15/2019                        Ot           791.0           PROT

EINURIA       

                                                 

 

                01/15/2019           NANCY MENDEZ, AHMED S           Ot        

      E11.29       

                          TYPE 2 DIABETES MELLITUS W [facility] DIABETIC               

      

 

                01/15/2019           NANCY MENDEZ, AHMED S           Ot        

      E78.5        

                          HYPERLIPIDEMIA, UNSPECIFIED                    

 

                01/15/2019           NANCY MENDEZ, AHMED S           Ot        

      I12.9        

                          HYPERTENSIVE CHRONIC KIDNEY DISEASE W ST              

      

 

                01/15/2019           NANCY MENDEZ, AHMED S           Ot        

      N18.3        

                          CHRONIC KIDNEY DISEASE, STAGE 3 (MODERAT              

      

 

                01/15/2019           NANCY MENDEZ, AHMED S           Ot        

      R80.9        

                          PROTEINURIA, UNSPECIFIED                    

 

                01/15/2019           NANCY MENDEZ, AHMED S           Ot        

      E11.21       

                          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              

      

 

                01/15/2019           NANCY MENDEZ, AHMED S           Ot        

      E11.22       

                          TYPE 2 DIABETES MELLITUS W DIABETIC               

      

 

                01/15/2019           NANCY MENDEZ, JULIANNEMED S           Ot        

      E78.5        

                          HYPERLIPIDEMIA, UNSPECIFIED                    

 

                01/15/2019           NANCY MENDEZ, AHMED S           Ot        

      I12.9        

                          HYPERTENSIVE CHRONIC KIDNEY DISEASE W ST              

      

 

                01/15/2019           NANCY MENDEZ, AHMED S           Ot        

      N18.2        

                          CHRONIC KIDNEY DISEASE, STAGE 2 (MILD)                

    

 

                01/15/2019           NANCY MENDEZ, AHMED S           Ot        

      R80.9        

                          PROTEINURIA, UNSPECIFIED                    

 

                01/15/2019           NANCY MENDEZ, AHMED S           Ot        

      Z79.899      

                          OTHER LONG TERM (CURRENT) DRUG THERAPY                

    

 

                01/15/2019           GERMAIN DO, FELIZ L           Ot        

      E10.65       

                          TYPE 1 DIABETES MELLITUS WITH HYPERGLYCE              

      

 

                01/15/2019           GERMAIN DO, FELIZ L           Ot        

      E78.5        

                          HYPERLIPIDEMIA, UNSPECIFIED                    

 

                01/15/2019           GERMAIN DO, FELIZ L           Ot        

      E10.65       

                          TYPE 1 DIABETES MELLITUS WITH HYPERGLYCE              

      

 

                01/15/2019           GERMAIN DO, FELZI L           Ot        

      E78.5        

                          HYPERLIPIDEMIA, UNSPECIFIED                    

 

                01/15/2019           GERMAIN DO, FELIZ L           Ot        

      E10.65       

                          TYPE 1 DIABETES MELLITUS WITH HYPERGLYCE              

      

 

                01/15/2019           GELLENDER DO, JULY CHULA           Ot       

       R73.9       

                          HYPERGLYCEMIA, UNSPECIFIED                    

 

                01/15/2019           GELLENDER DO, JULY CHULA           Ot       

       R74.8       

                          ABNORMAL LEVELS OF OTHER SERUM ENZYMES                

    

 

                01/15/2019           GERMAIN DO, FELIZ L           Ot        

      E10.65       

                          TYPE 1 DIABETES MELLITUS WITH HYPERGLYCE              

      

 

                01/15/2019           GERMAIN DO, FELIZ L           Ot        

      N18.9        

                          CHRONIC KIDNEY DISEASE, UNSPECIFIED                   

 

 

                01/15/2019           GELLENDER DO, JULY CHULA           Ot       

       M25.522     

                          PAIN IN LEFT ELBOW                    

 

                01/15/2019           GELLENDER DO, JULY QUIROS           Ot       

       M61.48      

                          OTHER CALCIFICATION OF MUSCLE, OTHER SIT              

      

 

                2019           GERMAIN DO, FELIZ L           Ot        

      E10.65       

                          TYPE 1 DIABETES MELLITUS WITH HYPERGLYCE              

      

 

                2019           GERMAIN DO, FELIZ L           Ot        

      E10.65       

                          TYPE 1 DIABETES MELLITUS WITH HYPERGLYCE              

      

 

                2019           FELIZ GERMAIN DO           Ot        

      N18.9        

                          CHRONIC KIDNEY DISEASE, UNSPECIFIED                   

 

 

                2019           MISHA DEVRIES MD           Ot            

  D72.829          

                          ELEVATED WHITE BLOOD CELL COUNT, UNSPECI              

      

 

             2019           MISHA DEVRIES MD           Ot           E10.

10           

TYPE 1 DIABETES MELLITUS WITH KETOACIDOS                    

 

             2019           MISHA DEVRIES MD           Ot           E10.

40           

TYPE 1 DIABETES MELLITUS WITH DIABETIC N                    

 

             2019           MISHA DEVRIES MD           Ot           E10.

43           

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                    

 

             2019           MISHA DEVRIES MD           Ot           E78.

5           

HYPERLIPIDEMIA, UNSPECIFIED                      

 

             2019           MISHA DEVRIES MD           Ot           E86.

0           

DEHYDRATION                                      

 

             2019           MISHA DEVRIES MD           Ot           E87.

2           

ACIDOSIS                                         

 

                2019           MISHA DEVRIES MD           Ot            

  F17.210          

                          NICOTINE DEPENDENCE, CIGARETTES, UNCOMPL              

      

 

             2019           MISHA DEVRIES MD           Ot           F60.

9           

PERSONALITY DISORDER, UNSPECIFIED                    

 

             2019           MISHA DEVRIES MD           Ot           J30.

2           

OTHER SEASONAL ALLERGIC RHINITIS                    

 

             2019           MISHA DEVRIES MD           Ot           J44.

9           

CHRONIC OBSTRUCTIVE PULMONARY DISEASE, U                    

 

             2019           MISHA DEVRIES MD           Ot           K21.

9           

GASTRO-ESOPHAGEAL REFLUX DISEASE WITHOUT                    

 

             2019           MISHA DEVRIES MD           Ot           M06.

9           

RHEUMATOID ARTHRITIS, UNSPECIFIED                    

 

             2019           MISHA DEVRIES MD           Ot           M19.

91           

PRIMARY OSTEOARTHRITIS, UNSPECIFIED SITE                    

 

             2019           MISHA DEVRIES MD           Ot           M54.

9           

DORSALGIA, UNSPECIFIED                           

 

             2019           MISHA DEVRIES MD           Ot           N17.

0           

ACUTE KIDNEY FAILURE WITH TUBULAR NECROS                    

 

             2019           MISHA DEVRIES MD, Ot           N18.

9           

CHRONIC KIDNEY DISEASE, UNSPECIFIED                    

 

             2019           MISHA DEVRIES MD           Ot           R11.

10           

VOMITING, UNSPECIFIED                            

 

             2019           MISHA DEVRIES MD           Ot           Z79.

4           

LONG TERM (CURRENT) USE OF INSULIN                    

 

                2019           MISHA DEVRIES MD, Ot            

  Z86.718          

                          PERSONAL HISTORY OF OTHER VENOUS THROMBO              

      

 

             2019           MISHA DEVRIES MD, Ot           Z87.

11           

PERSONAL HISTORY OF PEPTIC ULCER DISEASE                    

 

             2019           MISHA DEVRIES MD, Ot           Z87.

19           

PERSONAL HISTORY OF OTHER DISEASES OF TH                    

 

             2019           MISHA DEVRIES MD, Ot           Z91.

14           

PATIENT'S OTHER NONCOMPLIANCE WITH MEDIC                    

 

                2019           MISHA DEVRIES MD, Ot            

  Z95.828          

                          PRESENCE OF OTHER VASCULAR IMPLANTS AND               

      

 

                2019           MISHA DEVRIES MD, Ot            

  D72.829          

                          ELEVATED WHITE BLOOD CELL COUNT, UNSPECI              

      

 

             2019           MISHA DEVRIES MD           Ot           E10.

10           

TYPE 1 DIABETES MELLITUS WITH KETOACIDOS                    

 

             2019           MISHA DEVRIES MD           Ot           E10.

40           

TYPE 1 DIABETES MELLITUS WITH DIABETIC N                    

 

             2019           MISHA DEVRIES MD           Ot           E10.

43           

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                    

 

             2019           MISHA DEVRIES MD, Ot           E78.

5           

HYPERLIPIDEMIA, UNSPECIFIED                      

 

             2019           MISHA DEVRIES MD, Ot           E86.

0           

DEHYDRATION                                      

 

             2019           MISHA DEVRIES MD           Ot           E87.

2           

ACIDOSIS                                         

 

                2019           MISHA DEVRIES MD           Ot            

  F17.210          

                          NICOTINE DEPENDENCE, CIGARETTES, UNCOMPL              

      

 

             2019           MISHA DEVRIES MD           Ot           F60.

9           

PERSONALITY DISORDER, UNSPECIFIED                    

 

             2019           MISHA DEVRIES MD, Ot           J30.

2           

OTHER SEASONAL ALLERGIC RHINITIS                    

 

             2019           MISHA DEVRIES MD, Ot           J44.

9           

CHRONIC OBSTRUCTIVE PULMONARY DISEASE, U                    

 

             2019           MISHA DEVRIES MD, Ot           K21.

9           

GASTRO-ESOPHAGEAL REFLUX DISEASE WITHOUT                    

 

             2019           MISHA DEVRIES MD, Ot           M06.

9           

RHEUMATOID ARTHRITIS, UNSPECIFIED                    

 

             2019           MISHA DEVRIES MD, Ot           M19.

91           

PRIMARY OSTEOARTHRITIS, UNSPECIFIED SITE                    

 

             2019           MISHA DEVRIES MD, Ot           M54.

9           

DORSALGIA, UNSPECIFIED                           

 

             2019           MISHA DEVRIES MD           Ot           N17.

0           

ACUTE KIDNEY FAILURE WITH TUBULAR NECROS                    

 

             2019           KAYCE MD, MISHA M           Ot           N18.

9           

CHRONIC KIDNEY DISEASE, UNSPECIFIED                    

 

             2019           MISHA DEVRIES MD, Ot           R11.

10           

VOMITING, UNSPECIFIED                            

 

             2019           MISHA DEVRIES MD, Ot           Z79.

4           

LONG TERM (CURRENT) USE OF INSULIN                    

 

                2019           MISHA DEVRIES MD, Ot            

  Z86.718          

                          PERSONAL HISTORY OF OTHER VENOUS THROMBO              

      

 

             2019           MISHA DEVRIES MD, Ot           Z87.

11           

PERSONAL HISTORY OF PEPTIC ULCER DISEASE                    

 

             2019           MISHA DEVRIES MD, Ot           Z87.

19           

PERSONAL HISTORY OF OTHER DISEASES OF TH                    

 

             2019           MISHA DEVRIES MD, Ot           Z91.

14           

PATIENT'S OTHER NONCOMPLIANCE WITH MEDIC                    

 

                2019           MISHA DEVRIES MD, Ot            

  Z95.828          

                          PRESENCE OF OTHER VASCULAR IMPLANTS AND               

      

 

                2019           MISHA DEVRIES MD, Ot            

  D72.829          

                          ELEVATED WHITE BLOOD CELL COUNT, UNSPECI              

      

 

             2019           MISHA DEVRIES MD           Ot           E10.

10           

TYPE 1 DIABETES MELLITUS WITH KETOACIDOS                    

 

             2019           MISHA DEVRIES MD           Ot           E10.

40           

TYPE 1 DIABETES MELLITUS WITH DIABETIC N                    

 

             2019           MISHA DEVRIES MD           Ot           E10.

43           

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                    

 

             2019           MISHA DEVRIES MD           Ot           E78.

5           

HYPERLIPIDEMIA, UNSPECIFIED                      

 

             2019           MISHA DEVRIES MD           Ot           E86.

0           

DEHYDRATION                                      

 

             2019           MISHA DEVRIES MD           Ot           E87.

2           

ACIDOSIS                                         

 

                2019           MISHA DEVRIES MD           Ot            

  F17.210          

                          NICOTINE DEPENDENCE, CIGARETTES, UNCOMPL              

      

 

             2019           MISHA DEVRIES MD, Ot           F60.

9           

PERSONALITY DISORDER, UNSPECIFIED                    

 

             2019           MISHA DEVRIES MD, Ot           J30.

2           

OTHER SEASONAL ALLERGIC RHINITIS                    

 

             2019           MISHA DEVRIES MD, Ot           J44.

9           

CHRONIC OBSTRUCTIVE PULMONARY DISEASE, U                    

 

             2019           MISHA DEVRIES MD, Ot           K21.

9           

GASTRO-ESOPHAGEAL REFLUX DISEASE WITHOUT                    

 

             2019           MISHA DEVRIES MD, Ot           M06.

9           

RHEUMATOID ARTHRITIS, UNSPECIFIED                    

 

             2019           KAYCE MD, MISHA M           Ot           M19.

91           

PRIMARY OSTEOARTHRITIS, UNSPECIFIED SITE                    

 

             2019           MISHA DEVRIES MD, Ot           M54.

9           

DORSALGIA, UNSPECIFIED                           

 

             2019           MISHA DEVRIES MD, Ot           N17.

0           

ACUTE KIDNEY FAILURE WITH TUBULAR NECROS                    

 

             2019           MISHA DEVRIES MD, Ot           N18.

9           

CHRONIC KIDNEY DISEASE, UNSPECIFIED                    

 

             2019           MISHA DEVRIES MD           Ot           R11.

10           

VOMITING, UNSPECIFIED                            

 

             2019           MISHA DEVRIES MD, Ot           Z79.

4           

LONG TERM (CURRENT) USE OF INSULIN                    

 

                2019           MISHA DEVRIES MD, Ot            

  Z86.718          

                          PERSONAL HISTORY OF OTHER VENOUS THROMBO              

      

 

             2019           MISHA DEVRIES MD, Ot           Z87.

11           

PERSONAL HISTORY OF PEPTIC ULCER DISEASE                    

 

             2019           MISHA DEVRIES MD, Ot           Z87.

19           

PERSONAL HISTORY OF OTHER DISEASES OF TH                    

 

             2019           MISHA DEVRIES MD, Ot           Z91.

14           

PATIENT'S OTHER NONCOMPLIANCE WITH MEDIC                    

 

                2019           MISHA DEVRIES MD           Ot            

  Z95.828          

                          PRESENCE OF OTHER VASCULAR IMPLANTS AND               

      

 

                2019           MISHA DEVRIES MD, Ot            

  D72.829          

                          ELEVATED WHITE BLOOD CELL COUNT, UNSPECI              

      

 

             2019           MISHA DEVRIES MD           Ot           E10.

10           

TYPE 1 DIABETES MELLITUS WITH KETOACIDOS                    

 

             2019           MISHA DEVRIES MD           Ot           E10.

40           

TYPE 1 DIABETES MELLITUS WITH DIABETIC N                    

 

             2019           MISHA DEVRIES MD           Ot           E10.

43           

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                    

 

             2019           MISHA DEVRIES MD           Ot           E78.

5           

HYPERLIPIDEMIA, UNSPECIFIED                      

 

             2019           MISHA DEVRIES MD, Ot           E86.

0           

DEHYDRATION                                      

 

             2019           MISHA DEVRIES MD, Ot           E87.

2           

ACIDOSIS                                         

 

                2019           MISHA DEVRIES MD, Ot            

  F17.210          

                          NICOTINE DEPENDENCE, CIGARETTES, UNCOMPL              

      

 

             2019           MISHA DEVRIES MD, Ot           F60.

9           

PERSONALITY DISORDER, UNSPECIFIED                    

 

             2019           MISHA DEVRIES MD           Ot           G89.

29           

OTHER CHRONIC PAIN                               

 

             2019           KAYCE MD, MISHA M           Ot           J30.

2           

OTHER SEASONAL ALLERGIC RHINITIS                    

 

             2019           MISHA DEVRIES MD, Ot           J44.

9           

CHRONIC OBSTRUCTIVE PULMONARY DISEASE, U                    

 

             2019           MISHA DEVRIES MD, Ot           K21.

9           

GASTRO-ESOPHAGEAL REFLUX DISEASE WITHOUT                    

 

             2019           MISHA DEVRIES MD, Ot           M06.

9           

RHEUMATOID ARTHRITIS, UNSPECIFIED                    

 

             2019           MISHA DEVRIES MD, Ot           M19.

91           

PRIMARY OSTEOARTHRITIS, UNSPECIFIED SITE                    

 

             2019           MISHA DEVRIES MD, Ot           M54.

9           

DORSALGIA, UNSPECIFIED                           

 

             2019           MISHA DEVRIES MD, Ot           N17.

0           

ACUTE KIDNEY FAILURE WITH TUBULAR NECROS                    

 

             2019           MISHA DEVRIES MD, Ot           N18.

9           

CHRONIC KIDNEY DISEASE, UNSPECIFIED                    

 

             2019           MISHA DEVRIES MD           Ot           R07.

89           

OTHER CHEST PAIN                                 

 

             2019           MISHA DEVRIES MD, Ot           R11.

10           

VOMITING, UNSPECIFIED                            

 

             2019           MISHA DEVRIES MD, Ot           Z79.

4           

LONG TERM (CURRENT) USE OF INSULIN                    

 

                2019           MISHA DEVRIES MD, Ot            

  Z86.718          

                          PERSONAL HISTORY OF OTHER VENOUS THROMBO              

      

 

             2019           MISHA DEVRIES MD, Ot           Z87.

11           

PERSONAL HISTORY OF PEPTIC ULCER DISEASE                    

 

             2019           MISHA DEVRIES MD, Ot           Z87.

19           

PERSONAL HISTORY OF OTHER DISEASES OF TH                    

 

             2019           MISHA DEVRIES MD           Ot           Z88.

2           

ALLERGY STATUS TO SULFONAMIDES STATUS                    

 

             2019           MISHA DEVRIES MD, Ot           Z88.

5           

ALLERGY STATUS TO NARCOTIC AGENT STATUS                    

 

             2019           MISHA DEVRIES MD           Ot           Z91.

14           

PATIENT'S OTHER NONCOMPLIANCE WITH MEDIC                    

 

                2019           MISHA DEVRIES MD           Ot            

  Z95.828          

                          PRESENCE OF OTHER VASCULAR IMPLANTS AND               

      

 

                2020           LIAM GAONA           Ot           

   E11.10          

                          TYPE 2 DIABETES MELLITUS WITH KETOACIDOS              

      

 

                2020           LIAM GAONA           Ot           

   E11.43          

                          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              

      

 

                2020           LIAM GAONA           Ot           

   F17.210         

                          NICOTINE DEPENDENCE, CIGARETTES, UNCOMPL              

      

 

             2020           LIAM GAONA           Ot           F60

.9           

PERSONALITY DISORDER, UNSPECIFIED                    

 

             2020           LIAM GAONA           Ot           J44

.9           

CHRONIC OBSTRUCTIVE PULMONARY DISEASE, U                    

 

             2020           LIAM GAONA           Ot           K21

.9           

GASTRO-ESOPHAGEAL REFLUX DISEASE WITHOUT                    

 

                2020           LIAM GAONA           Ot           

   K31.84          

                          GASTROPARESIS                      

 

             2020           LIAM GAONA           Ot           M06

.9           

RHEUMATOID ARTHRITIS, UNSPECIFIED                    

 

                2020           LIAM GAONA           Ot           

   R10.31          

                          RIGHT LOWER QUADRANT PAIN                    

 

             2020           LIAM GAONA           Ot           Z79

.4           

LONG TERM (CURRENT) USE OF INSULIN                    

 

             2020           LIAM GAONA           Ot           Z80

.8           

FAMILY HISTORY OF MALIGNANT NEOPLASM OF                     

 

                2020           LIAM GAONA           Ot           

   Z86.718         

                          PERSONAL HISTORY OF OTHER VENOUS THROMBO              

      

 

                2020           LIAM GAONA           Ot           

   Z87.01          

                          PERSONAL HISTORY OF PNEUMONIA (RECURRENT              

      

 

             2020           LIAM GAONA           Ot           Z88

.2           

ALLERGY STATUS TO SULFONAMIDES STATUS                    

 

             2020           LIAM GAONA           Ot           Z88

.5           

ALLERGY STATUS TO NARCOTIC AGENT STATUS                    

 

                2020           LIAM GAONA           Ot           

   Z90.710         

                          ACQUIRED ABSENCE OF BOTH CERVIX AND UTER              

      

 

                2020           LIAM GAONA           Ot           

   Z90.89          

                          ACQUIRED ABSENCE OF OTHER ORGANS                    

 

             2020           CAMPBELL HOANG MD           Ot           D64.

9           

ANEMIA, UNSPECIFIED                              

 

             2020           CAMPBELL HOANG MD           Ot           E10.

11           

TYPE 1 DIABETES MELLITUS WITH KETOACIDOS                    

 

             2020           CAMPBELL HOANG MD           Ot           E10.

43           

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                    

 

             2020           CAMPBELL HOANG MD           Ot           E86.

0           

DEHYDRATION                                      

 

             2020           CAMPBELL HOANG MD           Ot           E87.

1           

HYPO-OSMOLALITY AND HYPONATREMIA                    

 

             2020           CAMPBELL HOANG MD           Ot           E87.

2           

ACIDOSIS                                         

 

             2020           CAMPBELL HOANG MD           Ot           E87.

5           

HYPERKALEMIA                                     

 

             2020           CAMPBELL HOANG MD           Ot           F15.

10           

OTHER STIMULANT ABUSE, UNCOMPLICATED                    

 

                2020           CAMPBELL HOANG MD           Ot            

  F17.210          

                          NICOTINE DEPENDENCE, CIGARETTES, UNCOMPL              

      

 

             2020           CAMPBELL HOANG MD           Ot           F60.

9           

PERSONALITY DISORDER, UNSPECIFIED                    

 

             2020           CAMPBELL HOANG MD           Ot           G93.

49           

OTHER ENCEPHALOPATHY                             

 

             2020           CAMPBELL HOANG MD           Ot           I95.

9           

HYPOTENSION, UNSPECIFIED                         

 

             2020           CAMPBELL HOANG MD, Ot           J44.

9           

CHRONIC OBSTRUCTIVE PULMONARY DISEASE, U                    

 

             2020           CAMPBELL HOANG MD, Ot           J96.

00           

ACUTE RESPIRATORY FAILURE, UNSP W HYPOXI                    

 

             2020           CAMPBELL HOANG MD, Ot           K21.

9           

GASTRO-ESOPHAGEAL REFLUX DISEASE WITHOUT                    

 

             2020           CAMPBELL HOANG MD, Ot           M06.

9           

RHEUMATOID ARTHRITIS, UNSPECIFIED                    

 

             2020           CAMPBELL HOANG MD, Ot           M19.

91           

PRIMARY OSTEOARTHRITIS, UNSPECIFIED SITE                    

 

             2020           CAMPBELL HOANG MD, Ot           N17.

9           

ACUTE KIDNEY FAILURE, UNSPECIFIED                    

 

             2020           CAMPBELL HOANG MD, Ot           N18.

9           

CHRONIC KIDNEY DISEASE, UNSPECIFIED                    

 

             2020           CAMPBELL HOANG MD           Ot           R00.

0           

TACHYCARDIA, UNSPECIFIED                         

 

             2020           CAMPBELL HOANG MD           Ot           R57.

1           

HYPOVOLEMIC SHOCK                                

 

             2020           CAMPBELL HOANG MD           Ot           Z79.

4           

LONG TERM (CURRENT) USE OF INSULIN                    

 

                2020           CAMPBELL HOANG MD           Ot            

  Z86.718          

                          PERSONAL HISTORY OF OTHER VENOUS THROMBO              

      

 

             2020           CAMPBELL HOANG MD           Ot           Z86.

79           

PERSONAL HISTORY OF OTHER DISEASES OF TH                    

 

             2020           CAMPBELL HOANG MD           Ot           Z87.

11           

PERSONAL HISTORY OF PEPTIC ULCER DISEASE                    

 

             2020           CAMPBELL HOANG MD           Ot           Z91.

81           

HISTORY OF FALLING                               

 

             2020           CAMPBELL HOANG MD           Ot           Z96.

0           

PRESENCE OF UROGENITAL IMPLANTS                    

 

             2020           CAMPBELL HOANG MD           Ot           D64.

9           

ANEMIA, UNSPECIFIED                              

 

             2020           CAMPBELL HOANG MD           Ot           E10.

11           

TYPE 1 DIABETES MELLITUS WITH KETOACIDOS                    

 

             2020           CAMPBELL HOAGN MD           Ot           E10.

43           

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                    

 

             2020           CAMPBELL HOANG MD           Ot           E86.

0           

DEHYDRATION                                      

 

             2020           CAMPBELL HOANG MD           Ot           E87.

1           

HYPO-OSMOLALITY AND HYPONATREMIA                    

 

             2020           CAMPBELL HOANG MD           Ot           E87.

2           

ACIDOSIS                                         

 

             2020           CAMPBELL HOANG MD           Ot           E87.

5           

HYPERKALEMIA                                     

 

             2020           CAMPBELL HOANG MD           Ot           F15.

10           

OTHER STIMULANT ABUSE, UNCOMPLICATED                    

 

                2020           CAMPBELL HOANG MD           Ot            

  F17.210          

                          NICOTINE DEPENDENCE, CIGARETTES, UNCOMPL              

      

 

             2020           CAMPBELL HOANG MD           Ot           F60.

9           

PERSONALITY DISORDER, UNSPECIFIED                    

 

             2020           CAMPBELL HOANG MD           Ot           G93.

49           

OTHER ENCEPHALOPATHY                             

 

             2020           CAMPBELL HOANG MD           Ot           I95.

9           

HYPOTENSION, UNSPECIFIED                         

 

             2020           CAMPBELL HOANG MD, Ot           J44.

9           

CHRONIC OBSTRUCTIVE PULMONARY DISEASE, U                    

 

             2020           CAMPBELL HOANG MD           Ot           J96.

00           

ACUTE RESPIRATORY FAILURE, UNSP W HYPOXI                    

 

             2020           CAMPBELL HOANG MD           Ot           K21.

9           

GASTRO-ESOPHAGEAL REFLUX DISEASE WITHOUT                    

 

             2020           CAMPBELL HOANG MD           Ot           M06.

9           

RHEUMATOID ARTHRITIS, UNSPECIFIED                    

 

             2020           CAMPBELL HOANG MD           Ot           M19.

91           

PRIMARY OSTEOARTHRITIS, UNSPECIFIED SITE                    

 

             2020           CAMPBELL HOANG MD           Ot           N17.

9           

ACUTE KIDNEY FAILURE, UNSPECIFIED                    

 

             2020           CAMPBELL HOANG MD           Ot           N18.

9           

CHRONIC KIDNEY DISEASE, UNSPECIFIED                    

 

             2020           CAMPBELL HOANG MD           Ot           R00.

0           

TACHYCARDIA, UNSPECIFIED                         

 

             2020           CAMPBELL HOANG MD           Ot           R57.

1           

HYPOVOLEMIC SHOCK                                

 

             2020           CAMPBELL HOANG MD           Ot           Z79.

4           

LONG TERM (CURRENT) USE OF INSULIN                    

 

                2020           CAMPBELL HOANG MD           Ot            

  Z86.718          

                          PERSONAL HISTORY OF OTHER VENOUS THROMBO              

      

 

             2020           CAMPBELL HOANG MD           Ot           Z86.

79           

PERSONAL HISTORY OF OTHER DISEASES OF TH                    

 

             2020           CAMPBELL HOANG MD           Ot           Z87.

11           

PERSONAL HISTORY OF PEPTIC ULCER DISEASE                    

 

             2020           CAMPBELL HOANG MD           Ot           Z91.

81           

HISTORY OF FALLING                               

 

             2020           CAMPBELL HOANG MD           Ot           Z96.

0           

PRESENCE OF UROGENITAL IMPLANTS                    

 

             02/15/2020           CAMPBELL HOANG MD           Ot           A41.

9           

SEPSIS, UNSPECIFIED ORGANISM                     

 

             02/15/2020           CAMPBELL HOANG MD           Ot           B37.

1           

PULMONARY CANDIDIASIS                            

 

             02/15/2020           CAMPBELL HOANG MD           Ot           D64.

9           

ANEMIA, UNSPECIFIED                              

 

             02/15/2020           CAMPBELL HOANG MD           Ot           E10.

11           

TYPE 1 DIABETES MELLITUS WITH KETOACIDOS                    

 

             02/15/2020           CAMPBELL HOANG MD           Ot           E10.

43           

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                    

 

                02/15/2020           CAMPBELL HOANG MD           Ot            

  E10.649          

                          TYPE 1 DIABETES MELLITUS WITH HYPOGLYCEM              

      

 

             02/15/2020           CAMPBELL HOANG MD           Ot           E86.

0           

DEHYDRATION                                      

 

             02/15/2020           CAMPBELL HOANG MD           Ot           E87.

1           

HYPO-OSMOLALITY AND HYPONATREMIA                    

 

             02/15/2020           CAMPBELL HOANG MD           Ot           E87.

2           

ACIDOSIS                                         

 

             02/15/2020           CAMPBELL HOANG MD           Ot           E87.

5           

HYPERKALEMIA                                     

 

             02/15/2020           CAMPBELL HOANG MD           Ot           E87.

6           

HYPOKALEMIA                                      

 

             02/15/2020           CAMPBELL HOANG MD           Ot           F15.

10           

OTHER STIMULANT ABUSE, UNCOMPLICATED                    

 

                02/15/2020           CAMPBELL HOANG MD           Ot            

  F17.210          

                          NICOTINE DEPENDENCE, CIGARETTES, UNCOMPL              

      

 

             02/15/2020           CAMPBELL HOANG MD           Ot           F60.

9           

PERSONALITY DISORDER, UNSPECIFIED                    

 

             02/15/2020           CAMPBELL HOANG MD           Ot           G93.

49           

OTHER ENCEPHALOPATHY                             

 

             02/15/2020           CAMPBELL HOANG MD           Ot           I95.

9           

HYPOTENSION, UNSPECIFIED                         

 

             02/15/2020           CAMPBELL HOANG MD           Ot           J13 

          

PNEUMONIA DUE TO STREPTOCOCCUS PNEUMONIA                    

 

             02/15/2020           CAMPBELL HOANG MD           Ot           J44.

9           

CHRONIC OBSTRUCTIVE PULMONARY DISEASE, U                    

 

             02/15/2020           CAMPBELL HOANG MD           Ot           J96.

00           

ACUTE RESPIRATORY FAILURE, UNSP W HYPOXI                    

 

             02/15/2020           CAMPBELL HOANG MD           Ot           K21.

9           

GASTRO-ESOPHAGEAL REFLUX DISEASE WITHOUT                    

 

             02/15/2020           CAMPBELL HOANG MD           Ot           K92.

2           

GASTROINTESTINAL HEMORRHAGE, UNSPECIFIED                    

 

             02/15/2020           CAMPBELL HOANG MD           Ot           M06.

9           

RHEUMATOID ARTHRITIS, UNSPECIFIED                    

 

             02/15/2020           NATALYACAMPBELL ANN MD, Ot           M19.

91           

PRIMARY OSTEOARTHRITIS, UNSPECIFIED SITE                    

 

             02/15/2020           CAMPBELL HOANG MD, Ot           N17.

9           

ACUTE KIDNEY FAILURE, UNSPECIFIED                    

 

             02/15/2020           CAMPBELL HOANG MD, Ot           N18.

9           

CHRONIC KIDNEY DISEASE, UNSPECIFIED                    

 

             02/15/2020           CAMPBELL HOANG MD, Ot           R00.

0           

TACHYCARDIA, UNSPECIFIED                         

 

             02/15/2020           CAMBPELL HOANG MD, Ot           R57.

1           

HYPOVOLEMIC SHOCK                                

 

             02/15/2020           CAMPBELL HOANG MD, Ot           R65.

20           

SEVERE SEPSIS WITHOUT SEPTIC SHOCK                    

 

             02/15/2020           CAMPBELL HOANG MD, Ot           Z79.

4           

LONG TERM (CURRENT) USE OF INSULIN                    

 

                02/15/2020           CAMPBELL HOANG MD, Ot            

  Z86.718          

                          PERSONAL HISTORY OF OTHER VENOUS THROMBO              

      

 

             02/15/2020           CAMPBELL HOANG MD, Ot           Z87.

11           

PERSONAL HISTORY OF PEPTIC ULCER DISEASE                    

 

             02/15/2020           CAMPBELL HOANG MD, Ot           Z91.

81           

HISTORY OF FALLING                               



                                                                                
                                                                                
                                                                                
                                                                                
                                                                                
                                                                                
                                                                                
                                                                                
                                                                                
                                                                                
                                                                                
                                                                                
                                                                                
                                                                                
                                                                                
                                                                                
                                                                                
                                                                                
                                                                                
                                                                                
                



Procedures

      



                Code            Description           Performed By           Per

milan On        

 

                                38.7                                            

                    

                                        12/15/2011        

 

                                      [id number]                                 IN

SERTION OF ENDOTRACHEAL 

AIRWAY INTO TR                                               02/10/2020        

 

                                      [id number]                                 RE

SPIRATORY VENTILATION, 

LESS THAN 24 CO                                               02/10/2020        

 

                                      [id number]                                 RE

SPIRATORY VENTILATION, 24-

96 CONSECUTI                                               02/10/2020        



                        



Results

      



                    Test                Result              Range        

 

                                        Comprehensive metabolic panel - 16

 08:48         

 

                          Serum or plasma sodium measurement (moles/volume)     

      138 mmol/L          

                                        135-145        

 

                          Serum or plasma potassium measurement (moles/volume)  

         4.9 mmol/L       

                                        3.6-5.0        

 

                          Serum or plasma chloride measurement (moles/volume)   

        103 mmol/L        

                                                

 

                    Carbon dioxide           29 mmol/L           21-32        

 

                          Serum or plasma anion gap determination (moles/volume)

           6 mmol/L       

                                        5-14        

 

                          Serum or plasma urea nitrogen measurement (mass/volume

)           26 mg/dL      

                                        7-18        

 

                          Serum or plasma creatinine measurement (mass/volume)  

         1.30 mg/dL       

                                        0.60-1.30        

 

                    Serum or plasma urea nitrogen/creatinine mass ratio         

  20                  NRG 

       

 

                                        Serum or plasma creatinine measurement w

ith calculation of estimated glomerular 

filtration rate           43                        NRG        

 

                    Serum or plasma glucose measurement (mass/volume)           

223 mg/dL           

        

 

                    Serum or plasma calcium measurement (mass/volume)           

9.8 mg/dL           

8.5-10.1        

 

                          Serum or plasma total bilirubin measurement (mass/volu

me)           0.6 mg/dL   

                                        0.1-1.0        

 

                                        Serum or plasma alkaline phosphatase kim

surement (enzymatic activity/volume)    

                          72 U/L                            

 

                                        Serum or plasma aspartate aminotransfera

se measurement (enzymatic 

activity/volume)           20 U/L                    5-34        

 

                                        Serum or plasma alanine aminotransferase

 measurement (enzymatic activity/volume)

                          28 U/L                    0-55        

 

                    Serum or plasma protein measurement (mass/volume)           

7.2 g/dL            

6.4-8.2        

 

                    Serum or plasma albumin measurement (mass/volume)           

4.2 g/dL            

3.2-4.5        

 

                                        Lipid 1996 panel - 16 08:48       

  

 

                          Serum or plasma triglyceride measurement (mass/volume)

           118 mg/dL      

                                        <150        

 

                          Serum or plasma cholesterol measurement (mass/volume) 

          213 mg/dL       

                                        < 200        

 

                          Serum or plasma cholesterol in HDL measurement (mass/v

olume)           55 mg/dL 

                                        40-60        

 

                          Cholesterol in LDL [mass/volume] in serum or plasma by

 direct assay           

132 mg/dL                               1-129        

 

                          Serum or plasma cholesterol in VLDL measurement (mass/

volume)           24 mg/dL

                                        5-40        

 

                                        THYROID STIMULATING HORMONE - 16 0

8:48         

 

                    THYROID STIMULATING HORMONE           0.80 u[iU]/mL         

  0.35-4.94        

 

                                        Comprehensive metabolic panel - 17

 07:58         

 

                          Serum or plasma sodium measurement (moles/volume)     

      137 mmol/L          

                                        135-145        

 

                          Serum or plasma potassium measurement (moles/volume)  

         4.6 mmol/L       

                                        3.6-5.0        

 

                          Serum or plasma chloride measurement (moles/volume)   

        110 mmol/L        

                                                

 

                    Carbon dioxide           21 mmol/L           21-32        

 

                          Serum or plasma anion gap determination (moles/volume)

           6 mmol/L       

                                        5-14        

 

                          Serum or plasma urea nitrogen measurement (mass/volume

)           25 mg/dL      

                                        7-18        

 

                          Serum or plasma creatinine measurement (mass/volume)  

         1.17 mg/dL       

                                        0.60-1.30        

 

                    Serum or plasma urea nitrogen/creatinine mass ratio         

  21                  NRG 

       

 

                                        Serum or plasma creatinine measurement w

ith calculation of estimated glomerular 

filtration rate           48                        NRG        

 

                    Serum or plasma glucose measurement (mass/volume)           

201 mg/dL           

        

 

                    Serum or plasma calcium measurement (mass/volume)           

8.8 mg/dL           

8.5-10.1        

 

                          Serum or plasma total bilirubin measurement (mass/volu

me)           0.7 mg/dL   

                                        0.1-1.0        

 

                                        Serum or plasma alkaline phosphatase kim

surement (enzymatic activity/volume)    

                          66 U/L                            

 

                                        Serum or plasma aspartate aminotransfera

se measurement (enzymatic 

activity/volume)           54 U/L                    5-34        

 

                                        Serum or plasma alanine aminotransferase

 measurement (enzymatic activity/volume)

                          109 U/L                   0-55        

 

                    Serum or plasma protein measurement (mass/volume)           

6.6 g/dL            

6.4-8.2        

 

                    Serum or plasma albumin measurement (mass/volume)           

3.6 g/dL            

3.2-4.5        

 

                                        Lipid 1996 panel - 17 07:58       

  

 

                          Serum or plasma triglyceride measurement (mass/volume)

           169 mg/dL      

                                        <150        

 

                          Serum or plasma cholesterol measurement (mass/volume) 

          153 mg/dL       

                                        < 200        

 

                          Serum or plasma cholesterol in HDL measurement (mass/v

olume)           37 mg/dL 

                                        40-60        

 

                          Cholesterol in LDL [mass/volume] in serum or plasma by

 direct assay           97

 mg/dL                                  1-129        

 

                          Serum or plasma cholesterol in VLDL measurement (mass/

volume)           34 mg/dL

                                        5-40        

 

                                        Cyanocobalamin measurement - 17 07

:58         

 

                    Vitamin B12           1904 pg/mL           200-1000        

 

                                        25-hydroxyvitamin D measurement -  07:58         

 

                    25-hydroxy vitamin D measurement           14 %             

           

 

                                        Complete blood count (CBC) with automate

d white blood cell (WBC) differential - 

17 19:45         

 

                          Blood leukocytes automated count (number/volume)      

     9.4 10*3/uL          

                                        4.3-11.0        

 

                          Blood erythrocytes automated count (number/volume)    

       4.92 10*6/uL       

                                        4.35-5.85        

 

                    Venous blood hemoglobin measurement (mass/volume)           

15.4 g/dL           

11.5-16.0        

 

                    Blood hematocrit (volume fraction)           46 %           

     35-52        

 

                    Automated erythrocyte mean corpuscular volume           93 [

foz_us]           

80-99        

 

                                        Automated erythrocyte mean corpuscular h

emoglobin (mass per erythrocyte)        

                          31 pg                     25-34        

 

                                        Automated erythrocyte mean corpuscular h

emoglobin concentration measurement 

(mass/volume)             34 g/dL                   32-36        

 

                    Automated erythrocyte distribution width ratio           13.

8 %              10.0-

14.5        

 

                    Automated blood platelet count (count/volume)           237 

10*3/uL           

130-400        

 

                          Automated blood platelet mean volume measurement      

     10.1 [foz_us]        

                                        7.4-10.4        

 

                    Automated blood neutrophils/100 leukocytes           64 %   

             42-75       

 

 

                    Automated blood lymphocytes/100 leukocytes           25 %   

             12-44       

 

 

                    Blood monocytes/100 leukocytes           8 %                

 0-12        

 

                    Automated blood eosinophils/100 leukocytes           3 %    

             0-10        

 

                    Automated blood basophils/100 leukocytes           0 %      

           0-10        

 

                    Blood neutrophils automated count (number/volume)           

6.0 10*3            

1.8-7.8        

 

                    Blood lymphocytes automated count (number/volume)           

2.4 10*3            

1.0-4.0        

 

                    Blood monocytes automated count (number/volume)           0.

8 10*3            

0.0-1.0        

 

                    Automated eosinophil count           0.2 10*3/uL           0

.0-0.3        

 

                    Automated blood basophil count (count/volume)           0.0 

10*3/uL           

0.0-0.1        

 

                                        Comprehensive metabolic panel - 17

 19:45         

 

                          Serum or plasma sodium measurement (moles/volume)     

      142 mmol/L          

                                        135-145        

 

                          Serum or plasma potassium measurement (moles/volume)  

         4.2 mmol/L       

                                        3.6-5.0        

 

                          Serum or plasma chloride measurement (moles/volume)   

        104 mmol/L        

                                                

 

                    Carbon dioxide           24 mmol/L           21-32        

 

                          Serum or plasma anion gap determination (moles/volume)

           14 mmol/L      

                                        5-14        

 

                          Serum or plasma urea nitrogen measurement (mass/volume

)           25 mg/dL      

                                        7-18        

 

                          Serum or plasma creatinine measurement (mass/volume)  

         1.29 mg/dL       

                                        0.60-1.30        

 

                    Serum or plasma urea nitrogen/creatinine mass ratio         

  19                  NRG 

       

 

                                        Serum or plasma creatinine measurement w

ith calculation of estimated glomerular 

filtration rate           43                        NRG        

 

                    Serum or plasma glucose measurement (mass/volume)           

102 mg/dL           

        

 

                    Serum or plasma calcium measurement (mass/volume)           

9.6 mg/dL           

8.5-10.1        

 

                          Serum or plasma total bilirubin measurement (mass/volu

me)           0.9 mg/dL   

                                        0.1-1.0        

 

                                        Serum or plasma alkaline phosphatase kim

surement (enzymatic activity/volume)    

                          83 U/L                            

 

                                        Serum or plasma aspartate aminotransfera

se measurement (enzymatic 

activity/volume)           99 U/L                    5-34        

 

                                        Serum or plasma alanine aminotransferase

 measurement (enzymatic activity/volume)

                          230 U/L                   0-55        

 

                    Serum or plasma protein measurement (mass/volume)           

7.9 g/dL            

6.4-8.2        

 

                    Serum or plasma albumin measurement (mass/volume)           

4.1 g/dL            

3.2-4.5        

 

                                        Lipase - 09/05/17 19:45         

 

                    Lipase              144 U/L             8-78        

 

                                        Urine drug screening test - 17 20:

00         

 

                    Urine phencyclidine detection by screening method           

NEGATIVE            

NEGATIVE        

 

                          Urine benzodiazepines detection by screening method   

        NEGATIVE          

                                        NEGATIVE        

 

                    Urine cocaine detection           NEGATIVE            NEGATI

VE        

 

                    Urine amphetamines detection by screening method           N

EGATIVE            

NEGATIVE        

 

                          Urine methamphetamine detection by screening method   

        NEGATIVE          

                                        NEGATIVE        

 

                    Urine cannabinoids detection by screening method           P

OSITIVE            

NEGATIVE        

 

                    Urine opiates detection by screening method           NEGATI

VE            

NEGATIVE        

 

                    Urine barbiturates detection           NEGATIVE            N

EGATIVE        

 

                          Screening urine tricyclic antidepressants detection   

        NEGATIVE          

                                        NEGATIVE        

 

                    Urine methadone detection by screening method           NEGA

TIVE            

NEGATIVE        

 

                    Urine oxycodone detection           NEGATIVE            NEGA

TIVE        

 

                    Urine propoxyphene detection           NEGATIVE            N

EGATIVE        

 

                                        Complete urinalysis with reflex to cultu

re - 17 20:00         

 

                    Urine color determination           YELLOW              NRG 

       

 

                    Urine clarity determination           SLIGHTLY CLOUDY       

     NRG        

 

                    Urine pH measurement by test strip           8              

     5-9        

 

                    Specific gravity of urine by test strip           1.010     

          1.016-1.022  

      

 

                    Urine protein assay by test strip, semi-quantitative        

   3+                  

NEGATIVE        

 

                    Urine glucose detection by automated test strip           NE

GATIVE            

NEGATIVE        

 

                          Erythrocytes detection in urine sediment by light micr

oscopy           2+       

                                        NEGATIVE        

 

                    Urine ketones detection by automated test strip           1+

                  NEGATIVE

        

 

                    Urine nitrite detection by test strip           NEGATIVE    

        NEGATIVE    

    

 

                    Urine total bilirubin detection by test strip           NEGA

TIVE            

NEGATIVE        

 

                          Urine urobilinogen measurement by automated test strip

 (mass/volume)           

NORMAL                                  NORMAL        

 

                    Urine leukocyte esterase detection by dipstick           1+ 

                 NEGATIVE 

       

 

                                        Automated urine sediment erythrocyte cou

nt by microscopy (number/high power 

field)                     [HPF]                    NRG        

 

                                        Automated urine sediment leukocyte count

 by microscopy (number/high power field)

                           [HPF]                    NRG        

 

                          Bacteria detection in urine sediment by light microsco

py           NONE         

                                        NRG        

 

                                        Squamous epithelial cells detection in u

rine sediment by light microscopy       

                          0-2                       NRG        

 

                          Crystals detection in urine sediment by light microsco

py           NONE         

                                        NRG        

 

                    Casts detection in urine sediment by light microscopy       

    NONE                

NRG        

 

                          Mucus detection in urine sediment by light microscopy 

          NEGATIVE        

                                        NRG        

 

                    Complete urinalysis with reflex to culture           NO     

             NRG        

 

                                        Complete blood count (CBC) with automate

d white blood cell (WBC) differential - 

10/11/17 08:55         

 

                          Blood leukocytes automated count (number/volume)      

     7.0 10*3/uL          

                                        4.3-11.0        

 

                          Blood erythrocytes automated count (number/volume)    

       5.49 10*6/uL       

                                        4.35-5.85        

 

                    Venous blood hemoglobin measurement (mass/volume)           

17.0 g/dL           

11.5-16.0        

 

                    Blood hematocrit (volume fraction)           51 %           

     35-52        

 

                    Automated erythrocyte mean corpuscular volume           92 [

foz_us]           

80-99        

 

                                        Automated erythrocyte mean corpuscular h

emoglobin (mass per erythrocyte)        

                          31 pg                     25-34        

 

                                        Automated erythrocyte mean corpuscular h

emoglobin concentration measurement 

(mass/volume)             34 g/dL                   32-36        

 

                    Automated erythrocyte distribution width ratio           13.

7 %              10.0-

14.5        

 

                    Automated blood platelet count (count/volume)           227 

10*3/uL           

130-400        

 

                          Automated blood platelet mean volume measurement      

     10.3 [foz_us]        

                                        7.4-10.4        

 

                    Automated blood neutrophils/100 leukocytes           51 %   

             42-75       

 

 

                    Automated blood lymphocytes/100 leukocytes           33 %   

             12-44       

 

 

                    Blood monocytes/100 leukocytes           13 %               

 0-12        

 

                    Automated blood eosinophils/100 leukocytes           3 %    

             0-10        

 

                    Automated blood basophils/100 leukocytes           1 %      

           0-10        

 

                    Blood neutrophils automated count (number/volume)           

3.6 10*3            

1.8-7.8        

 

                    Blood lymphocytes automated count (number/volume)           

2.3 10*3            

1.0-4.0        

 

                    Blood monocytes automated count (number/volume)           0.

9 10*3            

0.0-1.0        

 

                    Automated eosinophil count           0.2 10*3/uL           0

.0-0.3        

 

                    Automated blood basophil count (count/volume)           0.0 

10*3/uL           

0.0-0.1        

 

                                        Comprehensive metabolic panel - 10/11/17

 08:55         

 

                          Serum or plasma sodium measurement (moles/volume)     

      138 mmol/L          

                                        135-145        

 

                          Serum or plasma potassium measurement (moles/volume)  

         4.2 mmol/L       

                                        3.6-5.0        

 

                          Serum or plasma chloride measurement (moles/volume)   

        103 mmol/L        

                                                

 

                    Carbon dioxide           26 mmol/L           21-32        

 

                          Serum or plasma anion gap determination (moles/volume)

           9 mmol/L       

                                        5-14        

 

                          Serum or plasma urea nitrogen measurement (mass/volume

)           23 mg/dL      

                                        7-18        

 

                          Serum or plasma creatinine measurement (mass/volume)  

         1.33 mg/dL       

                                        0.60-1.30        

 

                    Serum or plasma urea nitrogen/creatinine mass ratio         

  17                  NRG 

       

 

                                        Serum or plasma creatinine measurement w

ith calculation of estimated glomerular 

filtration rate           42                        NRG        

 

                    Serum or plasma glucose measurement (mass/volume)           

186 mg/dL           

        

 

                    Serum or plasma calcium measurement (mass/volume)           

9.9 mg/dL           

8.5-10.1        

 

                          Serum or plasma total bilirubin measurement (mass/volu

me)           1.2 mg/dL   

                                        0.1-1.0        

 

                                        Serum or plasma alkaline phosphatase kim

surement (enzymatic activity/volume)    

                          83 U/L                            

 

                                        Serum or plasma aspartate aminotransfera

se measurement (enzymatic 

activity/volume)           73 U/L                    5-34        

 

                                        Serum or plasma alanine aminotransferase

 measurement (enzymatic activity/volume)

                          144 U/L                   0-55        

 

                    Serum or plasma protein measurement (mass/volume)           

8.3 g/dL            

6.4-8.2        

 

                    Serum or plasma albumin measurement (mass/volume)           

4.1 g/dL            

3.2-4.5        

 

                                        Lipid 1996 panel - 10/11/17 08:55       

  

 

                          Serum or plasma triglyceride measurement (mass/volume)

           168 mg/dL      

                                        <150        

 

                          Serum or plasma cholesterol measurement (mass/volume) 

          191 mg/dL       

                                        < 200        

 

                          Serum or plasma cholesterol in HDL measurement (mass/v

olume)           42 mg/dL 

                                        40-60        

 

                          Cholesterol in LDL [mass/volume] in serum or plasma by

 direct assay           

108 mg/dL                               1-129        

 

                          Serum or plasma cholesterol in VLDL measurement (mass/

volume)           34 mg/dL

                                        5-40        

 

                                        Hemoglobin A1c - 10/11/17 08:55         

 

                    Hemoglobin A1c           10.7 %              4.5-6.2        

 

                                        Automated blood complete blood count (he

mogram) panel - 18 08:18         

 

                          Blood leukocytes automated count (number/volume)      

     5.2 10*3/uL          

                                        4.3-11.0        

 

                          Blood erythrocytes automated count (number/volume)    

       4.66 10*6/uL       

                                        4.35-5.85        

 

                    Venous blood hemoglobin measurement (mass/volume)           

15.0 g/dL           

11.5-16.0        

 

                    Blood hematocrit (volume fraction)           43 %           

     35-52        

 

                    Automated erythrocyte mean corpuscular volume           93 [

foz_us]           

80-99        

 

                                        Automated erythrocyte mean corpuscular h

emoglobin (mass per erythrocyte)        

                          32 pg                     25-34        

 

                                        Automated erythrocyte mean corpuscular h

emoglobin concentration measurement 

(mass/volume)             35 g/dL                   32-36        

 

                    Automated erythrocyte distribution width ratio           13.

3 %              10.0-

14.5        

 

                    Automated blood platelet count (count/volume)           232 

10*3/uL           

130-400        

 

                          Automated blood platelet mean volume measurement      

     10.2 [foz_us]        

                                        7.4-10.4        

 

                                        Comprehensive metabolic panel - 18

 08:18         

 

                          Serum or plasma sodium measurement (moles/volume)     

      135 mmol/L          

                                        135-145        

 

                          Serum or plasma potassium measurement (moles/volume)  

         4.5 mmol/L       

                                        3.6-5.0        

 

                          Serum or plasma chloride measurement (moles/volume)   

        104 mmol/L        

                                                

 

                    Carbon dioxide           23 mmol/L           21-32        

 

                          Serum or plasma anion gap determination (moles/volume)

           8 mmol/L       

                                        5-14        

 

                          Serum or plasma urea nitrogen measurement (mass/volume

)           25 mg/dL      

                                        7-18        

 

                          Serum or plasma creatinine measurement (mass/volume)  

         1.21 mg/dL       

                                        0.60-1.30        

 

                    Serum or plasma urea nitrogen/creatinine mass ratio         

  21                  NRG 

       

 

                                        Serum or plasma creatinine measurement w

ith calculation of estimated glomerular 

filtration rate           46                        NRG        

 

                    Serum or plasma glucose measurement (mass/volume)           

310 mg/dL           

        

 

                    Serum or plasma calcium measurement (mass/volume)           

9.3 mg/dL           

8.5-10.1        

 

                          Serum or plasma total bilirubin measurement (mass/volu

me)           0.8 mg/dL   

                                        0.1-1.0        

 

                                        Serum or plasma alkaline phosphatase kim

surement (enzymatic activity/volume)    

                          66 U/L                            

 

                                        Serum or plasma aspartate aminotransfera

se measurement (enzymatic 

activity/volume)           68 U/L                    5-34        

 

                                        Serum or plasma alanine aminotransferase

 measurement (enzymatic activity/volume)

                          133 U/L                   0-55        

 

                    Serum or plasma protein measurement (mass/volume)           

7.3 g/dL            

6.4-8.2        

 

                    Serum or plasma albumin measurement (mass/volume)           

3.9 g/dL            

3.2-4.5        

 

                                        THYROID STIMULATING HORMONE - 18 0

8:18         

 

                    THYROID STIMULATING HORMONE           0.73 u[iU]/mL         

  0.35-4.94        

 

                                        Urine microalbumin measurement by test s

trip (mass/volume) - 18 08:18     

    

 

                    Urine creatinine measurement (mass/volume)           166 %  

             NRG        

 

                    Microalbumin [mass/volume] in urine           739.8 %       

      0.0-20.0        

 

                    Microalbumin/creatinine [ratio] in urine           445.7 mg/

g{Cre}           

0.0-30.0        

 

                                        Cyanocobalamin measurement - 18 08

:18         

 

                    Vitamin B12           952 pg/mL           190-1100        

 

                                        VITAMIN D 25-HYDROXY - 18 08:18   

      

 

                    VITAMIN D 25-HYDROXY (TOTAL)           25.2 %              3

0.0-100.0        

 

                                        Automated blood complete blood count (he

mogram) panel - 01/15/19 17:23         

 

                          Blood leukocytes automated count (number/volume)      

     9.4 10*3/uL          

                                        4.3-11.0        

 

                          Blood erythrocytes automated count (number/volume)    

       4.76 10*6/uL       

                                        4.35-5.85        

 

                    Venous blood hemoglobin measurement (mass/volume)           

14.7 g/dL           

11.5-16.0        

 

                    Blood hematocrit (volume fraction)           44 %           

     35-52        

 

                    Automated erythrocyte mean corpuscular volume           93 [

foz_us]           

80-99        

 

                                        Automated erythrocyte mean corpuscular h

emoglobin (mass per erythrocyte)        

                          31 pg                     25-34        

 

                                        Automated erythrocyte mean corpuscular h

emoglobin concentration measurement 

(mass/volume)             33 g/dL                   32-36        

 

                    Automated erythrocyte distribution width ratio           13.

2 %              10.0-

14.5        

 

                    Automated blood platelet count (count/volume)           254 

10*3/uL           

130-400        

 

                          Automated blood platelet mean volume measurement      

     10.0 [foz_us]        

                                        7.4-10.4        

 

                                        Comprehensive metabolic panel - 01/15/19

 17:23         

 

                          Serum or plasma sodium measurement (moles/volume)     

      137 mmol/L          

                                        135-145        

 

                          Serum or plasma potassium measurement (moles/volume)  

         3.6 mmol/L       

                                        3.6-5.0        

 

                          Serum or plasma chloride measurement (moles/volume)   

        97 mmol/L         

                                                

 

                    Carbon dioxide           26 mmol/L           21-32        

 

                          Serum or plasma anion gap determination (moles/volume)

           14 mmol/L      

                                        5-14        

 

                          Serum or plasma urea nitrogen measurement (mass/volume

)           24 mg/dL      

                                        7-18        

 

                          Serum or plasma creatinine measurement (mass/volume)  

         1.78 mg/dL       

                                        0.60-1.30        

 

                    Serum or plasma urea nitrogen/creatinine mass ratio         

  13                  NRG 

       

 

                                        Serum or plasma creatinine measurement w

ith calculation of estimated glomerular 

filtration rate           30                        NRG        

 

                    Serum or plasma glucose measurement (mass/volume)           

112 mg/dL           

        

 

                          Serum or plasma calcium measurement (mass/volume)     

      10.1 mg/dL          

                                        8.5-10.1        

 

                          Serum or plasma total bilirubin measurement (mass/volu

me)           0.8 mg/dL   

                                        0.1-1.0        

 

                                        Serum or plasma alkaline phosphatase kim

surement (enzymatic activity/volume)    

                          92 U/L                            

 

                                        Serum or plasma aspartate aminotransfera

se measurement (enzymatic 

activity/volume)           42 U/L                    5-34        

 

                                        Serum or plasma alanine aminotransferase

 measurement (enzymatic activity/volume)

                          81 U/L                    0-55        

 

                    Serum or plasma protein measurement (mass/volume)           

8.7 g/dL            

6.4-8.2        

 

                    Serum or plasma albumin measurement (mass/volume)           

4.2 g/dL            

3.2-4.5        

 

                    CALCIUM CORRECTED           9.9 mg/dL           8.5-10.1    

    

 

                                        Lipid 1996 panel - 01/15/19 17:23       

  

 

                          Serum or plasma triglyceride measurement (mass/volume)

           231 mg/dL      

                                        <150        

 

                          Serum or plasma cholesterol measurement (mass/volume) 

          280 mg/dL       

                                        < 200        

 

                          Serum or plasma cholesterol in HDL measurement (mass/v

olume)           54 mg/dL 

                                        40-60        

 

                          Cholesterol in LDL [mass/volume] in serum or plasma by

 direct assay           

179 mg/dL                               1-129        

 

                          Serum or plasma cholesterol in VLDL measurement (mass/

volume)           46 mg/dL

                                        5-40        

 

                                        THYROID STIMULATING HORMONE - 01/15/19 1

7:23         

 

                    THYROID STIMULATING HORMONE           4.84 u[iU]/mL         

  0.35-4.94        

 

                                        Complete blood count (CBC) with automate

d white blood cell (WBC) differential - 

19 08:19         

 

                          Blood leukocytes automated count (number/volume)      

     26.1 10*3/uL         

                                        4.3-11.0        

 

                          Blood erythrocytes automated count (number/volume)    

       4.51 10*6/uL       

                                        4.35-5.85        

 

                    Venous blood hemoglobin measurement (mass/volume)           

14.1 g/dL           

11.5-16.0        

 

                    Blood hematocrit (volume fraction)           44 %           

     35-52        

 

                    Automated erythrocyte mean corpuscular volume           97 [

foz_us]           

80-99        

 

                                        Automated erythrocyte mean corpuscular h

emoglobin (mass per erythrocyte)        

                          31 pg                     25-34        

 

                                        Automated erythrocyte mean corpuscular h

emoglobin concentration measurement 

(mass/volume)             32 g/dL                   32-36        

 

                    Automated erythrocyte distribution width ratio           13.

7 %              10.0-

14.5        

 

                    Automated blood platelet count (count/volume)           349 

10*3/uL           

130-400        

 

                          Automated blood platelet mean volume measurement      

     10.6 [foz_us]        

                                        7.4-10.4        

 

                    Automated blood neutrophils/100 leukocytes           81 %   

             42-75       

 

 

                    Automated blood lymphocytes/100 leukocytes           11 %   

             12-44       

 

 

                    Blood monocytes/100 leukocytes           7 %                

 0-12        

 

                    Automated blood eosinophils/100 leukocytes           0 %    

             0-10        

 

                    Automated blood basophils/100 leukocytes           0 %      

           0-10        

 

                    Blood neutrophils automated count (number/volume)           

21.2 10*3           

1.8-7.8        

 

                    Blood lymphocytes automated count (number/volume)           

3.0 10*3            

1.0-4.0        

 

                    Blood monocytes automated count (number/volume)           1.

8 10*3            

0.0-1.0        

 

                    Automated eosinophil count           0.0 10*3/uL           0

.0-0.3        

 

                    Automated blood basophil count (count/volume)           0.0 

10*3/uL           

0.0-0.1        

 

                                        Manual absolute plasma cell count -  08:19         

 

                    Blood monocytes/100 leukocytes           5 %                

 NRG        

 

                    Manual blood segmented neutrophils/100 leukocytes           

83 %                NRG  

      

 

                    Blood band neutrophils/100 leukocytes           0 %         

        NRG        

 

                    Manual blood lymphocytes/100 leukocytes           12 %      

          NRG        

 

                    Manual eosinophils/100 leukocytes in nose           0 %     

            NRG        

 

                    Manual blood basophils/100 leukocytes           0 %         

        NRG        

 

                    Blood erythrocyte morphology finding identification         

  NORMAL              

NRG        

 

                                        Comprehensive metabolic panel - 19

 08:19         

 

                          Serum or plasma sodium measurement (moles/volume)     

      129 mmol/L          

                                        135-145        

 

                          Serum or plasma potassium measurement (moles/volume)  

         6.3 mmol/L       

                                        3.6-5.0        

 

                          Serum or plasma chloride measurement (moles/volume)   

        88 mmol/L         

                                                

 

                    Carbon dioxide           < mmol/L            21-32        

 

                          Serum or plasma anion gap determination (moles/volume)

           37 mmol/L      

                                        5-14        

 

                          Serum or plasma urea nitrogen measurement (mass/volume

)           51 mg/dL      

                                        7-18        

 

                          Serum or plasma creatinine measurement (mass/volume)  

         2.96 mg/dL       

                                        0.60-1.30        

 

                    Serum or plasma urea nitrogen/creatinine mass ratio         

  17                  NRG 

       

 

                                        Serum or plasma creatinine measurement w

ith calculation of estimated glomerular 

filtration rate           16                        NRG        

 

                    Serum or plasma glucose measurement (mass/volume)           

919 mg/dL           

        

 

                    Serum or plasma calcium measurement (mass/volume)           

9.6 mg/dL           

8.5-10.1        

 

                          Serum or plasma total bilirubin measurement (mass/volu

me)           0.4 mg/dL   

                                        0.1-1.0        

 

                                        Serum or plasma alkaline phosphatase kim

surement (enzymatic activity/volume)    

                          127 U/L                           

 

                                        Serum or plasma aspartate aminotransfera

se measurement (enzymatic 

activity/volume)           32 U/L                    5-34        

 

                                        Serum or plasma alanine aminotransferase

 measurement (enzymatic activity/volume)

                          74 U/L                    0-55        

 

                    Serum or plasma protein measurement (mass/volume)           

7.4 g/dL            

6.4-8.2        

 

                    Serum or plasma albumin measurement (mass/volume)           

3.4 g/dL            

3.2-4.5        

 

                    CALCIUM CORRECTED           10.1 mg/dL           8.5-10.1   

     

 

                                        Lipase - 19 08:19         

 

                    Lipase              215 U/L             8-78        

 

                                        Capillary blood glucose measurement by g

lucometer (mass/volume) - 19 09:33

         

 

                          Capillary blood glucose measurement by glucometer (mas

s/volume)           > 

mg/dL                                           

 

                                        Arterial blood gas measurement -  09:34         

 

                    Blood pCO2           11 mm[Hg]           35-45        

 

                    Blood pO2           127 mm[Hg]           79-93        

 

                          Arterial blood bicarbonate measurement (moles/volume) 

          3 mmol/L        

                                        23-27        

 

                    Arterial blood base excess by calculation           -25.7 mm

ol/L           

-2.5-2.5        

 

                    Arterial blood oxygen saturation measurement           98 % 

                   

    

 

                    * Inhaled oxygen flow rate           NA                  NRG

        

 

                          Arterial blood pH measurement with patient temperature

 correction           7.07

                                        7.37-7.43        

 

                          Arterial blood carbon dioxide, total measurement (mole

s/volume)           3.4 

mmol/L                                  21.0-31.0        

 

                    Body site           R BRACHIAL            NRG        

 

                          Assessment of wrist artery patency prior to arterial p

uncture           YES-POS 

                                        NRG        

 

                    Setting of ventilation mode           NO                  NR

G        

 

                    Measurement of body temperature           97.6              

  NRG        

 

                                        Whole blood basic metabolic panel -  09:45         

 

                          Serum or plasma sodium measurement (moles/volume)     

      132 mmol/L          

                                        135-145        

 

                          Serum or plasma potassium measurement (moles/volume)  

         6.0 mmol/L       

                                        3.6-5.0        

 

                          Serum or plasma chloride measurement (moles/volume)   

        94 mmol/L         

                                                

 

                    Carbon dioxide           < mmol/L            21-32        

 

                          Serum or plasma anion gap determination (moles/volume)

           33 mmol/L      

                                        5-14        

 

                          Serum or plasma urea nitrogen measurement (mass/volume

)           52 mg/dL      

                                        7-18        

 

                          Serum or plasma creatinine measurement (mass/volume)  

         2.74 mg/dL       

                                        0.60-1.30        

 

                    Serum or plasma urea nitrogen/creatinine mass ratio         

  19                  NRG 

       

 

                                        Serum or plasma creatinine measurement w

ith calculation of estimated glomerular 

filtration rate           18                        NRG        

 

                    Serum or plasma glucose measurement (mass/volume)           

852 mg/dL           

        

 

                    Serum or plasma calcium measurement (mass/volume)           

8.9 mg/dL           

8.5-10.1        

 

                                        Blood lactic acid measurement (moles/vol

ume) - 19 09:45         

 

                    Blood lactic acid measurement (moles/volume)           5.75 

mmol/L           

0.50-2.00        

 

                                        Serum or plasma troponin i.cardiac measu

rement (mass/volume) - 19 09:45   

      

 

                          Serum or plasma troponin i.cardiac measurement (mass/v

olume)           < ng/mL  

                                        <0.028        

 

                                        Bacterial blood culture - 19 10:15

         

 

                    Bacterial blood culture           NG                  NRG   

     

 

                                        Bacterial blood culture - 19 10:20

         

 

                    Bacterial blood culture           NG                  NRG   

     

 

                                        Automated blood complete blood count (he

mogram) panel - 19 11:00         

 

                          Blood leukocytes automated count (number/volume)      

     29.0 10*3/uL         

                                        4.3-11.0        

 

                          Blood erythrocytes automated count (number/volume)    

       4.23 10*6/uL       

                                        4.35-5.85        

 

                    Venous blood hemoglobin measurement (mass/volume)           

13.2 g/dL           

11.5-16.0        

 

                    Blood hematocrit (volume fraction)           40 %           

     35-52        

 

                    Automated erythrocyte mean corpuscular volume           95 [

foz_us]           

80-99        

 

                                        Automated erythrocyte mean corpuscular h

emoglobin (mass per erythrocyte)        

                          31 pg                     25-34        

 

                                        Automated erythrocyte mean corpuscular h

emoglobin concentration measurement 

(mass/volume)             33 g/dL                   32-36        

 

                    Automated erythrocyte distribution width ratio           13.

3 %              10.0-

14.5        

 

                    Automated blood platelet count (count/volume)           290 

10*3/uL           

130-400        

 

                          Automated blood platelet mean volume measurement      

     10.6 [foz_us]        

                                        7.4-10.4        

 

                                        Whole blood basic metabolic panel -  11:00         

 

                          Serum or plasma sodium measurement (moles/volume)     

      134 mmol/L          

                                        135-145        

 

                          Serum or plasma potassium measurement (moles/volume)  

         5.1 mmol/L       

                                        3.6-5.0        

 

                          Serum or plasma chloride measurement (moles/volume)   

        100 mmol/L        

                                                

 

                    Carbon dioxide           < mmol/L            21-32        

 

                          Serum or plasma anion gap determination (moles/volume)

           29 mmol/L      

                                        5-14        

 

                          Serum or plasma urea nitrogen measurement (mass/volume

)           51 mg/dL      

                                        7-18        

 

                          Serum or plasma creatinine measurement (mass/volume)  

         2.60 mg/dL       

                                        0.60-1.30        

 

                    Serum or plasma urea nitrogen/creatinine mass ratio         

  20                  NRG 

       

 

                                        Serum or plasma creatinine measurement w

ith calculation of estimated glomerular 

filtration rate           19                        NRG        

 

                    Serum or plasma glucose measurement (mass/volume)           

691 mg/dL           

        

 

                    Serum or plasma calcium measurement (mass/volume)           

8.3 mg/dL           

8.5-10.1        

 

                                        Beta-hydroxybutyric acid measurement - 0

19 11:00         

 

                    Beta-hydroxybutyric acid measurement           12.57 mmol/L 

          0.00-0.27 

       

 

                                        Hemoglobin A1c measurement - 19 11

:00         

 

                    Blood hemoglobin A1C measurement (mass/volume)           14.

3 %              4.0-

5.6        

 

                    MEAN BLOOD GLUCOSE           364 %               <=126      

  

 

                                        Serum or plasma lactate measurement (mol

es/volume) - 19 12:00         

 

                          Serum or plasma lactate measurement (moles/volume)    

       2.87 mmol/L        

                                        0.50-2.00        

 

                                        Methicillin resistant Staphylococcus aur

eus (MRSA) screening culture - 19 

12:00         

 

                          Methicillin resistant Staphylococcus aureus (MRSA) scr

eening culture           

NEG                                     NRG        

 

                                        Capillary blood glucose measurement by g

lucometer (mass/volume) - 19 12:05

         

 

                          Capillary blood glucose measurement by glucometer (mas

s/volume)           > 

mg/dL                                           

 

                                        Whole blood basic metabolic panel -  12:20         

 

                          Serum or plasma sodium measurement (moles/volume)     

      136 mmol/L          

                                        135-145        

 

                          Serum or plasma potassium measurement (moles/volume)  

         4.5 mmol/L       

                                        3.6-5.0        

 

                          Serum or plasma chloride measurement (moles/volume)   

        104 mmol/L        

                                                

 

                    Carbon dioxide           < mmol/L            21-32        

 

                          Serum or plasma anion gap determination (moles/volume)

           27 mmol/L      

                                        5-14        

 

                          Serum or plasma urea nitrogen measurement (mass/volume

)           48 mg/dL      

                                        7-18        

 

                          Serum or plasma creatinine measurement (mass/volume)  

         2.42 mg/dL       

                                        0.60-1.30        

 

                    Serum or plasma urea nitrogen/creatinine mass ratio         

  20                  NRG 

       

 

                                        Serum or plasma creatinine measurement w

ith calculation of estimated glomerular 

filtration rate           21                        NRG        

 

                    Serum or plasma glucose measurement (mass/volume)           

649 mg/dL           

        

 

                    Serum or plasma calcium measurement (mass/volume)           

8.4 mg/dL           

8.5-10.1        

 

                                        Capillary blood glucose measurement by g

lucometer (mass/volume) - 19 13:29

         

 

                          Capillary blood glucose measurement by glucometer (mas

s/volume)           541 

mg/dL                                           

 

                                        Complete urinalysis with reflex to cultu

re - 19 14:20         

 

                    Urine color determination           YELLOW              NRG 

       

 

                    Urine clarity determination           CLEAR               NR

G        

 

                    Urine pH measurement by test strip           5              

     5-9        

 

                    Specific gravity of urine by test strip           1.020     

          1.016-1.022  

      

 

                    Urine protein assay by test strip, semi-quantitative        

   3+                  

NEGATIVE        

 

                    Urine glucose detection by automated test strip           4+

                  NEGATIVE

        

 

                          Erythrocytes detection in urine sediment by light micr

oscopy           2+       

                                        NEGATIVE        

 

                    Urine ketones detection by automated test strip           4+

                  NEGATIVE

        

 

                    Urine nitrite detection by test strip           NEGATIVE    

        NEGATIVE    

    

 

                    Urine total bilirubin detection by test strip           NEGA

TIVE            

NEGATIVE        

 

                          Urine urobilinogen measurement by automated test strip

 (mass/volume)           

NORMAL                                  NORMAL        

 

                    Urine leukocyte esterase detection by dipstick           NEG

ATIVE            

NEGATIVE        

 

                                        Automated urine sediment erythrocyte cou

nt by microscopy (number/high power 

field)                     [HPF]                    NRG        

 

                                        Automated urine sediment leukocyte count

 by microscopy (number/high power field)

                          NONE                      NRG        

 

                          Bacteria detection in urine sediment by light microsco

py           TRACE        

                                        NRG        

 

                                        Squamous epithelial cells detection in u

rine sediment by light microscopy       

                          5-10                      NRG        

 

                          Crystals detection in urine sediment by light microsco

py           NONE         

                                        NRG        

 

                    Casts detection in urine sediment by light microscopy       

    NONE                

NRG        

 

                          Mucus detection in urine sediment by light microscopy 

          NEGATIVE        

                                        NRG        

 

                    Complete urinalysis with reflex to culture           NO     

             NRG        

 

                                        Urine drug screening test - 19 14:

20         

 

                    Urine phencyclidine detection by screening method           

NEGATIVE            

NEGATIVE        

 

                          Urine benzodiazepines detection by screening method   

        NEGATIVE          

                                        NEGATIVE        

 

                    Urine cocaine detection           NEGATIVE            NEGATI

VE        

 

                    Urine amphetamines detection by screening method           N

EGATIVE            

NEGATIVE        

 

                          Urine methamphetamine detection by screening method   

        NEGATIVE          

                                        NEGATIVE        

 

                    Urine cannabinoids detection by screening method           N

EGATIVE            

NEGATIVE        

 

                    Urine opiates detection by screening method           NEGATI

VE            

NEGATIVE        

 

                    Urine barbiturates detection           NEGATIVE            N

EGATIVE        

 

                          Screening urine tricyclic antidepressants detection   

        NEGATIVE          

                                        NEGATIVE        

 

                    Urine methadone detection by screening method           NEGA

TIVE            

NEGATIVE        

 

                    Urine oxycodone detection           NEGATIVE            NEGA

TIVE        

 

                    Urine propoxyphene detection           NEGATIVE            N

EGATIVE        

 

                                        Capillary blood glucose measurement by g

lucometer (mass/volume) - 19 14:32

         

 

                          Capillary blood glucose measurement by glucometer (mas

s/volume)           464 

mg/dL                                           

 

                                        Whole blood basic metabolic panel -  15:10         

 

                          Serum or plasma sodium measurement (moles/volume)     

      137 mmol/L          

                                        135-145        

 

                          Serum or plasma potassium measurement (moles/volume)  

         4.7 mmol/L       

                                        3.6-5.0        

 

                          Serum or plasma chloride measurement (moles/volume)   

        105 mmol/L        

                                                

 

                    Carbon dioxide           5 mmol/L            -32        

 

                          Serum or plasma anion gap determination (moles/volume)

           27 mmol/L      

                                        5-14        

 

                          Serum or plasma urea nitrogen measurement (mass/volume

)           47 mg/dL      

                                        7-18        

 

                          Serum or plasma creatinine measurement (mass/volume)  

         2.28 mg/dL       

                                        0.60-1.30        

 

                    Serum or plasma urea nitrogen/creatinine mass ratio         

  21                  NRG 

       

 

                                        Serum or plasma creatinine measurement w

ith calculation of estimated glomerular 

filtration rate           22                        NRG        

 

                    Serum or plasma glucose measurement (mass/volume)           

507 mg/dL           

        

 

                    Serum or plasma calcium measurement (mass/volume)           

8.8 mg/dL           

8.5-10.1        

 

                                        Capillary blood glucose measurement by g

lucometer (mass/volume) - 19 15:37

         

 

                          Capillary blood glucose measurement by glucometer (mas

s/volume)           432 

mg/dL                                           

 

                                        Capillary blood glucose measurement by g

lucometer (mass/volume) - 19 16:30

         

 

                          Capillary blood glucose measurement by glucometer (mas

s/volume)           377 

mg/dL                                           

 

                                        Capillary blood glucose measurement by g

lucometer (mass/volume) - 19 17:35

         

 

                          Capillary blood glucose measurement by glucometer (mas

s/volume)           382 

mg/dL                                           

 

                                        Capillary blood glucose measurement by g

lucometer (mass/volume) - 19 18:41

         

 

                          Capillary blood glucose measurement by glucometer (mas

s/volume)           282 

mg/dL                                           

 

                                        Capillary blood glucose measurement by g

lucometer (mass/volume) - 19 19:37

         

 

                          Capillary blood glucose measurement by glucometer (mas

s/volume)           210 

mg/dL                                           

 

                                        Whole blood basic metabolic panel -  20:05         

 

                          Serum or plasma sodium measurement (moles/volume)     

      136 mmol/L          

                                        135-145        

 

                          Serum or plasma potassium measurement (moles/volume)  

         4.9 mmol/L       

                                        3.6-5.0        

 

                          Serum or plasma chloride measurement (moles/volume)   

        107 mmol/L        

                                                

 

                    Carbon dioxide           14 mmol/L           -32        

 

                          Serum or plasma anion gap determination (moles/volume)

           15 mmol/L      

                                        5-14        

 

                          Serum or plasma urea nitrogen measurement (mass/volume

)           43 mg/dL      

                                        7-18        

 

                          Serum or plasma creatinine measurement (mass/volume)  

         1.89 mg/dL       

                                        0.60-1.30        

 

                    Serum or plasma urea nitrogen/creatinine mass ratio         

  23                  NRG 

       

 

                                        Serum or plasma creatinine measurement w

ith calculation of estimated glomerular 

filtration rate           28                        NRG        

 

                    Serum or plasma glucose measurement (mass/volume)           

263 mg/dL           

        

 

                    Serum or plasma calcium measurement (mass/volume)           

8.5 mg/dL           

8.5-10.1        

 

                                        Capillary blood glucose measurement by g

lucometer (mass/volume) - 19 20:26

         

 

                          Capillary blood glucose measurement by glucometer (mas

s/volume)           286 

mg/dL                                           

 

                                        Capillary blood glucose measurement by g

lucometer (mass/volume) - 19 21:31

         

 

                          Capillary blood glucose measurement by glucometer (mas

s/volume)           238 

mg/dL                                           

 

                                        Capillary blood glucose measurement by g

lucometer (mass/volume) - 19 22:44

         

 

                          Capillary blood glucose measurement by glucometer (mas

s/volume)           244 

mg/dL                                           

 

                                        Capillary blood glucose measurement by g

lucometer (mass/volume) - 19 23:28

         

 

                          Capillary blood glucose measurement by glucometer (mas

s/volume)           208 

mg/dL                                           

 

                                        Capillary blood glucose measurement by g

lucometer (mass/volume) - 19 00:35

         

 

                          Capillary blood glucose measurement by glucometer (mas

s/volume)           173 

mg/dL                                           

 

                                        Capillary blood glucose measurement by g

lucometer (mass/volume) - 19 01:38

         

 

                          Capillary blood glucose measurement by glucometer (mas

s/volume)           160 

mg/dL                                           

 

                                        Capillary blood glucose measurement by g

lucometer (mass/volume) - 19 02:34

         

 

                          Capillary blood glucose measurement by glucometer (mas

s/volume)           155 

mg/dL                                           

 

                                        Capillary blood glucose measurement by g

lucometer (mass/volume) - 19 03:31

         

 

                          Capillary blood glucose measurement by glucometer (mas

s/volume)           119 

mg/dL                                           

 

                                        Complete blood count (CBC) with automate

d white blood cell (WBC) differential - 

19 03:39         

 

                          Blood leukocytes automated count (number/volume)      

     16.9 10*3/uL         

                                        4.3-11.0        

 

                          Blood erythrocytes automated count (number/volume)    

       4.07 10*6/uL       

                                        4.35-5.85        

 

                    Venous blood hemoglobin measurement (mass/volume)           

12.8 g/dL           

11.5-16.0        

 

                    Blood hematocrit (volume fraction)           37 %           

     35-52        

 

                    Automated erythrocyte mean corpuscular volume           90 [

foz_us]           

80-99        

 

                                        Automated erythrocyte mean corpuscular h

emoglobin (mass per erythrocyte)        

                          31 pg                     25-34        

 

                                        Automated erythrocyte mean corpuscular h

emoglobin concentration measurement 

(mass/volume)             35 g/dL                   32-36        

 

                    Automated erythrocyte distribution width ratio           13.

3 %              10.0-

14.5        

 

                    Automated blood platelet count (count/volume)           230 

10*3/uL           

130-400        

 

                          Automated blood platelet mean volume measurement      

     10.1 [foz_us]        

                                        7.4-10.4        

 

                    Automated blood neutrophils/100 leukocytes           83 %   

             42-75       

 

 

                    Automated blood lymphocytes/100 leukocytes           13 %   

             12-44       

 

 

                    Blood monocytes/100 leukocytes           4 %                

 0-12        

 

                    Automated blood eosinophils/100 leukocytes           0 %    

             0-10        

 

                    Automated blood basophils/100 leukocytes           0 %      

           0-10        

 

                    Blood neutrophils automated count (number/volume)           

14.0 10*3           

1.8-7.8        

 

                    Blood lymphocytes automated count (number/volume)           

2.2 10*3            

1.0-4.0        

 

                    Blood monocytes automated count (number/volume)           0.

6 10*3            

0.0-1.0        

 

                    Automated eosinophil count           0.0 10*3/uL           0

.0-0.3        

 

                    Automated blood basophil count (count/volume)           0.0 

10*3/uL           

0.0-0.1        

 

                                        Comprehensive metabolic panel - 19

 03:39         

 

                          Serum or plasma sodium measurement (moles/volume)     

      136 mmol/L          

                                        135-145        

 

                          Serum or plasma potassium measurement (moles/volume)  

         4.2 mmol/L       

                                        3.6-5.0        

 

                          Serum or plasma chloride measurement (moles/volume)   

        109 mmol/L        

                                                

 

                    Carbon dioxide           19 mmol/L           21-32        

 

                          Serum or plasma anion gap determination (moles/volume)

           8 mmol/L       

                                        5-14        

 

                          Serum or plasma urea nitrogen measurement (mass/volume

)           35 mg/dL      

                                        7-18        

 

                          Serum or plasma creatinine measurement (mass/volume)  

         1.49 mg/dL       

                                        0.60-1.30        

 

                    Serum or plasma urea nitrogen/creatinine mass ratio         

  23                  NRG 

       

 

                                        Serum or plasma creatinine measurement w

ith calculation of estimated glomerular 

filtration rate           36                        NRG        

 

                    Serum or plasma glucose measurement (mass/volume)           

112 mg/dL           

        

 

                    Serum or plasma calcium measurement (mass/volume)           

8.5 mg/dL           

8.5-10.1        

 

                          Serum or plasma total bilirubin measurement (mass/volu

me)           0.5 mg/dL   

                                        0.1-1.0        

 

                                        Serum or plasma alkaline phosphatase kim

surement (enzymatic activity/volume)    

                          84 U/L                            

 

                                        Serum or plasma aspartate aminotransfera

se measurement (enzymatic 

activity/volume)           36 U/L                    5-34        

 

                                        Serum or plasma alanine aminotransferase

 measurement (enzymatic activity/volume)

                          51 U/L                    0-55        

 

                    Serum or plasma protein measurement (mass/volume)           

6.1 g/dL            

6.4-8.2        

 

                    Serum or plasma albumin measurement (mass/volume)           

3.0 g/dL            

3.2-4.5        

 

                    CALCIUM CORRECTED           9.3 mg/dL           8.5-10.1    

    

 

                                        Serum or plasma phosphate measurement (m

ass/volume) - 19 03:39         

 

                          Serum or plasma phosphate measurement (mass/volume)   

        1.9 mg/dL         

                                        2.3-4.7        

 

                                        Magnesium - 19 03:39         

 

                    Magnesium           1.8 mg/dL           1.8-2.4        

 

                                        Beta-hydroxybutyric acid measurement - 0

19 03:39         

 

                    Beta-hydroxybutyric acid measurement           0.12 mmol/L  

         0.00-0.27  

      

 

                                        PT panel in platelet poor plasma by coag

ulation assay - 19 03:39         

 

                          Prothrombin time (PT) in platelet poor plasma by coagu

lation assay           

12.1 s                                  12.2-14.7        

 

                          INR in platelet poor plasma or blood by coagulation as

say           0.9         

                                        0.8-1.4        

 

                                        Capillary blood glucose measurement by g

lucometer (mass/volume) - 19 04:31

         

 

                          Capillary blood glucose measurement by glucometer (mas

s/volume)           97 

mg/dL                                           

 

                                        Capillary blood glucose measurement by g

lucometer (mass/volume) - 19 06:00

         

 

                          Capillary blood glucose measurement by glucometer (mas

s/volume)           125 

mg/dL                                           

 

                                        Capillary blood glucose measurement by g

lucometer (mass/volume) - 19 06:59

         

 

                          Capillary blood glucose measurement by glucometer (mas

s/volume)           129 

mg/dL                                           

 

                                        Whole blood basic metabolic panel -  07:05         

 

                          Serum or plasma sodium measurement (moles/volume)     

      137 mmol/L          

                                        135-145        

 

                          Serum or plasma potassium measurement (moles/volume)  

         4.4 mmol/L       

                                        3.6-5.0        

 

                          Serum or plasma chloride measurement (moles/volume)   

        109 mmol/L        

                                                

 

                    Carbon dioxide           19 mmol/L           21-32        

 

                          Serum or plasma anion gap determination (moles/volume)

           9 mmol/L       

                                        5-14        

 

                          Serum or plasma urea nitrogen measurement (mass/volume

)           31 mg/dL      

                                        7-18        

 

                          Serum or plasma creatinine measurement (mass/volume)  

         1.40 mg/dL       

                                        0.60-1.30        

 

                    Serum or plasma urea nitrogen/creatinine mass ratio         

  22                  NRG 

       

 

                                        Serum or plasma creatinine measurement w

ith calculation of estimated glomerular 

filtration rate           39                        NRG        

 

                    Serum or plasma glucose measurement (mass/volume)           

159 mg/dL           

        

 

                    Serum or plasma calcium measurement (mass/volume)           

8.3 mg/dL           

8.5-10.1        

 

                                        Serum or plasma phosphate measurement (m

ass/volume) - 19 07:05         

 

                          Serum or plasma phosphate measurement (mass/volume)   

        1.7 mg/dL         

                                        2.3-4.7        

 

                                        Magnesium - 19 07:05         

 

                    Magnesium           1.8 mg/dL           1.8-2.4        

 

                                        Beta-hydroxybutyric acid measurement - 0

19 07:05         

 

                    Beta-hydroxybutyric acid measurement           0.85 mmol/L  

         0.00-0.27  

      

 

                                        Capillary blood glucose measurement by g

lucometer (mass/volume) - 19 10:15

         

 

                          Capillary blood glucose measurement by glucometer (mas

s/volume)           175 

mg/dL                                           

 

                                        Capillary blood glucose measurement by g

lucometer (mass/volume) - 19 11:22

         

 

                          Capillary blood glucose measurement by glucometer (mas

s/volume)           169 

mg/dL                                           

 

                                        Capillary blood glucose measurement by g

lucometer (mass/volume) - 19 13:13

         

 

                          Capillary blood glucose measurement by glucometer (mas

s/volume)           203 

mg/dL                                           

 

                                        Capillary blood glucose measurement by g

lucometer (mass/volume) - 19 15:52

         

 

                          Capillary blood glucose measurement by glucometer (mas

s/volume)           229 

mg/dL                                           

 

                                        Capillary blood glucose measurement by g

lucometer (mass/volume) - 19 21:16

         

 

                          Capillary blood glucose measurement by glucometer (mas

s/volume)           194 

mg/dL                                           

 

                                        Capillary blood glucose measurement by g

lucometer (mass/volume) - 19 06:12

         

 

                          Capillary blood glucose measurement by glucometer (mas

s/volume)           48 

mg/dL                                           

 

                                        Complete blood count (CBC) with automate

d white blood cell (WBC) differential - 

19 08:40         

 

                          Blood leukocytes automated count (number/volume)      

     10.7 10*3/uL         

                                        4.3-11.0        

 

                          Blood erythrocytes automated count (number/volume)    

       3.97 10*6/uL       

                                        4.35-5.85        

 

                    Venous blood hemoglobin measurement (mass/volume)           

12.2 g/dL           

11.5-16.0        

 

                    Blood hematocrit (volume fraction)           37 %           

     35-52        

 

                    Automated erythrocyte mean corpuscular volume           93 [

foz_us]           

80-99        

 

                                        Automated erythrocyte mean corpuscular h

emoglobin (mass per erythrocyte)        

                          31 pg                     25-34        

 

                                        Automated erythrocyte mean corpuscular h

emoglobin concentration measurement 

(mass/volume)             33 g/dL                   32-36        

 

                    Automated erythrocyte distribution width ratio           14.

9 %              10.0-

14.5        

 

                    Automated blood platelet count (count/volume)           194 

10*3/uL           

130-400        

 

                          Automated blood platelet mean volume measurement      

     10.0 [foz_us]        

                                        7.4-10.4        

 

                    Automated blood neutrophils/100 leukocytes           79 %   

             42-75       

 

 

                    Automated blood lymphocytes/100 leukocytes           16 %   

             12-44       

 

 

                    Blood monocytes/100 leukocytes           5 %                

 0-12        

 

                    Automated blood eosinophils/100 leukocytes           0 %    

             0-10        

 

                    Automated blood basophils/100 leukocytes           0 %      

           0-10        

 

                    Blood neutrophils automated count (number/volume)           

8.4 10*3            

1.8-7.8        

 

                    Blood lymphocytes automated count (number/volume)           

1.7 10*3            

1.0-4.0        

 

                    Blood monocytes automated count (number/volume)           0.

5 10*3            

0.0-1.0        

 

                    Automated eosinophil count           0.0 10*3/uL           0

.0-0.3        

 

                    Automated blood basophil count (count/volume)           0.0 

10*3/uL           

0.0-0.1        

 

                                        Whole blood basic metabolic panel - 08/1

3/19 08:40         

 

                          Serum or plasma sodium measurement (moles/volume)     

      142 mmol/L          

                                        135-145        

 

                          Serum or plasma potassium measurement (moles/volume)  

         3.5 mmol/L       

                                        3.6-5.0        

 

                          Serum or plasma chloride measurement (moles/volume)   

        112 mmol/L        

                                                

 

                    Carbon dioxide           21 mmol/L           21-32        

 

                          Serum or plasma anion gap determination (moles/volume)

           9 mmol/L       

                                        5-14        

 

                          Serum or plasma urea nitrogen measurement (mass/volume

)           21 mg/dL      

                                        7-18        

 

                          Serum or plasma creatinine measurement (mass/volume)  

         0.96 mg/dL       

                                        0.60-1.30        

 

                    Serum or plasma urea nitrogen/creatinine mass ratio         

  22                  NRG 

       

 

                                        Serum or plasma creatinine measurement w

ith calculation of estimated glomerular 

filtration rate           60                        NRG        

 

                    Serum or plasma glucose measurement (mass/volume)           

93 mg/dL            

        

 

                    Serum or plasma calcium measurement (mass/volume)           

8.5 mg/dL           

8.5-10.1        

 

                                        Beta-hydroxybutyric acid measurement - 0

19 08:40         

 

                    Beta-hydroxybutyric acid measurement           0.11 mmol/L  

         0.00-0.27  

      

 

                                        Serum or plasma troponin i.cardiac measu

rement (mass/volume) - 19 08:40   

      

 

                          Serum or plasma troponin i.cardiac measurement (mass/v

olume)           < ng/mL  

                                        <0.028        

 

                                        Capillary blood glucose measurement by g

lucometer (mass/volume) - 19 10:15

         

 

                          Capillary blood glucose measurement by glucometer (mas

s/volume)           129 

mg/dL                                           

 

                                        Capillary blood glucose measurement by g

lucometer (mass/volume) - 19 15:53

         

 

                          Capillary blood glucose measurement by glucometer (mas

s/volume)           292 

mg/dL                                           

 

                                        Capillary blood glucose measurement by g

lucometer (mass/volume) - 19 20:15

         

 

                          Capillary blood glucose measurement by glucometer (mas

s/volume)           152 

mg/dL                                           

 

                                        Capillary blood glucose measurement by g

lucometer (mass/volume) - 19 06:00

         

 

                          Capillary blood glucose measurement by glucometer (mas

s/volume)           306 

mg/dL                                           

 

                                        Capillary blood glucose measurement by g

lucometer (mass/volume) - 19 11:29

         

 

                          Capillary blood glucose measurement by glucometer (mas

s/volume)           184 

mg/dL                                           

 

                                        Whole blood basic metabolic panel -  11:55         

 

                          Serum or plasma sodium measurement (moles/volume)     

      136 mmol/L          

                                        135-145        

 

                          Serum or plasma potassium measurement (moles/volume)  

         3.7 mmol/L       

                                        3.6-5.0        

 

                          Serum or plasma chloride measurement (moles/volume)   

        105 mmol/L        

                                                

 

                    Carbon dioxide           24 mmol/L           21-32        

 

                          Serum or plasma anion gap determination (moles/volume)

           7 mmol/L       

                                        5-14        

 

                          Serum or plasma urea nitrogen measurement (mass/volume

)           16 mg/dL      

                                        7-18        

 

                          Serum or plasma creatinine measurement (mass/volume)  

         0.92 mg/dL       

                                        0.60-1.30        

 

                    Serum or plasma urea nitrogen/creatinine mass ratio         

  17                  NRG 

       

 

                                        Serum or plasma creatinine measurement w

ith calculation of estimated glomerular 

filtration rate           >                         NRG        

 

                    Serum or plasma glucose measurement (mass/volume)           

187 mg/dL           

        

 

                    Serum or plasma calcium measurement (mass/volume)           

7.8 mg/dL           

8.5-10.1        

 

                                        Beta-hydroxybutyric acid measurement - 0

19 11:55         

 

                    Beta-hydroxybutyric acid measurement           0.12 mmol/L  

         0.00-0.27  

      

 

                                        Complete blood count (CBC) with automate

d white blood cell (WBC) differential - 

20 18:40         

 

                          Blood leukocytes automated count (number/volume)      

     9.2 10*3/uL          

                                        4.3-11.0        

 

                          Blood erythrocytes automated count (number/volume)    

       4.17 10*6/uL       

                                        4.35-5.85        

 

                    Venous blood hemoglobin measurement (mass/volume)           

12.7 g/dL           

11.5-16.0        

 

                    Blood hematocrit (volume fraction)           39 %           

     35-52        

 

                    Automated erythrocyte mean corpuscular volume           93 [

foz_us]           

80-99        

 

                                        Automated erythrocyte mean corpuscular h

emoglobin (mass per erythrocyte)        

                          30 pg                     25-34        

 

                                        Automated erythrocyte mean corpuscular h

emoglobin concentration measurement 

(mass/volume)             33 g/dL                   32-36        

 

                    Automated erythrocyte distribution width ratio           13.

3 %              10.0-

14.5        

 

                    Automated blood platelet count (count/volume)           359 

10*3/uL           

130-400        

 

                          Automated blood platelet mean volume measurement      

     9.6 [foz_us]         

                                        7.4-10.4        

 

                    Automated blood neutrophils/100 leukocytes           69 %   

             42-75       

 

 

                    Automated blood lymphocytes/100 leukocytes           24 %   

             12-44       

 

 

                    Blood monocytes/100 leukocytes           7 %                

 0-12        

 

                    Automated blood eosinophils/100 leukocytes           0 %    

             0-10        

 

                    Automated blood basophils/100 leukocytes           0 %      

           0-10        

 

                    Blood neutrophils automated count (number/volume)           

6.4 10*3            

1.8-7.8        

 

                    Blood lymphocytes automated count (number/volume)           

2.2 10*3            

1.0-4.0        

 

                    Blood monocytes automated count (number/volume)           0.

6 10*3            

0.0-1.0        

 

                    Automated eosinophil count           0.0 10*3/uL           0

.0-0.3        

 

                    Automated blood basophil count (count/volume)           0.0 

10*3/uL           

0.0-0.1        

 

                                        Comprehensive metabolic panel - 20

 18:40         

 

                          Serum or plasma sodium measurement (moles/volume)     

      127 mmol/L          

                                        135-145        

 

                          Serum or plasma potassium measurement (moles/volume)  

         3.7 mmol/L       

                                        3.6-5.0        

 

                          Serum or plasma chloride measurement (moles/volume)   

        94 mmol/L         

                                                

 

                    Carbon dioxide           13 mmol/L           21-32        

 

                          Serum or plasma anion gap determination (moles/volume)

           20 mmol/L      

                                        5-14        

 

                          Serum or plasma urea nitrogen measurement (mass/volume

)           27 mg/dL      

                                        7-18        

 

                          Serum or plasma creatinine measurement (mass/volume)  

         1.68 mg/dL       

                                        0.60-1.30        

 

                    Serum or plasma urea nitrogen/creatinine mass ratio         

  16                  NRG 

       

 

                                        Serum or plasma creatinine measurement w

ith calculation of estimated glomerular 

filtration rate           32                        NRG        

 

                    Serum or plasma glucose measurement (mass/volume)           

690 mg/dL           

        

 

                    Serum or plasma calcium measurement (mass/volume)           

8.8 mg/dL           

8.5-10.1        

 

                          Serum or plasma total bilirubin measurement (mass/volu

me)           0.5 mg/dL   

                                        0.1-1.0        

 

                                        Serum or plasma alkaline phosphatase kim

surement (enzymatic activity/volume)    

                          160 U/L                           

 

                                        Serum or plasma aspartate aminotransfera

se measurement (enzymatic 

activity/volume)           80 U/L                    5-34        

 

                                        Serum or plasma alanine aminotransferase

 measurement (enzymatic activity/volume)

                          131 U/L                   0-55        

 

                    Serum or plasma protein measurement (mass/volume)           

6.4 g/dL            

6.4-8.2        

 

                    Serum or plasma albumin measurement (mass/volume)           

2.6 g/dL            

3.2-4.5        

 

                    CALCIUM CORRECTED           9.9 mg/dL           8.5-10.1    

    

 

                                        Serum or plasma lithium measurement (mol

es/volume) - 20 18:40         

 

                    BNP PT              244.9 pg/mL           <100.0        

 

                                        THYROID STIMULATING HORMONE - 20 1

8:40         

 

                    THYROID STIMULATING HORMONE           1.02 u[iU]/mL         

  0.35-4.94        

 

                                        Serum or plasma thyroxine (T4) free cristian

urement (mass/volume) - 20 18:40  

       

 

                          Serum or plasma thyroxine (T4) free measurement (mass/

volume)           0.84 

ng/dL                                   0.70-1.48        

 

                                        Beta-hydroxybutyric acid measurement - 0

20 18:40         

 

                    Beta-hydroxybutyric acid measurement           5.37 mmol/L  

         0.00-0.27  

      

 

                                        Arterial blood gas measurement - 

0 18:45         

 

                    Blood pCO2           26 mm[Hg]           35-45        

 

                    Blood pO2           98 mm[Hg]           79-93        

 

                          Arterial blood bicarbonate measurement (moles/volume) 

          14 mmol/L       

                                        23-27        

 

                    Arterial blood base excess by calculation           -10.7 mm

ol/L           

-2.5-2.5        

 

                    Arterial blood oxygen saturation measurement           97 % 

                   

    

 

                    * Inhaled oxygen flow rate           21% FI02            NRG

        

 

                          Arterial blood pH measurement with patient temperature

 correction           7.34

                                        7.37-7.43        

 

                          Arterial blood carbon dioxide, total measurement (mole

s/volume)           14.7 

mmol/L                                  21.0-31.0        

 

                    Body site           LEFT RADIAL            NRG        

 

                          Assessment of wrist artery patency prior to arterial p

uncture           POSITIVE

                                        NRG        

 

                    Setting of ventilation mode           NO                  NR

G        

 

                    Measurement of body temperature           36.8              

  NRG        

 

                                        Urine drug screening test - 20 20:

05         

 

                    Urine phencyclidine detection by screening method           

NEGATIVE            

NEGATIVE        

 

                          Urine benzodiazepines detection by screening method   

        NEGATIVE          

                                        NEGATIVE        

 

                    Urine cocaine detection           NEGATIVE            NEGATI

VE        

 

                    Urine amphetamines detection by screening method           P

OSITIVE            

NEGATIVE        

 

                          Urine methamphetamine detection by screening method   

        POSITIVE          

                                        NEGATIVE        

 

                    Urine cannabinoids detection by screening method           N

EGATIVE            

NEGATIVE        

 

                    Urine opiates detection by screening method           NEGATI

VE            

NEGATIVE        

 

                    Urine barbiturates detection           NEGATIVE            N

EGATIVE        

 

                          Screening urine tricyclic antidepressants detection   

        NEGATIVE          

                                        NEGATIVE        

 

                    Urine methadone detection by screening method           NEGA

TIVE            

NEGATIVE        

 

                    Urine oxycodone detection           NEGATIVE            NEGA

TIVE        

 

                    Urine propoxyphene detection           NEGATIVE            N

EGATIVE        

 

                                        Complete urinalysis with reflex to cultu

re - 20 20:05         

 

                    Urine color determination           YELLOW              NRG 

       

 

                    Urine clarity determination           CLEAR               NR

G        

 

                    Urine pH measurement by test strip           5.5            

     5-9        

 

                    Specific gravity of urine by test strip           1.020     

          1.016-1.022  

      

 

                    Urine protein assay by test strip, semi-quantitative        

   2+                  

NEGATIVE        

 

                    Urine glucose detection by automated test strip           2+

                  NEGATIVE

        

 

                          Erythrocytes detection in urine sediment by light micr

oscopy           1+       

                                        NEGATIVE        

 

                    Urine ketones detection by automated test strip           1+

                  NEGATIVE

        

 

                    Urine nitrite detection by test strip           NEGATIVE    

        NEGATIVE    

    

 

                    Urine total bilirubin detection by test strip           NEGA

TIVE            

NEGATIVE        

 

                          Urine urobilinogen measurement by automated test strip

 (mass/volume)           

0.2 mg/dL                               < = 1.0        

 

                    Urine leukocyte esterase detection by dipstick           NEG

ATIVE            

NEGATIVE        

 

                                        Automated urine sediment erythrocyte cou

nt by microscopy (number/high power 

field)                     [HPF]                    NRG        

 

                                        Automated urine sediment leukocyte count

 by microscopy (number/high power field)

                           [HPF]                    NRG        

 

                          Bacteria detection in urine sediment by light microsco

py           FEW          

                                        NRG        

 

                                        Squamous epithelial cells detection in u

rine sediment by light microscopy       

                          10-25                     NRG        

 

                          Crystals detection in urine sediment by light microsco

py           PRESENT      

                                        NRG        

 

                    Casts detection in urine sediment by light microscopy       

    NONE                

NRG        

 

                          Mucus detection in urine sediment by light microscopy 

          NEGATIVE        

                                        NRG        

 

                    Complete urinalysis with reflex to culture           YES    

             NRG        

 

                          Amorphous sediment detection in urine sediment by ligh

t microscopy           FEW

 NATALIE URATES                            NRG        

 

                                        Bacterial urine culture - 20 20:05

         

 

                    Bacterial urine culture           3 OR MORE            NRG  

      

 

                    COLONY COUNT           50,000 CFU/ML            NRG        

 

                    FTX;REPORTABLE           GRAM POSITIVE ISOLATES            N

RG        

 

                                        Capillary blood glucose measurement by g

lucometer (mass/volume) - 02/10/20 02:13

         

 

                          Capillary blood glucose measurement by glucometer (mas

s/volume)           > 

mg/dL                                           

 

                                        Complete blood count (CBC) with automate

d white blood cell (WBC) differential - 

02/10/20 02:19         

 

                          Blood leukocytes automated count (number/volume)      

     32.5 10*3/uL         

                                        4.3-11.0        

 

                          Blood erythrocytes automated count (number/volume)    

       3.73 10*6/uL       

                                        4.35-5.85        

 

                    Venous blood hemoglobin measurement (mass/volume)           

12.1 g/dL           

11.5-16.0        

 

                    Blood hematocrit (volume fraction)           37 %           

     35-52        

 

                    Automated erythrocyte mean corpuscular volume           100 

[foz_us]           

80-99        

 

                                        Automated erythrocyte mean corpuscular h

emoglobin (mass per erythrocyte)        

                          32 pg                     25-34        

 

                                        Automated erythrocyte mean corpuscular h

emoglobin concentration measurement 

(mass/volume)             33 g/dL                   32-36        

 

                    Automated erythrocyte distribution width ratio           14.

1 %              10.0-

14.5        

 

                    Automated blood platelet count (count/volume)           326 

10*3/uL           

130-400        

 

                          Automated blood platelet mean volume measurement      

     10.2 [foz_us]        

                                        7.4-10.4        

 

                    Automated blood neutrophils/100 leukocytes           80 %   

             42-75       

 

 

                    Automated blood lymphocytes/100 leukocytes           15 %   

             12-44       

 

 

                    Blood monocytes/100 leukocytes           5 %                

 0-12        

 

                    Automated blood eosinophils/100 leukocytes           0 %    

             0-10        

 

                    Automated blood basophils/100 leukocytes           0 %      

           0-10        

 

                    Blood neutrophils automated count (number/volume)           

25.9 10*3           

1.8-7.8        

 

                    Blood lymphocytes automated count (number/volume)           

4.8 10*3            

1.0-4.0        

 

                    Blood monocytes automated count (number/volume)           1.

7 10*3            

0.0-1.0        

 

                    Automated eosinophil count           0.0 10*3/uL           0

.0-0.3        

 

                    Automated blood basophil count (count/volume)           0.1 

10*3/uL           

0.0-0.1        

 

                                        PT panel in platelet poor plasma by coag

ulation assay - 02/10/20 02:19         

 

                          Prothrombin time (PT) in platelet poor plasma by coagu

lation assay           

14.0 s                                  12.2-14.7        

 

                          INR in platelet poor plasma or blood by coagulation as

say           1.0         

                                        0.8-1.4        

 

                                        Activated partial thromboplastin time (a

PTT) in platelet poor plasma 

bycoagulation assay - 02/10/20 02:19         

 

                                        Activated partial thromboplastin time (a

PTT) in platelet poor plasma 

bycoagulation assay           27 s                      24-35        

 

                                        Manual absolute plasma cell count -  02:19         

 

                    Blood monocytes/100 leukocytes           5 %                

 NRG        

 

                    Manual blood segmented neutrophils/100 leukocytes           

72 %                NRG  

      

 

                    Blood band neutrophils/100 leukocytes           10 %        

        NRG        

 

                    Manual blood lymphocytes/100 leukocytes           11 %      

          NRG        

 

                    Blood erythrocyte morphology finding identification         

  NORMAL              

NRG        

 

                    Manual blood metamyelocytes/100 leukocytes           2 %    

             NRG        

 

                                        Influenza virus A and B antigen detectio

n - 02/10/20 02:19         

 

                    FLU RESULT           NEGATIVE FOR INFLUENZA A AND B ANTIGENS

 BY IA            

NRG        

 

                                        Blood lactic acid measurement (moles/vol

ume) - 02/10/20 02:19         

 

                    Blood lactic acid measurement (moles/volume)           6.69 

mmol/L           

0.50-2.00        

 

                                        Comprehensive metabolic panel - 02/10/20

 02:19         

 

                          Serum or plasma sodium measurement (moles/volume)     

      123 mmol/L          

                                        135-145        

 

                          Serum or plasma potassium measurement (moles/volume)  

         6.1 mmol/L       

                                        3.6-5.0        

 

                          Serum or plasma chloride measurement (moles/volume)   

        82 mmol/L         

                                                

 

                    Carbon dioxide           < mmol/L            21-32        

 

                          Serum or plasma anion gap determination (moles/volume)

           36 mmol/L      

                                        5-14        

 

                          Serum or plasma urea nitrogen measurement (mass/volume

)           58 mg/dL      

                                        7-18        

 

                          Serum or plasma creatinine measurement (mass/volume)  

         3.05 mg/dL       

                                        0.60-1.30        

 

                    Serum or plasma urea nitrogen/creatinine mass ratio         

  19                  NRG 

       

 

                                        Serum or plasma creatinine measurement w

ith calculation of estimated glomerular 

filtration rate           16                        NRG        

 

                          Serum or plasma glucose measurement (mass/volume)     

      1040 mg/dL          

                                                

 

                    Serum or plasma calcium measurement (mass/volume)           

9.6 mg/dL           

8.5-10.1        

 

                          Serum or plasma total bilirubin measurement (mass/volu

me)           0.2 mg/dL   

                                        0.1-1.0        

 

                                        Serum or plasma alkaline phosphatase kim

surement (enzymatic activity/volume)    

                          130 U/L                           

 

                                        Serum or plasma aspartate aminotransfera

se measurement (enzymatic 

activity/volume)           23 U/L                    5-34        

 

                                        Serum or plasma alanine aminotransferase

 measurement (enzymatic activity/volume)

                          39 U/L                    0-55        

 

                    Serum or plasma protein measurement (mass/volume)           

6.4 g/dL            

6.4-8.2        

 

                    Serum or plasma albumin measurement (mass/volume)           

2.8 g/dL            

3.2-4.5        

 

                    CALCIUM CORRECTED           10.6 mg/dL           8.5-10.1   

     

 

                                        Serum or plasma creatine kinase measurem

ent (enzymatic activity/volume) - 

02/10/20 02:19         

 

                                        Serum or plasma creatine kinase measurem

ent (enzymatic activity/volume)         

                          118 U/L                           

 

                                        Serum or plasma troponin i.cardiac measu

rement (mass/volume) - 02/10/20 02:19   

      

 

                          Serum or plasma troponin i.cardiac measurement (mass/v

olume)           < ng/mL  

                                        <0.028        

 

                                        Serum or plasma ethanol measurement (mas

s/volume) - 02/10/20 02:19         

 

                    Serum or plasma ethanol measurement (mass/volume)           

< mg/dL             

<10        

 

                                        Bacterial blood culture - 02/10/20 02:19

         

 

                    Bacterial blood culture           NG                  NRG   

     

 

                                        Complete urinalysis with reflex to cultu

re - 02/10/20 02:31         

 

                    Urine color determination           YELLOW              NRG 

       

 

                    Urine clarity determination           SL CLOUDY            N

RG        

 

                    Urine pH measurement by test strip           5.0            

     5-9        

 

                    Specific gravity of urine by test strip           >=        

          1.016-1.022     

   

 

                    Urine protein assay by test strip, semi-quantitative        

   3+                  

NEGATIVE        

 

                    Urine glucose detection by automated test strip           3+

                  NEGATIVE

        

 

                          Erythrocytes detection in urine sediment by light micr

oscopy           1+       

                                        NEGATIVE        

 

                    Urine ketones detection by automated test strip           2+

                  NEGATIVE

        

 

                    Urine nitrite detection by test strip           NEGATIVE    

        NEGATIVE    

    

 

                    Urine total bilirubin detection by test strip           NEGA

TIVE            

NEGATIVE        

 

                          Urine urobilinogen measurement by automated test strip

 (mass/volume)           

0.2 mg/dL                               < = 1.0        

 

                    Urine leukocyte esterase detection by dipstick           NEG

ATIVE            

NEGATIVE        

 

                                        Automated urine sediment erythrocyte cou

nt by microscopy (number/high power 

field)                     [HPF]                    NRG        

 

                                        Automated urine sediment leukocyte count

 by microscopy (number/high power field)

                           [HPF]                    NRG        

 

                          Bacteria detection in urine sediment by light microsco

py           TRACE        

                                        NRG        

 

                                        Squamous epithelial cells detection in u

rine sediment by light microscopy       

                          0-2                       NRG        

 

                          Crystals detection in urine sediment by light microsco

py           PRESENT      

                                        NRG        

 

                    Casts detection in urine sediment by light microscopy       

    NONE                

NRG        

 

                          Mucus detection in urine sediment by light microscopy 

          NEGATIVE        

                                        NRG        

 

                    Complete urinalysis with reflex to culture           YES    

             NRG        

 

                          Amorphous sediment detection in urine sediment by ligh

t microscopy           MOD

 NATALIE URATES                            NRG        

 

                                        Urine drug screening test - 02/10/20 02:

31         

 

                    Urine phencyclidine detection by screening method           

NEGATIVE            

NEGATIVE        

 

                          Urine benzodiazepines detection by screening method   

        NEGATIVE          

                                        NEGATIVE        

 

                    Urine cocaine detection           NEGATIVE            NEGATI

VE        

 

                    Urine amphetamines detection by screening method           P

OSITIVE            

NEGATIVE        

 

                          Urine methamphetamine detection by screening method   

        POSITIVE          

                                        NEGATIVE        

 

                    Urine cannabinoids detection by screening method           N

EGATIVE            

NEGATIVE        

 

                    Urine opiates detection by screening method           NEGATI

VE            

NEGATIVE        

 

                    Urine barbiturates detection           NEGATIVE            N

EGATIVE        

 

                          Screening urine tricyclic antidepressants detection   

        NEGATIVE          

                                        NEGATIVE        

 

                    Urine methadone detection by screening method           NEGA

TIVE            

NEGATIVE        

 

                    Urine oxycodone detection           NEGATIVE            NEGA

TIVE        

 

                    Urine propoxyphene detection           NEGATIVE            N

EGATIVE        

 

                                        Bacterial urine culture - 02/10/20 02:31

         

 

                    Bacterial urine culture           NG                  NRG   

     

 

                                        Bacterial blood culture - 02/10/20 02:40

         

 

                    Bacterial blood culture           NG                  NRG   

     

 

                                        Arterial blood gas measurement - 02/10/2

0 02:53         

 

                    Blood pCO2           11 mm[Hg]           35-45        

 

                    Blood pO2           130 mm[Hg]           79-93        

 

                          Arterial blood bicarbonate measurement (moles/volume) 

          3 mmol/L        

                                        23-27        

 

                    Arterial blood base excess by calculation           -26.3 mm

ol/L           

-2.5-2.5        

 

                    Arterial blood oxygen saturation measurement           98 % 

                   

    

 

                    * Inhaled oxygen flow rate           2L O2               NRG

        

 

                          Arterial blood pH measurement with patient temperature

 correction           7.05

                                        7.37-7.43        

 

                          Arterial blood carbon dioxide, total measurement (mole

s/volume)           3.3 

mmol/L                                  21.0-31.0        

 

                    Body site           LEFT RADIAL            NRG        

 

                          Assessment of wrist artery patency prior to arterial p

uncture           POSITIVE

                                        NRG        

 

                    Setting of ventilation mode           NO                  NR

G        

 

                    Measurement of body temperature           34.4              

  NRG        

 

                                        Capillary blood glucose measurement by g

lucometer (mass/volume) - 02/10/20 04:20

         

 

                          Capillary blood glucose measurement by glucometer (mas

s/volume)           > 

mg/dL                                           

 

                                        Serum or plasma lactate measurement (mol

es/volume) - 02/10/20 04:23         

 

                          Serum or plasma lactate measurement (moles/volume)    

       2.72 mmol/L        

                                        0.50-2.00        

 

                                        Capillary blood glucose measurement by g

lucometer (mass/volume) - 02/10/20 05:23

         

 

                          Capillary blood glucose measurement by glucometer (mas

s/volume)           > 

mg/dL                                           

 

                                        Complete blood count (CBC) with automate

d white blood cell (WBC) differential - 

02/10/20 06:00         

 

                          Blood leukocytes automated count (number/volume)      

     37.4 10*3/uL         

                                        4.3-11.0        

 

                          Blood erythrocytes automated count (number/volume)    

       3.16 10*6/uL       

                                        4.35-5.85        

 

                    Venous blood hemoglobin measurement (mass/volume)           

10.0 g/dL           

11.5-16.0        

 

                    Blood hematocrit (volume fraction)           31 %           

     35-52        

 

                    Automated erythrocyte mean corpuscular volume           98 [

foz_us]           

80-99        

 

                                        Automated erythrocyte mean corpuscular h

emoglobin (mass per erythrocyte)        

                          32 pg                     25-34        

 

                                        Automated erythrocyte mean corpuscular h

emoglobin concentration measurement 

(mass/volume)             33 g/dL                   32-36        

 

                    Automated erythrocyte distribution width ratio           14.

0 %              10.0-

14.5        

 

                    Automated blood platelet count (count/volume)           293 

10*3/uL           

130-400        

 

                          Automated blood platelet mean volume measurement      

     10.0 [foz_us]        

                                        7.4-10.4        

 

                    Automated blood neutrophils/100 leukocytes           76 %   

             42-75       

 

 

                    Automated blood lymphocytes/100 leukocytes           18 %   

             12-44       

 

 

                    Blood monocytes/100 leukocytes           5 %                

 0-12        

 

                    Automated blood eosinophils/100 leukocytes           0 %    

             0-10        

 

                    Automated blood basophils/100 leukocytes           0 %      

           0-10        

 

                    Blood neutrophils automated count (number/volume)           

28.5 10*3           

1.8-7.8        

 

                    Blood lymphocytes automated count (number/volume)           

6.9 10*3            

1.0-4.0        

 

                    Blood monocytes automated count (number/volume)           1.

9 10*3            

0.0-1.0        

 

                    Automated eosinophil count           0.1 10*3/uL           0

.0-0.3        

 

                    Automated blood basophil count (count/volume)           0.1 

10*3/uL           

0.0-0.1        

 

                                        Comprehensive metabolic panel - 02/10/20

 06:00         

 

                          Serum or plasma sodium measurement (moles/volume)     

      128 mmol/L          

                                        135-145        

 

                          Serum or plasma potassium measurement (moles/volume)  

         5.0 mmol/L       

                                        3.6-5.0        

 

                          Serum or plasma chloride measurement (moles/volume)   

        96 mmol/L         

                                                

 

                    Carbon dioxide           < mmol/L            21-32        

 

                          Serum or plasma anion gap determination (moles/volume)

           27 mmol/L      

                                        5-14        

 

                          Serum or plasma urea nitrogen measurement (mass/volume

)           56 mg/dL      

                                        7-18        

 

                          Serum or plasma creatinine measurement (mass/volume)  

         2.64 mg/dL       

                                        0.60-1.30        

 

                    Serum or plasma urea nitrogen/creatinine mass ratio         

  21                  NRG 

       

 

                                        Serum or plasma creatinine measurement w

ith calculation of estimated glomerular 

filtration rate           19                        NRG        

 

                    Serum or plasma glucose measurement (mass/volume)           

787 mg/dL           

        

 

                    Serum or plasma calcium measurement (mass/volume)           

7.6 mg/dL           

8.5-10.1        

 

                          Serum or plasma total bilirubin measurement (mass/volu

me)           0.2 mg/dL   

                                        0.1-1.0        

 

                                        Serum or plasma alkaline phosphatase kim

surement (enzymatic activity/volume)    

                          105 U/L                           

 

                                        Serum or plasma aspartate aminotransfera

se measurement (enzymatic 

activity/volume)           21 U/L                    5-34        

 

                                        Serum or plasma alanine aminotransferase

 measurement (enzymatic activity/volume)

                          32 U/L                    0-55        

 

                    Serum or plasma protein measurement (mass/volume)           

5.0 g/dL            

6.4-8.2        

 

                    Serum or plasma albumin measurement (mass/volume)           

2.2 g/dL            

3.2-4.5        

 

                    CALCIUM CORRECTED           9.0 mg/dL           8.5-10.1    

    

 

                                        Serum or plasma phosphate measurement (m

ass/volume) - 02/10/20 06:00         

 

                          Serum or plasma phosphate measurement (mass/volume)   

        9.1 mg/dL         

                                        2.3-4.7        

 

                                        Magnesium - 02/10/20 06:00         

 

                    Magnesium           2.3 mg/dL           1.6-2.4        

 

                                        Serum or plasma triglyceride measurement

 (mass/volume) - 02/10/20 06:00         

 

                          Serum or plasma triglyceride measurement (mass/volume)

           927 mg/dL      

                                        <150        

 

                                        Beta-hydroxybutyric acid measurement - 0

2/10/20 06:00         

 

                    Beta-hydroxybutyric acid measurement           12.29 mmol/L 

          0.00-0.27 

       

 

                                        Hemoglobin A1c measurement - 02/10/20 06

:00         

 

                    Blood hemoglobin A1C measurement (mass/volume)           14.

8 %              4.0-

5.6        

 

                    MEAN BLOOD GLUCOSE           378 %               <=126      

  

 

                                        Methicillin resistant Staphylococcus aur

eus (MRSA) screening culture - 02/10/20 

06:00         

 

                          Methicillin resistant Staphylococcus aureus (MRSA) scr

eening culture           

NEG                                     NRG        

 

                                        Complete urinalysis with reflex to cultu

re - 02/10/20 06:19         

 

                    Urine color determination           YELLOW              NRG 

       

 

                    Urine clarity determination           CLEAR               NR

G        

 

                    Urine pH measurement by test strip           5.0            

     5-9        

 

                    Specific gravity of urine by test strip           >=        

          1.016-1.022     

   

 

                    Urine protein assay by test strip, semi-quantitative        

   2+                  

NEGATIVE        

 

                    Urine glucose detection by automated test strip           3+

                  NEGATIVE

        

 

                          Erythrocytes detection in urine sediment by light micr

oscopy           1+       

                                        NEGATIVE        

 

                    Urine ketones detection by automated test strip           2+

                  NEGATIVE

        

 

                    Urine nitrite detection by test strip           NEGATIVE    

        NEGATIVE    

    

 

                    Urine total bilirubin detection by test strip           NEGA

TIVE            

NEGATIVE        

 

                          Urine urobilinogen measurement by automated test strip

 (mass/volume)           

0.2 mg/dL                               < = 1.0        

 

                    Urine leukocyte esterase detection by dipstick           NEG

ATIVE            

NEGATIVE        

 

                                        Automated urine sediment erythrocyte cou

nt by microscopy (number/high power 

field)                     [HPF]                    NRG        

 

                                        Automated urine sediment leukocyte count

 by microscopy (number/high power field)

                           [HPF]                    NRG        

 

                          Bacteria detection in urine sediment by light microsco

py           TRACE        

                                        NRG        

 

                                        Squamous epithelial cells detection in u

rine sediment by light microscopy       

                          2-5                       NRG        

 

                          Crystals detection in urine sediment by light microsco

py           PRESENT      

                                        NRG        

 

                    Casts detection in urine sediment by light microscopy       

    NONE                

NRG        

 

                          Mucus detection in urine sediment by light microscopy 

          NEGATIVE        

                                        NRG        

 

                    Complete urinalysis with reflex to culture           NO     

             NRG        

 

                          Amorphous sediment detection in urine sediment by ligh

t microscopy           MOD

 NATALIE URATES                            NRG        

 

                                        Capillary blood glucose measurement by g

lucometer (mass/volume) - 02/10/20 06:30

         

 

                          Capillary blood glucose measurement by glucometer (mas

s/volume)           > 

mg/dL                                           

 

                                        Arterial blood gas measurement - 02/10/2

0 07:00         

 

                    Blood pCO2           22 mm[Hg]           35-45        

 

                    Blood pO2           95 mm[Hg]           79-93        

 

                          Arterial blood bicarbonate measurement (moles/volume) 

          7 mmol/L        

                                        23-27        

 

                    Arterial blood base excess by calculation           -19.9 mm

ol/L           

-2.5-2.5        

 

                    Arterial blood oxygen saturation measurement           97 % 

                   

    

 

                    * Inhaled oxygen flow rate           30% O2              NRG

        

 

                          Arterial blood pH measurement with patient temperature

 correction           7.16

                                        7.37-7.43        

 

                          Arterial blood carbon dioxide, total measurement (mole

s/volume)           8.0 

mmol/L                                  21.0-31.0        

 

                    Body site           L RADIAL            NRG        

 

                          Assessment of wrist artery patency prior to arterial p

uncture           POSITIVE

                                        NRG        

 

                    Setting of ventilation mode           YES                 NR

G        

 

                    Measurement of body temperature           36.5              

  NRG        

 

                                        Sputum Gram stain - 02/10/20 07:06      

   

 

                    Sputum Gram stain           Mixed Bacterial Terrie           

 NRG        

 

                                        Bacterial sputum culture - 02/10/20 07:0

6         

 

                    FREE TEXT EXTERNAL           SUSCEPTIBILITY REPORTED 20            

NRG        

 

                    QUANTITY OF GROWTH           .                   NRG        

 

                    FREE TEXT ENTRY 2           SEE COMMENT            NRG      

  

 

                    Bacterial sputum culture           USUAL RESP            NRG

        

 

                                        Complete blood count (CBC) with automate

d white blood cell (WBC) differential - 

02/10/20 07:25         

 

                          Blood leukocytes automated count (number/volume)      

     35.4 10*3/uL         

                                        4.3-11.0        

 

                          Blood erythrocytes automated count (number/volume)    

       3.03 10*6/uL       

                                        4.35-5.85        

 

                    Venous blood hemoglobin measurement (mass/volume)           

9.9 g/dL            

11.5-16.0        

 

                    Blood hematocrit (volume fraction)           29 %           

     35-52        

 

                    Automated erythrocyte mean corpuscular volume           96 [

foz_us]           

80-99        

 

                                        Automated erythrocyte mean corpuscular h

emoglobin (mass per erythrocyte)        

                          33 pg                     25-34        

 

                                        Automated erythrocyte mean corpuscular h

emoglobin concentration measurement 

(mass/volume)             34 g/dL                   32-36        

 

                    Automated erythrocyte distribution width ratio           14.

0 %              10.0-

14.5        

 

                    Automated blood platelet count (count/volume)           262 

10*3/uL           

130-400        

 

                          Automated blood platelet mean volume measurement      

     9.8 [foz_us]         

                                        7.4-10.4        

 

                    Automated blood neutrophils/100 leukocytes           78 %   

             42-75       

 

 

                    Automated blood lymphocytes/100 leukocytes           17 %   

             12-44       

 

 

                    Blood monocytes/100 leukocytes           5 %                

 0-12        

 

                    Automated blood eosinophils/100 leukocytes           0 %    

             0-10        

 

                    Automated blood basophils/100 leukocytes           0 %      

           0-10        

 

                    Blood neutrophils automated count (number/volume)           

27.5 10*3           

1.8-7.8        

 

                    Blood lymphocytes automated count (number/volume)           

6.2 10*3            

1.0-4.0        

 

                    Blood monocytes automated count (number/volume)           1.

7 10*3            

0.0-1.0        

 

                    Automated eosinophil count           0.1 10*3/uL           0

.0-0.3        

 

                    Automated blood basophil count (count/volume)           0.0 

10*3/uL           

0.0-0.1        

 

                                        Blood lactic acid measurement (moles/vol

ume) - 02/10/20 07:25         

 

                    Blood lactic acid measurement (moles/volume)           2.33 

mmol/L           

0.50-2.00        

 

                                        Comprehensive metabolic panel - 02/10/20

 07:25         

 

                          Serum or plasma sodium measurement (moles/volume)     

      128 mmol/L          

                                        135-145        

 

                          Serum or plasma potassium measurement (moles/volume)  

         4.3 mmol/L       

                                        3.6-5.0        

 

                          Serum or plasma chloride measurement (moles/volume)   

        96 mmol/L         

                                                

 

                          Serum or plasma urea nitrogen measurement (mass/volume

)           56 mg/dL      

                                        7-18        

 

                    Serum or plasma urea nitrogen/creatinine mass ratio         

  22                  NRG 

       

 

                                        Serum or plasma creatinine measurement w

ith calculation of estimated glomerular 

filtration rate           19                        NRG        

 

                    Serum or plasma calcium measurement (mass/volume)           

7.9 mg/dL           

8.5-10.1        

 

                          Serum or plasma total bilirubin measurement (mass/volu

me)           0.2 mg/dL   

                                        0.1-1.0        

 

                                        Serum or plasma alkaline phosphatase kim

surement (enzymatic activity/volume)    

                          95 U/L                            

 

                                        Serum or plasma aspartate aminotransfera

se measurement (enzymatic 

activity/volume)           23 U/L                    5-34        

 

                                        Serum or plasma alanine aminotransferase

 measurement (enzymatic activity/volume)

                          31 U/L                    0-55        

 

                    Serum or plasma protein measurement (mass/volume)           

4.9 g/dL            

6.4-8.2        

 

                    Serum or plasma albumin measurement (mass/volume)           

2.1 g/dL            

3.2-4.5        

 

                    CALCIUM CORRECTED           9.4 mg/dL           8.5-10.1    

    

 

                                        Capillary blood glucose measurement by g

lucometer (mass/volume) - 02/10/20 09:59

         

 

                          Capillary blood glucose measurement by glucometer (mas

s/volume)           569 

mg/dL                                           

 

                                        Serum or plasma phosphate measurement (m

ass/volume) - 02/10/20 11:03         

 

                          Serum or plasma phosphate measurement (mass/volume)   

        4.3 mg/dL         

                                        2.3-4.7        

 

                                        Magnesium - 02/10/20 11:03         

 

                    Magnesium           2.0 mg/dL           1.6-2.4        

 

                                        Comprehensive metabolic panel - 02/10/20

 11:03         

 

                          Serum or plasma sodium measurement (moles/volume)     

      132 mmol/L          

                                        135-145        

 

                          Serum or plasma potassium measurement (moles/volume)  

         4.3 mmol/L       

                                        3.6-5.0        

 

                          Serum or plasma chloride measurement (moles/volume)   

        98 mmol/L         

                                                

 

                    Carbon dioxide           15 mmol/L           21-32        

 

                          Serum or plasma anion gap determination (moles/volume)

           19 mmol/L      

                                        5-14        

 

                          Serum or plasma urea nitrogen measurement (mass/volume

)           54 mg/dL      

                                        7-18        

 

                          Serum or plasma creatinine measurement (mass/volume)  

         2.54 mg/dL       

                                        0.60-1.30        

 

                    Serum or plasma urea nitrogen/creatinine mass ratio         

  21                  NRG 

       

 

                                        Serum or plasma creatinine measurement w

ith calculation of estimated glomerular 

filtration rate           20                        NRG        

 

                    Serum or plasma glucose measurement (mass/volume)           

524 mg/dL           

        

 

                    Serum or plasma calcium measurement (mass/volume)           

7.5 mg/dL           

8.5-10.1        

 

                          Serum or plasma total bilirubin measurement (mass/volu

me)           0.2 mg/dL   

                                        0.1-1.0        

 

                                        Serum or plasma alkaline phosphatase kim

surement (enzymatic activity/volume)    

                          89 U/L                            

 

                                        Serum or plasma aspartate aminotransfera

se measurement (enzymatic 

activity/volume)           25 U/L                    5-34        

 

                                        Serum or plasma alanine aminotransferase

 measurement (enzymatic activity/volume)

                          29 U/L                    0-55        

 

                    Serum or plasma protein measurement (mass/volume)           

4.7 g/dL            

6.4-8.2        

 

                    Serum or plasma albumin measurement (mass/volume)           

2.0 g/dL            

3.2-4.5        

 

                    CALCIUM CORRECTED           9.1 mg/dL           8.5-10.1    

    

 

                                        Serum or plasma lactate measurement (mol

es/volume) - 02/10/20 11:03         

 

                          Serum or plasma lactate measurement (moles/volume)    

       1.78 mmol/L        

                                        0.50-2.00        

 

                                        Arterial blood gas measurement - 02/10/2

0 11:14         

 

                    Blood pCO2           30 mm[Hg]           35-45        

 

                    Blood pO2           79 mm[Hg]           79-93        

 

                          Arterial blood bicarbonate measurement (moles/volume) 

          17 mmol/L       

                                        23-27        

 

                    Arterial blood base excess by calculation           -7.2 mmo

l/L           

-2.5-2.5        

 

                    Arterial blood oxygen saturation measurement           95 % 

                   

    

 

                    * Inhaled oxygen flow rate           21%                 NRG

        

 

                          Arterial blood pH measurement with patient temperature

 correction           7.38

                                        7.37-7.43        

 

                          Arterial blood carbon dioxide, total measurement (mole

s/volume)           17.8 

mmol/L                                  21.0-31.0        

 

                    Body site           LR                  NRG        

 

                          Assessment of wrist artery patency prior to arterial p

uncture           YES-POS 

                                        NRG        

 

                    Setting of ventilation mode           YES                 NR

G        

 

                    Measurement of body temperature           37.3              

  NRG        

 

                                        Capillary blood glucose measurement by g

lucometer (mass/volume) - 02/10/20 11:36

         

 

                          Capillary blood glucose measurement by glucometer (mas

s/volume)           452 

mg/dL                                           

 

                                        Capillary blood glucose measurement by g

lucometer (mass/volume) - 02/10/20 12:33

         

 

                          Capillary blood glucose measurement by glucometer (mas

s/volume)           464 

mg/dL                                           

 

                                        Comprehensive metabolic panel - 02/10/20

 13:30         

 

                          Serum or plasma sodium measurement (moles/volume)     

      133 mmol/L          

                                        135-145        

 

                          Serum or plasma potassium measurement (moles/volume)  

         4.8 mmol/L       

                                        3.6-5.0        

 

                          Serum or plasma chloride measurement (moles/volume)   

        101 mmol/L        

                                                

 

                    Carbon dioxide           17 mmol/L           21-32        

 

                          Serum or plasma anion gap determination (moles/volume)

           15 mmol/L      

                                        5-14        

 

                          Serum or plasma urea nitrogen measurement (mass/volume

)           54 mg/dL      

                                        7-18        

 

                          Serum or plasma creatinine measurement (mass/volume)  

         2.57 mg/dL       

                                        0.60-1.30        

 

                    Serum or plasma urea nitrogen/creatinine mass ratio         

  21                  NRG 

       

 

                                        Serum or plasma creatinine measurement w

ith calculation of estimated glomerular 

filtration rate           19                        NRG        

 

                    Serum or plasma glucose measurement (mass/volume)           

421 mg/dL           

        

 

                    Serum or plasma calcium measurement (mass/volume)           

7.6 mg/dL           

8.5-10.1        

 

                          Serum or plasma total bilirubin measurement (mass/volu

me)           0.3 mg/dL   

                                        0.1-1.0        

 

                                        Serum or plasma alkaline phosphatase kim

surement (enzymatic activity/volume)    

                          86 U/L                            

 

                                        Serum or plasma aspartate aminotransfera

se measurement (enzymatic 

activity/volume)           26 U/L                    5-34        

 

                                        Serum or plasma alanine aminotransferase

 measurement (enzymatic activity/volume)

                          28 U/L                    0-55        

 

                    Serum or plasma protein measurement (mass/volume)           

4.6 g/dL            

6.4-8.2        

 

                    Serum or plasma albumin measurement (mass/volume)           

2.0 g/dL            

3.2-4.5        

 

                    CALCIUM CORRECTED           9.2 mg/dL           8.5-10.1    

    

 

                                        Serum or plasma phosphate measurement (m

ass/volume) - 02/10/20 13:30         

 

                          Serum or plasma phosphate measurement (mass/volume)   

        4.0 mg/dL         

                                        2.3-4.7        

 

                                        Magnesium - 02/10/20 13:30         

 

                    Magnesium           1.9 mg/dL           1.6-2.4        

 

                                        Whole blood hemoglobin and hematocrit pa

jordana - 02/10/20 13:30         

 

                    Venous blood hemoglobin measurement (mass/volume)           

9.6 g/dL            

11.5-16.0        

 

                    Blood hematocrit (volume fraction)           28 %           

     35-52        

 

                                        Capillary blood glucose measurement by g

lucometer (mass/volume) - 02/10/20 13:34

         

 

                          Capillary blood glucose measurement by glucometer (mas

s/volume)           383 

mg/dL                                           

 

                                        Capillary blood glucose measurement by g

lucometer (mass/volume) - 02/10/20 14:30

         

 

                          Capillary blood glucose measurement by glucometer (mas

s/volume)           388 

mg/dL                                           

 

                                        Capillary blood glucose measurement by g

lucometer (mass/volume) - 02/10/20 15:23

         

 

                          Capillary blood glucose measurement by glucometer (mas

s/volume)           382 

mg/dL                                           

 

                                        Comprehensive metabolic panel - 02/10/20

 16:20         

 

                          Serum or plasma sodium measurement (moles/volume)     

      134 mmol/L          

                                        135-145        

 

                          Serum or plasma potassium measurement (moles/volume)  

         4.4 mmol/L       

                                        3.6-5.0        

 

                          Serum or plasma chloride measurement (moles/volume)   

        103 mmol/L        

                                                

 

                    Carbon dioxide           17 mmol/L           21-32        

 

                          Serum or plasma anion gap determination (moles/volume)

           14 mmol/L      

                                        5-14        

 

                          Serum or plasma urea nitrogen measurement (mass/volume

)           54 mg/dL      

                                        7-18        

 

                          Serum or plasma creatinine measurement (mass/volume)  

         2.60 mg/dL       

                                        0.60-1.30        

 

                    Serum or plasma urea nitrogen/creatinine mass ratio         

  21                  NRG 

       

 

                                        Serum or plasma creatinine measurement w

ith calculation of estimated glomerular 

filtration rate           19                        NRG        

 

                    Serum or plasma glucose measurement (mass/volume)           

415 mg/dL           

        

 

                    Serum or plasma calcium measurement (mass/volume)           

7.5 mg/dL           

8.5-10.1        

 

                          Serum or plasma total bilirubin measurement (mass/volu

me)           0.3 mg/dL   

                                        0.1-1.0        

 

                                        Serum or plasma alkaline phosphatase kim

surement (enzymatic activity/volume)    

                          83 U/L                            

 

                                        Serum or plasma aspartate aminotransfera

se measurement (enzymatic 

activity/volume)           28 U/L                    5-34        

 

                                        Serum or plasma alanine aminotransferase

 measurement (enzymatic activity/volume)

                          29 U/L                    0-55        

 

                    Serum or plasma protein measurement (mass/volume)           

4.4 g/dL            

6.4-8.2        

 

                    Serum or plasma albumin measurement (mass/volume)           

1.9 g/dL            

3.2-4.5        

 

                    CALCIUM CORRECTED           9.2 mg/dL           8.5-10.1    

    

 

                                        Serum or plasma phosphate measurement (m

ass/volume) - 02/10/20 16:20         

 

                          Serum or plasma phosphate measurement (mass/volume)   

        3.4 mg/dL         

                                        2.3-4.7        

 

                                        Magnesium - 02/10/20 16:20         

 

                    Magnesium           1.7 mg/dL           1.6-2.4        

 

                                        Capillary blood glucose measurement by g

lucometer (mass/volume) - 02/10/20 16:22

         

 

                          Capillary blood glucose measurement by glucometer (mas

s/volume)           425 

mg/dL                                           

 

                                        Capillary blood glucose measurement by g

lucometer (mass/volume) - 02/10/20 17:37

         

 

                          Capillary blood glucose measurement by glucometer (mas

s/volume)           349 

mg/dL                                           

 

                                        Capillary blood glucose measurement by g

lucometer (mass/volume) - 02/10/20 18:53

         

 

                          Capillary blood glucose measurement by glucometer (mas

s/volume)           310 

mg/dL                                           

 

                                        Capillary blood glucose measurement by g

lucometer (mass/volume) - 02/10/20 19:32

         

 

                          Capillary blood glucose measurement by glucometer (mas

s/volume)           222 

mg/dL                                           

 

                                        Capillary blood glucose measurement by g

lucometer (mass/volume) - 02/10/20 20:00

         

 

                          Capillary blood glucose measurement by glucometer (mas

s/volume)           249 

mg/dL                                           

 

                                        Capillary blood glucose measurement by g

lucometer (mass/volume) - 02/10/20 21:11

         

 

                          Capillary blood glucose measurement by glucometer (mas

s/volume)           250 

mg/dL                                           

 

                                        Comprehensive metabolic panel - 02/10/20

 21:45         

 

                          Serum or plasma sodium measurement (moles/volume)     

      134 mmol/L          

                                        135-145        

 

                          Serum or plasma potassium measurement (moles/volume)  

         4.4 mmol/L       

                                        3.6-5.0        

 

                          Serum or plasma chloride measurement (moles/volume)   

        105 mmol/L        

                                                

 

                    Carbon dioxide           19 mmol/L           21-32        

 

                          Serum or plasma anion gap determination (moles/volume)

           10 mmol/L      

                                        5-14        

 

                          Serum or plasma urea nitrogen measurement (mass/volume

)           52 mg/dL      

                                        7-18        

 

                          Serum or plasma creatinine measurement (mass/volume)  

         2.53 mg/dL       

                                        0.60-1.30        

 

                    Serum or plasma urea nitrogen/creatinine mass ratio         

  21                  NRG 

       

 

                                        Serum or plasma creatinine measurement w

ith calculation of estimated glomerular 

filtration rate           20                        NRG        

 

                    Serum or plasma glucose measurement (mass/volume)           

240 mg/dL           

        

 

                    Serum or plasma calcium measurement (mass/volume)           

7.6 mg/dL           

8.5-10.1        

 

                          Serum or plasma total bilirubin measurement (mass/volu

me)           0.2 mg/dL   

                                        0.1-1.0        

 

                                        Serum or plasma alkaline phosphatase kim

surement (enzymatic activity/volume)    

                          86 U/L                            

 

                                        Serum or plasma aspartate aminotransfera

se measurement (enzymatic 

activity/volume)           32 U/L                    5-34        

 

                                        Serum or plasma alanine aminotransferase

 measurement (enzymatic activity/volume)

                          30 U/L                    0-55        

 

                    Serum or plasma protein measurement (mass/volume)           

4.7 g/dL            

6.4-8.2        

 

                    Serum or plasma albumin measurement (mass/volume)           

2.0 g/dL            

3.2-4.5        

 

                    CALCIUM CORRECTED           9.2 mg/dL           8.5-10.1    

    

 

                                        Serum or plasma phosphate measurement (m

ass/volume) - 02/10/20 21:45         

 

                          Serum or plasma phosphate measurement (mass/volume)   

        2.8 mg/dL         

                                        2.3-4.7        

 

                                        Magnesium - 02/10/20 21:45         

 

                    Magnesium           1.8 mg/dL           1.6-2.4        

 

                                        Capillary blood glucose measurement by g

lucometer (mass/volume) - 02/10/20 22:09

         

 

                          Capillary blood glucose measurement by glucometer (mas

s/volume)           223 

mg/dL                                           

 

                                        Capillary blood glucose measurement by g

lucometer (mass/volume) - 20 00:16

         

 

                          Capillary blood glucose measurement by glucometer (mas

s/volume)           185 

mg/dL                                           

 

                                        Arterial blood gas measurement - 

0 03:00         

 

                    Blood pCO2           34 mm[Hg]           35-45        

 

                    Blood pO2           87 mm[Hg]           79-93        

 

                          Arterial blood bicarbonate measurement (moles/volume) 

          21 mmol/L       

                                        23-27        

 

                    Arterial blood base excess by calculation           -3.0 mmo

l/L           

-2.5-2.5        

 

                    Arterial blood oxygen saturation measurement           96 % 

                   

    

 

                    * Inhaled oxygen flow rate           21%                 NRG

        

 

                          Arterial blood pH measurement with patient temperature

 correction           7.40

                                        7.37-7.43        

 

                          Arterial blood carbon dioxide, total measurement (mole

s/volume)           22.0 

mmol/L                                  21.0-31.0        

 

                    Body site           LEFT ART LINE            NRG        

 

                          Assessment of wrist artery patency prior to arterial p

uncture           ART LINE

                                        NRG        

 

                    Setting of ventilation mode           NO                  NR

G        

 

                    Measurement of body temperature           36.9              

  NRG        

 

                                        Complete blood count (CBC) with automate

d white blood cell (WBC) differential - 

20 03:00         

 

                          Blood leukocytes automated count (number/volume)      

     13.3 10*3/uL         

                                        4.3-11.0        

 

                          Blood erythrocytes automated count (number/volume)    

       3.25 10*6/uL       

                                        4.35-5.85        

 

                    Venous blood hemoglobin measurement (mass/volume)           

10.4 g/dL           

11.5-16.0        

 

                    Blood hematocrit (volume fraction)           30 %           

     35-52        

 

                    Automated erythrocyte mean corpuscular volume           91 [

foz_us]           

80-99        

 

                                        Automated erythrocyte mean corpuscular h

emoglobin (mass per erythrocyte)        

                          32 pg                     25-34        

 

                                        Automated erythrocyte mean corpuscular h

emoglobin concentration measurement 

(mass/volume)             35 g/dL                   32-36        

 

                    Automated erythrocyte distribution width ratio           14.

4 %              10.0-

14.5        

 

                    Automated blood platelet count (count/volume)           216 

10*3/uL           

130-400        

 

                          Automated blood platelet mean volume measurement      

     9.6 [foz_us]         

                                        7.4-10.4        

 

                    Automated blood neutrophils/100 leukocytes           76 %   

             42-75       

 

 

                    Automated blood lymphocytes/100 leukocytes           19 %   

             12-44       

 

 

                    Blood monocytes/100 leukocytes           5 %                

 0-12        

 

                    Automated blood eosinophils/100 leukocytes           0 %    

             0-10        

 

                    Automated blood basophils/100 leukocytes           0 %      

           0-10        

 

                    Blood neutrophils automated count (number/volume)           

10.1 10*3           

1.8-7.8        

 

                    Blood lymphocytes automated count (number/volume)           

2.5 10*3            

1.0-4.0        

 

                    Blood monocytes automated count (number/volume)           0.

7 10*3            

0.0-1.0        

 

                    Automated eosinophil count           0.1 10*3/uL           0

.0-0.3        

 

                    Automated blood basophil count (count/volume)           0.0 

10*3/uL           

0.0-0.1        

 

                                        Comprehensive metabolic panel - 20

 03:00         

 

                          Serum or plasma sodium measurement (moles/volume)     

      134 mmol/L          

                                        135-145        

 

                          Serum or plasma potassium measurement (moles/volume)  

         4.4 mmol/L       

                                        3.6-5.0        

 

                          Serum or plasma chloride measurement (moles/volume)   

        106 mmol/L        

                                                

 

                    Carbon dioxide           18 mmol/L           21-32        

 

                          Serum or plasma anion gap determination (moles/volume)

           10 mmol/L      

                                        5-14        

 

                          Serum or plasma urea nitrogen measurement (mass/volume

)           49 mg/dL      

                                        7-18        

 

                          Serum or plasma creatinine measurement (mass/volume)  

         2.40 mg/dL       

                                        0.60-1.30        

 

                    Serum or plasma urea nitrogen/creatinine mass ratio         

  20                  NRG 

       

 

                                        Serum or plasma creatinine measurement w

ith calculation of estimated glomerular 

filtration rate           21                        NRG        

 

                    Serum or plasma glucose measurement (mass/volume)           

179 mg/dL           

        

 

                    Serum or plasma calcium measurement (mass/volume)           

7.6 mg/dL           

8.5-10.1        

 

                          Serum or plasma total bilirubin measurement (mass/volu

me)           0.2 mg/dL   

                                        0.1-1.0        

 

                                        Serum or plasma alkaline phosphatase kim

surement (enzymatic activity/volume)    

                          79 U/L                            

 

                                        Serum or plasma aspartate aminotransfera

se measurement (enzymatic 

activity/volume)           33 U/L                    5-34        

 

                                        Serum or plasma alanine aminotransferase

 measurement (enzymatic activity/volume)

                          29 U/L                    0-55        

 

                    Serum or plasma protein measurement (mass/volume)           

4.9 g/dL            

6.4-8.2        

 

                    Serum or plasma albumin measurement (mass/volume)           

2.1 g/dL            

3.2-4.5        

 

                    CALCIUM CORRECTED           9.1 mg/dL           8.5-10.1    

    

 

                                        Serum or plasma phosphate measurement (m

ass/volume) - 20 03:00         

 

                          Serum or plasma phosphate measurement (mass/volume)   

        2.7 mg/dL         

                                        2.3-4.7        

 

                                        Magnesium - 20 03:00         

 

                    Magnesium           1.8 mg/dL           1.6-2.4        

 

                                        Beta-hydroxybutyric acid measurement - 0

20 03:00         

 

                    Beta-hydroxybutyric acid measurement           2.10 mmol/L  

         0.00-0.27  

      

 

                                        Complete urinalysis with reflex to cultu

re - 20 06:30         

 

                    Urine color determination           YELLOW              NRG 

       

 

                    Urine clarity determination           CLEAR               NR

G        

 

                    Urine pH measurement by test strip           6.0            

     5-9        

 

                    Specific gravity of urine by test strip           1.025     

          1.016-1.022  

      

 

                    Urine protein assay by test strip, semi-quantitative        

   3+                  

NEGATIVE        

 

                    Urine glucose detection by automated test strip           NE

GATIVE            

NEGATIVE        

 

                          Erythrocytes detection in urine sediment by light micr

oscopy           1+       

                                        NEGATIVE        

 

                    Urine ketones detection by automated test strip           1+

                  NEGATIVE

        

 

                    Urine nitrite detection by test strip           NEGATIVE    

        NEGATIVE    

    

 

                    Urine total bilirubin detection by test strip           1+  

                NEGATIVE  

      

 

                          Urine urobilinogen measurement by automated test strip

 (mass/volume)           

0.2 mg/dL                               < = 1.0        

 

                    Urine leukocyte esterase detection by dipstick           NEG

ATIVE            

NEGATIVE        

 

                                        Automated urine sediment erythrocyte cou

nt by microscopy (number/high power 

field)                    NONE                      NRG        

 

                                        Automated urine sediment leukocyte count

 by microscopy (number/high power field)

                           [HPF]                    NRG        

 

                          Bacteria detection in urine sediment by light microsco

py           TRACE        

                                        NRG        

 

                                        Squamous epithelial cells detection in u

rine sediment by light microscopy       

                          5-10                      NRG        

 

                          Crystals detection in urine sediment by light microsco

py           PRESENT      

                                        NRG        

 

                    Casts detection in urine sediment by light microscopy       

    NONE                

NRG        

 

                          Mucus detection in urine sediment by light microscopy 

          SMALL           

                                        NRG        

 

                    Complete urinalysis with reflex to culture           NO     

             NRG        

 

                          Amorphous sediment detection in urine sediment by ligh

t microscopy           FEW

 NATALIE URATES                            NRG        

 

                                        Capillary blood glucose measurement by g

lucometer (mass/volume) - 20 08:26

         

 

                          Capillary blood glucose measurement by glucometer (mas

s/volume)           324 

mg/dL                                           

 

                                        Capillary blood glucose measurement by g

lucometer (mass/volume) - 20 11:35

         

 

                          Capillary blood glucose measurement by glucometer (mas

s/volume)           296 

mg/dL                                           

 

                                        Comprehensive metabolic panel - 20

 13:17         

 

                          Serum or plasma sodium measurement (moles/volume)     

      135 mmol/L          

                                        135-145        

 

                          Serum or plasma potassium measurement (moles/volume)  

         3.5 mmol/L       

                                        3.6-5.0        

 

                          Serum or plasma chloride measurement (moles/volume)   

        108 mmol/L        

                                                

 

                    Carbon dioxide           19 mmol/L           21-32        

 

                          Serum or plasma anion gap determination (moles/volume)

           8 mmol/L       

                                        5-14        

 

                          Serum or plasma urea nitrogen measurement (mass/volume

)           40 mg/dL      

                                        7-18        

 

                          Serum or plasma creatinine measurement (mass/volume)  

         2.16 mg/dL       

                                        0.60-1.30        

 

                    Serum or plasma urea nitrogen/creatinine mass ratio         

  19                  NRG 

       

 

                                        Serum or plasma creatinine measurement w

ith calculation of estimated glomerular 

filtration rate           24                        NRG        

 

                    Serum or plasma glucose measurement (mass/volume)           

290 mg/dL           

        

 

                    Serum or plasma calcium measurement (mass/volume)           

7.4 mg/dL           

8.5-10.1        

 

                          Serum or plasma total bilirubin measurement (mass/volu

me)           0.3 mg/dL   

                                        0.1-1.0        

 

                                        Serum or plasma alkaline phosphatase kim

surement (enzymatic activity/volume)    

                          68 U/L                            

 

                                        Serum or plasma aspartate aminotransfera

se measurement (enzymatic 

activity/volume)           35 U/L                    5-34        

 

                                        Serum or plasma alanine aminotransferase

 measurement (enzymatic activity/volume)

                          29 U/L                    0-55        

 

                    Serum or plasma protein measurement (mass/volume)           

4.5 g/dL            

6.4-8.2        

 

                    Serum or plasma albumin measurement (mass/volume)           

1.9 g/dL            

3.2-4.5        

 

                    CALCIUM CORRECTED           9.1 mg/dL           8.5-10.1    

    

 

                                        Serum or plasma phosphate measurement (m

ass/volume) - 20 13:17         

 

                          Serum or plasma phosphate measurement (mass/volume)   

        2.0 mg/dL         

                                        2.3-4.7        

 

                                        Magnesium - 20 13:17         

 

                    Magnesium           1.6 mg/dL           1.6-2.4        

 

                                        Capillary blood glucose measurement by g

lucometer (mass/volume) - 20 16:00

         

 

                          Capillary blood glucose measurement by glucometer (mas

s/volume)           194 

mg/dL                                           

 

                                        Capillary blood glucose measurement by g

lucometer (mass/volume) - 20 19:40

         

 

                          Capillary blood glucose measurement by glucometer (mas

s/volume)           196 

mg/dL                                           

 

                                        Capillary blood glucose measurement by g

lucometer (mass/volume) - 20 23:09

         

 

                          Capillary blood glucose measurement by glucometer (mas

s/volume)           76 

mg/dL                                           

 

                                        Complete blood count (CBC) with automate

d white blood cell (WBC) differential - 

20 02:21         

 

                          Blood leukocytes automated count (number/volume)      

     11.5 10*3/uL         

                                        4.3-11.0        

 

                          Blood erythrocytes automated count (number/volume)    

       2.95 10*6/uL       

                                        4.35-5.85        

 

                    Venous blood hemoglobin measurement (mass/volume)           

9.4 g/dL            

11.5-16.0        

 

                    Blood hematocrit (volume fraction)           28 %           

     35-52        

 

                    Automated erythrocyte mean corpuscular volume           94 [

foz_us]           

80-99        

 

                                        Automated erythrocyte mean corpuscular h

emoglobin (mass per erythrocyte)        

                          32 pg                     25-34        

 

                                        Automated erythrocyte mean corpuscular h

emoglobin concentration measurement 

(mass/volume)             34 g/dL                   32-36        

 

                    Automated erythrocyte distribution width ratio           15.

1 %              10.0-

14.5        

 

                    Automated blood platelet count (count/volume)           155 

10*3/uL           

130-400        

 

                          Automated blood platelet mean volume measurement      

     8.9 [foz_us]         

                                        7.4-10.4        

 

                    Automated blood neutrophils/100 leukocytes           79 %   

             42-75       

 

 

                    Automated blood lymphocytes/100 leukocytes           15 %   

             12-44       

 

 

                    Blood monocytes/100 leukocytes           5 %                

 0-12        

 

                    Automated blood eosinophils/100 leukocytes           0 %    

             0-10        

 

                    Automated blood basophils/100 leukocytes           0 %      

           0-10        

 

                    Blood neutrophils automated count (number/volume)           

9.1 10*3            

1.8-7.8        

 

                    Blood lymphocytes automated count (number/volume)           

1.7 10*3            

1.0-4.0        

 

                    Blood monocytes automated count (number/volume)           0.

6 10*3            

0.0-1.0        

 

                    Automated eosinophil count           0.1 10*3/uL           0

.0-0.3        

 

                    Automated blood basophil count (count/volume)           0.0 

10*3/uL           

0.0-0.1        

 

                                        Whole blood basic metabolic panel -  02:21         

 

                          Serum or plasma sodium measurement (moles/volume)     

      142 mmol/L          

                                        135-145        

 

                          Serum or plasma potassium measurement (moles/volume)  

         2.8 mmol/L       

                                        3.6-5.0        

 

                          Serum or plasma chloride measurement (moles/volume)   

        112 mmol/L        

                                                

 

                    Carbon dioxide           22 mmol/L           21-32        

 

                          Serum or plasma anion gap determination (moles/volume)

           8 mmol/L       

                                        5-14        

 

                          Serum or plasma urea nitrogen measurement (mass/volume

)           27 mg/dL      

                                        7-18        

 

                          Serum or plasma creatinine measurement (mass/volume)  

         1.59 mg/dL       

                                        0.60-1.30        

 

                    Serum or plasma urea nitrogen/creatinine mass ratio         

  17                  NRG 

       

 

                                        Serum or plasma creatinine measurement w

ith calculation of estimated glomerular 

filtration rate           34                        NRG        

 

                    Serum or plasma glucose measurement (mass/volume)           

32 mg/dL            

        

 

                    Serum or plasma calcium measurement (mass/volume)           

7.7 mg/dL           

8.5-10.1        

 

                                        Serum or plasma phosphate measurement (m

ass/volume) - 20 02:21         

 

                          Serum or plasma phosphate measurement (mass/volume)   

        1.8 mg/dL         

                                        2.3-4.7        

 

                                        Magnesium - 20 02:21         

 

                    Magnesium           1.9 mg/dL           1.6-2.4        

 

                                        Capillary blood glucose measurement by g

lucometer (mass/volume) - 20 02:55

         

 

                          Capillary blood glucose measurement by glucometer (mas

s/volume)           31 

mg/dL                                           

 

                                        Capillary blood glucose measurement by g

lucometer (mass/volume) - 20 03:12

         

 

                          Capillary blood glucose measurement by glucometer (mas

s/volume)           113 

mg/dL                                           

 

                                        Capillary blood glucose measurement by g

lucometer (mass/volume) - 20 03:59

         

 

                          Capillary blood glucose measurement by glucometer (mas

s/volume)           69 

mg/dL                                           

 

                                        Capillary blood glucose measurement by g

lucometer (mass/volume) - 20 05:35

         

 

                          Capillary blood glucose measurement by glucometer (mas

s/volume)           101 

mg/dL                                           

 

                                        Capillary blood glucose measurement by g

lucometer (mass/volume) - 20 08:38

         

 

                          Capillary blood glucose measurement by glucometer (mas

s/volume)           102 

mg/dL                                           

 

                                        Capillary blood glucose measurement by g

lucometer (mass/volume) - 20 11:36

         

 

                          Capillary blood glucose measurement by glucometer (mas

s/volume)           115 

mg/dL                                           

 

                                        Capillary blood glucose measurement by g

lucometer (mass/volume) - 20 16:07

         

 

                          Capillary blood glucose measurement by glucometer (mas

s/volume)           347 

mg/dL                                           

 

                                        Capillary blood glucose measurement by g

lucometer (mass/volume) - 02/12/20 20:06

         

 

                          Capillary blood glucose measurement by glucometer (mas

s/volume)           343 

mg/dL                                           

 

                                        Capillary blood glucose measurement by g

lucometer (mass/volume) - 20 05:32

         

 

                          Capillary blood glucose measurement by glucometer (mas

s/volume)           53 

mg/dL                                           

 

                                        Complete blood count (CBC) with automate

d white blood cell (WBC) differential - 

20 05:48         

 

                          Blood leukocytes automated count (number/volume)      

     12.7 10*3/uL         

                                        4.3-11.0        

 

                          Blood erythrocytes automated count (number/volume)    

       3.39 10*6/uL       

                                        4.35-5.85        

 

                    Venous blood hemoglobin measurement (mass/volume)           

10.8 g/dL           

11.5-16.0        

 

                    Blood hematocrit (volume fraction)           33 %           

     35-52        

 

                    Automated erythrocyte mean corpuscular volume           96 [

foz_us]           

80-99        

 

                                        Automated erythrocyte mean corpuscular h

emoglobin (mass per erythrocyte)        

                          32 pg                     25-34        

 

                                        Automated erythrocyte mean corpuscular h

emoglobin concentration measurement 

(mass/volume)             33 g/dL                   32-36        

 

                    Automated erythrocyte distribution width ratio           14.

8 %              10.0-

14.5        

 

                    Automated blood platelet count (count/volume)           164 

10*3/uL           

130-400        

 

                          Automated blood platelet mean volume measurement      

     9.9 [foz_us]         

                                        7.4-10.4        

 

                    Automated blood neutrophils/100 leukocytes           76 %   

             42-75       

 

 

                    Automated blood lymphocytes/100 leukocytes           19 %   

             12-44       

 

 

                    Blood monocytes/100 leukocytes           4 %                

 0-12        

 

                    Automated blood eosinophils/100 leukocytes           1 %    

             0-10        

 

                    Automated blood basophils/100 leukocytes           0 %      

           0-10        

 

                    Blood neutrophils automated count (number/volume)           

9.6 10*3            

1.8-7.8        

 

                    Blood lymphocytes automated count (number/volume)           

2.4 10*3            

1.0-4.0        

 

                    Blood monocytes automated count (number/volume)           0.

5 10*3            

0.0-1.0        

 

                    Automated eosinophil count           0.2 10*3/uL           0

.0-0.3        

 

                    Automated blood basophil count (count/volume)           0.0 

10*3/uL           

0.0-0.1        

 

                                        Whole blood basic metabolic panel - 02/1

3/20 05:48         

 

                          Serum or plasma sodium measurement (moles/volume)     

      138 mmol/L          

                                        135-145        

 

                          Serum or plasma potassium measurement (moles/volume)  

         3.4 mmol/L       

                                        3.6-5.0        

 

                          Serum or plasma chloride measurement (moles/volume)   

        104 mmol/L        

                                                

 

                    Carbon dioxide           24 mmol/L           21-32        

 

                          Serum or plasma anion gap determination (moles/volume)

           10 mmol/L      

                                        5-14        

 

                          Serum or plasma urea nitrogen measurement (mass/volume

)           20 mg/dL      

                                        7-18        

 

                          Serum or plasma creatinine measurement (mass/volume)  

         1.28 mg/dL       

                                        0.60-1.30        

 

                    Serum or plasma urea nitrogen/creatinine mass ratio         

  16                  NRG 

       

 

                                        Serum or plasma creatinine measurement w

ith calculation of estimated glomerular 

filtration rate           43                        NRG        

 

                    Serum or plasma glucose measurement (mass/volume)           

54 mg/dL            

        

 

                    Serum or plasma calcium measurement (mass/volume)           

8.0 mg/dL           

8.5-10.1        

 

                                        Serum or plasma phosphate measurement (m

ass/volume) - 20 05:48         

 

                          Serum or plasma phosphate measurement (mass/volume)   

        2.1 mg/dL         

                                        2.3-4.7        

 

                                        Magnesium - 20 05:48         

 

                    Magnesium           1.5 mg/dL           1.6-2.4        

 

                                        Capillary blood glucose measurement by g

lucometer (mass/volume) - 20 06:43

         

 

                          Capillary blood glucose measurement by glucometer (mas

s/volume)           119 

mg/dL                                           

 

                                        Capillary blood glucose measurement by g

lucometer (mass/volume) - 20 10:47

         

 

                          Capillary blood glucose measurement by glucometer (mas

s/volume)           342 

mg/dL                                           

 

                                        Capillary blood glucose measurement by g

lucometer (mass/volume) - 20 16:19

         

 

                          Capillary blood glucose measurement by glucometer (mas

s/volume)           389 

mg/dL                                           

 

                                        Capillary blood glucose measurement by g

lucometer (mass/volume) - 20 20:36

         

 

                          Capillary blood glucose measurement by glucometer (mas

s/volume)           377 

mg/dL                                           

 

                                        Capillary blood glucose measurement by g

lucometer (mass/volume) - 20 06:07

         

 

                          Capillary blood glucose measurement by glucometer (mas

s/volume)           289 

mg/dL                                           

 

                                        Complete blood count (CBC) with automate

d white blood cell (WBC) differential - 

20 06:28         

 

                          Blood leukocytes automated count (number/volume)      

     10.2 10*3/uL         

                                        4.3-11.0        

 

                          Blood erythrocytes automated count (number/volume)    

       3.13 10*6/uL       

                                        4.35-5.85        

 

                    Venous blood hemoglobin measurement (mass/volume)           

10.0 g/dL           

11.5-16.0        

 

                    Blood hematocrit (volume fraction)           31 %           

     35-52        

 

                    Automated erythrocyte mean corpuscular volume           97 [

foz_us]           

80-99        

 

                                        Automated erythrocyte mean corpuscular h

emoglobin (mass per erythrocyte)        

                          32 pg                     25-34        

 

                                        Automated erythrocyte mean corpuscular h

emoglobin concentration measurement 

(mass/volume)             33 g/dL                   32-36        

 

                    Automated erythrocyte distribution width ratio           14.

4 %              10.0-

14.5        

 

                    Automated blood platelet count (count/volume)           159 

10*3/uL           

130-400        

 

                          Automated blood platelet mean volume measurement      

     10.0 [foz_us]        

                                        7.4-10.4        

 

                    Automated blood neutrophils/100 leukocytes           70 %   

             42-75       

 

 

                    Automated blood lymphocytes/100 leukocytes           24 %   

             12-44       

 

 

                    Blood monocytes/100 leukocytes           4 %                

 0-12        

 

                    Automated blood eosinophils/100 leukocytes           3 %    

             0-10        

 

                    Automated blood basophils/100 leukocytes           0 %      

           0-10        

 

                    Blood neutrophils automated count (number/volume)           

7.1 10*3            

1.8-7.8        

 

                    Blood lymphocytes automated count (number/volume)           

2.4 10*3            

1.0-4.0        

 

                    Blood monocytes automated count (number/volume)           0.

4 10*3            

0.0-1.0        

 

                    Automated eosinophil count           0.3 10*3/uL           0

.0-0.3        

 

                    Automated blood basophil count (count/volume)           0.0 

10*3/uL           

0.0-0.1        

 

                                        Whole blood basic metabolic panel -  06:28         

 

                          Serum or plasma sodium measurement (moles/volume)     

      136 mmol/L          

                                        135-145        

 

                          Serum or plasma potassium measurement (moles/volume)  

         4.0 mmol/L       

                                        3.6-5.0        

 

                          Serum or plasma chloride measurement (moles/volume)   

        105 mmol/L        

                                                

 

                    Carbon dioxide           24 mmol/L           21-32        

 

                          Serum or plasma anion gap determination (moles/volume)

           7 mmol/L       

                                        5-14        

 

                          Serum or plasma urea nitrogen measurement (mass/volume

)           16 mg/dL      

                                        7-18        

 

                          Serum or plasma creatinine measurement (mass/volume)  

         1.27 mg/dL       

                                        0.60-1.30        

 

                    Serum or plasma urea nitrogen/creatinine mass ratio         

  13                  NRG 

       

 

                                        Serum or plasma creatinine measurement w

ith calculation of estimated glomerular 

filtration rate           44                        NRG        

 

                    Serum or plasma glucose measurement (mass/volume)           

311 mg/dL           

        

 

                    Serum or plasma calcium measurement (mass/volume)           

7.7 mg/dL           

8.5-10.1        

 

                                        Serum or plasma phosphate measurement (m

ass/volume) - 20 06:28         

 

                          Serum or plasma phosphate measurement (mass/volume)   

        1.9 mg/dL         

                                        2.3-4.7        

 

                                        Magnesium - 20 06:28         

 

                    Magnesium           1.5 mg/dL           1.6-2.4        

 

                                        Capillary blood glucose measurement by g

lucometer (mass/volume) - 20 11:16

         

 

                          Capillary blood glucose measurement by glucometer (mas

s/volume)           363 

mg/dL                                           

 

                                        Capillary blood glucose measurement by g

lucometer (mass/volume) - 20 15:39

         

 

                          Capillary blood glucose measurement by glucometer (mas

s/volume)           380 

mg/dL                                           

 

                                        Capillary blood glucose measurement by g

lucometer (mass/volume) - 20 20:29

         

 

                          Capillary blood glucose measurement by glucometer (mas

s/volume)           480 

mg/dL                                           

 

                                        Capillary blood glucose measurement by g

lucometer (mass/volume) - 02/15/20 03:44

         

 

                          Capillary blood glucose measurement by glucometer (mas

s/volume)           302 

mg/dL                                           

 

                                        Whole blood basic metabolic panel -  05:16         

 

                          Serum or plasma sodium measurement (moles/volume)     

      137 mmol/L          

                                        135-145        

 

                          Serum or plasma potassium measurement (moles/volume)  

         3.8 mmol/L       

                                        3.6-5.0        

 

                          Serum or plasma chloride measurement (moles/volume)   

        107 mmol/L        

                                                

 

                    Carbon dioxide           21 mmol/L           21-32        

 

                          Serum or plasma anion gap determination (moles/volume)

           9 mmol/L       

                                        5-14        

 

                          Serum or plasma urea nitrogen measurement (mass/volume

)           20 mg/dL      

                                        7-18        

 

                          Serum or plasma creatinine measurement (mass/volume)  

         1.22 mg/dL       

                                        0.60-1.30        

 

                    Serum or plasma urea nitrogen/creatinine mass ratio         

  16                  NRG 

       

 

                                        Serum or plasma creatinine measurement w

ith calculation of estimated glomerular 

filtration rate           46                        NRG        

 

                    Serum or plasma glucose measurement (mass/volume)           

236 mg/dL           

        

 

                    Serum or plasma calcium measurement (mass/volume)           

7.8 mg/dL           

8.5-10.1        

 

                                        Serum or plasma phosphate measurement (m

ass/volume) - 02/15/20 05:16         

 

                          Serum or plasma phosphate measurement (mass/volume)   

        2.2 mg/dL         

                                        2.3-4.7        

 

                                        Magnesium - 02/15/20 05:16         

 

                    Magnesium           1.7 mg/dL           1.6-2.4        

 

                                        Complete blood count (CBC) with automate

d white blood cell (WBC) differential - 

02/15/20 05:18         

 

                          Blood leukocytes automated count (number/volume)      

     8.8 10*3/uL          

                                        4.3-11.0        

 

                          Blood erythrocytes automated count (number/volume)    

       2.99 10*6/uL       

                                        4.35-5.85        

 

                    Venous blood hemoglobin measurement (mass/volume)           

9.5 g/dL            

11.5-16.0        

 

                    Blood hematocrit (volume fraction)           29 %           

     35-52        

 

                    Automated erythrocyte mean corpuscular volume           98 [

foz_us]           

80-99        

 

                                        Automated erythrocyte mean corpuscular h

emoglobin (mass per erythrocyte)        

                          32 pg                     25-34        

 

                                        Automated erythrocyte mean corpuscular h

emoglobin concentration measurement 

(mass/volume)             32 g/dL                   32-36        

 

                    Automated erythrocyte distribution width ratio           14.

0 %              10.0-

14.5        

 

                    Automated blood platelet count (count/volume)           220 

10*3/uL           

130-400        

 

                          Automated blood platelet mean volume measurement      

     10.2 [foz_us]        

                                        7.4-10.4        

 

                    Automated blood neutrophils/100 leukocytes           58 %   

             42-75       

 

 

                    Automated blood lymphocytes/100 leukocytes           33 %   

             12-44       

 

 

                    Blood monocytes/100 leukocytes           7 %                

 0-12        

 

                    Automated blood eosinophils/100 leukocytes           3 %    

             0-10        

 

                    Automated blood basophils/100 leukocytes           0 %      

           0-10        

 

                    Blood neutrophils automated count (number/volume)           

5.1 10*3            

1.8-7.8        

 

                    Blood lymphocytes automated count (number/volume)           

2.9 10*3            

1.0-4.0        

 

                    Blood monocytes automated count (number/volume)           0.

6 10*3            

0.0-1.0        

 

                    Automated eosinophil count           0.2 10*3/uL           0

.0-0.3        

 

                    Automated blood basophil count (count/volume)           0.0 

10*3/uL           

0.0-0.1        

 

                                        Capillary blood glucose measurement by g

lucometer (mass/volume) - 02/15/20 05:31

         

 

                          Capillary blood glucose measurement by glucometer (mas

s/volume)           208 

mg/dL                                           

 

                                        Capillary blood glucose measurement by g

lucometer (mass/volume) - 02/15/20 10:57

         

 

                          Capillary blood glucose measurement by glucometer (mas

s/volume)           297 

mg/dL                                           

 

                                        Capillary blood glucose measurement by g

lucometer (mass/volume) - 20 18:02

         

 

                          Capillary blood glucose measurement by glucometer (mas

s/volume)           > 

mg/dL                                           

 

                                        Comprehensive metabolic panel - 20

 18:15         

 

                          Serum or plasma sodium measurement (moles/volume)     

      116 mmol/L          

                                        135-145        

 

                          Serum or plasma potassium measurement (moles/volume)  

         6.6 mmol/L       

                                        3.6-5.0        

 

                          Serum or plasma chloride measurement (moles/volume)   

        79 mmol/L         

                                                

 

                    Carbon dioxide           8 mmol/L            21-32        

 

                          Serum or plasma anion gap determination (moles/volume)

           29 mmol/L      

                                        5-14        

 

                          Serum or plasma urea nitrogen measurement (mass/volume

)           77 mg/dL      

                                        7-18        

 

                          Serum or plasma creatinine measurement (mass/volume)  

         3.27 mg/dL       

                                        0.60-1.30        

 

                    Serum or plasma urea nitrogen/creatinine mass ratio         

  24                  NRG 

       

 

                                        Serum or plasma creatinine measurement w

ith calculation of estimated glomerular 

filtration rate           15                        NRG        

 

                          Serum or plasma glucose measurement (mass/volume)     

      1322 mg/dL          

                                                

 

                    Serum or plasma calcium measurement (mass/volume)           

9.6 mg/dL           

8.5-10.1        

 

                          Serum or plasma total bilirubin measurement (mass/volu

me)           0.5 mg/dL   

                                        0.1-1.0        

 

                                        Serum or plasma alkaline phosphatase kim

surement (enzymatic activity/volume)    

                          123 U/L                           

 

                                        Serum or plasma aspartate aminotransfera

se measurement (enzymatic 

activity/volume)           22 U/L                    5-34        

 

                                        Serum or plasma alanine aminotransferase

 measurement (enzymatic activity/volume)

                          31 U/L                    0-55        

 

                    Serum or plasma protein measurement (mass/volume)           

8.4 g/dL            

6.4-8.2        

 

                    Serum or plasma albumin measurement (mass/volume)           

3.5 g/dL            

3.2-4.5        

 

                    CALCIUM CORRECTED           10.0 mg/dL           8.5-10.1   

     

 

                                        Lipase - 20 18:15         

 

                    Lipase              71 U/L              8-78        

 

                                        Beta-hydroxybutyric acid measurement - 0

20 18:15         

 

                    Beta-hydroxybutyric acid measurement           8.53 mmol/L  

         0.00-0.27  

      

 

                                        Arterial blood gas measurement - 

0 18:22         

 

                    Blood pCO2           15 mm[Hg]           35-45        

 

                    Blood pO2           147 mm[Hg]           79-93        

 

                          Arterial blood bicarbonate measurement (moles/volume) 

          8 mmol/L        

                                        23-27        

 

                    Arterial blood base excess by calculation           -17.2 mm

ol/L           

-2.5-2.5        

 

                    Arterial blood oxygen saturation measurement           97 % 

                   

    

 

                    * Inhaled oxygen flow rate           RA                  NRG

        

 

                          Arterial blood pH measurement with patient temperature

 correction           7.34

                                        7.37-7.43        

 

                          Arterial blood carbon dioxide, total measurement (mole

s/volume)           8.3 

mmol/L                                  21.0-31.0        

 

                    Body site           R RAD               NRG        

 

                          Assessment of wrist artery patency prior to arterial p

uncture           YES-POS 

                                        NRG        

 

                    Setting of ventilation mode           NO                  NR

G        

 

                    Measurement of body temperature           37.6              

  NRG        

 

                                        Complete blood count (CBC) with automate

d white blood cell (WBC) differential - 

20 18:34         

 

                          Blood leukocytes automated count (number/volume)      

     11.3 10*3/uL         

                                        4.3-11.0        

 

                          Blood erythrocytes automated count (number/volume)    

       3.80 10*6/uL       

                                        4.35-5.85        

 

                    Venous blood hemoglobin measurement (mass/volume)           

11.8 g/dL           

11.5-16.0        

 

                    Blood hematocrit (volume fraction)           38 %           

     35-52        

 

                    Automated erythrocyte mean corpuscular volume           99 [

foz_us]           

80-99        

 

                                        Automated erythrocyte mean corpuscular h

emoglobin (mass per erythrocyte)        

                          31 pg                     25-34        

 

                                        Automated erythrocyte mean corpuscular h

emoglobin concentration measurement 

(mass/volume)             31 g/dL                   32-36        

 

                    Automated erythrocyte distribution width ratio           13.

5 %              10.0-

14.5        

 

                    Automated blood platelet count (count/volume)           444 

10*3/uL           

130-400        

 

                          Automated blood platelet mean volume measurement      

     10.1 [foz_us]        

                                        7.4-10.4        

 

                    Automated blood neutrophils/100 leukocytes           90 %   

             42-75       

 

 

                    Automated blood lymphocytes/100 leukocytes           10 %   

             12-44       

 

 

                    Blood monocytes/100 leukocytes           0 %                

 0-12        

 

                    Automated blood eosinophils/100 leukocytes           0 %    

             0-10        

 

                    Automated blood basophils/100 leukocytes           0 %      

           0-10        

 

                    Blood neutrophils automated count (number/volume)           

10.1 10*3           

1.8-7.8        

 

                    Blood lymphocytes automated count (number/volume)           

1.1 10*3            

1.0-4.0        

 

                    Blood monocytes automated count (number/volume)           0.

1 10*3            

0.0-1.0        

 

                    Automated eosinophil count           0.0 10*3/uL           0

.0-0.3        

 

                    Automated blood basophil count (count/volume)           0.0 

10*3/uL           

0.0-0.1        

 

                                        Manual absolute plasma cell count -  18:34         

 

                    Blood monocytes/100 leukocytes           1 %                

 NRG        

 

                    Manual blood segmented neutrophils/100 leukocytes           

84 %                NRG  

      

 

                    Blood band neutrophils/100 leukocytes           3 %         

        NRG        

 

                    Manual blood lymphocytes/100 leukocytes           12 %      

          NRG        

 

                    Blood erythrocyte morphology finding identification         

  NORMAL              

NRG        

 

                                        Complete urinalysis with reflex to cultu

re - 20 19:49         

 

                    Urine color determination           YELLOW              NRG 

       

 

                    Urine clarity determination           CLEAR               NR

G        

 

                    Urine pH measurement by test strip           6.0            

     5-9        

 

                    Specific gravity of urine by test strip           1.020     

          1.016-1.022  

      

 

                    Urine protein assay by test strip, semi-quantitative        

   3+                  

NEGATIVE        

 

                    Urine glucose detection by automated test strip           3+

                  NEGATIVE

        

 

                          Erythrocytes detection in urine sediment by light micr

oscopy           TRACE-I  

                                        NEGATIVE        

 

                    Urine ketones detection by automated test strip           1+

                  NEGATIVE

        

 

                    Urine nitrite detection by test strip           NEGATIVE    

        NEGATIVE    

    

 

                    Urine total bilirubin detection by test strip           NEGA

TIVE            

NEGATIVE        

 

                          Urine urobilinogen measurement by automated test strip

 (mass/volume)           

0.2 mg/dL                               < = 1.0        

 

                    Urine leukocyte esterase detection by dipstick           NEG

ATIVE            

NEGATIVE        

 

                                        Automated urine sediment erythrocyte cou

nt by microscopy (number/high power 

field)                    NONE                      NRG        

 

                                        Automated urine sediment leukocyte count

 by microscopy (number/high power field)

                           [HPF]                    NRG        

 

                          Bacteria detection in urine sediment by light microsco

py           TRACE        

                                        NRG        

 

                                        Squamous epithelial cells detection in u

rine sediment by light microscopy       

                          0-2                       NRG        

 

                          Crystals detection in urine sediment by light microsco

py           NONE         

                                        NRG        

 

                    Casts detection in urine sediment by light microscopy       

    NONE                

NRG        

 

                          Mucus detection in urine sediment by light microscopy 

          SMALL           

                                        NRG        

 

                    Complete urinalysis with reflex to culture           NO     

             NRG        

 

                                        Urine drug screening test - 20 19:

49         

 

                    Urine phencyclidine detection by screening method           

NEGATIVE            

NEGATIVE        

 

                          Urine benzodiazepines detection by screening method   

        NEGATIVE          

                                        NEGATIVE        

 

                    Urine cocaine detection           NEGATIVE            NEGATI

VE        

 

                    Urine amphetamines detection by screening method           N

EGATIVE            

NEGATIVE        

 

                          Urine methamphetamine detection by screening method   

        NEGATIVE          

                                        NEGATIVE        

 

                    Urine cannabinoids detection by screening method           P

OSITIVE            

NEGATIVE        

 

                    Urine opiates detection by screening method           NEGATI

VE            

NEGATIVE        

 

                    Urine barbiturates detection           NEGATIVE            N

EGATIVE        

 

                          Screening urine tricyclic antidepressants detection   

        NEGATIVE          

                                        NEGATIVE        

 

                    Urine methadone detection by screening method           NEGA

TIVE            

NEGATIVE        

 

                    Urine oxycodone detection           NEGATIVE            NEGA

TIVE        

 

                    Urine propoxyphene detection           NEGATIVE            N

EGATIVE        

 

                                        Whole blood basic metabolic panel -  21:05         

 

                          Serum or plasma potassium measurement (moles/volume)  

         4.4 mmol/L       

                                        3.6-5.0        

 

                          Serum or plasma chloride measurement (moles/volume)   

        89 mmol/L         

                                                

 

                          Serum or plasma anion gap determination (moles/volume)

           20 mmol/L      

                                        5-14        

 

                          Serum or plasma urea nitrogen measurement (mass/volume

)           75 mg/dL      

                                        7-18        

 

                    Serum or plasma urea nitrogen/creatinine mass ratio         

  24                  NRG 

       

 

                                        Serum or plasma creatinine measurement w

ith calculation of estimated glomerular 

filtration rate           15                        NRG        

 

                    Serum or plasma calcium measurement (mass/volume)           

8.6 mg/dL           

8.5-10.1        



                                                                                
                                                                                
                                                                                
                                                                                
                                                                                
                                                                          



Encounters

      



                ACCT No.           Visit Date/Time           Discharge          

 Status         

             Pt. Type           Provider           Facility           Loc./Unit 

          

Complaint        

 

                    [id number]           02/10/2020 04:00:00           02/15/2

020 13:15:00        

                DIS             Outpatient           NATALYA MENDEZ, CAMPBELL ROBERTS         

  [facility]           4TH                       DKA,METHAMPHETAMINE,RES

P FAILURE   

     

 

                    [id number]           2020 18:13:00           

020 20:32:00        

                DIS             Outpatient           LIAM GAONA        

   [facility]           ER                        KIDNEY PAIN        

 

                    [id number]           2019 09:18:00           

019 14:35:00        

                DIS             Inpatient           KAYCE MENDEZ, MISHA ROBERTS          

 [facility]           4TH                       DKA        

 

                    [id number]           2019 13:00:00            23:59:59        

                CLS             Preadmit           WILFREDO MENDEZ, BRUCE          

 [facility]           RAD                       CKD STAGE 3        

 

                    [id number]           2019 10:30:00           

019 23:59:59        

                CLS             Outpatient           FELIZ GERMAIN DO     

      [facility] 

[facility]           LAB                       N18.9        

 

                    [id number]           01/15/2019 17:11:00           01/15/2

019 23:59:59        

                CLS             Outpatient           FELIZ GERMAIN DO     

      [facility] 

[facility]           LAB                       E10.65        

 

                    [id number]           2018 10:23:00           09/06/2

018 23:59:59        

                CLS             Outpatient           JULY VOSS DO    

       [facility] 

[facility]           RAD                       TRAUMA LEFT ELB

OW        

 

                    [id number]           2018 08:02:00           

018 23:59:59        

                CLS             Outpatient           FELIZ GERMAIN DO L     

      [facility] 

[facility]           LAB                       E10.65        

 

                    [id number]           10/11/2017 08:40:00           10/11/2

017 23:59:59        

                CLS             Outpatient           JULY VOSS DO    

       [facility] 

[facility]           LAB                       INC LIVER ENZYM

ES, 

HYPERGLYCEMIA        

 

                    [id number]           2017 19:16:00           

017 23:17:00        

                DIS             Emergency           LIAM GAONA         

  [facility]           ER                        VOMITING        

 

                    [id number]           2017 07:38:00            23:59:59        

                CLS             Outpatient           FELIZ GERMAIN DO L     

      [facility] 

[facility]           LAB                       E10.65        

 

                    [id number]           2016 08:31:00           

016 23:59:59        

                CLS             Outpatient           FELIZ GERMAIN DO L     

      [facility]ia 

[facility]           LAB                       DIABETES MELLIT

US TYPE 1   

     

 

                    [id number]           2016 09:58:00           

016 23:59:59        

                CLS             Outpatient           FELIZ GERMAIN DO L     

      [facility] 

[facility]           LAB                                

 

                    [id number]           2016 09:39:00           

016 23:59:59        

                CLS             Outpatient           YU CRUZ MD     

      [facility]ia 

[facility]           LAB                       CHRONIC KIDNEY 

DISEASE,DIABETES TYPE I,PROTEINURIA        

 

                    [id number]           2016 15:41:00           

016 18:19:00        

                DIS             Emergency           MELODY AGUIRRE MD      

     [facility] 

[facility]           ER                        LOW BLOOD SUGAR

,WITHDRAWAL  

      

 

                    [id number]           2016 13:02:00           

016 23:59:59        

                CLS             Outpatient           YU CRUZ MD     

      [facility]ia 

[facility]           LAB                       LONG TERM MED U

SE, CKD, 

HYPERLIPIDEMIA,        

 

                    [id number]           2015 23:17:00            14:00:00        

                DIS             Inpatient           JULY VOSS DO     

      [facility]ia 

[facility]           ICU                       

OVERDOSE,AMS,HYPOTENSION,ACUTE KIDNEY INJURY        

 

                    [id number]           2014 00:41:00            23:59:59        

                CLS             Preadmit           JULY VOSS DO      

     [facility] 

[facility]           LAB                       DVT        

 

                    [id number]           2014 13:24:00            00:01:00        

                DIS             Outpatient           JULY VOSS DO    

       [facility] 

[facility]           LAB                       DVT        

 

                    [id number]           2014 13:19:00            23:59:59        

                CLS             Outpatient           MARCELLUS RAMIRES FELIZ L     

      [facility] 

[facility]           LAB                       DIABETES       

 

 

                    [id number]           2014 11:53:00            23:59:59        

                CLS             Outpatient           NANCY MENDEZ, YU MELENDEZ     

      [facility] 

[facility]           LAB                       DIABETES,NURYS DOW, HTN  

      

 

                    [id number]           2014 07:48:00            23:59:59        

                CLS             Outpatient           MARCELLUS RAMIRES FELIZ JL     

      [facility] 

[facility]           LAB                       DM I        

 

                    [id number]           2014 11:10:00            23:59:59        

                CLS             Outpatient           YU CRUZ MD     

      [facility]ia 

[facility]           LAB                       DIABETES UNCOMP

L 

TYPEI,PROTEINURIA,BENIGN ESSENTIA        

 

                    [id number]           2014 11:12:00            00:01:00        

                DIS             Outpatient           JULY VOSS DO    

       [facility] 

[facility]           LAB                       DVT        

 

                    [id number]           2013 08:20:00            23:59:59        

                CLS             Outpatient           JULY VOSS DO    

       [facility] 

[facility]           RAD                       BREAST LUMP    

    

 

                    [id number]           10/10/2013 08:37:00           10/10/2

013 23:59:59        

                CLS             Outpatient           JULY VOSS DO    

       [facility] 

[facility]           RAD                       SCREENING      

  

 

                    [id number]           10/09/2013 15:05:00           10/09/2

013 23:59:59        

                CLS             Outpatient           NANCY MENDEZYU     

      [facility] 

[facility]           RAD                       CHRONIC KIDNEY 

       

 

                    [id number]           10/03/2013 11:01:00           10/03/2

013 23:59:59        

                CLS             Outpatient           YU CRUZ MD     

      [facility]ia 

[facility]           LAB                       ABNORMALITIES O

F BLOOD     

   

 

             [id number]           2020 18:54:00                        A

CT           

Inpatient                 MISHA DEVRIES MD           [facility]                 ICU                       DKA        

 

             [id number]           2015 07:49:00                         

            

Document Registration                                                           

         

 

             [id number]           2015 07:45:00                         

            

Document Registration                                                           

         

 

             [id number]           2013 00:00:00                         

            

Document Registration                                                           

         

 

             [id number]           02/15/2013 06:00:00                         

            

Document Registration                                                           

         

 

             [id number]           2013 12:50:00                         

            

Document Registration                                                           

         

 

             [id number]           2013 12:44:00                         

            

Document Registration                                                           

         

 

             [id number]           2012 11:00:00                         

            

Document Registration                                                           

         

 

             [id number]           2012 08:25:00                         

            

Document Registration                                                           

         

 

             [id number]           2012 11:54:00                         

            

Document Registration                                                           

         

 

             [id number]           2012 11:44:00                         

            

Document Registration                                                           

         

 

             [id number]           2012 12:34:00                         

            

Document Registration                                                           

         

 

             [id number]           2012 08:50:00                         

            

Document Registration                                                           

         

 

             [id number]           2011 14:13:00                         

            

Document Registration                                                           

         

 

             [id number]           2011 10:27:00                         

            

Document Registration                                                           

         

 

             [id number]           10/24/2011 11:35:00                         

            

Document Registration                                                           

         

 

             [id number]           2011 10:06:00                         

            

Document Registration                                                           

         

 

             [id number]           2011 11:54:00                         

            

Document Registration                                                           

         

 

             [id number]           2011 11:35:00                         

            

Document Registration                                                           

         

 

             [id number]           2011 11:51:00                         

            

Document Registration                                                           

         

 

             [id number]           2011 12:55:00                         

            

Document Registration

## 2020-06-17 NOTE — XMS REPORT
Clinical Summary

                             Created on: 2020



Radha Kelly

External Reference #: [id number]

: 1965

Sex: Female



Demographics





                          Address                   [address] [place]  [address]

 

                          Home Phone                [phone]

 

                          Preferred Language        Unknown

 

                          Marital Status            

 

                          Restorationism Affiliation     Unknown

 

                          Race                      White

 

                          Ethnic Group              Unknown





Author





                          Author                    [facility]

 

                          Address                   Unknown

 

                          Phone                     Unavailable







Support





                Name            Relationship    Address         Phone

 

                Cameron Whittington   [employer]            Unknown         [phone]







Care Team Providers





                    Care Team Member Name Role                Phone

 

                          PCP                       Unavailable







Allergies

Not on File



Medications

Not on file



Active Problems





Not on file



Social History





                                        Date



                 Tobacco Use     Types           Packs/Day       Years Used 

 

                                         



                                         Never Assessed    







 



                           Sex Assigned at Birth     Date Recorded

 

 



                                         Not on file 







                                        Industry



                           Job Start Date            Occupation 

 

                                        Not on file



                           Not on file               Not on file 







                                        Travel End



                           Travel History            Travel Start 

 





                                         No recent travel history available.







Last Filed Vital Signs

Not on file



Plan of Treatment





   



                 Health Maintenance  Due Date        Last Done       Comments

 

   



                           Hepatitis C Screening     1965  

 

   



                           DTaP,Tdap,and Td Vaccines  1982  



                                         (1 - Tdap)   

 

   



                           MMR Vaccines-Adult        1982  

 

   



                           Cervical Cancer Screening  1984  

 

   



                           Breast Cancer             2013  



                                         Screening-Mammogram   

 

   



                           Colon Cancer Screening    2013  

 

   



                           Zoster Recombinant        2013  



                                         Vaccine (RZV,Shingrix) (1   



                                         of 2 - [facility] 2 Dose   



                                         Standard)   

 

   



                           Influenza Vaccine (Season  2020  



                                         Ended)   

 

   



                     Pneumo-Vaccine: Peds (0-5  Aged Out            No longer el

igible based on patient's age

to



                           Yrs) & At-Risk Patients    complete this topic



                                         (6-64 Yrs)   







Results

Not on filefrom Last 3 Months

## 2020-06-17 NOTE — ED GENERAL
General


Chief Complaint:  Glucose Problems


Stated Complaint:  POSS DKA


Source of Information:  Patient


Exam Limitations:  No Limitations





History of Present Illness


Date Seen by Provider:  Jun 17, 2020


Time Seen by Provider:  18:07


Initial Comments


To ER with nausea and vomiting since this morning. Known diabetic takes Levemir 

and NovoLog. Hasn't had her glucometer at home so doesn't know what her sugars 

have been running. She's too high to read on arrival to ER


Timing/Duration:  1-2 Days


Severity:  Moderate


Associated Systoms:  Nausea/Vomiting





Allergies and Home Medications


Allergies


Coded Allergies:  


     Sulfa (Sulfonamide Antibiotics) (Unverified  Allergy, Unknown, 6/25/15)


     codeine (Verified  Allergy, Unknown, TAKES ULTRAM AT HOME, 1/2/09)


     ketorolac (Verified  Allergy, Unknown, 11/19/08)


     tramadol (Verified  Allergy, Unknown, 12/17/11)





Home Medications


Albuterol Sulfate 1 Puff Puff, 2 PUFF IH TID PRN for SHORTNESS OF BREATH, (

Reported)


Insulin Determir 1,000 Units/10 Ml Soln, 13 UNIT SQ HS


   Prescribed by: MISHA DEVRIES on 2/15/20 1008


Insulin Lispro 100 Unit/1 Ml Insuln.pen, 5 UNITS SQ TIDWM, (Reported)


Lisinopril 20 Mg Tablet, 20 MG PO DAILY


   Prescribed by: MISHA DEVRIES on 2/15/20 1008


Magnesium Chloride 64 Mg Tab, 2 TAB PO Q48H, (Reported)


Montelukast Sodium 10 Mg Tablet, 5 MG PO DAILY, (Reported)


   TAKES  OF A 10 MG TO EQUAL 5MG DAILY 


Niacinamide 500 Mg Tablet, 500 MG PO Q48H, (Reported)


Potassium Gluconate 99 Mg Tablet, 99 MG PO Q48H, (Reported)





Patient Home Medication List


Home Medication List Reviewed:  Yes





Review of Systems


Review of Systems


Constitutional:  see HPI


EENTM:  see HPI


Respiratory:  no symptoms reported


Genitourinary:  no symptoms reported


Musculoskeletal:  no symptoms reported


Skin:  no symptoms reported


Psychiatric/Neurological:  No Symptoms Reported


Hematologic/Lymphatic:  No Symptoms Reported





Past Medical-Social-Family Hx


Patient Social History


Drug of Choice:  THC


Type Used:  Cigarettes


2nd Hand Smoke Exposure:  No


Recent Foreign Travel:  No


Contact w/Someone Who Travel:  No


Recent Hopitalizations:  No





Immunizations Up To Date


Tetanus Booster (TDap):  Unknown


Date of Pneumonia Vaccine:  Oct 1, 2012


Date of Influenza Vaccine:  Oct 1, 2012





Seasonal Allergies


Seasonal Allergies:  No





Past Medical History


Surgeries:  Yes (renal stents)


Adenoidectomy, Ear Surgery, Hysterectomy, Tonsillectomy


Respiratory:  Yes


Asthma, Pneumonia, Chronic Bronchitis


Cardiac:  Yes


Deep Vein Thrombosis


Neurological:  Yes


Neuropathy


Reproductive Disorders:  Yes


Female Reproductive Disorders:  Menstrual Problems


Genitourinary:  Yes


Renal Failure


Gastrointestinal:  Yes (gastroparesis)


Gastroesophageal Reflux, Pancreatitis, Ulcer


Musculoskeletal:  Yes


Arthritis, Rheumatoid Arthritis, Fractures


Endocrine:  Yes


Diabetes, Insulin dep


HEENT:  No


Tonsilitis


Loss of Vision:  Denies


Hearing Impairment:  Denies


Cancer:  No


Psychosocial:  Yes


Personality Disorder


Integumentary:  No


Blood Disorders:  No


Adverse Reaction/Blood Tranf:  No





Family Medical History





FH: thyroid cancer


  G8 SISTER


FHx: macular degeneration


  19 MOTHER


Glaucoma


  G8 BROTHER


Heart Disease, Cancer, Diabetes





Physical Exam


Vital Signs





Vital Signs - First Documented








 6/17/20





 17:59


 


Temp 37.6


 


Pulse 122


 


Resp 18


 


B/P (MAP) 134/86 (102)


 


Pulse Ox 98





Capillary Refill :


Height, Weight, BMI


Height: 5'5.00"


Weight: 151lbs. 1.0oz. 68.434664oe; 32.00 BMI


Method:Stated


General Appearance:  No Apparent Distress, WD/WN


Eyes:  Bilateral Eye Normal Inspection, Bilateral Eye PERRL, Bilateral Eye EOMI


HEENT:  PERRL/EOMI, TMs Normal


Respiratory:  Normal Breath Sounds, No Accessory Muscle Use, No Respiratory 

Distress


Cardiovascular:  Regular Rate, Rhythm, Normal Peripheral Pulses


Gastrointestinal:  Non Tender, Soft


Extremity:  Normal Capillary Refill, Normal Inspection


Neurologic/Psychiatric:  Alert, Oriented x3


Skin:  Normal Color, Warm/Dry





Procedures/Interventions


Date of ETT Placement:  Feb 10, 2020


Time of ETT Placement:  0312





Progress/Results/Core Measures


Suspected Sepsis


SIRS


Temperature: 


Pulse:  


Respiratory Rate: 


 


Laboratory Tests


6/17/20 18:34: White Blood Count 11.3H


Blood Pressure  / 


Mean: 


 











Laboratory Tests


6/17/20 18:15: 


Creatinine 3.27H, Total Bilirubin 0.5


6/17/20 18:34: Platelet Count 444H





Results/Orders


Lab Results





Laboratory Tests








Test


 6/17/20


18:02 6/17/20


18:15 6/17/20


18:22 6/17/20


18:34 Range/Units


 


 


Glucometer > 600 *H      MG/DL


 


Sodium Level  116 *L   135-145  MMOL/L


 


Potassium Level  6.6 *H   3.6-5.0  MMOL/L


 


Chloride Level  79 L     MMOL/L


 


Carbon Dioxide Level  8 *L   21-32  MMOL/L


 


Anion Gap  29 H   5-14  MMOL/L


 


Blood Urea Nitrogen  77 H   7-18  MG/DL


 


Creatinine


 


 3.27 H


 


 


 0.60-1.30


MG/DL


 


Estimat Glomerular Filtration


Rate 


 15 


 


 


  





 


BUN/Creatinine Ratio  24     


 


Glucose Level  1322 *H     MG/DL


 


Calcium Level  9.6    8.5-10.1  MG/DL


 


Corrected Calcium  10.0    8.5-10.1  MG/DL


 


Total Bilirubin  0.5    0.1-1.0  MG/DL


 


Aspartate Amino Transf


(AST/SGOT) 


 22 


 


 


 5-34  U/L





 


Alanine Aminotransferase


(ALT/SGPT) 


 31 


 


 


 0-55  U/L





 


Alkaline Phosphatase  123      U/L


 


Total Protein  8.4 H   6.4-8.2  GM/DL


 


Albumin  3.5    3.2-4.5  GM/DL


 


Lipase  71    8-78  U/L


 


Beta-Hydroxybutyrate (Chem


panel) 


 8.53 H


 


 


 0.00-0.27


MMOL/L


 


Blood Gas Puncture Site   R RAD    


 


Blood Gas Patient Temperature   37.6    


 


Arterial Blood pH   7.34 *L  7.37-7.43  


 


Arterial Blood Partial


Pressure CO2 


 


 15 *L


 


 35-45  MMHG





 


Arterial Blood Partial


Pressure O2 


 


 147 H


 


 79-93  MMHG





 


Arterial Blood HCO3   8 *L  23-27  MMOL/L


 


Arterial Blood Total CO2


 


 


 8.3 L


 


 21.0-31.0


MMOL/L


 


Arterial Blood Oxygen


Saturation 


 


 97 


 


   %





 


Arterial Blood Base Excess


 


 


 -17.2 L


 


 -2.5-2.5


MMOL/L


 


Trent Test   YES-POS    


 


Blood Gas Ventilator Setting   NO    


 


Blood Gas Inspired Oxygen   RA    


 


White Blood Count


 


 


 


 11.3 H


 4.3-11.0


10^3/uL


 


Red Blood Count


 


 


 


 3.80 L


 4.35-5.85


10^6/uL


 


Hemoglobin    11.8  11.5-16.0  G/DL


 


Hematocrit    38  35-52  %


 


Mean Corpuscular Volume    99  80-99  FL


 


Mean Corpuscular Hemoglobin    31  25-34  PG


 


Mean Corpuscular Hemoglobin


Concent 


 


 


 31 L


 32-36  G/DL





 


Red Cell Distribution Width    13.5  10.0-14.5  %


 


Platelet Count


 


 


 


 444 H


 130-400


10^3/uL


 


Mean Platelet Volume    10.1  7.4-10.4  FL


 


Neutrophils (%) (Auto)    90 H 42-75  %


 


Lymphocytes (%) (Auto)    10 L 12-44  %


 


Monocytes (%) (Auto)    0  0-12  %


 


Eosinophils (%) (Auto)    0  0-10  %


 


Basophils (%) (Auto)    0  0-10  %


 


Neutrophils # (Auto)    10.1 H 1.8-7.8  X 10^3


 


Lymphocytes # (Auto)    1.1  1.0-4.0  X 10^3


 


Monocytes # (Auto)    0.1  0.0-1.0  X 10^3


 


Eosinophils # (Auto)


 


 


 


 0.0 


 0.0-0.3


10^3/uL


 


Basophils # (Auto)


 


 


 


 0.0 


 0.0-0.1


10^3/uL


 


Neutrophils % (Manual)    84   %


 


Lymphocytes % (Manual)    12   %


 


Monocytes % (Manual)    1   %


 


Band Neutrophils    3   %


 


Blood Morphology Comment    NORMAL   








My Orders





Orders - LIAM GAONA APRN


Cbc With Automated Diff (6/17/20 18:04)


Comprehensive Metabolic Panel (6/17/20 18:04)


Beta Hydroxybutyrate (6/17/20 18:04)


Arterial Blood Gas (6/17/20 18:04)


Ed Iv/Invasive Line Start (6/17/20 18:04)


Lipase (6/17/20 18:04)


Ua Culture If Indicated (6/17/20 18:04)


Drug Screen Stat (Urine) (6/17/20 18:04)


Lactated Ringers (Lr 1000 Ml Iv Solution (6/17/20 18:15)


Promethazine Injection (Phenergan Injec (6/17/20 18:15)


Accucheck Prn (6/17/20 18:04)


Insulin Regular Drip (Myxredlin 100 Unit (6/17/20 18:15)


Insulin (Regular) Human (Novolin R (Per (6/17/20 18:15)


Manual Differential (6/17/20 18:34)


Ondansetron Injection (Zofran Injectio (6/17/20 19:00)


Lr 1000 Ml Wide Open (6/17/20 19:00)


Normal Saline Bolus 1,000ml (6/17/20 19:00)





Medications Given in ED





Current Medications








 Medications  Dose


 Ordered  Sig/Corin


 Route  Start Time


 Stop Time Status Last Admin


Dose Admin


 


 Promethazine HCl  25 mg  ONCE  ONCE


 IVP  6/17/20 18:15


 6/17/20 18:16 DC 6/17/20 18:24


25 MG








Vital Signs/I&O











 6/17/20





 17:59


 


Temp 37.6


 


Pulse 122


 


Resp 18


 


B/P (MAP) 134/86 (102)


 


Pulse Ox 98





Capillary Refill :





Departure


Communication (Admissions)


Time/Spoke to Admitting Phy:  19:01





Impression





   Primary Impression:  


   DKA (diabetic ketoacidoses)


   Qualified Codes:  E11.10 - Type 2 diabetes mellitus with ketoacidosis without

   coma


Disposition:  01 HOME, SELF-CARE


Condition:  Stable





Admissions


Decision to Admit Reason:  Admit from ER (General)


Decision to Admit/Date:  Jun 17, 2020


Time/Decision to Admit Time:  19:01





Departure-Patient Inst.


Referrals:  


NO,LOCAL PHYSICIAN (PCP/Family)


Primary Care Physician











LIAM GAONA             Jun 17, 2020 18:09

## 2020-06-18 VITALS — SYSTOLIC BLOOD PRESSURE: 96 MMHG | DIASTOLIC BLOOD PRESSURE: 48 MMHG

## 2020-06-18 VITALS — SYSTOLIC BLOOD PRESSURE: 84 MMHG | DIASTOLIC BLOOD PRESSURE: 53 MMHG

## 2020-06-18 VITALS — SYSTOLIC BLOOD PRESSURE: 95 MMHG | DIASTOLIC BLOOD PRESSURE: 51 MMHG

## 2020-06-18 VITALS — SYSTOLIC BLOOD PRESSURE: 104 MMHG | DIASTOLIC BLOOD PRESSURE: 60 MMHG

## 2020-06-18 VITALS — SYSTOLIC BLOOD PRESSURE: 92 MMHG | DIASTOLIC BLOOD PRESSURE: 74 MMHG

## 2020-06-18 VITALS — DIASTOLIC BLOOD PRESSURE: 43 MMHG | SYSTOLIC BLOOD PRESSURE: 96 MMHG

## 2020-06-18 VITALS — SYSTOLIC BLOOD PRESSURE: 104 MMHG | DIASTOLIC BLOOD PRESSURE: 50 MMHG

## 2020-06-18 VITALS — DIASTOLIC BLOOD PRESSURE: 64 MMHG | SYSTOLIC BLOOD PRESSURE: 101 MMHG

## 2020-06-18 VITALS — DIASTOLIC BLOOD PRESSURE: 54 MMHG | SYSTOLIC BLOOD PRESSURE: 98 MMHG

## 2020-06-18 VITALS — SYSTOLIC BLOOD PRESSURE: 91 MMHG | DIASTOLIC BLOOD PRESSURE: 53 MMHG

## 2020-06-18 VITALS — DIASTOLIC BLOOD PRESSURE: 65 MMHG | SYSTOLIC BLOOD PRESSURE: 111 MMHG

## 2020-06-18 VITALS — DIASTOLIC BLOOD PRESSURE: 61 MMHG | SYSTOLIC BLOOD PRESSURE: 106 MMHG

## 2020-06-18 VITALS — DIASTOLIC BLOOD PRESSURE: 64 MMHG | SYSTOLIC BLOOD PRESSURE: 97 MMHG

## 2020-06-18 VITALS — DIASTOLIC BLOOD PRESSURE: 71 MMHG | SYSTOLIC BLOOD PRESSURE: 107 MMHG

## 2020-06-18 VITALS — SYSTOLIC BLOOD PRESSURE: 108 MMHG | DIASTOLIC BLOOD PRESSURE: 55 MMHG

## 2020-06-18 VITALS — SYSTOLIC BLOOD PRESSURE: 110 MMHG | DIASTOLIC BLOOD PRESSURE: 52 MMHG

## 2020-06-18 LAB
ALBUMIN SERPL-MCNC: 2.7 GM/DL (ref 3.2–4.5)
ALP SERPL-CCNC: 84 U/L (ref 40–136)
ALT SERPL-CCNC: 21 U/L (ref 0–55)
BASOPHILS # BLD AUTO: 0 10^3/UL (ref 0–0.1)
BASOPHILS NFR BLD AUTO: 0 % (ref 0–10)
BILIRUB SERPL-MCNC: 0.4 MG/DL (ref 0.1–1)
BUN/CREAT SERPL: 26
BUN/CREAT SERPL: 27
CALCIUM SERPL-MCNC: 7.7 MG/DL (ref 8.5–10.1)
CALCIUM SERPL-MCNC: 7.8 MG/DL (ref 8.5–10.1)
CALCIUM SERPL-MCNC: 7.9 MG/DL (ref 8.5–10.1)
CALCIUM SERPL-MCNC: 7.9 MG/DL (ref 8.5–10.1)
CHLORIDE SERPL-SCNC: 100 MMOL/L (ref 98–107)
CHLORIDE SERPL-SCNC: 101 MMOL/L (ref 98–107)
CHLORIDE SERPL-SCNC: 96 MMOL/L (ref 98–107)
CHLORIDE SERPL-SCNC: 98 MMOL/L (ref 98–107)
CO2 SERPL-SCNC: 16 MMOL/L (ref 21–32)
CO2 SERPL-SCNC: 17 MMOL/L (ref 21–32)
CO2 SERPL-SCNC: 18 MMOL/L (ref 21–32)
CO2 SERPL-SCNC: 19 MMOL/L (ref 21–32)
CREAT SERPL-MCNC: 2.42 MG/DL (ref 0.6–1.3)
CREAT SERPL-MCNC: 2.53 MG/DL (ref 0.6–1.3)
CREAT SERPL-MCNC: 2.6 MG/DL (ref 0.6–1.3)
CREAT SERPL-MCNC: 2.74 MG/DL (ref 0.6–1.3)
EOSINOPHIL # BLD AUTO: 0 10^3/UL (ref 0–0.3)
EOSINOPHIL NFR BLD AUTO: 0 % (ref 0–10)
ERYTHROCYTE [DISTWIDTH] IN BLOOD BY AUTOMATED COUNT: 12.8 % (ref 10–14.5)
GFR SERPLBLD BASED ON 1.73 SQ M-ARVRAT: 18 ML/MIN
GFR SERPLBLD BASED ON 1.73 SQ M-ARVRAT: 19 ML/MIN
GFR SERPLBLD BASED ON 1.73 SQ M-ARVRAT: 20 ML/MIN
GFR SERPLBLD BASED ON 1.73 SQ M-ARVRAT: 21 ML/MIN
GLUCOSE SERPL-MCNC: 202 MG/DL (ref 70–105)
GLUCOSE SERPL-MCNC: 215 MG/DL (ref 70–105)
GLUCOSE SERPL-MCNC: 480 MG/DL (ref 70–105)
GLUCOSE SERPL-MCNC: 641 MG/DL (ref 70–105)
HCT VFR BLD CALC: 28 % (ref 35–52)
HGB BLD-MCNC: 9.6 G/DL (ref 11.5–16)
LYMPHOCYTES # BLD AUTO: 2.8 X 10^3 (ref 1–4)
LYMPHOCYTES NFR BLD AUTO: 23 % (ref 12–44)
MAGNESIUM SERPL-MCNC: 2 MG/DL (ref 1.6–2.4)
MANUAL DIFFERENTIAL PERFORMED BLD QL: NO
MCH RBC QN AUTO: 31 PG (ref 25–34)
MCHC RBC AUTO-ENTMCNC: 35 G/DL (ref 32–36)
MCV RBC AUTO: 89 FL (ref 80–99)
MONOCYTES # BLD AUTO: 1.2 X 10^3 (ref 0–1)
MONOCYTES NFR BLD AUTO: 10 % (ref 0–12)
NEUTROPHILS # BLD AUTO: 8.3 X 10^3 (ref 1.8–7.8)
NEUTROPHILS NFR BLD AUTO: 68 % (ref 42–75)
PHOSPHATE SERPL-MCNC: 5.4 MG/DL (ref 2.3–4.7)
PLATELET # BLD: 356 10^3/UL (ref 130–400)
PMV BLD AUTO: 9.6 FL (ref 7.4–10.4)
POTASSIUM SERPL-SCNC: 4.5 MMOL/L (ref 3.6–5)
POTASSIUM SERPL-SCNC: 4.6 MMOL/L (ref 3.6–5)
POTASSIUM SERPL-SCNC: 4.7 MMOL/L (ref 3.6–5)
POTASSIUM SERPL-SCNC: 4.8 MMOL/L (ref 3.6–5)
PROT SERPL-MCNC: 6 GM/DL (ref 6.4–8.2)
SODIUM SERPL-SCNC: 127 MMOL/L (ref 135–145)
SODIUM SERPL-SCNC: 127 MMOL/L (ref 135–145)
SODIUM SERPL-SCNC: 128 MMOL/L (ref 135–145)
SODIUM SERPL-SCNC: 129 MMOL/L (ref 135–145)
WBC # BLD AUTO: 12.4 10^3/UL (ref 4.3–11)

## 2020-06-18 RX ADMIN — POTASSIUM CHLORIDE SCH MLS/HR: 200 INJECTION, SOLUTION INTRAVENOUS at 06:55

## 2020-06-18 RX ADMIN — POTASSIUM CHLORIDE SCH MLS/HR: 200 INJECTION, SOLUTION INTRAVENOUS at 00:23

## 2020-06-18 RX ADMIN — DICYCLOMINE HYDROCHLORIDE SCH MG: 10 CAPSULE ORAL at 20:18

## 2020-06-18 RX ADMIN — POTASSIUM CHLORIDE SCH MLS/HR: 200 INJECTION, SOLUTION INTRAVENOUS at 04:31

## 2020-06-18 RX ADMIN — HYDROCODONE BITARTRATE AND ACETAMINOPHEN PRN TAB: 5; 325 TABLET ORAL at 04:31

## 2020-06-18 RX ADMIN — ENOXAPARIN SODIUM SCH MG: 30 INJECTION SUBCUTANEOUS at 08:34

## 2020-06-18 RX ADMIN — DICYCLOMINE HYDROCHLORIDE SCH MG: 10 CAPSULE ORAL at 17:07

## 2020-06-18 RX ADMIN — DICYCLOMINE HYDROCHLORIDE SCH MG: 10 CAPSULE ORAL at 11:39

## 2020-06-18 RX ADMIN — SODIUM CHLORIDE SCH MLS/HR: 4.5 INJECTION, SOLUTION INTRAVENOUS at 15:53

## 2020-06-18 RX ADMIN — POTASSIUM CHLORIDE SCH MLS/HR: 200 INJECTION, SOLUTION INTRAVENOUS at 02:16

## 2020-06-18 RX ADMIN — SODIUM CHLORIDE SCH MLS/HR: 4.5 INJECTION, SOLUTION INTRAVENOUS at 01:17

## 2020-06-18 RX ADMIN — HYDROCODONE BITARTRATE AND ACETAMINOPHEN PRN TAB: 5; 325 TABLET ORAL at 14:00

## 2020-06-18 RX ADMIN — SODIUM CHLORIDE SCH MLS/HR: 4.5 INJECTION, SOLUTION INTRAVENOUS at 05:34

## 2020-06-18 RX ADMIN — MELATONIN 3 MG ORAL TABLET PRN MG: 3 TABLET ORAL at 20:18

## 2020-06-18 RX ADMIN — INSULIN ASPART SCH UNIT: 100 INJECTION, SOLUTION INTRAVENOUS; SUBCUTANEOUS at 15:54

## 2020-06-18 RX ADMIN — INSULIN ASPART SCH UNIT: 100 INJECTION, SOLUTION INTRAVENOUS; SUBCUTANEOUS at 11:38

## 2020-06-18 RX ADMIN — INSULIN ASPART SCH UNIT: 100 INJECTION, SOLUTION INTRAVENOUS; SUBCUTANEOUS at 20:20

## 2020-06-18 RX ADMIN — SODIUM CHLORIDE SCH MLS/HR: 4.5 INJECTION, SOLUTION INTRAVENOUS at 11:38

## 2020-06-18 RX ADMIN — HYDROCODONE BITARTRATE AND ACETAMINOPHEN PRN TAB: 5; 325 TABLET ORAL at 20:18

## 2020-06-18 NOTE — PULMONARY CONSULTATION
History of Present Illness


History of Present Illness


Date Seen by Provider:  Jun 18, 2020


Time Seen by Provider:  05:11


Date of Admission








Allergies and Home Medications


Allergies


Coded Allergies:  


     Sulfa (Sulfonamide Antibiotics) (Unverified  Allergy, Unknown, 6/25/15)


     codeine (Verified  Allergy, Unknown, TAKES ULTRAM AT HOME, 1/2/09)


     ketorolac (Verified  Allergy, Unknown, 11/19/08)


     tramadol (Verified  Allergy, Unknown, 12/17/11)





Home Medications


Albuterol Sulfate 1 Puff Puff, 2 PUFF IH TID PRN for SHORTNESS OF BREATH, 

(Reported)


Insulin Determir 1,000 Units/10 Ml Soln, 13 UNIT SQ HS


   Prescribed by: MISHA DEVRIES on 2/15/20 1008


Insulin Lispro 100 Unit/1 Ml Insuln.pen, 5 UNITS SQ TIDWM, (Reported)


Lisinopril 20 Mg Tablet, 20 MG PO DAILY


   Prescribed by: MISHA DEVRIES on 2/15/20 1008


Magnesium Chloride 64 Mg Tab, 2 TAB PO Q48H, (Reported)


Montelukast Sodium 10 Mg Tablet, 5 MG PO DAILY, (Reported)


   TAKES  OF A 10 MG TO EQUAL 5MG DAILY 


Niacinamide 500 Mg Tablet, 500 MG PO Q48H, (Reported)


Potassium Gluconate 99 Mg Tablet, 99 MG PO Q48H, (Reported)





Past Medical-Social-Family Hx


Patient Social History


Alcohol Use:  Denies Use


Recreational Drug Use:  No


Drug of Choice:  THC


Smoking Status:  Current Everyday Smoker


Type Used:  Cigarettes


2nd Hand Smoke Exposure:  No


Recent Foreign Travel:  No


Contact w/Someone Who Travel:  No


Recent Infectious Disease Expo:  No


Recent Hopitalizations:  No





Immunizations Up To Date


Tetanus Booster (TDap):  Unknown


Date of Pneumonia Vaccine:  Oct 1, 2012


Date of Influenza Vaccine:  Oct 1, 2012





Seasonal Allergies


Seasonal Allergies:  No





Past Medical History


Surgeries:  Yes (renal stents)


Adenoidectomy, Ear Surgery, Hysterectomy, Tonsillectomy


Respiratory:  Yes


Asthma, Pneumonia, Chronic Bronchitis


Cardiac:  Yes


Deep Vein Thrombosis


Neurological:  Yes


Neuropathy


Reproductive Disorders:  Yes


Female Reproductive Disorders:  Menstrual Problems


Genitourinary:  Yes


Renal Failure


Gastrointestinal:  Yes (gastroparesis)


Gastroesophageal Reflux, Pancreatitis, Ulcer


Musculoskeletal:  Yes


Arthritis, Rheumatoid Arthritis, Fractures


Endocrine:  Yes


Diabetes, Insulin dep


HEENT:  No


Tonsilitis


Loss of Vision:  Denies


Hearing Impairment:  Denies


Cancer:  No


Psychosocial:  Yes


Personality Disorder


Integumentary:  No


Blood Disorders:  No


Adverse Reaction/Blood Tranf:  No





Family Medical History





FH: thyroid cancer


  G8 SISTER


FHx: macular degeneration


  19 MOTHER


Glaucoma


  G8 BROTHER


Heart Disease, Cancer, Diabetes





Review of Systems


Time Seen by Provider:  05:13





Sepsis Event


Evaluation


Height, Weight, BMI


Height: 5'5.00"


Weight: 151lbs. 1.0oz. 68.787392ib; 26.63 BMI


Method:Stated





Exam


Exam





Vital Signs








  Date Time  Temp Pulse Resp B/P (MAP) Pulse Ox O2 Delivery O2 Flow Rate FiO2


 


6/18/20 04:00      Room Air  


 


6/18/20 03:00  98 22 98/54 (69) 95 Room Air  


 


6/18/20 02:00  98 19 110/52 (71) 94 Room Air  


 


6/18/20 01:00  90      


 


6/18/20 01:00  101 20 96/43 (60) 93 Room Air  


 


6/18/20 00:00      Room Air  


 


6/18/20 00:00  101 21 92/74 (80) 93 Room Air  


 


6/17/20 23:00  107 20 109/50 (69) 90 Room Air  


 


6/17/20 22:00  101 23 98/56 (70) 92 Room Air  


 


6/17/20 21:30  105 21 99/47 (64) 90 Room Air  


 


6/17/20 21:15  104 12 109/58 (75) 93 Room Air  


 


6/17/20 21:00  105 16 117/55 (75) 97 Room Air  


 


6/17/20 20:45  107 13 101/66 (78) 100 Room Air  


 


6/17/20 20:30  110 20 103/65 (78) 97 Room Air  


 


6/17/20 20:28  107      


 


6/17/20 20:27 37.3 113 16 119/53 (75) 98 Room Air  


 


6/17/20 20:20      Room Air  


 


6/17/20 20:06 37.6 107 18 110/61 (102) 97 Room Air  


 


6/17/20 17:59 37.6 122 18 134/86 (102) 98   














I & O 


 


 6/18/20





 07:00


 


Intake Total 4730 ml


 


Output Total 375 ml


 


Balance 4355 ml








Height & Weight


Height: 5'5.00"


Weight: 151lbs. 1.0oz. 68.141950eh; 26.63 BMI


Method:Stated


General Appearance:  No Apparent Distress, WD/WN


HEENT:  PERRL/EOMI, TMs Normal


Respiratory:  Normal Breath Sounds, No Accessory Muscle Use, No Respiratory 

Distress


Cardiovascular:  Regular Rate, Rhythm, Normal Peripheral Pulses


Capillary Refill:  Less Than 3 Seconds


Extremity:  Normal Capillary Refill, Normal Inspection


Neurologic/Psychiatric:  Alert, Oriented x3


Skin:  Normal Color, Warm/Dry





Results


Lab


Laboratory Tests


6/17/20 18:15








6/17/20 18:34








6/17/20 21:05








6/17/20 22:52








6/18/20 00:31








6/18/20 03:04











Assessment/Plan


Assessment/Plan


Acute DKA 


   -Continue Protocol 


[facility] use 


   -Education











JUAN ASHRAF DO              Jun 18, 2020 05:13

## 2020-06-18 NOTE — HISTORY & PHYSICAL-HOSPITALIST
History of Present Illness


HPI/Chief Complaint


Pt is a 55yoCF with a PMH of IDDMII and CKD who presented to the ER due to 

nausea, vomiting, and elevated blood sugars. She states her symptoms started 

yesterday morning around 2-3am with profuse vomiting. She was unable to seek 

care then because she did not have a ride. She knew her blood sugar was high but

could not find her insulin to help bring it down because she didn't feel well 

and her mom who was with her is blind. She normally takes 22 units Levemir twice

daily and 5-15 units sliding scale with meals.


Source:  patient


Date Seen


20


Time Seen by a Provider:  07:42


Attending Physician


Misha Young MD


PCP


No,Local Physician


Referring Physician





Date of Admission


2020 at 18:54





Home Medications & Allergies


Home Medications


Reviewed patient Home Medication Reconciliation performed by pharmacy medication

reconciliations technician and/or nursing.


Patients Allergies have been reviewed.





Allergies





Allergies


Coded Allergies


  Sulfa (Sulfonamide Antibiotics) (Unverified Allergy, Unknown, 6/25/15)


  codeine (Verified Allergy, Unknown, TAKES ULTRAM AT HOME, 09)


  ketorolac (Verified Allergy, Unknown, 08)


  tramadol (Verified Allergy, Unknown, 11)








Past Medical-Social-Family Hx


Past Med/Social Hx:  Reviewed Nursing Past Med/Soc Hx


Patient Social History


Alcohol Use:  Denies Use


Recreational Drug Use:  Yes


Drug of Choice:  THC


Smoking Status:  Current Everyday Smoker


Type Used:  Cigarettes


2nd Hand Smoke Exposure:  No


Recent Foreign Travel:  No


Contact w/other who traveled:  No


Recent Hopitalizations:  No


Recent Infectious Disease Expo:  No





Immunizations Up To Date


Tetanus Booster (TDap):  Unknown


Date of Pneumonia Vaccine:  Oct 1, 2012


Date of Influenza Vaccine:  Oct 1, 2012





Seasonal Allergies


Seasonal Allergies:  No





Past Medical History


Surgeries:  Adenoidectomy, Ear Surgery, Hysterectomy, Tonsillectomy


Cardiac:  Deep Vein Thrombosis


Neurological:  Neuropathy


Reproductive:  Yes


Female Reproductive Disorders:  Menstrual Problems


Genitourinary:  Renal Failure


Gastrointestinal:  Gastroesophageal Reflux, Pancreatitis, Ulcer


Musculoskeletal:  Arthritis, Rheumatoid Arthritis, Fractures


Endocrine:  Diabetes, Insulin dep


HEENT:  Tonsilitis


Loss of Vision:  Denies


Hearing Impairment:  Denies


Psychosocial:  Personality Disorder


History of Blood Disorders:  No


Adverse Reaction to Blood Ramirez:  No





Family History


Reviewed Nursing Family Hx





FH: thyroid cancer


  G8 SISTER


FHx: macular degeneration


  19 MOTHER


Glaucoma


  G8 BROTHER


Heart Disease, Cancer, Diabetes





Review of Systems


Constitutional:  No chills, No fever


EENTM:  no symptoms reported


Respiratory:  No cough, No short of breath


Cardiovascular:  No chest pain


Gastrointestinal:  No abdominal pain; nausea, vomiting


Genitourinary:  no symptoms reported


Musculoskeletal:  back pain (chronic)


Skin:  no symptoms reported


Psychiatric/Neurological:  No Symptoms Reported





Physical Exam


Physical Exam


Vital Signs





Vital Signs - First Documented








 20





 17:59 20:06


 


Temp 37.6 


 


Pulse 122 


 


Resp 18 


 


B/P (MAP) 134/86 (102) 


 


Pulse Ox 98 


 


O2 Delivery  Room Air





Capillary Refill : Less Than 3 Seconds


Height, Weight, BMI


Height: 5'5.00"


Weight: 151lbs. 1.0oz. 68.807025mt; 26.63 BMI


Method:Stated


General Appearance:  No Apparent Distress, Chronically ill


HEENT:  PERRL/EOMI, Moist Mucous Membranes; No Scleral Icterus (L), No Scleral 

Icterus (R)


Neck:  Normal Inspection, Supple


Respiratory:  Lungs Clear, No Accessory Muscle Use, No Respiratory Distress


Cardiovascular:  Regular Rate, Rhythm, No Murmur


Gastrointestinal:  Normal Bowel Sounds, Non Tender, Soft


Genital/Rectal:  Other (queen in place)


Extremity:  Normal Capillary Refill, No Calf Tenderness, No Pedal Edema


Neurologic/Psychiatric:  Alert, Oriented x3, Normal Mood/Affect


Skin:  Normal Color, Warm/Dry, Other (scab on forehead)





Results


Results/Procedures


Labs


Laboratory Tests


20 18:15








20 18:34








20 21:05








20 22:52








20 00:31








20 03:04








Patient resulted labs reviewed.





Assessment/Plan


Admission Diagnosis


DKA


Admission Status:  Inpatient Order (span 2 midnights)


Reason for Inpatient Admission:  


insulin gtt, bs over 1300





Assessment and Plan


DKA


IDDMII


   Presented with BS 1322, bicarb of 8, and gap of 29


   Admitted to ICU for DKA


   Doing much better, gap now closed


   Will transition to bolus insulin and DC insulin gtt


   A1c ordered





Acute on CKD stage 3a


   Crt 3.27 on arrival


   Currently 2.6


   Continue IVF


   Monitor UOP





HTN


   hold home metoprolol for hypotension





Chronic pain


   Continue home meds





Diagnosis/Problems


Diagnosis/Problems





(1) Insulin dependent diabetes mellitus


Status:  Chronic


(2) Hypertension


Status:  Chronic


Qualifiers:  


   Hypertension type:  essential hypertension  Qualified Codes:  I10 - Essential

(primary) hypertension


(3) DKA (diabetic ketoacidoses)


Status:  Acute


Qualifiers:  


   Diabetes mellitus type:  type 2  Diabetes mellitus complication detail:  

without coma  Qualified Codes:  E11.10 - Type 2 diabetes mellitus with 

ketoacidosis without coma


(4) Hypotension


Status:  Acute


Qualifiers:  


   Hypotension type:  hypotension due to hypovolemia  Qualified Codes:  I95.89 -

Other hypotension; E86.1 - Hypovolemia


(5) Acute kidney injury superimposed on chronic kidney disease


Status:  Acute





Clinical Quality Measures


DVT/VTE Risk/Contraindication:


Risk Factor Score Per Nursin


RFS Level Per Nursing on Admit:  4+=Very High











MISHA YOUNG MD              2020 07:50

## 2020-06-18 NOTE — NUR
RD ASSESSMENT 



PMHx: 

DM; CKD; DVT; GERD; pancreatitis



PT INTERACTION: 

Pt was awake and pleasant during nutrition assessment. Pt states current appetite is good 
and has been for some time. avg PO intake 100% x2meal, 

per chart review. Pt states following a regular diet at home, and has no issues with 
chewing/swallowing food, despite missing some teeth. Pt states 

some recent issues with nausea and vomiting. Pt states no recent issues with constipation or 
diarrhea, and that her last BM was 6/17. Note pt not 

currently on bowel regimen per chart review. Pt states having "weight issues," but did not 
elaborate, even after prompted. Note recent 15# wt 

loss x4mon, per chart review. Pt states current DM management is "pretty good, and I'm 
following the suggestions you gave the last time I was here." Note recent HbA1c of 14.8, 
taken 2/10/20, per chart review. 



ABNORMAL NUTRITION-RELATED LAB VALUES

LOW: Na 128; Ca 7.9

HIGH:  BNU 65; cr 2.53; glu 215



Est. kcal needs: 9507-6574 kcal | 20-25 kcal/kg 

Est. Pro needs:  59-73 g Pro | 0.8-1.0 g Pro/kg 



PES STATEMENT: 

Given current PO intake, no nutrition diagnosis at this time (NO-1.1) 



INTERVENTION:  

Continue with current diet order of CHO 60g/m 1snack diet. 

Discussed and reinforced previous diet education on DM management. Pt verbalized portion 
control and CHO counting suggestions mentioned during last hospital stay. 

Encouraged pt to continue with DM management. 

Will continue to follow and reassess as pt needs, intake, and status change. 



MONITOR/EVALUATE:  

PO Intake; Plan of Care; Hydration Status; Weight Status; Lab Values 





NIKKI Lao, MS, RD, LD

## 2020-06-18 NOTE — NUR
Patient transferred to 420-1 per BED accompanied by ICU staff.  Patient and family notified 
and understand transfer.  Personal belongings with patient.  Report given to THIS RN FROM 
ICU RN.

## 2020-06-18 NOTE — NUR
SPOKE WITH THE PT (SHE HAD A MED LIST FROM [facility] THAT I MADE A COPY OF & ATTACHED TO HER 
CHART) AND WENT THRU THE EXT MED HISTORY TO COMPLETE THE MED REC



PT WAS ABLE TO TELL ME HOW SHE TAKES HER MEDICATIONS AND ALSO LET ME KNOW THAT [facility] IS 
BUBBLE PACKING HER MEDICATIONS FOR HER.



OTC MEDS:

VIT C

VIT D

MELATONIN

POTASSIUM  

-------------------------------------------------------------------------------

Addendum: 06/18/20 at 1440 by CARLOS JONES CPhT

-------------------------------------------------------------------------------

MONTELUKAST 10MG LAST FILLED 01- #15- PT DIDNT REALIZE SHE WAS NOT GETTING THIS 
MEDICATION IN HER BUBBLE PACKS FROM [facility]. SHE IS GOING TO REACH OUT TO THE PHARMACY AND 
GET THAT STRAIGHTENED OUT.   

-------------------------------------------------------------------------------

Addendum: 06/18/20 at 1449 by CARLOS JONES CPhT

-------------------------------------------------------------------------------

PT ALSO GOT SERTRALINE IN APRIL 2020 HOWEVER THE PT IS NO LONGER TAKING

## 2020-06-19 VITALS — DIASTOLIC BLOOD PRESSURE: 75 MMHG | SYSTOLIC BLOOD PRESSURE: 144 MMHG

## 2020-06-19 VITALS — SYSTOLIC BLOOD PRESSURE: 118 MMHG | DIASTOLIC BLOOD PRESSURE: 65 MMHG

## 2020-06-19 VITALS — DIASTOLIC BLOOD PRESSURE: 65 MMHG | SYSTOLIC BLOOD PRESSURE: 118 MMHG

## 2020-06-19 VITALS — DIASTOLIC BLOOD PRESSURE: 72 MMHG | SYSTOLIC BLOOD PRESSURE: 145 MMHG

## 2020-06-19 LAB
BASOPHILS # BLD AUTO: 0 10^3/UL (ref 0–0.1)
BASOPHILS NFR BLD AUTO: 0 % (ref 0–10)
BUN/CREAT SERPL: 28
CALCIUM SERPL-MCNC: 8.6 MG/DL (ref 8.5–10.1)
CHLORIDE SERPL-SCNC: 108 MMOL/L (ref 98–107)
CO2 SERPL-SCNC: 17 MMOL/L (ref 21–32)
CREAT SERPL-MCNC: 2.15 MG/DL (ref 0.6–1.3)
EOSINOPHIL # BLD AUTO: 0.2 10^3/UL (ref 0–0.3)
EOSINOPHIL NFR BLD AUTO: 3 % (ref 0–10)
ERYTHROCYTE [DISTWIDTH] IN BLOOD BY AUTOMATED COUNT: 13.7 % (ref 10–14.5)
GFR SERPLBLD BASED ON 1.73 SQ M-ARVRAT: 24 ML/MIN
GLUCOSE SERPL-MCNC: 110 MG/DL (ref 70–105)
HCT VFR BLD CALC: 30 % (ref 35–52)
HGB BLD-MCNC: 10 G/DL (ref 11.5–16)
LYMPHOCYTES # BLD AUTO: 2.2 X 10^3 (ref 1–4)
LYMPHOCYTES NFR BLD AUTO: 25 % (ref 12–44)
MAGNESIUM SERPL-MCNC: 2.2 MG/DL (ref 1.6–2.4)
MANUAL DIFFERENTIAL PERFORMED BLD QL: NO
MCH RBC QN AUTO: 31 PG (ref 25–34)
MCHC RBC AUTO-ENTMCNC: 34 G/DL (ref 32–36)
MCV RBC AUTO: 92 FL (ref 80–99)
MONOCYTES # BLD AUTO: 0.5 X 10^3 (ref 0–1)
MONOCYTES NFR BLD AUTO: 6 % (ref 0–12)
NEUTROPHILS # BLD AUTO: 5.9 X 10^3 (ref 1.8–7.8)
NEUTROPHILS NFR BLD AUTO: 67 % (ref 42–75)
PHOSPHATE SERPL-MCNC: 4.7 MG/DL (ref 2.3–4.7)
PLATELET # BLD: 360 10^3/UL (ref 130–400)
PMV BLD AUTO: 9.5 FL (ref 7.4–10.4)
POTASSIUM SERPL-SCNC: 4.3 MMOL/L (ref 3.6–5)
SODIUM SERPL-SCNC: 137 MMOL/L (ref 135–145)
WBC # BLD AUTO: 8.9 10^3/UL (ref 4.3–11)

## 2020-06-19 RX ADMIN — DICYCLOMINE HYDROCHLORIDE SCH MG: 10 CAPSULE ORAL at 12:03

## 2020-06-19 RX ADMIN — INSULIN ASPART SCH UNIT: 100 INJECTION, SOLUTION INTRAVENOUS; SUBCUTANEOUS at 16:30

## 2020-06-19 RX ADMIN — DICYCLOMINE HYDROCHLORIDE SCH MG: 10 CAPSULE ORAL at 20:59

## 2020-06-19 RX ADMIN — INSULIN ASPART SCH UNIT: 100 INJECTION, SOLUTION INTRAVENOUS; SUBCUTANEOUS at 13:05

## 2020-06-19 RX ADMIN — DICYCLOMINE HYDROCHLORIDE SCH MG: 10 CAPSULE ORAL at 04:00

## 2020-06-19 RX ADMIN — ENOXAPARIN SODIUM SCH MG: 30 INJECTION SUBCUTANEOUS at 07:50

## 2020-06-19 RX ADMIN — INSULIN ASPART SCH UNIT: 100 INJECTION, SOLUTION INTRAVENOUS; SUBCUTANEOUS at 04:03

## 2020-06-19 RX ADMIN — HYDROCODONE BITARTRATE AND ACETAMINOPHEN PRN TAB: 5; 325 TABLET ORAL at 08:35

## 2020-06-19 RX ADMIN — HYDROCODONE BITARTRATE AND ACETAMINOPHEN PRN TAB: 5; 325 TABLET ORAL at 22:24

## 2020-06-19 RX ADMIN — HYDROCODONE BITARTRATE AND ACETAMINOPHEN PRN TAB: 5; 325 TABLET ORAL at 18:29

## 2020-06-19 RX ADMIN — DICYCLOMINE HYDROCHLORIDE SCH MG: 10 CAPSULE ORAL at 16:34

## 2020-06-19 RX ADMIN — HYDROCODONE BITARTRATE AND ACETAMINOPHEN PRN TAB: 5; 325 TABLET ORAL at 13:06

## 2020-06-19 RX ADMIN — HYDROCODONE BITARTRATE AND ACETAMINOPHEN PRN TAB: 5; 325 TABLET ORAL at 04:00

## 2020-06-19 RX ADMIN — ONDANSETRON PRN MG: 2 INJECTION, SOLUTION INTRAMUSCULAR; INTRAVENOUS at 07:50

## 2020-06-19 RX ADMIN — MELATONIN 3 MG ORAL TABLET PRN MG: 3 TABLET ORAL at 22:24

## 2020-06-19 RX ADMIN — INSULIN HUMAN SCH UNIT: 100 INJECTION, SOLUTION PARENTERAL at 12:03

## 2020-06-19 RX ADMIN — INSULIN ASPART SCH UNIT: 100 INJECTION, SOLUTION INTRAVENOUS; SUBCUTANEOUS at 20:26

## 2020-06-19 NOTE — NUR
Received dietary consult for diet education on DM management. 



Note this RD discussed and provided diet education on 6/18, reinforcing education from 
previous hospital stay. Pt verbalized understanding and was able to provide teachback on 
some topics discussed from previous stay. 



Will continue to follow and reassess as pt needs, intake, and status change. 



NIKKI Lao, MS, RD, LD

## 2020-06-19 NOTE — PROGRESS NOTE - HOSPITALIST
Subjective


HPI/CC On Admission


Date Seen by Provider:  2020


Time Seen by Provider:  09:59


Pt is a 55yoCF with a PMH of IDDMII and CKD who presented to the ER due to 

nausea, vomiting, and elevated blood sugars. She states her symptoms started 

yesterday morning around 2-3am with profuse vomiting. She was unable to seek 

care then because she did not have a ride. She knew her blood sugar was high but

could not find her insulin to help bring it down because she didn't feel well 

and her mom who was with her is blind. She normally takes 22 units Levemir twice

daily and 5-15 units sliding scale with meals.


Subjective/Events-last exam


Pt reports feeling better today. Nauseated earlier this morning.





Objective


Exam


Vital Signs





Vital Signs








  Date Time  Temp Pulse Resp B/P (MAP) Pulse Ox O2 Delivery O2 Flow Rate FiO2


 


20 06:00 36.5 74 18 118/65 (82) 99 Room Air  





Capillary Refill : Less Than 3 Seconds


General Appearance:  No Apparent Distress, Chronically ill


Respiratory:  Lungs Clear, No Respiratory Distress


Cardiovascular:  Regular Rate, Rhythm, No Murmur


Gastrointestinal:  Normal Bowel Sounds, Non Tender, Soft


Neurologic/Psychiatric:  Alert, Oriented x3





Results/Procedures


Lab


Laboratory Tests


20 16:00








20 05:31








20 05:51








Patient resulted labs reviewed.





Assessment/Plan


Assessment and Plan


Assess & Plan/Chief Complaint


DKA- resolved


IDDMII


   Doing well off insulin gtt


   Was hypoglycemic this AM, will decrease basal insulin


   SSI ordered


   A1c 12.0


   Education ordered





Acute on CKD stage 3a


   Crt 3.27 on arrival


   Currently 2.15


   Good UOP





HTN


   BP well controlled without medications





Chronic pain


   Continue home meds





Diagnosis/Problems


Diagnosis/Problems





(1) Insulin dependent diabetes mellitus


Status:  Chronic


(2) Hypertension


Status:  Chronic


Qualifiers:  


   Hypertension type:  essential hypertension  Qualified Codes:  I10 - Essential

(primary) hypertension


(3) DKA (diabetic ketoacidoses)


Status:  Acute


Qualifiers:  


   Diabetes mellitus type:  type 2  Diabetes mellitus complication detail:  

without coma  Qualified Codes:  E11.10 - Type 2 diabetes mellitus with 

ketoacidosis without coma


(4) Hypotension


Status:  Acute


Qualifiers:  


   Hypotension type:  hypotension due to hypovolemia  Qualified Codes:  I95.89 -

Other hypotension; E86.1 - Hypovolemia


(5) Acute kidney injury superimposed on chronic kidney disease


Status:  Acute





Clinical Quality Measures


DVT/VTE Risk/Contraindication:


Risk Factor Score Per Nursin


RFS Level Per Nursing on Admit:  4+=Very High











MISHA DEVRIES MD              2020 10:03

## 2020-06-19 NOTE — NUR
DR. DEVRIES CALLED AT THIS TIME AND INFORMED OF PT DROP IN BS AND CONCERN WITH TAKING LEVEMIR 
THIS HS. ORDER TO HOLD LEVEMIR FOR THIS HS.

## 2020-06-19 NOTE — NUR
DURING PT ASSESSMENT PT EXPRESSES CONCERN TO THIS RN ABOUT HER DROP IN BLOOD SUGAR. PT 
STATES AFTER RECEIVING INSULIN FROM DAYSHIFT SHE TOOK HER BS AT 1500 ON HER OWN GLUCOMETER 
AND IT WAS 38. SHE RECEIVED FOOD. SHE TOOK BS AGAIN AT 1513 ON HER OWN GLUCOMETER AND IT WAS 
65. THIS RN WITNESSED THIS ON HER MACHINE. THIS RN LOOK AT OUR LABS AT 1556 IT SHOWED AT BS 
OF 66, THEN AT 1710 BS OF 77,AND AT 2017 101. PT HAS LEVEMIR SCHEDULED FOR THIS HS. THIS RN 
INFORMED PT DR. DEVRIES WOULD BE CALLED AT THIS TIME TO DISCUSS INSULIN AND DROP IN BS.

## 2020-06-19 NOTE — NUR
accu ck 45 480cc apple juice given per pt request. pt refused milk or peanutbutter.. states 
this will bring it up pt is a/o no s/s of hypoglycemia  noted  will retake accu ck

## 2020-06-20 VITALS — DIASTOLIC BLOOD PRESSURE: 82 MMHG | SYSTOLIC BLOOD PRESSURE: 146 MMHG

## 2020-06-20 VITALS — SYSTOLIC BLOOD PRESSURE: 139 MMHG | DIASTOLIC BLOOD PRESSURE: 89 MMHG

## 2020-06-20 VITALS — SYSTOLIC BLOOD PRESSURE: 152 MMHG | DIASTOLIC BLOOD PRESSURE: 83 MMHG

## 2020-06-20 LAB
BASOPHILS # BLD AUTO: 0 10^3/UL (ref 0–0.1)
BASOPHILS NFR BLD AUTO: 1 % (ref 0–10)
BUN/CREAT SERPL: 21
CALCIUM SERPL-MCNC: 8.4 MG/DL (ref 8.5–10.1)
CHLORIDE SERPL-SCNC: 104 MMOL/L (ref 98–107)
CO2 SERPL-SCNC: 22 MMOL/L (ref 21–32)
CREAT SERPL-MCNC: 2.11 MG/DL (ref 0.6–1.3)
EOSINOPHIL # BLD AUTO: 0.2 10^3/UL (ref 0–0.3)
EOSINOPHIL NFR BLD AUTO: 3 % (ref 0–10)
ERYTHROCYTE [DISTWIDTH] IN BLOOD BY AUTOMATED COUNT: 13.8 % (ref 10–14.5)
GFR SERPLBLD BASED ON 1.73 SQ M-ARVRAT: 24 ML/MIN
GLUCOSE SERPL-MCNC: 456 MG/DL (ref 70–105)
HCT VFR BLD CALC: 31 % (ref 35–52)
HGB BLD-MCNC: 10.2 G/DL (ref 11.5–16)
LYMPHOCYTES # BLD AUTO: 2 X 10^3 (ref 1–4)
LYMPHOCYTES NFR BLD AUTO: 39 % (ref 12–44)
MANUAL DIFFERENTIAL PERFORMED BLD QL: NO
MCH RBC QN AUTO: 30 PG (ref 25–34)
MCHC RBC AUTO-ENTMCNC: 33 G/DL (ref 32–36)
MCV RBC AUTO: 93 FL (ref 80–99)
MONOCYTES # BLD AUTO: 0.3 X 10^3 (ref 0–1)
MONOCYTES NFR BLD AUTO: 5 % (ref 0–12)
NEUTROPHILS # BLD AUTO: 2.6 X 10^3 (ref 1.8–7.8)
NEUTROPHILS NFR BLD AUTO: 52 % (ref 42–75)
PLATELET # BLD: 314 10^3/UL (ref 130–400)
PMV BLD AUTO: 9.2 FL (ref 7.4–10.4)
POTASSIUM SERPL-SCNC: 5.1 MMOL/L (ref 3.6–5)
SODIUM SERPL-SCNC: 134 MMOL/L (ref 135–145)
WBC # BLD AUTO: 5 10^3/UL (ref 4.3–11)

## 2020-06-20 RX ADMIN — INSULIN ASPART SCH UNIT: 100 INJECTION, SOLUTION INTRAVENOUS; SUBCUTANEOUS at 20:30

## 2020-06-20 RX ADMIN — DICYCLOMINE HYDROCHLORIDE SCH MG: 10 CAPSULE ORAL at 06:44

## 2020-06-20 RX ADMIN — GABAPENTIN SCH MG: 100 CAPSULE ORAL at 12:25

## 2020-06-20 RX ADMIN — METOCLOPRAMIDE SCH MG: 10 TABLET ORAL at 12:25

## 2020-06-20 RX ADMIN — METOCLOPRAMIDE SCH MG: 10 TABLET ORAL at 17:20

## 2020-06-20 RX ADMIN — ENOXAPARIN SODIUM SCH MG: 30 INJECTION SUBCUTANEOUS at 08:26

## 2020-06-20 RX ADMIN — METOPROLOL TARTRATE SCH MG: 50 TABLET, FILM COATED ORAL at 20:40

## 2020-06-20 RX ADMIN — INSULIN ASPART SCH UNIT: 100 INJECTION, SOLUTION INTRAVENOUS; SUBCUTANEOUS at 06:45

## 2020-06-20 RX ADMIN — ONDANSETRON PRN MG: 2 INJECTION, SOLUTION INTRAMUSCULAR; INTRAVENOUS at 05:39

## 2020-06-20 RX ADMIN — HYDROCODONE BITARTRATE AND ACETAMINOPHEN PRN TAB: 5; 325 TABLET ORAL at 03:25

## 2020-06-20 RX ADMIN — GABAPENTIN SCH MG: 100 CAPSULE ORAL at 20:40

## 2020-06-20 RX ADMIN — HYDROCODONE BITARTRATE AND ACETAMINOPHEN PRN TAB: 5; 325 TABLET ORAL at 08:31

## 2020-06-20 RX ADMIN — METOCLOPRAMIDE SCH MG: 10 TABLET ORAL at 20:40

## 2020-06-20 RX ADMIN — INSULIN ASPART SCH UNIT: 100 INJECTION, SOLUTION INTRAVENOUS; SUBCUTANEOUS at 16:36

## 2020-06-20 RX ADMIN — MELATONIN 3 MG ORAL TABLET PRN MG: 3 TABLET ORAL at 20:40

## 2020-06-20 RX ADMIN — INSULIN ASPART SCH UNIT: 100 INJECTION, SOLUTION INTRAVENOUS; SUBCUTANEOUS at 12:25

## 2020-06-20 NOTE — PROGRESS NOTE - HOSPITALIST
Subjective


HPI/CC On Admission


Date Seen by Provider:  2020


Time Seen by Provider:  10:43


Pt is a 55yoCF with a PMH of IDDMII and CKD who presented to the ER due to 

nausea, vomiting, and elevated blood sugars. She states her symptoms started 

yesterday morning around 2-3am with profuse vomiting. She was unable to seek 

care then because she did not have a ride. She knew her blood sugar was high but

could not find her insulin to help bring it down because she didn't feel well 

and her mom who was with her is blind. She normally takes 22 units Levemir twice

daily and 5-15 units sliding scale with meals.


Subjective/Events-last exam


pt reports feeling better today but had her BS drop yesterday. She states she 

checked it herself and it was 38.





Objective


Exam


Vital Signs





Vital Signs








  Date Time  Temp Pulse Resp B/P (MAP) Pulse Ox O2 Delivery O2 Flow Rate FiO2


 


20 08:00 37.3 88 20 152/83 (106) 99 Room Air  





Capillary Refill : Less Than 3 Seconds


General Appearance:  No Apparent Distress, WD/WN


Respiratory:  Lungs Clear, No Respiratory Distress


Cardiovascular:  Regular Rate, Rhythm, No Murmur


Gastrointestinal:  Normal Bowel Sounds, Soft


Neurologic/Psychiatric:  Alert, Oriented x3





Results/Procedures


Lab


Laboratory Tests


20 05:24








Patient resulted labs reviewed.





Assessment/Plan


Assessment and Plan


Assess & Plan/Chief Complaint


DKA- resolved


IDDMII


   Doing well off insulin gtt


   Discussed home insulin regimen. She has previously stated she takes Levemir 

BID but now states it's just once a day, will decrease Levemir to evening dosing

only and adjust with sliding scale to get back to home regimen


   SSI ordered


   A1c 12.0


   Education ordered





Acute on CKD stage 3a


   Crt 3.27 on arrival


   Currently 2.11- may be her new baseline


   Good UOP





gastroparesis


   Resume reglan


   Pepto prn per patient request





HTN


   BP trending up, resume metoprolol





Chronic pain


Neuropathy


   Continue home meds


   Resume gabapentin





DVT ppx: Lovenox





Diagnosis/Problems


Diagnosis/Problems





(1) Insulin dependent diabetes mellitus


Status:  Chronic


(2) Hypertension


Status:  Chronic


Qualifiers:  


   Hypertension type:  essential hypertension  Qualified Codes:  I10 - Essential

(primary) hypertension


(3) DKA (diabetic ketoacidoses)


Status:  Acute


Qualifiers:  


   Diabetes mellitus type:  type 2  Diabetes mellitus complication detail:  

without coma  Qualified Codes:  E11.10 - Type 2 diabetes mellitus with 

ketoacidosis without coma


(4) Hypotension


Status:  Acute


Qualifiers:  


   Hypotension type:  hypotension due to hypovolemia  Qualified Codes:  I95.89 -

Other hypotension; E86.1 - Hypovolemia


(5) Acute kidney injury superimposed on chronic kidney disease


Status:  Acute





Clinical Quality Measures


DVT/VTE Risk/Contraindication:


Risk Factor Score Per Nursin


RFS Level Per Nursing on Admit:  4+=Very High











MISHA DEVRIES MD              2020 10:47

## 2020-06-21 VITALS — DIASTOLIC BLOOD PRESSURE: 83 MMHG | SYSTOLIC BLOOD PRESSURE: 152 MMHG

## 2020-06-21 VITALS — SYSTOLIC BLOOD PRESSURE: 152 MMHG | DIASTOLIC BLOOD PRESSURE: 83 MMHG

## 2020-06-21 VITALS — SYSTOLIC BLOOD PRESSURE: 164 MMHG | DIASTOLIC BLOOD PRESSURE: 77 MMHG

## 2020-06-21 LAB
BASOPHILS # BLD AUTO: 0 10^3/UL (ref 0–0.1)
BASOPHILS NFR BLD AUTO: 1 % (ref 0–10)
BUN/CREAT SERPL: 21
CALCIUM SERPL-MCNC: 8.8 MG/DL (ref 8.5–10.1)
CHLORIDE SERPL-SCNC: 104 MMOL/L (ref 98–107)
CO2 SERPL-SCNC: 22 MMOL/L (ref 21–32)
CREAT SERPL-MCNC: 1.64 MG/DL (ref 0.6–1.3)
EOSINOPHIL # BLD AUTO: 0.2 10^3/UL (ref 0–0.3)
EOSINOPHIL NFR BLD AUTO: 3 % (ref 0–10)
ERYTHROCYTE [DISTWIDTH] IN BLOOD BY AUTOMATED COUNT: 13.7 % (ref 10–14.5)
GFR SERPLBLD BASED ON 1.73 SQ M-ARVRAT: 32 ML/MIN
GLUCOSE SERPL-MCNC: 235 MG/DL (ref 70–105)
HCT VFR BLD CALC: 34 % (ref 35–52)
HGB BLD-MCNC: 11.2 G/DL (ref 11.5–16)
LYMPHOCYTES # BLD AUTO: 2.4 X 10^3 (ref 1–4)
LYMPHOCYTES NFR BLD AUTO: 41 % (ref 12–44)
MANUAL DIFFERENTIAL PERFORMED BLD QL: NO
MCH RBC QN AUTO: 31 PG (ref 25–34)
MCHC RBC AUTO-ENTMCNC: 33 G/DL (ref 32–36)
MCV RBC AUTO: 94 FL (ref 80–99)
MONOCYTES # BLD AUTO: 0.4 X 10^3 (ref 0–1)
MONOCYTES NFR BLD AUTO: 7 % (ref 0–12)
NEUTROPHILS # BLD AUTO: 2.8 X 10^3 (ref 1.8–7.8)
NEUTROPHILS NFR BLD AUTO: 49 % (ref 42–75)
PLATELET # BLD: 337 10^3/UL (ref 130–400)
PMV BLD AUTO: 9.3 FL (ref 7.4–10.4)
POTASSIUM SERPL-SCNC: 4.4 MMOL/L (ref 3.6–5)
SODIUM SERPL-SCNC: 135 MMOL/L (ref 135–145)
WBC # BLD AUTO: 5.7 10^3/UL (ref 4.3–11)

## 2020-06-21 RX ADMIN — GABAPENTIN SCH MG: 100 CAPSULE ORAL at 08:10

## 2020-06-21 RX ADMIN — INSULIN ASPART SCH UNIT: 100 INJECTION, SOLUTION INTRAVENOUS; SUBCUTANEOUS at 06:24

## 2020-06-21 RX ADMIN — INSULIN ASPART SCH UNIT: 100 INJECTION, SOLUTION INTRAVENOUS; SUBCUTANEOUS at 11:39

## 2020-06-21 RX ADMIN — METOCLOPRAMIDE SCH MG: 10 TABLET ORAL at 06:24

## 2020-06-21 RX ADMIN — METOPROLOL TARTRATE SCH MG: 50 TABLET, FILM COATED ORAL at 08:09

## 2020-06-21 NOTE — DISCHARGE INST-SIMPLE/STANDARD
Discharge Inst-Standard


Discharge Medications


New, Converted or Re-Newed RX:  RX on Chart





Patient Instructions/Follow Up


Plan of Care/Instructions/FU:  


Please continue to take your  medications as prescribed. Please follow up


with your physicians as you have scheduled for this week.


Activity as Tolerated:  Yes


Discharge Diet:  ADA Diet


Return to The Hospital For:  


Elevated blood sugar, chest pain, shortness of breath, confusion,


lethargy, fever, inability to keep food or water down, if you feel you are


getting worse.











MISHA DEVRIES MD              Jun 21, 2020 10:45

## 2020-06-21 NOTE — NUR
patient refused blood sugar check and insulin at this time. she stated she didn't want it 
since she is going home

## 2020-06-21 NOTE — DISCHARGE SUMMARY
Diagnosis/Chief Complaint


Date of Admission


2020 at 18:54


Date of Discharge





Discharge Date:  2020


Admission Diagnosis


DKA


Primary Care


No,Local Physician


Discharge Diagnosis





(1) Insulin dependent diabetes mellitus


Status:  Chronic


(2) Hypertension


Status:  Chronic


(3) DKA (diabetic ketoacidoses)


Status:  Acute


(4) Hypotension


Status:  Acute


(5) Acute kidney injury superimposed on chronic kidney disease


Status:  Acute





Discharge Summary


Discharge Physical Exam


Allergies:  


Coded Allergies:  


     Sulfa (Sulfonamide Antibiotics) (Unverified  Allergy, Unknown, 6/25/15)


     codeine (Verified  Allergy, Unknown, TAKES ULTRAM AT HOME, 09)


     ketorolac (Verified  Allergy, Unknown, 08)


     tramadol (Verified  Allergy, Unknown, 11)


Vitals & I&Os





Vital Signs








  Date Time  Temp Pulse Resp B/P (MAP) Pulse Ox O2 Delivery O2 Flow Rate FiO2


 


20 09:00      Room Air  


 


20 08:00 37.2 82 20 152/83 (106) 99   











Hospital Course


Labs (last 24 hrs)


Laboratory Tests


20 11:14: Glucometer 272H


20 16:27: Glucometer 174H


20 20:28: Glucometer 149H


20 05:06: 


White Blood Count 5.7, Red Blood Count 3.65L, Hemoglobin 11.2L, Hematocrit 34L, 

Mean Corpuscular Volume 94, Mean Corpuscular Hemoglobin 31, Mean Corpuscular 

Hemoglobin Concent 33, Red Cell Distribution Width 13.7, Platelet Count 337, 

Mean Platelet Volume 9.3, Neutrophils (%) (Auto) 49, Lymphocytes (%) (Auto) 41, 

Monocytes (%) (Auto) 7, Eosinophils (%) (Auto) 3, Basophils (%) (Auto) 1, 

Neutrophils # (Auto) 2.8, Lymphocytes # (Auto) 2.4, Monocytes # (Auto) 0.4, 

Eosinophils # (Auto) 0.2, Basophils # (Auto) 0.0, Sodium Level 135, Potassium 

Level 4.4, Chloride Level 104, Carbon Dioxide Level 22, Anion Gap 9, Blood Urea 

Nitrogen 35H, Creatinine 1.64H, Estimat Glomerular Filtration Rate 32, 

BUN/Creatinine Ratio 21, Glucose Level 235H, Calcium Level 8.8





Microbiology


20 MRSA Screen - Final, Complete


          MRSA not isolated


Patient resulted labs reviewed.


Pending Labs


Laboratory Tests


20 05:06: 


White Blood Count 5.7, Red Blood Count 3.65, Hemoglobin 11.2, Hematocrit 34, 

Mean Corpuscular Volume 94, Mean Corpuscular Hemoglobin 31, Mean Corpuscular 

Hemoglobin Concent 33, Red Cell Distribution Width 13.7, Platelet Count 337, 

Mean Platelet Volume 9.3, Neutrophils (%) (Auto) 49, Lymphocytes (%) (Auto) 41, 

Monocytes (%) (Auto) 7, Eosinophils (%) (Auto) 3, Basophils (%) (Auto) 1, 

Neutrophils # (Auto) 2.8, Lymphocytes # (Auto) 2.4, Monocytes # (Auto) 0.4, 

Eosinophils # (Auto) 0.2, Basophils # (Auto) 0.0, Sodium Level 135, Potassium 

Level 4.4, Chloride Level 104, Carbon Dioxide Level 22, Anion Gap 9, Blood Urea 

Nitrogen 35, Creatinine 1.64, Estimat Glomerular Filtration Rate 32, BUN/Creat

inine Ratio 21, Glucose Level 235, Calcium Level 8.8








Discharge


Home Medications:





Active Scripts


Active


Oxycodone IR (Oxycodone HCl) 5 Mg Tablet 5 Mg PO DAILY PRN


Reported


Vitamin D3 (Cholecalciferol (Vitamin D3)) 25 Mcg Tablet 25 Mcg PO BID


Vitamin C (Ascorbic Acid) 1,000 Mg Tablet 1,000 Mg PO DAILY


Ativan (Lorazepam) 1 Mg Tablet 1 Mg PO DAILY PRN


Melatonin 10 Mg Tablet 10 Mg PO HS


Diclofenac Sodium 100 Gm Gel..gram. 1 Applic TOP QID PRN


     APPLY TO LEGS


Levemir Flextouch (Insulin Detemir) 100 Unit/1 Ml Insuln.pen 22 Units SC HS


Flonase Allergy Relief (Fluticasone Propionate) 9.9 Ml Spray.susp 2 [place] NSEACH

DAILY


     1 SPRAY EACH NARE DAILY


Furosemide 40 Mg Tablet 40 Mg PO DAILY


Metoclopramide HCl 10 Mg Tablet 10 Mg PO QIDACHS


Metoprolol Tartrate 50 Mg Tablet 50 Mg PO BID


Narcan (Naloxone HCl) 4 Mg Spray 1 Rio NS UD PRN


     ADMINSTER 1 SPRAY IN 1 NOSTRIL 1 TIME- MAY REPEAT IN ALTERNATING


     NOSTRIL EVERY 2-3 MINUTES UNTIL RESPONSIVE OR EMS ARRIVES


Omeprazole 20 Mg Capsule.dr 20 Mg PO DAILY


Gabapentin 100 Mg Capsule 100 Mg PO TID


Dicyclomine HCl 10 Mg Capsule 10 Mg PO QID


Humalog Kwikpen (Insulin Lispro) 100 Unit/1 Ml Insuln.pen 5-15 Units SQ TIDWM


Proair Hfa (Albuterol Sulfate) 1 Puff Puff 2 Puff IH TID PRN


Potassium (Potassium Gluconate) 99 Mg Tablet 99 Mg PO DAILY


Montelukast Sodium 10 Mg Tablet 10 Mg PO HS


     LAST FILLED 2020 #15





Instructions to patient/family


Please see electronic discharge instructions given to patient.





Clinical Quality Measures


DVT/VTE Risk/Contraindication:


Risk Factor Score Per Nursin


RFS Level Per Nursing on Admit:  4+=Very High





Problem Qualifiers





(1) Hypertension:  


Hypertension type:  essential hypertension  Qualified Codes:  I10 - Essential 

(primary) hypertension


(2) DKA (diabetic ketoacidoses):  


Diabetes mellitus type:  type 2  Diabetes mellitus complication detail:  without

coma  Qualified Codes:  E11.10 - Type 2 diabetes mellitus with ketoacidosis 

without coma


(3) Hypotension:  


Hypotension type:  hypotension due to hypovolemia  Qualified Codes:  I95.89 - 

Other hypotension; E86.1 - Hypovolemia








MISHA DEVRIES MD              2020 10:58

## 2020-06-30 ENCOUNTER — HOSPITAL ENCOUNTER (INPATIENT)
Dept: HOSPITAL 75 - ER | Age: 55
LOS: 9 days | Discharge: HOME | DRG: 871 | End: 2020-07-09
Attending: FAMILY MEDICINE | Admitting: FAMILY MEDICINE
Payer: MEDICAID

## 2020-06-30 VITALS — BODY MASS INDEX: 35.46 KG/M2 | HEIGHT: 65.75 IN | WEIGHT: 218.04 LBS

## 2020-06-30 VITALS — DIASTOLIC BLOOD PRESSURE: 74 MMHG | SYSTOLIC BLOOD PRESSURE: 124 MMHG

## 2020-06-30 VITALS — DIASTOLIC BLOOD PRESSURE: 76 MMHG | SYSTOLIC BLOOD PRESSURE: 129 MMHG

## 2020-06-30 VITALS — DIASTOLIC BLOOD PRESSURE: 92 MMHG | SYSTOLIC BLOOD PRESSURE: 179 MMHG

## 2020-06-30 VITALS — SYSTOLIC BLOOD PRESSURE: 112 MMHG | DIASTOLIC BLOOD PRESSURE: 67 MMHG

## 2020-06-30 DIAGNOSIS — N18.3: ICD-10-CM

## 2020-06-30 DIAGNOSIS — R79.1: ICD-10-CM

## 2020-06-30 DIAGNOSIS — Z88.2: ICD-10-CM

## 2020-06-30 DIAGNOSIS — M19.91: ICD-10-CM

## 2020-06-30 DIAGNOSIS — F17.210: ICD-10-CM

## 2020-06-30 DIAGNOSIS — R65.20: ICD-10-CM

## 2020-06-30 DIAGNOSIS — E11.65: ICD-10-CM

## 2020-06-30 DIAGNOSIS — Z87.11: ICD-10-CM

## 2020-06-30 DIAGNOSIS — Z86.718: ICD-10-CM

## 2020-06-30 DIAGNOSIS — Z88.8: ICD-10-CM

## 2020-06-30 DIAGNOSIS — G89.29: ICD-10-CM

## 2020-06-30 DIAGNOSIS — Z87.19: ICD-10-CM

## 2020-06-30 DIAGNOSIS — M06.9: ICD-10-CM

## 2020-06-30 DIAGNOSIS — I12.9: ICD-10-CM

## 2020-06-30 DIAGNOSIS — J96.00: ICD-10-CM

## 2020-06-30 DIAGNOSIS — A41.9: Primary | ICD-10-CM

## 2020-06-30 DIAGNOSIS — Z79.4: ICD-10-CM

## 2020-06-30 DIAGNOSIS — Z88.5: ICD-10-CM

## 2020-06-30 DIAGNOSIS — J44.9: ICD-10-CM

## 2020-06-30 DIAGNOSIS — K21.9: ICD-10-CM

## 2020-06-30 DIAGNOSIS — J69.0: ICD-10-CM

## 2020-06-30 DIAGNOSIS — N17.9: ICD-10-CM

## 2020-06-30 DIAGNOSIS — F60.9: ICD-10-CM

## 2020-06-30 DIAGNOSIS — E78.5: ICD-10-CM

## 2020-06-30 DIAGNOSIS — E11.40: ICD-10-CM

## 2020-06-30 LAB
ALBUMIN SERPL-MCNC: 3.3 GM/DL (ref 3.2–4.5)
ALP SERPL-CCNC: 99 U/L (ref 40–136)
ALT SERPL-CCNC: 22 U/L (ref 0–55)
APTT BLD: 29 SEC (ref 24–35)
APTT PPP: YELLOW S
BACTERIA #/AREA URNS HPF: (no result) /HPF
BARBITURATES UR QL: NEGATIVE
BASOPHILS # BLD AUTO: 0 10^3/UL (ref 0–0.1)
BASOPHILS NFR BLD AUTO: 0 % (ref 0–10)
BASOPHILS NFR BLD MANUAL: 1 %
BENZODIAZ UR QL SCN: POSITIVE
BILIRUB SERPL-MCNC: 0.6 MG/DL (ref 0.1–1)
BILIRUB UR QL STRIP: NEGATIVE
BUN/CREAT SERPL: 18
CALCIUM SERPL-MCNC: 9.1 MG/DL (ref 8.5–10.1)
CHLORIDE SERPL-SCNC: 92 MMOL/L (ref 98–107)
CO2 SERPL-SCNC: 22 MMOL/L (ref 21–32)
COCAINE UR QL: NEGATIVE
CREAT SERPL-MCNC: 2.11 MG/DL (ref 0.6–1.3)
EOSINOPHIL # BLD AUTO: 0 10^3/UL (ref 0–0.3)
EOSINOPHIL NFR BLD AUTO: 0 % (ref 0–10)
EOSINOPHIL NFR BLD MANUAL: 1 %
ERYTHROCYTE [DISTWIDTH] IN BLOOD BY AUTOMATED COUNT: 13.5 % (ref 10–14.5)
FIBRINOGEN PPP-MCNC: CLEAR MG/DL
GFR SERPLBLD BASED ON 1.73 SQ M-ARVRAT: 24 ML/MIN
GLUCOSE SERPL-MCNC: 299 MG/DL (ref 70–105)
GLUCOSE UR STRIP-MCNC: (no result) MG/DL
HCT VFR BLD CALC: 34 % (ref 35–52)
HGB BLD-MCNC: 11.4 G/DL (ref 11.5–16)
INR PPP: 1 (ref 0.8–1.4)
KETONES UR QL STRIP: NEGATIVE
LEUKOCYTE ESTERASE UR QL STRIP: NEGATIVE
LYMPHOCYTES # BLD AUTO: 3.1 X 10^3 (ref 1–4)
LYMPHOCYTES NFR BLD AUTO: 14 % (ref 12–44)
MAGNESIUM SERPL-MCNC: 1.7 MG/DL (ref 1.6–2.4)
MANUAL DIFFERENTIAL PERFORMED BLD QL: YES
MCH RBC QN AUTO: 30 PG (ref 25–34)
MCHC RBC AUTO-ENTMCNC: 33 G/DL (ref 32–36)
MCV RBC AUTO: 91 FL (ref 80–99)
METHADONE UR QL SCN: NEGATIVE
METHAMPHETAMINE SCREEN URINE S: NEGATIVE
MONOCYTES # BLD AUTO: 1.7 X 10^3 (ref 0–1)
MONOCYTES NFR BLD AUTO: 8 % (ref 0–12)
MONOCYTES NFR BLD: 7 %
NEUTROPHILS # BLD AUTO: 17.5 X 10^3 (ref 1.8–7.8)
NEUTROPHILS NFR BLD AUTO: 78 % (ref 42–75)
NEUTS BAND NFR BLD MANUAL: 72 %
NEUTS BAND NFR BLD: 5 %
NITRITE UR QL STRIP: NEGATIVE
OPIATES UR QL SCN: NEGATIVE
OXYCODONE UR QL: NEGATIVE
PH UR STRIP: 6 [PH] (ref 5–9)
PLATELET # BLD: 304 10^3/UL (ref 130–400)
PMV BLD AUTO: 9.4 FL (ref 7.4–10.4)
POTASSIUM SERPL-SCNC: 4.4 MMOL/L (ref 3.6–5)
PROPOXYPH UR QL: NEGATIVE
PROT SERPL-MCNC: 7.7 GM/DL (ref 6.4–8.2)
PROT UR QL STRIP: (no result)
PROTHROMBIN TIME: 13.3 SEC (ref 12.2–14.7)
RBC #/AREA URNS HPF: (no result) /HPF
SODIUM SERPL-SCNC: 126 MMOL/L (ref 135–145)
SP GR UR STRIP: 1.02 (ref 1.02–1.02)
SQUAMOUS #/AREA URNS HPF: (no result) /HPF
TRICYCLICS UR QL SCN: NEGATIVE
VARIANT LYMPHS NFR BLD MANUAL: 14 %
WBC # BLD AUTO: 22.3 10^3/UL (ref 4.3–11)
WBC #/AREA URNS HPF: (no result) /HPF

## 2020-06-30 PROCEDURE — 80061 LIPID PANEL: CPT

## 2020-06-30 PROCEDURE — 83880 ASSAY OF NATRIURETIC PEPTIDE: CPT

## 2020-06-30 PROCEDURE — 87070 CULTURE OTHR SPECIMN AEROBIC: CPT

## 2020-06-30 PROCEDURE — 85610 PROTHROMBIN TIME: CPT

## 2020-06-30 PROCEDURE — 86703 HIV-1/HIV-2 1 RESULT ANTBDY: CPT

## 2020-06-30 PROCEDURE — 85652 RBC SED RATE AUTOMATED: CPT

## 2020-06-30 PROCEDURE — 86038 ANTINUCLEAR ANTIBODIES: CPT

## 2020-06-30 PROCEDURE — 94761 N-INVAS EAR/PLS OXIMETRY MLT: CPT

## 2020-06-30 PROCEDURE — 96361 HYDRATE IV INFUSION ADD-ON: CPT

## 2020-06-30 PROCEDURE — 36415 COLL VENOUS BLD VENIPUNCTURE: CPT

## 2020-06-30 PROCEDURE — 83874 ASSAY OF MYOGLOBIN: CPT

## 2020-06-30 PROCEDURE — 81000 URINALYSIS NONAUTO W/SCOPE: CPT

## 2020-06-30 PROCEDURE — 87040 BLOOD CULTURE FOR BACTERIA: CPT

## 2020-06-30 PROCEDURE — 87449 NOS EACH ORGANISM AG IA: CPT

## 2020-06-30 PROCEDURE — 36600 WITHDRAWAL OF ARTERIAL BLOOD: CPT

## 2020-06-30 PROCEDURE — 85379 FIBRIN DEGRADATION QUANT: CPT

## 2020-06-30 PROCEDURE — 83605 ASSAY OF LACTIC ACID: CPT

## 2020-06-30 PROCEDURE — 84484 ASSAY OF TROPONIN QUANT: CPT

## 2020-06-30 PROCEDURE — 94640 AIRWAY INHALATION TREATMENT: CPT

## 2020-06-30 PROCEDURE — 80048 BASIC METABOLIC PNL TOTAL CA: CPT

## 2020-06-30 PROCEDURE — 82010 KETONE BODYS QUAN: CPT

## 2020-06-30 PROCEDURE — 83735 ASSAY OF MAGNESIUM: CPT

## 2020-06-30 PROCEDURE — 93005 ELECTROCARDIOGRAM TRACING: CPT

## 2020-06-30 PROCEDURE — 84145 PROCALCITONIN (PCT): CPT

## 2020-06-30 PROCEDURE — 71045 X-RAY EXAM CHEST 1 VIEW: CPT

## 2020-06-30 PROCEDURE — 87631 RESP VIRUS 3-5 TARGETS: CPT

## 2020-06-30 PROCEDURE — 93970 EXTREMITY STUDY: CPT

## 2020-06-30 PROCEDURE — 85027 COMPLETE CBC AUTOMATED: CPT

## 2020-06-30 PROCEDURE — 87205 SMEAR GRAM STAIN: CPT

## 2020-06-30 PROCEDURE — 85730 THROMBOPLASTIN TIME PARTIAL: CPT

## 2020-06-30 PROCEDURE — 86769 SARS-COV-2 COVID-19 ANTIBODY: CPT

## 2020-06-30 PROCEDURE — 93041 RHYTHM ECG TRACING: CPT

## 2020-06-30 PROCEDURE — 87899 AGENT NOS ASSAY W/OPTIC: CPT

## 2020-06-30 PROCEDURE — 94760 N-INVAS EAR/PLS OXIMETRY 1: CPT

## 2020-06-30 PROCEDURE — 94660 CPAP INITIATION&MGMT: CPT

## 2020-06-30 PROCEDURE — 82962 GLUCOSE BLOOD TEST: CPT

## 2020-06-30 PROCEDURE — 87385 HISTOPLASMA CAPSUL AG IA: CPT

## 2020-06-30 PROCEDURE — 94799 UNLISTED PULMONARY SVC/PX: CPT

## 2020-06-30 PROCEDURE — 85007 BL SMEAR W/DIFF WBC COUNT: CPT

## 2020-06-30 PROCEDURE — 82805 BLOOD GASES W/O2 SATURATION: CPT

## 2020-06-30 PROCEDURE — 85025 COMPLETE CBC W/AUTO DIFF WBC: CPT

## 2020-06-30 PROCEDURE — 96360 HYDRATION IV INFUSION INIT: CPT

## 2020-06-30 PROCEDURE — 87635 SARS-COV-2 COVID-19 AMP PRB: CPT

## 2020-06-30 PROCEDURE — 86039 ANTINUCLEAR ANTIBODIES (ANA): CPT

## 2020-06-30 PROCEDURE — 86141 C-REACTIVE PROTEIN HS: CPT

## 2020-06-30 PROCEDURE — 94002 VENT MGMT INPAT INIT DAY: CPT

## 2020-06-30 PROCEDURE — 80306 DRUG TEST PRSMV INSTRMNT: CPT

## 2020-06-30 PROCEDURE — 94003 VENT MGMT INPAT SUBQ DAY: CPT

## 2020-06-30 PROCEDURE — 94664 DEMO&/EVAL PT USE INHALER: CPT

## 2020-06-30 PROCEDURE — 80053 COMPREHEN METABOLIC PANEL: CPT

## 2020-06-30 PROCEDURE — 71250 CT THORAX DX C-: CPT

## 2020-06-30 PROCEDURE — 84100 ASSAY OF PHOSPHORUS: CPT

## 2020-06-30 PROCEDURE — 74176 CT ABD & PELVIS W/O CONTRAST: CPT

## 2020-06-30 RX ADMIN — CYCLOBENZAPRINE HYDROCHLORIDE PRN MG: 10 TABLET, FILM COATED ORAL at 22:07

## 2020-06-30 RX ADMIN — ENOXAPARIN SODIUM SCH MG: 100 INJECTION SUBCUTANEOUS at 20:53

## 2020-06-30 RX ADMIN — INSULIN ASPART SCH UNIT: 100 INJECTION, SOLUTION INTRAVENOUS; SUBCUTANEOUS at 20:54

## 2020-06-30 RX ADMIN — SODIUM CHLORIDE SCH MLS/HR: 900 INJECTION, SOLUTION INTRAVENOUS at 20:38

## 2020-06-30 NOTE — NUR
PATIENT TOOK HYDROCODONE/APAP WITHOUT ANY DIFFICULTY. SHE STATES SHE HAS 
OXYCODONE WHICH WORKS BETTER BUT WILL TAKE THE HYDROCODONE/APAP DUE TO PAIN 10 
OUT OF 10.

## 2020-06-30 NOTE — XMS REPORT
Clinical Summary

                             Created on: 2020



Radha Kelly

External Reference #: [id number]

: 1965

Sex: Female



Demographics





                          Address                   [address] [place]  [address]

 

                          Home Phone                [phone]

 

                          Preferred Language        Unknown

 

                          Marital Status            

 

                          Anabaptism Affiliation     Unknown

 

                          Race                      White

 

                          Ethnic Group              Unknown





Author





                          Author                    [facility]

 

                          Address                   Unknown

 

                          Phone                     Unavailable







Support





                Name            Relationship    Address         Phone

 

                Cameron Whittington   [employer]            Unknown         [phone]







Care Team Providers





                    Care Team Member Name Role                Phone

 

                          PCP                       Unavailable







Allergies

Not on File



Medications

Not on file



Active Problems





Not on file



Social History





                                        Date



                 Tobacco Use     Types           Packs/Day       Years Used 

 

                                         



                                         Never Assessed    







 



                           Sex Assigned at Birth     Date Recorded

 

 



                                         Not on file 







                                        Industry



                           Job Start Date            Occupation 

 

                                        Not on file



                           Not on file               Not on file 







                                        Travel End



                           Travel History            Travel Start 

 





                                         No recent travel history available.







Last Filed Vital Signs

Not on file



Plan of Treatment





   



                 Health Maintenance  Due Date        Last Done       Comments

 

   



                           Hepatitis C Screening     1965  

 

   



                           DTaP,Tdap,and Td Vaccines  1982  



                                         (1 - Tdap)   

 

   



                           MMR Vaccines-Adult        1982  

 

   



                           Cervical Cancer Screening  1984  

 

   



                           Breast Cancer             2013  



                                         Screening-Mammogram   

 

   



                           Colon Cancer Screening    2013  

 

   



                           Zoster Recombinant        2013  



                                         Vaccine (RZV,Shingrix) (1   



                                         of 2 - [facility] 2 Dose   



                                         Standard)   

 

   



                           Influenza Vaccine (Season  2020  



                                         Ended)   

 

   



                     Pneumo-Vaccine: Peds (0-5  Aged Out            No longer el

igible based on patient's age

to



                           Yrs) & At-Risk Patients    complete this topic



                                         (6-64 Yrs)   







Results

Not on filefrom Last 3 Months

## 2020-06-30 NOTE — ED GENERAL
General


Stated Complaint:  SOB,CP


Source of Information:  Patient


Exam Limitations:  No Limitations





History of Present Illness


Date Seen by Provider:  2020


Time Seen by Provider:  18:00


Initial Comments


To ER with reports of sharp right-sided chest pain worsened with movement and 

deep breathing as well as a fever up to 102. Recently admitted here for DKA.


Timing/Duration:  2-3 Days


Severity:  Moderate


Associated Systoms:  Chest Pain, Cough





Allergies and Home Medications


Allergies


Coded Allergies:  


     Sulfa (Sulfonamide Antibiotics) (Unverified  Allergy, Unknown, 6/25/15)


     codeine (Verified  Allergy, Unknown, TAKES ULTRAM AT HOME, 09)


     ketorolac (Verified  Allergy, Unknown, 08)


     tramadol (Verified  Allergy, Unknown, 11)





Home Medications


Albuterol Sulfate 1 Puff Puff, 2 PUFF IH TID PRN for SHORTNESS OF BREATH, 

(Reported)


Ascorbic Acid 1,000 Mg Tablet, 1,000 MG PO DAILY, (Reported)


Cholecalciferol (Vitamin D3) 25 Mcg Tablet, 25 MCG PO BID, (Reported)


Diclofenac Sodium 100 Gm Gel..gram., 1 APPLIC TOP QID PRN for CRAMPS, (Reported)


   APPLY TO LEGS 


Fluticasone Propionate 9.9 Ml [place].susp, 2 SPRAY NSEACH DAILY, (Reported)


   1 SPRAY EACH NARE DAILY 


Furosemide 40 Mg Tablet, 40 MG PO DAILY, (Reported)


Gabapentin 100 Mg Capsule, 100 MG PO TID, (Reported)


Insulin Detemir 100 Unit/1 Ml Insuln.pen, 22 UNITS SC HS, (Reported)


Insulin Lispro 100 Unit/1 Ml Insuln.pen, 5-15 UNITS SQ TIDWM, (Reported)


Melatonin 10 Mg Tablet, 10 MG PO HS, (Reported)


Metoclopramide HCl 10 Mg Tablet, 10 MG PO QIDACHS, (Reported)


Metoprolol Tartrate 50 Mg Tablet, 50 MG PO BID, (Reported)


Montelukast Sodium 10 Mg Tablet, 10 MG PO HS, (Reported)


   LAST FILLED 2020 #15 


Naloxone HCl 4 Mg Spray, 1 SPRAY NS UD PRN for UNRESPONSIVE, (Reported)


   ADMINSTER 1 SPRAY IN 1 NOSTRIL 1 TIME- MAY REPEAT IN ALTERNATING NOSTRIL 

EVERY 2-3 MINUTES UNTIL RESPONSIVE OR EMS ARRIVES 


Omeprazole 20 Mg Capsule.dr, 20 MG PO DAILY, (Reported)


Oxycodone HCl 5 Mg Tablet, 5 MG PO DAILY PRN for PAIN-SEVERE (8-10)


   Prescribed by: MISHA DEVRIES on 20 1042


Potassium Gluconate 99 Mg Tablet, 99 MG PO DAILY, (Reported)





Patient Home Medication List


Home Medication List Reviewed:  Yes





Review of Systems


Review of Systems


Constitutional:  see HPI, fever


EENTM:  see HPI


Respiratory:  see HPI, cough


Cardiovascular:  see HPI, chest pain


Genitourinary:  no symptoms reported


Musculoskeletal:  no symptoms reported


Skin:  no symptoms reported


Psychiatric/Neurological:  No Symptoms Reported


Hematologic/Lymphatic:  No Symptoms Reported


Immunological/Allergic:  no symptoms reported





Past Medical-Social-Family Hx


Patient Social History


Drug of Choice:  THC


Type Used:  Cigarettes


2nd Hand Smoke Exposure:  No


Recent Hopitalizations:  No





Immunizations Up To Date


Tetanus Booster (TDap):  Unknown


Date of Pneumonia Vaccine:  Oct 1, 2012


Date of Influenza Vaccine:  Oct 1, 2012





Seasonal Allergies


Seasonal Allergies:  No





Past Medical History


Surgeries:  Yes (renal stents)


Adenoidectomy, Ear Surgery, Hysterectomy, Tonsillectomy


Respiratory:  Yes


Asthma, Pneumonia, Chronic Bronchitis


Cardiac:  Yes


Deep Vein Thrombosis


Neurological:  Yes


Neuropathy


Reproductive Disorders:  Yes


Female Reproductive Disorders:  Menstrual Problems


Genitourinary:  Yes


Renal Failure


Gastrointestinal:  Yes (gastroparesis)


Gastroesophageal Reflux, Pancreatitis, Ulcer


Musculoskeletal:  Yes


Arthritis, Rheumatoid Arthritis, Fractures


Endocrine:  Yes


Diabetes, Insulin dep


HEENT:  No


Tonsilitis


Loss of Vision:  Denies


Hearing Impairment:  Denies


Cancer:  No


Psychosocial:  Yes


Personality Disorder


Integumentary:  No


Blood Disorders:  No


Adverse Reaction/Blood Tranf:  No





Family Medical History





FH: thyroid cancer


  G8 SISTER


FHx: macular degeneration


  19 MOTHER


Glaucoma


  G8 BROTHER


Heart Disease, Cancer, Diabetes





Physical Exam


Vital Signs





Vital Signs - First Documented




















Capillary Refill :


Height, Weight, BMI


Height: 5'5.00"


Weight: 151lbs. 1.0oz. [id number]; 26.63 BMI


Method:Stated


General Appearance:  No Apparent Distress, WD/WN, Chronically ill (appeared much

older than stated age), Thin


Eyes:  Bilateral Eye Normal Inspection, Bilateral Eye PERRL, Bilateral Eye EOMI


Neck:  Full Range of Motion, Normal Inspection


Respiratory:  Normal Breath Sounds, No Accessory Muscle Use, No Respiratory 

Distress


Cardiovascular:  Regular Rate, Rhythm, Normal Peripheral Pulses


Gastrointestinal:  Normal Bowel Sounds, Non Tender, Soft


Extremity:  Normal Capillary Refill, Normal Inspection


Neurologic/Psychiatric:  Alert, Oriented x3


Skin:  Normal Color, Warm/Dry





Focused Exam


Lactate Level


20 17:26: Lactic Acid Level 0.89





Lactic Acid Level





Laboratory Tests








Test


 20


17:26


 


Lactic Acid Level


 0.89 MMOL/L


(0.50-2.00)











Procedures/Interventions


Date of ETT Placement:  Feb 10, 2020


Time of ETT Placement:  312





Progress/Results/Core Measures


Suspected Sepsis


SIRS


Temperature: 


Pulse:  


Respiratory Rate: 


 


Laboratory Tests


20 16:45: White Blood Count 22.3H


Blood Pressure  / 


Mean: 


 





20 17:26: Lactic Acid Level 0.89


Laboratory Tests


20 16:45: 


Creatinine 2.11H, INR Comment 1.0, Platelet Count 304, Total Bilirubin 0.6








Results/Orders


Lab Results





Laboratory Tests








Test


 20


16:45 20


17:10 20


17:26 20


18:06 Range/Units


 


 


White Blood Count


 22.3 H


 


 


 


 4.3-11.0


10^3/uL


 


Red Blood Count


 3.75 L


 


 


 


 4.35-5.85


10^6/uL


 


Hemoglobin 11.4 L    11.5-16.0  G/DL


 


Hematocrit 34 L    35-52  %


 


Mean Corpuscular Volume 91     80-99  FL


 


Mean Corpuscular Hemoglobin 30     25-34  PG


 


Mean Corpuscular Hemoglobin


Concent 33 


 


 


 


 32-36  G/DL





 


Red Cell Distribution Width 13.5     10.0-14.5  %


 


Platelet Count


 304 


 


 


 


 130-400


10^3/uL


 


Mean Platelet Volume 9.4     7.4-10.4  FL


 


Neutrophils (%) (Auto) 78 H    42-75  %


 


Lymphocytes (%) (Auto) 14     12-44  %


 


Monocytes (%) (Auto) 8     0-12  %


 


Eosinophils (%) (Auto) 0     0-10  %


 


Basophils (%) (Auto) 0     0-10  %


 


Neutrophils # (Auto) 17.5 H    1.8-7.8  X 10^3


 


Lymphocytes # (Auto) 3.1     1.0-4.0  X 10^3


 


Monocytes # (Auto) 1.7 H    0.0-1.0  X 10^3


 


Eosinophils # (Auto)


 0.0 


 


 


 


 0.0-0.3


10^3/uL


 


Basophils # (Auto)


 0.0 


 


 


 


 0.0-0.1


10^3/uL


 


Neutrophils % (Manual) 72      %


 


Lymphocytes % (Manual) 14      %


 


Monocytes % (Manual) 7      %


 


Eosinophils % (Manual) 1      %


 


Basophils % (Manual) 1      %


 


Band Neutrophils 5      %


 


Prothrombin Time 13.3     12.2-14.7  SEC


 


INR Comment 1.0     0.8-1.4  


 


Activated Partial


Thromboplast Time 29 


 


 


 


 24-35  SEC





 


D-Dimer


 3.76 H


 


 


 


 0.00-0.49


UG/ML


 


Sodium Level 126 L    135-145  MMOL/L


 


Potassium Level 4.4     3.6-5.0  MMOL/L


 


Chloride Level 92 L      MMOL/L


 


Carbon Dioxide Level 22     21-32  MMOL/L


 


Anion Gap 12     5-14  MMOL/L


 


Blood Urea Nitrogen 37 H    7-18  MG/DL


 


Creatinine


 2.11 H


 


 


 


 0.60-1.30


MG/DL


 


Estimat Glomerular Filtration


Rate 24 


 


 


 


  





 


BUN/Creatinine Ratio 18      


 


Glucose Level 299 H      MG/DL


 


Calcium Level 9.1     8.5-10.1  MG/DL


 


Corrected Calcium 9.7     8.5-10.1  MG/DL


 


Magnesium Level 1.7     1.6-2.4  MG/DL


 


Total Bilirubin 0.6     0.1-1.0  MG/DL


 


Aspartate Amino Transf


(AST/SGOT) 16 


 


 


 


 5-34  U/L





 


Alanine Aminotransferase


(ALT/SGPT) 22 


 


 


 


 0-55  U/L





 


Alkaline Phosphatase 99       U/L


 


Myoglobin


 42.2 


 


 


 


 10.0-92.0


NG/ML


 


Troponin I < 0.028     <0.028  NG/ML


 


B-Type Natriuretic Peptide 347.3 H    <100.0  PG/ML


 


Total Protein 7.7     6.4-8.2  GM/DL


 


Albumin 3.3     3.2-4.5  GM/DL


 


Beta-Hydroxybutyrate (Chem


panel) 0.63 H


 


 


 


 0.00-0.27


MMOL/L


 


Lactic Acid Level


 


 


 0.89 


 


 0.50-2.00


MMOL/L


 


Urine Color    YELLOW   


 


Urine Clarity    CLEAR   


 


Urine pH    6.0  5-9  


 


Urine Specific Gravity    1.020  1.016-1.022  


 


Urine Protein    3+ H NEGATIVE  


 


Urine Glucose (UA)    3+ H NEGATIVE  


 


Urine Ketones    NEGATIVE  NEGATIVE  


 


Urine Nitrite    NEGATIVE  NEGATIVE  


 


Urine Bilirubin    NEGATIVE  NEGATIVE  


 


Urine Urobilinogen    0.2  < = 1.0  MG/DL


 


Urine Leukocyte Esterase    NEGATIVE  NEGATIVE  


 


Urine RBC (Auto)    2+ H NEGATIVE  


 


Urine RBC    2-5 H  /HPF


 


Urine WBC    0-2   /HPF


 


Urine Squamous Epithelial


Cells 


 


 


 RARE 


  /HPF





 


Urine Crystals    NONE   /LPF


 


Urine Bacteria    TRACE   /HPF


 


Urine Casts    NONE   /LPF


 


Urine Mucus    NEGATIVE   /LPF


 


Urine Culture Indicated    NO   


 


Urine Opiates Screen    NEGATIVE  NEGATIVE  


 


Urine Oxycodone Screen    NEGATIVE  NEGATIVE  


 


Urine Methadone Screen    NEGATIVE  NEGATIVE  


 


Urine Propoxyphene Screen    NEGATIVE  NEGATIVE  


 


Urine Barbiturates Screen    NEGATIVE  NEGATIVE  


 


Ur Tricyclic Antidepressants


Screen 


 


 


 NEGATIVE 


 NEGATIVE  





 


Urine Phencyclidine Screen    NEGATIVE  NEGATIVE  


 


Urine Amphetamines Screen    NEGATIVE  NEGATIVE  


 


Urine Methamphetamines Screen    NEGATIVE  NEGATIVE  


 


Urine Benzodiazepines Screen    POSITIVE H NEGATIVE  


 


Urine Cocaine Screen    NEGATIVE  NEGATIVE  


 


Urine Cannabinoids Screen    POSITIVE H NEGATIVE  








My Orders





Orders - LIAM GAONA


Cbc With Automated Diff (20 16:37)


Magnesium (20 16:37)


Chest 1 View, Ap/Pa Only (20 16:37)


Ekg Tracing (20 16:37)


Comprehensive Metabolic Panel (20 16:37)


Myoglobin Serum (20 16:37)


Protime With Inr (20 16:37)


Partial Thromboplastin Time (20 16:37)


O2 (20 16:37)


Monitor-Rhythm Ecg Trace Only (20 16:37)


Lipid Panel (20 06:00)


Ed Iv/Invasive Line Start (20 16:37)


BNP (20 16:37)


Fibrin Degradation Products (20 16:37)


Troponin I (20 16:37)


Aspirin Chewable Tablet (Baby Aspirin Ch (20 16:45)


Beta Hydroxybutyrate (20 16:38)


Acetaminophen  Tablet (Tylenol  Tablet) (20 17:00)


Ns Iv 1000 Ml (Sodium Chloride 0.9%) (20 17:00)


Coronavirus Sars-Cov-2 So  (20 16:51)


Ua Culture If Indicated (20 16:52)


Drug Screen Stat (Urine) (20 17:04)


Manual Differential (20 16:45)


Blood Culture (20 17:20)


Lactic Acid Analyzer (20 17:20)


Ns Iv 1000 Ml (Sodium Chloride 0.9%) (20 18:00)


Hydrocodone/Apap 5/325 Tablet (Lortab 5 (20 18:00)





Medications Given in ED





Current Medications








 Medications  Dose


 Ordered  Sig/Corin


 Route  Start Time


 Stop Time Status Last Admin


Dose Admin


 


 Acetaminophen/


 Hydrocodone Bitart  1 tab  ONCE  ONCE


 PO  20 18:00


 20 18:01 DC 20 18:08


1 TAB


 


 Aspirin  324 mg  ONCE  ONCE


 PO  20 16:45


 20 16:46 DC 20 17:00


324 MG








Vital Signs/I&O











 20





 16:31 16:31 16:31 18:11


 


Temp 39.6   38.1


 


Pulse 118   109


 


Resp 24   18


 


B/P (MAP) 179/92 (121)   129/76 (93)


 


Pulse Ox 89  94 96


 


O2 Delivery Room Air Nasal Cannula Nasal Cannula Nasal Cannula


 


O2 Flow Rate  2.0 2.00 2.00


 


    





 20   





 18:33   


 


Temp 38.4   





Capillary Refill :





Diagnostic Imaging





   Diagonstic Imaging:  Xray


   Plain Films/CT/US/NM/MRI:  chest


Comments


NAME:   DAYTON BLISS


MED REC#:   [id number]


ACCOUNT#:   [id number]


PT STATUS:   REG ER


:   1965


PHYSICIAN:   LIAM GAONA


ADMIT DATE:   20/ER


                                   ***Draft***


Date of Exam:20





CHEST 1 VIEW, AP/PA ONLY








INDICATION: Chest pain.





TIME OF EXAM: 5:11 PM





CORRELATION is made with prior chest from 2020. 





FINDINGS: Heart size is stable. There is central congestion.


Interstitial changes throughout both lungs are noted however


significantly improved from the exam from February. No


parenchymal consolidation or effusion is seen. There is no


pneumothorax.





IMPRESSION: 


1. Central congestion and mild interstitial changes. The


appearance of the chest has improved since the prior radiograph


from 2020.





  Dictated on workstation # [id number]








Dict:   20 1723


Trans:   20 172


[facility] [id number]





Interpreted by:     VIK HOUSER MD


Electronically signed by:





Departure


Communication (Admissions)


Time/Spoke to Admitting Phy:  18:44


Spoke with Dr. Castellanos, we'll admit, use renal dosed Lovenox for the elevated d-

dimer with pleuritic chest pain. Will use Zosyn plus vancomycin for suspected 

right-sided pneumonia that will fluff out with some fluids. Patient complains of

severe pain, states that she takes oxycodone immediate release 5 mg tablet once 

a day for pain and she would like to continue that upstairs. She is upset that I

only offered her Norco for pain control. She refused Tylenol because she states 

it will harm her kidneys. I discussed with her that it will not and would be 

beneficial to lower her temperature with Tylenol. She states her kidney doctor 

told her to never take it.





Impression





   Primary Impression:  


   Acute kidney injury superimposed on chronic kidney disease


   Additional Impressions:  


   Leukocytosis


   Insulin dependent diabetes mellitus


   Chest wall pain


Disposition:   ADMITTED AS INPATIENT


Condition:  Stable





Admissions


Decision to Admit Reason:  Admit from ER (General)


Decision to Admit/Date:  2020


Time/Decision to Admit Time:  18:46





Departure-Patient Inst.


Referrals:  


NO,LOCAL PHYSICIAN (PCP/Family)


Primary Care Physician











LIAM GAONA             2020 16:39

## 2020-06-30 NOTE — NUR
PATIENT REFUSED TYLENOL AND STATES "I CAN'T TAKE TYLENOL BECAUSE IT HURTS MY 
KIDNEYS BUT I DO NEED PAIN MEDICATION". RN EXPLAINS TO PATIENT THAT THE TYLENOL 
WILL HELP WITH PAIN AND FEVER DUE TO PATIENT HAVING FEVER  BUT PATIENT 
CONTINUES TO REFUSE THE TYLENOL.

## 2020-06-30 NOTE — DIAGNOSTIC IMAGING REPORT
INDICATION: Chest pain.



TIME OF EXAM: 5:11 PM



CORRELATION is made with prior chest from 02/13/2020. 



FINDINGS: Heart size is stable. There is central congestion.

Interstitial changes throughout both lungs are noted however

significantly improved from the exam from February. No

parenchymal consolidation or effusion is seen. There is no

pneumothorax.



IMPRESSION: 

1. Central congestion and mild interstitial changes. The

appearance of the chest has improved since the prior radiograph

from 02/13/2020.



Dictated by: 



  Dictated on workstation # [id number]

## 2020-06-30 NOTE — NUR
ALBUTEROL INHALER QID AND PRN. IS AND AEROBIKA TID. INITIATE 02 WITH SAT LESS THAN 90%. RT 
TO REEVALUATE IN 72 HOURS OR AS NEEDED.

-------------------------------------------------------------------------------

Addendum: 06/30/20 at 2054 by RADHA ASHRAF RT

-------------------------------------------------------------------------------

Amended: Links added.

## 2020-06-30 NOTE — XMS REPORT
Patient Summarization Differential

                             Created on: 2020



DAYTON BLISS

External Reference #: [id number]

: 1965

Sex: Female



Demographics





                          Address                   [address] Phone                [phone]

 

                          Preferred Language        English

 

                          Marital Status            Unknown

 

                          Restorationism Affiliation     Unknown

 

                          Race                      Unknown

 

                          Ethnic Group              Unknown





Author





                          Author                    [facility] [employer]

 

                          [facility]              [facility] [facility]

 

                          Address                   [address] [place] [address]



 

                          Phone                     [phone]







Care Team Providers





                    Care Team Member Name Role                Phone

 

                    JULY VOSS Unavailable         [phone]

 

                    lexDEMETRIUS Fishman     Unavailable         [phone]

 

                    JANAE MENDEZ, RANDY MELENDEZ  Unavailable         Unavailable

 

                    KAYCE MENDEZ, MISHA RBOERTS  Unavailable         Unavailable

 

                    NATALYA MENDEZ, CAMPBELL ROBERTS  Unavailable         Unavailable

 

                    NATALYA MENDEZ, CAMPBELL ROBERTS  Unavailable         Unavailable

 

                    DAYTON BARBER DO     Unavailable         Unavailable

 

                    LIAM MCDERMOTT Unavailable         Unavailable

 

                    JAKE MENDEZ, SARAHI DAN  Unavailable         Unavailable

 

                          Unavailable               Unavailable

 

                          Unavailable               Unavailable



                                            



Allergies

          No Information                                                        
 



Medications

          No Information                                                        
 



Problems

                      Active Problems            

            



      



           Problem   Normalized  Date Last  Normalized  Normalized  Provider  Fa

cility



              Classification  Problem(s)   Recorded     Problem      Problem Sta

tus  



                                         Duration   

 

      



            Other liver  Abnormal   Episodic   Active     JULY    Not Availab

le



                 diseases (3     levels of       GELLENDER , DO  (84901)



                           sources.)                 other serum     



                                         enzymes     

 

      



            Residual   Acquired   Episodic   Active     LIAM GAONA  Not Availab

le



                     codes;              absence of          (08155)



                           unclassified              both cervix     



                           (4 sources.)              and uterus     

 

      



            Residual   Acquired   Episodic   Active     JENA SAUNDERS Via



                 codes;          absence of      [facility]



                     unclassified        other organs        Hospital -



                           (1 source.)               [place]



                                         (58809)

 

      



            Acute and  Acute kidney   Episodic   Active     JULY    Not Avail

able



                 unspecified     failure,        GELLENDER , DO  (43515)



                           renal failure             unspecified     



                           (16 sources.)             Translations:     



                                         [ ACUTE KIDNEY     



                                         FAILURE WITH     



                                         TUBULAR     



                                         NECROS, ACUTE     



                                         KIDNEY     



                                         FAILURE,     



                                         UNSPECIFIED]     

 

      



            Respiratory  Acute      Episodic   Active     JENA CARDENAS Vi

a



                 failure;        respiratory     [facility]



                     insufficiency;      failure,            Hospital -



                     arrest (adult)      unspecified         [place]



                     (6 sources.)        whether with        (49641)



                                         hypoxia or     



                                         hypercapnia     

 

      



            Allergic   Allergy status   Episodic   Active     [facility]EE[facility] Via



                 reactions (8    to narcotic     [facility]



                     sources.)           agent status        Hospital -



                           Translations:             [place] [facility]



                           [ ALLERGY                 (81282)



                                         STATUS TO     



                                         SULFONAMIDES     



                                         STATUS,     



                                         ALLERGY STATUS     



                                         TO NARCOTIC     



                                         AGENT STATUS]     

 

      



            Deficiency and  Anemia,    Episodic   Active     CAMPBELL NATALYA ,  [facility]

 Via



                 other anemia    unspecified     [facility]



                           (6 sources.)              Hospital [facility]



                                         ([id number])

 

      



            Personality  Borderline   Chronic    Active     JULY    Not Avail

able



                 disorders (17   personality     DO BIPIN  (43240)



                           sources.)                 disorder     



                                         Translations:     



                                         [ PERSONALITY     



                                         DISORDER,     



                                         UNSPECIFIED]     

 

      



            Chronic kidney  Chronic kidney   Chronic    Active     [facility]      No

t Available



                 disease (22     disease, stage     ABOUL-MAGD ,    (29421)



                     sources.)           3 (moderate)        MD 



                                         Translations:     



                                         [ CHRONIC     



                                         KIDNEY     



                                         DISEASE, STAGE     



                                         II (MILD),     



                                         CHRONIC KIDNEY     



                                         DISEASE,     



                                         UNSPECIFIED,     



                                         CHRONIC KIDNEY     



                                         DISEASE, STAGE     



                                         III (MODER,     



                                         CHRONIC KIDNEY     



                                         DISEASE, STAGE     



                                         2 (MILD)]     

 

      



            Chronic    Chronic    Chronic    Active     CAMPBELL NATALYA ,  [facility]



                 obstructive     obstructive     [facility]



                     pulmonary           pulmonary           [facility]



                     [facility]



                     bronchiectasis      unspecified         (51617)



                                         (14 sources.)      

 

      



            Diabetes   Diabetes   Chronic    Active     MEICHELLE WOOD  Not Avai

lable



                     mellitus            mellitus            (65202)



                           without                   without     



                           complication              mention of     



                           (11 sources.)             complication,     



                                         type I     



                                         [juvenile     



                                         type], not     



                                         stated as     



                                         uncontrolled     



                                         Translations:     



                                         [ TYPE 2     



                                         DIABETES     



                                         MELLITUS     



                                         WITHOUT     



                                         COMPLIC]     

 

      



            Diabetes   Diabetes with   Chronic    Active     FELIZ    Not Avai

lable



                 mellitus with   other           DO MARCELLUS   (45382)



                           complications             specified     



                           (21 sources.)             manifestations     



                                         , type II or     



                                         unspecified     



                                         type, not     



                                         stated as     



                                         uncontrolled     



                                         Translations:     



                                         [ DIAB KATHERIN WO     



                                         COMPL, TYPE I     



                                         [JUVENILE TYP,     



                                         TYPE 1     



                                         DIABETES     



                                         MELLITUS WITH     



                                         HYPERGLYCE,     



                                         TYPE 2     



                                         DIABETES     



                                         MELLITUS W OTH     



                                         DIABETIC ,     



                                         DIAB W RENAL     



                                         MANIFEST, TYPE     



                                         I [JUVENILE ,     



                                         DIAB W RENAL     



                                         MANIFEST, TYPE     



                                         II OR UNSPEC,     



                                         TYPE 2     



                                         DIABETES     



                                         MELLITUS WITH     



                                         DIABETIC N,     



                                         TYPE 2     



                                         DIABETES     



                                         MELLITUS W     



                                         DIABETIC ,     



                                         DIAB W NEURO     



                                         MANIFEST, TYPE     



                                         I [JUVENILE ,     



                                         TYPE 1     



                                         DIABETES     



                                         MELLITUS WITH     



                                         KETOACIDOS,     



                                         TYPE 1     



                                         DIABETES     



                                         MELLITUS WITH     



                                         DIABETIC N,     



                                         TYPE 1     



                                         DIABETES W     



                                         DIABETIC     



                                         AUTONOMIC (PO,     



                                         TYPE 2     



                                         DIABETES     



                                         MELLITUS WITH     



                                         KETOACIDOS,     



                                         TYPE 2     



                                         DIABETES W     



                                         DIABETIC     



                                         AUTONOMIC (PO,     



                                         TYPE 1     



                                         DIABETES     



                                         MELLITUS WITH     



                                         KETOACIDOS,     



                                         TYPE 1     



                                         DIABETES     



                                         MELLITUS WITH     



                                         HYPOGLYCEM]     

 

      



            Unclassified  Dissociative   Chronic    Active     JULY    Not Av

ailable



                 (3 sources.)    identity        GELDANK , DO  (86155)



                                         disorder     

 

      



            Diseases of  Elevated white   Chronic    Active     CAMPBELL NATALYA ,  

[facility]



                 white blood     blood cell      [facility]



                     cells (7            count,              Hospital -



                     sources.)           unspecified         [place]



                                         (84054)

 

      



            Esophageal  Esophageal   Chronic    Active     JULY    Not Availa

ble



                 disorders (21   reflux          GELLENDER , DO  (06398)



                           sources.)                 Translations:     



                                         [     



                                         GASTRO-ESOPHAG     



                                         EAL REFLUX     



                                         DISEASE     



                                         WITHOUT]     

 

      



            Residual   Family history   Episodic   Active     LIAM GAONA  Not A

vailable



                     codes;              of ischemic         (89650)



                           unclassified              heart disease     



                           (3 sources.)              and other     



                                         diseases of     



                                         the     



                                         circulatory     



                                         system     

 

      



            Residual   Family history   Episodic   Active     LIAM GAONA  [facility]

 Via



                 codes;          [facility]



                     unclassified        neoplasm of         Hospital -



                     (1 source.)         other organs        [place]



                           or systems                (30926)

 

      



            Gastrointestin  Gastrointestin   Episodic   Active     CAMPBELL HOANG  [facility]



                 al hemorrhage   al hemorrhage,     [facility]



                     (3 sources.)        unspecified         Hospital -



                                         [place]



                                         (81279)

 

      



            Other      Gastroparesis   Episodic   Active     LIAM GAONA  [facility] 

Via



                     disorders of        APRCECILIA                July



                           stomach and               Hospital -



                           duodenum (1               [place]



                           source.)                  (51809)

 

      



            Other injuries  History of   Episodic   Active     CAMPBELL HOANG ,  V

CH Via



                 and conditions  falling         [facility]



                           due to                    Hospital -



                           external                  [place]



                           causes (6                 (43170)



                                         sources.)      

 

      



            Diabetes   Hyperglycemia,   Episodic   Active     JULY    Not Brittanie

ilable



                 mellitus        unspecified     GELLENDER , DO  (15955)



                                         without      



                                         complication      



                                         (3 sources.)      

 

      



            Disorders of  Hyperlipidemia   Chronic    Active     [facility]      Not 

Available



                 lipid           , unspecified     ABOUL-MAGD ,    (88477)



                     metabolism (21      Translations:       MD 



                           sources.)                 [     



                                         HYPERLIPIDEMIA     



                                         NEC/NOS]     

 

      



            Hypertension  Hypertensive   Chronic    Active     MEICHELLE WOOD  N

ot Available



                     with                chronic kidney      (70526)



                           complications             disease with     



                           and secondary             stage 1     



                           hypertension              through stage     



                           (16 sources.)             4 chronic     



                                         kidney     



                                         disease, or     



                                         unspecified     



                                         chronic kidney     



                                         disease     



                                         Translations:     



                                         [ HYPTNSV CHR     



                                         KID DIS,     



                                         BENIGN, W CHR     



                                         KD ST]     

 

      



            Other      Hypotension,   Episodic   Active     CAMPBELL HOANG  [facility] 

Via



                 circulatory     unspecified     [facility]



                           disease (6                Hospital -



                           sources.)                 [place]



                                         (58886)

 

      



            Other      Hypotension,   Episodic   Active     JULY    Not Avail

able



                 circulatory     unspecified     GELLENDER , DO  (05026)



                                         disease (3      



                                         sources.)      

 

      



            Shock (6   Hypovolemic   Episodic   Active     CAMPBELL HOANG  [facility] V

ia



                 sources.)       shock           [facility]



                                         [facility]



                                         (99390)

 

      



            Other      Long term   Episodic   Active     LIAM GAONA  Not Availa

ble



                     aftercare (4        (current) use       (94138)



                           sources.)                 of     



                                         anticoagulants     

 

      



            Other      Long term   Episodic   Active     LIAM GAONA  Not Availa

ble



                     aftercare (18       (current) use       (21546)



                           sources.)                 of insulin     

 

      



            Other      Long-term   Episodic   Active     JULY    Not Availabl

e



                 aftercare (3    (current) use     GELLENDER , DO  (83804)



                           sources.)                 of insulin     

 

      



            Nonmalignant  Lump or mass   Episodic   Active     JULY    Not Av

ailable



                 breast          in breast       GELLENDER , DO  (35414)



                                         conditions (3      



                                         sources.)      

 

      



            Headache;  Migraine,   Chronic    Active     MELODY    Not Availabl

e



                 including       unspecified,     MD TIMOTHY   (09885)



                           migraine (2               without     



                           sources.)                 mention of     



                                         intractable     



                                         migraine     



                                         without     



                                         mention of     



                                         status     



                                         migrainosus     

 

      



            Nephritis;  Nephritis and   Chronic    Active     MEICHELLE WOOD  No

t Available



                     nephrosis;          nephropathy,        (52951)



                           renal                     not specified     



                           sclerosis (10             as acute or     



                           sources.)                 chronic, in     



                                         diseases     



                                         classified     



                                         elsewhere     

 

      



            Other      Other      Episodic   Active     JULY    Not Available



                 connective      calcification     GELLENDER , DO  (08597)



                           tissue disease            of muscle,     



                           (2 sources.)              other site     

 

      



            Other nervous  Other chronic   Chronic    Active     MISHA DEVRIES 

 [facility] Via



                 system          pain            [facility]



                           disorders (3              Hospital -



                           sources.)                 [place]



                                         (01220)

 

      



            Other nervous  Other      Chronic    Active     CAMPBELL HOANG [facility] 

Via



                 system          encephalopathy     [facility]



                           disorders (6              Hospital -



                           sources.)                 [place]



                                         (95544)

 

      



            Other      Other long   Episodic   Active     AHMED      Not Availab

le



                 aftercare (3    term (current)     ABOUL-MAGD ,    (58932)



                     sources.)           drug therapy        MD 

 

      



            Unclassified  Other      Episodic   Active     JULY    Not Availa

ble



                 (7 sources.)    screening       GELDANK , DO  (23687)



                                         mammogram     



                                         Translations:     



                                         [ ABNORMAL     



                                         RESULTS OF     



                                         LIVER FUNCTION     



                                         STUDI]     

 

      



            Other upper  Other seasonal   Chronic    Active     CAMPBELL HOANG  

[facility]



                 respiratory     allergic        [facility]



                     disease (7          rhinitis            Hospital -



                           sources.)                 [place]



                                         (16707)

 

      



            Other      Pain in left   Episodic   Active     JULY    Not Avail

able



                 non-traumatic   elbow           GELLENDER , DO  (20162)



                                         joint      



                                         disorders (2      



                                         sources.)      

 

      



            Other      Personal   Episodic   Active     CAMPBELL HOANG  [facility]



                 circulatory     history of      [facility]



                     disease (3          other diseases      Hospital -



                     sources.)           of the              [place]



                           circulatory               (54824)



                                         system     

 

      



            Gastroduodenal  Personal   Episodic   Active     CAMPBELL HOANG  [facility]

 Via



                 ulcer (except   history of      [facility]



                     hemorrhage)         peptic ulcer        Hospital -



                     (13 sources.)       disease             [place]



                                         ([id number])

 

      



            Other lower  Personal   Episodic   Active     LIAM GAONA  Not Avail

able



                     respiratory         history of          (14121)



                           disease (5                pneumonia     



                           sources.)                 (recurrent)     

 

      



            Phlebitis;  Personal   Episodic   Active     JULY    Not Availabl

e



                 thrombophlebit  history of      GELLENDER , DO  (17153)



                           is and                    venous     



                           thromboembolis            thrombosis and     



                           m (20                     embolism     



                           sources.)                 Translations:     



                                         [ ACUTE VENOUS     



                                         EMBOLISM     



                                         THROMBOSIS     



                                         UNSP ,     



                                         PERSONAL     



                                         HISTORY OF     



                                         OTHER VENOUS     



                                         THROMBO]     

 

      



            Pneumonia  Pneumonia due   Episodic   Active     JENA CARDENAS

 Via



                 (except that    to              [facility]



                     caused by           Streptococcus       Hospital -



                     tuberculosis        pneumoniae          [place]



                     or sexually         Translations:       (64728)



                           transmitted               [ PULMONARY     



                           disease) (2               CANDIDIASIS]     



                                         sources.)      

 

      



            Poisoning by  Poisoning by   Episodic   Active     JULY    Not Av

ailable



                 psychotropic    benzodiazepine     GELLENDER , DO  (92428)



                           agents (3                 -based     



                           sources.)                 tranquilizers     

 

      



            Poisoning by  Poisoning by   Episodic   Active     JULY    Not Av

ailable



                 other           other opiates     GELLENDER , DO  (50455)



                           medications               and related     



                           and drugs (3              narcotics     



                                         sources.)      

 

      



            Other      Presence of   Chronic    Active     JENA CARDENAS V

ia



                 circulatory     other vascular     [facility]



                     disease (7          implants and        Hospital -



                     sources.)           grafts              [place]



                                         (37794)

 

      



            Genitourinary  Presence of   Chronic    Active     CAMPBELL HOANG  V

[facility]



                 symptoms and    urogenital      [facility]



                     ill-defined         implants            Hospital -



                           conditions (3             [place]



                           sources.)                 (13653)

 

      



            Genitourinary  Proteinuria,   Episodic   Active     MEICHELLE WOOD  

Not Available



                     symptoms and        unspecified         (73205)



                           ill-defined               Translations:     



                           conditions (20            [ PROTEINURIA]     



                                         sources.)      

 

      



            Rheumatoid  Rheumatoid   Chronic    Active     JULY    Not Availa

ble



                 arthritis and   arthritis       GELLENDER , DO  (35756)



                           related                   Translations:     



                           disease (21               [ RHEUMATOID     



                           sources.)                 ARTHRITIS,     



                                         UNSPECIFIED]     

 

      



            Abdominal pain  Right lower   Episodic   Active     LIAM GAONA  Not

 Available



                     (5 sources.)        quadrant pain       (14522)

 

      



            Septicemia  Sepsis,    Episodic   Active     JENA CARDENAS [facility]



                 (except in      unspecified     [facility]



                     labor) (4           organism            Hospital -



                     sources.)           Translations:       [place]



                           [ SEVERE                  (92524)



                                         SEPSIS WITHOUT     



                                         SEPTIC SHOCK]     

 

      



            Cardiac    Tachycardia,   Episodic   Active     [facility] 

[facility]



                 dysrhythmias    unspecified     [facility]



                           (6 sources.)              Hospital -



                                         [place]



                                         (97037)

 

      



            Asthma (5  Unspecified   Chronic    Active     JULY    Not Availa

ble



                 sources.)       asthma,         GELLENDER , DO  (77413)



                                         uncomplicated     



                                         Translations:     



                                         [ ASTHMA,     



                                         UNSPECIFIED]     

 

      



            Essential  Unspecified   Chronic    Active     JULY    Not Availa

ble



                 hypertension    essential       GELLENDER , DO  (10708)



                           (1 source.)               hypertension     

 

      



            Osteoarthritis  Unspecified   Chronic    Active     LIAM GAONA  Not

 Available



                     (17 sources.)       osteoarthritis      (78571)



                                         , unspecified     



                                         site     



                                         Translations:     



                                         [ PRIMARY     



                                         OSTEOARTHRITIS     



                                         , UNSPECIFIED     



                                         SITE]     



            

            Past or Other Problems            

            



      



           Problem   Normalized  Date Last  Normalized  Normalized  Provider  Fa

cility



              Classification  Problem(s)   Recorded     Problem      Problem Sta

tus  



                                         Duration   

 

      



            External   Accidental   no information  no information  JULY    N

ot Available



                 Injury -        poisoning by     GELLENDER , DO  (27628)



                           Poisoning (5              other opiates     



                           sources.)                 and related     



                                         narcotics     



                                         Translations:     



                                         [ ACC     



                                         POISN-BENZDIAZ     



                                         TRANQ]     

 

      



            Pancreatic  Acute      Episodic   Completed  JULY    Not Availabl

e



                 disorders (not  pancreatitis     GELLENDER , DO  (06431)



                                         diabetes) (1      



                                         source.)      

 

      



            Spondylosis;  Dorsalgia,   Episodic   Completed  [facility]

 Via



                 intervertebral  unspecified     [facility]



                           disc                      Hospital -



                           disorders;                [place]



                           other back                (07850)



                                         problems (7      



                                         sources.)      

 

      



            Other      Gastroparesis   Episodic   Completed  JULY    Not Avai

lable



                     disorders of        GELLENDER , DO      (26156)



                                         stomach and      



                                         duodenum (1      



                                         source.)      

 

      



            External   Home accidents   no information  no information  JULY 

   Not 

Available



                     Injury - Place      GELLENDER , DO      (62642)



                                         of occurrence      



                                         (3 sources.)      

 

      



            Noninfectious  Other and   Episodic   Completed  JULY    Not Avai

lable



                 gastroenteriti  unspecified     GELLENDER , DO  (12044)



                           s (1 source.)             noninfectious     



                                         gastroenteriti     



                                         s and colitis     

 

      



            Nonspecific  Other chest   Episodic   Completed  JENA BEEBE

 Via



                 chest pain (3   pain            [facility]



                           sources.)                 [facility]



                                         (85487)

 

      



            Residual   Patient's   Episodic   Completed  JENA CARDENAS [facility]



                 codes;          [facility]



                     unclassified        noncompliance       Hospital -



                     (7 sources.)        with                [place]



                           medication                (18891)



                                         regimen     

 

      



            Other      Personal   Episodic   Completed  LIAM GAONA  Not Availab

le



                     gastrointestin      history of          (09291)



                           al disorders              other diseases     



                           (11 sources.)             of the     



                                         digestive     



                                         system     

 

      



            Nausea and  Vomiting,   no information  no information  JENA BEEBE [facility]



                 vomiting (3     unspecified     [facility]



                           sources.)                 [facility]



                                         ()



                                                                                
                                                                                
                                                                                
                                                                                
                                                                                
                                                                                
                                                                                
                                                                                
                                                                                



Procedures

                      



    



              Procedure    Normalized Procedure  Procedure Result  Performer    

Facility



                                         Date    

 

    



              02-   INSERTION OF  no information  no name      [facility] 

risti



                           ENDOTRACHEAL AIRWAY       [facility]



                           INTO TR                   (17426)

 

    



              02-   RESPIRATORY  no information  no name      [facility]

isti



                           VENTILATION, 24-96        [facility]



                           CONSECUTI                 (31374)

 

    



              02-   RESPIRATORY  no information  no name      [facility]

isti



                           VENTILATION, LESS THAN    [facility]



                           24 CO                     (38784)



                                                                                
                 



Immunizations

                      The data below is from unstructured sources            No 
Known Immunizations                                                             
      



Results

                      



     



            Test Name  Value      Interpretation  Reference Range  Date Time  Fa

cility



                     (Normalized)        (Normalized)        (Medline  



                                         Reference)  

 

 



                                         laboratory



                                         on 2020

 

     



            Albumin    3.3 g/dL   (NEG)      3.4 - 5.4 g/dL  2020  PENDING

 LOCATION



                     [Mass/Vol]          12:          [facility] (02074)

 

     



            ALP [Catalytic  99 U/L     (NEG)      44 - 147 U/L  2020  PEND

ING LOCATION



                     activity/Vol]       12:          [facility] (43885)

 

     



            ALT [Catalytic  22 U/L     (NEG)      4 - 40 U/L  2020  PENDIN

G LOCATION



                     activity/Vol]       12:          [facility] (67084)

 

     



            Anion gap  12 mmol/L  (NEG)      3 - 11 mmol/L  2020  PENDING 

LOCATION



                     [Moles/Vol]         12:          [facility] (44884)

 

     



            aPTT Coag (PPP)  29 s       (NEG)      25 - 35 s  2020  PENDIN

G LOCATION



                     [Time]              12:          [facility] (68621)

 

     



            AST [Catalytic  16 U/L     (NEG)      10 - 34 U/L  2020  PENDI

NG LOCATION



                     activity/Vol]       12:          [facility] (97129)

 

     



            Band form  5 %        (no code)  0 - 3 %    2020  PENDING LOCA

TION



                     neutrophils/100     12:          [facility] (67451)



                                         WBC (Bld)     

 

     



            Basophils (Bld)  0.0 10*3/uL  (NEG)      0 - 0.3 10*3/uL  2020

  PENDING 

LOCATION



                     [#/Vol]             12:          [facility] (72795)

 

     



            Basophils/100  0 %        (NEG)      0.5 - 1 %  2020  PENDING 

LOCATION



                     WBC (Bld)           12:          [facility] (00424)

 

     



            Basophils/100  1 %        (no code)  0.5 - 1 %  2020  PENDING 

LOCATION



                     WBC (Bld)           12:          [facility] (50950)

 

     



              Beta         0.63         (H)          2020   PENDING LOCATI

ON



                     hydroxybutyrate     12:          [facility] (86418)



                                         [Mass/Vol]     

 

     



            Bilirubin  0.6 mg/dL  (NEG)      0.1 - 1.2 mg/dL  2020  PENDIN

G LOCATION



                     [Mass/Vol]          12:          [facility] (75555)

 

     



            Calcium    9.1 mg/dL  (NEG)      8.5 - 10.2 mg/dL  2020  PENDI

NG LOCATION



                     [Mass/Vol]          12:          [facility] (19067)

 

     



            Calcium    9.7 mg/dL  (NEG)      8.5 - 10.2 mg/dL  2020  PENDI

NG LOCATION



                     [Mass/Vol]          12:          [facility] (55587)

 

     



            Chloride   92 mmol/L  (L)        95 - 106 mmol/L  2020  PENDIN

G LOCATION



                     [Moles/Vol]         12:          [facility] (53244)

 

     



            CO2 [Moles/Vol]  22 mmol/L  (NEG)      23 - 29 mmol/L  2020  P

ENDING LOCATION



                           12:                [facility] (34542)

 

     



              Creatinine   2.11 mg/dL   (H)          2020   PENDING LOCATI

ON



                     [Mass/Vol]          12:          [facility] (25410)

 

     



              Creatinine and  24           (no code)    2020   PENDING LOC

ATION



                     Glomerular          12:          [facility] (01531)



                                         filtration     



                                         rate.predicted     



                                         panel - Serum,     



                                         Plasma or Blood     

 

     



            Eosinophils  0.0 10*3/uL  (NEG)      0.05 - 0.5  2020  PENDING

 LOCATION



                 (Bld) [#/Vol]    10*3/uL         12:      [facility] (47668)

 

     



            Eosinophils/100  0 %        (NEG)      1 - 4 %    2020  PENDIN

G LOCATION



                     WBC (Bld)           12:          [facility] (78109)

 

     



              Eosinophils/100  1 %          (no code)    2020   PENDING LO

CATION



                     WBC (Nose)          12:          [facility] (54037)

 

     



            Erythrocyte  13.5 %     (NEG)      11.6 - 14.6 %  2020  PENDIN

G LOCATION



                     distribution        12:          [facility] (08231)



                                         width (RBC)     



                                         [Ratio]     

 

     



              Fibrin D-dimer  3.76         (H)          2020   PENDING LOC

ATION



                     FEU (PPP)           12:          [facility] (45523)



                                         [Mass/Vol]     

 

     



            Glucose    299 mg/dL  (H)        60 - 125 mg/dL  2020  PENDING

 LOCATION



                     [Mass/Vol]          12:          [facility] (47434)

 

     



            Hematocrit (Bld)  34 %       (L)        36.1 - 50.3 %  2020  P

ENDING LOCATION



                     [Volume             12:          [facility] (12208)



                                         fraction]     

 

     



            Hemoglobin (Bld)  11.4 g/dL  (L)        12.1 - 17.2 g/dL  2020

  PENDING 

LOCATION



                     [Mass/Vol]          12:          [facility] (95885)

 

     



              INR Coag     1.0          (NEG)        2020   PENDING LOCATI

ON



                     (Platelet poor      12:          [facility] (43882)



                                         plasma or blood)     



                                         [Relative time]     

 

     



            Lymphocytes  3.1 10*3/uL  (NEG)      0.9 - 2.9  2020  PENDING 

LOCATION



                 (Bld) [#/Vol]    10*3/uL         12:      [facility] (50730)

 

     



            Lymphocytes/100  14 %       (no code)  20 - 40 %  2020  PENDIN

G LOCATION



                     WBC (Bld)           12:          [facility] (10464)

 

     



            Magnesium  1.7 mg/dL  (NEG)      1.7 - 2.2 mg/dL  2020  PENDIN

G LOCATION



                     [Mass/Vol]          12:          [facility] (12679)

 

     



            MCH (RBC)  30 pg      (NEG)      27 - 31 pg  2020  PENDING LOC

ATION



                     [Entitic mass]      12:          [facility] (13429)

 

     



            MCHC (RBC)  33 g/dL    (NEG)      32 - 36 g/dL  2020  PENDING 

LOCATION



                     [Mass/Vol]          12:          [facility] (60225)

 

     



              MCV (RBC)    91           (NEG)        2020   PENDING LOCATI

ON



                     [Entitic vol]       12:          [facility] (81445)

 

     



            Monocytes (Bld)  1.7 10*3/uL  (H)        0.3 - 0.9  2020  PEND

ING LOCATION



                 [#/Vol]         10*3/uL         12:      [facility] (54808)

 

     



            Monocytes/100  8 %        (NEG)      2 - 8 %    2020  PENDING 

LOCATION



                     WBC (Bld)           12:          [facility] (44304)

 

     



            Monocytes/100  7 %        (no code)  2 - 8 %    2020  PENDING 

LOCATION



                     WBC (Bld)           12:          [facility] (38912)

 

     



              Myoglobin    42.2 ng/mL   (NEG)        2020   PENDING LOCATI

ON



                     [Mass/Vol]          12:          [facility] (12389)

 

     



            Natriuretic  347.3 pg/mL  (H)        0 - 100 pg/mL  2020  PEND

ING LOCATION



                     peptide B (Bld)     12:          [facility] (68519)



                                         [Mass/Vol]     

 

     



            Neutrophils  17.5 10*3/uL  (H)        1.7 - 7 10*3/uL  2020  P

ENDING LOCATION



                     (Bld) [#/Vol]       12:          [facility] (49292)

 

     



            Neutrophils/100  78 %       (H)        40 - 60 %  2020  PENDIN

G LOCATION



                     WBC (Bld)           12:          [facility] (79703)

 

     



              Platelet mean  9.4          (NEG)        2020   PENDING LOCA

TION



                     volume (Bld)        12:          [facility] (85085)



                                         [Entitic vol]     

 

     



            Platelets (Bld)  304 10*3/uL  (NEG)      150 - 450  2020  PEND

ING LOCATION



                 [#/Vol]         10*3/uL         12:      [facility] (27515)

 

     



            Potassium  4.4 mmol/L  (NEG)      3.7 - 5.2 mmol/L  2020  PEND

ING LOCATION



                     [Moles/Vol]         12:          [facility] (17760)

 

     



            Protein    7.7 g/dL   (NEG)      6.4 - 8.3 g/dL  2020  PENDING

 LOCATION



                     [Mass/Vol]          12:          [facility] (31121)

 

     



            PT Coag (PPP)  13.3 s     (NEG)      9.4 - 12.5 s  2020  PENDI

NG LOCATION



                     [Time]              12:          [facility] (49699)

 

     



            RBC (Bld)  3.75 10*6/uL  (L)        4.2 - 6.1  2020  PENDING L

OCATION



                 [#/Vol]         10*6/uL         12:      [facility] (01600)

 

     



            Segmented  72 %       (no code)  35 - 80 %  2020  PENDING LOCA

TION



                     neutrophils/100     12:          [facility] (99189)



                                         WBC (Bld)     

 

     



            Sodium     126 mmol/L  (L)        135 - 145 mmol/L  2020  PEND

ING LOCATION



                     [Moles/Vol]         12:          [facility] (96208)

 

     



            Troponin   ng/mL      (NEG)      0 - 0.4 ng/mL  2020  PENDING 

LOCATION



                     I.cardiac           12:450400          [facility] (77731)



                                         [Mass/Vol]     

 

     



            Urea nitrogen  37 mg/dL   (H)        7 - 20 mg/dL  2020  PENDI

NG LOCATION



                     [Mass/Vol]          12:          [facility] (43498)

 

     



            Urea       18 mg/mg   (no code)  6 - 22 mg/mg  2020  PENDING L

OCATION



                     nitrogen/Creatin     12:          [facility] (21564)



                                         ine [Mass ratio]     

 

     



            WBC (Bld)  22.3 10*3/uL  (H)        3.5 - 10.5  2020  PENDING 

LOCATION



                 [#/Vol]         10*3/uL         12:      [facility] (76432)



                                                                                
                                                                                
                                                                                
                                                                                
                                                                                
                                                                                
                                                                                
                            



Vital Signs

                      The data below is from unstructured sources            



                    Vital                   Response                   Date/Time

                

 

                          Temperature (Fahrenheit)                   98 degrees 

F (97.6 - 99.5)           

                                        2016 3:40pm                

 

                          Temperature (Calculated Celsius)                   36.

6696 degrees C (36.4 - 

37.5)                                   2016 3:40pm                

 

                    Temperature Source                   Tympanic               

    2016 

3:40pm                

 

                    Pulse Rate (adult)                   127 bpm (60 - 90)      

             

2016 3:40pm                

 

                    Respiratory Rate                   18 bpm (12 - 24)         

          2016

 3:40pm                

 

                    O2 Sat by Pulse Oximetry                   98 % (88 - 100)  

                 

2016 3:40pm                

 

                    Blood Pressure                   135/106 mm Hg              

     2016 

3:40pm                

 

                     Blood Pressure Mean                   116 mm Hg            

       2016 

3:40pm                

 

                    Pain                                                       

 

                     Pain Intensity                   0                   2016 3:40pm         

       

 

                    Height (Feet)                   5 feet                   04/

 3:40pm      

          

 

                    Height (Inches)                   4 inches                  

 2016 3:40pm  

              

 

                          Height (Calculated Centimeters)                   162.

540178 cm                 

                                        2016 3:40pm                

 

                    Weight (Pounds)                   175 pounds                

   2016 3:40pm

                

 

                          Weight (Calculated Kilograms)                   79.378

666 kilograms             

                                        2016 3:40pm                

 

                    Height                   5 ft 4 in                          

         

 

                    Weight                   175 lb                             

      

 

                    Body Mass Index                   30.0 kg/m^2               

                    



            



                    Vital                   Response                   Date/Time

                

 

                          Temperature (Fahrenheit)                   97.1 degree

s F (97.6 - 99.5)         

                                                        

 

                          Temperature (Calculated Celsius)                   36.

78315 degrees C (36.4 - 

37.5)                                                   

 

                    Temperature Source                   Temporal               

                    

 

 

                    Pulse Rate (adult)                   96 bpm (60 - 90)       

                    

        

 

                    Respiratory Rate                   20 bpm (12 - 24)         

                    

      

 

                    O2 Sat by Pulse Oximetry                   100 % (88 - 100) 

                    

              

 

                    Blood Pressure                   113/74 mm Hg               

                    

 

                    Pain                                                       

 

                     Pain Intensity                   0                         

           

 

                    Height (Feet)                   5 feet                      

              

 

                    Height (Inches)                   3.00 inches               

                    

 

 

                          Height (Calculated Centimeters)                   160.

495794 cm                 

                                                        

 

                    Weight (Pounds)                   188 pounds                

                    

 

                    Weight (Ounces)                   0.0 oz                    

                

 

                    Weight (Calculated Grams)                   41533.366 gm    

                    

            

 

                          Weight (Calculated Kilograms)                   85.275

366 kilograms             

                                                        

 

                    Calculated BMI                   33.30                      

              



            



                    Vital                   Response                   Date/Time

                

 

                          Temperature (Fahrenheit)                   97.3 degree

s F (97.6 - 99.5)         

                                        2017 7:27pm                

 

                          Temperature (Calculated Celsius)                   36.

37434 degrees C (36.4 - 

37.5)                                   2017 7:27pm                

 

                    Temperature Source                   Temporal               

    2017 

7:27pm                

 

                    Pulse Rate (adult)                   115 bpm (60 - 90)      

             

2017 7:27pm                

 

                    Respiratory Rate                   24 bpm (12 - 24)         

          2017

 7:27pm                

 

                    O2 Sat by Pulse Oximetry                   97 % (88 - 100)  

                 

2017 7:27pm                

 

                    Blood Pressure                   178/120 mm Hg              

     2017 

7:27pm                

 

                     Blood Pressure Mean                   139 mm Hg            

       2017 

7:27pm                

 

                    Pain                                                       

 

                     Numeric Pain Scale                   8                    7:27pm     

           

 

                    Height (Feet)                   5 feet                   2017 7:27pm      

          

 

                    Height (Inches)                   5.00 inches               

    2017 

7:27pm                

 

                          Height (Calculated Centimeters)                   165.

107810 cm                 

                                        2017 7:27pm                

 

                    Height Method                   Stated                   2017 7:27pm      

          

 

                    Weight (Pounds)                   205 pounds                

   2017 7:27pm

                

 

                          Weight (Calculated Kilograms)                   92.986

437 kilograms             

                                        2017 7:27pm                

 

                    Weight Method                   Stated                   2017 7:27pm      

          

 

                    Capillary Refill                                            

           

 

                     Capillary Refill                   Less Than 3 Seconds     

              

2017 7:27pm                

 

                    Height                   5 ft 5 in                   

017 7:27pm          

      

 

                    Weight                   205 lb                   2017

 7:27pm             

   

 

                    Body Mass Index                   34.1 kg/m^2               

    2017 

7:27pm                



            



                    Blood pressure systolic                   144 mmHg          

         2017 

               

 

                    Blood pressure diastolic                   69 mmHg          

         2017 

               



            



                    Blood pressure systolic                   144 mmHg          

         2017 

               

 

                    Blood pressure diastolic                   69 mmHg          

         2017 

               



                                                                    



Interventions

          No Information                                                        
  



Plan of Treatment

                      The data below is from unstructured sources            



                          Discharge Date                   16 6:19pm      

          

 

                          Disposition                   01 HOME, SELF-CARE      

          

 

                          Condition at Discharge                   Stable       

         

 

                          Instructions/Education Provided                   [facility]

dration (ED)              

      

Methamphetamine Abuse (ED)                                  

 

                          Prescriptions                   See Medication Section

                

 

                          Referrals                   JULY VOSS DO - P

[facility]     

           

 

                          Additional Instructions/Education                   Al

l discharge instructions 

reviewed with patient and/or family. Voiced                     

understanding.                     

                    

Drink plenty of fluids. You're cleared for incarceration. Follow-up with Dr. Voss after incarceration.                                   



            



                          Discharge Date                   06/25/15 2:00pm      

          

 

                          Disposition                   30 STILL A PATIENT      

          

 

                          Instructions/Education Provided                   [facility]

HOL AND SUBSTANCE ABUSE   

                 

Schizophrenia (DC)                                  

 

                          Forms Provided                   [facility]          

      

 

                          Prescriptions                   See Medications Sectio

n                

 

                          Referrals                    (Unspecified)            

         

                    

Reason(s) for Referral:                    

FOLLOW UP WITH DR. VOSS ON  AT 3:00PM                      
            

 

                          Additional Instructions/Education                   PL

EASE FOLLOW UP WITH DR. VOSS AT 3PM ON                 



            



                          Discharge Date                   17 11:17pm     

           

 

                          Disposition                   01 HOME, SELF-CARE      

          

 

                          Condition at Discharge                   Stable       

         

 

                          Instructions/Education Provided                   NO I

NSTRUCTIONS GIVEN         

       

 

                          Prescriptions                   See Medication Section

                

 

                          Referrals                   JULY VOSS DO    

                

Order Date: Primary Care Physician                    

Address:                    

                                        [facility] [place] [address]                    

                                        [id number]                              

    

 

                          Additional Instructions/Education                   1.

 Follow-up with Dr. Dr. Voss                    

                                        2. Return to ER for any concerns        

            

                                        3.                                  



            



                          Activity                   Details                

 

                                                         

 

                          Follow Up                   prn Reason:hygiene/santa    

            



            



                          Activity                   Details                

 

                                                         

 

                          Follow Up                   prn Reason:hygiene/santa    

            



                                                                    



Goals

          No Information                                                        
  



Social History

          No Information                                                        
  



Functional Status

                      The data below is from unstructured sources            



                    Query                   Response                   Date Miguel

rded                

 

                          Patient Orientation                   Unable to Assess

                    

                                        2015 4:35pm                

 

                          Comprehension Ability                   Understands Co

ncepts                    

                                        2015 12:00pm                



                                                                    



Mental Status

          No Information                                                        
  



Encounters

                      



    



              Encounter    Normalized Encounter  Encounter Diagnosis  Care Provi

peter  

Organization



                           Date                      Type   

 

    



              2020   Emergency department  no information  SARAHI JOSEPH MD (no  [facility]



                     patient visit       phone)              [facility]



                                         (no phone)

 

    



              2020   Emergency department  no information  no name      no

 organization name



                           -                         patient visit   



                                         2020   Emergency department  no information  no name      no

 organization name



                                         patient visit   

 

    



              2012   Emergency department  no information  no name      no

 organization name



                           -                         patient visit   



                                         2012   Evaluation and  no information  CAMPBELL HOANG MD (no

  [facility] 

[facility]



                 -               management of   phone)          [facility]



                     02-          inpatient           (no phone)

 

    



              2019   Evaluation and  no information  no name      no organ

ization name



                           -                         management of   



                           2019                inpatient   

 

    



              2019   Evaluation and  no information  no name      no organ

ization name



                           -                         management of   



                           2019                inpatient   

 

    



              2015   Evaluation and  no information  no name      no organ

ization name



                           -                         management of   



                           2015                inpatient   

 

    



              02-   Evaluation and  no information  no name      no organ

ization name



                           -                         management of   



                           2013                inpatient   

 

    



              2018   Patient encounter  no information  no name      no or

ganization name

 

    



              2018   Patient encounter  no information  no name      no or

ganization name

 

    



              10-   Patient encounter  no information  no name      no or

ganization name

 

    



              2016   Patient encounter  no information  no name      no or

ganization name

 

    



              2016   Patient encounter  no information  no name      no or

ganization name

 

    



              2016   Patient encounter  no information  no name      no or

ganization name

 

    



              2016   Patient encounter  no information  no name      no or

ganization name

 

    



              2015   Patient encounter  no information  no name      no or

ganization name

 

    



              2014   Patient encounter  no information  no name      no or

ganization name

 

    



              2014   Patient encounter  no information  no name      no or

ganization name



                                         -    



                                         2014   Patient encounter  no information  no name      no or

ganization name

 

    



              2014   Patient encounter  no information  no name      no or

ganization name

 

    



              2014   Patient encounter  no information  no name      no or

ganization name

 

    



              2014   Patient encounter  no information  no name      no or

ganization name



                                         -    



                                         2014   Patient encounter  no information  no name      no or

ganization name

 

    



              10-   Patient encounter  no information  no name      no or

ganization name

 

    



              10-   Patient encounter  no information  no name      no or

ganization name

 

    



              10-   Patient encounter  no information  no name      no or

ganization name

 

    



              2013   Patient encounter  no information  no name      no or

ganization name

 

    



              2013   Patient encounter  no information  no name      no or

ganization name



                                         -    



                                         2013   Patient encounter  no information  CAMPBELL HOANG MD 

(no  [facility] 

July



                 -               procedure       phone)          [facility]



                           02-                (no phone)

 

    



              2020   Patient encounter  no information  no name      no or

ganization name



                                         procedure   

 

    



              2019   Patient encounter  no information  no name      no or

ganization name



                           -                         procedure   



                                         2019   Patient encounter  no information  no name      no or

ganization name



                                         procedure   

 

    



              01-   Patient encounter  no information  no name      no or

ganization name



                                         procedure   

 

    



              2012   Patient encounter  no information  no name      no or

ganization name



                           -                         procedure   



                                         2012   Patient encounter  no information  no name      no or

ganization name



                                         procedure   

 

    



              2012   Patient encounter  no information  no name      no or

ganization name



                           -                         procedure   



                                         2012    



                                                                                
                                                                                
                                                                                
                                                                                
                                                                                
                                      



Medical Equipment

          No Information                                                        
  



Payers

          No Information                                                        
  



Advance Directives

                      



                    Directive                   Response                   Recor

ded Date/Time       

         

 

                    Advance Directives                   No                    3:50pm       

         

 

                    Health Care Power of                    No          

         16 

3:50pm                

 

                    Organ Donor                   No                   16 

3:50pm              

  

 

                    Resuscitation Status                   Full Code            

       16 

3:50pm                



            



                    Directive                   Response                   Recor

ded Date/Time       

         

 

                    Advance Directives                   No                   06

/25/15 1:07am       

         

 

                    Health Care Power of                    No          

         06/25/15 

1:07am                

 

                    Organ Donor                   No                   06/25/15 

1:07am              

  

 

                    Resuscitation Status                   Full Code            

       06/25/15 

1:07am                



            



                    Directive                   Response                   Recor

ded Date/Time       

         

 

                    Advance Directives                   No                   02

/15/13 6:00am       

         

 

                    Health Care Power of                    No          

         02/15/13 

6:00am                

 

                    Organ Donor                   No                   02/15/13 

6:00am              

  



            



                    Directive                   Response                   Recor

ded Date/Time       

         

 

                    Advance Directives                   No                    7:27pm       

         

 

                    Health Care Power of                    No          

         17 

7:27pm                

 

                    Organ Donor                   No                   17 

7:27pm              

  

 

                    Resuscitation Status                   Full Code            

       17 

7:27pm                



                                                                    



Discharge Instructions

          No hospital discharge instructions.No hospital discharge 
instructions.No hospital discharge instructions.No hospital discharge 
instruction information available.                                              
  



Additional Source Comments

          This clinical document has been generated using [employer] 
software that has been certified by the Office of the National Coordinator for 
Health Information Technology (ONC 15.99.04.3023.Diam.31.00.0.045938) and the 
National Committee for  (NCQA, as an eMeasure certified 
technology).            

            

FOR RECORDS PERTAINING TO PATIENTS WHO ARE OR HAVE BEEN ENROLLED IN A CHEMICAL D
EPENDENCY/SUBSTANCE ABUSE PROGRAM, SOME INFORMATION MAY BE OMITTED. This clinica
l summary was aggregated from multiple sources.  Caution should be exercised in 
using it in the provision of clinical care. This summary normalizes information 
from multiple sources, and as a consequence, information in this document may ma
terially change the coding, format and clinical context of patient data. In china
tion, data may be omitted in some cases. CLINICAL DECISIONS SHOULD BE BASED ON T
HE PRIMARY CLINICAL RECORDS. [employer] provides no warranty or guara
ntee of the accuracy or completeness of information in this document.The followi
ng information is based on time limited clinical information            



                                        UNRECOGNIZED CONTENT PROVIDED BELOW FOR 

UNRECOGNIZED SECTION MEDICAL (GENERAL) 

HISTORY                

 

                                                         



            



                    Type                   Description                   Date   

             

 

                    Medical History                   High blood pressure       

                    

         

 

                    Medical History                   [place]                  

                  

 

                    Medical History                   Asthma                    

                

 

                    Medical History                   Diabetes                  

                  

 

                    Medical History                   Pt takes Blood thinners   

                    

             

 

                    Medical History                   [place]th[place]                

                    

 

                    Medical History                   9 chronic kidney diesease

## 2020-06-30 NOTE — NUR
RALPH CALLED AND WAS GIVEN REPORT BY THIS RN. RALPH STATES SHE WILL BE DOWN TO 
GET THE PATIENT IN 10-15 MINUTES.

## 2020-06-30 NOTE — XMS REPORT
Continuity of Care Document

                             Created on: 2020



DAYTON BLISS

External Reference #: [id number]

: 1965

Sex: Female



Demographics





                          Address                   [address] [place]  [address]

 

                          Home Phone                [phone] x

 

                          Preferred Language        Unknown

 

                          Marital Status            Unknown

 

                          Tenriism Affiliation     Unknown

 

                          Race                      Unknown

 

                          Ethnic Group              Unknown





Author





                          Organization              Unknown

 

                          Address                   Unknown

 

                          Phone                     Unavailable



              



Allergies

      



             Active           Description           Code           Type         

  Severity   

                Reaction           Onset           Reported/Identified          

 

Relationship to Patient                 Clinical Status        

 

             Yes           ketorolac           [id number]           Drug Allergy

           

Unknown           N/A                        2008                         

  

     

 

             Yes           codeine           [id number]           Drug Allergy  

         

Unknown           TAKES ULTRAM AT                           2009          

    

                                                 

 

             Yes           tramadol           [id number]           Drug Allergy 

          

Unknown           N/A                        2011                         

  

     

 

                Yes             Sulfa (Sulfonamide Antibiotics)           [id number]

[id number]           

Drug Allergy           Unknown           N/A                             

015   

                                                             



                        



Medications

      



There is no data.                  



Problems

      



             Date Dx Coded           Attending           Type           Code    

       

Diagnosis                               Diagnosed By        

 

             2011                        Ot           716.90           ART

HROPATHY NOS-

UNSPEC                                           

 

             2011                        Ot           787.03           VOM

ITING ALONE   

                                                 

 

             2011                        Ot           789.00           ABD

OMINAL PAIN, 

UNSPECIFIED SITE                                 

 

             2011                        Ot           250.80           J CARLOS

B W OTH SPEC 

MANIFEST, TYPE II OR UNS                         

 

             2011                        Ot           305.20           CAN

NABIS ABUSE-

UNSPEC                                           

 

             2011                        Ot           305.50           OPI

OID ABUSE-

UNSPEC                                           

 

             2011                        Ot           305.90           KYA

G ABUSE NEC-

UNSPEC                                           

 

             2011                        Ot           V58.67           MUSA

G-TERM 

(CURRENT) USE OF INSULIN                         

 

             2011                        Ot           V58.69           OTH

 

MED,LT,CURRENT USE                               

 

             2011                        Ot           346.90           NIRAV

CIRA 

UNSPECIFIED W/O INTRACT MGRN W/                    

 

             2011                        Ot           784.0           HEAD

ACHE          

                                                 

 

             10/24/2011                        Ot           825.25           FX 

METATARSAL-

CLOSED                                           

 

             10/24/2011                        Ot           959.7           LOWE

R LEG INJURY 

NOS                                              

 

             10/24/2011                        Ot           E000.8           OTH

ER EXTERNAL 

CAUSE STATUS                                     

 

             10/24/2011                        Ot           E849.0           ACC

IDENT IN HOME 

                                                 

 

             10/24/2011                        Ot           E885.9           FAL

L FROM 

SLIPPING, TRIPPING, OR STUMBLI                    

 

             2011                        Ot           041.89           SONYA

TERIAL 

INFECTION DUE TO OTHER SPECIFI                    

 

             2011                        Ot           250.00           J CARLOS

B KATHERIN WO 

COMPL, TYPE II OR UNSPEC TY                      

 

             2011                        Ot           272.4           HYPE

RLIPIDEMIA 

NEC/NOS                                          

 

             2011                        Ot           300.4           DYST

HYMIC DISORDER

                                                 

 

             2011                        Ot           401.9           HYPE

RTENSION NOS  

                                                 

 

             2011                        Ot           453.41           ACU

TE VENOUS 

EMBOLISM   THROMBOSIS DEEP                       

 

             2011                        Ot           530.81           ESO

PHAGEAL REFLUX

                                                 

 

             2011                        Ot           593.9           HUEY

L   URETERAL 

DIS NOS                                          

 

             2011                        Ot           599.0           URIN

 TRACT 

INFECTION NOS                                    

 

             2011                        Ot           V45.89           POS

TSURGICAL 

STATES NEC                                       

 

             2011                        Ot           V54.19           AFT

ERCARE HEALING

TRAUMATIC FX OTHER BON                           

 

             2012                        Ot           453.40           ACU

TE VENOUS 

EMBOLISM   THROMBOSIS UNSP                       

 

             2012                        Ot           346.90           NIRAV

CIRA 

UNSPECIFIED W/O INTRACT MGRN W/                    

 

             2012                        Ot           453.40           ACU

TE VENOUS 

EMBOLISM   THROMBOSIS UNSP                       

 

             2013                        Ot           250.61           J CARLOS

B W NEURO 

MANIFEST, TYPE I [JUVENILE                       

 

             2013                        Ot           305.1           TOBA

CCO USE 

DISORDER                                         

 

             2013                        Ot           401.9           HYPE

RTENSION NOS  

                                                 

 

             2013                        Ot           493.90           AST

HMA, 

UNSPECIFIED                                      

 

             2013                        Ot           536.3           YIMI

ROPARESIS     

                                                 

 

             2013                        Ot           558.9           DOC

NF 

GASTROENTERIT NEC                                

 

             2013                        Ot           577.0           ACUT

E PANCREATITIS

                                                 

 

             2013                        Ot           453.40           ACU

TE VENOUS 

EMBOLISM   THROMBOSIS UNSP                       

 

                2014           JULY VOSS DO           Ot       

       453.40      

                          ACUTE VENOUS EMBOLISM   THROMBOSIS UNSP               

      

 

                2014           JULY VOSS DO           Ot       

       453.40      

                          ACUTE VENOUS EMBOLISM   THROMBOSIS UNSP               

      

 

           2015                       Ot           250.03                 

           

    

 

           2015                       Ot           250.41                 

           

    

 

           2015                       Ot           272.4                  

           

   

 

           2015                       Ot           403.10                 

           

    

 

           2015                       Ot           583.81                 

           

    

 

           2015                       Ot           585.2                  

           

   

 

           2015                       Ot           791.0                  

           

   

 

           2015                       Ot           453.40                 

           

    

 

                2015           JULY VOSS DO           Ot       

       453.40      

                                                             

 

                2015           JULY VOSS DO           Ot       

       250.80      

                          DIAB W OTH SPEC MANIFEST, TYPE II OR UNS              

      

 

                2015           JULY VOSS DO           Ot       

       300.14      

                          DISSOCIATIVE IDENTITY DISORDER                    

 

                2015           [facility]           [facility]       

       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      

                          BORDERLINE PERSONALITY DISORDER                    

 

                2015           BIPIN RAMIRES, JULY QUIROS           Ot       

       458.9       

                          HYPOTENSION NOS                    

 

                2015           [facility]           Ot       

       530.81      

                          ESOPHAGEAL REFLUX                    

 

                2015           [facility]           Ot       

       584.9       

                          ACUTE RENAL FAILURE, UNSPECIFIED                    

 

                2015           [facility]           Ot       

       714.0       

                          RHEUMATOID ARTHRITIS                    

 

                2015           [facility]           Ot       

       780.97      

                          ALTERED MENTAL STATUS                    

 

                2015           [facility]           Ot       

       965.09      

                          POISONING-OPIATES NEC                    

 

                2015           [facility], JULY QUIROS           Ot       

       969.4       

                          POIS-BENZODIAZEPINE RUBIO                    

 

                2015           [facility]           [facility]       

       E849.0      

                          ACCIDENT IN HOME                    

 

                2015           [facility]           Ot       

       E850.2      

                          ACC POISON-OPIATES NEC                    

 

                2015           [facility], JULY QUIROS           Ot       

       E853.2      

                          ACC POISN-BENZDIAZ TRANQ                    

 

                2015           [facility], JULY QUIROS           Ot       

       V12.51      

                          HX-VENOUS THROMBOSIS EMBOLISM                    

 

                2015           [facility], JULY QUIROS           Ot       

       V58.67      

                          LONG-TERM (CURRENT) USE OF INSULIN                    

 

                2016           NANCY MENDEZ, MED S           Ot        

      E11.29       

                                                             

 

                2016           NANCY MENDEZ, MED S           Ot        

      E78.5        

                                                             

 

                2016           NANCY MENDEZ, MED S           Ot        

      I12.9        

                                                             

 

                2016           NANCY MENDEZ, MED S           Ot        

      N18.3        

                                                             

 

                2016           NANCY MENDEZ, MED S           Ot        

      R80.9        

                                                             

 

                2016           TIMOTHY MENDEZ, MELODY GRIJALVA           Ot        

      E86.0        

                          DEHYDRATION                        

 

                2016           TIMOTHY MENDEZ, MELODY GRIJALVA           Ot        

      F11.10       

                          OPIOID ABUSE, UNCOMPLICATED                    

 

                2016           TIMOTHY MENDEZ, MELODY GRIJALVA           Ot        

      F12.10       

                          CANNABIS ABUSE, UNCOMPLICATED                    

 

                2016           TIMOTHY MENDEZ, MELODY GRIJALVA           Ot        

      F15.10       

                          OTHER STIMULANT ABUSE, UNCOMPLICATED                  

  

 

           2016                       Ot           250.01                 

           

    

 

           2016                       Ot           272.4                  

           

   

 

           2016                       Ot           403.90                 

           

    

 

           2016                       Ot           583.81                 

           

    

 

           2016                       Ot           585.2                  

           

   

 

           2016                       Ot           791.0                  

           

   

 

           2016                       Ot           250.01                 

           

    

 

           2016                       Ot           272.4                  

           

   

 

           2016                       Ot           403.90                 

           

    

 

           2016                       Ot           583.81                 

           

    

 

           2016                       Ot           585.2                  

           

   

 

           2016                       Ot           791.0                  

           

   

 

           2016                       Ot           250.41                 

           

    

 

           2016                       Ot           272.4                  

           

   

 

           2016                       Ot           403.90                 

           

    

 

           2016                       Ot           583.81                 

           

    

 

           2016                       Ot           585.2                  

           

   

 

           2016                       Ot           791.0                  

           

   

 

           2016                       Ot           250.41                 

           

    

 

           2016                       Ot           272.4                  

           

   

 

           2016                       Ot           403.90                 

           

    

 

           2016                       Ot           583.81                 

           

    

 

           2016                       Ot           585.2                  

           

   

 

           2016                       Ot           791.0                  

           

   

 

           2016                       Ot           250.01                 

           

    

 

           2016                       Ot           272.4                  

           

   

 

           2016                       Ot           403.10                 

           

    

 

           2016                       Ot           583.81                 

           

    

 

           2016                       Ot           585.2                  

           

   

 

           2016                       Ot           791.0                  

           

   

 

           2016                       Ot           250.01                 

           

    

 

           2016                       Ot           272.4                  

           

   

 

           2016                       Ot           403.10                 

           

    

 

           2016                       Ot           583.81                 

           

    

 

           2016                       Ot           585.2                  

           

   

 

           2016                       Ot           791.0                  

           

   

 

           2016                       Ot           250.40                 

           

    

 

           2016                       Ot           272.4                  

           

   

 

           2016                       Ot           585.2                  

           

   

 

           2016                       Ot           791.0                  

           

   

 

           2016                       Ot           453.40                 

           

    

 

                2016           NANCY MENDEZ, YU MELENDEZ           Ot        

      585.3        

                                                             

 

                2016           NANCY MENDEZ, YU MELENDEZ           Ot        

      250.40       

                                                             

 

                2016           NANCY MENDEZ, YU MELENDEZ           Ot        

      272.4        

                                                             

 

                2016           NANCY MENDEZ, YU MELENDEZ           Ot        

      585.2        

                                                             

 

                2016           NANCY MENDEZ, YU MELENDEZ           Ot        

      791.0        

                                                             

 

                2016           JULY VOSS DO           Ot       

       V76.12      

                                                             

 

                2016           JULY VOSS DO           Ot       

       611.72      

                                                             

 

                2016           NANCY MENDEZ, AHMED S           Ot        

      250.01       

                                                             

 

                2016           NANCY MENDEZ, MED S           Ot        

      272.4        

                                                             

 

                2016           NANCY MENDEZ, MED S           Ot        

      403.10       

                                                             

 

                2016           NANCY MENDEZ, MED S           Ot        

      583.81       

                                                             

 

                2016           NANCY MENDEZ, MED S           Ot        

      585.2        

                                                             

 

                2016           NANCY MENDEZ, MED S           Ot        

      791.0        

                                                             

 

                2016           FELIZ GERMAIN DO           Ot        

      250.03       

                                                             

 

                2016           NANCY MENDEZ, MED S           Ot        

      250.01       

                                                             

 

                2016           NANCY MENDEZ, MED S           Ot        

      272.4        

                                                             

 

                2016           NANCY MENDEZ, MED S           Ot        

      403.10       

                                                             

 

                2016           NANCY MENDEZ, MED S           Ot        

      583.81       

                                                             

 

                2016           NANCY MENDEZ, MED S           Ot        

      585.2        

                                                             

 

                2016           NANCY MENDEZ, [facility] S           Ot        

      791.0        

                                                             

 

                2016           FELIZ GERMAIN DO           Ot        

      250.03       

                                                             

 

                2016           JULY VOSS DO           Ot       

       453.40      

                                                             

 

           2016                       Ot           250.03                 

           

    

 

           2016                       Ot           250.41                 

           

    

 

           2016                       Ot           272.4                  

           

   

 

           2016                       Ot           403.10                 

           

    

 

           2016                       Ot           583.81                 

           

    

 

           2016                       Ot           585.2                  

           

   

 

           2016                       Ot           791.0                  

           

   

 

                2016           NANCY MENDEZ, MED S           Ot        

      E11.29       

                                                             

 

                2016           NANCY MENDEZ, MED S           Ot        

      E78.5        

                                                             

 

                2016           NANCY MENDEZ, MED S           Ot        

      I12.9        

                                                             

 

                2016           NANCY MENDEZ, GENESIS S           Ot        

      N18.3        

                                                             

 

                2016           NANCY MENDEZ, GENESIS S           Ot        

      R80.9        

                                                             

 

           2016                       Ot           250.01                 

           

    

 

           2016                       Ot           272.4                  

           

   

 

           2016                       Ot           403.90                 

           

    

 

           2016                       Ot           583.81                 

           

    

 

           2016                       Ot           585.2                  

           

   

 

           2016                       Ot           791.0                  

           

   

 

           2016                       Ot           250.01                 

           

    

 

           2016                       Ot           272.4                  

           

   

 

           2016                       Ot           403.90                 

           

    

 

           2016                       Ot           583.81                 

           

    

 

           2016                       Ot           585.2                  

           

   

 

           2016                       Ot           791.0                  

           

   

 

           2016                       Ot           250.41                 

           

    

 

           2016                       Ot           272.4                  

           

   

 

           2016                       Ot           403.90                 

           

    

 

           2016                       Ot           583.81                 

           

    

 

           2016                       Ot           585.2                  

           

   

 

           2016                       Ot           791.0                  

           

   

 

           2016                       Ot           250.41                 

           

    

 

           2016                       Ot           272.4                  

           

   

 

           2016                       Ot           403.90                 

           

    

 

           2016                       Ot           583.81                 

           

    

 

           2016                       Ot           585.2                  

           

   

 

           2016                       Ot           791.0                  

           

   

 

           2016                       Ot           250.01                 

           

    

 

           2016                       Ot           272.4                  

           

   

 

           2016                       Ot           403.10                 

           

    

 

           2016                       Ot           583.81                 

           

    

 

           2016                       Ot           585.2                  

           

   

 

           2016                       Ot           791.0                  

           

   

 

           2016                       Ot           250.01                 

           

    

 

           2016                       Ot           272.4                  

           

   

 

           2016                       Ot           403.10                 

           

    

 

           2016                       Ot           583.81                 

           

    

 

           2016                       Ot           585.2                  

           

   

 

           2016                       Ot           791.0                  

           

   

 

           2016                       Ot           250.40                 

           

    

 

           2016                       Ot           272.4                  

           

   

 

           2016                       Ot           585.2                  

           

   

 

           2016                       Ot           791.0                  

           

   

 

           2016                       Ot           453.40                 

           

    

 

                2016           NANCY MENDEZ, YU MELENDEZ           Ot        

      585.3        

                                                             

 

                2016           NANCY MENDEZ, GENESIS MELENDEZ           Ot        

      250.40       

                                                             

 

                2016           NANCY MENDEZ, GENESIS MELENDEZ           Ot        

      272.4        

                                                             

 

                2016           NANCY MENDEZ, GENESIS MELENDEZ           Ot        

      585.2        

                                                             

 

                2016           NANCY MENDEZ, GENESIS MELENDEZ           Ot        

      791.0        

                                                             

 

                2016           JULY VOSS DO           Ot       

       V76.12      

                                                             

 

                2016           JULY VOSS DO           Ot       

       611.72      

                                                             

 

                2016           NANCY MENDEZ, YU MELENDEZ           Ot        

      250.01       

                                                             

 

                2016           NANCY MENDEZ, GENESIS MELENDEZ           Ot        

      272.4        

                                                             

 

                2016           NANCY MENDEZ, MED S           Ot        

      403.10       

                                                             

 

                2016           NANCY MENDEZ, MED S           Ot        

      583.81       

                                                             

 

                2016           NANCY MENDEZ, MED S           Ot        

      585.2        

                                                             

 

                2016           NANCY MENDEZ, MED S           Ot        

      791.0        

                                                             

 

                2016           FELIZ GERMAIN DO           Ot        

      250.03       

                                                             

 

                2016           NANCY MENDEZ, MED S           Ot        

      250.01       

                                                             

 

                2016           NANCY MENDEZ, MED S           Ot        

      272.4        

                                                             

 

                2016           NANCY MENDEZ, MED S           Ot        

      403.10       

                                                             

 

                2016           NANCY MENDEZ, MED S           Ot        

      583.81       

                                                             

 

                2016           NANCY MENDEZ, MED S           Ot        

      585.2        

                                                             

 

                2016           NANCY MENDEZ, MED S           Ot        

      791.0        

                                                             

 

                2016           FELIZ GERMAIN DO           Ot        

      250.03       

                                                             

 

                2016           JULY VOSS DO           Ot       

       453.40      

                                                             

 

           2016                       Ot           250.03                 

           

    

 

           2016                       Ot           250.41                 

           

    

 

           2016                       Ot           272.4                  

           

   

 

           2016                       Ot           403.10                 

           

    

 

           2016                       Ot           583.81                 

           

    

 

           2016                       Ot           585.2                  

           

   

 

           2016                       Ot           791.0                  

           

   

 

                2016           NANCY MENDEZ, MED S           Ot        

      E11.29       

                                                             

 

                2016           NANCY MENDEZ, MED S           Ot        

      E78.5        

                                                             

 

                2016           NANCY MENDEZ, MED S           Ot        

      I12.9        

                                                             

 

                2016           NANCY MENDEZ, MED S           Ot        

      N18.3        

                                                             

 

                2016           NANCY MENDEZ, MED S           Ot        

      R80.9        

                                                             

 

                2016           TIMOTHY MENDEZ, MELODY D           Ot        

      E86.0        

                                                             

 

                2016           TIMOTHY MENDEZ, MELODY D           Ot        

      F11.10       

                                                             

 

                2016           TIMOTHY MENDEZ, MELODY D           Ot        

      F12.10       

                                                             

 

                2016           TIMOTHY MENDEZ, MELODY D           Ot        

      F15.10       

                                                             

 

                04/15/2016           TIMOTHY MENDEZ, MELODY D           Ot        

      E86.0        

                          DEHYDRATION                        

 

                04/15/2016           TIMOTHY MENDEZ, MELODY GRIJALVA           Ot        

      F11.10       

                          OPIOID ABUSE, UNCOMPLICATED                    

 

                04/15/2016           MELODY AGUIRRE MD           Ot        

      F12.10       

                          CANNABIS ABUSE, UNCOMPLICATED                    

 

                04/15/2016           TIMOTHY MENDEZ, MELODY GRIJALVA           Ot        

      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       

                          OTHER STIMULANT ABUSE, UNCOMPLICATED                  

  

 

                2016           TIMOTHY MENDEZ, MELODY GRIJALVA           Ot        

      E86.0        

                          DEHYDRATION                        

 

                2016           MELODY AGUIRRE MD           Ot        

      F11.10       

                          OPIOID ABUSE, UNCOMPLICATED                    

 

                2016           MELODY AGUIRRE MD           Ot        

      F12.10       

                          CANNABIS ABUSE, UNCOMPLICATED                    

 

                2016           TIMOTHY MENDEZ, MELODY GRIJALVA           Ot        

      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       

                          OTHER STIMULANT ABUSE, UNCOMPLICATED                  

  

 

             2016                        Ot           250.01           J CARLOS

B KATHERIN WO 

COMPL, TYPE I [JUVENILE TYP                      

 

             2016                        Ot           272.4           HYPE

RLIPIDEMIA 

NEC/NOS                                          

 

             2016                        Ot           403.90           HYP

TNSV CHR KID 

DIS, UNSPEC, W [facility]                         

 

             2016                        Ot           583.81           NEP

HRITIS NOS IN 

OTH DIS                                          

 

             2016                        Ot           585.2           

DAWN KIDNEY 

DISEASE, STAGE II (MILD)                         

 

             2016                        Ot           791.0           PROT

EINURIA       

                                                 

 

             2016                        Ot           250.01           J CARLOS

B KATHERIN WO 

COMPL, TYPE I [JUVENILE TYP                      

 

             2016                        Ot           272.4           HYPE

RLIPIDEMIA 

NEC/NOS                                          

 

             2016                        Ot           403.90           HYP

TNSV CHR KID 

DIS, UNSPEC, W [facility]                         

 

             2016                        Ot           583.81           NEP

HRITIS NOS IN 

OTH DIS                                          

 

             2016                        Ot           585.2           

DAWN KIDNEY 

DISEASE, STAGE II (MILD)                         

 

             2016                        Ot           791.0           PROT

EINURIA       

                                                 

 

             2016                        Ot           250.41           J CARLOS

B W RENAL 

MANIFEST, TYPE I [JUVENILE                       

 

             2016                        Ot           272.4           HYPE

RLIPIDEMIA 

NEC/NOS                                          

 

             2016                        Ot           403.90           HYP

TNSV CHR KID 

DIS, UNSPEC, W [facility]                         

 

             2016                        Ot           583.81           NEP

HRITIS NOS IN 

OTH DIS                                          

 

             2016                        Ot           585.2           

DAWN KIDNEY 

DISEASE, STAGE II (MILD)                         

 

             2016                        Ot           791.0           PROT

EINURIA       

                                                 

 

             2016                        Ot           250.41           J ACRLOS

B W RENAL 

MANIFEST, TYPE I [JUVENILE                       

 

             2016                        Ot           272.4           HYPE

RLIPIDEMIA 

NEC/NOS                                          

 

             2016                        Ot           403.90           HYP

TNSV CHR KID 

DIS, UNSPEC, W [facility]R [facility]                         

 

             2016                        Ot           583.81           NEP

HRITIS NOS IN 

OTH DIS                                          

 

             2016                        Ot           585.2           

DAWN KIDNEY 

DISEASE, STAGE II (MILD)                         

 

             2016                        Ot           791.0           PROT

EINURIA       

                                                 

 

             2016                        Ot           250.01           J CARLOS

B KATHERIN WO 

COMPL, TYPE I [JUVENILE TYP                      

 

             2016                        Ot           272.4           HYPE

RLIPIDEMIA 

NEC/NOS                                          

 

             2016                        Ot           403.10           HYP

TNSV CHR KID 

DIS, BENIGN, W [facility]                         

 

             2016                        Ot           583.81           NEP

HRITIS NOS IN 

OTH DIS                                          

 

             2016                        Ot           585.2           

DAWN KIDNEY 

DISEASE, STAGE II (MILD)                         

 

             2016                        Ot           791.0           PROT

EINURIA       

                                                 

 

             2016                        Ot           250.01           J CARLOS

B KATHERIN WO 

COMPL, TYPE I [JUVENILE TYP                      

 

             2016                        Ot           272.4           HYPE

RLIPIDEMIA 

NEC/NOS                                          

 

             2016                        Ot           403.10           HYP

TNSV CHR KID 

DIS, BENIGN, W [facility]R [facility]                         

 

             2016                        Ot           583.81           NEP

HRITIS NOS IN 

OTH DIS                                          

 

             2016                        Ot           585.2           

DAWN KIDNEY 

DISEASE, STAGE II (MILD)                         

 

             2016                        Ot           791.0           PROT

EINURIA       

                                                 

 

             2016                        Ot           250.40           J CARLOS

B W RENAL 

MANIFEST, TYPE II OR UNSPEC                      

 

             2016                        Ot           272.4           HYPE

RLIPIDEMIA 

NEC/NOS                                          

 

             2016                        Ot           585.2           

DAWN KIDNEY 

DISEASE, STAGE II (MILD)                         

 

             2016                        Ot           791.0           PROT

EINURIA       

                                                 

 

             2016                        Ot           453.40           ACU

TE VENOUS 

EMBOLISM   THROMBOSIS UNSP                       

 

                2016           NANCY MENDEZ, YU MELENDEZ           Ot        

      585.3        

                          CHRONIC KIDNEY DISEASE, STAGE III (MODER              

      

 

                2016           NANCY MENDEZ, YU MELENDEZ           Ot        

      250.40       

                          DIAB W RENAL MANIFEST, TYPE II OR UNSPEC              

      

 

                2016           NANCY MENDEZ, YU MELENDEZ           Ot        

      272.4        

                          HYPERLIPIDEMIA NEC/NOS                    

 

                2016           NANCY MENDEZ, AHMED S           Ot        

      585.2        

                          CHRONIC KIDNEY DISEASE, STAGE II (MILD)               

     

 

                2016           NANCY MENDEZ, AHMED S           Ot        

      791.0        

                          PROTEINURIA                        

 

                2016           JULY VOSS DO           Ot       

       V76.12      

                          OTH SCREEN MAMMO-MALIGN NEOPLASM OF BABATUNDE              

      

 

                2016           JULY VOSS DO           Ot       

       611.72      

                          LUMP OR MASS IN BREAST                    

 

                2016           NANCY MENDEZ, AHMED S           Ot        

      250.01       

                          DIAB KATHERIN WO COMPL, TYPE I [JUVENILE TYP              

      

 

                2016           NANCY MENDEZ, AHMED S           Ot        

      272.4        

                          HYPERLIPIDEMIA NEC/NOS                    

 

                2016           NANCY MENDEZ, AHMED S           Ot        

      403.10       

                          HYPTNSV [facility] KID DIS, BENIGN, W CHR [facility]              

      

 

                2016           NANCY MENDEZ, AHMED S           Ot        

      583.81       

                          NEPHRITIS NOS IN OTH DIS                    

 

                2016           NANCY MENDEZ, AHMED S           Ot        

      585.2        

                          CHRONIC KIDNEY DISEASE, STAGE II (MILD)               

     

 

                2016           NANCY MENDEZ, AHMED S           Ot        

      791.0        

                          PROTEINURIA                        

 

                2016           GERMAIN , FELIZ L           Ot        

      250.03       

                          DIAB KATHERIN WO COMPL, TYPE I [JUVENILE TYP              

      

 

                2016           NANCY MENDEZ, AHMED S           Ot        

      250.01       

                          DIAB KATHERIN WO COMPL, TYPE I [JUVENILE TYP              

      

 

                2016           NANCY MENDEZ, AHMED S           Ot        

      272.4        

                          HYPERLIPIDEMIA NEC/NOS                    

 

                2016           NANCY MENDEZ, AHMED S           Ot        

      403.10       

                          HYPTNSV [facility] KID DIS, BENIGN, W CHR [facility]              

      

 

                2016           NANCY MENDEZ, AHMED S           Ot        

      583.81       

                          NEPHRITIS NOS IN OTH DIS                    

 

                2016           NANCY MENDEZ, AHMED S           Ot        

      585.2        

                          CHRONIC KIDNEY DISEASE, STAGE II (MILD)               

     

 

                2016           NANCY MENDEZ, AHMED S           Ot        

      791.0        

                          PROTEINURIA                        

 

                2016           GERMAIN DO, FELIZ L           Ot        

      250.03       

                          DIAB KATHERIN WO COMPL, TYPE I [JUVENILE TYP              

      

 

                2016           JULY VOSS DO           Ot       

       453.40      

                          ACUTE VENOUS EMBOLISM   THROMBOSIS UNSP               

      

 

             2016                        Ot           250.03           J CARLOS

B KATHERIN WO 

COMPL, TYPE I [JUVENILE TYP                      

 

             2016                        Ot           250.41           J CARLOS

B W RENAL 

MANIFEST, TYPE I [JUVENILE                       

 

             2016                        Ot           272.4           HYPE

RLIPIDEMIA 

NEC/NOS                                          

 

             2016                        Ot           403.10           HYP

TNSV CHR KID 

DIS, BENIGN, W CHR [facility]                         

 

             2016                        Ot           583.81           NEP

HRITIS NOS IN 

OTH DIS                                          

 

             2016                        Ot           585.2           

DAWN KIDNEY 

DISEASE, STAGE II (MILD)                         

 

             2016                        Ot           791.0           PROT

EINURIA       

                                                 

 

                2016           NANCY MENDEZ, YU S           Ot        

      E11.29       

                          TYPE 2 DIABETES MELLITUS W H DIABETIC               

      

 

                2016           NANCY MENDEZ, JULIANNEMED S           Ot        

      E78.5        

                          HYPERLIPIDEMIA, UNSPECIFIED                    

 

                2016           NACNY MENDEZ, JULIANNEMED S           Ot        

      I12.9        

                          HYPERTENSIVE CHRONIC KIDNEY DISEASE W ST              

      

 

                2016           NANCY MENDEZ, YU S           Ot        

      N18.3        

                          CHRONIC KIDNEY DISEASE, STAGE 3 (MODERAT              

      

 

                2016           NANCY MENDEZ, JULIANNEMED S           Ot        

      R80.9        

                          PROTEINURIA, UNSPECIFIED                    

 

                2016           NANCY MENDEZ, JULIANNEMED S           Ot        

      E11.21       

                          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              

      

 

                2016           NANCY MENDEZ, YU S           Ot        

      E11.22       

                          TYPE 2 DIABETES MELLITUS W DIABETIC               

      

 

                2016           NANCY MENDEZ, YU S           Ot        

      E78.5        

                          HYPERLIPIDEMIA, UNSPECIFIED                    

 

                2016           NANCY MENDEZ, YU S           Ot        

      I12.9        

                          HYPERTENSIVE CHRONIC KIDNEY DISEASE W ST              

      

 

                2016           NANCY MENDEZ, JULIANNEMED S           Ot        

      N18.2        

                          CHRONIC KIDNEY DISEASE, STAGE 2 (MILD)                

    

 

                2016           NANCY MENDEZ, JULIANNEMED S           Ot        

      R80.9        

                          PROTEINURIA, UNSPECIFIED                    

 

                2016           NANCY MENDEZ, AHMED S           Ot        

      Z79.899      

                          OTHER LONG TERM (CURRENT) DRUG THERAPY                

    

 

                05/10/2016           NANCY MENDEZ, YU S           Ot        

      E11.21       

                          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              

      

 

                05/10/2016           NANCY MENDEZ, YU S           Ot        

      E11.22       

                          TYPE 2 DIABETES MELLITUS W DIABETIC               

      

 

                05/10/2016           NANCY MENDEZ, YU S           Ot        

      E78.5        

                          HYPERLIPIDEMIA, UNSPECIFIED                    

 

                05/10/2016           NANCY MENDEZ, JULIANNEGENESIS S           Ot        

      I12.9        

                          HYPERTENSIVE CHRONIC KIDNEY DISEASE W ST              

      

 

                05/10/2016           NANCY MENDEZ, YU S           Ot        

      N18.2        

                          CHRONIC KIDNEY DISEASE, STAGE 2 (MILD)                

    

 

                05/10/2016           NANCY MENDEZ, JULIANNEGENESIS S           Ot        

      R80.9        

                          PROTEINURIA, UNSPECIFIED                    

 

                05/10/2016           NANCY MENDEZ, YU S           Ot        

      Z79.899      

                          OTHER LONG TERM (CURRENT) DRUG THERAPY                

    

 

                2016           GERMAIN DO, FELIZ L           Ot        

      E10.65       

                          TYPE 1 DIABETES MELLITUS WITH HYPERGLYCE              

      

 

                2016           GERMAIN DO, FELIZ L           Ot        

      E78.5        

                          HYPERLIPIDEMIA, UNSPECIFIED                    

 

                2016           GERMAIN DO, FELIZ L           Ot        

      E10.65       

                          TYPE 1 DIABETES MELLITUS WITH HYPERGLYCE              

      

 

                2016           GERMAIN DO, FELIZ L           Ot        

      E78.5        

                          HYPERLIPIDEMIA, UNSPECIFIED                    

 

             2016                        Ot           250.01           J CARLOS

B KATHERIN WO 

COMPL, TYPE I [JUVENILE TYP                      

 

             2016                        Ot           272.4           HYPE

RLIPIDEMIA 

NEC/NOS                                          

 

             2016                        Ot           403.90           HYP

TNSV CHR KID 

DIS, UNSPEC, W CHR KD ST                         

 

             2016                        Ot           583.81           NEP

HRITIS NOS IN 

OTH DIS                                          

 

             2016                        Ot           585.2           

DAWN KIDNEY 

DISEASE, STAGE II (MILD)                         

 

             2016                        Ot           791.0           PROT

EINURIA       

                                                 

 

             2016                        Ot           250.41           J CARLOS

B W RENAL 

MANIFEST, TYPE I [JUVENILE                       

 

             2016                        Ot           272.4           HYPE

RLIPIDEMIA 

NEC/NOS                                          

 

             2016                        Ot           403.90           HYP

TNSV CHR KID 

DIS, UNSPEC, W CHR KD ST                         

 

             2016                        Ot           583.81           NEP

HRITIS NOS IN 

OTH DIS                                          

 

             2016                        Ot           585.2           

DAWN KIDNEY 

DISEASE, STAGE II (MILD)                         

 

             2016                        Ot           791.0           PROT

EINURIA       

                                                 

 

             2016                        Ot           250.41           J CARLOS

B W RENAL 

MANIFEST, TYPE I [JUVENILE                       

 

             2016                        Ot           272.4           HYPE

RLIPIDEMIA 

NEC/NOS                                          

 

             2016                        Ot           403.90           HYP

TNSV CHR KID 

DIS, UNSPEC, W CHR KD ST                         

 

             2016                        Ot           583.81           NEP

HRITIS NOS IN 

OTH DIS                                          

 

             2016                        Ot           585.2           

DAWN KIDNEY 

DISEASE, STAGE II (MILD)                         

 

             2016                        Ot           791.0           PROT

EINURIA       

                                                 

 

             2016                        Ot           250.01           J CARLOS

B KATHERIN WO 

COMPL, TYPE I [JUVENILE TYP                      

 

             2016                        Ot           272.4           HYPE

RLIPIDEMIA 

NEC/NOS                                          

 

             2016                        Ot           403.10           HYP

TNSV CHR KID 

DIS, BENIGN, W CHR KD ST                         

 

             2016                        Ot           583.81           NEP

HRITIS NOS IN 

OTH DIS                                          

 

             2016                        Ot           585.2           

DAWN KIDNEY 

DISEASE, STAGE II (MILD)                         

 

             2016                        Ot           791.0           PROT

EINURIA       

                                                 

 

             2016                        Ot           250.01           J CARLOS

B KATHERIN WO 

COMPL, TYPE I [JUVENILE TYP                      

 

             2016                        Ot           272.4           HYPE

RLIPIDEMIA 

NEC/NOS                                          

 

             2016                        Ot           403.10           HYP

TNSV CHR KID 

DIS, BENIGN, W CHR KD ST                         

 

             2016                        Ot           583.81           NEP

HRITIS NOS IN 

OTH DIS                                          

 

             2016                        Ot           585.2           

DAWN KIDNEY 

DISEASE, STAGE II (MILD)                         

 

             2016                        Ot           791.0           PROT

EINURIA       

                                                 

 

             2016                        Ot           250.40           J CARLOS

B W RENAL 

MANIFEST, TYPE II OR UNSPEC                      

 

             2016                        Ot           272.4           HYPE

RLIPIDEMIA 

NEC/NOS                                          

 

             2016                        Ot           585.2           

DAWN KIDNEY 

DISEASE, STAGE II (MILD)                         

 

             2016                        Ot           791.0           PROT

EINURIA       

                                                 

 

             2016                        Ot           453.40           ACU

TE VENOUS 

EMBOLISM   THROMBOSIS UNSP                       

 

                2016           NANCY MENDEZ, YU S           Ot        

      585.3        

                          CHRONIC KIDNEY DISEASE, STAGE III (MODER              

      

 

                2016           NANCY MENDEZ, YU S           Ot        

      250.40       

                          DIAB W RENAL MANIFEST, TYPE II OR UNSPEC              

      

 

                2016           NANCY MENDEZ, YU S           Ot        

      272.4        

                          HYPERLIPIDEMIA NEC/NOS                    

 

                2016           NANCY MENDEZ, YU S           Ot        

      585.2        

                          CHRONIC KIDNEY DISEASE, STAGE II (MILD)               

     

 

                2016           NANCY MENDEZ, YU S           Ot        

      791.0        

                          PROTEINURIA                        

 

                2016           JULY VOSS DO           Ot       

       V76.12      

                          OT SCREEN MAMMO-MALIGN NEOPLASM OF BABATUNDE              

      

 

                2016           JULY VOSS DO           Ot       

       611.72      

                          LUMP OR MASS IN BREAST                    

 

                2016           NANCY MENDEZ, AHMED S           Ot        

      250.01       

                          DIAB KATHERIN WO COMPL, TYPE I [JUVENILE TYP              

      

 

                2016           NANCY MENDEZ, AHMED S           Ot        

      272.4        

                          HYPERLIPIDEMIA NEC/NOS                    

 

                2016           NANCY MENDEZ, AHMED S           Ot        

      403.10       

                          HYPTNSV [facility] KID DIS, BENIGN, W CHR KD ST              

      

 

                2016           NANCY MENDEZ, AHMED S           Ot        

      583.81       

                          NEPHRITIS NOS IN OTH DIS                    

 

                2016           NANCY MENDEZ, AHMED S           Ot        

      585.2        

                          CHRONIC KIDNEY DISEASE, STAGE II (MILD)               

     

 

                2016           NANCY MENDEZ, AHMED S           Ot        

      791.0        

                          PROTEINURIA                        

 

                2016           FELIZ GERMAIN DO L           Ot        

      250.03       

                          DIAB KATHERIN WO COMPL, TYPE I [JUVENILE TYP              

      

 

                2016           NANCY MENDEZ, AHMED S           Ot        

      250.01       

                          DIAB KATHERIN WO COMPL, TYPE I [JUVENILE TYP              

      

 

                2016           NANCY MENDEZ, AHMED S           Ot        

      272.4        

                          HYPERLIPIDEMIA NEC/NOS                    

 

                2016           NANCY MENDEZ, AHMED S           Ot        

      403.10       

                          HYPTNSV [facility] KID DIS, BENIGN, W CHR KD ST              

      

 

                2016           NANCY MENDEZ, AHMED S           Ot        

      583.81       

                          NEPHRITIS NOS IN OTH DIS                    

 

                2016           NANCY MENDEZ, AHMED S           Ot        

      585.2        

                          CHRONIC KIDNEY DISEASE, STAGE II (MILD)               

     

 

                2016           NANCY MENDEZ, AHMED S           Ot        

      791.0        

                          PROTEINURIA                        

 

                2016           SHERIE GERMAIN DOISON L           Ot        

      250.03       

                          DIAB KATHERIN WO COMPL, TYPE I [JUVENILE TYP              

      

 

                2016           JULY VOSS DO           Ot       

       453.40      

                          ACUTE VENOUS EMBOLISM   THROMBOSIS UNSP               

      

 

             2016                        Ot           250.03           J CARLOS

B KATHERIN WO 

COMPL, TYPE I [JUVENILE TYP                      

 

             2016                        Ot           250.41           J CARLOS

B W RENAL 

MANIFEST, TYPE I [JUVENILE                       

 

             2016                        Ot           272.4           HYPE

RLIPIDEMIA 

NEC/NOS                                          

 

             2016                        Ot           403.10           HYP

TNSV CHR KID 

DIS, BENIGN, W CHR KD ST                         

 

             2016                        Ot           583.81           NEP

HRITIS NOS IN 

OTH DIS                                          

 

             2016                        Ot           585.2           

DAWN KIDNEY 

DISEASE, STAGE II (MILD)                         

 

             2016                        Ot           791.0           PROT

EINURIA       

                                                 

 

                2016           NANCY MENDEZ, JULIANNEMED S           Ot        

      E11.29       

                          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               

      

 

                2016           NANCY MENDEZ, AHMED S           Ot        

      E78.5        

                          HYPERLIPIDEMIA, UNSPECIFIED                    

 

                2016           NANCY MENDEZ, AHMED S           Ot        

      I12.9        

                          HYPERTENSIVE CHRONIC KIDNEY DISEASE W ST              

      

 

                2016           NANCY MENDEZ, AHMED S           Ot        

      N18.3        

                          CHRONIC KIDNEY DISEASE, STAGE 3 (MODERAT              

      

 

                2016           NANCY MENDEZ, AHMED S           Ot        

      R80.9        

                          PROTEINURIA, UNSPECIFIED                    

 

                2016           NANCY MENDEZ, AHMED S           Ot        

      E11.21       

                          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              

      

 

                2016           ANNCY MENDEZ, AHMED S           Ot        

      E11.22       

                          TYPE 2 DIABETES MELLITUS W DIABETIC               

      

 

                2016           NANCY MENDEZ, AHMED S           Ot        

      E78.5        

                          HYPERLIPIDEMIA, UNSPECIFIED                    

 

                2016           NANCY MENDEZ, AHMED S           Ot        

      I12.9        

                          HYPERTENSIVE CHRONIC KIDNEY DISEASE W ST              

      

 

                2016           NANCY MENDZE, AHMED S           Ot        

      N18.2        

                          CHRONIC KIDNEY DISEASE, STAGE 2 (MILD)                

    

 

                2016           NANCY MENDEZ, AHMED S           Ot        

      R80.9        

                          PROTEINURIA, UNSPECIFIED                    

 

                2016           NANCY MENDEZ, AHMED S           Ot        

      Z79.899      

                          OTHER LONG TERM (CURRENT) DRUG THERAPY                

    

 

                2016           MARCELLUS RAMIRES, FELIZ L           Ot        

      E10.65       

                          TYPE 1 DIABETES MELLITUS WITH HYPERGLYCE              

      

 

                2016           MARCELLUS RAMIRES, FELIZ L           Ot        

      E78.5        

                          HYPERLIPIDEMIA, UNSPECIFIED                    

 

                2016           MARCELLUS DO, FELIZ L           Ot        

      E10.65       

                          TYPE 1 DIABETES MELLITUS WITH HYPERGLYCE              

      

 

                2016           MARCELLUS RAMIRES, FELIZ L           Ot        

      E78.5        

                          HYPERLIPIDEMIA, UNSPECIFIED                    

 

                2016           MARCELLUS RAMIRES, FELIZ L           Ot        

      E10.65       

                          TYPE 1 DIABETES MELLITUS WITH HYPERGLYCE              

      

 

                2016           FELIZ GERMAIN DO           Ot        

      E78.5        

                          HYPERLIPIDEMIA, UNSPECIFIED                    

 

             2017                        Ot           250.01           J CARLOS

B KATHERIN WO 

COMPL, TYPE I [JUVENILE TYP                      

 

             2017                        Ot           272.4           HYPE

RLIPIDEMIA 

NEC/NOS                                          

 

             2017                        Ot           403.10           HYP

TNSV CHR KID 

DIS, BENIGN, W CHR KD ST                         

 

             2017                        Ot           583.81           NEP

HRITIS NOS IN 

OTH DIS                                          

 

             2017                        Ot           585.2           

DAWN KIDNEY 

DISEASE, STAGE II (MILD)                         

 

             2017                        Ot           791.0           PROT

EINURIA       

                                                 

 

             2017                        Ot           250.40           J CARLOS

B W RENAL 

MANIFEST, TYPE II OR UNSPEC                      

 

             2017                        Ot           272.4           HYPE

RLIPIDEMIA 

NEC/NOS                                          

 

             2017                        Ot           585.2           

DAWN KIDNEY 

DISEASE, STAGE II (MILD)                         

 

             2017                        Ot           791.0           PROT

EINURIA       

                                                 

 

             2017                        Ot           453.40           ACU

TE VENOUS 

EMBOLISM   THROMBOSIS UNSP                       

 

                2017           NANCY MENDEZ, AHMED S           Ot        

      585.3        

                          CHRONIC KIDNEY DISEASE, STAGE III (MODER              

      

 

                2017           NANCY MENDEZ, JULIANNEMED S           Ot        

      250.40       

                          DIAB W RENAL MANIFEST, TYPE II OR UNSPEC              

      

 

                2017           NANCY MENDEZ, AHMED S           Ot        

      272.4        

                          HYPERLIPIDEMIA NEC/NOS                    

 

                2017           NANCY MENDEZ, AHMED S           Ot        

      585.2        

                          CHRONIC KIDNEY DISEASE, STAGE II (MILD)               

     

 

                2017           NANCY MENDEZ, AHMED S           Ot        

      791.0        

                          PROTEINURIA                        

 

                2017           UJLY VOSS DO           Ot       

       V76.12      

                          OTH SCREEN MAMMO-MALIGN NEOPLASM OF BABATUNDE              

      

 

                2017           JULY VOSS DO           Ot       

       611.72      

                          LUMP OR MASS IN BREAST                    

 

                2017           NANCY MENDEZ, AHMED S           Ot        

      250.01       

                          DIAB KATHERIN WO COMPL, TYPE I [JUVENILE TYP              

      

 

                2017           NANCY MENDEZ, AHMED S           Ot        

      272.4        

                          HYPERLIPIDEMIA NEC/NOS                    

 

                2017           NANCY MENDEZ, YU S           Ot        

      403.10       

                          HYPTNSV CHR KID DIS, BENIGN, W CHR KD ST              

      

 

                2017           NANCY MENDEZ, YU S           Ot        

      583.81       

                          NEPHRITIS NOS IN OTH DIS                    

 

                2017           NANCY MENDEZ, AHMED S           Ot        

      585.2        

                          CHRONIC KIDNEY DISEASE, STAGE II (MILD)               

     

 

                2017           NANCY MENDEZ, AHMED S           Ot        

      791.0        

                          PROTEINURIA                        

 

                2017           FELIZ GERMAIN DO           Ot        

      250.03       

                          DIAB KATHERIN WO COMPL, TYPE I [JUVENILE TYP              

      

 

                2017           NANCY MENDEZ, AHMED S           Ot        

      250.01       

                          DIAB KATHERIN WO COMPL, TYPE I [JUVENILE TYP              

      

 

                2017           NANCY MENDEZ, AHMED S           Ot        

      272.4        

                          HYPERLIPIDEMIA NEC/NOS                    

 

                2017           NANCY MENDEZ, AHMED S           Ot        

      403.10       

                          HYPTNSV [facility] KID DIS, BENIGN, W CHR KD ST              

      

 

                2017           NANCY MENDEZ, AHMED S           Ot        

      583.81       

                          NEPHRITIS NOS IN OTH DIS                    

 

                2017           NANCY MENDEZ, AHMED S           Ot        

      585.2        

                          CHRONIC KIDNEY DISEASE, STAGE II (MILD)               

     

 

                2017           NANCY MENDEZ, AHMED S           Ot        

      791.0        

                          PROTEINURIA                        

 

                2017           FELIZ GERMAIN DO           Ot        

      250.03       

                          DIAB KATHERIN WO COMPL, TYPE I [JUVENILE TYP              

      

 

                2017           JULY VOSS DO           Ot       

       453.40      

                          ACUTE VENOUS EMBOLISM   THROMBOSIS UNSP               

      

 

             2017                        Ot           250.03           J CARLOS

B KATHERIN WO 

COMPL, TYPE I [JUVENILE TYP                      

 

             2017                        Ot           250.41           J CARLOS

B W RENAL 

MANIFEST, TYPE I [JUVENILE                       

 

             2017                        Ot           272.4           HYPE

RLIPIDEMIA 

NEC/NOS                                          

 

             2017                        Ot           403.10           HYP

TNSV CHR KID 

DIS, BENIGN, W CHR KD ST                         

 

             2017                        Ot           583.81           NEP

HRITIS NOS IN 

OTH DIS                                          

 

             2017                        Ot           585.2           

DAWN KIDNEY 

DISEASE, STAGE II (MILD)                         

 

             2017                        Ot           791.0           PROT

EINURIA       

                                                 

 

                2017           NANCY MENDEZ, AHMED S           Ot        

      E11.29       

                          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               

      

 

                2017           NANCY MENDEZ, AHMED S           Ot        

      E78.5        

                          HYPERLIPIDEMIA, UNSPECIFIED                    

 

                2017           NANCY MENDEZ, YU S           Ot        

      I12.9        

                          HYPERTENSIVE CHRONIC KIDNEY DISEASE W ST              

      

 

                2017           NANCY MENDEZ, JULIANNEMED S           Ot        

      N18.3        

                          CHRONIC KIDNEY DISEASE, STAGE 3 (MODERAT              

      

 

                2017           NANCY MENDEZ, AHMED S           Ot        

      R80.9        

                          PROTEINURIA, UNSPECIFIED                    

 

                2017           NANCY MENDEZ, JULIANNEMED S           Ot        

      E11.21       

                          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              

      

 

                2017           NANCY MENDEZ, JULIANNEMED S           Ot        

      E11.22       

                          TYPE 2 DIABETES MELLITUS W DIABETIC               

      

 

                2017           NANCY MENDEZ, JULIANNEMED S           Ot        

      E78.5        

                          HYPERLIPIDEMIA, UNSPECIFIED                    

 

                2017           NANCY MENDEZ, JULIANNEMED S           Ot        

      I12.9        

                          HYPERTENSIVE CHRONIC KIDNEY DISEASE W ST              

      

 

                2017           NANCY MENDEZ, AHMED S           Ot        

      N18.2        

                          CHRONIC KIDNEY DISEASE, STAGE 2 (MILD)                

    

 

                2017           JULIANNE CRUZ MDMED S           Ot        

      R80.9        

                          PROTEINURIA, UNSPECIFIED                    

 

                2017           NANCY MENDEZ, AHMED S           Ot        

      Z79.899      

                          OTHER LONG TERM (CURRENT) DRUG THERAPY                

    

 

                2017           MARCELLUS RAMIRES, FELIZ L           Ot        

      E10.65       

                          TYPE 1 DIABETES MELLITUS WITH HYPERGLYCE              

      

 

                2017           MARCELLUS RAMIRES, FELIZ L           Ot        

      E78.5        

                          HYPERLIPIDEMIA, UNSPECIFIED                    

 

                2017           MARCELLUS RAMIRES, FELIZ L           Ot        

      E10.65       

                          TYPE 1 DIABETES MELLITUS WITH HYPERGLYCE              

      

 

                2017           MARCELLUS RAMIRES, FELIZ L           Ot        

      E78.5        

                          HYPERLIPIDEMIA, UNSPECIFIED                    

 

             2017           LIAM GAONA APRN           Ot           E11

.9           

TYPE 2 DIABETES MELLITUS WITHOUT COMPLIC                    

 

                2017           LIAM GAONA APRN           Ot           

   J45.909         

                          UNSPECIFIED ASTHMA, UNCOMPLICATED                    

 

             2017           LIAM GAONA APRN           Ot           K21

.9           

GASTRO-ESOPHAGEAL REFLUX DISEASE WITHOUT                    

 

             2017           LIAM GAONA APRN           Ot           M06

.9           

RHEUMATOID ARTHRITIS, UNSPECIFIED                    

 

                2017           LIAM GAONA APRN           Ot           

   M19.90          

                          UNSPECIFIED OSTEOARTHRITIS, UNSPECIFIED               

      

 

                2017           LAIM GAONA APRN           Ot           

   R10.31          

                          RIGHT LOWER QUADRANT PAIN                    

 

             2017           LIAM GAONA APRN           Ot           R11

.2           

NAUSEA WITH VOMITING, UNSPECIFIED                    

 

             2017           LIAM AGONA           Ot           R94

.5           

ABNORMAL RESULTS OF LIVER FUNCTION STUDI                    

 

                2017           LIAM GAONA           Ot           

   Z79.01          

                          LONG TERM (CURRENT) USE OF ANTICOAGULANT              

      

 

             2017           LIAM GAONA           Ot           Z79

.4           

LONG TERM (CURRENT) USE OF INSULIN                    

 

                2017           LIAM GAONA           Ot           

   Z82.49          

                          FAMILY HX OF ISCHEM HEART DIS AND OTH DI              

      

 

                2017           LIAM GAONA           Ot           

   Z86.718         

                          PERSONAL HISTORY OF OTHER VENOUS THROMBO              

      

 

                2017           LIAM GAONA           Ot           

   Z87.01          

                          PERSONAL HISTORY OF PNEUMONIA (RECURRENT              

      

 

                2017           LIAM GAONA           Ot           

   Z87.19          

                          PERSONAL HISTORY OF OTHER DISEASES OF TH              

      

 

                2017           LIAM GAONA           Ot           

   Z90.710         

                          ACQUIRED ABSENCE OF BOTH CERVIX AND UTER              

      

 

                2017           LIAM GAONA           Ot           

   Z90.89          

                          ACQUIRED ABSENCE OF OTHER ORGANS                    

 

                2017           FELIZ GERMAIN DO           Ot        

      E10.65       

                          TYPE 1 DIABETES MELLITUS WITH HYPERGLYCE              

      

 

                10/24/2017           JULY VOSS DO           Ot       

       R73.9       

                          HYPERGLYCEMIA, UNSPECIFIED                    

 

                10/24/2017           JULY VOSS DO           Ot       

       R74.8       

                          ABNORMAL LEVELS OF OTHER SERUM ENZYMES                

    

 

                2018           FELIZ GERMAIN DO           Ot        

      E10.65       

                          TYPE 1 DIABETES MELLITUS WITH HYPERGLYCE              

      

 

                2018           FELIZ GERMAIN DO           Ot        

      N18.9        

                          CHRONIC KIDNEY DISEASE, UNSPECIFIED                   

 

 

                2018           FELIZ GERMAIN DO           Ot        

      E10.65       

                          TYPE 1 DIABETES MELLITUS WITH HYPERGLYCE              

      

 

                2018           FELIZ GERMAIN DO           Ot        

      N18.9        

                          CHRONIC KIDNEY DISEASE, UNSPECIFIED                   

 

 

             2018                        Ot           453.40           ACU

TE VENOUS 

EMBOLISM   THROMBOSIS UNSP                       

 

                2018           NANCY MENDEZ, YU MELENDEZ           Ot        

      585.3        

                          CHRONIC KIDNEY DISEASE, STAGE III (MODER              

      

 

                2018           NANCY MENDEZ, YU S           Ot        

      250.40       

                          DIAB W RENAL MANIFEST, TYPE II OR UNSPEC              

      

 

                2018           YU CRUZ MD S           Ot        

      272.4        

                          HYPERLIPIDEMIA NEC/NOS                    

 

                2018           NANCY MENDEZ, AHMED S           Ot        

      585.2        

                          CHRONIC KIDNEY DISEASE, STAGE II (MILD)               

     

 

                2018           NANCY MENDEZ, JULIANNEMED S           Ot        

      791.0        

                          PROTEINURIA                        

 

                2018           BIPIN JULY RAMIRES           Ot       

       V76.12      

                          OTH SCREEN MAMMO-MALIGN NEOPLASM OF BABATUNDE              

      

 

                2018           BIPIN JULY RAMIRES           Ot       

       611.72      

                          LUMP OR MASS IN BREAST                    

 

                2018           NANCY MENDEZ, AHMED S           Ot        

      250.01       

                          DIAB KATHERIN WO COMPL, TYPE I [JUVENILE TYP              

      

 

                2018           NANCY MENDEZ, AHMED S           Ot        

      272.4        

                          HYPERLIPIDEMIA NEC/NOS                    

 

                2018           NANCY MENDEZ, AHMED S           Ot        

      403.10       

                          HYPTNSV [facility] KID DIS, BENIGN, W [facility] KD ST              

      

 

                2018           NANCY MENDEZ, AHMED S           Ot        

      583.81       

                          NEPHRITIS NOS IN OTH DIS                    

 

                2018           NANCY MENDEZ, AHMED S           Ot        

      585.2        

                          CHRONIC KIDNEY DISEASE, STAGE II (MILD)               

     

 

                2018           NANCY MENDEZ, AHMED S           Ot        

      791.0        

                          PROTEINURIA                        

 

                2018           GERMAIN DO, FELIZ L           Ot        

      250.03       

                          DIAB KATHERIN WO COMPL, TYPE I [JUVENILE TYP              

      

 

                2018           NANCY MENDEZ, AHMED S           Ot        

      250.01       

                          DIAB KATHERIN WO COMPL, TYPE I [JUVENILE TYP              

      

 

                2018           NANCY MENDEZ, AHMED S           Ot        

      272.4        

                          HYPERLIPIDEMIA NEC/NOS                    

 

                2018           NANCY MENDEZ, AHMED S           Ot        

      403.10       

                          HYPTNSV [facility] KID DIS, BENIGN, W [facility]              

      

 

                2018           NANCY MENDEZ, AHMED S           Ot        

      583.81       

                          NEPHRITIS NOS IN OTH DIS                    

 

                2018           NANCY MENDEZ, AHMED S           Ot        

      585.2        

                          CHRONIC KIDNEY DISEASE, STAGE II (MILD)               

     

 

                2018           NANCY MENDEZ, AHMED S           Ot        

      791.0        

                          PROTEINURIA                        

 

                2018           GERMAIN DO, FELIZ L           Ot        

      250.03       

                          DIAB KATHERIN WO COMPL, TYPE I [JUVENILE TYP              

      

 

                2018           ASYADANK DOJULY           Ot       

       453.40      

                          ACUTE VENOUS EMBOLISM   THROMBOSIS UNSP               

      

 

             2018                        Ot           250.03           J CARLOS

B KATHERIN WO 

COMPL, TYPE I [JUVENILE TYP                      

 

             2018                        Ot           250.41           J CARLOS

B W RENAL 

MANIFEST, TYPE I [JUVENILE                       

 

             2018                        Ot           272.4           HYPE

RLIPIDEMIA 

NEC/NOS                                          

 

             2018                        Ot           403.10           HYP

TNSV CHR KID 

DIS, BENIGN, W [facility]                         

 

             2018                        Ot           583.81           NEP

HRITIS NOS IN 

OTH DIS                                          

 

             2018                        Ot           585.2           

DAWN KIDNEY 

DISEASE, STAGE II (MILD)                         

 

             2018                        Ot           791.0           PROT

EINURIA       

                                                 

 

                2018           NANCY EMNDEZ, JULIANNEMED S           Ot        

      E11.29       

                          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               

      

 

                2018           NANCY MENDEZ, JULIANNEMED S           Ot        

      E78.5        

                          HYPERLIPIDEMIA, UNSPECIFIED                    

 

                2018           NANCY MENDEZ, AHMED S           Ot        

      I12.9        

                          HYPERTENSIVE CHRONIC KIDNEY DISEASE W ST              

      

 

                2018           NANCY MENDEZ, AHMED S           Ot        

      N18.3        

                          CHRONIC KIDNEY DISEASE, STAGE 3 (MODERAT              

      

 

                2018           NANCY MENDEZ, AHMED S           Ot        

      R80.9        

                          PROTEINURIA, UNSPECIFIED                    

 

                2018           NANCY MENDEZ, AHMED S           Ot        

      E11.21       

                          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              

      

 

                2018           NANCY MENDEZ, AHMED S           Ot        

      E11.22       

                          TYPE 2 DIABETES MELLITUS W DIABETIC               

      

 

                2018           NANCY MENDEZ, AHMED S           Ot        

      E78.5        

                          HYPERLIPIDEMIA, UNSPECIFIED                    

 

                2018           NANCY MENDEZ, AHMED S           Ot        

      I12.9        

                          HYPERTENSIVE CHRONIC KIDNEY DISEASE W ST              

      

 

                2018           NANCY MENDEZ, AHMED S           Ot        

      N18.2        

                          CHRONIC KIDNEY DISEASE, STAGE 2 (MILD)                

    

 

                2018           NANCY MENDEZ, AHMED S           Ot        

      R80.9        

                          PROTEINURIA, UNSPECIFIED                    

 

                2018           NANCY MENDEZ, AHMED S           Ot        

      Z79.899      

                          OTHER LONG TERM (CURRENT) DRUG THERAPY                

    

 

                2018           SHERIE GERMAIN DOISON L           Ot        

      E10.65       

                          TYPE 1 DIABETES MELLITUS WITH HYPERGLYCE              

      

 

                2018           SHERIE GERMAIN DOISON L           Ot        

      E78.5        

                          HYPERLIPIDEMIA, UNSPECIFIED                    

 

                2018           SHERIE GERMAIN DOISON L           Ot        

      E10.65       

                          TYPE 1 DIABETES MELLITUS WITH HYPERGLYCE              

      

 

                2018           GERMAIN DO, FELIZ PARIKH           Ot        

      E78.5        

                          HYPERLIPIDEMIA, UNSPECIFIED                    

 

                2018           GERMAIN DO, FELIZ L           Ot        

      E10.65       

                          TYPE 1 DIABETES MELLITUS WITH HYPERGLYCE              

      

 

                2018           ASYALENDER DO, JULY QUIROS           Ot       

       R73.9       

                          HYPERGLYCEMIA, UNSPECIFIED                    

 

                2018           ASYALENDER DO, JULY QUIROS           Ot       

       R74.8       

                          ABNORMAL LEVELS OF OTHER SERUM ENZYMES                

    

 

                2018           MARCELLUS DO, FELIZ PARIKH           Ot        

      E10.65       

                          TYPE 1 DIABETES MELLITUS WITH HYPERGLYCE              

      

 

                2018           GERMAIN DO, FELIZ PARIKH           Ot        

      N18.9        

                          CHRONIC KIDNEY DISEASE, UNSPECIFIED                   

 

 

                2018           ASYALENDER DO, JULY QUIROS           Ot       

       M25.522     

                          PAIN IN LEFT ELBOW                    

 

                2018           BIPIN RAMIRES, JULY QUIROS           Ot       

       M61.48      

                          OTHER CALCIFICATION OF MUSCLE, OTHER SIT              

      

 

                01/15/2019           NANCY MENDEZ, YU S           Ot        

      585.3        

                          CHRONIC KIDNEY DISEASE, STAGE III (MODER              

      

 

                01/15/2019           NANCY MENDEZ, JULIANNEMED S           Ot        

      250.40       

                          DIAB W RENAL MANIFEST, TYPE II OR UNSPEC              

      

 

                01/15/2019           NANCY MENDEZ, YU S           Ot        

      272.4        

                          HYPERLIPIDEMIA NEC/NOS                    

 

                01/15/2019           NANCY MENDEZ, YU S           Ot        

      585.2        

                          CHRONIC KIDNEY DISEASE, STAGE II (MILD)               

     

 

                01/15/2019           NANCY MENDEZ, JULIANNEMED S           Ot        

      791.0        

                          PROTEINURIA                        

 

                01/15/2019           BIPIN RAMIRESJULY           Ot       

       V76.12      

                          OT SCREEN MAMMO-MALIGN NEOPLASM OF BABATUNDE              

      

 

                01/15/2019           BIPIN RAMIRESJULY           Ot       

       611.72      

                          LUMP OR MASS IN BREAST                    

 

                01/15/2019           NANCY MENDEZ, AHMED S           Ot        

      250.01       

                          DIAB KATHERIN WO COMPL, TYPE I [JUVENILE TYP              

      

 

                01/15/2019           NANCY MENDEZ, YU S           Ot        

      272.4        

                          HYPERLIPIDEMIA NEC/NOS                    

 

                01/15/2019           NANCY MENDEZ, YU S           Ot        

      403.10       

                          HYPTNSV CHR KID DIS, BENIGN, W CHR KD ST              

      

 

                01/15/2019           YU CRUZ MD S           Ot        

      583.81       

                          NEPHRITIS NOS IN OTH DIS                    

 

                01/15/2019           NANCY MENDEZ, AHGENESIS S           Ot        

      585.2        

                          CHRONIC KIDNEY DISEASE, STAGE II (MILD)               

     

 

                01/15/2019           NANCY MENDEZ, AHMED S           Ot        

      791.0        

                          PROTEINURIA                        

 

                01/15/2019           FELIZ GERMAIN DO           Ot        

      250.03       

                          DIAB KATHERIN WO COMPL, TYPE I [JUVENILE TYP              

      

 

                01/15/2019           NANCY MENDEZ, AHMED S           Ot        

      250.01       

                          DIAB KATHERIN WO COMPL, TYPE I [JUVENILE TYP              

      

 

                01/15/2019           NANCY MENDEZ, AHMED S           Ot        

      272.4        

                          HYPERLIPIDEMIA NEC/NOS                    

 

                01/15/2019           NANCY MENDEZ, AHMED S           Ot        

      403.10       

                          HYPTNSV CHR KID DIS, BENIGN, W CHR KD ST              

      

 

                01/15/2019           NANCY MENDEZ, AHMED S           Ot        

      583.81       

                          NEPHRITIS NOS IN OTH DIS                    

 

                01/15/2019           NANCY MENDEZ, AHMED S           Ot        

      585.2        

                          CHRONIC KIDNEY DISEASE, STAGE II (MILD)               

     

 

                01/15/2019           NANCY MENDEZ, AHMED S           Ot        

      791.0        

                          PROTEINURIA                        

 

                01/15/2019           FELIZ GERMAIN DO           Ot        

      250.03       

                          DIAB KATHERIN WO COMPL, TYPE I [JUVENILE TYP              

      

 

                01/15/2019           JULY VOSS DO           Ot       

       453.40      

                          ACUTE VENOUS EMBOLISM   THROMBOSIS UNSP               

      

 

             01/15/2019                        Ot           250.03           J CARLOS

B KATHERIN WO 

COMPL, TYPE I [JUVENILE TYP                      

 

             01/15/2019                        Ot           250.41           J CARLOS

B W RENAL 

MANIFEST, TYPE I [JUVENILE                       

 

             01/15/2019                        Ot           272.4           HYPE

RLIPIDEMIA 

NEC/NOS                                          

 

             01/15/2019                        Ot           403.10           HYP

TNSV CHR KID 

DIS, BENIGN, W CHR KD ST                         

 

             01/15/2019                        Ot           583.81           NEP

HRITIS NOS IN 

OTH DIS                                          

 

             01/15/2019                        Ot           585.2           

DAWN KIDNEY 

DISEASE, STAGE II (MILD)                         

 

             01/15/2019                        Ot           791.0           PROT

EINURIA       

                                                 

 

                01/15/2019           NANCY MENDEZ, AHMED S           Ot        

      E11.29       

                          TYPE 2 DIABETES MELLITUS W [facility] DIABETIC               

      

 

                01/15/2019           NANCY MENDEZ, AHMED S           Ot        

      E78.5        

                          HYPERLIPIDEMIA, UNSPECIFIED                    

 

                01/15/2019           NANCY MENDEZ, AHMED S           Ot        

      I12.9        

                          HYPERTENSIVE CHRONIC KIDNEY DISEASE W ST              

      

 

                01/15/2019           NANCY MENDEZ, AHMED S           Ot        

      N18.3        

                          CHRONIC KIDNEY DISEASE, STAGE 3 (MODERAT              

      

 

                01/15/2019           NANCY MENDEZ, AHMED S           Ot        

      R80.9        

                          PROTEINURIA, UNSPECIFIED                    

 

                01/15/2019           NANCY MENDEZ, AHMED S           Ot        

      E11.21       

                          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              

      

 

                01/15/2019           NANCY MENDEZ, AHMED S           Ot        

      E11.22       

                          TYPE 2 DIABETES MELLITUS W DIABETIC               

      

 

                01/15/2019           NANCY MENDEZ, JULIANNEMED S           Ot        

      E78.5        

                          HYPERLIPIDEMIA, UNSPECIFIED                    

 

                01/15/2019           NANCY MENDEZ, AHMED S           Ot        

      I12.9        

                          HYPERTENSIVE CHRONIC KIDNEY DISEASE W ST              

      

 

                01/15/2019           NANCY MENDEZ, AHMED S           Ot        

      N18.2        

                          CHRONIC KIDNEY DISEASE, STAGE 2 (MILD)                

    

 

                01/15/2019           NANCY MENDEZ, AHMED S           Ot        

      R80.9        

                          PROTEINURIA, UNSPECIFIED                    

 

                01/15/2019           NANCY MENDEZ, AHMED S           Ot        

      Z79.899      

                          OTHER LONG TERM (CURRENT) DRUG THERAPY                

    

 

                01/15/2019           GERMAIN DO, FELIZ L           Ot        

      E10.65       

                          TYPE 1 DIABETES MELLITUS WITH HYPERGLYCE              

      

 

                01/15/2019           GERMAIN DO, FELIZ L           Ot        

      E78.5        

                          HYPERLIPIDEMIA, UNSPECIFIED                    

 

                01/15/2019           GERMAIN DO, FELIZ L           Ot        

      E10.65       

                          TYPE 1 DIABETES MELLITUS WITH HYPERGLYCE              

      

 

                01/15/2019           GERMAIN DO, FELIZ L           Ot        

      E78.5        

                          HYPERLIPIDEMIA, UNSPECIFIED                    

 

                01/15/2019           GERMAIN DO, FELIZ L           Ot        

      E10.65       

                          TYPE 1 DIABETES MELLITUS WITH HYPERGLYCE              

      

 

                01/15/2019           GELLENDER DO, JULY CHULA           Ot       

       R73.9       

                          HYPERGLYCEMIA, UNSPECIFIED                    

 

                01/15/2019           GELLENDER DO, JULY CHULA           Ot       

       R74.8       

                          ABNORMAL LEVELS OF OTHER SERUM ENZYMES                

    

 

                01/15/2019           GERMAIN DO, FELIZ L           Ot        

      E10.65       

                          TYPE 1 DIABETES MELLITUS WITH HYPERGLYCE              

      

 

                01/15/2019           GERMAIN DO, FELIZ L           Ot        

      N18.9        

                          CHRONIC KIDNEY DISEASE, UNSPECIFIED                   

 

 

                01/15/2019           GELLENDER DO, JULY CHULA           Ot       

       M25.522     

                          PAIN IN LEFT ELBOW                    

 

                01/15/2019           GELLENDER DO, JULY QUIROS           Ot       

       M61.48      

                          OTHER CALCIFICATION OF MUSCLE, OTHER SIT              

      

 

                2019           GERMAIN DO, FELIZ L           Ot        

      E10.65       

                          TYPE 1 DIABETES MELLITUS WITH HYPERGLYCE              

      

 

                2019           GERMAIN DO, FELIZ L           Ot        

      E10.65       

                          TYPE 1 DIABETES MELLITUS WITH HYPERGLYCE              

      

 

                2019           FELIZ GERMAIN DO           Ot        

      N18.9        

                          CHRONIC KIDNEY DISEASE, UNSPECIFIED                   

 

 

                2019           MISHA DEVRIES MD           Ot            

  D72.829          

                          ELEVATED WHITE BLOOD CELL COUNT, UNSPECI              

      

 

             2019           MISHA DEVRIES MD           Ot           E10.

10           

TYPE 1 DIABETES MELLITUS WITH KETOACIDOS                    

 

             2019           MISHA DEVRIES MD           Ot           E10.

40           

TYPE 1 DIABETES MELLITUS WITH DIABETIC N                    

 

             2019           MISHA DEVRIES MD           Ot           E10.

43           

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                    

 

             2019           MISHA DEVRIES MD           Ot           E78.

5           

HYPERLIPIDEMIA, UNSPECIFIED                      

 

             2019           MISHA DEVRIES MD           Ot           E86.

0           

DEHYDRATION                                      

 

             2019           MISHA DEVRIES MD           Ot           E87.

2           

ACIDOSIS                                         

 

                2019           MISHA DEVRIES MD           Ot            

  F17.210          

                          NICOTINE DEPENDENCE, CIGARETTES, UNCOMPL              

      

 

             2019           MISHA DEVRIES MD           Ot           F60.

9           

PERSONALITY DISORDER, UNSPECIFIED                    

 

             2019           MISHA DEVRIES MD           Ot           J30.

2           

OTHER SEASONAL ALLERGIC RHINITIS                    

 

             2019           MISHA DEVRIES MD           Ot           J44.

9           

CHRONIC OBSTRUCTIVE PULMONARY DISEASE, U                    

 

             2019           MISHA DEVRIES MD           Ot           K21.

9           

GASTRO-ESOPHAGEAL REFLUX DISEASE WITHOUT                    

 

             2019           MISHA DEVRIES MD           Ot           M06.

9           

RHEUMATOID ARTHRITIS, UNSPECIFIED                    

 

             2019           MISHA DEVRIES MD           Ot           M19.

91           

PRIMARY OSTEOARTHRITIS, UNSPECIFIED SITE                    

 

             2019           MISHA DEVRIES MD           Ot           M54.

9           

DORSALGIA, UNSPECIFIED                           

 

             2019           MISHA DEVRIES MD           Ot           N17.

0           

ACUTE KIDNEY FAILURE WITH TUBULAR NECROS                    

 

             2019           MISHA DEVRIES MD, Ot           N18.

9           

CHRONIC KIDNEY DISEASE, UNSPECIFIED                    

 

             2019           MISHA DEVRIES MD           Ot           R11.

10           

VOMITING, UNSPECIFIED                            

 

             2019           MISHA DEVRIES MD           Ot           Z79.

4           

LONG TERM (CURRENT) USE OF INSULIN                    

 

                2019           MISHA DEVRIES MD, Ot            

  Z86.718          

                          PERSONAL HISTORY OF OTHER VENOUS THROMBO              

      

 

             2019           MISHA DEVRIES MD, Ot           Z87.

11           

PERSONAL HISTORY OF PEPTIC ULCER DISEASE                    

 

             2019           MISHA DEVRIES MD, Ot           Z87.

19           

PERSONAL HISTORY OF OTHER DISEASES OF TH                    

 

             2019           MISHA DEVRIES MD, Ot           Z91.

14           

PATIENT'S OTHER NONCOMPLIANCE WITH MEDIC                    

 

                2019           MISHA DEVRIES MD, Ot            

  Z95.828          

                          PRESENCE OF OTHER VASCULAR IMPLANTS AND               

      

 

                2019           MISHA DEVRIES MD, Ot            

  D72.829          

                          ELEVATED WHITE BLOOD CELL COUNT, UNSPECI              

      

 

             2019           MISHA DEVRIES MD           Ot           E10.

10           

TYPE 1 DIABETES MELLITUS WITH KETOACIDOS                    

 

             2019           MISHA DEVRIES MD           Ot           E10.

40           

TYPE 1 DIABETES MELLITUS WITH DIABETIC N                    

 

             2019           MISHA DEVRIES MD           Ot           E10.

43           

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                    

 

             2019           MISHA DEVRIES MD, Ot           E78.

5           

HYPERLIPIDEMIA, UNSPECIFIED                      

 

             2019           MISHA DEVRIES MD, Ot           E86.

0           

DEHYDRATION                                      

 

             2019           MISHA DEVRIES MD           Ot           E87.

2           

ACIDOSIS                                         

 

                2019           MISHA DEVRIES MD           Ot            

  F17.210          

                          NICOTINE DEPENDENCE, CIGARETTES, UNCOMPL              

      

 

             2019           MISHA DEVRIES MD           Ot           F60.

9           

PERSONALITY DISORDER, UNSPECIFIED                    

 

             2019           MISHA DEVRIES MD, Ot           J30.

2           

OTHER SEASONAL ALLERGIC RHINITIS                    

 

             2019           MISHA DEVRIES MD, Ot           J44.

9           

CHRONIC OBSTRUCTIVE PULMONARY DISEASE, U                    

 

             2019           MISHA DEVRIES MD, Ot           K21.

9           

GASTRO-ESOPHAGEAL REFLUX DISEASE WITHOUT                    

 

             2019           MISHA DEVRIES MD, Ot           M06.

9           

RHEUMATOID ARTHRITIS, UNSPECIFIED                    

 

             2019           MISHA DEVRIES MD, Ot           M19.

91           

PRIMARY OSTEOARTHRITIS, UNSPECIFIED SITE                    

 

             2019           MISHA DEVRIES MD, Ot           M54.

9           

DORSALGIA, UNSPECIFIED                           

 

             2019           MISHA DEVRIES MD           Ot           N17.

0           

ACUTE KIDNEY FAILURE WITH TUBULAR NECROS                    

 

             2019           KAYCE MD, MISHA M           Ot           N18.

9           

CHRONIC KIDNEY DISEASE, UNSPECIFIED                    

 

             2019           MISHA DEVRIES MD, Ot           R11.

10           

VOMITING, UNSPECIFIED                            

 

             2019           MISHA DEVRIES MD, Ot           Z79.

4           

LONG TERM (CURRENT) USE OF INSULIN                    

 

                2019           MISHA DEVRIES MD, Ot            

  Z86.718          

                          PERSONAL HISTORY OF OTHER VENOUS THROMBO              

      

 

             2019           MISHA DEVRIES MD, Ot           Z87.

11           

PERSONAL HISTORY OF PEPTIC ULCER DISEASE                    

 

             2019           MISHA DEVRIES MD, Ot           Z87.

19           

PERSONAL HISTORY OF OTHER DISEASES OF TH                    

 

             2019           MISHA DEVRIES MD, Ot           Z91.

14           

PATIENT'S OTHER NONCOMPLIANCE WITH MEDIC                    

 

                2019           MISHA DEVRIES MD, Ot            

  Z95.828          

                          PRESENCE OF OTHER VASCULAR IMPLANTS AND               

      

 

                2019           MISHA DEVRIES MD, Ot            

  D72.829          

                          ELEVATED WHITE BLOOD CELL COUNT, UNSPECI              

      

 

             2019           MISHA DEVRIES MD           Ot           E10.

10           

TYPE 1 DIABETES MELLITUS WITH KETOACIDOS                    

 

             2019           MISHA DEVRIES MD           Ot           E10.

40           

TYPE 1 DIABETES MELLITUS WITH DIABETIC N                    

 

             2019           MISHA DEVRIES MD           Ot           E10.

43           

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                    

 

             2019           MISHA DEVRIES MD           Ot           E78.

5           

HYPERLIPIDEMIA, UNSPECIFIED                      

 

             2019           MISHA DEVRIES MD           Ot           E86.

0           

DEHYDRATION                                      

 

             2019           MISHA DEVRIES MD           Ot           E87.

2           

ACIDOSIS                                         

 

                2019           MISHA DEVRIES MD           Ot            

  F17.210          

                          NICOTINE DEPENDENCE, CIGARETTES, UNCOMPL              

      

 

             2019           MISHA DEVRIES MD, Ot           F60.

9           

PERSONALITY DISORDER, UNSPECIFIED                    

 

             2019           MISHA DEVRIES MD, Ot           J30.

2           

OTHER SEASONAL ALLERGIC RHINITIS                    

 

             2019           MISHA DEVRIES MD, Ot           J44.

9           

CHRONIC OBSTRUCTIVE PULMONARY DISEASE, U                    

 

             2019           MISHA DEVRIES MD, Ot           K21.

9           

GASTRO-ESOPHAGEAL REFLUX DISEASE WITHOUT                    

 

             2019           MISHA DEVRIES MD, Ot           M06.

9           

RHEUMATOID ARTHRITIS, UNSPECIFIED                    

 

             2019           KAYCE MD, MISHA M           Ot           M19.

91           

PRIMARY OSTEOARTHRITIS, UNSPECIFIED SITE                    

 

             2019           MISHA DEVRIES MD, Ot           M54.

9           

DORSALGIA, UNSPECIFIED                           

 

             2019           MISHA DEVRIES MD, Ot           N17.

0           

ACUTE KIDNEY FAILURE WITH TUBULAR NECROS                    

 

             2019           MISHA DEVRIES MD, Ot           N18.

9           

CHRONIC KIDNEY DISEASE, UNSPECIFIED                    

 

             2019           MISHA DEVRIES MD           Ot           R11.

10           

VOMITING, UNSPECIFIED                            

 

             2019           MISHA DEVRIES MD, Ot           Z79.

4           

LONG TERM (CURRENT) USE OF INSULIN                    

 

                2019           MISHA DEVRIES MD, Ot            

  Z86.718          

                          PERSONAL HISTORY OF OTHER VENOUS THROMBO              

      

 

             2019           MISHA DEVRIES MD, Ot           Z87.

11           

PERSONAL HISTORY OF PEPTIC ULCER DISEASE                    

 

             2019           MISHA DEVRIES MD, Ot           Z87.

19           

PERSONAL HISTORY OF OTHER DISEASES OF TH                    

 

             2019           MISHA DEVRIES MD, Ot           Z91.

14           

PATIENT'S OTHER NONCOMPLIANCE WITH MEDIC                    

 

                2019           MISHA DEVRIES MD           Ot            

  Z95.828          

                          PRESENCE OF OTHER VASCULAR IMPLANTS AND               

      

 

                2019           MISHA DEVRIES MD, Ot            

  D72.829          

                          ELEVATED WHITE BLOOD CELL COUNT, UNSPECI              

      

 

             2019           MISHA DEVRIES MD           Ot           E10.

10           

TYPE 1 DIABETES MELLITUS WITH KETOACIDOS                    

 

             2019           MISHA DEVRIES MD           Ot           E10.

40           

TYPE 1 DIABETES MELLITUS WITH DIABETIC N                    

 

             2019           MISHA DEVRIES MD           Ot           E10.

43           

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                    

 

             2019           MISHA DEVRIES MD           Ot           E78.

5           

HYPERLIPIDEMIA, UNSPECIFIED                      

 

             2019           MISHA DEVRIES MD, Ot           E86.

0           

DEHYDRATION                                      

 

             2019           MISHA DEVRIES MD, Ot           E87.

2           

ACIDOSIS                                         

 

                2019           MISHA DEVRIES MD, Ot            

  F17.210          

                          NICOTINE DEPENDENCE, CIGARETTES, UNCOMPL              

      

 

             2019           MISHA DEVRIES MD, Ot           F60.

9           

PERSONALITY DISORDER, UNSPECIFIED                    

 

             2019           MISHA DEVRIES MD           Ot           G89.

29           

OTHER CHRONIC PAIN                               

 

             2019           KAYCE MD, MISHA M           Ot           J30.

2           

OTHER SEASONAL ALLERGIC RHINITIS                    

 

             2019           MISHA DEVRIES MD, Ot           J44.

9           

CHRONIC OBSTRUCTIVE PULMONARY DISEASE, U                    

 

             2019           MISHA DEVRIES MD, Ot           K21.

9           

GASTRO-ESOPHAGEAL REFLUX DISEASE WITHOUT                    

 

             2019           MISHA DEVRIES MD, Ot           M06.

9           

RHEUMATOID ARTHRITIS, UNSPECIFIED                    

 

             2019           MISHA DEVRIES MD, Ot           M19.

91           

PRIMARY OSTEOARTHRITIS, UNSPECIFIED SITE                    

 

             2019           MISHA DEVRIES MD, Ot           M54.

9           

DORSALGIA, UNSPECIFIED                           

 

             2019           MISHA DEVRIES MD, Ot           N17.

0           

ACUTE KIDNEY FAILURE WITH TUBULAR NECROS                    

 

             2019           MISHA DEVRIES MD, Ot           N18.

9           

CHRONIC KIDNEY DISEASE, UNSPECIFIED                    

 

             2019           MISHA DEVRIES MD           Ot           R07.

89           

OTHER CHEST PAIN                                 

 

             2019           MISHA DEVRIES MD, Ot           R11.

10           

VOMITING, UNSPECIFIED                            

 

             2019           MISHA DEVRIES MD, Ot           Z79.

4           

LONG TERM (CURRENT) USE OF INSULIN                    

 

                2019           MISHA DEVRIES MD, Ot            

  Z86.718          

                          PERSONAL HISTORY OF OTHER VENOUS THROMBO              

      

 

             2019           MISHA DEVRIES MD, Ot           Z87.

11           

PERSONAL HISTORY OF PEPTIC ULCER DISEASE                    

 

             2019           MISHA DEVRIES MD, Ot           Z87.

19           

PERSONAL HISTORY OF OTHER DISEASES OF TH                    

 

             2019           MISHA DEVRIES MD           Ot           Z88.

2           

ALLERGY STATUS TO SULFONAMIDES STATUS                    

 

             2019           MISHA DEVRIES MD, Ot           Z88.

5           

ALLERGY STATUS TO NARCOTIC AGENT STATUS                    

 

             2019           MISHA DEVRIES MD           Ot           Z91.

14           

PATIENT'S OTHER NONCOMPLIANCE WITH MEDIC                    

 

                2019           MISHA DEVRIES MD           Ot            

  Z95.828          

                          PRESENCE OF OTHER VASCULAR IMPLANTS AND               

      

 

                2020           LIAM GAONA           Ot           

   E11.10          

                          TYPE 2 DIABETES MELLITUS WITH KETOACIDOS              

      

 

                2020           LIAM GAONA           Ot           

   E11.43          

                          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              

      

 

                2020           LIAM GAONA           Ot           

   F17.210         

                          NICOTINE DEPENDENCE, CIGARETTES, UNCOMPL              

      

 

             2020           LIAM GAONA           Ot           F60

.9           

PERSONALITY DISORDER, UNSPECIFIED                    

 

             2020           LIAM GAONA           Ot           J44

.9           

CHRONIC OBSTRUCTIVE PULMONARY DISEASE, U                    

 

             2020           LIAM GAONA           Ot           K21

.9           

GASTRO-ESOPHAGEAL REFLUX DISEASE WITHOUT                    

 

                2020           LIAM GAONA           Ot           

   K31.84          

                          GASTROPARESIS                      

 

             2020           LIAM GAONA           Ot           M06

.9           

RHEUMATOID ARTHRITIS, UNSPECIFIED                    

 

                2020           LIAM GAONA           Ot           

   R10.31          

                          RIGHT LOWER QUADRANT PAIN                    

 

             2020           LIAM GAONA           Ot           Z79

.4           

LONG TERM (CURRENT) USE OF INSULIN                    

 

             2020           LIAM GAONA           Ot           Z80

.8           

FAMILY HISTORY OF MALIGNANT NEOPLASM OF                     

 

                2020           LIAM GAONA           Ot           

   Z86.718         

                          PERSONAL HISTORY OF OTHER VENOUS THROMBO              

      

 

                2020           LIAM GAONA           Ot           

   Z87.01          

                          PERSONAL HISTORY OF PNEUMONIA (RECURRENT              

      

 

             2020           LIAM GAONA           Ot           Z88

.2           

ALLERGY STATUS TO SULFONAMIDES STATUS                    

 

             2020           LIAM GAONA           Ot           Z88

.5           

ALLERGY STATUS TO NARCOTIC AGENT STATUS                    

 

                2020           LIAM GAONA           Ot           

   Z90.710         

                          ACQUIRED ABSENCE OF BOTH CERVIX AND UTER              

      

 

                2020           LIAM GAONA           Ot           

   Z90.89          

                          ACQUIRED ABSENCE OF OTHER ORGANS                    

 

             2020           CAMPBELL HOANG MD           Ot           D64.

9           

ANEMIA, UNSPECIFIED                              

 

             2020           CAMPBELL HOANG MD           Ot           E10.

11           

TYPE 1 DIABETES MELLITUS WITH KETOACIDOS                    

 

             2020           CAMPBELL HOANG MD           Ot           E10.

43           

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                    

 

             2020           CAMPBELL HOANG MD           Ot           E86.

0           

DEHYDRATION                                      

 

             2020           CAMPBELL HOANG MD           Ot           E87.

1           

HYPO-OSMOLALITY AND HYPONATREMIA                    

 

             2020           CAMPBELL HOANG MD           Ot           E87.

2           

ACIDOSIS                                         

 

             2020           CAMPBELL HOANG MD           Ot           E87.

5           

HYPERKALEMIA                                     

 

             2020           CAMPBELL HOANG MD           Ot           F15.

10           

OTHER STIMULANT ABUSE, UNCOMPLICATED                    

 

                2020           CAMPBELL HOANG MD           Ot            

  F17.210          

                          NICOTINE DEPENDENCE, CIGARETTES, UNCOMPL              

      

 

             2020           CAMPBELL HOANG MD           Ot           F60.

9           

PERSONALITY DISORDER, UNSPECIFIED                    

 

             2020           CAMPBELL HOANG MD           Ot           G93.

49           

OTHER ENCEPHALOPATHY                             

 

             2020           CAMPBELL HOANG MD           Ot           I95.

9           

HYPOTENSION, UNSPECIFIED                         

 

             2020           CAMPBELL HOANG MD, Ot           J44.

9           

CHRONIC OBSTRUCTIVE PULMONARY DISEASE, U                    

 

             2020           CAMPBELL HOANG MD, Ot           J96.

00           

ACUTE RESPIRATORY FAILURE, UNSP W HYPOXI                    

 

             2020           CAMPBELL HOANG MD, Ot           K21.

9           

GASTRO-ESOPHAGEAL REFLUX DISEASE WITHOUT                    

 

             2020           CAMPBELL HOANG MD, Ot           M06.

9           

RHEUMATOID ARTHRITIS, UNSPECIFIED                    

 

             2020           CAMPBELL HOANG MD, Ot           M19.

91           

PRIMARY OSTEOARTHRITIS, UNSPECIFIED SITE                    

 

             2020           CAMPBELL HOANG MD, Ot           N17.

9           

ACUTE KIDNEY FAILURE, UNSPECIFIED                    

 

             2020           CAMPBELL HOANG MD, Ot           N18.

9           

CHRONIC KIDNEY DISEASE, UNSPECIFIED                    

 

             2020           CAMPBELL HOANG MD           Ot           R00.

0           

TACHYCARDIA, UNSPECIFIED                         

 

             2020           CAMPBELL HOANG MD           Ot           R57.

1           

HYPOVOLEMIC SHOCK                                

 

             2020           CAMPBELL HOANG MD           Ot           Z79.

4           

LONG TERM (CURRENT) USE OF INSULIN                    

 

                2020           CAMPBELL HOANG MD           Ot            

  Z86.718          

                          PERSONAL HISTORY OF OTHER VENOUS THROMBO              

      

 

             2020           CAMPBELL HOANG MD           Ot           Z86.

79           

PERSONAL HISTORY OF OTHER DISEASES OF TH                    

 

             2020           CAMPBELL HOANG MD           Ot           Z87.

11           

PERSONAL HISTORY OF PEPTIC ULCER DISEASE                    

 

             2020           CAMPBELL HOANG MD           Ot           Z91.

81           

HISTORY OF FALLING                               

 

             2020           CAMPBELL HOANG MD           Ot           Z96.

0           

PRESENCE OF UROGENITAL IMPLANTS                    

 

             2020           CAMPBELL HOANG MD           Ot           D64.

9           

ANEMIA, UNSPECIFIED                              

 

             2020           CAMPBELL HOANG MD           Ot           E10.

11           

TYPE 1 DIABETES MELLITUS WITH KETOACIDOS                    

 

             2020           CAMPBELL HOANG MD           Ot           E10.

43           

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                    

 

             2020           CAMPBELL HOANG MD           Ot           E86.

0           

DEHYDRATION                                      

 

             2020           CAMPBELL HOANG MD           Ot           E87.

1           

HYPO-OSMOLALITY AND HYPONATREMIA                    

 

             2020           CAMPBELL HOANG MD           Ot           E87.

2           

ACIDOSIS                                         

 

             2020           CAMPBELL HOANG MD           Ot           E87.

5           

HYPERKALEMIA                                     

 

             2020           CAMPBELL HOANG MD           Ot           F15.

10           

OTHER STIMULANT ABUSE, UNCOMPLICATED                    

 

                2020           CAMPBELL HOANG MD           Ot            

  F17.210          

                          NICOTINE DEPENDENCE, CIGARETTES, UNCOMPL              

      

 

             2020           CAMPBELL HOANG MD           Ot           F60.

9           

PERSONALITY DISORDER, UNSPECIFIED                    

 

             2020           CAMPBELL HOANG MD           Ot           G93.

49           

OTHER ENCEPHALOPATHY                             

 

             2020           CAMPBELL HOANG MD           Ot           I95.

9           

HYPOTENSION, UNSPECIFIED                         

 

             2020           CAMPBELL HOANG MD, Ot           J44.

9           

CHRONIC OBSTRUCTIVE PULMONARY DISEASE, U                    

 

             2020           CAMPBELL HOANG MD           Ot           J96.

00           

ACUTE RESPIRATORY FAILURE, UNSP W HYPOXI                    

 

             2020           CAMPBELL HOANG MD           Ot           K21.

9           

GASTRO-ESOPHAGEAL REFLUX DISEASE WITHOUT                    

 

             2020           CAMPBELL HOANG MD           Ot           M06.

9           

RHEUMATOID ARTHRITIS, UNSPECIFIED                    

 

             2020           CAMPBELL HOANG MD           Ot           M19.

91           

PRIMARY OSTEOARTHRITIS, UNSPECIFIED SITE                    

 

             2020           CAMPBELL HOANG MD           Ot           N17.

9           

ACUTE KIDNEY FAILURE, UNSPECIFIED                    

 

             2020           CAMPBELL HOANG MD           Ot           N18.

9           

CHRONIC KIDNEY DISEASE, UNSPECIFIED                    

 

             2020           CAMPBELL HOANG MD           Ot           R00.

0           

TACHYCARDIA, UNSPECIFIED                         

 

             2020           CAMPBELL HOANG MD           Ot           R57.

1           

HYPOVOLEMIC SHOCK                                

 

             2020           CAMPBELL HOANG MD           Ot           Z79.

4           

LONG TERM (CURRENT) USE OF INSULIN                    

 

                2020           CAMPBELL HOANG MD           Ot            

  Z86.718          

                          PERSONAL HISTORY OF OTHER VENOUS THROMBO              

      

 

             2020           CAMPBELL HOANG MD           Ot           Z86.

79           

PERSONAL HISTORY OF OTHER DISEASES OF TH                    

 

             2020           CAMPBELL HOANG MD           Ot           Z87.

11           

PERSONAL HISTORY OF PEPTIC ULCER DISEASE                    

 

             2020           CAMPBELL HOANG MD           Ot           Z91.

81           

HISTORY OF FALLING                               

 

             2020           CAMPBELL HOANG MD           Ot           Z96.

0           

PRESENCE OF UROGENITAL IMPLANTS                    

 

             02/15/2020           CAMPBELL HOANG MD           Ot           A41.

9           

SEPSIS, UNSPECIFIED ORGANISM                     

 

             02/15/2020           CAMPBELL HOANG MD           Ot           B37.

1           

PULMONARY CANDIDIASIS                            

 

             02/15/2020           CAMPBELL HOANG MD           Ot           D64.

9           

ANEMIA, UNSPECIFIED                              

 

             02/15/2020           CAMPBELL HOANG MD           Ot           E10.

11           

TYPE 1 DIABETES MELLITUS WITH KETOACIDOS                    

 

             02/15/2020           CAMPBELL HOANG MD           Ot           E10.

43           

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                    

 

                02/15/2020           CAMPBELL HOANG MD           Ot            

  E10.649          

                          TYPE 1 DIABETES MELLITUS WITH HYPOGLYCEM              

      

 

             02/15/2020           CAMPBELL HOANG MD           Ot           E86.

0           

DEHYDRATION                                      

 

             02/15/2020           CAMPBELL HOANG MD           Ot           E87.

1           

HYPO-OSMOLALITY AND HYPONATREMIA                    

 

             02/15/2020           CAMPBELL HOANG MD           Ot           E87.

2           

ACIDOSIS                                         

 

             02/15/2020           CAMPBELL HOANG MD           Ot           E87.

5           

HYPERKALEMIA                                     

 

             02/15/2020           CAMPBELL HOANG MD           Ot           E87.

6           

HYPOKALEMIA                                      

 

             02/15/2020           CAMPBELL HOANG MD           Ot           F15.

10           

OTHER STIMULANT ABUSE, UNCOMPLICATED                    

 

                02/15/2020           CAMPBELL HOANG MD           Ot            

  F17.210          

                          NICOTINE DEPENDENCE, CIGARETTES, UNCOMPL              

      

 

             02/15/2020           CAMPBELL HOANG MD           Ot           F60.

9           

PERSONALITY DISORDER, UNSPECIFIED                    

 

             02/15/2020           CAMPBELL HOANG MD           Ot           G93.

49           

OTHER ENCEPHALOPATHY                             

 

             02/15/2020           CAMPBELL HOANG MD           Ot           I95.

9           

HYPOTENSION, UNSPECIFIED                         

 

             02/15/2020           CAMPBELL HOANG MD           Ot           J13 

          

PNEUMONIA DUE TO STREPTOCOCCUS PNEUMONIA                    

 

             02/15/2020           CAMPBELL HOANG MD           Ot           J44.

9           

CHRONIC OBSTRUCTIVE PULMONARY DISEASE, U                    

 

             02/15/2020           CAMPBELL HOANG MD           Ot           J96.

00           

ACUTE RESPIRATORY FAILURE, UNSP W HYPOXI                    

 

             02/15/2020           CAMPBELL HOANG MD           Ot           K21.

9           

GASTRO-ESOPHAGEAL REFLUX DISEASE WITHOUT                    

 

             02/15/2020           CAMPBELL HOANG MD           Ot           K92.

2           

GASTROINTESTINAL HEMORRHAGE, UNSPECIFIED                    

 

             02/15/2020           CAMPBELL HOANG MD           Ot           M06.

9           

RHEUMATOID ARTHRITIS, UNSPECIFIED                    

 

             02/15/2020           NATALYACAMPBELL ANN MD, Ot           M19.

91           

PRIMARY OSTEOARTHRITIS, UNSPECIFIED SITE                    

 

             02/15/2020           CAMPBELL HOANG MD           Ot           N17.

9           

ACUTE KIDNEY FAILURE, UNSPECIFIED                    

 

             02/15/2020           CAMPBELL HOANG MD, Ot           N18.

9           

CHRONIC KIDNEY DISEASE, UNSPECIFIED                    

 

             02/15/2020           CAMPBELL HOANG MD           Ot           R00.

0           

TACHYCARDIA, UNSPECIFIED                         

 

             02/15/2020           CAMPBELL HOANG MD, Ot           R57.

1           

HYPOVOLEMIC SHOCK                                

 

             02/15/2020           CAMPBELL HOANG MD, Ot           R65.

20           

SEVERE SEPSIS WITHOUT SEPTIC SHOCK                    

 

             02/15/2020           CAMPBELL HOANG MD, Ot           Z79.

4           

LONG TERM (CURRENT) USE OF INSULIN                    

 

                02/15/2020           CAMPBELL HOANG MD, Ot            

  Z86.718          

                          PERSONAL HISTORY OF OTHER VENOUS THROMBO              

      

 

             02/15/2020           CAMPBELL HOANG MD, Ot           Z87.

11           

PERSONAL HISTORY OF PEPTIC ULCER DISEASE                    

 

             02/15/2020           CAMPBELL HOANG MD, Ot           Z91.

81           

HISTORY OF FALLING                               

 

             2020           MISHA DEVRIES MD, Ot           E11.

10           

TYPE 2 DIABETES MELLITUS WITH KETOACIDOS                    

 

             2020           MISHA DEVRIES MD, Ot           E11.

22           

TYPE 2 DIABETES MELLITUS W DIABETIC                     

 

             2020           MISHA DEVRIES MD, Ot           E11.

40           

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                    

 

             2020           MISHA DEVRIES MD, Ot           E11.

43           

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                    

 

             2020           MISHA DEVRIES MD, Ot           F12.

90           

CANNABIS USE, UNSPECIFIED, UNCOMPLICATED                    

 

                2020           MISHA DEVRIES MD, Ot            

  F17.210          

                          NICOTINE DEPENDENCE, CIGARETTES, UNCOMPL              

      

 

             2020           MISHA DEVRIES MD, Ot           F60.

9           

PERSONALITY DISORDER, UNSPECIFIED                    

 

             2020           MISHA DEVRIES MD, Ot           I12.

9           

HYPERTENSIVE CHRONIC KIDNEY DISEASE W ST                    

 

             2020           MISHA DEVRIES MD, Ot           J44.

9           

CHRONIC OBSTRUCTIVE PULMONARY DISEASE, U                    

 

             2020           MISHA DEVRIES MD, Ot           K21.

9           

GASTRO-ESOPHAGEAL REFLUX DISEASE WITHOUT                    

 

             2020           MISHA DEVRIES MD, Ot           K31.

84           

GASTROPARESIS                                    

 

             2020           MISHA DEVRIES MD, Ot           M06.

9           

RHEUMATOID ARTHRITIS, UNSPECIFIED                    

 

             2020           MISHA DEVRIES MD, Ot           M19.

91           

PRIMARY OSTEOARTHRITIS, UNSPECIFIED SITE                    

 

             2020           MISHA DEVRIES MD, Ot           N17.

9           

ACUTE KIDNEY FAILURE, UNSPECIFIED                    

 

             2020           MISHA DEVRIES MD, Ot           N18.

3           

CHRONIC KIDNEY DISEASE, STAGE 3 (MODERAT                    

 

             2020           MISHA DEVRIES MD, Ot           Z79.

4           

LONG TERM (CURRENT) USE OF INSULIN                    

 

                2020           MISHA DEVRIES MD, Ot            

  Z86.718          

                          PERSONAL HISTORY OF OTHER VENOUS THROMBO              

      

 

             2020           MISHA DEVRIES MD, Ot           Z87.

01           

PERSONAL HISTORY OF PNEUMONIA (RECURRENT                    

 

                2020           MISHA DEVRIES MD, Ot            

  Z90.710          

                          ACQUIRED ABSENCE OF BOTH CERVIX AND UTER              

      

 

             2020           MISHA DEVRIES MD, Ot           Z90.

89           

ACQUIRED ABSENCE OF OTHER ORGANS                    

 

                2020           MISHA DEVRIES MD, Ot            

  Z95.820          

                          PERIPHERAL VASCULAR ANGIOPLASTY STATUS W              

      



                                                                                
                                                                                
                                                                                
                                                                                
                                                                                
                                                                                
                                                                                
                                                                                
                                                                                
                                                                                
                                                                                
                                                                                
                                                                                
                                                                                
                                                                                
                                                                                
                                                                                
                                                                                
                                                                                
                                                                                
                                                          



Procedures

      



                Code            Description           Performed By           Per

formed On        

 

                                38.7                                            

                    

                                        12/15/2011        

 

                                      [id number]                                 IN

SERTION OF ENDOTRACHEAL 

AIRWAY INTO TR                                               02/10/2020        

 

                                      [id number]                                 RE

SPIRATORY VENTILATION, 

LESS THAN 24 CO                                               02/10/2020        

 

                                      [id number]                                 RE

SPIRATORY VENTILATION, 24-

96 CONSECUTI                                               02/10/2020        



                        



Results

      



                    Test                Result              Range        

 

                                        Comprehensive metabolic panel - 16

 08:48         

 

                          Serum or plasma sodium measurement (moles/volume)     

      138 mmol/L          

                                        135-145        

 

                          Serum or plasma potassium measurement (moles/volume)  

         4.9 mmol/L       

                                        3.6-5.0        

 

                          Serum or plasma chloride measurement (moles/volume)   

        103 mmol/L        

                                                

 

                    Carbon dioxide           29 mmol/L           21-32        

 

                          Serum or plasma anion gap determination (moles/volume)

           6 mmol/L       

                                        5-14        

 

                          Serum or plasma urea nitrogen measurement (mass/volume

)           26 mg/dL      

                                        7-18        

 

                          Serum or plasma creatinine measurement (mass/volume)  

         1.30 mg/dL       

                                        0.60-1.30        

 

                    Serum or plasma urea nitrogen/creatinine mass ratio         

  20                  NRG 

       

 

                                        Serum or plasma creatinine measurement w

ith calculation of estimated glomerular 

filtration rate           43                        NRG        

 

                    Serum or plasma glucose measurement (mass/volume)           

223 mg/dL           

        

 

                    Serum or plasma calcium measurement (mass/volume)           

9.8 mg/dL           

8.5-10.1        

 

                          Serum or plasma total bilirubin measurement (mass/volu

me)           0.6 mg/dL   

                                        0.1-1.0        

 

                                        Serum or plasma alkaline phosphatase kim

surement (enzymatic activity/volume)    

                          72 U/L                            

 

                                        Serum or plasma aspartate aminotransfera

se measurement (enzymatic 

activity/volume)           20 U/L                    5-34        

 

                                        Serum or plasma alanine aminotransferase

 measurement (enzymatic activity/volume)

                          28 U/L                    0-55        

 

                    Serum or plasma protein measurement (mass/volume)           

7.2 g/dL            

6.4-8.2        

 

                    Serum or plasma albumin measurement (mass/volume)           

4.2 g/dL            

3.2-4.5        

 

                                        Lipid 1996 panel - 16 08:48       

  

 

                          Serum or plasma triglyceride measurement (mass/volume)

           118 mg/dL      

                                        <150        

 

                          Serum or plasma cholesterol measurement (mass/volume) 

          213 mg/dL       

                                        < 200        

 

                          Serum or plasma cholesterol in HDL measurement (mass/v

olume)           55 mg/dL 

                                        40-60        

 

                          Cholesterol in LDL [mass/volume] in serum or plasma by

 direct assay           

132 mg/dL                               1-129        

 

                          Serum or plasma cholesterol in VLDL measurement (mass/

volume)           24 mg/dL

                                        5-40        

 

                                        THYROID STIMULATING HORMONE - 16 0

8:48         

 

                    THYROID STIMULATING HORMONE           0.80 u[iU]/mL         

  0.35-4.94        

 

                                        Comprehensive metabolic panel - 17

 07:58         

 

                          Serum or plasma sodium measurement (moles/volume)     

      137 mmol/L          

                                        135-145        

 

                          Serum or plasma potassium measurement (moles/volume)  

         4.6 mmol/L       

                                        3.6-5.0        

 

                          Serum or plasma chloride measurement (moles/volume)   

        110 mmol/L        

                                                

 

                    Carbon dioxide           21 mmol/L           21-32        

 

                          Serum or plasma anion gap determination (moles/volume)

           6 mmol/L       

                                        5-14        

 

                          Serum or plasma urea nitrogen measurement (mass/volume

)           25 mg/dL      

                                        7-18        

 

                          Serum or plasma creatinine measurement (mass/volume)  

         1.17 mg/dL       

                                        0.60-1.30        

 

                    Serum or plasma urea nitrogen/creatinine mass ratio         

  21                  NRG 

       

 

                                        Serum or plasma creatinine measurement w

ith calculation of estimated glomerular 

filtration rate           48                        NRG        

 

                    Serum or plasma glucose measurement (mass/volume)           

201 mg/dL           

        

 

                    Serum or plasma calcium measurement (mass/volume)           

8.8 mg/dL           

8.5-10.1        

 

                          Serum or plasma total bilirubin measurement (mass/volu

me)           0.7 mg/dL   

                                        0.1-1.0        

 

                                        Serum or plasma alkaline phosphatase kim

surement (enzymatic activity/volume)    

                          66 U/L                            

 

                                        Serum or plasma aspartate aminotransfera

se measurement (enzymatic 

activity/volume)           54 U/L                    5-34        

 

                                        Serum or plasma alanine aminotransferase

 measurement (enzymatic activity/volume)

                          109 U/L                   0-55        

 

                    Serum or plasma protein measurement (mass/volume)           

6.6 g/dL            

6.4-8.2        

 

                    Serum or plasma albumin measurement (mass/volume)           

3.6 g/dL            

3.2-4.5        

 

                                        Lipid 1996 panel - 17 07:58       

  

 

                          Serum or plasma triglyceride measurement (mass/volume)

           169 mg/dL      

                                        <150        

 

                          Serum or plasma cholesterol measurement (mass/volume) 

          153 mg/dL       

                                        < 200        

 

                          Serum or plasma cholesterol in HDL measurement (mass/v

olume)           37 mg/dL 

                                        40-60        

 

                          Cholesterol in LDL [mass/volume] in serum or plasma by

 direct assay           97

 mg/dL                                  1-129        

 

                          Serum or plasma cholesterol in VLDL measurement (mass/

volume)           34 mg/dL

                                        5-40        

 

                                        Cyanocobalamin measurement - 17 07

:58         

 

                    Vitamin B12           1904 pg/mL           200-1000        

 

                                        25-hydroxyvitamin D measurement -  07:58         

 

                    25-hydroxy vitamin D measurement           14 %             

           

 

                                        Complete blood count (CBC) with automate

d white blood cell (WBC) differential - 

17 19:45         

 

                          Blood leukocytes automated count (number/volume)      

     9.4 10*3/uL          

                                        4.3-11.0        

 

                          Blood erythrocytes automated count (number/volume)    

       4.92 10*6/uL       

                                        4.35-5.85        

 

                    Venous blood hemoglobin measurement (mass/volume)           

15.4 g/dL           

11.5-16.0        

 

                    Blood hematocrit (volume fraction)           46 %           

     35-52        

 

                    Automated erythrocyte mean corpuscular volume           93 [

foz_us]           

80-99        

 

                                        Automated erythrocyte mean corpuscular h

emoglobin (mass per erythrocyte)        

                          31 pg                     25-34        

 

                                        Automated erythrocyte mean corpuscular h

emoglobin concentration measurement 

(mass/volume)             34 g/dL                   32-36        

 

                    Automated erythrocyte distribution width ratio           13.

8 %              10.0-

14.5        

 

                    Automated blood platelet count (count/volume)           237 

10*3/uL           

130-400        

 

                          Automated blood platelet mean volume measurement      

     10.1 [foz_us]        

                                        7.4-10.4        

 

                    Automated blood neutrophils/100 leukocytes           64 %   

             42-75       

 

 

                    Automated blood lymphocytes/100 leukocytes           25 %   

             12-44       

 

 

                    Blood monocytes/100 leukocytes           8 %                

 0-12        

 

                    Automated blood eosinophils/100 leukocytes           3 %    

             0-10        

 

                    Automated blood basophils/100 leukocytes           0 %      

           0-10        

 

                    Blood neutrophils automated count (number/volume)           

6.0 10*3            

1.8-7.8        

 

                    Blood lymphocytes automated count (number/volume)           

2.4 10*3            

1.0-4.0        

 

                    Blood monocytes automated count (number/volume)           0.

8 10*3            

0.0-1.0        

 

                    Automated eosinophil count           0.2 10*3/uL           0

.0-0.3        

 

                    Automated blood basophil count (count/volume)           0.0 

10*3/uL           

0.0-0.1        

 

                                        Comprehensive metabolic panel - 17

 19:45         

 

                          Serum or plasma sodium measurement (moles/volume)     

      142 mmol/L          

                                        135-145        

 

                          Serum or plasma potassium measurement (moles/volume)  

         4.2 mmol/L       

                                        3.6-5.0        

 

                          Serum or plasma chloride measurement (moles/volume)   

        104 mmol/L        

                                                

 

                    Carbon dioxide           24 mmol/L           21-32        

 

                          Serum or plasma anion gap determination (moles/volume)

           14 mmol/L      

                                        5-14        

 

                          Serum or plasma urea nitrogen measurement (mass/volume

)           25 mg/dL      

                                        7-18        

 

                          Serum or plasma creatinine measurement (mass/volume)  

         1.29 mg/dL       

                                        0.60-1.30        

 

                    Serum or plasma urea nitrogen/creatinine mass ratio         

  19                  NRG 

       

 

                                        Serum or plasma creatinine measurement w

ith calculation of estimated glomerular 

filtration rate           43                        NRG        

 

                    Serum or plasma glucose measurement (mass/volume)           

102 mg/dL           

        

 

                    Serum or plasma calcium measurement (mass/volume)           

9.6 mg/dL           

8.5-10.1        

 

                          Serum or plasma total bilirubin measurement (mass/volu

me)           0.9 mg/dL   

                                        0.1-1.0        

 

                                        Serum or plasma alkaline phosphatase kim

surement (enzymatic activity/volume)    

                          83 U/L                            

 

                                        Serum or plasma aspartate aminotransfera

se measurement (enzymatic 

activity/volume)           99 U/L                    5-34        

 

                                        Serum or plasma alanine aminotransferase

 measurement (enzymatic activity/volume)

                          230 U/L                   0-55        

 

                    Serum or plasma protein measurement (mass/volume)           

7.9 g/dL            

6.4-8.2        

 

                    Serum or plasma albumin measurement (mass/volume)           

4.1 g/dL            

3.2-4.5        

 

                                        Lipase - 17 19:45         

 

                    Lipase              144 U/L             8-78        

 

                                        Urine drug screening test - 17 20:

00         

 

                    Urine phencyclidine detection by screening method           

NEGATIVE            

NEGATIVE        

 

                          Urine benzodiazepines detection by screening method   

        NEGATIVE          

                                        NEGATIVE        

 

                    Urine cocaine detection           NEGATIVE            NEGATI

VE        

 

                    Urine amphetamines detection by screening method           N

EGATIVE            

NEGATIVE        

 

                          Urine methamphetamine detection by screening method   

        NEGATIVE          

                                        NEGATIVE        

 

                    Urine cannabinoids detection by screening method           P

OSITIVE            

NEGATIVE        

 

                    Urine opiates detection by screening method           NEGATI

VE            

NEGATIVE        

 

                    Urine barbiturates detection           NEGATIVE            N

EGATIVE        

 

                          Screening urine tricyclic antidepressants detection   

        NEGATIVE          

                                        NEGATIVE        

 

                    Urine methadone detection by screening method           NEGA

TIVE            

NEGATIVE        

 

                    Urine oxycodone detection           NEGATIVE            NEGA

TIVE        

 

                    Urine propoxyphene detection           NEGATIVE            N

EGATIVE        

 

                                        Complete urinalysis with reflex to cultu

re - 17 20:00         

 

                    Urine color determination           YELLOW              NRG 

       

 

                    Urine clarity determination           SLIGHTLY CLOUDY       

     NRG        

 

                    Urine pH measurement by test strip           8              

     5-9        

 

                    Specific gravity of urine by test strip           1.010     

          1.016-1.022  

      

 

                    Urine protein assay by test strip, semi-quantitative        

   3+                  

NEGATIVE        

 

                    Urine glucose detection by automated test strip           NE

GATIVE            

NEGATIVE        

 

                          Erythrocytes detection in urine sediment by light micr

oscopy           2+       

                                        NEGATIVE        

 

                    Urine ketones detection by automated test strip           1+

                  NEGATIVE

        

 

                    Urine nitrite detection by test strip           NEGATIVE    

        NEGATIVE    

    

 

                    Urine total bilirubin detection by test strip           NEGA

TIVE            

NEGATIVE        

 

                          Urine urobilinogen measurement by automated test strip

 (mass/volume)           

NORMAL                                  NORMAL        

 

                    Urine leukocyte esterase detection by dipstick           1+ 

                 NEGATIVE 

       

 

                                        Automated urine sediment erythrocyte cou

nt by microscopy (number/high power 

field)                     [HPF]                    NRG        

 

                                        Automated urine sediment leukocyte count

 by microscopy (number/high power field)

                           [HPF]                    NRG        

 

                          Bacteria detection in urine sediment by light microsco

py           NONE         

                                        NRG        

 

                                        Squamous epithelial cells detection in u

rine sediment by light microscopy       

                          0-2                       NRG        

 

                          Crystals detection in urine sediment by light microsco

py           NONE         

                                        NRG        

 

                    Casts detection in urine sediment by light microscopy       

    NONE                

NRG        

 

                          Mucus detection in urine sediment by light microscopy 

          NEGATIVE        

                                        NRG        

 

                    Complete urinalysis with reflex to culture           NO     

             NRG        

 

                                        Complete blood count (CBC) with automate

d white blood cell (WBC) differential - 

10/11/17 08:55         

 

                          Blood leukocytes automated count (number/volume)      

     7.0 10*3/uL          

                                        4.3-11.0        

 

                          Blood erythrocytes automated count (number/volume)    

       5.49 10*6/uL       

                                        4.35-5.85        

 

                    Venous blood hemoglobin measurement (mass/volume)           

17.0 g/dL           

11.5-16.0        

 

                    Blood hematocrit (volume fraction)           51 %           

     35-52        

 

                    Automated erythrocyte mean corpuscular volume           92 [

foz_us]           

80-99        

 

                                        Automated erythrocyte mean corpuscular h

emoglobin (mass per erythrocyte)        

                          31 pg                     25-34        

 

                                        Automated erythrocyte mean corpuscular h

emoglobin concentration measurement 

(mass/volume)             34 g/dL                   32-36        

 

                    Automated erythrocyte distribution width ratio           13.

7 %              10.0-

14.5        

 

                    Automated blood platelet count (count/volume)           227 

10*3/uL           

130-400        

 

                          Automated blood platelet mean volume measurement      

     10.3 [foz_us]        

                                        7.4-10.4        

 

                    Automated blood neutrophils/100 leukocytes           51 %   

             42-75       

 

 

                    Automated blood lymphocytes/100 leukocytes           33 %   

             12-44       

 

 

                    Blood monocytes/100 leukocytes           13 %               

 0-12        

 

                    Automated blood eosinophils/100 leukocytes           3 %    

             0-10        

 

                    Automated blood basophils/100 leukocytes           1 %      

           0-10        

 

                    Blood neutrophils automated count (number/volume)           

3.6 10*3            

1.8-7.8        

 

                    Blood lymphocytes automated count (number/volume)           

2.3 10*3            

1.0-4.0        

 

                    Blood monocytes automated count (number/volume)           0.

9 10*3            

0.0-1.0        

 

                    Automated eosinophil count           0.2 10*3/uL           0

.0-0.3        

 

                    Automated blood basophil count (count/volume)           0.0 

10*3/uL           

0.0-0.1        

 

                                        Comprehensive metabolic panel - 10/11/17

 08:55         

 

                          Serum or plasma sodium measurement (moles/volume)     

      138 mmol/L          

                                        135-145        

 

                          Serum or plasma potassium measurement (moles/volume)  

         4.2 mmol/L       

                                        3.6-5.0        

 

                          Serum or plasma chloride measurement (moles/volume)   

        103 mmol/L        

                                                

 

                    Carbon dioxide           26 mmol/L           21-32        

 

                          Serum or plasma anion gap determination (moles/volume)

           9 mmol/L       

                                        5-14        

 

                          Serum or plasma urea nitrogen measurement (mass/volume

)           23 mg/dL      

                                        7-18        

 

                          Serum or plasma creatinine measurement (mass/volume)  

         1.33 mg/dL       

                                        0.60-1.30        

 

                    Serum or plasma urea nitrogen/creatinine mass ratio         

  17                  NRG 

       

 

                                        Serum or plasma creatinine measurement w

ith calculation of estimated glomerular 

filtration rate           42                        NRG        

 

                    Serum or plasma glucose measurement (mass/volume)           

186 mg/dL           

        

 

                    Serum or plasma calcium measurement (mass/volume)           

9.9 mg/dL           

8.5-10.1        

 

                          Serum or plasma total bilirubin measurement (mass/volu

me)           1.2 mg/dL   

                                        0.1-1.0        

 

                                        Serum or plasma alkaline phosphatase kim

surement (enzymatic activity/volume)    

                          83 U/L                            

 

                                        Serum or plasma aspartate aminotransfera

se measurement (enzymatic 

activity/volume)           73 U/L                    5-34        

 

                                        Serum or plasma alanine aminotransferase

 measurement (enzymatic activity/volume)

                          144 U/L                   0-55        

 

                    Serum or plasma protein measurement (mass/volume)           

8.3 g/dL            

6.4-8.2        

 

                    Serum or plasma albumin measurement (mass/volume)           

4.1 g/dL            

3.2-4.5        

 

                                        Lipid 1996 panel - 10/11/17 08:55       

  

 

                          Serum or plasma triglyceride measurement (mass/volume)

           168 mg/dL      

                                        <150        

 

                          Serum or plasma cholesterol measurement (mass/volume) 

          191 mg/dL       

                                        < 200        

 

                          Serum or plasma cholesterol in HDL measurement (mass/v

olume)           42 mg/dL 

                                        40-60        

 

                          Cholesterol in LDL [mass/volume] in serum or plasma by

 direct assay           

108 mg/dL                               1-129        

 

                          Serum or plasma cholesterol in VLDL measurement (mass/

volume)           34 mg/dL

                                        5-40        

 

                                        Hemoglobin A1c - 10/11/17 08:55         

 

                    Hemoglobin A1c           10.7 %              4.5-6.2        

 

                                        Automated blood complete blood count (he

mogram) panel - 18 08:18         

 

                          Blood leukocytes automated count (number/volume)      

     5.2 10*3/uL          

                                        4.3-11.0        

 

                          Blood erythrocytes automated count (number/volume)    

       4.66 10*6/uL       

                                        4.35-5.85        

 

                    Venous blood hemoglobin measurement (mass/volume)           

15.0 g/dL           

11.5-16.0        

 

                    Blood hematocrit (volume fraction)           43 %           

     35-52        

 

                    Automated erythrocyte mean corpuscular volume           93 [

foz_us]           

80-99        

 

                                        Automated erythrocyte mean corpuscular h

emoglobin (mass per erythrocyte)        

                          32 pg                     25-34        

 

                                        Automated erythrocyte mean corpuscular h

emoglobin concentration measurement 

(mass/volume)             35 g/dL                   32-36        

 

                    Automated erythrocyte distribution width ratio           13.

3 %              10.0-

14.5        

 

                    Automated blood platelet count (count/volume)           232 

10*3/uL           

130-400        

 

                          Automated blood platelet mean volume measurement      

     10.2 [foz_us]        

                                        7.4-10.4        

 

                                        Comprehensive metabolic panel - 18

 08:18         

 

                          Serum or plasma sodium measurement (moles/volume)     

      135 mmol/L          

                                        135-145        

 

                          Serum or plasma potassium measurement (moles/volume)  

         4.5 mmol/L       

                                        3.6-5.0        

 

                          Serum or plasma chloride measurement (moles/volume)   

        104 mmol/L        

                                                

 

                    Carbon dioxide           23 mmol/L           21-32        

 

                          Serum or plasma anion gap determination (moles/volume)

           8 mmol/L       

                                        5-14        

 

                          Serum or plasma urea nitrogen measurement (mass/volume

)           25 mg/dL      

                                        7-18        

 

                          Serum or plasma creatinine measurement (mass/volume)  

         1.21 mg/dL       

                                        0.60-1.30        

 

                    Serum or plasma urea nitrogen/creatinine mass ratio         

  21                  NRG 

       

 

                                        Serum or plasma creatinine measurement w

ith calculation of estimated glomerular 

filtration rate           46                        NRG        

 

                    Serum or plasma glucose measurement (mass/volume)           

310 mg/dL           

        

 

                    Serum or plasma calcium measurement (mass/volume)           

9.3 mg/dL           

8.5-10.1        

 

                          Serum or plasma total bilirubin measurement (mass/volu

me)           0.8 mg/dL   

                                        0.1-1.0        

 

                                        Serum or plasma alkaline phosphatase kim

surement (enzymatic activity/volume)    

                          66 U/L                            

 

                                        Serum or plasma aspartate aminotransfera

se measurement (enzymatic 

activity/volume)           68 U/L                    5-34        

 

                                        Serum or plasma alanine aminotransferase

 measurement (enzymatic activity/volume)

                          133 U/L                   0-55        

 

                    Serum or plasma protein measurement (mass/volume)           

7.3 g/dL            

6.4-8.2        

 

                    Serum or plasma albumin measurement (mass/volume)           

3.9 g/dL            

3.2-4.5        

 

                                        THYROID STIMULATING HORMONE - 18 0

8:18         

 

                    THYROID STIMULATING HORMONE           0.73 u[iU]/mL         

  0.35-4.94        

 

                                        Urine microalbumin measurement by test s

trip (mass/volume) - 18 08:18     

    

 

                    Urine creatinine measurement (mass/volume)           166 %  

             NRG        

 

                    Microalbumin [mass/volume] in urine           739.8 %       

      0.0-20.0        

 

                    Microalbumin/creatinine [ratio] in urine           445.7 mg/

g{Cre}           

0.0-30.0        

 

                                        Cyanocobalamin measurement - 18 08

:18         

 

                    Vitamin B12           952 pg/mL           190-1100        

 

                                        VITAMIN D 25-HYDROXY - 18 08:18   

      

 

                    VITAMIN D 25-HYDROXY (TOTAL)           25.2 %              3

0.0-100.0        

 

                                        Automated blood complete blood count (he

mogram) panel - 01/15/19 17:23         

 

                          Blood leukocytes automated count (number/volume)      

     9.4 10*3/uL          

                                        4.3-11.0        

 

                          Blood erythrocytes automated count (number/volume)    

       4.76 10*6/uL       

                                        4.35-5.85        

 

                    Venous blood hemoglobin measurement (mass/volume)           

14.7 g/dL           

11.5-16.0        

 

                    Blood hematocrit (volume fraction)           44 %           

     35-52        

 

                    Automated erythrocyte mean corpuscular volume           93 [

foz_us]           

80-99        

 

                                        Automated erythrocyte mean corpuscular h

emoglobin (mass per erythrocyte)        

                          31 pg                     25-34        

 

                                        Automated erythrocyte mean corpuscular h

emoglobin concentration measurement 

(mass/volume)             33 g/dL                   32-36        

 

                    Automated erythrocyte distribution width ratio           13.

2 %              10.0-

14.5        

 

                    Automated blood platelet count (count/volume)           254 

10*3/uL           

130-400        

 

                          Automated blood platelet mean volume measurement      

     10.0 [foz_us]        

                                        7.4-10.4        

 

                                        Comprehensive metabolic panel - 01/15/19

 17:23         

 

                          Serum or plasma sodium measurement (moles/volume)     

      137 mmol/L          

                                        135-145        

 

                          Serum or plasma potassium measurement (moles/volume)  

         3.6 mmol/L       

                                        3.6-5.0        

 

                          Serum or plasma chloride measurement (moles/volume)   

        97 mmol/L         

                                                

 

                    Carbon dioxide           26 mmol/L           21-32        

 

                          Serum or plasma anion gap determination (moles/volume)

           14 mmol/L      

                                        5-14        

 

                          Serum or plasma urea nitrogen measurement (mass/volume

)           24 mg/dL      

                                        7-18        

 

                          Serum or plasma creatinine measurement (mass/volume)  

         1.78 mg/dL       

                                        0.60-1.30        

 

                    Serum or plasma urea nitrogen/creatinine mass ratio         

  13                  NRG 

       

 

                                        Serum or plasma creatinine measurement w

ith calculation of estimated glomerular 

filtration rate           30                        NRG        

 

                    Serum or plasma glucose measurement (mass/volume)           

112 mg/dL           

        

 

                          Serum or plasma calcium measurement (mass/volume)     

      10.1 mg/dL          

                                        8.5-10.1        

 

                          Serum or plasma total bilirubin measurement (mass/volu

me)           0.8 mg/dL   

                                        0.1-1.0        

 

                                        Serum or plasma alkaline phosphatase kim

surement (enzymatic activity/volume)    

                          92 U/L                            

 

                                        Serum or plasma aspartate aminotransfera

se measurement (enzymatic 

activity/volume)           42 U/L                    5-34        

 

                                        Serum or plasma alanine aminotransferase

 measurement (enzymatic activity/volume)

                          81 U/L                    0-55        

 

                    Serum or plasma protein measurement (mass/volume)           

8.7 g/dL            

6.4-8.2        

 

                    Serum or plasma albumin measurement (mass/volume)           

4.2 g/dL            

3.2-4.5        

 

                    CALCIUM CORRECTED           9.9 mg/dL           8.5-10.1    

    

 

                                        Lipid 1996 panel - 01/15/19 17:23       

  

 

                          Serum or plasma triglyceride measurement (mass/volume)

           231 mg/dL      

                                        <150        

 

                          Serum or plasma cholesterol measurement (mass/volume) 

          280 mg/dL       

                                        < 200        

 

                          Serum or plasma cholesterol in HDL measurement (mass/v

olume)           54 mg/dL 

                                        40-60        

 

                          Cholesterol in LDL [mass/volume] in serum or plasma by

 direct assay           

179 mg/dL                               1-129        

 

                          Serum or plasma cholesterol in VLDL measurement (mass/

volume)           46 mg/dL

                                        5-40        

 

                                        THYROID STIMULATING HORMONE - 01/15/19 1

7:23         

 

                    THYROID STIMULATING HORMONE           4.84 u[iU]/mL         

  0.35-4.94        

 

                                        Complete blood count (CBC) with automate

d white blood cell (WBC) differential - 

19 08:19         

 

                          Blood leukocytes automated count (number/volume)      

     26.1 10*3/uL         

                                        4.3-11.0        

 

                          Blood erythrocytes automated count (number/volume)    

       4.51 10*6/uL       

                                        4.35-5.85        

 

                    Venous blood hemoglobin measurement (mass/volume)           

14.1 g/dL           

11.5-16.0        

 

                    Blood hematocrit (volume fraction)           44 %           

     35-52        

 

                    Automated erythrocyte mean corpuscular volume           97 [

foz_us]           

80-99        

 

                                        Automated erythrocyte mean corpuscular h

emoglobin (mass per erythrocyte)        

                          31 pg                     25-34        

 

                                        Automated erythrocyte mean corpuscular h

emoglobin concentration measurement 

(mass/volume)             32 g/dL                   32-36        

 

                    Automated erythrocyte distribution width ratio           13.

7 %              10.0-

14.5        

 

                    Automated blood platelet count (count/volume)           349 

10*3/uL           

130-400        

 

                          Automated blood platelet mean volume measurement      

     10.6 [foz_us]        

                                        7.4-10.4        

 

                    Automated blood neutrophils/100 leukocytes           81 %   

             42-75       

 

 

                    Automated blood lymphocytes/100 leukocytes           11 %   

             12-44       

 

 

                    Blood monocytes/100 leukocytes           7 %                

 0-12        

 

                    Automated blood eosinophils/100 leukocytes           0 %    

             0-10        

 

                    Automated blood basophils/100 leukocytes           0 %      

           0-10        

 

                    Blood neutrophils automated count (number/volume)           

21.2 10*3           

1.8-7.8        

 

                    Blood lymphocytes automated count (number/volume)           

3.0 10*3            

1.0-4.0        

 

                    Blood monocytes automated count (number/volume)           1.

8 10*3            

0.0-1.0        

 

                    Automated eosinophil count           0.0 10*3/uL           0

.0-0.3        

 

                    Automated blood basophil count (count/volume)           0.0 

10*3/uL           

0.0-0.1        

 

                                        Manual absolute plasma cell count -  08:19         

 

                    Blood monocytes/100 leukocytes           5 %                

 NRG        

 

                    Manual blood segmented neutrophils/100 leukocytes           

83 %                NRG  

      

 

                    Blood band neutrophils/100 leukocytes           0 %         

        NRG        

 

                    Manual blood lymphocytes/100 leukocytes           12 %      

          NRG        

 

                    Manual eosinophils/100 leukocytes in nose           0 %     

            NRG        

 

                    Manual blood basophils/100 leukocytes           0 %         

        NRG        

 

                    Blood erythrocyte morphology finding identification         

  NORMAL              

NRG        

 

                                        Comprehensive metabolic panel - 19

 08:19         

 

                          Serum or plasma sodium measurement (moles/volume)     

      129 mmol/L          

                                        135-145        

 

                          Serum or plasma potassium measurement (moles/volume)  

         6.3 mmol/L       

                                        3.6-5.0        

 

                          Serum or plasma chloride measurement (moles/volume)   

        88 mmol/L         

                                                

 

                    Carbon dioxide           < mmol/L            21-32        

 

                          Serum or plasma anion gap determination (moles/volume)

           37 mmol/L      

                                        5-14        

 

                          Serum or plasma urea nitrogen measurement (mass/volume

)           51 mg/dL      

                                        7-18        

 

                          Serum or plasma creatinine measurement (mass/volume)  

         2.96 mg/dL       

                                        0.60-1.30        

 

                    Serum or plasma urea nitrogen/creatinine mass ratio         

  17                  NRG 

       

 

                                        Serum or plasma creatinine measurement w

ith calculation of estimated glomerular 

filtration rate           16                        NRG        

 

                    Serum or plasma glucose measurement (mass/volume)           

919 mg/dL           

        

 

                    Serum or plasma calcium measurement (mass/volume)           

9.6 mg/dL           

8.5-10.1        

 

                          Serum or plasma total bilirubin measurement (mass/volu

me)           0.4 mg/dL   

                                        0.1-1.0        

 

                                        Serum or plasma alkaline phosphatase kim

surement (enzymatic activity/volume)    

                          127 U/L                           

 

                                        Serum or plasma aspartate aminotransfera

se measurement (enzymatic 

activity/volume)           32 U/L                    5-34        

 

                                        Serum or plasma alanine aminotransferase

 measurement (enzymatic activity/volume)

                          74 U/L                    0-55        

 

                    Serum or plasma protein measurement (mass/volume)           

7.4 g/dL            

6.4-8.2        

 

                    Serum or plasma albumin measurement (mass/volume)           

3.4 g/dL            

3.2-4.5        

 

                    CALCIUM CORRECTED           10.1 mg/dL           8.5-10.1   

     

 

                                        Lipase - 19 08:19         

 

                    Lipase              215 U/L             8-78        

 

                                        Capillary blood glucose measurement by g

lucometer (mass/volume) - 19 09:33

         

 

                          Capillary blood glucose measurement by glucometer (mas

s/volume)           > 

mg/dL                                           

 

                                        Arterial blood gas measurement - 

9 09:34         

 

                    Blood pCO2           11 mm[Hg]           35-45        

 

                    Blood pO2           127 mm[Hg]           79-93        

 

                          Arterial blood bicarbonate measurement (moles/volume) 

          3 mmol/L        

                                        23-27        

 

                    Arterial blood base excess by calculation           -25.7 mm

ol/L           

-2.5-2.5        

 

                    Arterial blood oxygen saturation measurement           98 % 

                   

    

 

                    * Inhaled oxygen flow rate           NA                  NRG

        

 

                          Arterial blood pH measurement with patient temperature

 correction           7.07

                                        7.37-7.43        

 

                          Arterial blood carbon dioxide, total measurement (mole

s/volume)           3.4 

mmol/L                                  21.0-31.0        

 

                    Body site           R BRACHIAL            NRG        

 

                          Assessment of wrist artery patency prior to arterial p

uncture           YES-POS 

                                        NRG        

 

                    Setting of ventilation mode           NO                  NR

G        

 

                    Measurement of body temperature           97.6              

  NRG        

 

                                        Whole blood basic metabolic panel -  09:45         

 

                          Serum or plasma sodium measurement (moles/volume)     

      132 mmol/L          

                                        135-145        

 

                          Serum or plasma potassium measurement (moles/volume)  

         6.0 mmol/L       

                                        3.6-5.0        

 

                          Serum or plasma chloride measurement (moles/volume)   

        94 mmol/L         

                                                

 

                    Carbon dioxide           < mmol/L            21-32        

 

                          Serum or plasma anion gap determination (moles/volume)

           33 mmol/L      

                                        5-14        

 

                          Serum or plasma urea nitrogen measurement (mass/volume

)           52 mg/dL      

                                        7-18        

 

                          Serum or plasma creatinine measurement (mass/volume)  

         2.74 mg/dL       

                                        0.60-1.30        

 

                    Serum or plasma urea nitrogen/creatinine mass ratio         

  19                  NRG 

       

 

                                        Serum or plasma creatinine measurement w

ith calculation of estimated glomerular 

filtration rate           18                        NRG        

 

                    Serum or plasma glucose measurement (mass/volume)           

852 mg/dL           

        

 

                    Serum or plasma calcium measurement (mass/volume)           

8.9 mg/dL           

8.5-10.1        

 

                                        Blood lactic acid measurement (moles/vol

ume) - 19 09:45         

 

                    Blood lactic acid measurement (moles/volume)           5.75 

mmol/L           

0.50-2.00        

 

                                        Serum or plasma troponin i.cardiac measu

rement (mass/volume) - 19 09:45   

      

 

                          Serum or plasma troponin i.cardiac measurement (mass/v

olume)           < ng/mL  

                                        <0.028        

 

                                        Bacterial blood culture - 19 10:15

         

 

                    Bacterial blood culture           NG                  NRG   

     

 

                                        Bacterial blood culture - 19 10:20

         

 

                    Bacterial blood culture           NG                  NRG   

     

 

                                        Automated blood complete blood count (he

mogram) panel - 19 11:00         

 

                          Blood leukocytes automated count (number/volume)      

     29.0 10*3/uL         

                                        4.3-11.0        

 

                          Blood erythrocytes automated count (number/volume)    

       4.23 10*6/uL       

                                        4.35-5.85        

 

                    Venous blood hemoglobin measurement (mass/volume)           

13.2 g/dL           

11.5-16.0        

 

                    Blood hematocrit (volume fraction)           40 %           

     35-52        

 

                    Automated erythrocyte mean corpuscular volume           95 [

foz_us]           

80-99        

 

                                        Automated erythrocyte mean corpuscular h

emoglobin (mass per erythrocyte)        

                          31 pg                     25-34        

 

                                        Automated erythrocyte mean corpuscular h

emoglobin concentration measurement 

(mass/volume)             33 g/dL                   32-36        

 

                    Automated erythrocyte distribution width ratio           13.

3 %              10.0-

14.5        

 

                    Automated blood platelet count (count/volume)           290 

10*3/uL           

130-400        

 

                          Automated blood platelet mean volume measurement      

     10.6 [foz_us]        

                                        7.4-10.4        

 

                                        Whole blood basic metabolic panel -  11:00         

 

                          Serum or plasma sodium measurement (moles/volume)     

      134 mmol/L          

                                        135-145        

 

                          Serum or plasma potassium measurement (moles/volume)  

         5.1 mmol/L       

                                        3.6-5.0        

 

                          Serum or plasma chloride measurement (moles/volume)   

        100 mmol/L        

                                                

 

                    Carbon dioxide           < mmol/L            21-32        

 

                          Serum or plasma anion gap determination (moles/volume)

           29 mmol/L      

                                        5-14        

 

                          Serum or plasma urea nitrogen measurement (mass/volume

)           51 mg/dL      

                                        7-18        

 

                          Serum or plasma creatinine measurement (mass/volume)  

         2.60 mg/dL       

                                        0.60-1.30        

 

                    Serum or plasma urea nitrogen/creatinine mass ratio         

  20                  NRG 

       

 

                                        Serum or plasma creatinine measurement w

ith calculation of estimated glomerular 

filtration rate           19                        NRG        

 

                    Serum or plasma glucose measurement (mass/volume)           

691 mg/dL           

        

 

                    Serum or plasma calcium measurement (mass/volume)           

8.3 mg/dL           

8.5-10.1        

 

                                        Beta-hydroxybutyric acid measurement - 0

19 11:00         

 

                    Beta-hydroxybutyric acid measurement           12.57 mmol/L 

          0.00-0.27 

       

 

                                        Hemoglobin A1c measurement - 19 11

:00         

 

                    Blood hemoglobin A1C measurement (mass/volume)           14.

3 %              4.0-

5.6        

 

                    MEAN BLOOD GLUCOSE           364 %               <=126      

  

 

                                        Serum or plasma lactate measurement (mol

es/volume) - 19 12:00         

 

                          Serum or plasma lactate measurement (moles/volume)    

       2.87 mmol/L        

                                        0.50-2.00        

 

                                        Methicillin resistant Staphylococcus aur

eus (MRSA) screening culture - 19 

12:00         

 

                          Methicillin resistant Staphylococcus aureus (MRSA) scr

eening culture           

NEG                                     NRG        

 

                                        Capillary blood glucose measurement by g

lucometer (mass/volume) - 19 12:05

         

 

                          Capillary blood glucose measurement by glucometer (mas

s/volume)           > 

mg/dL                                           

 

                                        Whole blood basic metabolic panel -  12:20         

 

                          Serum or plasma sodium measurement (moles/volume)     

      136 mmol/L          

                                        135-145        

 

                          Serum or plasma potassium measurement (moles/volume)  

         4.5 mmol/L       

                                        3.6-5.0        

 

                          Serum or plasma chloride measurement (moles/volume)   

        104 mmol/L        

                                                

 

                    Carbon dioxide           < mmol/L            21-32        

 

                          Serum or plasma anion gap determination (moles/volume)

           27 mmol/L      

                                        5-14        

 

                          Serum or plasma urea nitrogen measurement (mass/volume

)           48 mg/dL      

                                        7-18        

 

                          Serum or plasma creatinine measurement (mass/volume)  

         2.42 mg/dL       

                                        0.60-1.30        

 

                    Serum or plasma urea nitrogen/creatinine mass ratio         

  20                  NRG 

       

 

                                        Serum or plasma creatinine measurement w

ith calculation of estimated glomerular 

filtration rate           21                        NRG        

 

                    Serum or plasma glucose measurement (mass/volume)           

649 mg/dL           

        

 

                    Serum or plasma calcium measurement (mass/volume)           

8.4 mg/dL           

8.5-10.1        

 

                                        Capillary blood glucose measurement by g

lucometer (mass/volume) - 19 13:29

         

 

                          Capillary blood glucose measurement by glucometer (mas

s/volume)           541 

mg/dL                                           

 

                                        Complete urinalysis with reflex to cultu

re - 19 14:20         

 

                    Urine color determination           YELLOW              NRG 

       

 

                    Urine clarity determination           CLEAR               NR

G        

 

                    Urine pH measurement by test strip           5              

     5-9        

 

                    Specific gravity of urine by test strip           1.020     

          1.016-1.022  

      

 

                    Urine protein assay by test strip, semi-quantitative        

   3+                  

NEGATIVE        

 

                    Urine glucose detection by automated test strip           4+

                  NEGATIVE

        

 

                          Erythrocytes detection in urine sediment by light micr

oscopy           2+       

                                        NEGATIVE        

 

                    Urine ketones detection by automated test strip           4+

                  NEGATIVE

        

 

                    Urine nitrite detection by test strip           NEGATIVE    

        NEGATIVE    

    

 

                    Urine total bilirubin detection by test strip           NEGA

TIVE            

NEGATIVE        

 

                          Urine urobilinogen measurement by automated test strip

 (mass/volume)           

NORMAL                                  NORMAL        

 

                    Urine leukocyte esterase detection by dipstick           NEG

ATIVE            

NEGATIVE        

 

                                        Automated urine sediment erythrocyte cou

nt by microscopy (number/high power 

field)                     [HPF]                    NRG        

 

                                        Automated urine sediment leukocyte count

 by microscopy (number/high power field)

                          NONE                      NRG        

 

                          Bacteria detection in urine sediment by light microsco

py           TRACE        

                                        NRG        

 

                                        Squamous epithelial cells detection in u

rine sediment by light microscopy       

                          5-10                      NRG        

 

                          Crystals detection in urine sediment by light microsco

py           NONE         

                                        NRG        

 

                    Casts detection in urine sediment by light microscopy       

    NONE                

NRG        

 

                          Mucus detection in urine sediment by light microscopy 

          NEGATIVE        

                                        NRG        

 

                    Complete urinalysis with reflex to culture           NO     

             NRG        

 

                                        Urine drug screening test - 19 14:

20         

 

                    Urine phencyclidine detection by screening method           

NEGATIVE            

NEGATIVE        

 

                          Urine benzodiazepines detection by screening method   

        NEGATIVE          

                                        NEGATIVE        

 

                    Urine cocaine detection           NEGATIVE            NEGATI

VE        

 

                    Urine amphetamines detection by screening method           N

EGATIVE            

NEGATIVE        

 

                          Urine methamphetamine detection by screening method   

        NEGATIVE          

                                        NEGATIVE        

 

                    Urine cannabinoids detection by screening method           N

EGATIVE            

NEGATIVE        

 

                    Urine opiates detection by screening method           NEGATI

VE            

NEGATIVE        

 

                    Urine barbiturates detection           NEGATIVE            N

EGATIVE        

 

                          Screening urine tricyclic antidepressants detection   

        NEGATIVE          

                                        NEGATIVE        

 

                    Urine methadone detection by screening method           NEGA

TIVE            

NEGATIVE        

 

                    Urine oxycodone detection           NEGATIVE            NEGA

TIVE        

 

                    Urine propoxyphene detection           NEGATIVE            N

EGATIVE        

 

                                        Capillary blood glucose measurement by g

lucometer (mass/volume) - 19 14:32

         

 

                          Capillary blood glucose measurement by glucometer (mas

s/volume)           464 

mg/dL                                           

 

                                        Whole blood basic metabolic panel -  15:10         

 

                          Serum or plasma sodium measurement (moles/volume)     

      137 mmol/L          

                                        135-145        

 

                          Serum or plasma potassium measurement (moles/volume)  

         4.7 mmol/L       

                                        3.6-5.0        

 

                          Serum or plasma chloride measurement (moles/volume)   

        105 mmol/L        

                                                

 

                    Carbon dioxide           5 mmol/L            -32        

 

                          Serum or plasma anion gap determination (moles/volume)

           27 mmol/L      

                                        5-14        

 

                          Serum or plasma urea nitrogen measurement (mass/volume

)           47 mg/dL      

                                        7-18        

 

                          Serum or plasma creatinine measurement (mass/volume)  

         2.28 mg/dL       

                                        0.60-1.30        

 

                    Serum or plasma urea nitrogen/creatinine mass ratio         

  21                  NRG 

       

 

                                        Serum or plasma creatinine measurement w

ith calculation of estimated glomerular 

filtration rate           22                        NRG        

 

                    Serum or plasma glucose measurement (mass/volume)           

507 mg/dL           

        

 

                    Serum or plasma calcium measurement (mass/volume)           

8.8 mg/dL           

8.5-10.1        

 

                                        Capillary blood glucose measurement by g

lucometer (mass/volume) - 19 15:37

         

 

                          Capillary blood glucose measurement by glucometer (mas

s/volume)           432 

mg/dL                                           

 

                                        Capillary blood glucose measurement by g

lucometer (mass/volume) - 19 16:30

         

 

                          Capillary blood glucose measurement by glucometer (mas

s/volume)           377 

mg/dL                                           

 

                                        Capillary blood glucose measurement by g

lucometer (mass/volume) - 19 17:35

         

 

                          Capillary blood glucose measurement by glucometer (mas

s/volume)           382 

mg/dL                                           

 

                                        Capillary blood glucose measurement by g

lucometer (mass/volume) - 19 18:41

         

 

                          Capillary blood glucose measurement by glucometer (mas

s/volume)           282 

mg/dL                                           

 

                                        Capillary blood glucose measurement by g

lucometer (mass/volume) - 19 19:37

         

 

                          Capillary blood glucose measurement by glucometer (mas

s/volume)           210 

mg/dL                                           

 

                                        Whole blood basic metabolic panel -  20:05         

 

                          Serum or plasma sodium measurement (moles/volume)     

      136 mmol/L          

                                        135-145        

 

                          Serum or plasma potassium measurement (moles/volume)  

         4.9 mmol/L       

                                        3.6-5.0        

 

                          Serum or plasma chloride measurement (moles/volume)   

        107 mmol/L        

                                                

 

                    Carbon dioxide           14 mmol/L           -32        

 

                          Serum or plasma anion gap determination (moles/volume)

           15 mmol/L      

                                        5-14        

 

                          Serum or plasma urea nitrogen measurement (mass/volume

)           43 mg/dL      

                                        7-18        

 

                          Serum or plasma creatinine measurement (mass/volume)  

         1.89 mg/dL       

                                        0.60-1.30        

 

                    Serum or plasma urea nitrogen/creatinine mass ratio         

  23                  NRG 

       

 

                                        Serum or plasma creatinine measurement w

ith calculation of estimated glomerular 

filtration rate           28                        NRG        

 

                    Serum or plasma glucose measurement (mass/volume)           

263 mg/dL           

        

 

                    Serum or plasma calcium measurement (mass/volume)           

8.5 mg/dL           

8.5-10.1        

 

                                        Capillary blood glucose measurement by g

lucometer (mass/volume) - 19 20:26

         

 

                          Capillary blood glucose measurement by glucometer (mas

s/volume)           286 

mg/dL                                           

 

                                        Capillary blood glucose measurement by g

lucometer (mass/volume) - 19 21:31

         

 

                          Capillary blood glucose measurement by glucometer (mas

s/volume)           238 

mg/dL                                           

 

                                        Capillary blood glucose measurement by g

lucometer (mass/volume) - 19 22:44

         

 

                          Capillary blood glucose measurement by glucometer (mas

s/volume)           244 

mg/dL                                           

 

                                        Capillary blood glucose measurement by g

lucometer (mass/volume) - 19 23:28

         

 

                          Capillary blood glucose measurement by glucometer (mas

s/volume)           208 

mg/dL                                           

 

                                        Capillary blood glucose measurement by g

lucometer (mass/volume) - 19 00:35

         

 

                          Capillary blood glucose measurement by glucometer (mas

s/volume)           173 

mg/dL                                           

 

                                        Capillary blood glucose measurement by g

lucometer (mass/volume) - 19 01:38

         

 

                          Capillary blood glucose measurement by glucometer (mas

s/volume)           160 

mg/dL                                           

 

                                        Capillary blood glucose measurement by g

lucometer (mass/volume) - 19 02:34

         

 

                          Capillary blood glucose measurement by glucometer (mas

s/volume)           155 

mg/dL                                           

 

                                        Capillary blood glucose measurement by g

lucometer (mass/volume) - 19 03:31

         

 

                          Capillary blood glucose measurement by glucometer (mas

s/volume)           119 

mg/dL                                           

 

                                        Complete blood count (CBC) with automate

d white blood cell (WBC) differential - 

19 03:39         

 

                          Blood leukocytes automated count (number/volume)      

     16.9 10*3/uL         

                                        4.3-11.0        

 

                          Blood erythrocytes automated count (number/volume)    

       4.07 10*6/uL       

                                        4.35-5.85        

 

                    Venous blood hemoglobin measurement (mass/volume)           

12.8 g/dL           

11.5-16.0        

 

                    Blood hematocrit (volume fraction)           37 %           

     35-52        

 

                    Automated erythrocyte mean corpuscular volume           90 [

foz_us]           

80-99        

 

                                        Automated erythrocyte mean corpuscular h

emoglobin (mass per erythrocyte)        

                          31 pg                     25-34        

 

                                        Automated erythrocyte mean corpuscular h

emoglobin concentration measurement 

(mass/volume)             35 g/dL                   32-36        

 

                    Automated erythrocyte distribution width ratio           13.

3 %              10.0-

14.5        

 

                    Automated blood platelet count (count/volume)           230 

10*3/uL           

130-400        

 

                          Automated blood platelet mean volume measurement      

     10.1 [foz_us]        

                                        7.4-10.4        

 

                    Automated blood neutrophils/100 leukocytes           83 %   

             42-75       

 

 

                    Automated blood lymphocytes/100 leukocytes           13 %   

             12-44       

 

 

                    Blood monocytes/100 leukocytes           4 %                

 0-12        

 

                    Automated blood eosinophils/100 leukocytes           0 %    

             0-10        

 

                    Automated blood basophils/100 leukocytes           0 %      

           0-10        

 

                    Blood neutrophils automated count (number/volume)           

14.0 10*3           

1.8-7.8        

 

                    Blood lymphocytes automated count (number/volume)           

2.2 10*3            

1.0-4.0        

 

                    Blood monocytes automated count (number/volume)           0.

6 10*3            

0.0-1.0        

 

                    Automated eosinophil count           0.0 10*3/uL           0

.0-0.3        

 

                    Automated blood basophil count (count/volume)           0.0 

10*3/uL           

0.0-0.1        

 

                                        Comprehensive metabolic panel - 19

 03:39         

 

                          Serum or plasma sodium measurement (moles/volume)     

      136 mmol/L          

                                        135-145        

 

                          Serum or plasma potassium measurement (moles/volume)  

         4.2 mmol/L       

                                        3.6-5.0        

 

                          Serum or plasma chloride measurement (moles/volume)   

        109 mmol/L        

                                                

 

                    Carbon dioxide           19 mmol/L           21-32        

 

                          Serum or plasma anion gap determination (moles/volume)

           8 mmol/L       

                                        5-14        

 

                          Serum or plasma urea nitrogen measurement (mass/volume

)           35 mg/dL      

                                        7-18        

 

                          Serum or plasma creatinine measurement (mass/volume)  

         1.49 mg/dL       

                                        0.60-1.30        

 

                    Serum or plasma urea nitrogen/creatinine mass ratio         

  23                  NRG 

       

 

                                        Serum or plasma creatinine measurement w

ith calculation of estimated glomerular 

filtration rate           36                        NRG        

 

                    Serum or plasma glucose measurement (mass/volume)           

112 mg/dL           

        

 

                    Serum or plasma calcium measurement (mass/volume)           

8.5 mg/dL           

8.5-10.1        

 

                          Serum or plasma total bilirubin measurement (mass/volu

me)           0.5 mg/dL   

                                        0.1-1.0        

 

                                        Serum or plasma alkaline phosphatase kim

surement (enzymatic activity/volume)    

                          84 U/L                            

 

                                        Serum or plasma aspartate aminotransfera

se measurement (enzymatic 

activity/volume)           36 U/L                    5-34        

 

                                        Serum or plasma alanine aminotransferase

 measurement (enzymatic activity/volume)

                          51 U/L                    0-55        

 

                    Serum or plasma protein measurement (mass/volume)           

6.1 g/dL            

6.4-8.2        

 

                    Serum or plasma albumin measurement (mass/volume)           

3.0 g/dL            

3.2-4.5        

 

                    CALCIUM CORRECTED           9.3 mg/dL           8.5-10.1    

    

 

                                        Serum or plasma phosphate measurement (m

ass/volume) - 19 03:39         

 

                          Serum or plasma phosphate measurement (mass/volume)   

        1.9 mg/dL         

                                        2.3-4.7        

 

                                        Magnesium - 19 03:39         

 

                    Magnesium           1.8 mg/dL           1.8-2.4        

 

                                        Beta-hydroxybutyric acid measurement - 0

19 03:39         

 

                    Beta-hydroxybutyric acid measurement           0.12 mmol/L  

         0.00-0.27  

      

 

                                        PT panel in platelet poor plasma by coag

ulation assay - 19 03:39         

 

                          Prothrombin time (PT) in platelet poor plasma by coagu

lation assay           

12.1 s                                  12.2-14.7        

 

                          INR in platelet poor plasma or blood by coagulation as

say           0.9         

                                        0.8-1.4        

 

                                        Capillary blood glucose measurement by g

lucometer (mass/volume) - 19 04:31

         

 

                          Capillary blood glucose measurement by glucometer (mas

s/volume)           97 

mg/dL                                           

 

                                        Capillary blood glucose measurement by g

lucometer (mass/volume) - 19 06:00

         

 

                          Capillary blood glucose measurement by glucometer (mas

s/volume)           125 

mg/dL                                           

 

                                        Capillary blood glucose measurement by g

lucometer (mass/volume) - 19 06:59

         

 

                          Capillary blood glucose measurement by glucometer (mas

s/volume)           129 

mg/dL                                           

 

                                        Whole blood basic metabolic panel -  07:05         

 

                          Serum or plasma sodium measurement (moles/volume)     

      137 mmol/L          

                                        135-145        

 

                          Serum or plasma potassium measurement (moles/volume)  

         4.4 mmol/L       

                                        3.6-5.0        

 

                          Serum or plasma chloride measurement (moles/volume)   

        109 mmol/L        

                                                

 

                    Carbon dioxide           19 mmol/L           21-32        

 

                          Serum or plasma anion gap determination (moles/volume)

           9 mmol/L       

                                        5-14        

 

                          Serum or plasma urea nitrogen measurement (mass/volume

)           31 mg/dL      

                                        7-18        

 

                          Serum or plasma creatinine measurement (mass/volume)  

         1.40 mg/dL       

                                        0.60-1.30        

 

                    Serum or plasma urea nitrogen/creatinine mass ratio         

  22                  NRG 

       

 

                                        Serum or plasma creatinine measurement w

ith calculation of estimated glomerular 

filtration rate           39                        NRG        

 

                    Serum or plasma glucose measurement (mass/volume)           

159 mg/dL           

        

 

                    Serum or plasma calcium measurement (mass/volume)           

8.3 mg/dL           

8.5-10.1        

 

                                        Serum or plasma phosphate measurement (m

ass/volume) - 19 07:05         

 

                          Serum or plasma phosphate measurement (mass/volume)   

        1.7 mg/dL         

                                        2.3-4.7        

 

                                        Magnesium - 19 07:05         

 

                    Magnesium           1.8 mg/dL           1.8-2.4        

 

                                        Beta-hydroxybutyric acid measurement - 0

19 07:05         

 

                    Beta-hydroxybutyric acid measurement           0.85 mmol/L  

         0.00-0.27  

      

 

                                        Capillary blood glucose measurement by g

lucometer (mass/volume) - 19 10:15

         

 

                          Capillary blood glucose measurement by glucometer (mas

s/volume)           175 

mg/dL                                           

 

                                        Capillary blood glucose measurement by g

lucometer (mass/volume) - 19 11:22

         

 

                          Capillary blood glucose measurement by glucometer (mas

s/volume)           169 

mg/dL                                           

 

                                        Capillary blood glucose measurement by g

lucometer (mass/volume) - 19 13:13

         

 

                          Capillary blood glucose measurement by glucometer (mas

s/volume)           203 

mg/dL                                           

 

                                        Capillary blood glucose measurement by g

lucometer (mass/volume) - 19 15:52

         

 

                          Capillary blood glucose measurement by glucometer (mas

s/volume)           229 

mg/dL                                           

 

                                        Capillary blood glucose measurement by g

lucometer (mass/volume) - 19 21:16

         

 

                          Capillary blood glucose measurement by glucometer (mas

s/volume)           194 

mg/dL                                           

 

                                        Capillary blood glucose measurement by g

lucometer (mass/volume) - 19 06:12

         

 

                          Capillary blood glucose measurement by glucometer (mas

s/volume)           48 

mg/dL                                           

 

                                        Complete blood count (CBC) with automate

d white blood cell (WBC) differential - 

19 08:40         

 

                          Blood leukocytes automated count (number/volume)      

     10.7 10*3/uL         

                                        4.3-11.0        

 

                          Blood erythrocytes automated count (number/volume)    

       3.97 10*6/uL       

                                        4.35-5.85        

 

                    Venous blood hemoglobin measurement (mass/volume)           

12.2 g/dL           

11.5-16.0        

 

                    Blood hematocrit (volume fraction)           37 %           

     35-52        

 

                    Automated erythrocyte mean corpuscular volume           93 [

foz_us]           

80-99        

 

                                        Automated erythrocyte mean corpuscular h

emoglobin (mass per erythrocyte)        

                          31 pg                     25-34        

 

                                        Automated erythrocyte mean corpuscular h

emoglobin concentration measurement 

(mass/volume)             33 g/dL                   32-36        

 

                    Automated erythrocyte distribution width ratio           14.

9 %              10.0-

14.5        

 

                    Automated blood platelet count (count/volume)           194 

10*3/uL           

130-400        

 

                          Automated blood platelet mean volume measurement      

     10.0 [foz_us]        

                                        7.4-10.4        

 

                    Automated blood neutrophils/100 leukocytes           79 %   

             42-75       

 

 

                    Automated blood lymphocytes/100 leukocytes           16 %   

             12-44       

 

 

                    Blood monocytes/100 leukocytes           5 %                

 0-12        

 

                    Automated blood eosinophils/100 leukocytes           0 %    

             0-10        

 

                    Automated blood basophils/100 leukocytes           0 %      

           0-10        

 

                    Blood neutrophils automated count (number/volume)           

8.4 10*3            

1.8-7.8        

 

                    Blood lymphocytes automated count (number/volume)           

1.7 10*3            

1.0-4.0        

 

                    Blood monocytes automated count (number/volume)           0.

5 10*3            

0.0-1.0        

 

                    Automated eosinophil count           0.0 10*3/uL           0

.0-0.3        

 

                    Automated blood basophil count (count/volume)           0.0 

10*3/uL           

0.0-0.1        

 

                                        Whole blood basic metabolic panel - 08/1

3/19 08:40         

 

                          Serum or plasma sodium measurement (moles/volume)     

      142 mmol/L          

                                        135-145        

 

                          Serum or plasma potassium measurement (moles/volume)  

         3.5 mmol/L       

                                        3.6-5.0        

 

                          Serum or plasma chloride measurement (moles/volume)   

        112 mmol/L        

                                                

 

                    Carbon dioxide           21 mmol/L           21-32        

 

                          Serum or plasma anion gap determination (moles/volume)

           9 mmol/L       

                                        5-14        

 

                          Serum or plasma urea nitrogen measurement (mass/volume

)           21 mg/dL      

                                        7-18        

 

                          Serum or plasma creatinine measurement (mass/volume)  

         0.96 mg/dL       

                                        0.60-1.30        

 

                    Serum or plasma urea nitrogen/creatinine mass ratio         

  22                  NRG 

       

 

                                        Serum or plasma creatinine measurement w

ith calculation of estimated glomerular 

filtration rate           60                        NRG        

 

                    Serum or plasma glucose measurement (mass/volume)           

93 mg/dL            

        

 

                    Serum or plasma calcium measurement (mass/volume)           

8.5 mg/dL           

8.5-10.1        

 

                                        Beta-hydroxybutyric acid measurement - 0

19 08:40         

 

                    Beta-hydroxybutyric acid measurement           0.11 mmol/L  

         0.00-0.27  

      

 

                                        Serum or plasma troponin i.cardiac measu

rement (mass/volume) - 19 08:40   

      

 

                          Serum or plasma troponin i.cardiac measurement (mass/v

olume)           < ng/mL  

                                        <0.028        

 

                                        Capillary blood glucose measurement by g

lucometer (mass/volume) - 19 10:15

         

 

                          Capillary blood glucose measurement by glucometer (mas

s/volume)           129 

mg/dL                                           

 

                                        Capillary blood glucose measurement by g

lucometer (mass/volume) - 19 15:53

         

 

                          Capillary blood glucose measurement by glucometer (mas

s/volume)           292 

mg/dL                                           

 

                                        Capillary blood glucose measurement by g

lucometer (mass/volume) - 19 20:15

         

 

                          Capillary blood glucose measurement by glucometer (mas

s/volume)           152 

mg/dL                                           

 

                                        Capillary blood glucose measurement by g

lucometer (mass/volume) - 19 06:00

         

 

                          Capillary blood glucose measurement by glucometer (mas

s/volume)           306 

mg/dL                                           

 

                                        Capillary blood glucose measurement by g

lucometer (mass/volume) - 19 11:29

         

 

                          Capillary blood glucose measurement by glucometer (mas

s/volume)           184 

mg/dL                                           

 

                                        Whole blood basic metabolic panel -  11:55         

 

                          Serum or plasma sodium measurement (moles/volume)     

      136 mmol/L          

                                        135-145        

 

                          Serum or plasma potassium measurement (moles/volume)  

         3.7 mmol/L       

                                        3.6-5.0        

 

                          Serum or plasma chloride measurement (moles/volume)   

        105 mmol/L        

                                                

 

                    Carbon dioxide           24 mmol/L           21-32        

 

                          Serum or plasma anion gap determination (moles/volume)

           7 mmol/L       

                                        5-14        

 

                          Serum or plasma urea nitrogen measurement (mass/volume

)           16 mg/dL      

                                        7-18        

 

                          Serum or plasma creatinine measurement (mass/volume)  

         0.92 mg/dL       

                                        0.60-1.30        

 

                    Serum or plasma urea nitrogen/creatinine mass ratio         

  17                  NRG 

       

 

                                        Serum or plasma creatinine measurement w

ith calculation of estimated glomerular 

filtration rate           >                         NRG        

 

                    Serum or plasma glucose measurement (mass/volume)           

187 mg/dL           

        

 

                    Serum or plasma calcium measurement (mass/volume)           

7.8 mg/dL           

8.5-10.1        

 

                                        Beta-hydroxybutyric acid measurement - 0

19 11:55         

 

                    Beta-hydroxybutyric acid measurement           0.12 mmol/L  

         0.00-0.27  

      

 

                                        Complete blood count (CBC) with automate

d white blood cell (WBC) differential - 

20 18:40         

 

                          Blood leukocytes automated count (number/volume)      

     9.2 10*3/uL          

                                        4.3-11.0        

 

                          Blood erythrocytes automated count (number/volume)    

       4.17 10*6/uL       

                                        4.35-5.85        

 

                    Venous blood hemoglobin measurement (mass/volume)           

12.7 g/dL           

11.5-16.0        

 

                    Blood hematocrit (volume fraction)           39 %           

     35-52        

 

                    Automated erythrocyte mean corpuscular volume           93 [

foz_us]           

80-99        

 

                                        Automated erythrocyte mean corpuscular h

emoglobin (mass per erythrocyte)        

                          30 pg                     25-34        

 

                                        Automated erythrocyte mean corpuscular h

emoglobin concentration measurement 

(mass/volume)             33 g/dL                   32-36        

 

                    Automated erythrocyte distribution width ratio           13.

3 %              10.0-

14.5        

 

                    Automated blood platelet count (count/volume)           359 

10*3/uL           

130-400        

 

                          Automated blood platelet mean volume measurement      

     9.6 [foz_us]         

                                        7.4-10.4        

 

                    Automated blood neutrophils/100 leukocytes           69 %   

             42-75       

 

 

                    Automated blood lymphocytes/100 leukocytes           24 %   

             12-44       

 

 

                    Blood monocytes/100 leukocytes           7 %                

 0-12        

 

                    Automated blood eosinophils/100 leukocytes           0 %    

             0-10        

 

                    Automated blood basophils/100 leukocytes           0 %      

           0-10        

 

                    Blood neutrophils automated count (number/volume)           

6.4 10*3            

1.8-7.8        

 

                    Blood lymphocytes automated count (number/volume)           

2.2 10*3            

1.0-4.0        

 

                    Blood monocytes automated count (number/volume)           0.

6 10*3            

0.0-1.0        

 

                    Automated eosinophil count           0.0 10*3/uL           0

.0-0.3        

 

                    Automated blood basophil count (count/volume)           0.0 

10*3/uL           

0.0-0.1        

 

                                        Comprehensive metabolic panel - 20

 18:40         

 

                          Serum or plasma sodium measurement (moles/volume)     

      127 mmol/L          

                                        135-145        

 

                          Serum or plasma potassium measurement (moles/volume)  

         3.7 mmol/L       

                                        3.6-5.0        

 

                          Serum or plasma chloride measurement (moles/volume)   

        94 mmol/L         

                                                

 

                    Carbon dioxide           13 mmol/L           21-32        

 

                          Serum or plasma anion gap determination (moles/volume)

           20 mmol/L      

                                        5-14        

 

                          Serum or plasma urea nitrogen measurement (mass/volume

)           27 mg/dL      

                                        7-18        

 

                          Serum or plasma creatinine measurement (mass/volume)  

         1.68 mg/dL       

                                        0.60-1.30        

 

                    Serum or plasma urea nitrogen/creatinine mass ratio         

  16                  NRG 

       

 

                                        Serum or plasma creatinine measurement w

ith calculation of estimated glomerular 

filtration rate           32                        NRG        

 

                    Serum or plasma glucose measurement (mass/volume)           

690 mg/dL           

        

 

                    Serum or plasma calcium measurement (mass/volume)           

8.8 mg/dL           

8.5-10.1        

 

                          Serum or plasma total bilirubin measurement (mass/volu

me)           0.5 mg/dL   

                                        0.1-1.0        

 

                                        Serum or plasma alkaline phosphatase kim

surement (enzymatic activity/volume)    

                          160 U/L                           

 

                                        Serum or plasma aspartate aminotransfera

se measurement (enzymatic 

activity/volume)           80 U/L                    5-34        

 

                                        Serum or plasma alanine aminotransferase

 measurement (enzymatic activity/volume)

                          131 U/L                   0-55        

 

                    Serum or plasma protein measurement (mass/volume)           

6.4 g/dL            

6.4-8.2        

 

                    Serum or plasma albumin measurement (mass/volume)           

2.6 g/dL            

3.2-4.5        

 

                    CALCIUM CORRECTED           9.9 mg/dL           8.5-10.1    

    

 

                                        Serum or plasma lithium measurement (mol

es/volume) - 20 18:40         

 

                    BNP PT              244.9 pg/mL           <100.0        

 

                                        THYROID STIMULATING HORMONE - 20 1

8:40         

 

                    THYROID STIMULATING HORMONE           1.02 u[iU]/mL         

  0.35-4.94        

 

                                        Serum or plasma thyroxine (T4) free cristian

urement (mass/volume) - 20 18:40  

       

 

                          Serum or plasma thyroxine (T4) free measurement (mass/

volume)           0.84 

ng/dL                                   0.70-1.48        

 

                                        Beta-hydroxybutyric acid measurement - 0

20 18:40         

 

                    Beta-hydroxybutyric acid measurement           5.37 mmol/L  

         0.00-0.27  

      

 

                                        Arterial blood gas measurement - 

0 18:45         

 

                    Blood pCO2           26 mm[Hg]           35-45        

 

                    Blood pO2           98 mm[Hg]           79-93        

 

                          Arterial blood bicarbonate measurement (moles/volume) 

          14 mmol/L       

                                        23-27        

 

                    Arterial blood base excess by calculation           -10.7 mm

ol/L           

-2.5-2.5        

 

                    Arterial blood oxygen saturation measurement           97 % 

                   

    

 

                    * Inhaled oxygen flow rate           21% FI02            NRG

        

 

                          Arterial blood pH measurement with patient temperature

 correction           7.34

                                        7.37-7.43        

 

                          Arterial blood carbon dioxide, total measurement (mole

s/volume)           14.7 

mmol/L                                  21.0-31.0        

 

                    Body site           LEFT RADIAL            NRG        

 

                          Assessment of wrist artery patency prior to arterial p

uncture           POSITIVE

                                        NRG        

 

                    Setting of ventilation mode           NO                  NR

G        

 

                    Measurement of body temperature           36.8              

  NRG        

 

                                        Urine drug screening test - 20 20:

05         

 

                    Urine phencyclidine detection by screening method           

NEGATIVE            

NEGATIVE        

 

                          Urine benzodiazepines detection by screening method   

        NEGATIVE          

                                        NEGATIVE        

 

                    Urine cocaine detection           NEGATIVE            NEGATI

VE        

 

                    Urine amphetamines detection by screening method           P

OSITIVE            

NEGATIVE        

 

                          Urine methamphetamine detection by screening method   

        POSITIVE          

                                        NEGATIVE        

 

                    Urine cannabinoids detection by screening method           N

EGATIVE            

NEGATIVE        

 

                    Urine opiates detection by screening method           NEGATI

VE            

NEGATIVE        

 

                    Urine barbiturates detection           NEGATIVE            N

EGATIVE        

 

                          Screening urine tricyclic antidepressants detection   

        NEGATIVE          

                                        NEGATIVE        

 

                    Urine methadone detection by screening method           NEGA

TIVE            

NEGATIVE        

 

                    Urine oxycodone detection           NEGATIVE            NEGA

TIVE        

 

                    Urine propoxyphene detection           NEGATIVE            N

EGATIVE        

 

                                        Complete urinalysis with reflex to cultu

re - 20 20:05         

 

                    Urine color determination           YELLOW              NRG 

       

 

                    Urine clarity determination           CLEAR               NR

G        

 

                    Urine pH measurement by test strip           5.5            

     5-9        

 

                    Specific gravity of urine by test strip           1.020     

          1.016-1.022  

      

 

                    Urine protein assay by test strip, semi-quantitative        

   2+                  

NEGATIVE        

 

                    Urine glucose detection by automated test strip           2+

                  NEGATIVE

        

 

                          Erythrocytes detection in urine sediment by light micr

oscopy           1+       

                                        NEGATIVE        

 

                    Urine ketones detection by automated test strip           1+

                  NEGATIVE

        

 

                    Urine nitrite detection by test strip           NEGATIVE    

        NEGATIVE    

    

 

                    Urine total bilirubin detection by test strip           NEGA

TIVE            

NEGATIVE        

 

                          Urine urobilinogen measurement by automated test strip

 (mass/volume)           

0.2 mg/dL                               < = 1.0        

 

                    Urine leukocyte esterase detection by dipstick           NEG

ATIVE            

NEGATIVE        

 

                                        Automated urine sediment erythrocyte cou

nt by microscopy (number/high power 

field)                     [HPF]                    NRG        

 

                                        Automated urine sediment leukocyte count

 by microscopy (number/high power field)

                           [HPF]                    NRG        

 

                          Bacteria detection in urine sediment by light microsco

py           FEW          

                                        NRG        

 

                                        Squamous epithelial cells detection in u

rine sediment by light microscopy       

                          10-25                     NRG        

 

                          Crystals detection in urine sediment by light microsco

py           PRESENT      

                                        NRG        

 

                    Casts detection in urine sediment by light microscopy       

    NONE                

NRG        

 

                          Mucus detection in urine sediment by light microscopy 

          NEGATIVE        

                                        NRG        

 

                    Complete urinalysis with reflex to culture           YES    

             NRG        

 

                          Amorphous sediment detection in urine sediment by ligh

t microscopy           FEW

 NATALIE URATES                            NRG        

 

                                        Bacterial urine culture - 20 20:05

         

 

                    Bacterial urine culture           3 OR MORE            NRG  

      

 

                    COLONY COUNT           50,000 CFU/ML            NRG        

 

                    FTX;REPORTABLE           GRAM POSITIVE ISOLATES            N

RG        

 

                                        Capillary blood glucose measurement by g

lucometer (mass/volume) - 02/10/20 02:13

         

 

                          Capillary blood glucose measurement by glucometer (mas

s/volume)           > 

mg/dL                                           

 

                                        Complete blood count (CBC) with automate

d white blood cell (WBC) differential - 

02/10/20 02:19         

 

                          Blood leukocytes automated count (number/volume)      

     32.5 10*3/uL         

                                        4.3-11.0        

 

                          Blood erythrocytes automated count (number/volume)    

       3.73 10*6/uL       

                                        4.35-5.85        

 

                    Venous blood hemoglobin measurement (mass/volume)           

12.1 g/dL           

11.5-16.0        

 

                    Blood hematocrit (volume fraction)           37 %           

     35-52        

 

                    Automated erythrocyte mean corpuscular volume           100 

[foz_us]           

80-99        

 

                                        Automated erythrocyte mean corpuscular h

emoglobin (mass per erythrocyte)        

                          32 pg                     25-34        

 

                                        Automated erythrocyte mean corpuscular h

emoglobin concentration measurement 

(mass/volume)             33 g/dL                   32-36        

 

                    Automated erythrocyte distribution width ratio           14.

1 %              10.0-

14.5        

 

                    Automated blood platelet count (count/volume)           326 

10*3/uL           

130-400        

 

                          Automated blood platelet mean volume measurement      

     10.2 [foz_us]        

                                        7.4-10.4        

 

                    Automated blood neutrophils/100 leukocytes           80 %   

             42-75       

 

 

                    Automated blood lymphocytes/100 leukocytes           15 %   

             12-44       

 

 

                    Blood monocytes/100 leukocytes           5 %                

 0-12        

 

                    Automated blood eosinophils/100 leukocytes           0 %    

             0-10        

 

                    Automated blood basophils/100 leukocytes           0 %      

           0-10        

 

                    Blood neutrophils automated count (number/volume)           

25.9 10*3           

1.8-7.8        

 

                    Blood lymphocytes automated count (number/volume)           

4.8 10*3            

1.0-4.0        

 

                    Blood monocytes automated count (number/volume)           1.

7 10*3            

0.0-1.0        

 

                    Automated eosinophil count           0.0 10*3/uL           0

.0-0.3        

 

                    Automated blood basophil count (count/volume)           0.1 

10*3/uL           

0.0-0.1        

 

                                        PT panel in platelet poor plasma by coag

ulation assay - 02/10/20 02:19         

 

                          Prothrombin time (PT) in platelet poor plasma by coagu

lation assay           

14.0 s                                  12.2-14.7        

 

                          INR in platelet poor plasma or blood by coagulation as

say           1.0         

                                        0.8-1.4        

 

                                        Activated partial thromboplastin time (a

PTT) in platelet poor plasma 

bycoagulation assay - 02/10/20 02:19         

 

                                        Activated partial thromboplastin time (a

PTT) in platelet poor plasma 

bycoagulation assay           27 s                      24-35        

 

                                        Manual absolute plasma cell count -  02:19         

 

                    Blood monocytes/100 leukocytes           5 %                

 NRG        

 

                    Manual blood segmented neutrophils/100 leukocytes           

72 %                NRG  

      

 

                    Blood band neutrophils/100 leukocytes           10 %        

        NRG        

 

                    Manual blood lymphocytes/100 leukocytes           11 %      

          NRG        

 

                    Blood erythrocyte morphology finding identification         

  NORMAL              

NRG        

 

                    Manual blood metamyelocytes/100 leukocytes           2 %    

             NRG        

 

                                        Influenza virus A and B antigen detectio

n - 02/10/20 02:19         

 

                    FLU RESULT           NEGATIVE FOR INFLUENZA A AND B ANTIGENS

 BY IA            

NRG        

 

                                        Blood lactic acid measurement (moles/vol

ume) - 02/10/20 02:19         

 

                    Blood lactic acid measurement (moles/volume)           6.69 

mmol/L           

0.50-2.00        

 

                                        Comprehensive metabolic panel - 02/10/20

 02:19         

 

                          Serum or plasma sodium measurement (moles/volume)     

      123 mmol/L          

                                        135-145        

 

                          Serum or plasma potassium measurement (moles/volume)  

         6.1 mmol/L       

                                        3.6-5.0        

 

                          Serum or plasma chloride measurement (moles/volume)   

        82 mmol/L         

                                                

 

                    Carbon dioxide           < mmol/L            21-32        

 

                          Serum or plasma anion gap determination (moles/volume)

           36 mmol/L      

                                        5-14        

 

                          Serum or plasma urea nitrogen measurement (mass/volume

)           58 mg/dL      

                                        7-18        

 

                          Serum or plasma creatinine measurement (mass/volume)  

         3.05 mg/dL       

                                        0.60-1.30        

 

                    Serum or plasma urea nitrogen/creatinine mass ratio         

  19                  NRG 

       

 

                                        Serum or plasma creatinine measurement w

ith calculation of estimated glomerular 

filtration rate           16                        NRG        

 

                          Serum or plasma glucose measurement (mass/volume)     

      1040 mg/dL          

                                                

 

                    Serum or plasma calcium measurement (mass/volume)           

9.6 mg/dL           

8.5-10.1        

 

                          Serum or plasma total bilirubin measurement (mass/volu

me)           0.2 mg/dL   

                                        0.1-1.0        

 

                                        Serum or plasma alkaline phosphatase kim

surement (enzymatic activity/volume)    

                          130 U/L                           

 

                                        Serum or plasma aspartate aminotransfera

se measurement (enzymatic 

activity/volume)           23 U/L                    5-34        

 

                                        Serum or plasma alanine aminotransferase

 measurement (enzymatic activity/volume)

                          39 U/L                    0-55        

 

                    Serum or plasma protein measurement (mass/volume)           

6.4 g/dL            

6.4-8.2        

 

                    Serum or plasma albumin measurement (mass/volume)           

2.8 g/dL            

3.2-4.5        

 

                    CALCIUM CORRECTED           10.6 mg/dL           8.5-10.1   

     

 

                                        Serum or plasma creatine kinase measurem

ent (enzymatic activity/volume) - 

02/10/20 02:19         

 

                                        Serum or plasma creatine kinase measurem

ent (enzymatic activity/volume)         

                          118 U/L                           

 

                                        Serum or plasma troponin i.cardiac measu

rement (mass/volume) - 02/10/20 02:19   

      

 

                          Serum or plasma troponin i.cardiac measurement (mass/v

olume)           < ng/mL  

                                        <0.028        

 

                                        Serum or plasma ethanol measurement (mas

s/volume) - 02/10/20 02:19         

 

                    Serum or plasma ethanol measurement (mass/volume)           

< mg/dL             

<10        

 

                                        Bacterial blood culture - 02/10/20 02:19

         

 

                    Bacterial blood culture           NG                  NRG   

     

 

                                        Complete urinalysis with reflex to cultu

re - 02/10/20 02:31         

 

                    Urine color determination           YELLOW              NRG 

       

 

                    Urine clarity determination           SL CLOUDY            N

RG        

 

                    Urine pH measurement by test strip           5.0            

     5-9        

 

                    Specific gravity of urine by test strip           >=        

          1.016-1.022     

   

 

                    Urine protein assay by test strip, semi-quantitative        

   3+                  

NEGATIVE        

 

                    Urine glucose detection by automated test strip           3+

                  NEGATIVE

        

 

                          Erythrocytes detection in urine sediment by light micr

oscopy           1+       

                                        NEGATIVE        

 

                    Urine ketones detection by automated test strip           2+

                  NEGATIVE

        

 

                    Urine nitrite detection by test strip           NEGATIVE    

        NEGATIVE    

    

 

                    Urine total bilirubin detection by test strip           NEGA

TIVE            

NEGATIVE        

 

                          Urine urobilinogen measurement by automated test strip

 (mass/volume)           

0.2 mg/dL                               < = 1.0        

 

                    Urine leukocyte esterase detection by dipstick           NEG

ATIVE            

NEGATIVE        

 

                                        Automated urine sediment erythrocyte cou

nt by microscopy (number/high power 

field)                     [HPF]                    NRG        

 

                                        Automated urine sediment leukocyte count

 by microscopy (number/high power field)

                           [HPF]                    NRG        

 

                          Bacteria detection in urine sediment by light microsco

py           TRACE        

                                        NRG        

 

                                        Squamous epithelial cells detection in u

rine sediment by light microscopy       

                          0-2                       NRG        

 

                          Crystals detection in urine sediment by light microsco

py           PRESENT      

                                        NRG        

 

                    Casts detection in urine sediment by light microscopy       

    NONE                

NRG        

 

                          Mucus detection in urine sediment by light microscopy 

          NEGATIVE        

                                        NRG        

 

                    Complete urinalysis with reflex to culture           YES    

             NRG        

 

                          Amorphous sediment detection in urine sediment by ligh

t microscopy           MOD

 NATALIE URATES                            NRG        

 

                                        Urine drug screening test - 02/10/20 02:

31         

 

                    Urine phencyclidine detection by screening method           

NEGATIVE            

NEGATIVE        

 

                          Urine benzodiazepines detection by screening method   

        NEGATIVE          

                                        NEGATIVE        

 

                    Urine cocaine detection           NEGATIVE            NEGATI

VE        

 

                    Urine amphetamines detection by screening method           P

OSITIVE            

NEGATIVE        

 

                          Urine methamphetamine detection by screening method   

        POSITIVE          

                                        NEGATIVE        

 

                    Urine cannabinoids detection by screening method           N

EGATIVE            

NEGATIVE        

 

                    Urine opiates detection by screening method           NEGATI

VE            

NEGATIVE        

 

                    Urine barbiturates detection           NEGATIVE            N

EGATIVE        

 

                          Screening urine tricyclic antidepressants detection   

        NEGATIVE          

                                        NEGATIVE        

 

                    Urine methadone detection by screening method           NEGA

TIVE            

NEGATIVE        

 

                    Urine oxycodone detection           NEGATIVE            NEGA

TIVE        

 

                    Urine propoxyphene detection           NEGATIVE            N

EGATIVE        

 

                                        Bacterial urine culture - 02/10/20 02:31

         

 

                    Bacterial urine culture           NG                  NRG   

     

 

                                        Bacterial blood culture - 02/10/20 02:40

         

 

                    Bacterial blood culture           NG                  NRG   

     

 

                                        Arterial blood gas measurement - 02/10/2

0 02:53         

 

                    Blood pCO2           11 mm[Hg]           35-45        

 

                    Blood pO2           130 mm[Hg]           79-93        

 

                          Arterial blood bicarbonate measurement (moles/volume) 

          3 mmol/L        

                                        23-27        

 

                    Arterial blood base excess by calculation           -26.3 mm

ol/L           

-2.5-2.5        

 

                    Arterial blood oxygen saturation measurement           98 % 

                   

    

 

                    * Inhaled oxygen flow rate           2L O2               NRG

        

 

                          Arterial blood pH measurement with patient temperature

 correction           7.05

                                        7.37-7.43        

 

                          Arterial blood carbon dioxide, total measurement (mole

s/volume)           3.3 

mmol/L                                  21.0-31.0        

 

                    Body site           LEFT RADIAL            NRG        

 

                          Assessment of wrist artery patency prior to arterial p

uncture           POSITIVE

                                        NRG        

 

                    Setting of ventilation mode           NO                  NR

G        

 

                    Measurement of body temperature           34.4              

  NRG        

 

                                        Capillary blood glucose measurement by g

lucometer (mass/volume) - 02/10/20 04:20

         

 

                          Capillary blood glucose measurement by glucometer (mas

s/volume)           > 

mg/dL                                           

 

                                        Serum or plasma lactate measurement (mol

es/volume) - 02/10/20 04:23         

 

                          Serum or plasma lactate measurement (moles/volume)    

       2.72 mmol/L        

                                        0.50-2.00        

 

                                        Capillary blood glucose measurement by g

lucometer (mass/volume) - 02/10/20 05:23

         

 

                          Capillary blood glucose measurement by glucometer (mas

s/volume)           > 

mg/dL                                           

 

                                        Complete blood count (CBC) with automate

d white blood cell (WBC) differential - 

02/10/20 06:00         

 

                          Blood leukocytes automated count (number/volume)      

     37.4 10*3/uL         

                                        4.3-11.0        

 

                          Blood erythrocytes automated count (number/volume)    

       3.16 10*6/uL       

                                        4.35-5.85        

 

                    Venous blood hemoglobin measurement (mass/volume)           

10.0 g/dL           

11.5-16.0        

 

                    Blood hematocrit (volume fraction)           31 %           

     35-52        

 

                    Automated erythrocyte mean corpuscular volume           98 [

foz_us]           

80-99        

 

                                        Automated erythrocyte mean corpuscular h

emoglobin (mass per erythrocyte)        

                          32 pg                     25-34        

 

                                        Automated erythrocyte mean corpuscular h

emoglobin concentration measurement 

(mass/volume)             33 g/dL                   32-36        

 

                    Automated erythrocyte distribution width ratio           14.

0 %              10.0-

14.5        

 

                    Automated blood platelet count (count/volume)           293 

10*3/uL           

130-400        

 

                          Automated blood platelet mean volume measurement      

     10.0 [foz_us]        

                                        7.4-10.4        

 

                    Automated blood neutrophils/100 leukocytes           76 %   

             42-75       

 

 

                    Automated blood lymphocytes/100 leukocytes           18 %   

             12-44       

 

 

                    Blood monocytes/100 leukocytes           5 %                

 0-12        

 

                    Automated blood eosinophils/100 leukocytes           0 %    

             0-10        

 

                    Automated blood basophils/100 leukocytes           0 %      

           0-10        

 

                    Blood neutrophils automated count (number/volume)           

28.5 10*3           

1.8-7.8        

 

                    Blood lymphocytes automated count (number/volume)           

6.9 10*3            

1.0-4.0        

 

                    Blood monocytes automated count (number/volume)           1.

9 10*3            

0.0-1.0        

 

                    Automated eosinophil count           0.1 10*3/uL           0

.0-0.3        

 

                    Automated blood basophil count (count/volume)           0.1 

10*3/uL           

0.0-0.1        

 

                                        Comprehensive metabolic panel - 02/10/20

 06:00         

 

                          Serum or plasma sodium measurement (moles/volume)     

      128 mmol/L          

                                        135-145        

 

                          Serum or plasma potassium measurement (moles/volume)  

         5.0 mmol/L       

                                        3.6-5.0        

 

                          Serum or plasma chloride measurement (moles/volume)   

        96 mmol/L         

                                                

 

                    Carbon dioxide           < mmol/L            21-32        

 

                          Serum or plasma anion gap determination (moles/volume)

           27 mmol/L      

                                        5-14        

 

                          Serum or plasma urea nitrogen measurement (mass/volume

)           56 mg/dL      

                                        7-18        

 

                          Serum or plasma creatinine measurement (mass/volume)  

         2.64 mg/dL       

                                        0.60-1.30        

 

                    Serum or plasma urea nitrogen/creatinine mass ratio         

  21                  NRG 

       

 

                                        Serum or plasma creatinine measurement w

ith calculation of estimated glomerular 

filtration rate           19                        NRG        

 

                    Serum or plasma glucose measurement (mass/volume)           

787 mg/dL           

        

 

                    Serum or plasma calcium measurement (mass/volume)           

7.6 mg/dL           

8.5-10.1        

 

                          Serum or plasma total bilirubin measurement (mass/volu

me)           0.2 mg/dL   

                                        0.1-1.0        

 

                                        Serum or plasma alkaline phosphatase kim

surement (enzymatic activity/volume)    

                          105 U/L                           

 

                                        Serum or plasma aspartate aminotransfera

se measurement (enzymatic 

activity/volume)           21 U/L                    5-34        

 

                                        Serum or plasma alanine aminotransferase

 measurement (enzymatic activity/volume)

                          32 U/L                    0-55        

 

                    Serum or plasma protein measurement (mass/volume)           

5.0 g/dL            

6.4-8.2        

 

                    Serum or plasma albumin measurement (mass/volume)           

2.2 g/dL            

3.2-4.5        

 

                    CALCIUM CORRECTED           9.0 mg/dL           8.5-10.1    

    

 

                                        Serum or plasma phosphate measurement (m

ass/volume) - 02/10/20 06:00         

 

                          Serum or plasma phosphate measurement (mass/volume)   

        9.1 mg/dL         

                                        2.3-4.7        

 

                                        Magnesium - 02/10/20 06:00         

 

                    Magnesium           2.3 mg/dL           1.6-2.4        

 

                                        Serum or plasma triglyceride measurement

 (mass/volume) - 02/10/20 06:00         

 

                          Serum or plasma triglyceride measurement (mass/volume)

           927 mg/dL      

                                        <150        

 

                                        Beta-hydroxybutyric acid measurement - 0

2/10/20 06:00         

 

                    Beta-hydroxybutyric acid measurement           12.29 mmol/L 

          0.00-0.27 

       

 

                                        Hemoglobin A1c measurement - 02/10/20 06

:00         

 

                    Blood hemoglobin A1C measurement (mass/volume)           14.

8 %              4.0-

5.6        

 

                    MEAN BLOOD GLUCOSE           378 %               <=126      

  

 

                                        Methicillin resistant Staphylococcus aur

eus (MRSA) screening culture - 02/10/20 

06:00         

 

                          Methicillin resistant Staphylococcus aureus (MRSA) scr

eening culture           

NEG                                     NRG        

 

                                        Complete urinalysis with reflex to cultu

re - 02/10/20 06:19         

 

                    Urine color determination           YELLOW              NRG 

       

 

                    Urine clarity determination           CLEAR               NR

G        

 

                    Urine pH measurement by test strip           5.0            

     5-9        

 

                    Specific gravity of urine by test strip           >=        

          1.016-1.022     

   

 

                    Urine protein assay by test strip, semi-quantitative        

   2+                  

NEGATIVE        

 

                    Urine glucose detection by automated test strip           3+

                  NEGATIVE

        

 

                          Erythrocytes detection in urine sediment by light micr

oscopy           1+       

                                        NEGATIVE        

 

                    Urine ketones detection by automated test strip           2+

                  NEGATIVE

        

 

                    Urine nitrite detection by test strip           NEGATIVE    

        NEGATIVE    

    

 

                    Urine total bilirubin detection by test strip           NEGA

TIVE            

NEGATIVE        

 

                          Urine urobilinogen measurement by automated test strip

 (mass/volume)           

0.2 mg/dL                               < = 1.0        

 

                    Urine leukocyte esterase detection by dipstick           NEG

ATIVE            

NEGATIVE        

 

                                        Automated urine sediment erythrocyte cou

nt by microscopy (number/high power 

field)                     [HPF]                    NRG        

 

                                        Automated urine sediment leukocyte count

 by microscopy (number/high power field)

                           [HPF]                    NRG        

 

                          Bacteria detection in urine sediment by light microsco

py           TRACE        

                                        NRG        

 

                                        Squamous epithelial cells detection in u

rine sediment by light microscopy       

                          2-5                       NRG        

 

                          Crystals detection in urine sediment by light microsco

py           PRESENT      

                                        NRG        

 

                    Casts detection in urine sediment by light microscopy       

    NONE                

NRG        

 

                          Mucus detection in urine sediment by light microscopy 

          NEGATIVE        

                                        NRG        

 

                    Complete urinalysis with reflex to culture           NO     

             NRG        

 

                          Amorphous sediment detection in urine sediment by ligh

t microscopy           MOD

 NATALIE URATES                            NRG        

 

                                        Capillary blood glucose measurement by g

lucometer (mass/volume) - 02/10/20 06:30

         

 

                          Capillary blood glucose measurement by glucometer (mas

s/volume)           > 

mg/dL                                           

 

                                        Arterial blood gas measurement - 02/10/2

0 07:00         

 

                    Blood pCO2           22 mm[Hg]           35-45        

 

                    Blood pO2           95 mm[Hg]           79-93        

 

                          Arterial blood bicarbonate measurement (moles/volume) 

          7 mmol/L        

                                        23-27        

 

                    Arterial blood base excess by calculation           -19.9 mm

ol/L           

-2.5-2.5        

 

                    Arterial blood oxygen saturation measurement           97 % 

                   

    

 

                    * Inhaled oxygen flow rate           30% O2              NRG

        

 

                          Arterial blood pH measurement with patient temperature

 correction           7.16

                                        7.37-7.43        

 

                          Arterial blood carbon dioxide, total measurement (mole

s/volume)           8.0 

mmol/L                                  21.0-31.0        

 

                    Body site           L RADIAL            NRG        

 

                          Assessment of wrist artery patency prior to arterial p

uncture           POSITIVE

                                        NRG        

 

                    Setting of ventilation mode           YES                 NR

G        

 

                    Measurement of body temperature           36.5              

  NRG        

 

                                        Sputum Gram stain - 02/10/20 07:06      

   

 

                    Sputum Gram stain           Mixed Bacterial Terrie           

 NRG        

 

                                        Bacterial sputum culture - 02/10/20 07:0

6         

 

                    FREE TEXT EXTERNAL           SUSCEPTIBILITY REPORTED 20            

NRG        

 

                    QUANTITY OF GROWTH           .                   NRG        

 

                    FREE TEXT ENTRY 2           SEE COMMENT            NRG      

  

 

                    Bacterial sputum culture           USUAL RESP            NRG

        

 

                                        Complete blood count (CBC) with automate

d white blood cell (WBC) differential - 

02/10/20 07:25         

 

                          Blood leukocytes automated count (number/volume)      

     35.4 10*3/uL         

                                        4.3-11.0        

 

                          Blood erythrocytes automated count (number/volume)    

       3.03 10*6/uL       

                                        4.35-5.85        

 

                    Venous blood hemoglobin measurement (mass/volume)           

9.9 g/dL            

11.5-16.0        

 

                    Blood hematocrit (volume fraction)           29 %           

     35-52        

 

                    Automated erythrocyte mean corpuscular volume           96 [

foz_us]           

80-99        

 

                                        Automated erythrocyte mean corpuscular h

emoglobin (mass per erythrocyte)        

                          33 pg                     25-34        

 

                                        Automated erythrocyte mean corpuscular h

emoglobin concentration measurement 

(mass/volume)             34 g/dL                   32-36        

 

                    Automated erythrocyte distribution width ratio           14.

0 %              10.0-

14.5        

 

                    Automated blood platelet count (count/volume)           262 

10*3/uL           

130-400        

 

                          Automated blood platelet mean volume measurement      

     9.8 [foz_us]         

                                        7.4-10.4        

 

                    Automated blood neutrophils/100 leukocytes           78 %   

             42-75       

 

 

                    Automated blood lymphocytes/100 leukocytes           17 %   

             12-44       

 

 

                    Blood monocytes/100 leukocytes           5 %                

 0-12        

 

                    Automated blood eosinophils/100 leukocytes           0 %    

             0-10        

 

                    Automated blood basophils/100 leukocytes           0 %      

           0-10        

 

                    Blood neutrophils automated count (number/volume)           

27.5 10*3           

1.8-7.8        

 

                    Blood lymphocytes automated count (number/volume)           

6.2 10*3            

1.0-4.0        

 

                    Blood monocytes automated count (number/volume)           1.

7 10*3            

0.0-1.0        

 

                    Automated eosinophil count           0.1 10*3/uL           0

.0-0.3        

 

                    Automated blood basophil count (count/volume)           0.0 

10*3/uL           

0.0-0.1        

 

                                        Blood lactic acid measurement (moles/vol

ume) - 02/10/20 07:25         

 

                    Blood lactic acid measurement (moles/volume)           2.33 

mmol/L           

0.50-2.00        

 

                                        Comprehensive metabolic panel - 02/10/20

 07:25         

 

                          Serum or plasma sodium measurement (moles/volume)     

      128 mmol/L          

                                        135-145        

 

                          Serum or plasma potassium measurement (moles/volume)  

         4.3 mmol/L       

                                        3.6-5.0        

 

                          Serum or plasma chloride measurement (moles/volume)   

        96 mmol/L         

                                                

 

                          Serum or plasma urea nitrogen measurement (mass/volume

)           56 mg/dL      

                                        7-18        

 

                    Serum or plasma urea nitrogen/creatinine mass ratio         

  22                  NRG 

       

 

                                        Serum or plasma creatinine measurement w

ith calculation of estimated glomerular 

filtration rate           19                        NRG        

 

                    Serum or plasma calcium measurement (mass/volume)           

7.9 mg/dL           

8.5-10.1        

 

                          Serum or plasma total bilirubin measurement (mass/volu

me)           0.2 mg/dL   

                                        0.1-1.0        

 

                                        Serum or plasma alkaline phosphatase kim

surement (enzymatic activity/volume)    

                          95 U/L                            

 

                                        Serum or plasma aspartate aminotransfera

se measurement (enzymatic 

activity/volume)           23 U/L                    5-34        

 

                                        Serum or plasma alanine aminotransferase

 measurement (enzymatic activity/volume)

                          31 U/L                    0-55        

 

                    Serum or plasma protein measurement (mass/volume)           

4.9 g/dL            

6.4-8.2        

 

                    Serum or plasma albumin measurement (mass/volume)           

2.1 g/dL            

3.2-4.5        

 

                    CALCIUM CORRECTED           9.4 mg/dL           8.5-10.1    

    

 

                                        Capillary blood glucose measurement by g

lucometer (mass/volume) - 02/10/20 09:59

         

 

                          Capillary blood glucose measurement by glucometer (mas

s/volume)           569 

mg/dL                                           

 

                                        Serum or plasma phosphate measurement (m

ass/volume) - 02/10/20 11:03         

 

                          Serum or plasma phosphate measurement (mass/volume)   

        4.3 mg/dL         

                                        2.3-4.7        

 

                                        Magnesium - 02/10/20 11:03         

 

                    Magnesium           2.0 mg/dL           1.6-2.4        

 

                                        Comprehensive metabolic panel - 02/10/20

 11:03         

 

                          Serum or plasma sodium measurement (moles/volume)     

      132 mmol/L          

                                        135-145        

 

                          Serum or plasma potassium measurement (moles/volume)  

         4.3 mmol/L       

                                        3.6-5.0        

 

                          Serum or plasma chloride measurement (moles/volume)   

        98 mmol/L         

                                                

 

                    Carbon dioxide           15 mmol/L           21-32        

 

                          Serum or plasma anion gap determination (moles/volume)

           19 mmol/L      

                                        5-14        

 

                          Serum or plasma urea nitrogen measurement (mass/volume

)           54 mg/dL      

                                        7-18        

 

                          Serum or plasma creatinine measurement (mass/volume)  

         2.54 mg/dL       

                                        0.60-1.30        

 

                    Serum or plasma urea nitrogen/creatinine mass ratio         

  21                  NRG 

       

 

                                        Serum or plasma creatinine measurement w

ith calculation of estimated glomerular 

filtration rate           20                        NRG        

 

                    Serum or plasma glucose measurement (mass/volume)           

524 mg/dL           

        

 

                    Serum or plasma calcium measurement (mass/volume)           

7.5 mg/dL           

8.5-10.1        

 

                          Serum or plasma total bilirubin measurement (mass/volu

me)           0.2 mg/dL   

                                        0.1-1.0        

 

                                        Serum or plasma alkaline phosphatase kim

surement (enzymatic activity/volume)    

                          89 U/L                            

 

                                        Serum or plasma aspartate aminotransfera

se measurement (enzymatic 

activity/volume)           25 U/L                    5-34        

 

                                        Serum or plasma alanine aminotransferase

 measurement (enzymatic activity/volume)

                          29 U/L                    0-55        

 

                    Serum or plasma protein measurement (mass/volume)           

4.7 g/dL            

6.4-8.2        

 

                    Serum or plasma albumin measurement (mass/volume)           

2.0 g/dL            

3.2-4.5        

 

                    CALCIUM CORRECTED           9.1 mg/dL           8.5-10.1    

    

 

                                        Serum or plasma lactate measurement (mol

es/volume) - 02/10/20 11:03         

 

                          Serum or plasma lactate measurement (moles/volume)    

       1.78 mmol/L        

                                        0.50-2.00        

 

                                        Arterial blood gas measurement - 02/10/2

0 11:14         

 

                    Blood pCO2           30 mm[Hg]           35-45        

 

                    Blood pO2           79 mm[Hg]           79-93        

 

                          Arterial blood bicarbonate measurement (moles/volume) 

          17 mmol/L       

                                        23-27        

 

                    Arterial blood base excess by calculation           -7.2 mmo

l/L           

-2.5-2.5        

 

                    Arterial blood oxygen saturation measurement           95 % 

                   

    

 

                    * Inhaled oxygen flow rate           21%                 NRG

        

 

                          Arterial blood pH measurement with patient temperature

 correction           7.38

                                        7.37-7.43        

 

                          Arterial blood carbon dioxide, total measurement (mole

s/volume)           17.8 

mmol/L                                  21.0-31.0        

 

                    Body site           LR                  NRG        

 

                          Assessment of wrist artery patency prior to arterial p

uncture           YES-POS 

                                        NRG        

 

                    Setting of ventilation mode           YES                 NR

G        

 

                    Measurement of body temperature           37.3              

  NRG        

 

                                        Capillary blood glucose measurement by g

lucometer (mass/volume) - 02/10/20 11:36

         

 

                          Capillary blood glucose measurement by glucometer (mas

s/volume)           452 

mg/dL                                           

 

                                        Capillary blood glucose measurement by g

lucometer (mass/volume) - 02/10/20 12:33

         

 

                          Capillary blood glucose measurement by glucometer (mas

s/volume)           464 

mg/dL                                           

 

                                        Comprehensive metabolic panel - 02/10/20

 13:30         

 

                          Serum or plasma sodium measurement (moles/volume)     

      133 mmol/L          

                                        135-145        

 

                          Serum or plasma potassium measurement (moles/volume)  

         4.8 mmol/L       

                                        3.6-5.0        

 

                          Serum or plasma chloride measurement (moles/volume)   

        101 mmol/L        

                                                

 

                    Carbon dioxide           17 mmol/L           21-32        

 

                          Serum or plasma anion gap determination (moles/volume)

           15 mmol/L      

                                        5-14        

 

                          Serum or plasma urea nitrogen measurement (mass/volume

)           54 mg/dL      

                                        7-18        

 

                          Serum or plasma creatinine measurement (mass/volume)  

         2.57 mg/dL       

                                        0.60-1.30        

 

                    Serum or plasma urea nitrogen/creatinine mass ratio         

  21                  NRG 

       

 

                                        Serum or plasma creatinine measurement w

ith calculation of estimated glomerular 

filtration rate           19                        NRG        

 

                    Serum or plasma glucose measurement (mass/volume)           

421 mg/dL           

        

 

                    Serum or plasma calcium measurement (mass/volume)           

7.6 mg/dL           

8.5-10.1        

 

                          Serum or plasma total bilirubin measurement (mass/volu

me)           0.3 mg/dL   

                                        0.1-1.0        

 

                                        Serum or plasma alkaline phosphatase kim

surement (enzymatic activity/volume)    

                          86 U/L                            

 

                                        Serum or plasma aspartate aminotransfera

se measurement (enzymatic 

activity/volume)           26 U/L                    5-34        

 

                                        Serum or plasma alanine aminotransferase

 measurement (enzymatic activity/volume)

                          28 U/L                    0-55        

 

                    Serum or plasma protein measurement (mass/volume)           

4.6 g/dL            

6.4-8.2        

 

                    Serum or plasma albumin measurement (mass/volume)           

2.0 g/dL            

3.2-4.5        

 

                    CALCIUM CORRECTED           9.2 mg/dL           8.5-10.1    

    

 

                                        Serum or plasma phosphate measurement (m

ass/volume) - 02/10/20 13:30         

 

                          Serum or plasma phosphate measurement (mass/volume)   

        4.0 mg/dL         

                                        2.3-4.7        

 

                                        Magnesium - 02/10/20 13:30         

 

                    Magnesium           1.9 mg/dL           1.6-2.4        

 

                                        Whole blood hemoglobin and hematocrit pa

jordana - 02/10/20 13:30         

 

                    Venous blood hemoglobin measurement (mass/volume)           

9.6 g/dL            

11.5-16.0        

 

                    Blood hematocrit (volume fraction)           28 %           

     35-52        

 

                                        Capillary blood glucose measurement by g

lucometer (mass/volume) - 02/10/20 13:34

         

 

                          Capillary blood glucose measurement by glucometer (mas

s/volume)           383 

mg/dL                                           

 

                                        Capillary blood glucose measurement by g

lucometer (mass/volume) - 02/10/20 14:30

         

 

                          Capillary blood glucose measurement by glucometer (mas

s/volume)           388 

mg/dL                                           

 

                                        Capillary blood glucose measurement by g

lucometer (mass/volume) - 02/10/20 15:23

         

 

                          Capillary blood glucose measurement by glucometer (mas

s/volume)           382 

mg/dL                                           

 

                                        Comprehensive metabolic panel - 02/10/20

 16:20         

 

                          Serum or plasma sodium measurement (moles/volume)     

      134 mmol/L          

                                        135-145        

 

                          Serum or plasma potassium measurement (moles/volume)  

         4.4 mmol/L       

                                        3.6-5.0        

 

                          Serum or plasma chloride measurement (moles/volume)   

        103 mmol/L        

                                                

 

                    Carbon dioxide           17 mmol/L           21-32        

 

                          Serum or plasma anion gap determination (moles/volume)

           14 mmol/L      

                                        5-14        

 

                          Serum or plasma urea nitrogen measurement (mass/volume

)           54 mg/dL      

                                        7-18        

 

                          Serum or plasma creatinine measurement (mass/volume)  

         2.60 mg/dL       

                                        0.60-1.30        

 

                    Serum or plasma urea nitrogen/creatinine mass ratio         

  21                  NRG 

       

 

                                        Serum or plasma creatinine measurement w

ith calculation of estimated glomerular 

filtration rate           19                        NRG        

 

                    Serum or plasma glucose measurement (mass/volume)           

415 mg/dL           

        

 

                    Serum or plasma calcium measurement (mass/volume)           

7.5 mg/dL           

8.5-10.1        

 

                          Serum or plasma total bilirubin measurement (mass/volu

me)           0.3 mg/dL   

                                        0.1-1.0        

 

                                        Serum or plasma alkaline phosphatase kim

surement (enzymatic activity/volume)    

                          83 U/L                            

 

                                        Serum or plasma aspartate aminotransfera

se measurement (enzymatic 

activity/volume)           28 U/L                    5-34        

 

                                        Serum or plasma alanine aminotransferase

 measurement (enzymatic activity/volume)

                          29 U/L                    0-55        

 

                    Serum or plasma protein measurement (mass/volume)           

4.4 g/dL            

6.4-8.2        

 

                    Serum or plasma albumin measurement (mass/volume)           

1.9 g/dL            

3.2-4.5        

 

                    CALCIUM CORRECTED           9.2 mg/dL           8.5-10.1    

    

 

                                        Serum or plasma phosphate measurement (m

ass/volume) - 02/10/20 16:20         

 

                          Serum or plasma phosphate measurement (mass/volume)   

        3.4 mg/dL         

                                        2.3-4.7        

 

                                        Magnesium - 02/10/20 16:20         

 

                    Magnesium           1.7 mg/dL           1.6-2.4        

 

                                        Capillary blood glucose measurement by g

lucometer (mass/volume) - 02/10/20 16:22

         

 

                          Capillary blood glucose measurement by glucometer (mas

s/volume)           425 

mg/dL                                           

 

                                        Capillary blood glucose measurement by g

lucometer (mass/volume) - 02/10/20 17:37

         

 

                          Capillary blood glucose measurement by glucometer (mas

s/volume)           349 

mg/dL                                           

 

                                        Capillary blood glucose measurement by g

lucometer (mass/volume) - 02/10/20 18:53

         

 

                          Capillary blood glucose measurement by glucometer (mas

s/volume)           310 

mg/dL                                           

 

                                        Capillary blood glucose measurement by g

lucometer (mass/volume) - 02/10/20 19:32

         

 

                          Capillary blood glucose measurement by glucometer (mas

s/volume)           222 

mg/dL                                           

 

                                        Capillary blood glucose measurement by g

lucometer (mass/volume) - 02/10/20 20:00

         

 

                          Capillary blood glucose measurement by glucometer (mas

s/volume)           249 

mg/dL                                           

 

                                        Capillary blood glucose measurement by g

lucometer (mass/volume) - 02/10/20 21:11

         

 

                          Capillary blood glucose measurement by glucometer (mas

s/volume)           250 

mg/dL                                           

 

                                        Comprehensive metabolic panel - 02/10/20

 21:45         

 

                          Serum or plasma sodium measurement (moles/volume)     

      134 mmol/L          

                                        135-145        

 

                          Serum or plasma potassium measurement (moles/volume)  

         4.4 mmol/L       

                                        3.6-5.0        

 

                          Serum or plasma chloride measurement (moles/volume)   

        105 mmol/L        

                                                

 

                    Carbon dioxide           19 mmol/L           21-32        

 

                          Serum or plasma anion gap determination (moles/volume)

           10 mmol/L      

                                        5-14        

 

                          Serum or plasma urea nitrogen measurement (mass/volume

)           52 mg/dL      

                                        7-18        

 

                          Serum or plasma creatinine measurement (mass/volume)  

         2.53 mg/dL       

                                        0.60-1.30        

 

                    Serum or plasma urea nitrogen/creatinine mass ratio         

  21                  NRG 

       

 

                                        Serum or plasma creatinine measurement w

ith calculation of estimated glomerular 

filtration rate           20                        NRG        

 

                    Serum or plasma glucose measurement (mass/volume)           

240 mg/dL           

        

 

                    Serum or plasma calcium measurement (mass/volume)           

7.6 mg/dL           

8.5-10.1        

 

                          Serum or plasma total bilirubin measurement (mass/volu

me)           0.2 mg/dL   

                                        0.1-1.0        

 

                                        Serum or plasma alkaline phosphatase kim

surement (enzymatic activity/volume)    

                          86 U/L                            

 

                                        Serum or plasma aspartate aminotransfera

se measurement (enzymatic 

activity/volume)           32 U/L                    5-34        

 

                                        Serum or plasma alanine aminotransferase

 measurement (enzymatic activity/volume)

                          30 U/L                    0-55        

 

                    Serum or plasma protein measurement (mass/volume)           

4.7 g/dL            

6.4-8.2        

 

                    Serum or plasma albumin measurement (mass/volume)           

2.0 g/dL            

3.2-4.5        

 

                    CALCIUM CORRECTED           9.2 mg/dL           8.5-10.1    

    

 

                                        Serum or plasma phosphate measurement (m

ass/volume) - 02/10/20 21:45         

 

                          Serum or plasma phosphate measurement (mass/volume)   

        2.8 mg/dL         

                                        2.3-4.7        

 

                                        Magnesium - 02/10/20 21:45         

 

                    Magnesium           1.8 mg/dL           1.6-2.4        

 

                                        Capillary blood glucose measurement by g

lucometer (mass/volume) - 02/10/20 22:09

         

 

                          Capillary blood glucose measurement by glucometer (mas

s/volume)           223 

mg/dL                                           

 

                                        Capillary blood glucose measurement by g

lucometer (mass/volume) - 20 00:16

         

 

                          Capillary blood glucose measurement by glucometer (mas

s/volume)           185 

mg/dL                                           

 

                                        Arterial blood gas measurement - 

0 03:00         

 

                    Blood pCO2           34 mm[Hg]           35-45        

 

                    Blood pO2           87 mm[Hg]           79-93        

 

                          Arterial blood bicarbonate measurement (moles/volume) 

          21 mmol/L       

                                        23-27        

 

                    Arterial blood base excess by calculation           -3.0 mmo

l/L           

-2.5-2.5        

 

                    Arterial blood oxygen saturation measurement           96 % 

                   

    

 

                    * Inhaled oxygen flow rate           21%                 NRG

        

 

                          Arterial blood pH measurement with patient temperature

 correction           7.40

                                        7.37-7.43        

 

                          Arterial blood carbon dioxide, total measurement (mole

s/volume)           22.0 

mmol/L                                  21.0-31.0        

 

                    Body site           LEFT ART LINE            NRG        

 

                          Assessment of wrist artery patency prior to arterial p

uncture           ART LINE

                                        NRG        

 

                    Setting of ventilation mode           NO                  NR

G        

 

                    Measurement of body temperature           36.9              

  NRG        

 

                                        Complete blood count (CBC) with automate

d white blood cell (WBC) differential - 

20 03:00         

 

                          Blood leukocytes automated count (number/volume)      

     13.3 10*3/uL         

                                        4.3-11.0        

 

                          Blood erythrocytes automated count (number/volume)    

       3.25 10*6/uL       

                                        4.35-5.85        

 

                    Venous blood hemoglobin measurement (mass/volume)           

10.4 g/dL           

11.5-16.0        

 

                    Blood hematocrit (volume fraction)           30 %           

     35-52        

 

                    Automated erythrocyte mean corpuscular volume           91 [

[id number]]           

80-99        

 

                                        Automated erythrocyte mean corpuscular h

emoglobin (mass per erythrocyte)        

                          32 pg                     25-34        

 

                                        Automated erythrocyte mean corpuscular h

emoglobin concentration measurement 

(mass/volume)             35 g/dL                   32-36        

 

                    Automated erythrocyte distribution width ratio           14.

4 %              10.0-

14.5        

 

                    Automated blood platelet count (count/volume)           216 

10*3/uL           

130-400        

 

                          Automated blood platelet mean volume measurement      

     9.6 [foz_us]         

                                        7.4-10.4        

 

                    Automated blood neutrophils/100 leukocytes           76 %   

             42-75       

 

 

                    Automated blood lymphocytes/100 leukocytes           19 %   

             12-44       

 

 

                    Blood monocytes/100 leukocytes           5 %                

 0-12        

 

                    Automated blood eosinophils/100 leukocytes           0 %    

             0-10        

 

                    Automated blood basophils/100 leukocytes           0 %      

           0-10        

 

                    Blood neutrophils automated count (number/volume)           

10.1 10*3           

1.8-7.8        

 

                    Blood lymphocytes automated count (number/volume)           

2.5 10*3            

1.0-4.0        

 

                    Blood monocytes automated count (number/volume)           0.

7 10*3            

0.0-1.0        

 

                    Automated eosinophil count           0.1 10*3/uL           0

.0-0.3        

 

                    Automated blood basophil count (count/volume)           0.0 

10*3/uL           

0.0-0.1        

 

                                        Comprehensive metabolic panel - 20

 03:00         

 

                          Serum or plasma sodium measurement (moles/volume)     

      134 mmol/L          

                                        135-145        

 

                          Serum or plasma potassium measurement (moles/volume)  

         4.4 mmol/L       

                                        3.6-5.0        

 

                          Serum or plasma chloride measurement (moles/volume)   

        106 mmol/L        

                                                

 

                    Carbon dioxide           18 mmol/L           21-32        

 

                          Serum or plasma anion gap determination (moles/volume)

           10 mmol/L      

                                        5-14        

 

                          Serum or plasma urea nitrogen measurement (mass/volume

)           49 mg/dL      

                                        7-18        

 

                          Serum or plasma creatinine measurement (mass/volume)  

         2.40 mg/dL       

                                        0.60-1.30        

 

                    Serum or plasma urea nitrogen/creatinine mass ratio         

  20                  NRG 

       

 

                                        Serum or plasma creatinine measurement w

ith calculation of estimated glomerular 

filtration rate           21                        NRG        

 

                    Serum or plasma glucose measurement (mass/volume)           

179 mg/dL           

        

 

                    Serum or plasma calcium measurement (mass/volume)           

7.6 mg/dL           

8.5-10.1        

 

                          Serum or plasma total bilirubin measurement (mass/volu

me)           0.2 mg/dL   

                                        0.1-1.0        

 

                                        Serum or plasma alkaline phosphatase kim

surement (enzymatic activity/volume)    

                          79 U/L                            

 

                                        Serum or plasma aspartate aminotransfera

se measurement (enzymatic 

activity/volume)           33 U/L                    5-34        

 

                                        Serum or plasma alanine aminotransferase

 measurement (enzymatic activity/volume)

                          29 U/L                    0-55        

 

                    Serum or plasma protein measurement (mass/volume)           

4.9 g/dL            

6.4-8.2        

 

                    Serum or plasma albumin measurement (mass/volume)           

2.1 g/dL            

3.2-4.5        

 

                    CALCIUM CORRECTED           9.1 mg/dL           8.5-10.1    

    

 

                                        Serum or plasma phosphate measurement (m

ass/volume) - 20 03:00         

 

                          Serum or plasma phosphate measurement (mass/volume)   

        2.7 mg/dL         

                                        2.3-4.7        

 

                                        Magnesium - 20 03:00         

 

                    Magnesium           1.8 mg/dL           1.6-2.4        

 

                                        Beta-hydroxybutyric acid measurement - 0

20 03:00         

 

                    Beta-hydroxybutyric acid measurement           2.10 mmol/L  

         0.00-0.27  

      

 

                                        Complete urinalysis with reflex to cultu

re - 20 06:30         

 

                    Urine color determination           YELLOW              NRG 

       

 

                    Urine clarity determination           CLEAR               NR

G        

 

                    Urine pH measurement by test strip           6.0            

     5-9        

 

                    Specific gravity of urine by test strip           1.025     

          1.016-1.022  

      

 

                    Urine protein assay by test strip, semi-quantitative        

   3+                  

NEGATIVE        

 

                    Urine glucose detection by automated test strip           NE

GATIVE            

NEGATIVE        

 

                          Erythrocytes detection in urine sediment by light micr

oscopy           1+       

                                        NEGATIVE        

 

                    Urine ketones detection by automated test strip           1+

                  NEGATIVE

        

 

                    Urine nitrite detection by test strip           NEGATIVE    

        NEGATIVE    

    

 

                    Urine total bilirubin detection by test strip           1+  

                NEGATIVE  

      

 

                          Urine urobilinogen measurement by automated test strip

 (mass/volume)           

0.2 mg/dL                               < = 1.0        

 

                    Urine leukocyte esterase detection by dipstick           NEG

ATIVE            

NEGATIVE        

 

                                        Automated urine sediment erythrocyte cou

nt by microscopy (number/high power 

field)                    NONE                      NRG        

 

                                        Automated urine sediment leukocyte count

 by microscopy (number/high power field)

                           [HPF]                    NRG        

 

                          Bacteria detection in urine sediment by light microsco

py           TRACE        

                                        NRG        

 

                                        Squamous epithelial cells detection in u

rine sediment by light microscopy       

                          5-10                      NRG        

 

                          Crystals detection in urine sediment by light microsco

py           PRESENT      

                                        NRG        

 

                    Casts detection in urine sediment by light microscopy       

    NONE                

NRG        

 

                          Mucus detection in urine sediment by light microscopy 

          SMALL           

                                        NRG        

 

                    Complete urinalysis with reflex to culture           NO     

             NRG        

 

                          Amorphous sediment detection in urine sediment by ligh

t microscopy           FEW

 NATALIE URATES                            NRG        

 

                                        Capillary blood glucose measurement by g

lucometer (mass/volume) - 20 08:26

         

 

                          Capillary blood glucose measurement by glucometer (mas

s/volume)           324 

mg/dL                                           

 

                                        Capillary blood glucose measurement by g

lucometer (mass/volume) - 20 11:35

         

 

                          Capillary blood glucose measurement by glucometer (mas

s/volume)           296 

mg/dL                                           

 

                                        Comprehensive metabolic panel - 20

 13:17         

 

                          Serum or plasma sodium measurement (moles/volume)     

      135 mmol/L          

                                        135-145        

 

                          Serum or plasma potassium measurement (moles/volume)  

         3.5 mmol/L       

                                        3.6-5.0        

 

                          Serum or plasma chloride measurement (moles/volume)   

        108 mmol/L        

                                                

 

                    Carbon dioxide           19 mmol/L           21-32        

 

                          Serum or plasma anion gap determination (moles/volume)

           8 mmol/L       

                                        5-14        

 

                          Serum or plasma urea nitrogen measurement (mass/volume

)           40 mg/dL      

                                        7-18        

 

                          Serum or plasma creatinine measurement (mass/volume)  

         2.16 mg/dL       

                                        0.60-1.30        

 

                    Serum or plasma urea nitrogen/creatinine mass ratio         

  19                  NRG 

       

 

                                        Serum or plasma creatinine measurement w

ith calculation of estimated glomerular 

filtration rate           24                        NRG        

 

                    Serum or plasma glucose measurement (mass/volume)           

290 mg/dL           

        

 

                    Serum or plasma calcium measurement (mass/volume)           

7.4 mg/dL           

8.5-10.1        

 

                          Serum or plasma total bilirubin measurement (mass/volu

me)           0.3 mg/dL   

                                        0.1-1.0        

 

                                        Serum or plasma alkaline phosphatase kim

surement (enzymatic activity/volume)    

                          68 U/L                            

 

                                        Serum or plasma aspartate aminotransfera

se measurement (enzymatic 

activity/volume)           35 U/L                    5-34        

 

                                        Serum or plasma alanine aminotransferase

 measurement (enzymatic activity/volume)

                          29 U/L                    0-55        

 

                    Serum or plasma protein measurement (mass/volume)           

4.5 g/dL            

6.4-8.2        

 

                    Serum or plasma albumin measurement (mass/volume)           

1.9 g/dL            

3.2-4.5        

 

                    CALCIUM CORRECTED           9.1 mg/dL           8.5-10.1    

    

 

                                        Serum or plasma phosphate measurement (m

ass/volume) - 20 13:17         

 

                          Serum or plasma phosphate measurement (mass/volume)   

        2.0 mg/dL         

                                        2.3-4.7        

 

                                        Magnesium - 20 13:17         

 

                    Magnesium           1.6 mg/dL           1.6-2.4        

 

                                        Capillary blood glucose measurement by g

lucometer (mass/volume) - 20 16:00

         

 

                          Capillary blood glucose measurement by glucometer (mas

s/volume)           194 

mg/dL                                           

 

                                        Capillary blood glucose measurement by g

lucometer (mass/volume) - 20 19:40

         

 

                          Capillary blood glucose measurement by glucometer (mas

s/volume)           196 

mg/dL                                           

 

                                        Capillary blood glucose measurement by g

lucometer (mass/volume) - 20 23:09

         

 

                          Capillary blood glucose measurement by glucometer (mas

s/volume)           76 

mg/dL                                           

 

                                        Complete blood count (CBC) with automate

d white blood cell (WBC) differential - 

20 02:21         

 

                          Blood leukocytes automated count (number/volume)      

     11.5 10*3/uL         

                                        4.3-11.0        

 

                          Blood erythrocytes automated count (number/volume)    

       2.95 10*6/uL       

                                        4.35-5.85        

 

                    Venous blood hemoglobin measurement (mass/volume)           

9.4 g/dL            

11.5-16.0        

 

                    Blood hematocrit (volume fraction)           28 %           

     35-52        

 

                    Automated erythrocyte mean corpuscular volume           94 [

foz_us]           

80-99        

 

                                        Automated erythrocyte mean corpuscular h

emoglobin (mass per erythrocyte)        

                          32 pg                     25-34        

 

                                        Automated erythrocyte mean corpuscular h

emoglobin concentration measurement 

(mass/volume)             34 g/dL                   32-36        

 

                    Automated erythrocyte distribution width ratio           15.

1 %              10.0-

14.5        

 

                    Automated blood platelet count (count/volume)           155 

10*3/uL           

130-400        

 

                          Automated blood platelet mean volume measurement      

     8.9 [foz_us]         

                                        7.4-10.4        

 

                    Automated blood neutrophils/100 leukocytes           79 %   

             42-75       

 

 

                    Automated blood lymphocytes/100 leukocytes           15 %   

             12-44       

 

 

                    Blood monocytes/100 leukocytes           5 %                

 0-12        

 

                    Automated blood eosinophils/100 leukocytes           0 %    

             0-10        

 

                    Automated blood basophils/100 leukocytes           0 %      

           0-10        

 

                    Blood neutrophils automated count (number/volume)           

9.1 10*3            

1.8-7.8        

 

                    Blood lymphocytes automated count (number/volume)           

1.7 10*3            

1.0-4.0        

 

                    Blood monocytes automated count (number/volume)           0.

6 10*3            

0.0-1.0        

 

                    Automated eosinophil count           0.1 10*3/uL           0

.0-0.3        

 

                    Automated blood basophil count (count/volume)           0.0 

10*3/uL           

0.0-0.1        

 

                                        Whole blood basic metabolic panel -  02:21         

 

                          Serum or plasma sodium measurement (moles/volume)     

      142 mmol/L          

                                        135-145        

 

                          Serum or plasma potassium measurement (moles/volume)  

         2.8 mmol/L       

                                        3.6-5.0        

 

                          Serum or plasma chloride measurement (moles/volume)   

        112 mmol/L        

                                                

 

                    Carbon dioxide           22 mmol/L           21-32        

 

                          Serum or plasma anion gap determination (moles/volume)

           8 mmol/L       

                                        5-14        

 

                          Serum or plasma urea nitrogen measurement (mass/volume

)           27 mg/dL      

                                        7-18        

 

                          Serum or plasma creatinine measurement (mass/volume)  

         1.59 mg/dL       

                                        0.60-1.30        

 

                    Serum or plasma urea nitrogen/creatinine mass ratio         

  17                  NRG 

       

 

                                        Serum or plasma creatinine measurement w

ith calculation of estimated glomerular 

filtration rate           34                        NRG        

 

                    Serum or plasma glucose measurement (mass/volume)           

32 mg/dL            

        

 

                    Serum or plasma calcium measurement (mass/volume)           

7.7 mg/dL           

8.5-10.1        

 

                                        Serum or plasma phosphate measurement (m

ass/volume) - 20 02:21         

 

                          Serum or plasma phosphate measurement (mass/volume)   

        1.8 mg/dL         

                                        2.3-4.7        

 

                                        Magnesium - 20 02:21         

 

                    Magnesium           1.9 mg/dL           1.6-2.4        

 

                                        Capillary blood glucose measurement by g

lucometer (mass/volume) - 20 02:55

         

 

                          Capillary blood glucose measurement by glucometer (mas

s/volume)           31 

mg/dL                                           

 

                                        Capillary blood glucose measurement by g

lucometer (mass/volume) - 20 03:12

         

 

                          Capillary blood glucose measurement by glucometer (mas

s/volume)           113 

mg/dL                                           

 

                                        Capillary blood glucose measurement by g

lucometer (mass/volume) - 20 03:59

         

 

                          Capillary blood glucose measurement by glucometer (mas

s/volume)           69 

mg/dL                                           

 

                                        Capillary blood glucose measurement by g

lucometer (mass/volume) - 20 05:35

         

 

                          Capillary blood glucose measurement by glucometer (mas

s/volume)           101 

mg/dL                                           

 

                                        Capillary blood glucose measurement by g

lucometer (mass/volume) - 20 08:38

         

 

                          Capillary blood glucose measurement by glucometer (mas

s/volume)           102 

mg/dL                                           

 

                                        Capillary blood glucose measurement by g

lucometer (mass/volume) - 20 11:36

         

 

                          Capillary blood glucose measurement by glucometer (mas

s/volume)           115 

mg/dL                                           

 

                                        Capillary blood glucose measurement by g

lucometer (mass/volume) - 20 16:07

         

 

                          Capillary blood glucose measurement by glucometer (mas

s/volume)           347 

mg/dL                                           

 

                                        Capillary blood glucose measurement by g

lucometer (mass/volume) - 20 20:06

         

 

                          Capillary blood glucose measurement by glucometer (mas

s/volume)           343 

mg/dL                                           

 

                                        Capillary blood glucose measurement by g

lucometer (mass/volume) - 20 05:32

         

 

                          Capillary blood glucose measurement by glucometer (mas

s/volume)           53 

mg/dL                                           

 

                                        Complete blood count (CBC) with automate

d white blood cell (WBC) differential - 

20 05:48         

 

                          Blood leukocytes automated count (number/volume)      

     12.7 10*3/uL         

                                        4.3-11.0        

 

                          Blood erythrocytes automated count (number/volume)    

       3.39 10*6/uL       

                                        4.35-5.85        

 

                    Venous blood hemoglobin measurement (mass/volume)           

10.8 g/dL           

11.5-16.0        

 

                    Blood hematocrit (volume fraction)           33 %           

     35-52        

 

                    Automated erythrocyte mean corpuscular volume           96 [

foz_us]           

80-99        

 

                                        Automated erythrocyte mean corpuscular h

emoglobin (mass per erythrocyte)        

                          32 pg                     25-34        

 

                                        Automated erythrocyte mean corpuscular h

emoglobin concentration measurement 

(mass/volume)             33 g/dL                   32-36        

 

                    Automated erythrocyte distribution width ratio           14.

8 %              10.0-

14.5        

 

                    Automated blood platelet count (count/volume)           164 

10*3/uL           

130-400        

 

                          Automated blood platelet mean volume measurement      

     9.9 [foz_us]         

                                        7.4-10.4        

 

                    Automated blood neutrophils/100 leukocytes           76 %   

             42-75       

 

 

                    Automated blood lymphocytes/100 leukocytes           19 %   

             12-44       

 

 

                    Blood monocytes/100 leukocytes           4 %                

 0-12        

 

                    Automated blood eosinophils/100 leukocytes           1 %    

             0-10        

 

                    Automated blood basophils/100 leukocytes           0 %      

           0-10        

 

                    Blood neutrophils automated count (number/volume)           

9.6 10*3            

1.8-7.8        

 

                    Blood lymphocytes automated count (number/volume)           

2.4 10*3            

1.0-4.0        

 

                    Blood monocytes automated count (number/volume)           0.

5 10*3            

0.0-1.0        

 

                    Automated eosinophil count           0.2 10*3/uL           0

.0-0.3        

 

                    Automated blood basophil count (count/volume)           0.0 

10*3/uL           

0.0-0.1        

 

                                        Whole blood basic metabolic panel - 02/1

3/20 05:48         

 

                          Serum or plasma sodium measurement (moles/volume)     

      138 mmol/L          

                                        135-145        

 

                          Serum or plasma potassium measurement (moles/volume)  

         3.4 mmol/L       

                                        3.6-5.0        

 

                          Serum or plasma chloride measurement (moles/volume)   

        104 mmol/L        

                                                

 

                    Carbon dioxide           24 mmol/L           21-32        

 

                          Serum or plasma anion gap determination (moles/volume)

           10 mmol/L      

                                        5-14        

 

                          Serum or plasma urea nitrogen measurement (mass/volume

)           20 mg/dL      

                                        7-18        

 

                          Serum or plasma creatinine measurement (mass/volume)  

         1.28 mg/dL       

                                        0.60-1.30        

 

                    Serum or plasma urea nitrogen/creatinine mass ratio         

  16                  NRG 

       

 

                                        Serum or plasma creatinine measurement w

ith calculation of estimated glomerular 

filtration rate           43                        NRG        

 

                    Serum or plasma glucose measurement (mass/volume)           

54 mg/dL            

        

 

                    Serum or plasma calcium measurement (mass/volume)           

8.0 mg/dL           

8.5-10.1        

 

                                        Serum or plasma phosphate measurement (m

ass/volume) - 20 05:48         

 

                          Serum or plasma phosphate measurement (mass/volume)   

        2.1 mg/dL         

                                        2.3-4.7        

 

                                        Magnesium - 20 05:48         

 

                    Magnesium           1.5 mg/dL           1.6-2.4        

 

                                        Capillary blood glucose measurement by g

lucometer (mass/volume) - 20 06:43

         

 

                          Capillary blood glucose measurement by glucometer (mas

s/volume)           119 

mg/dL                                           

 

                                        Capillary blood glucose measurement by g

lucometer (mass/volume) - 20 10:47

         

 

                          Capillary blood glucose measurement by glucometer (mas

s/volume)           342 

mg/dL                                           

 

                                        Capillary blood glucose measurement by g

lucometer (mass/volume) - 20 16:19

         

 

                          Capillary blood glucose measurement by glucometer (mas

s/volume)           389 

mg/dL                                           

 

                                        Capillary blood glucose measurement by g

lucometer (mass/volume) - 20 20:36

         

 

                          Capillary blood glucose measurement by glucometer (mas

s/volume)           377 

mg/dL                                           

 

                                        Capillary blood glucose measurement by g

lucometer (mass/volume) - 20 06:07

         

 

                          Capillary blood glucose measurement by glucometer (mas

s/volume)           289 

mg/dL                                           

 

                                        Complete blood count (CBC) with automate

d white blood cell (WBC) differential - 

20 06:28         

 

                          Blood leukocytes automated count (number/volume)      

     10.2 10*3/uL         

                                        4.3-11.0        

 

                          Blood erythrocytes automated count (number/volume)    

       3.13 10*6/uL       

                                        4.35-5.85        

 

                    Venous blood hemoglobin measurement (mass/volume)           

10.0 g/dL           

11.5-16.0        

 

                    Blood hematocrit (volume fraction)           31 %           

     35-52        

 

                    Automated erythrocyte mean corpuscular volume           97 [

foz_us]           

80-99        

 

                                        Automated erythrocyte mean corpuscular h

emoglobin (mass per erythrocyte)        

                          32 pg                     25-34        

 

                                        Automated erythrocyte mean corpuscular h

emoglobin concentration measurement 

(mass/volume)             33 g/dL                   32-36        

 

                    Automated erythrocyte distribution width ratio           14.

4 %              10.0-

14.5        

 

                    Automated blood platelet count (count/volume)           159 

10*3/uL           

130-400        

 

                          Automated blood platelet mean volume measurement      

     10.0 [foz_us]        

                                        7.4-10.4        

 

                    Automated blood neutrophils/100 leukocytes           70 %   

             42-75       

 

 

                    Automated blood lymphocytes/100 leukocytes           24 %   

             12-44       

 

 

                    Blood monocytes/100 leukocytes           4 %                

 0-12        

 

                    Automated blood eosinophils/100 leukocytes           3 %    

             0-10        

 

                    Automated blood basophils/100 leukocytes           0 %      

           0-10        

 

                    Blood neutrophils automated count (number/volume)           

7.1 10*3            

1.8-7.8        

 

                    Blood lymphocytes automated count (number/volume)           

2.4 10*3            

1.0-4.0        

 

                    Blood monocytes automated count (number/volume)           0.

4 10*3            

0.0-1.0        

 

                    Automated eosinophil count           0.3 10*3/uL           0

.0-0.3        

 

                    Automated blood basophil count (count/volume)           0.0 

10*3/uL           

0.0-0.1        

 

                                        Whole blood basic metabolic panel -  06:28         

 

                          Serum or plasma sodium measurement (moles/volume)     

      136 mmol/L          

                                        135-145        

 

                          Serum or plasma potassium measurement (moles/volume)  

         4.0 mmol/L       

                                        3.6-5.0        

 

                          Serum or plasma chloride measurement (moles/volume)   

        105 mmol/L        

                                                

 

                    Carbon dioxide           24 mmol/L           21-32        

 

                          Serum or plasma anion gap determination (moles/volume)

           7 mmol/L       

                                        5-14        

 

                          Serum or plasma urea nitrogen measurement (mass/volume

)           16 mg/dL      

                                        7-18        

 

                          Serum or plasma creatinine measurement (mass/volume)  

         1.27 mg/dL       

                                        0.60-1.30        

 

                    Serum or plasma urea nitrogen/creatinine mass ratio         

  13                  NRG 

       

 

                                        Serum or plasma creatinine measurement w

ith calculation of estimated glomerular 

filtration rate           44                        NRG        

 

                    Serum or plasma glucose measurement (mass/volume)           

311 mg/dL           

        

 

                    Serum or plasma calcium measurement (mass/volume)           

7.7 mg/dL           

8.5-10.1        

 

                                        Serum or plasma phosphate measurement (m

ass/volume) - 20 06:28         

 

                          Serum or plasma phosphate measurement (mass/volume)   

        1.9 mg/dL         

                                        2.3-4.7        

 

                                        Magnesium - 20 06:28         

 

                    Magnesium           1.5 mg/dL           1.6-2.4        

 

                                        Capillary blood glucose measurement by g

lucometer (mass/volume) - 20 11:16

         

 

                          Capillary blood glucose measurement by glucometer (mas

s/volume)           363 

mg/dL                                           

 

                                        Capillary blood glucose measurement by g

lucometer (mass/volume) - 20 15:39

         

 

                          Capillary blood glucose measurement by glucometer (mas

s/volume)           380 

mg/dL                                           

 

                                        Capillary blood glucose measurement by g

lucometer (mass/volume) - 20 20:29

         

 

                          Capillary blood glucose measurement by glucometer (mas

s/volume)           480 

mg/dL                                           

 

                                        Capillary blood glucose measurement by g

lucometer (mass/volume) - 02/15/20 03:44

         

 

                          Capillary blood glucose measurement by glucometer (mas

s/volume)           302 

mg/dL                                           

 

                                        Whole blood basic metabolic panel -  05:16         

 

                          Serum or plasma sodium measurement (moles/volume)     

      137 mmol/L          

                                        135-145        

 

                          Serum or plasma potassium measurement (moles/volume)  

         3.8 mmol/L       

                                        3.6-5.0        

 

                          Serum or plasma chloride measurement (moles/volume)   

        107 mmol/L        

                                                

 

                    Carbon dioxide           21 mmol/L           21-32        

 

                          Serum or plasma anion gap determination (moles/volume)

           9 mmol/L       

                                        5-14        

 

                          Serum or plasma urea nitrogen measurement (mass/volume

)           20 mg/dL      

                                        7-18        

 

                          Serum or plasma creatinine measurement (mass/volume)  

         1.22 mg/dL       

                                        0.60-1.30        

 

                    Serum or plasma urea nitrogen/creatinine mass ratio         

  16                  NRG 

       

 

                                        Serum or plasma creatinine measurement w

ith calculation of estimated glomerular 

filtration rate           46                        NRG        

 

                    Serum or plasma glucose measurement (mass/volume)           

236 mg/dL           

        

 

                    Serum or plasma calcium measurement (mass/volume)           

7.8 mg/dL           

8.5-10.1        

 

                                        Serum or plasma phosphate measurement (m

ass/volume) - 02/15/20 05:16         

 

                          Serum or plasma phosphate measurement (mass/volume)   

        2.2 mg/dL         

                                        2.3-4.7        

 

                                        Magnesium - 02/15/20 05:16         

 

                    Magnesium           1.7 mg/dL           1.6-2.4        

 

                                        Complete blood count (CBC) with automate

d white blood cell (WBC) differential - 

02/15/20 05:18         

 

                          Blood leukocytes automated count (number/volume)      

     8.8 10*3/uL          

                                        4.3-11.0        

 

                          Blood erythrocytes automated count (number/volume)    

       2.99 10*6/uL       

                                        4.35-5.85        

 

                    Venous blood hemoglobin measurement (mass/volume)           

9.5 g/dL            

11.5-16.0        

 

                    Blood hematocrit (volume fraction)           29 %           

     35-52        

 

                    Automated erythrocyte mean corpuscular volume           98 [

foz_us]           

80-99        

 

                                        Automated erythrocyte mean corpuscular h

emoglobin (mass per erythrocyte)        

                          32 pg                     25-34        

 

                                        Automated erythrocyte mean corpuscular h

emoglobin concentration measurement 

(mass/volume)             32 g/dL                   32-36        

 

                    Automated erythrocyte distribution width ratio           14.

0 %              10.0-

14.5        

 

                    Automated blood platelet count (count/volume)           220 

10*3/uL           

130-400        

 

                          Automated blood platelet mean volume measurement      

     10.2 [foz_us]        

                                        7.4-10.4        

 

                    Automated blood neutrophils/100 leukocytes           58 %   

             42-75       

 

 

                    Automated blood lymphocytes/100 leukocytes           33 %   

             12-44       

 

 

                    Blood monocytes/100 leukocytes           7 %                

 0-12        

 

                    Automated blood eosinophils/100 leukocytes           3 %    

             0-10        

 

                    Automated blood basophils/100 leukocytes           0 %      

           0-10        

 

                    Blood neutrophils automated count (number/volume)           

5.1 10*3            

1.8-7.8        

 

                    Blood lymphocytes automated count (number/volume)           

2.9 10*3            

1.0-4.0        

 

                    Blood monocytes automated count (number/volume)           0.

6 10*3            

0.0-1.0        

 

                    Automated eosinophil count           0.2 10*3/uL           0

.0-0.3        

 

                    Automated blood basophil count (count/volume)           0.0 

10*3/uL           

0.0-0.1        

 

                                        Capillary blood glucose measurement by g

lucometer (mass/volume) - 02/15/20 05:31

         

 

                          Capillary blood glucose measurement by glucometer (mas

s/volume)           208 

mg/dL                                           

 

                                        Capillary blood glucose measurement by g

lucometer (mass/volume) - 02/15/20 10:57

         

 

                          Capillary blood glucose measurement by glucometer (mas

s/volume)           297 

mg/dL                                           

 

                                        Capillary blood glucose measurement by g

lucometer (mass/volume) - 20 18:02

         

 

                          Capillary blood glucose measurement by glucometer (mas

s/volume)           > 

mg/dL                                           

 

                                        Comprehensive metabolic panel - 20

 18:15         

 

                          Serum or plasma sodium measurement (moles/volume)     

      116 mmol/L          

                                        135-145        

 

                          Serum or plasma potassium measurement (moles/volume)  

         6.6 mmol/L       

                                        3.6-5.0        

 

                          Serum or plasma chloride measurement (moles/volume)   

        79 mmol/L         

                                                

 

                    Carbon dioxide           8 mmol/L            21-32        

 

                          Serum or plasma anion gap determination (moles/volume)

           29 mmol/L      

                                        5-14        

 

                          Serum or plasma urea nitrogen measurement (mass/volume

)           77 mg/dL      

                                        7-18        

 

                          Serum or plasma creatinine measurement (mass/volume)  

         3.27 mg/dL       

                                        0.60-1.30        

 

                    Serum or plasma urea nitrogen/creatinine mass ratio         

  24                  NRG 

       

 

                                        Serum or plasma creatinine measurement w

ith calculation of estimated glomerular 

filtration rate           15                        NRG        

 

                          Serum or plasma glucose measurement (mass/volume)     

      1322 mg/dL          

                                                

 

                    Serum or plasma calcium measurement (mass/volume)           

9.6 mg/dL           

8.5-10.1        

 

                          Serum or plasma total bilirubin measurement (mass/volu

me)           0.5 mg/dL   

                                        0.1-1.0        

 

                                        Serum or plasma alkaline phosphatase kim

surement (enzymatic activity/volume)    

                          123 U/L                           

 

                                        Serum or plasma aspartate aminotransfera

se measurement (enzymatic 

activity/volume)           22 U/L                    5-34        

 

                                        Serum or plasma alanine aminotransferase

 measurement (enzymatic activity/volume)

                          31 U/L                    0-55        

 

                    Serum or plasma protein measurement (mass/volume)           

8.4 g/dL            

6.4-8.2        

 

                    Serum or plasma albumin measurement (mass/volume)           

3.5 g/dL            

3.2-4.5        

 

                    CALCIUM CORRECTED           10.0 mg/dL           8.5-10.1   

     

 

                                        Lipase - 20 18:15         

 

                    Lipase              71 U/L              8-78        

 

                                        Beta-hydroxybutyric acid measurement - 0

20 18:15         

 

                    Beta-hydroxybutyric acid measurement           8.53 mmol/L  

         0.00-0.27  

      

 

                                        Arterial blood gas measurement - 

0 18:22         

 

                    Blood pCO2           15 mm[Hg]           35-45        

 

                    Blood pO2           147 mm[Hg]           79-93        

 

                          Arterial blood bicarbonate measurement (moles/volume) 

          8 mmol/L        

                                        23-27        

 

                    Arterial blood base excess by calculation           -17.2 mm

ol/L           

-2.5-2.5        

 

                    Arterial blood oxygen saturation measurement           97 % 

                   

    

 

                    * Inhaled oxygen flow rate           RA                  NRG

        

 

                          Arterial blood pH measurement with patient temperature

 correction           7.34

                                        7.37-7.43        

 

                          Arterial blood carbon dioxide, total measurement (mole

s/volume)           8.3 

mmol/L                                  21.0-31.0        

 

                    Body site           R RAD               NRG        

 

                          Assessment of wrist artery patency prior to arterial p

uncture           YES-POS 

                                        NRG        

 

                    Setting of ventilation mode           NO                  NR

G        

 

                    Measurement of body temperature           37.6              

  NRG        

 

                                        Complete blood count (CBC) with automate

d white blood cell (WBC) differential - 

20 18:34         

 

                          Blood leukocytes automated count (number/volume)      

     11.3 10*3/uL         

                                        4.3-11.0        

 

                          Blood erythrocytes automated count (number/volume)    

       3.80 10*6/uL       

                                        4.35-5.85        

 

                    Venous blood hemoglobin measurement (mass/volume)           

11.8 g/dL           

11.5-16.0        

 

                    Blood hematocrit (volume fraction)           38 %           

     35-52        

 

                    Automated erythrocyte mean corpuscular volume           99 [

foz_us]           

80-99        

 

                                        Automated erythrocyte mean corpuscular h

emoglobin (mass per erythrocyte)        

                          31 pg                     25-34        

 

                                        Automated erythrocyte mean corpuscular h

emoglobin concentration measurement 

(mass/volume)             31 g/dL                   32-36        

 

                    Automated erythrocyte distribution width ratio           13.

5 %              10.0-

14.5        

 

                    Automated blood platelet count (count/volume)           444 

10*3/uL           

130-400        

 

                          Automated blood platelet mean volume measurement      

     10.1 [foz_us]        

                                        7.4-10.4        

 

                    Automated blood neutrophils/100 leukocytes           90 %   

             42-75       

 

 

                    Automated blood lymphocytes/100 leukocytes           10 %   

             12-44       

 

 

                    Blood monocytes/100 leukocytes           0 %                

 0-12        

 

                    Automated blood eosinophils/100 leukocytes           0 %    

             0-10        

 

                    Automated blood basophils/100 leukocytes           0 %      

           0-10        

 

                    Blood neutrophils automated count (number/volume)           

10.1 10*3           

1.8-7.8        

 

                    Blood lymphocytes automated count (number/volume)           

1.1 10*3            

1.0-4.0        

 

                    Blood monocytes automated count (number/volume)           0.

1 10*3            

0.0-1.0        

 

                    Automated eosinophil count           0.0 10*3/uL           0

.0-0.3        

 

                    Automated blood basophil count (count/volume)           0.0 

10*3/uL           

0.0-0.1        

 

                                        Manual absolute plasma cell count -  18:34         

 

                    Blood monocytes/100 leukocytes           1 %                

 NRG        

 

                    Manual blood segmented neutrophils/100 leukocytes           

84 %                NRG  

      

 

                    Blood band neutrophils/100 leukocytes           3 %         

        NRG        

 

                    Manual blood lymphocytes/100 leukocytes           12 %      

          NRG        

 

                    Blood erythrocyte morphology finding identification         

  NORMAL              

NRG        

 

                                        Complete urinalysis with reflex to cultu

re - 20 19:49         

 

                    Urine color determination           YELLOW              NRG 

       

 

                    Urine clarity determination           CLEAR               NR

G        

 

                    Urine pH measurement by test strip           6.0            

     5-9        

 

                    Specific gravity of urine by test strip           1.020     

          1.016-1.022  

      

 

                    Urine protein assay by test strip, semi-quantitative        

   3+                  

NEGATIVE        

 

                    Urine glucose detection by automated test strip           3+

                  NEGATIVE

        

 

                          Erythrocytes detection in urine sediment by light micr

oscopy           TRACE-I  

                                        NEGATIVE        

 

                    Urine ketones detection by automated test strip           1+

                  NEGATIVE

        

 

                    Urine nitrite detection by test strip           NEGATIVE    

        NEGATIVE    

    

 

                    Urine total bilirubin detection by test strip           NEGA

TIVE            

NEGATIVE        

 

                          Urine urobilinogen measurement by automated test strip

 (mass/volume)           

0.2 mg/dL                               < = 1.0        

 

                    Urine leukocyte esterase detection by dipstick           NEG

ATIVE            

NEGATIVE        

 

                                        Automated urine sediment erythrocyte cou

nt by microscopy (number/high power 

field)                    NONE                      NRG        

 

                                        Automated urine sediment leukocyte count

 by microscopy (number/high power field)

                           [HPF]                    NRG        

 

                          Bacteria detection in urine sediment by light microsco

py           TRACE        

                                        NRG        

 

                                        Squamous epithelial cells detection in u

rine sediment by light microscopy       

                          0-2                       NRG        

 

                          Crystals detection in urine sediment by light microsco

py           NONE         

                                        NRG        

 

                    Casts detection in urine sediment by light microscopy       

    NONE                

NRG        

 

                          Mucus detection in urine sediment by light microscopy 

          SMALL           

                                        NRG        

 

                    Complete urinalysis with reflex to culture           NO     

             NRG        

 

                                        Urine drug screening test - 20 19:

49         

 

                    Urine phencyclidine detection by screening method           

NEGATIVE            

NEGATIVE        

 

                          Urine benzodiazepines detection by screening method   

        NEGATIVE          

                                        NEGATIVE        

 

                    Urine cocaine detection           NEGATIVE            NEGATI

VE        

 

                    Urine amphetamines detection by screening method           N

EGATIVE            

NEGATIVE        

 

                          Urine methamphetamine detection by screening method   

        NEGATIVE          

                                        NEGATIVE        

 

                    Urine cannabinoids detection by screening method           P

OSITIVE            

NEGATIVE        

 

                    Urine opiates detection by screening method           NEGATI

VE            

NEGATIVE        

 

                    Urine barbiturates detection           NEGATIVE            N

EGATIVE        

 

                          Screening urine tricyclic antidepressants detection   

        NEGATIVE          

                                        NEGATIVE        

 

                    Urine methadone detection by screening method           NEGA

TIVE            

NEGATIVE        

 

                    Urine oxycodone detection           NEGATIVE            NEGA

TIVE        

 

                    Urine propoxyphene detection           NEGATIVE            N

EGATIVE        

 

                                        Methicillin resistant Staphylococcus aur

eus (MRSA) screening culture - 20 

20:29         

 

                          Methicillin resistant Staphylococcus aureus (MRSA) scr

eening culture           

NEG                                     NRG        

 

                                        Whole blood basic metabolic panel -  21:05         

 

                          Serum or plasma sodium measurement (moles/volume)     

      123 mmol/L          

                                        135-145        

 

                          Serum or plasma potassium measurement (moles/volume)  

         4.4 mmol/L       

                                        3.6-5.0        

 

                          Serum or plasma chloride measurement (moles/volume)   

        89 mmol/L         

                                                

 

                    Carbon dioxide           14 mmol/L           21-32        

 

                          Serum or plasma anion gap determination (moles/volume)

           20 mmol/L      

                                        5-14        

 

                          Serum or plasma urea nitrogen measurement (mass/volume

)           75 mg/dL      

                                        7-18        

 

                          Serum or plasma creatinine measurement (mass/volume)  

         3.11 mg/dL       

                                        0.60-1.30        

 

                    Serum or plasma urea nitrogen/creatinine mass ratio         

  24                  NRG 

       

 

                                        Serum or plasma creatinine measurement w

ith calculation of estimated glomerular 

filtration rate           15                        NRG        

 

                    Serum or plasma glucose measurement (mass/volume)           

929 mg/dL           

        

 

                    Serum or plasma calcium measurement (mass/volume)           

8.6 mg/dL           

8.5-10.1        

 

                                        Serum or plasma glucose measurement (mas

s/volume) - 20 22:52         

 

                    Serum or plasma glucose measurement (mass/volume)           

710 mg/dL           

        

 

                                        Whole blood basic metabolic panel -  00:31         

 

                          Serum or plasma sodium measurement (moles/volume)     

      127 mmol/L          

                                        135-145        

 

                          Serum or plasma potassium measurement (moles/volume)  

         4.5 mmol/L       

                                        3.6-5.0        

 

                          Serum or plasma chloride measurement (moles/volume)   

        96 mmol/L         

                                                

 

                    Carbon dioxide           17 mmol/L           -32        

 

                          Serum or plasma anion gap determination (moles/volume)

           14 mmol/L      

                                        5-14        

 

                          Serum or plasma urea nitrogen measurement (mass/volume

)           72 mg/dL      

                                        7-18        

 

                          Serum or plasma creatinine measurement (mass/volume)  

         2.74 mg/dL       

                                        0.60-1.30        

 

                    Serum or plasma urea nitrogen/creatinine mass ratio         

  26                  NRG 

       

 

                                        Serum or plasma creatinine measurement w

ith calculation of estimated glomerular 

filtration rate           18                        NRG        

 

                    Serum or plasma glucose measurement (mass/volume)           

641 mg/dL           

        

 

                    Serum or plasma calcium measurement (mass/volume)           

7.8 mg/dL           

8.5-10.1        

 

                                        Capillary blood glucose measurement by g

lucometer (mass/volume) - 20 01:57

         

 

                          Capillary blood glucose measurement by glucometer (mas

s/volume)           470 

mg/dL                                           

 

                                        Complete blood count (CBC) with automate

d white blood cell (WBC) differential - 

20 03:04         

 

                          Blood leukocytes automated count (number/volume)      

     12.4 10*3/uL         

                                        4.3-11.0        

 

                          Blood erythrocytes automated count (number/volume)    

       3.10 10*6/uL       

                                        4.35-5.85        

 

                    Venous blood hemoglobin measurement (mass/volume)           

9.6 g/dL            

11.5-16.0        

 

                    Blood hematocrit (volume fraction)           28 %           

     35-52        

 

                    Automated erythrocyte mean corpuscular volume           89 [

foz_us]           

80-99        

 

                                        Automated erythrocyte mean corpuscular h

emoglobin (mass per erythrocyte)        

                          31 pg                     25-34        

 

                                        Automated erythrocyte mean corpuscular h

emoglobin concentration measurement 

(mass/volume)             35 g/dL                   32-36        

 

                    Automated erythrocyte distribution width ratio           12.

8 %              10.0-

14.5        

 

                    Automated blood platelet count (count/volume)           356 

10*3/uL           

130-400        

 

                          Automated blood platelet mean volume measurement      

     9.6 [foz_us]         

                                        7.4-10.4        

 

                    Automated blood neutrophils/100 leukocytes           68 %   

             42-75       

 

 

                    Automated blood lymphocytes/100 leukocytes           23 %   

             12-44       

 

 

                    Blood monocytes/100 leukocytes           10 %               

 0-12        

 

                    Automated blood eosinophils/100 leukocytes           0 %    

             0-10        

 

                    Automated blood basophils/100 leukocytes           0 %      

           0-10        

 

                    Blood neutrophils automated count (number/volume)           

8.3 10*3            

1.8-7.8        

 

                    Blood lymphocytes automated count (number/volume)           

2.8 10*3            

1.0-4.0        

 

                    Blood monocytes automated count (number/volume)           1.

2 10*3            

0.0-1.0        

 

                    Automated eosinophil count           0.0 10*3/uL           0

.0-0.3        

 

                    Automated blood basophil count (count/volume)           0.0 

10*3/uL           

0.0-0.1        

 

                                        Comprehensive metabolic panel - 20

 03:04         

 

                          Serum or plasma sodium measurement (moles/volume)     

      127 mmol/L          

                                        135-145        

 

                          Serum or plasma potassium measurement (moles/volume)  

         4.6 mmol/L       

                                        3.6-5.0        

 

                          Serum or plasma chloride measurement (moles/volume)   

        98 mmol/L         

                                                

 

                    Carbon dioxide           16 mmol/L           21-32        

 

                          Serum or plasma anion gap determination (moles/volume)

           13 mmol/L      

                                        5-14        

 

                          Serum or plasma urea nitrogen measurement (mass/volume

)           69 mg/dL      

                                        7-18        

 

                          Serum or plasma creatinine measurement (mass/volume)  

         2.60 mg/dL       

                                        0.60-1.30        

 

                    Serum or plasma urea nitrogen/creatinine mass ratio         

  27                  NRG 

       

 

                                        Serum or plasma creatinine measurement w

ith calculation of estimated glomerular 

filtration rate           19                        NRG        

 

                    Serum or plasma glucose measurement (mass/volume)           

480 mg/dL           

        

 

                    Serum or plasma calcium measurement (mass/volume)           

7.7 mg/dL           

8.5-10.1        

 

                          Serum or plasma total bilirubin measurement (mass/volu

me)           0.4 mg/dL   

                                        0.1-1.0        

 

                                        Serum or plasma alkaline phosphatase kim

surement (enzymatic activity/volume)    

                          84 U/L                            

 

                                        Serum or plasma aspartate aminotransfera

se measurement (enzymatic 

activity/volume)           15 U/L                    5-34        

 

                                        Serum or plasma alanine aminotransferase

 measurement (enzymatic activity/volume)

                          21 U/L                    0-55        

 

                    Serum or plasma protein measurement (mass/volume)           

6.0 g/dL            

6.4-8.2        

 

                    Serum or plasma albumin measurement (mass/volume)           

2.7 g/dL            

3.2-4.5        

 

                    CALCIUM CORRECTED           8.7 mg/dL           8.5-10.1    

    

 

                                        Serum or plasma phosphate measurement (m

ass/volume) - 20 03:04         

 

                          Serum or plasma phosphate measurement (mass/volume)   

        5.4 mg/dL         

                                        2.3-4.7        

 

                                        Magnesium - 20 03:04         

 

                    Magnesium           2.0 mg/dL           1.6-2.4        

 

                                        Hemoglobin A1c measurement - 20 03

:04         

 

                    Blood hemoglobin A1C measurement (mass/volume)           12.

0 %              4.0-

5.6        

 

                    MEAN BLOOD GLUCOSE           298 %               <=126      

  

 

                                        Capillary blood glucose measurement by g

lucometer (mass/volume) - 20 03:14

         

 

                          Capillary blood glucose measurement by glucometer (mas

s/volume)           470 

mg/dL                                           

 

                                        Capillary blood glucose measurement by g

lucometer (mass/volume) - 20 04:33

         

 

                          Capillary blood glucose measurement by glucometer (mas

s/volume)           400 

mg/dL                                           

 

                                        Capillary blood glucose measurement by g

lucometer (mass/volume) - 20 05:36

         

 

                          Capillary blood glucose measurement by glucometer (mas

s/volume)           346 

mg/dL                                           

 

                                        Capillary blood glucose measurement by g

lucometer (mass/volume) - 20 06:35

         

 

                          Capillary blood glucose measurement by glucometer (mas

s/volume)           300 

mg/dL                                           

 

                                        Capillary blood glucose measurement by g

lucometer (mass/volume) - 20 07:31

         

 

                          Capillary blood glucose measurement by glucometer (mas

s/volume)           254 

mg/dL                                           

 

                                        Whole blood basic metabolic panel -  07:50         

 

                          Serum or plasma sodium measurement (moles/volume)     

      128 mmol/L          

                                        135-145        

 

                          Serum or plasma potassium measurement (moles/volume)  

         4.8 mmol/L       

                                        3.6-5.0        

 

                          Serum or plasma chloride measurement (moles/volume)   

        100 mmol/L        

                                                

 

                    Carbon dioxide           18 mmol/L           21-32        

 

                          Serum or plasma anion gap determination (moles/volume)

           10 mmol/L      

                                        5-14        

 

                          Serum or plasma urea nitrogen measurement (mass/volume

)           65 mg/dL      

                                        7-18        

 

                          Serum or plasma creatinine measurement (mass/volume)  

         2.53 mg/dL       

                                        0.60-1.30        

 

                    Serum or plasma urea nitrogen/creatinine mass ratio         

  26                  NRG 

       

 

                                        Serum or plasma creatinine measurement w

ith calculation of estimated glomerular 

filtration rate           20                        NRG        

 

                    Serum or plasma glucose measurement (mass/volume)           

215 mg/dL           

        

 

                    Serum or plasma calcium measurement (mass/volume)           

7.9 mg/dL           

8.5-10.1        

 

                                        Capillary blood glucose measurement by g

lucometer (mass/volume) - 20 08:31

         

 

                          Capillary blood glucose measurement by glucometer (mas

s/volume)           230 

mg/dL                                           

 

                                        Capillary blood glucose measurement by g

lucometer (mass/volume) - 20 10:34

         

 

                          Capillary blood glucose measurement by glucometer (mas

s/volume)           169 

mg/dL                                           

 

                                        Capillary blood glucose measurement by g

lucometer (mass/volume) - 20 13:42

         

 

                          Capillary blood glucose measurement by glucometer (mas

s/volume)           120 

mg/dL                                           

 

                                        Capillary blood glucose measurement by g

lucometer (mass/volume) - 06/18/20 15:24

         

 

                          Capillary blood glucose measurement by glucometer (mas

s/volume)           181 

mg/dL                                           

 

                                        Whole blood basic metabolic panel -  16:00         

 

                          Serum or plasma sodium measurement (moles/volume)     

      129 mmol/L          

                                        135-145        

 

                          Serum or plasma potassium measurement (moles/volume)  

         4.7 mmol/L       

                                        3.6-5.0        

 

                          Serum or plasma chloride measurement (moles/volume)   

        101 mmol/L        

                                                

 

                    Carbon dioxide           19 mmol/L           21-32        

 

                          Serum or plasma anion gap determination (moles/volume)

           9 mmol/L       

                                        5-14        

 

                          Serum or plasma urea nitrogen measurement (mass/volume

)           64 mg/dL      

                                        7-18        

 

                          Serum or plasma creatinine measurement (mass/volume)  

         2.42 mg/dL       

                                        0.60-1.30        

 

                    Serum or plasma urea nitrogen/creatinine mass ratio         

  26                  NRG 

       

 

                                        Serum or plasma creatinine measurement w

ith calculation of estimated glomerular 

filtration rate           21                        NRG        

 

                    Serum or plasma glucose measurement (mass/volume)           

202 mg/dL           

        

 

                    Serum or plasma calcium measurement (mass/volume)           

7.9 mg/dL           

8.5-10.1        

 

                                        Capillary blood glucose measurement by g

lucometer (mass/volume) - 20 20:16

         

 

                          Capillary blood glucose measurement by glucometer (mas

s/volume)           338 

mg/dL                                           

 

                                        Capillary blood glucose measurement by g

lucometer (mass/volume) - 20 04:02

         

 

                          Capillary blood glucose measurement by glucometer (mas

s/volume)           45 

mg/dL                                           

 

                                        Complete blood count (CBC) with automate

d white blood cell (WBC) differential - 

20 05:31         

 

                          Blood leukocytes automated count (number/volume)      

     8.9 10*3/uL          

                                        4.3-11.0        

 

                          Blood erythrocytes automated count (number/volume)    

       3.24 10*6/uL       

                                        4.35-5.85        

 

                    Venous blood hemoglobin measurement (mass/volume)           

10.0 g/dL           

11.5-16.0        

 

                    Blood hematocrit (volume fraction)           30 %           

     35-52        

 

                    Automated erythrocyte mean corpuscular volume           92 [

foz_us]           

80-99        

 

                                        Automated erythrocyte mean corpuscular h

emoglobin (mass per erythrocyte)        

                          31 pg                     25-34        

 

                                        Automated erythrocyte mean corpuscular h

emoglobin concentration measurement 

(mass/volume)             34 g/dL                   32-36        

 

                    Automated erythrocyte distribution width ratio           13.

7 %              10.0-

14.5        

 

                    Automated blood platelet count (count/volume)           360 

10*3/uL           

130-400        

 

                          Automated blood platelet mean volume measurement      

     9.5 [foz_us]         

                                        7.4-10.4        

 

                    Automated blood neutrophils/100 leukocytes           67 %   

             42-75       

 

 

                    Automated blood lymphocytes/100 leukocytes           25 %   

             12-44       

 

 

                    Blood monocytes/100 leukocytes           6 %                

 0-12        

 

                    Automated blood eosinophils/100 leukocytes           3 %    

             0-10        

 

                    Automated blood basophils/100 leukocytes           0 %      

           0-10        

 

                    Blood neutrophils automated count (number/volume)           

5.9 10*3            

1.8-7.8        

 

                    Blood lymphocytes automated count (number/volume)           

2.2 10*3            

1.0-4.0        

 

                    Blood monocytes automated count (number/volume)           0.

5 10*3            

0.0-1.0        

 

                    Automated eosinophil count           0.2 10*3/uL           0

.0-0.3        

 

                    Automated blood basophil count (count/volume)           0.0 

10*3/uL           

0.0-0.1        

 

                                        Capillary blood glucose measurement by g

lucometer (mass/volume) - 20 05:36

         

 

                          Capillary blood glucose measurement by glucometer (mas

s/volume)           126 

mg/dL                                           

 

                                        Whole blood basic metabolic panel -  05:51         

 

                          Serum or plasma sodium measurement (moles/volume)     

      137 mmol/L          

                                        135-145        

 

                          Serum or plasma potassium measurement (moles/volume)  

         4.3 mmol/L       

                                        3.6-5.0        

 

                          Serum or plasma chloride measurement (moles/volume)   

        108 mmol/L        

                                                

 

                    Carbon dioxide           17 mmol/L           21-32        

 

                          Serum or plasma anion gap determination (moles/volume)

           12 mmol/L      

                                        5-14        

 

                          Serum or plasma urea nitrogen measurement (mass/volume

)           61 mg/dL      

                                        7-18        

 

                          Serum or plasma creatinine measurement (mass/volume)  

         2.15 mg/dL       

                                        0.60-1.30        

 

                    Serum or plasma urea nitrogen/creatinine mass ratio         

  28                  NRG 

       

 

                                        Serum or plasma creatinine measurement w

ith calculation of estimated glomerular 

filtration rate           24                        NRG        

 

                    Serum or plasma glucose measurement (mass/volume)           

110 mg/dL           

        

 

                    Serum or plasma calcium measurement (mass/volume)           

8.6 mg/dL           

8.5-10.1        

 

                                        Serum or plasma phosphate measurement (m

ass/volume) - 20 05:51         

 

                          Serum or plasma phosphate measurement (mass/volume)   

        4.7 mg/dL         

                                        2.3-4.7        

 

                                        Magnesium - 20 05:51         

 

                    Magnesium           2.2 mg/dL           1.6-2.4        

 

                                        Capillary blood glucose measurement by g

lucometer (mass/volume) - 20 11:07

         

 

                          Capillary blood glucose measurement by glucometer (mas

s/volume)           219 

mg/dL                                           

 

                                        Capillary blood glucose measurement by g

lucometer (mass/volume) - 20 15:56

         

 

                          Capillary blood glucose measurement by glucometer (mas

s/volume)           66 

mg/dL                                           

 

                                        Capillary blood glucose measurement by g

lucometer (mass/volume) - 20 17:10

         

 

                          Capillary blood glucose measurement by glucometer (mas

s/volume)           77 

mg/dL                                           

 

                                        Capillary blood glucose measurement by g

lucometer (mass/volume) - 20 20:17

         

 

                          Capillary blood glucose measurement by glucometer (mas

s/volume)           101 

mg/dL                                           

 

                                        Complete blood count (CBC) with automate

d white blood cell (WBC) differential - 

20 05:24         

 

                          Blood leukocytes automated count (number/volume)      

     5.0 10*3/uL          

                                        4.3-11.0        

 

                          Blood erythrocytes automated count (number/volume)    

       3.37 10*6/uL       

                                        4.35-5.85        

 

                    Venous blood hemoglobin measurement (mass/volume)           

10.2 g/dL           

11.5-16.0        

 

                    Blood hematocrit (volume fraction)           31 %           

     35-52        

 

                    Automated erythrocyte mean corpuscular volume           93 [

foz_us]           

80-99        

 

                                        Automated erythrocyte mean corpuscular h

emoglobin (mass per erythrocyte)        

                          30 pg                     25-34        

 

                                        Automated erythrocyte mean corpuscular h

emoglobin concentration measurement 

(mass/volume)             33 g/dL                   32-36        

 

                    Automated erythrocyte distribution width ratio           13.

8 %              10.0-

14.5        

 

                    Automated blood platelet count (count/volume)           314 

10*3/uL           

130-400        

 

                          Automated blood platelet mean volume measurement      

     9.2 [foz_us]         

                                        7.4-10.4        

 

                    Automated blood neutrophils/100 leukocytes           52 %   

             42-75       

 

 

                    Automated blood lymphocytes/100 leukocytes           39 %   

             12-44       

 

 

                    Blood monocytes/100 leukocytes           5 %                

 0-12        

 

                    Automated blood eosinophils/100 leukocytes           3 %    

             0-10        

 

                    Automated blood basophils/100 leukocytes           1 %      

           0-10        

 

                    Blood neutrophils automated count (number/volume)           

2.6 10*3            

1.8-7.8        

 

                    Blood lymphocytes automated count (number/volume)           

2.0 10*3            

1.0-4.0        

 

                    Blood monocytes automated count (number/volume)           0.

3 10*3            

0.0-1.0        

 

                    Automated eosinophil count           0.2 10*3/uL           0

.0-0.3        

 

                    Automated blood basophil count (count/volume)           0.0 

10*3/uL           

0.0-0.1        

 

                                        Whole blood basic metabolic panel -  05:24         

 

                          Serum or plasma sodium measurement (moles/volume)     

      134 mmol/L          

                                        135-145        

 

                          Serum or plasma potassium measurement (moles/volume)  

         5.1 mmol/L       

                                        3.6-5.0        

 

                          Serum or plasma chloride measurement (moles/volume)   

        104 mmol/L        

                                                

 

                    Carbon dioxide           22 mmol/L           21-32        

 

                          Serum or plasma anion gap determination (moles/volume)

           8 mmol/L       

                                        5-14        

 

                          Serum or plasma urea nitrogen measurement (mass/volume

)           44 mg/dL      

                                        7-18        

 

                          Serum or plasma creatinine measurement (mass/volume)  

         2.11 mg/dL       

                                        0.60-1.30        

 

                    Serum or plasma urea nitrogen/creatinine mass ratio         

  21                  NRG 

       

 

                                        Serum or plasma creatinine measurement w

ith calculation of estimated glomerular 

filtration rate           24                        NRG        

 

                    Serum or plasma glucose measurement (mass/volume)           

456 mg/dL           

        

 

                    Serum or plasma calcium measurement (mass/volume)           

8.4 mg/dL           

8.5-10.1        

 

                                        Capillary blood glucose measurement by g

lucometer (mass/volume) - 20 05:31

         

 

                          Capillary blood glucose measurement by glucometer (mas

s/volume)           463 

mg/dL                                           

 

                                        Capillary blood glucose measurement by g

lucometer (mass/volume) - 20 07:56

         

 

                          Capillary blood glucose measurement by glucometer (mas

s/volume)           338 

mg/dL                                           

 

                                        Capillary blood glucose measurement by g

lucometer (mass/volume) - 20 11:14

         

 

                          Capillary blood glucose measurement by glucometer (mas

s/volume)           272 

mg/dL                                           

 

                                        Capillary blood glucose measurement by g

lucometer (mass/volume) - 20 16:27

         

 

                          Capillary blood glucose measurement by glucometer (mas

s/volume)           174 

mg/dL                                           

 

                                        Capillary blood glucose measurement by g

lucometer (mass/volume) - 20 20:28

         

 

                          Capillary blood glucose measurement by glucometer (mas

s/volume)           149 

mg/dL                                           

 

                                        Complete blood count (CBC) with automate

d white blood cell (WBC) differential - 

20 05:06         

 

                          Blood leukocytes automated count (number/volume)      

     5.7 10*3/uL          

                                        4.3-11.0        

 

                          Blood erythrocytes automated count (number/volume)    

       3.65 10*6/uL       

                                        4.35-5.85        

 

                    Venous blood hemoglobin measurement (mass/volume)           

11.2 g/dL           

11.5-16.0        

 

                    Blood hematocrit (volume fraction)           34 %           

     35-52        

 

                    Automated erythrocyte mean corpuscular volume           94 [

foz_us]           

80-99        

 

                                        Automated erythrocyte mean corpuscular h

emoglobin (mass per erythrocyte)        

                          31 pg                     25-34        

 

                                        Automated erythrocyte mean corpuscular h

emoglobin concentration measurement 

(mass/volume)             33 g/dL                   32-36        

 

                    Automated erythrocyte distribution width ratio           13.

7 %              10.0-

14.5        

 

                    Automated blood platelet count (count/volume)           337 

10*3/uL           

130-400        

 

                          Automated blood platelet mean volume measurement      

     9.3 [foz_us]         

                                        7.4-10.4        

 

                    Automated blood neutrophils/100 leukocytes           49 %   

             42-75       

 

 

                    Automated blood lymphocytes/100 leukocytes           41 %   

             12-44       

 

 

                    Blood monocytes/100 leukocytes           7 %                

 0-12        

 

                    Automated blood eosinophils/100 leukocytes           3 %    

             0-10        

 

                    Automated blood basophils/100 leukocytes           1 %      

           0-10        

 

                    Blood neutrophils automated count (number/volume)           

2.8 10*3            

1.8-7.8        

 

                    Blood lymphocytes automated count (number/volume)           

2.4 10*3            

1.0-4.0        

 

                    Blood monocytes automated count (number/volume)           0.

4 10*3            

0.0-1.0        

 

                    Automated eosinophil count           0.2 10*3/uL           0

.0-0.3        

 

                    Automated blood basophil count (count/volume)           0.0 

10*3/uL           

0.0-0.1        

 

                                        Whole blood basic metabolic panel -  05:06         

 

                          Serum or plasma sodium measurement (moles/volume)     

      135 mmol/L          

                                        135-145        

 

                          Serum or plasma potassium measurement (moles/volume)  

         4.4 mmol/L       

                                        3.6-5.0        

 

                          Serum or plasma chloride measurement (moles/volume)   

        104 mmol/L        

                                                

 

                    Carbon dioxide           22 mmol/L           21-32        

 

                          Serum or plasma anion gap determination (moles/volume)

           9 mmol/L       

                                        5-14        

 

                          Serum or plasma urea nitrogen measurement (mass/volume

)           35 mg/dL      

                                        7-18        

 

                          Serum or plasma creatinine measurement (mass/volume)  

         1.64 mg/dL       

                                        0.60-1.30        

 

                    Serum or plasma urea nitrogen/creatinine mass ratio         

  21                  NRG 

       

 

                                        Serum or plasma creatinine measurement w

ith calculation of estimated glomerular 

filtration rate           32                        NRG        

 

                    Serum or plasma glucose measurement (mass/volume)           

235 mg/dL           

        

 

                    Serum or plasma calcium measurement (mass/volume)           

8.8 mg/dL           

8.5-10.1        

 

                                        Comprehensive metabolic panel - 20

 16:45         

 

                          Serum or plasma sodium measurement (moles/volume)     

      126 mmol/L          

                                        135-145        

 

                          Serum or plasma potassium measurement (moles/volume)  

         4.4 mmol/L       

                                        3.6-5.0        

 

                          Serum or plasma chloride measurement (moles/volume)   

        92 mmol/L         

                                                

 

                    Carbon dioxide           22 mmol/L           21-32        

 

                          Serum or plasma anion gap determination (moles/volume)

           12 mmol/L      

                                        5-14        

 

                          Serum or plasma urea nitrogen measurement (mass/volume

)           37 mg/dL      

                                        7-18        

 

                          Serum or plasma creatinine measurement (mass/volume)  

         2.11 mg/dL       

                                        0.60-1.30        

 

                    Serum or plasma urea nitrogen/creatinine mass ratio         

  18                  NRG 

       

 

                                        Serum or plasma creatinine measurement w

ith calculation of estimated glomerular 

filtration rate           24                        NRG        

 

                    Serum or plasma glucose measurement (mass/volume)           

299 mg/dL           

        

 

                    Serum or plasma calcium measurement (mass/volume)           

9.1 mg/dL           

8.5-10.1        

 

                          Serum or plasma total bilirubin measurement (mass/volu

me)           0.6 mg/dL   

                                        0.1-1.0        

 

                                        Serum or plasma alkaline phosphatase kim

surement (enzymatic activity/volume)    

                          99 U/L                            

 

                                        Serum or plasma aspartate aminotransfera

se measurement (enzymatic 

activity/volume)           16 U/L                    5-34        

 

                                        Serum or plasma alanine aminotransferase

 measurement (enzymatic activity/volume)

                          22 U/L                    0-55        

 

                    Serum or plasma protein measurement (mass/volume)           

7.7 g/dL            

6.4-8.2        

 

                    Serum or plasma albumin measurement (mass/volume)           

3.3 g/dL            

3.2-4.5        

 

                    CALCIUM CORRECTED           9.7 mg/dL           8.5-10.1    

    

 

                                        Complete blood count (CBC) with automate

d white blood cell (WBC) differential - 

20 16:45         

 

                          Blood leukocytes automated count (number/volume)      

     22.3 10*3/uL         

                                        4.3-11.0        

 

                          Blood erythrocytes automated count (number/volume)    

       3.75 10*6/uL       

                                        4.35-5.85        

 

                    Venous blood hemoglobin measurement (mass/volume)           

11.4 g/dL           

11.5-16.0        

 

                    Blood hematocrit (volume fraction)           34 %           

     35-52        

 

                    Automated erythrocyte mean corpuscular volume           91 [

foz_us]           

80-99        

 

                                        Automated erythrocyte mean corpuscular h

emoglobin (mass per erythrocyte)        

                          30 pg                     25-34        

 

                                        Automated erythrocyte mean corpuscular h

emoglobin concentration measurement 

(mass/volume)             33 g/dL                   32-36        

 

                    Automated erythrocyte distribution width ratio           13.

5 %              10.0-

14.5        

 

                    Automated blood platelet count (count/volume)           304 

10*3/uL           

130-400        

 

                          Automated blood platelet mean volume measurement      

     9.4 [foz_us]         

                                        7.4-10.4        

 

                    Automated blood neutrophils/100 leukocytes           78 %   

             42-75       

 

 

                    Automated blood lymphocytes/100 leukocytes           14 %   

             12-44       

 

 

                    Blood monocytes/100 leukocytes           8 %                

 0-12        

 

                    Automated blood eosinophils/100 leukocytes           0 %    

             0-10        

 

                    Automated blood basophils/100 leukocytes           0 %      

           0-10        

 

                    Blood neutrophils automated count (number/volume)           

17.5 10*3           

1.8-7.8        

 

                    Blood lymphocytes automated count (number/volume)           

3.1 10*3            

1.0-4.0        

 

                    Blood monocytes automated count (number/volume)           1.

7 10*3            

0.0-1.0        

 

                    Automated eosinophil count           0.0 10*3/uL           0

.0-0.3        

 

                    Automated blood basophil count (count/volume)           0.0 

10*3/uL           

0.0-0.1        

 

                                        Magnesium - 20 16:45         

 

                    Magnesium           1.7 mg/dL           1.6-2.4        

 

                                        Serum or plasma troponin i.cardiac measu

rement (mass/volume) - 20 16:45   

      

 

                          Serum or plasma troponin i.cardiac measurement (mass/v

olume)           < ng/mL  

                                        <0.028        

 

                                        Myoglobin, serum - 20 16:45       

  

 

                    Myoglobin, serum           42.2 ng/mL           10.0-92.0   

     

 

                                        Beta-hydroxybutyric acid measurement - 0

20 16:45         

 

                    Beta-hydroxybutyric acid measurement           0.63 mmol/L  

         0.00-0.27  

      

 

                                        Manual absolute plasma cell count - 06/3

0/20 16:45         

 

                    Blood monocytes/100 leukocytes           7 %                

 NRG        

 

                    Manual blood segmented neutrophils/100 leukocytes           

72 %                NRG  

      

 

                    Blood band neutrophils/100 leukocytes           5 %         

        NRG        

 

                    Manual blood lymphocytes/100 leukocytes           14 %      

          NRG        

 

                    Manual eosinophils/100 leukocytes in nose           1 %     

            NRG        

 

                    Manual blood basophils/100 leukocytes           1 %         

        NRG        

 

                                        PT panel in platelet poor plasma by coag

ulation assay - 20 16:45         

 

                          Prothrombin time (PT) in platelet poor plasma by coagu

lation assay           

13.3 s                                  12.2-14.7        

 

                          INR in platelet poor plasma or blood by coagulation as

say           1.0         

                                        0.8-1.4        

 

                                        Activated partial thromboplastin time (a

PTT) in platelet poor plasma 

bycoagulation assay - 20 16:45         

 

                                        Activated partial thromboplastin time (a

PTT) in platelet poor plasma 

bycoagulation assay           29 s                      24-35        

 

                                        Fibrin D-dimer FEU measurement in platel

et poor plasma (mass/volume) - 20 

16:45         

 

                          Fibrin D-dimer FEU measurement in platelet poor plasma

 (mass/volume)           

3.76 ug/mL                              0.00-0.49        

 

                                        Serum or plasma lithium measurement (mol

es/volume) - 20 16:45         

 

                    BNP PT              347.3 pg/mL           <100.0        

 

                                        Blood lactic acid measurement (moles/vol

ume) - 20 17:26         

 

                    Blood lactic acid measurement (moles/volume)           0.89 

mmol/L           

0.50-2.00        

 

                                        Complete urinalysis with reflex to cultu

re - 20 18:06         

 

                    Urine color determination           YELLOW              NRG 

       

 

                    Urine clarity determination           CLEAR               NR

G        

 

                    Urine pH measurement by test strip           6.0            

     5-9        

 

                    Specific gravity of urine by test strip           1.020     

          1.016-1.022  

      

 

                    Urine protein assay by test strip, semi-quantitative        

   3+                  

NEGATIVE        

 

                    Urine glucose detection by automated test strip           3+

                  NEGATIVE

        

 

                          Erythrocytes detection in urine sediment by light micr

oscopy           2+       

                                        NEGATIVE        

 

                    Urine ketones detection by automated test strip           NE

GATIVE            

NEGATIVE        

 

                    Urine nitrite detection by test strip           NEGATIVE    

        NEGATIVE    

    

 

                    Urine total bilirubin detection by test strip           NEGA

TIVE            

NEGATIVE        

 

                          Urine urobilinogen measurement by automated test strip

 (mass/volume)           

0.2 mg/dL                               < = 1.0        

 

                    Urine leukocyte esterase detection by dipstick           NEG

ATIVE            

NEGATIVE        

 

                                        Automated urine sediment erythrocyte cou

nt by microscopy (number/high power 

field)                     [HPF]                    NRG        

 

                                        Automated urine sediment leukocyte count

 by microscopy (number/high power field)

                           [HPF]                    NRG        

 

                          Bacteria detection in urine sediment by light microsco

py           TRACE        

                                        NRG        

 

                                        Squamous epithelial cells detection in u

rine sediment by light microscopy       

                          RARE                      NRG        

 

                          Crystals detection in urine sediment by light microsco

py           NONE         

                                        NRG        

 

                    Casts detection in urine sediment by light microscopy       

    NONE                

NRG        

 

                          Mucus detection in urine sediment by light microscopy 

          NEGATIVE        

                                        NRG        

 

                    Complete urinalysis with reflex to culture           NO     

             NRG        

 

                                        Urine drug screening test - 20 18:

06         

 

                    Urine phencyclidine detection by screening method           

NEGATIVE            

NEGATIVE        

 

                          Urine benzodiazepines detection by screening method   

        POSITIVE          

                                        NEGATIVE        

 

                    Urine cocaine detection           NEGATIVE            NEGATI

VE        

 

                    Urine amphetamines detection by screening method           N

EGATIVE            

NEGATIVE        

 

                          Urine methamphetamine detection by screening method   

        NEGATIVE          

                                        NEGATIVE        

 

                    Urine cannabinoids detection by screening method           P

OSITIVE            

NEGATIVE        

 

                    Urine opiates detection by screening method           NEGATI

VE            

NEGATIVE        

 

                    Urine barbiturates detection           NEGATIVE            N

EGATIVE        

 

                          Screening urine tricyclic antidepressants detection   

        NEGATIVE          

                                        NEGATIVE        

 

                    Urine methadone detection by screening method           NEGA

TIVE            

NEGATIVE        

 

                    Urine oxycodone detection           NEGATIVE            NEGA

TIVE        

 

                    Urine propoxyphene detection           NEGATIVE            N

EGATIVE        



                                                                                
                                                                                
                                                                                
                                                                                
                                                                                
                                                                                
                                                                                
                      



Encounters

      



                ACCT No.           Visit Date/Time           Discharge          

 Status         

             Pt. Type           Provider           Facility           Loc./Unit 

          

Complaint        

 

                    [id number]           2020 18:54:00           

020 11:30:00        

                DIS             Outpatient           MISHA DEVRIES MD         

  [address]                       [place]        

 

                    [id number]           02/10/2020 04:00:00           02/15/2

020 13:15:00        

                DIS             Outpatient           CAMPBELL HOANG MD         

  [address]                       [place],METHAMPHETAMINE,RES

P FAILURE   

     

 

                    [id number]           2020 18:13:00           

020 20:32:00        

                DIS             Outpatient           LIAM GAONA        

   [facility]           ER                        KIDNEY PAIN        

 

                    [id number]           2019 09:18:00           

019 14:35:00        

                DIS             Inpatient           MISHA DEVRIES MD          

 Via [facility]           4TH                       [place]        

 

                    [id number]           2019 13:00:00            23:59:59        

                CLS             Preadmit           BRUCE VILLALOBOS MD          

 Via [facility]           RAD                       CKD STAGE 3        

 

                    [id number]           2019 10:30:00           

019 23:59:59        

                CLS             Outpatient           GERMAIN DOFELIZ L     

      Via 

[facility]           LAB                       N18.9        

 

                    [id number]           01/15/2019 17:11:00           01/15/2

019 23:59:59        

                CLS             Outpatient           GERMAIN DO, FELIZ L     

      Via 

[facility]           LAB                       E10.65        

 

                    [id number]           2018 10:23:00           

018 23:59:59        

                CLS             Outpatient           JULY VOSS DO A    

       Via 

[facility]           RAD                       TRAUMA LEFT ELB

OW        

 

                    [id number]           2018 08:02:00           

018 23:59:59        

                CLS             Outpatient           MARCELLUS DO, FELIZ L     

      Via 

[facility]           LAB                       E10.65        

 

                    [id number]           10/11/2017 08:40:00           10/11/2

017 23:59:59        

                CLS             Outpatient           JULY VOSS DO A    

       Via 

[facility]           LAB                       INC LIVER ENZYM

ES, 

HYPERGLYCEMIA        

 

                    [id number]           2017 19:16:00           

017 23:17:00        

                DIS             Emergency           LIAM GAONA         

  [facility]           ER                        VOMITING        

 

                    [id number]           2017 07:38:00           

017 23:59:59        

                CLS             Outpatient           GERMAIN DO, FELIZ L     

      Via 

[facility]           LAB                       E10.65        

 

                    [id number]           2016 08:31:00           

016 23:59:59        

                CLS             Outpatient           GERMAIN DO, FELIZ L     

      Via 

[facility]           LAB                       DIABETES MELLIT

US TYPE 1   

     

 

                    [id number]           2016 09:58:00           

016 23:59:59        

                CLS             Outpatient           GERMAIN DO, FELIZ L     

      Via 

[facility]           LAB                                

 

                    [id number]           2016 09:39:00           

016 23:59:59        

                CLS             Outpatient           NANCY MENDEZ, YU MELENDEZ     

      Via 

[facility]           LAB                       CHRONIC KIDNEY 

DISEASE,DIABETES TYPE I,PROTEINURIA        

 

                    [id number]           2016 15:41:00            18:19:00        

                DIS             Emergency           TIMOTHY MENDEZ, MELODY RUDI      

     Via 

[facility]           ER                        LOW BLOOD SUGAR

,WITHDRAWAL  

      

 

                    [id number]           2016 13:02:00           

016 23:59:59        

                CLS             Outpatient           NANCY MENDEZ, YU MELENDEZ     

      Via 

[facility]           LAB                       LONG TERM MED U

SE, CKD, 

HYPERLIPIDEMIA,        

 

                    [id number]           2015 23:17:00            14:00:00        

                DIS             Inpatient           BIPIN RAMIRES JULY CHULA     

      Via 

[facility]           ICU                       

OVERDOSE,AMS,HYPOTENSION,ACUTE KIDNEY INJURY        

 

                    [id number]           2014 00:41:00           

014 23:59:59        

                CLS             Preadmit           BIPIN RAMIRES JULY CHULA      

     Via 

[facility]           LAB                       DVT        

 

                    [id number]           2014 13:24:00           

014 00:01:00        

                DIS             Outpatient           BIPIN RAMIRES JULY CHULA    

       Via 

[facility]           LAB                       DVT        

 

                    [id number]           2014 13:19:00           

014 23:59:59        

                CLS             Outpatient           FELIZ GERMAIN DO     

      Via 

[facility]           LAB                       DIABETES       

 

 

                    [id number]           2014 11:53:00           

014 23:59:59        

                CLS             Outpatient           NANCY MENDEZ, YU MELENDEZ     

      Via 

[facility]           LAB                       DIABETES,PROTIE

PALOMA, HTN  

      

 

                    [id number]           2014 07:48:00           

014 23:59:59        

                CLS             Outpatient           FELIZ GERMAIN DO     

      Via 

[facility]           LAB                       DM I        

 

                    [id number]           2014 11:10:00           

014 23:59:59        

                CLS             Outpatient           YU CRUZ MD     

      Via 

[facility]           LAB                       DIABETES UNCOMP

L 

TYPEI,PROTEINURIA,BENIGN ESSENTIA        

 

                    [id number]           2014 11:12:00           

014 00:01:00        

                DIS             Outpatient           JULY VOSS DO    

       Via 

[facility]           LAB                       DVT        

 

                    [id number]           2013 08:20:00            23:59:59        

                CLS             Outpatient           JULY VOSS DO    

       Via 

[facility]           RAD                       BREAST LUMP    

    

 

                    [id number]           10/10/2013 08:37:00           10/10/2

013 23:59:59        

                CLS             Outpatient           JULY VOSS DO    

       Via 

[facility]           RAD                       SCREENING      

  

 

                    [id number]           10/09/2013 15:05:00           10/09/2

013 23:59:59        

                CLS             Outpatient           YU CRUZ MD     

      Via 

[facility]           RAD                       CHRONIC KIDNEY 

       

 

                    [id number]           10/03/2013 11:01:00           10/03/2

013 23:59:59        

                CLS             Outpatient           YU CRUZ MD     

      Via 

[facility]           LAB                       ABNORMALITIES O

F BLOOD     

   

 

             [id number]           2020 17:09:00                         

            

Document Registration                                                           

         

 

             [id number]           2015 07:49:00                         

            

Document Registration                                                           

         

 

             [id number]           2015 07:45:00                         

            

Document Registration                                                           

         

 

             [id number]           2013 00:00:00                         

            

Document Registration                                                           

         

 

             [id number]           02/15/2013 06:00:00                         

            

Document Registration                                                           

         

 

             [id number]           2013 12:50:00                         

            

Document Registration                                                           

         

 

             [id number]           2013 12:44:00                         

            

Document Registration                                                           

         

 

             [id number]           2012 11:00:00                         

            

Document Registration                                                           

         

 

             [id number]           2012 08:25:00                         

            

Document Registration                                                           

         

 

             [id number]           2012 11:54:00                         

            

Document Registration                                                           

         

 

             [id number]           2012 11:44:00                         

            

Document Registration                                                           

         

 

             [id number]           2012 12:34:00                         

            

Document Registration                                                           

         

 

             [id number]           2012 08:50:00                         

            

Document Registration                                                           

         

 

             [id number]           2011 14:13:00                         

            

Document Registration                                                           

         

 

             [id number]           2011 10:27:00                         

            

Document Registration                                                           

         

 

             [id number]           10/24/2011 11:35:00                         

            

Document Registration                                                           

         

 

             [id number]           2011 10:06:00                         

            

Document Registration                                                           

         

 

             [id number]           2011 11:54:00                         

            

Document Registration                                                           

         

 

             [id number]           2011 11:35:00                         

            

Document Registration                                                           

         

 

             [id number]           2011 11:51:00                         

            

Document Registration                                                           

         

 

             [id number]           2011 12:55:00                         

            

Document Registration

## 2020-06-30 NOTE — NUR
ASSUMED CARE OF THIS PATIENT AT THIS TIME. PATIENT IS RESTING QUIETLEY IN ROOM 
WITH CALL LIGHT IN REACH. MONITORING MAINTAINED.

## 2020-06-30 NOTE — XMS REPORT
Clinical Summary

                             Created on: 2020



Radha Kelly

External Reference #: [id number]

: 1965

Sex: Female



Demographics





                          Address                   [address] [place]  [address]

 

                          Home Phone                [phone]

 

                          Preferred Language        English

 

                          Marital Status            Unknown

 

                          Caodaism Affiliation     NON

 

                          Race                      Unknown

 

                          Ethnic Group              Not  or 





Author





                          Author                    [facility]

 

                          Organization              [facility]

 

                          Address                   Unknown

 

                          Phone                     Unavailable







Support





                Name            Relationship    Address         Phone

 

                No Contact      [employer]            Unknown         [phone]







Care Team Providers





                    Care Team Member Name Role                Phone

 

                    No Pcp, Na          PCP                 Unavailable







Source Comments

Some departments are not documenting in the electronic medical record.  If you d
o not see the information that you expected, contact Release of Information in [facility] Health Information Management department at [phone] for further assistan
ce in locating additional records.[facility]



Allergies

Not on File



Medications

Not on file



Active Problems





Not on file



Social History





                                        Date



                 Tobacco Use     Types           Packs/Day       Years Used 

 

                                         



                                         Never Assessed    







 



                           Sex Assigned at Birth     Date Recorded

 

 



                                         Not on file 







                                        Industry



                           Job Start Date            Occupation 

 

                                        Not on file



                           Not on file               Not on file 







                                        Travel End



                           Travel History            Travel Start 

 





                                         No recent travel history available.







Last Filed Vital Signs

Not on file



Plan of Treatment





   



                 Health Maintenance  Due Date        Last Done       Comments

 

   



                           HIV SCREENING             1978  

 

   



                           DTAP/TDAP VACCINES (1 -   1981  



                                         Tdap)   

 

   



                           HEPATITIS C SCREENING     1981  

 

   



                           PHYSICAL (COMPREHENSIVE)  1981  



                                         EXAM   

 

   



                           CERVICAL CANCER SCREENING  1984  

 

   



                           BREAST CANCER SCREENING   2003  

 

   



                           COLORECTAL CANCER         2013  



                                         SCREENING   

 

   



                           SHINGLES RECOMBINANT      2013  



                                         VACCINE (1 of 2)   

 

   



                           INFLUENZA VACCINE         10/01/2020  







Results

Not on filefrom Last 3 Months



Insurance





                                        Type



            Payer      Benefit    Subscriber ID  Effective  Phone      Address 



                           Plan /                    Dates   



                                         Group     

 

                                         



                 [facility]          [place]          xxxxxxxxx       4/15/2016-   



                           HEALTH                    Present   



                                         SERVICES     







     



            Guarantor Name  Account    Relation to  Date of    Phone      Augie

g Address



                     Type                Patient             Birth  

 

     



            Radha Kelly  Personal/F  Self       1965  [phone]  [address] 

[facility]               (Home)              [place] [address]







Advance Directives





                          Patient Representative    Explanation



                           Type                      Date Recorded  

 

                                                     



                           Advance                   2016 11:45 AM  



                                         Directive/DPOA

## 2020-07-01 VITALS — DIASTOLIC BLOOD PRESSURE: 80 MMHG | SYSTOLIC BLOOD PRESSURE: 171 MMHG

## 2020-07-01 VITALS — SYSTOLIC BLOOD PRESSURE: 152 MMHG | DIASTOLIC BLOOD PRESSURE: 79 MMHG

## 2020-07-01 VITALS — DIASTOLIC BLOOD PRESSURE: 69 MMHG | SYSTOLIC BLOOD PRESSURE: 173 MMHG

## 2020-07-01 VITALS — DIASTOLIC BLOOD PRESSURE: 80 MMHG | SYSTOLIC BLOOD PRESSURE: 167 MMHG

## 2020-07-01 VITALS — SYSTOLIC BLOOD PRESSURE: 113 MMHG | DIASTOLIC BLOOD PRESSURE: 70 MMHG

## 2020-07-01 VITALS — SYSTOLIC BLOOD PRESSURE: 123 MMHG | DIASTOLIC BLOOD PRESSURE: 65 MMHG

## 2020-07-01 VITALS — DIASTOLIC BLOOD PRESSURE: 67 MMHG | SYSTOLIC BLOOD PRESSURE: 118 MMHG

## 2020-07-01 VITALS — DIASTOLIC BLOOD PRESSURE: 67 MMHG | SYSTOLIC BLOOD PRESSURE: 105 MMHG

## 2020-07-01 VITALS — SYSTOLIC BLOOD PRESSURE: 130 MMHG | DIASTOLIC BLOOD PRESSURE: 71 MMHG

## 2020-07-01 VITALS — SYSTOLIC BLOOD PRESSURE: 199 MMHG | DIASTOLIC BLOOD PRESSURE: 99 MMHG

## 2020-07-01 VITALS — SYSTOLIC BLOOD PRESSURE: 173 MMHG | DIASTOLIC BLOOD PRESSURE: 69 MMHG

## 2020-07-01 VITALS — SYSTOLIC BLOOD PRESSURE: 106 MMHG | DIASTOLIC BLOOD PRESSURE: 66 MMHG

## 2020-07-01 LAB
ALBUMIN SERPL-MCNC: 3 GM/DL (ref 3.2–4.5)
ALP SERPL-CCNC: 91 U/L (ref 40–136)
ALT SERPL-CCNC: 20 U/L (ref 0–55)
ARTERIAL PATENCY WRIST A: POSITIVE
BASE EXCESS STD BLDA CALC-SCNC: -3.9 MMOL/L (ref -2.5–2.5)
BASOPHILS # BLD AUTO: 0 10^3/UL (ref 0–0.1)
BASOPHILS NFR BLD AUTO: 0 % (ref 0–10)
BDY SITE: (no result)
BILIRUB SERPL-MCNC: 0.4 MG/DL (ref 0.1–1)
BODY TEMPERATURE: 39.5
BUN/CREAT SERPL: 16
CALCIUM SERPL-MCNC: 7.9 MG/DL (ref 8.5–10.1)
CHLORIDE SERPL-SCNC: 98 MMOL/L (ref 98–107)
CHOLEST SERPL-MCNC: 240 MG/DL (ref ?–200)
CO2 BLDA CALC-SCNC: 20.4 MMOL/L (ref 21–31)
CO2 SERPL-SCNC: 17 MMOL/L (ref 21–32)
CREAT SERPL-MCNC: 2.21 MG/DL (ref 0.6–1.3)
EOSINOPHIL # BLD AUTO: 0 10^3/UL (ref 0–0.3)
EOSINOPHIL NFR BLD AUTO: 0 % (ref 0–10)
ERYTHROCYTE [DISTWIDTH] IN BLOOD BY AUTOMATED COUNT: 13.6 % (ref 10–14.5)
GFR SERPLBLD BASED ON 1.73 SQ M-ARVRAT: 23 ML/MIN
GLUCOSE SERPL-MCNC: 429 MG/DL (ref 70–105)
HCT VFR BLD CALC: 31 % (ref 35–52)
HDLC SERPL-MCNC: 41 MG/DL (ref 40–60)
HGB BLD-MCNC: 10.1 G/DL (ref 11.5–16)
INHALED O2 FLOW RATE: 10 L/MIN
LYMPHOCYTES # BLD AUTO: 2.7 X 10^3 (ref 1–4)
LYMPHOCYTES NFR BLD AUTO: 13 % (ref 12–44)
MANUAL DIFFERENTIAL PERFORMED BLD QL: NO
MCH RBC QN AUTO: 31 PG (ref 25–34)
MCHC RBC AUTO-ENTMCNC: 33 G/DL (ref 32–36)
MCV RBC AUTO: 93 FL (ref 80–99)
MONOCYTES # BLD AUTO: 1.2 X 10^3 (ref 0–1)
MONOCYTES NFR BLD AUTO: 6 % (ref 0–12)
NEUTROPHILS # BLD AUTO: 16.3 X 10^3 (ref 1.8–7.8)
NEUTROPHILS NFR BLD AUTO: 81 % (ref 42–75)
PCO2 BLDA: 32 MMHG (ref 35–45)
PH BLDA: 7.41 [PH] (ref 7.37–7.43)
PLATELET # BLD: 256 10^3/UL (ref 130–400)
PMV BLD AUTO: 9.4 FL (ref 7.4–10.4)
PO2 BLDA: 82 MMHG (ref 79–93)
POTASSIUM SERPL-SCNC: 4.4 MMOL/L (ref 3.6–5)
PROT SERPL-MCNC: 6.8 GM/DL (ref 6.4–8.2)
SAO2 % BLDA FROM PO2: 94 % (ref 94–100)
SODIUM SERPL-SCNC: 128 MMOL/L (ref 135–145)
TRIGL SERPL-MCNC: 339 MG/DL (ref ?–150)
VENTILATION MODE VENT: NO
VLDLC SERPL CALC-MCNC: 68 MG/DL (ref 5–40)
WBC # BLD AUTO: 20.1 10^3/UL (ref 4.3–11)

## 2020-07-01 RX ADMIN — SODIUM CHLORIDE SCH MLS/HR: 900 INJECTION, SOLUTION INTRAVENOUS at 01:59

## 2020-07-01 RX ADMIN — LORAZEPAM PRN MG: 0.5 TABLET ORAL at 10:06

## 2020-07-01 RX ADMIN — ALBUTEROL SULFATE SCH GM: 90 AEROSOL, METERED RESPIRATORY (INHALATION) at 14:33

## 2020-07-01 RX ADMIN — SODIUM CHLORIDE SCH MLS/HR: 900 INJECTION, SOLUTION INTRAVENOUS at 04:31

## 2020-07-01 RX ADMIN — ENOXAPARIN SODIUM SCH MG: 100 INJECTION SUBCUTANEOUS at 19:36

## 2020-07-01 RX ADMIN — ASPIRIN PRN MG: 325 TABLET ORAL at 16:55

## 2020-07-01 RX ADMIN — SODIUM CHLORIDE SCH MLS/HR: 900 INJECTION, SOLUTION INTRAVENOUS at 22:24

## 2020-07-01 RX ADMIN — METOPROLOL TARTRATE SCH MG: 50 TABLET, FILM COATED ORAL at 10:05

## 2020-07-01 RX ADMIN — ALBUTEROL SULFATE SCH GM: 90 AEROSOL, METERED RESPIRATORY (INHALATION) at 10:29

## 2020-07-01 RX ADMIN — ASPIRIN 81 MG CHEWABLE TABLET SCH MG: 81 TABLET CHEWABLE at 08:37

## 2020-07-01 RX ADMIN — SODIUM CHLORIDE SCH MLS/HR: 900 INJECTION, SOLUTION INTRAVENOUS at 14:45

## 2020-07-01 RX ADMIN — SODIUM CHLORIDE SCH MLS/HR: 900 INJECTION, SOLUTION INTRAVENOUS at 10:08

## 2020-07-01 RX ADMIN — METOPROLOL TARTRATE SCH MG: 50 TABLET, FILM COATED ORAL at 20:53

## 2020-07-01 RX ADMIN — SODIUM CHLORIDE SCH MLS/HR: 900 INJECTION, SOLUTION INTRAVENOUS at 11:33

## 2020-07-01 RX ADMIN — LORAZEPAM PRN MG: 0.5 TABLET ORAL at 17:57

## 2020-07-01 RX ADMIN — ASPIRIN PRN MG: 325 TABLET ORAL at 22:28

## 2020-07-01 RX ADMIN — ACETAMINOPHEN PRN MG: 325 TABLET ORAL at 11:40

## 2020-07-01 RX ADMIN — INSULIN ASPART SCH UNIT: 100 INJECTION, SOLUTION INTRAVENOUS; SUBCUTANEOUS at 04:34

## 2020-07-01 RX ADMIN — ALBUTEROL SULFATE SCH PUFF: 90 AEROSOL, METERED RESPIRATORY (INHALATION) at 17:53

## 2020-07-01 RX ADMIN — VANCOMYCIN HYDROCHLORIDE SCH MLS/HR: 500 INJECTION, POWDER, LYOPHILIZED, FOR SOLUTION INTRAVENOUS at 19:36

## 2020-07-01 RX ADMIN — SODIUM CHLORIDE SCH MLS/HR: 900 INJECTION, SOLUTION INTRAVENOUS at 17:38

## 2020-07-01 RX ADMIN — INSULIN ASPART SCH UNIT: 100 INJECTION, SOLUTION INTRAVENOUS; SUBCUTANEOUS at 20:55

## 2020-07-01 RX ADMIN — INSULIN ASPART SCH UNIT: 100 INJECTION, SOLUTION INTRAVENOUS; SUBCUTANEOUS at 16:58

## 2020-07-01 RX ADMIN — ALBUTEROL SULFATE SCH PUFF: 90 AEROSOL, METERED RESPIRATORY (INHALATION) at 21:13

## 2020-07-01 RX ADMIN — INSULIN ASPART SCH UNIT: 100 INJECTION, SOLUTION INTRAVENOUS; SUBCUTANEOUS at 11:39

## 2020-07-01 RX ADMIN — ACETAMINOPHEN PRN MG: 325 TABLET ORAL at 04:35

## 2020-07-01 NOTE — NUR
PT CALLED THIS RN TO ROOM C/O SEVERE CP, VITALS TAKEN WITH O2 SAT 82% ON ROOM AIR, THIS RN 
CALLED RT TO ROOM AND PT PLACED ON OXY MASK 10L WITH SAT OF 96%. EKG DONE AND THIS RN 
NOTIFIED DR DEVRIES OF PT C/O, VITALS AND CHANGE IN OXYGEN STATUS AT 1805.  ORDERED ABG AND 
TO TRANSFER PT TO ICU.

## 2020-07-01 NOTE — DIAGNOSTIC IMAGING REPORT
INDICATION: Fever and pneumonia.



TIME OF EXAM: 12:39 PM



Correlation is made with prior chest from one day earlier.



FINDINGS: Heart size is stable. Worsening appearance of chest is

noted. There is central congestion. There is development of

airspace infiltrate in the right perihilar region as well as both

lung bases. No effusion or pneumothorax is identified.



IMPRESSION: Developing bilateral airspace pulmonary infiltrates

when compared with examination one day earlier.



Dictated by: 



  Dictated on workstation # [id number]

## 2020-07-01 NOTE — HISTORY & PHYSICAL-HOSPITALIST
History of Present Illness


HPI/Chief Complaint


Pt is a 55yoCF with a PMH of IDDMII, CKD, chronic pain, HTN who presented to the

ER due to right sided chest pain. She states she started feeling poorly a couple

of days ago and it worsened last night. It is made worse by deep breaths and 

with any movement. She was unaware she had a fever until arrival to the ER. She 

was found to have a leukocytosis as well and was admitted for sepsis from 

presumed pneumonia. This morning she states she is still not feeling well and 

can tell she has a fever still. I checked her temperature and it was 38.8. She 

denies any sick contacts but has been in the hospital and at multiple doctors 

offices recently to obtain care.


Source:  patient


Date Seen


20


Time Seen by a Provider:  09:01


Attending Physician


Misha Young MD


PCP


No,Local Physician


Referring Physician





Date of Admission


2020 at 18:35





Home Medications & Allergies


Home Medications


Reviewed patient Home Medication Reconciliation performed by pharmacy medication

reconciliations technician and/or nursing.


Patients Allergies have been reviewed.





Allergies





Allergies


Coded Allergies


  Sulfa (Sulfonamide Antibiotics) (Unverified Allergy, Unknown, 6/25/15)


  codeine (Verified Allergy, Unknown, TAKES ULTRAM AT HOME, 09)


  ketorolac (Verified Allergy, Unknown, 08)


  tramadol (Verified Allergy, Unknown, 11)








Past Medical-Social-Family Hx


Past Med/Social Hx:  Reviewed Nursing Past Med/Soc Hx


Patient Social History


Alcohol Use:  Denies Use


Recreational Drug Use:  Yes


Drug of Choice:  THC


Smoking Status:  Current Everyday Smoker


Type Used:  Cigarettes


2nd Hand Smoke Exposure:  No


Recent Foreign Travel:  No


Contact w/other who traveled:  No


Recent Hopitalizations:  No


Recent Infectious Disease Expo:  No





Immunizations Up To Date


Tetanus Booster (TDap):  Unknown


Date of Pneumonia Vaccine:  Oct 1, 2012


Date of Influenza Vaccine:  Oct 1, 2012





Seasonal Allergies


Seasonal Allergies:  No





Past Medical History


Surgeries:  Adenoidectomy, Ear Surgery, Hysterectomy, Tonsillectomy


Cardiac:  Deep Vein Thrombosis


Neurological:  Neuropathy


Reproductive:  Yes


Female Reproductive Disorders:  Menstrual Problems


Genitourinary:  Renal Failure


Gastrointestinal:  Gastroesophageal Reflux, Pancreatitis, Ulcer


Musculoskeletal:  Arthritis, Rheumatoid Arthritis, Fractures


Endocrine:  Diabetes, Insulin dep


HEENT:  Tonsilitis


Loss of Vision:  Denies


Hearing Impairment:  Denies


Psychosocial:  Personality Disorder


History of Blood Disorders:  No


Adverse Reaction to Blood Ramirez:  No





Family History


Reviewed Nursing Family Hx





FH: thyroid cancer


  G8 SISTER


FHx: macular degeneration


  19 MOTHER


Glaucoma


  G8 BROTHER


Heart Disease, Cancer, Diabetes





Review of Systems


Constitutional:  chills, diaphoresis, fever, malaise, weakness


Respiratory:  cough; No short of breath


Cardiovascular:  chest pain; No palpitations


Gastrointestinal:  abdominal pain (chronic)


Genitourinary:  No dysuria, No frequency


Musculoskeletal:  no symptoms reported


Skin:  no symptoms reported


Psychiatric/Neurological:  Anxiety





Physical Exam


Physical Exam


Vital Signs





Vital Signs - First Documented








 20





 20:43


 


FiO2 21





Capillary Refill : Less Than 3 Seconds


Height, Weight, BMI


Height: 5'5.00"


Weight: 151lbs. 1.0oz. 68.860519pf; 26.10 BMI


Method:Stated


General Appearance:  No Apparent Distress, Anxious, Chronically ill


HEENT:  PERRL/EOMI, Moist Mucous Membranes; No Scleral Icterus (L), No Scleral 

Icterus (R)


Neck:  Normal Inspection, Supple; No Thyromegaly


Respiratory:  No Accessory Muscle Use, Crackles (right lung base); No Wheezing


Cardiovascular:  Regular Rate, Rhythm, No Murmur


Gastrointestinal:  Normal Bowel Sounds, Non Tender, Soft


Extremity:  No Calf Tenderness, No Pedal Edema


Neurologic/Psychiatric:  Alert, Oriented x3; No Aphasia


Skin:  Normal Color, Warm/Dry





Results


Results/Procedures


Labs


Laboratory Tests


20 16:45








20 00:47








Patient resulted labs reviewed.


Imaging:  Reviewed Imaging Report





Assessment/Plan


Admission Diagnosis


Severe Sepsis- present on arrival


Pneumonia


Elevated d-dimer


   Fever with tachycardia and ADITI


   Continue on broad spectrum IV abx due to recent admission


   Satting well on room air


   remains febrile- requests 81mg ASA and ice packs for fever


   COVID19 testing pending


   Await cultures


   Continue lovenox for now until able to due CTA or VQ scan





ADITI on CKD Stage 3


   Continue IVF


   baseline creatinine around 1.6


   


IDDMII


   BS high this morning


   Continue home insulin and increase sliding scale


      had multiple episodes of hypoglycemia during last admission so will 

titrate up cautiously


HTN


HLD


   Continue home meds when med rec available


Admission Status:  Inpatient Order (span 2 midnights)


Reason for Inpatient Admission:  


severe sepsis





Clinical Quality Measures


AMI/AHF:


ASA po Prior to arrival:  No





DVT/VTE Risk/Contraindication:


Risk Factor Score Per Nursin


RFS Level Per Nursing on Admit:  4+=Very High











MISHA YOUNG MD               2020 09:10

## 2020-07-02 VITALS — DIASTOLIC BLOOD PRESSURE: 99 MMHG | SYSTOLIC BLOOD PRESSURE: 166 MMHG

## 2020-07-02 VITALS — DIASTOLIC BLOOD PRESSURE: 64 MMHG | SYSTOLIC BLOOD PRESSURE: 103 MMHG

## 2020-07-02 VITALS — DIASTOLIC BLOOD PRESSURE: 79 MMHG | SYSTOLIC BLOOD PRESSURE: 118 MMHG

## 2020-07-02 VITALS — SYSTOLIC BLOOD PRESSURE: 101 MMHG | DIASTOLIC BLOOD PRESSURE: 63 MMHG

## 2020-07-02 VITALS — DIASTOLIC BLOOD PRESSURE: 67 MMHG | SYSTOLIC BLOOD PRESSURE: 105 MMHG

## 2020-07-02 VITALS — DIASTOLIC BLOOD PRESSURE: 65 MMHG | SYSTOLIC BLOOD PRESSURE: 102 MMHG

## 2020-07-02 VITALS — DIASTOLIC BLOOD PRESSURE: 85 MMHG | SYSTOLIC BLOOD PRESSURE: 139 MMHG

## 2020-07-02 VITALS — SYSTOLIC BLOOD PRESSURE: 128 MMHG | DIASTOLIC BLOOD PRESSURE: 99 MMHG

## 2020-07-02 VITALS — DIASTOLIC BLOOD PRESSURE: 83 MMHG | SYSTOLIC BLOOD PRESSURE: 120 MMHG

## 2020-07-02 VITALS — SYSTOLIC BLOOD PRESSURE: 105 MMHG | DIASTOLIC BLOOD PRESSURE: 64 MMHG

## 2020-07-02 VITALS — SYSTOLIC BLOOD PRESSURE: 129 MMHG | DIASTOLIC BLOOD PRESSURE: 88 MMHG

## 2020-07-02 VITALS — SYSTOLIC BLOOD PRESSURE: 124 MMHG | DIASTOLIC BLOOD PRESSURE: 80 MMHG

## 2020-07-02 VITALS — SYSTOLIC BLOOD PRESSURE: 160 MMHG | DIASTOLIC BLOOD PRESSURE: 78 MMHG

## 2020-07-02 VITALS — SYSTOLIC BLOOD PRESSURE: 137 MMHG | DIASTOLIC BLOOD PRESSURE: 90 MMHG

## 2020-07-02 VITALS — DIASTOLIC BLOOD PRESSURE: 132 MMHG | SYSTOLIC BLOOD PRESSURE: 226 MMHG

## 2020-07-02 VITALS — SYSTOLIC BLOOD PRESSURE: 238 MMHG | DIASTOLIC BLOOD PRESSURE: 139 MMHG

## 2020-07-02 VITALS — SYSTOLIC BLOOD PRESSURE: 134 MMHG | DIASTOLIC BLOOD PRESSURE: 96 MMHG

## 2020-07-02 VITALS — DIASTOLIC BLOOD PRESSURE: 76 MMHG | SYSTOLIC BLOOD PRESSURE: 124 MMHG

## 2020-07-02 LAB
ARTERIAL PATENCY WRIST A: (no result)
ARTERIAL PATENCY WRIST A: POSITIVE
BASE EXCESS STD BLDA CALC-SCNC: -5.8 MMOL/L (ref -2.5–2.5)
BASE EXCESS STD BLDA CALC-SCNC: -5.8 MMOL/L (ref -2.5–2.5)
BDY SITE: (no result)
BDY SITE: (no result)
BODY TEMPERATURE: 38.7
BODY TEMPERATURE: 39.2
BUN/CREAT SERPL: 14
CALCIUM SERPL-MCNC: 8.2 MG/DL (ref 8.5–10.1)
CHLORIDE SERPL-SCNC: 109 MMOL/L (ref 98–107)
CO2 BLDA CALC-SCNC: 19.1 MMOL/L (ref 21–31)
CO2 BLDA CALC-SCNC: 19.7 MMOL/L (ref 21–31)
CO2 SERPL-SCNC: 14 MMOL/L (ref 21–32)
CREAT SERPL-MCNC: 1.95 MG/DL (ref 0.6–1.3)
ERYTHROCYTE [DISTWIDTH] IN BLOOD BY AUTOMATED COUNT: 14 % (ref 10–14.5)
GFR SERPLBLD BASED ON 1.73 SQ M-ARVRAT: 26 ML/MIN
GLUCOSE SERPL-MCNC: 51 MG/DL (ref 70–105)
HCT VFR BLD CALC: 36 % (ref 35–52)
HGB BLD-MCNC: 11.7 G/DL (ref 11.5–16)
INHALED O2 FLOW RATE: (no result) L/MIN
INHALED O2 FLOW RATE: (no result) L/MIN
MAGNESIUM SERPL-MCNC: 1.7 MG/DL (ref 1.6–2.4)
MCH RBC QN AUTO: 31 PG (ref 25–34)
MCHC RBC AUTO-ENTMCNC: 32 G/DL (ref 32–36)
MCV RBC AUTO: 96 FL (ref 80–99)
PCO2 BLDA: 33 MMHG (ref 35–45)
PCO2 BLDA: 37 MMHG (ref 35–45)
PH BLDA: 7.34 [PH] (ref 7.37–7.43)
PH BLDA: 7.37 [PH] (ref 7.37–7.43)
PHOSPHATE SERPL-MCNC: 3.5 MG/DL (ref 2.3–4.7)
PLATELET # BLD: 219 10^3/UL (ref 130–400)
PMV BLD AUTO: 9.4 FL (ref 7.4–10.4)
PO2 BLDA: 148 MMHG (ref 79–93)
PO2 BLDA: 94 MMHG (ref 79–93)
POTASSIUM SERPL-SCNC: 3.8 MMOL/L (ref 3.6–5)
SAO2 % BLDA FROM PO2: 95 % (ref 94–100)
SAO2 % BLDA FROM PO2: 97 % (ref 94–100)
SODIUM SERPL-SCNC: 136 MMOL/L (ref 135–145)
VENTILATION MODE VENT: NO
VENTILATION MODE VENT: NO
WBC # BLD AUTO: 17 10^3/UL (ref 4.3–11)

## 2020-07-02 RX ADMIN — PROMETHAZINE HYDROCHLORIDE PRN MG: 25 INJECTION INTRAMUSCULAR; INTRAVENOUS at 18:01

## 2020-07-02 RX ADMIN — ASPIRIN PRN MG: 325 TABLET ORAL at 03:34

## 2020-07-02 RX ADMIN — SODIUM CHLORIDE SCH MLS/HR: 900 INJECTION, SOLUTION INTRAVENOUS at 09:05

## 2020-07-02 RX ADMIN — ALBUTEROL SULFATE SCH PUFF: 90 AEROSOL, METERED RESPIRATORY (INHALATION) at 21:50

## 2020-07-02 RX ADMIN — PROMETHAZINE HYDROCHLORIDE PRN MG: 25 INJECTION INTRAMUSCULAR; INTRAVENOUS at 01:54

## 2020-07-02 RX ADMIN — ACETAMINOPHEN PRN MG: 325 TABLET ORAL at 16:45

## 2020-07-02 RX ADMIN — INSULIN ASPART SCH UNIT: 100 INJECTION, SOLUTION INTRAVENOUS; SUBCUTANEOUS at 21:10

## 2020-07-02 RX ADMIN — VANCOMYCIN HYDROCHLORIDE SCH MLS/HR: 500 INJECTION, POWDER, LYOPHILIZED, FOR SOLUTION INTRAVENOUS at 20:31

## 2020-07-02 RX ADMIN — SODIUM CHLORIDE SCH MLS/HR: 900 INJECTION, SOLUTION INTRAVENOUS at 01:56

## 2020-07-02 RX ADMIN — ASPIRIN 81 MG CHEWABLE TABLET SCH MG: 81 TABLET CHEWABLE at 08:51

## 2020-07-02 RX ADMIN — ALBUTEROL SULFATE SCH PUFF: 90 AEROSOL, METERED RESPIRATORY (INHALATION) at 15:00

## 2020-07-02 RX ADMIN — SODIUM CHLORIDE SCH MLS/HR: 900 INJECTION, SOLUTION INTRAVENOUS at 17:31

## 2020-07-02 RX ADMIN — INSULIN ASPART SCH UNIT: 100 INJECTION, SOLUTION INTRAVENOUS; SUBCUTANEOUS at 12:02

## 2020-07-02 RX ADMIN — ALBUTEROL SULFATE SCH PUFF: 90 AEROSOL, METERED RESPIRATORY (INHALATION) at 10:15

## 2020-07-02 RX ADMIN — INSULIN ASPART SCH UNIT: 100 INJECTION, SOLUTION INTRAVENOUS; SUBCUTANEOUS at 05:05

## 2020-07-02 RX ADMIN — LORAZEPAM PRN MG: 0.5 TABLET ORAL at 19:25

## 2020-07-02 RX ADMIN — CYCLOBENZAPRINE HYDROCHLORIDE PRN MG: 10 TABLET, FILM COATED ORAL at 20:21

## 2020-07-02 RX ADMIN — ALBUTEROL SULFATE SCH PUFF: 90 AEROSOL, METERED RESPIRATORY (INHALATION) at 07:45

## 2020-07-02 RX ADMIN — ALBUTEROL SULFATE SCH PUFF: 90 AEROSOL, METERED RESPIRATORY (INHALATION) at 19:57

## 2020-07-02 RX ADMIN — ASPIRIN PRN MG: 325 TABLET ORAL at 21:09

## 2020-07-02 RX ADMIN — ACETAMINOPHEN PRN MG: 650 SUPPOSITORY RECTAL at 06:06

## 2020-07-02 RX ADMIN — LORAZEPAM PRN MG: 0.5 TABLET ORAL at 08:51

## 2020-07-02 RX ADMIN — METOPROLOL TARTRATE SCH MG: 50 TABLET, FILM COATED ORAL at 08:50

## 2020-07-02 RX ADMIN — ASPIRIN PRN MG: 325 TABLET ORAL at 13:06

## 2020-07-02 RX ADMIN — ALBUTEROL SULFATE SCH PUFF: 90 AEROSOL, METERED RESPIRATORY (INHALATION) at 01:46

## 2020-07-02 RX ADMIN — INSULIN ASPART SCH UNIT: 100 INJECTION, SOLUTION INTRAVENOUS; SUBCUTANEOUS at 17:31

## 2020-07-02 RX ADMIN — ENOXAPARIN SODIUM SCH MG: 100 INJECTION SUBCUTANEOUS at 20:20

## 2020-07-02 RX ADMIN — LORAZEPAM PRN MG: 2 INJECTION INTRAMUSCULAR; INTRAVENOUS at 04:21

## 2020-07-02 RX ADMIN — METOPROLOL TARTRATE SCH MG: 50 TABLET, FILM COATED ORAL at 20:21

## 2020-07-02 NOTE — PROGRESS NOTE - HOSPITALIST
Subjective


HPI/CC On Admission


Date Seen by Provider:  2020


Time Seen by Provider:  08:25


Pt is a 55yoCF with a PMH of IDDMII, CKD, chronic pain, HTN who presented to the

ER due to right sided chest pain. She states she started feeling poorly a couple

of days ago and it worsened last night. It is made worse by deep breaths and 

with any movement. She was unaware she had a fever until arrival to the ER. She 

was found to have a leukocytosis as well and was admitted for sepsis from 

presumed pneumonia. This morning she states she is still not feeling well and 

can tell she has a fever still. I checked her temperature and it was 38.8. She 

denies any sick contacts but has been in the hospital and at multiple doctors 

offices recently to obtain care.


Subjective/Events-last exam


Reviewed events from last night. Developed acute respiratory distress and 

transferred to unit. Still on BiPAP. Reports still short of breath but no new 

complaints.





Focused Exam


Lactate Level


20 17:26: Lactic Acid Level 0.89


20 05:13: Lactic Acid Level 0.92





Lactic Acid Level





Laboratory Tests








Test


 20


05:13


 


Lactic Acid Level


 0.92 MMOL/L


(0.50-2.00)











Objective


Exam


Vital Signs





Vital Signs








  Date Time  Temp Pulse Resp B/P (MAP) Pulse Ox O2 Delivery O2 Flow Rate FiO2


 


20 07:50  96 20  98  25.00 


 


20 06:06 38.2       


 


20 06:00    105/67 (80)  NIV Bilevel  


 


20 20:43        21





Capillary Refill : Less Than 3 Seconds


General Appearance:  Anxious, Chronically ill


Respiratory:  Crackles, Decreased Breath Sounds, Other (on BiPAP)


Cardiovascular:  No Murmur, Tachycardia


Gastrointestinal:  Normal Bowel Sounds, Non Tender, Soft


Neurologic/Psychiatric:  Alert, Oriented x3





Results/Procedures


Lab


Laboratory Tests


20 03:00








Patient resulted labs reviewed.


Imaging:  Reviewed Imaging Report





Assessment/Plan


Assessment and Plan


Assess & Plan/Chief Complaint


Acute hypoxemic respiratory failure


Severe Sepsis


Pneumonia


Elevated d-dimer


   Continue on broad spectrum IV abx due to recent admission


   Continues to fever, low threshold for broadening abx


   Currently on BiPAP doing ok but low threshold for intubation 


   COVID19 testing negative but repeat pending given high suspicious


   BC with NGTD


   Discussed potential for BAL with Dr Andrade, given tenuous respiratory status 

will defer but if ends up on vent will likely proceed with bronch


   Continue lovenox, therapeutic dose given inability to due CTA or VQ and 

elevated D-Dimer





ADITI on CKD Stage 3


   Continue IVF, creatine improving


   baseline creatinine around 1.6


   


IDDMII


   Hypoglycemic this morning, will decrease basal insulin


   Continue home insulin and increase sliding scale





HTN


HLD


   Mildly hypotensive, hold home meds





Clinical Quality Measures


AMI/AHF:


ASA po Prior to arrival:  No





DVT/VTE Risk/Contraindication:


Risk Factor Score Per Nursin


RFS Level Per Nursing on Admit:  4+=Very High











MISHA DEVRIES MD               2020 08:25

## 2020-07-02 NOTE — NUR
Temp 38.4. Aspirin given as ordered. Pt states that she will not take anymore Tylenol. 
Attempted to educate pt that she needs Tylenol for the fever but pt refuses.

## 2020-07-02 NOTE — NUR
Lasix 40mg and Ativan 2mg administered per Dr Order. Patient 's at this time with 
oxygen sat of 88% oxy mask flush. Resp distress increased. /132.

This writer called RT for BiPap for patient.

## 2020-07-02 NOTE — NUR
THIS NURSE NOTIFIED E-ICU DR MIX THAT PT STILL HAS A FEVER AFTER RECEIVING TYLENOL AND 
ASPIRIN. PT ALSO HAS ICE PACKS IN PLACE. PT IS CONFUSED,TACHY, AND LETHARGIC. E-ICU GAVE 
ORDERS FOR A ONE TIME DOSE OF TYLENOL. E-ICU SAID TO MONITOR TEMP AND TO TREAT IF THEY 
REMAIN ABOVE 40. C.  THIS NURSE AT BEDSIDE TO MONITOR.

## 2020-07-02 NOTE — NUR
Patient in respiratory distress at this time. Call placed to Dr Dotty THOMAS ICU. Patient 
SOA, Tachycardiac, Increased RR, patient very anxious at this time.

Dr Storm entered room with camera to assess the patient. Orders for Lasix and Ativan.

## 2020-07-02 NOTE — NUR
THIS NURSE ENCOURAGED PT TO TAKE TYLENOL FOR FEVER. PT STATES " I FEEL AWFUL. ONLY ASPIRIN 
WORKS FOR ME. I WON'T TAKE TYLENOL." THIS NURSE EDUCATED ON FEVER. PT STILL REFUSED.

## 2020-07-02 NOTE — NUR
Dr Andrade here at this time. Will consider intubation if patient condition warrants. Patient 
/99, RR 30, , Temp 38.7, BiPap oxygen 80%

## 2020-07-02 NOTE — DIAGNOSTIC IMAGING REPORT
INDICATION: Respiratory distress.



Comparison made with prior examination from  07/01/2020



FINDINGS: The heart size is normal. There is a right perihilar

and right upper lobe infiltrate suspect for pneumonia. There is

no pleural effusion or pneumothorax. Mediastinum is unremarkable.



IMPRESSION: Right perihilar and right upper lobe infiltrate

suspect for pneumonia. There may also be some mild such pulmonary

 venous congestion. 



Dictated by: 



  Dictated on workstation # [id number]

## 2020-07-02 NOTE — NUR
THIS NURSE AGAIN EDUCATED PT ON FEVER AND ENCOURAGED HER TO TAKE TYLENOL. PT REFUSED STATING 
"I WANT TO WAIT TO SEE IF THE ASPIRIN WILL WORK."

## 2020-07-02 NOTE — PULMONARY CONSULTATION
History of Present Illness


History of Present Illness


Date Seen by Provider:  Jul 2, 2020


Time Seen by Provider:  04:54


Date of Admission





History of Present Illness





54yo with PMH of IDDMII, CKD, presented to the ER secondary to persistent right 

sided chest pain. Onset was over last 2 days.  While in the ED she was found to 

have a fever and sepsis. Denies any sick contacts but has been in the hospital 

and at multiple doctors offices. I am consulted for pulmonary/ICU management.





Allergies and Home Medications


Allergies


Coded Allergies:  


     Sulfa (Sulfonamide Antibiotics) (Unverified  Allergy, Unknown, 6/25/15)


     codeine (Verified  Allergy, Unknown, TAKES ULTRAM AT HOME, 1/2/09)


     ketorolac (Verified  Allergy, Unknown, 11/19/08)


     tramadol (Verified  Allergy, Unknown, 12/17/11)





Home Medications


Albuterol Sulfate 1 Puff Puff, 2 PUFF IH TID PRN for SHORTNESS OF BREATH, 

(Reported)


Ascorbic Acid 1,000 Mg Tablet, 1,000 MG PO DAILY, (Reported)


Cholecalciferol (Vitamin D3) 25 Mcg Tablet, 25 MCG PO BID, (Reported)


Diclofenac Sodium 100 Gm Gel..gram., 1 APPLIC TOP QID PRN for CRAMPS, (Reported)


   APPLY TO LEGS 


Fluticasone Propionate 9.9 Ml [place].susp, 2 SPRAY NSEACH DAILY, (Reported)


   1 SPRAY EACH NARE DAILY 


Furosemide 40 Mg Tablet, 40 MG PO DAILY, (Reported)


Gabapentin 100 Mg Capsule, 100 MG PO TID, (Reported)


Insulin Detemir 100 Unit/1 Ml Insuln.pen, 22 UNITS SC HS, (Reported)


Insulin Lispro 100 Unit/1 Ml Insuln.pen, 5-15 UNITS SQ TIDWM, (Reported)


Melatonin 10 Mg Tablet, 10 MG PO HS, (Reported)


Metoclopramide HCl 10 Mg Tablet, 10 MG PO QIDACHS, (Reported)


Metoprolol Tartrate 50 Mg Tablet, 50 MG PO BID, (Reported)


Montelukast Sodium 10 Mg Tablet, 10 MG PO HS, (Reported)


   LAST FILLED 01- #15 


Naloxone HCl 4 Mg Spray, 1 SPRAY NS UD PRN for UNRESPONSIVE, (Reported)


   ADMINSTER 1 SPRAY IN 1 NOSTRIL 1 TIME- MAY REPEAT IN ALTERNATING NOSTRIL 

EVERY 2-3 MINUTES UNTIL RESPONSIVE OR EMS ARRIVES 


Omeprazole 20 Mg Capsule.dr, 20 MG PO DAILY, (Reported)


Oxycodone HCl 5 Mg Tablet, 5 MG PO DAILY PRN for PAIN-SEVERE (8-10)


   Prescribed by: MISHA DEVRIES on 6/21/20 1042


Potassium Gluconate 99 Mg Tablet, 99 MG PO DAILY, (Reported)





Past Medical-Social-Family Hx


Past Med/Social Hx:  Reviewed Nursing Past Med/Soc Hx


Patient Social History


Alcohol Use:  Denies Use


Recreational Drug Use:  Yes


Drug of Choice:  THC


Smoking Status:  Current Everyday Smoker


Type Used:  Cigarettes


2nd Hand Smoke Exposure:  No


Recent Foreign Travel:  No


Contact w/Someone Who Travel:  No


Recent Infectious Disease Expo:  No


Recent Hopitalizations:  No





Immunizations Up To Date


Tetanus Booster (TDap):  Unknown


Date of Pneumonia Vaccine:  Oct 1, 2012


Date of Influenza Vaccine:  Oct 1, 2012





Seasonal Allergies


Seasonal Allergies:  No





Past Medical History


Surgeries:  Yes (renal stents)


Adenoidectomy, Ear Surgery, Hysterectomy, Tonsillectomy


Respiratory:  Yes


Asthma, Pneumonia, Chronic Bronchitis


Cardiac:  Yes


Deep Vein Thrombosis


Neurological:  Yes


Neuropathy


Pregnant:  No


Reproductive Disorders:  Yes


Female Reproductive Disorders:  Menstrual Problems


Genitourinary:  Yes


Renal Failure


Gastrointestinal:  Yes (gastroparesis)


Gastroesophageal Reflux, Pancreatitis, Ulcer


Musculoskeletal:  Yes


Arthritis, Rheumatoid Arthritis, Fractures


Endocrine:  Yes


Diabetes, Insulin dep


HEENT:  No


Tonsilitis


Loss of Vision:  Denies


Hearing Impairment:  Denies


Cancer:  No


Psychosocial:  Yes


Personality Disorder


Integumentary:  No


Blood Disorders:  No


Adverse Reaction/Blood Tranf:  No





Family Medical History


Reviewed Nursing Family Hx





FH: thyroid cancer


  G8 SISTER


FHx: macular degeneration


  19 MOTHER


Glaucoma


  G8 BROTHER


Heart Disease, Cancer, Diabetes





Review of Systems


Time Seen by Provider:  07:08


Constitutional:  Fever, Chills, Sweats, Weakness, Malaise, Other


Eyes:  No: Pain, Vision change, Conjunctivae inflammation, Eyelid inflammation, 

Other, Redness


ENT:  No: Ear pain, Ear discharge, Nose pain, Nose discharge, Nose congestion, 

Mouth pain, Mouth swelling, Throat pain, Throat swelling, Other


Respiratory:  Cough, Dry, Shortness of breath, SOB with excertion; No: Wheezing,

Hemoptysis


Cardiovascular:  Chest Pain, Palpitations, Paroxysmal Noc. Dyspnea, Lt [place]

ss


Gastrointestinal:  No: Nausea, Vomiting, Abdominal Pain, Diarrhea, Constipation,

Melena, Hematochezia, Other


Neurological:  Weakness





Sepsis Event


Evaluation


Height, Weight, BMI


Height: 5'5.00"


Weight: 151lbs. 1.0oz. 68.721482yj; 26.10 BMI


Method:Stated





Exam


Exam





Vital Signs








  Date Time  Temp Pulse Resp B/P (MAP) Pulse Ox O2 Delivery O2 Flow Rate FiO2


 


7/2/20 03:32 38.4       


 


7/2/20 03:15     100 Nasal Cannula 2.00 


 


7/2/20 03:00  107 25 128/99 (109) 89 Nasal Cannula 2.00 


 


7/2/20 02:00  97 21 134/96 (109) 98 Nasal Cannula 2.00 


 


7/2/20 01:55 37.1     Nasal Cannula 2.00 


 


7/2/20 01:46     98 Nasal Cannula 2.00 


 


7/2/20 01:00  80      


 


7/2/20 01:00  78 19 105/64 (78) 98 Nasal Cannula 2.00 


 


7/2/20 00:00  81 19 102/65 (77) 98 Nasal Cannula 2.00 


 


7/1/20 23:25     99 Nasal Cannula 2.00 


 


7/1/20 23:20 37.4       


 


7/1/20 23:00  87 20 106/66 (79) 99 Nasal Cannula 2.00 


 


7/1/20 22:26 38.1     Nasal Cannula 2.00 


 


7/1/20 22:00  92 23 113/70 (84) 97 Nasal Cannula 2.00 


 


7/1/20 21:13     98 Nasal Cannula 2.50 


 


7/1/20 21:00  101 14 130/71 (90) 97 Nasal Cannula 2.00 


 


7/1/20 20:00      Nasal Cannula 2.00 


 


7/1/20 20:00  103 20 118/67 (84) 98 Nasal Cannula 2.00 


 


7/1/20 19:40 37.3     OxyMask 5.00 


 


7/1/20 19:30     98 Nasal Cannula 2.00 


 


7/1/20 19:00  110      


 


7/1/20 19:00  108 23 105/67 (80) 98 OxyMask 10.00 


 


7/1/20 18:45  112 48 123/65 (84) 98 OxyMask 10.00 


 


7/1/20 18:30 39.2       


 


7/1/20 17:53     92 Room Air  


 


7/1/20 17:41 39.4 129 22 199/99 (132) 95 OxyMask 10.00 


 


7/1/20 16:56 39.9       


 


7/1/20 15:53 39.4 115 20 167/80 (109) 94 Room Air  


 


7/1/20 14:33     90 Room Air  


 


7/1/20 13:04  102      


 


7/1/20 11:31 38.7 105 20 152/79 (103) 90 Room Air  


 


7/1/20 10:35     96 Room Air  


 


7/1/20 10:06 38.3       


 


7/1/20 08:41 38.8 116   97   


 


7/1/20 08:25     97 Room Air  


 


7/1/20 08:15 38.8 125 16 171/80 (110) 97 Room Air  


 


7/1/20 08:00      Room Air  


 


7/1/20 07:14  112      


 


7/1/20 05:03 39.3       














I & O 


 


 7/2/20





 07:00


 


Intake Total 3820 ml


 


Output Total 745 ml


 


Balance 3075 ml








Height & Weight


Height: 5'5.00"


Weight: 151lbs. 1.0oz. 68.769784xm; 26.10 BMI


Method:Stated


General Appearance:  Anxious, Chronically ill, Severe Distress


HEENT:  PERRL/EOMI, Moist Mucous Membranes; No Scleral Icterus (L), No Scleral 

Icterus (R)


Neck:  Normal Inspection, Supple; No Thyromegaly


Respiratory:  Accessory Muscle Use, Crackles (right lung base), Decreased Breath

Sounds, Respiratory Distress; No Wheezing


Cardiovascular:  Regular Rate, Rhythm, No Murmur


Capillary Refill:  Less Than 3 Seconds


Gastrointestinal:  non tender, soft


Extremity:  No Calf Tenderness, Pedal Edema


Neurologic/Psychiatric:  Alert, Oriented x3; No Aphasia


Skin:  Normal Color, Warm/Dry





Results


Lab


Laboratory Tests


6/30/20 16:45








7/1/20 00:47








7/2/20 03:00











Assessment/Plan


Assessment/Plan


Acute respiratory failure Tm 39.9 


   -40mg of Lasix given per EICU. -- Pt improved after lasix and BIPAP therapy 


   -SL IVF for now 


   -Check ABG and repeat CXR 


   -Pt is now on BiPAP 


   -COVID is neg x 1 


      -repeat COVID testing 


   -Check RVP,  urine strep and legionella ag


   -Continue respiratory isolation 


   -DDimer is elevated. Pt is on theraputic dose Lovenox


      -Unable to r/o PE at this time


Severe Sepsis with PNA


   -Continue Vanco and Zosyn 


Metabolic acidosis 


   -Recheck LA 


ADITI on CKD Stage 3


   Continue IVF


   baseline creatinine around 1.6


   


IDDMII


HTN


HLD











JUAN ASHRAF DO               Jul 2, 2020 04:59

## 2020-07-02 NOTE — NUR
Critical blood glucose of 51 reported by Lab. Bedside finger stick glucose 56. Patient 
alert/oriented, talking to this writer, patient eating pudding and drinking an ensure.

## 2020-07-03 VITALS — SYSTOLIC BLOOD PRESSURE: 135 MMHG | DIASTOLIC BLOOD PRESSURE: 85 MMHG

## 2020-07-03 VITALS — DIASTOLIC BLOOD PRESSURE: 84 MMHG | SYSTOLIC BLOOD PRESSURE: 159 MMHG

## 2020-07-03 VITALS — SYSTOLIC BLOOD PRESSURE: 119 MMHG | DIASTOLIC BLOOD PRESSURE: 82 MMHG

## 2020-07-03 VITALS — DIASTOLIC BLOOD PRESSURE: 62 MMHG | SYSTOLIC BLOOD PRESSURE: 105 MMHG

## 2020-07-03 VITALS — SYSTOLIC BLOOD PRESSURE: 159 MMHG | DIASTOLIC BLOOD PRESSURE: 84 MMHG

## 2020-07-03 VITALS — SYSTOLIC BLOOD PRESSURE: 128 MMHG | DIASTOLIC BLOOD PRESSURE: 87 MMHG

## 2020-07-03 VITALS — SYSTOLIC BLOOD PRESSURE: 100 MMHG | DIASTOLIC BLOOD PRESSURE: 86 MMHG

## 2020-07-03 VITALS — SYSTOLIC BLOOD PRESSURE: 107 MMHG | DIASTOLIC BLOOD PRESSURE: 64 MMHG

## 2020-07-03 VITALS — SYSTOLIC BLOOD PRESSURE: 95 MMHG | DIASTOLIC BLOOD PRESSURE: 60 MMHG

## 2020-07-03 VITALS — DIASTOLIC BLOOD PRESSURE: 78 MMHG | SYSTOLIC BLOOD PRESSURE: 135 MMHG

## 2020-07-03 VITALS — SYSTOLIC BLOOD PRESSURE: 114 MMHG | DIASTOLIC BLOOD PRESSURE: 78 MMHG

## 2020-07-03 VITALS — SYSTOLIC BLOOD PRESSURE: 99 MMHG | DIASTOLIC BLOOD PRESSURE: 61 MMHG

## 2020-07-03 VITALS — SYSTOLIC BLOOD PRESSURE: 162 MMHG | DIASTOLIC BLOOD PRESSURE: 84 MMHG

## 2020-07-03 VITALS — SYSTOLIC BLOOD PRESSURE: 147 MMHG | DIASTOLIC BLOOD PRESSURE: 69 MMHG

## 2020-07-03 VITALS — SYSTOLIC BLOOD PRESSURE: 188 MMHG | DIASTOLIC BLOOD PRESSURE: 115 MMHG

## 2020-07-03 VITALS — SYSTOLIC BLOOD PRESSURE: 113 MMHG | DIASTOLIC BLOOD PRESSURE: 73 MMHG

## 2020-07-03 VITALS — DIASTOLIC BLOOD PRESSURE: 92 MMHG | SYSTOLIC BLOOD PRESSURE: 147 MMHG

## 2020-07-03 VITALS — DIASTOLIC BLOOD PRESSURE: 76 MMHG | SYSTOLIC BLOOD PRESSURE: 126 MMHG

## 2020-07-03 VITALS — SYSTOLIC BLOOD PRESSURE: 118 MMHG | DIASTOLIC BLOOD PRESSURE: 70 MMHG

## 2020-07-03 VITALS — DIASTOLIC BLOOD PRESSURE: 61 MMHG | SYSTOLIC BLOOD PRESSURE: 100 MMHG

## 2020-07-03 VITALS — DIASTOLIC BLOOD PRESSURE: 78 MMHG | SYSTOLIC BLOOD PRESSURE: 122 MMHG

## 2020-07-03 LAB
ALBUMIN SERPL-MCNC: 2.6 GM/DL (ref 3.2–4.5)
ALP SERPL-CCNC: 100 U/L (ref 40–136)
ALT SERPL-CCNC: 17 U/L (ref 0–55)
AMORPH SED URNS QL MICRO: (no result) /LPF
APTT PPP: YELLOW S
ARTERIAL PATENCY WRIST A: (no result)
BACTERIA #/AREA URNS HPF: (no result) /HPF
BASE EXCESS STD BLDA CALC-SCNC: -7.9 MMOL/L (ref -2.5–2.5)
BASOPHILS # BLD AUTO: 0 10^3/UL (ref 0–0.1)
BASOPHILS NFR BLD AUTO: 0 % (ref 0–10)
BDY SITE: (no result)
BILIRUB SERPL-MCNC: 0.3 MG/DL (ref 0.1–1)
BILIRUB UR QL STRIP: (no result)
BODY TEMPERATURE: 39.1
BUN/CREAT SERPL: 13
CALCIUM SERPL-MCNC: 8.4 MG/DL (ref 8.5–10.1)
CHLORIDE SERPL-SCNC: 105 MMOL/L (ref 98–107)
CO2 BLDA CALC-SCNC: 17 MMOL/L (ref 21–31)
CO2 SERPL-SCNC: 17 MMOL/L (ref 21–32)
CREAT SERPL-MCNC: 2.07 MG/DL (ref 0.6–1.3)
EOSINOPHIL # BLD AUTO: 0.2 10^3/UL (ref 0–0.3)
EOSINOPHIL NFR BLD AUTO: 1 % (ref 0–10)
ERYTHROCYTE [DISTWIDTH] IN BLOOD BY AUTOMATED COUNT: 14.1 % (ref 10–14.5)
FIBRINOGEN PPP-MCNC: CLEAR MG/DL
GFR SERPLBLD BASED ON 1.73 SQ M-ARVRAT: 25 ML/MIN
GLUCOSE SERPL-MCNC: 97 MG/DL (ref 70–105)
GLUCOSE UR STRIP-MCNC: (no result) MG/DL
HCT VFR BLD CALC: 31 % (ref 35–52)
HGB BLD-MCNC: 10.2 G/DL (ref 11.5–16)
INHALED O2 FLOW RATE: (no result) L/MIN
KETONES UR QL STRIP: (no result)
LEUKOCYTE ESTERASE UR QL STRIP: NEGATIVE
LYMPHOCYTES # BLD AUTO: 1.8 X 10^3 (ref 1–4)
LYMPHOCYTES NFR BLD AUTO: 11 % (ref 12–44)
MAGNESIUM SERPL-MCNC: 1.7 MG/DL (ref 1.6–2.4)
MANUAL DIFFERENTIAL PERFORMED BLD QL: NO
MCH RBC QN AUTO: 30 PG (ref 25–34)
MCHC RBC AUTO-ENTMCNC: 33 G/DL (ref 32–36)
MCV RBC AUTO: 92 FL (ref 80–99)
MONOCYTES # BLD AUTO: 0.6 X 10^3 (ref 0–1)
MONOCYTES NFR BLD AUTO: 4 % (ref 0–12)
NEUTROPHILS # BLD AUTO: 13 X 10^3 (ref 1.8–7.8)
NEUTROPHILS NFR BLD AUTO: 84 % (ref 42–75)
NITRITE UR QL STRIP: NEGATIVE
PCO2 BLDA: 30 MMHG (ref 35–45)
PH BLDA: 7.36 [PH] (ref 7.37–7.43)
PH UR STRIP: 6 [PH] (ref 5–9)
PHOSPHATE SERPL-MCNC: 3.3 MG/DL (ref 2.3–4.7)
PLATELET # BLD: 269 10^3/UL (ref 130–400)
PMV BLD AUTO: 10.1 FL (ref 7.4–10.4)
PO2 BLDA: 63 MMHG (ref 79–93)
POTASSIUM SERPL-SCNC: 4 MMOL/L (ref 3.6–5)
PROT SERPL-MCNC: 6.6 GM/DL (ref 6.4–8.2)
PROT UR QL STRIP: (no result)
RBC #/AREA URNS HPF: (no result) /HPF
SAO2 % BLDA FROM PO2: 85 % (ref 94–100)
SODIUM SERPL-SCNC: 134 MMOL/L (ref 135–145)
SP GR UR STRIP: 1.02 (ref 1.02–1.02)
VENTILATION MODE VENT: NO
WBC # BLD AUTO: 15.5 10^3/UL (ref 4.3–11)
WBC #/AREA URNS HPF: (no result) /HPF

## 2020-07-03 RX ADMIN — INSULIN ASPART SCH UNIT: 100 INJECTION, SOLUTION INTRAVENOUS; SUBCUTANEOUS at 05:57

## 2020-07-03 RX ADMIN — ALBUTEROL SULFATE SCH GM: 90 AEROSOL, METERED RESPIRATORY (INHALATION) at 15:23

## 2020-07-03 RX ADMIN — INSULIN ASPART SCH UNIT: 100 INJECTION, SOLUTION INTRAVENOUS; SUBCUTANEOUS at 23:57

## 2020-07-03 RX ADMIN — ASPIRIN 81 MG CHEWABLE TABLET SCH MG: 81 TABLET CHEWABLE at 08:29

## 2020-07-03 RX ADMIN — SODIUM CHLORIDE SCH MLS/HR: 900 INJECTION, SOLUTION INTRAVENOUS at 02:38

## 2020-07-03 RX ADMIN — LORAZEPAM PRN MG: 0.5 TABLET ORAL at 08:39

## 2020-07-03 RX ADMIN — ASPIRIN PRN MG: 325 TABLET ORAL at 03:40

## 2020-07-03 RX ADMIN — INSULIN ASPART SCH UNIT: 100 INJECTION, SOLUTION INTRAVENOUS; SUBCUTANEOUS at 11:43

## 2020-07-03 RX ADMIN — ALBUTEROL SULFATE SCH PUFF: 90 AEROSOL, METERED RESPIRATORY (INHALATION) at 03:58

## 2020-07-03 RX ADMIN — DEXMEDETOMIDINE HYDROCHLORIDE SCH MLS/HR: 100 INJECTION, SOLUTION, CONCENTRATE INTRAVENOUS at 16:22

## 2020-07-03 RX ADMIN — ACETAMINOPHEN PRN MG: 325 TABLET ORAL at 03:40

## 2020-07-03 RX ADMIN — PROMETHAZINE HYDROCHLORIDE PRN MG: 25 INJECTION INTRAMUSCULAR; INTRAVENOUS at 14:14

## 2020-07-03 RX ADMIN — METOPROLOL TARTRATE SCH MG: 1 INJECTION, SOLUTION INTRAVENOUS at 23:44

## 2020-07-03 RX ADMIN — ENOXAPARIN SODIUM SCH MG: 100 INJECTION SUBCUTANEOUS at 20:21

## 2020-07-03 RX ADMIN — ACETAMINOPHEN PRN MG: 325 TABLET ORAL at 21:36

## 2020-07-03 RX ADMIN — INSULIN ASPART SCH UNIT: 100 INJECTION, SOLUTION INTRAVENOUS; SUBCUTANEOUS at 17:06

## 2020-07-03 RX ADMIN — ACETAMINOPHEN PRN MG: 325 TABLET ORAL at 14:21

## 2020-07-03 RX ADMIN — SODIUM CHLORIDE SCH MLS/HR: 900 INJECTION INTRAVENOUS at 16:22

## 2020-07-03 RX ADMIN — METOPROLOL TARTRATE SCH MG: 50 TABLET, FILM COATED ORAL at 08:29

## 2020-07-03 RX ADMIN — SODIUM CHLORIDE SCH MLS/HR: 900 INJECTION, SOLUTION INTRAVENOUS at 11:43

## 2020-07-03 RX ADMIN — ALBUTEROL SULFATE SCH PUFF: 90 AEROSOL, METERED RESPIRATORY (INHALATION) at 20:52

## 2020-07-03 RX ADMIN — SODIUM CHLORIDE SCH MLS/HR: 4.5 INJECTION, SOLUTION INTRAVENOUS at 17:18

## 2020-07-03 RX ADMIN — INSULIN ASPART SCH UNIT: 100 INJECTION, SOLUTION INTRAVENOUS; SUBCUTANEOUS at 20:23

## 2020-07-03 RX ADMIN — LORAZEPAM PRN MG: 2 INJECTION INTRAMUSCULAR; INTRAVENOUS at 15:12

## 2020-07-03 RX ADMIN — SODIUM CHLORIDE SCH MLS/HR: 900 INJECTION INTRAVENOUS at 23:44

## 2020-07-03 RX ADMIN — ALBUTEROL SULFATE SCH PUFF: 90 AEROSOL, METERED RESPIRATORY (INHALATION) at 07:08

## 2020-07-03 RX ADMIN — INSULIN ASPART SCH UNIT: 100 INJECTION, SOLUTION INTRAVENOUS; SUBCUTANEOUS at 16:21

## 2020-07-03 NOTE — NUR
Pt c/o pain 9/10, this RN notified Tele-icu. New order received for Morphine 2mg IVP X1 Now. 
This RN tried to administer to patient, patient stated she's allergic to morphine and throws 
up when she takes it. This RN added Morphine to patient's allergy list. 2mg Morphine was 
wasted at this time, witnessed by JERAMIE Gallagher.

## 2020-07-03 NOTE — DIAGNOSTIC IMAGING REPORT
INDICATION: Respiratory distress.



TECHNIQUE: Single AP view of the chest is obtained.



FINDINGS: Since the study one day earlier, there has been further

consolidation in the upper lobe of the right lung with increased

right hilar density. There is mild basilar atelectasis

bilaterally. No pneumothorax is seen.



IMPRESSION: Increasing consolidation in the right upper lobe

which is likely due to worsening pneumonia. There may be central

obstructing lesion and clinical correlation is recommended.

Consideration could be given to bronchoscopy for further

evaluation.



Dictated by: 



  Dictated on workstation # [id number]

## 2020-07-03 NOTE — PROGRESS NOTE - HOSPITALIST
Subjective


HPI/CC On Admission


Date Seen by Provider:  Jul 3, 2020


Time Seen by Provider:  09:15


Pt is a 55yoCF with a PMH of IDDMII, CKD, chronic pain, HTN who presented to the

ER due to right sided chest pain. She states she started feeling poorly a couple

of days ago and it worsened last night. It is made worse by deep breaths and 

with any movement. She was unaware she had a fever until arrival to the ER. She 

was found to have a leukocytosis as well and was admitted for sepsis from 

presumed pneumonia. This morning she states she is still not feeling well and 

can tell she has a fever still. I checked her temperature and it was 38.8. She 

denies any sick contacts but has been in the hospital and at multiple doctors 

offices recently to obtain care.


Subjective/Events-last exam


Patient doing much better


Trying to get to the commode


Corona virus swabs x2 negative will take out of isolation


Much improved lung status will transfer to 4th floor


Monitor closely





Review of Systems


General:  Fatigue


Pulmonary:  Dyspnea


Gastrointestinal:  Constipation





Focused Exam


Lactate Level


20 17:26: Lactic Acid Level 0.89


20 05:13: Lactic Acid Level 0.92








Objective


Exam


Vital Signs





Vital Signs








  Date Time  Temp Pulse Resp B/P (MAP) Pulse Ox O2 Delivery O2 Flow Rate FiO2


 


7/3/20 14:21 39.4       


 


7/3/20 13:33  110   95   


 


7/3/20 12:02      Room Air  


 


7/3/20 12:00   15 159/84 (109)    


 


7/3/20 01:00       1.00 


 


20 08:00        40





Capillary Refill : Less Than 3 Seconds


General Appearance:  Anxious, Chronically ill, Mild Distress


Respiratory:  Chest Non Tender, Lungs Clear, Normal Breath Sounds, No Accessory 

Muscle Use, No Respiratory Distress, Decreased Breath Sounds


Cardiovascular:  Regular Rate, Rhythm, No Edema, No Gallop, No JVD, No Murmur, 

Normal Peripheral Pulses


Extremity:  Normal Capillary Refill, Normal Inspection, Normal Range of Motion, 

Non Tender, No Calf Tenderness, No Pedal Edema


Neurologic/Psychiatric:  Alert, Oriented x3, No Motor/Sensory Deficits, Normal 

Mood/Affect





Results/Procedures


Lab


Laboratory Tests


7/3/20 02:50








Patient resulted labs reviewed.


Imaging:  Reviewed Imaging Report





Assessment/Plan


Assessment and Plan


Assess & Plan/Chief Complaint


Assessment:


Respiratory insufficiency


PNA   


COVID-19 negative x2 swabs


Constipation


Anxiety











Plan:


Transfer out of ICU


Continue antibiotics


Bowel regimen





Diagnosis/Problems


Diagnosis/Problems





(1) Aspiration pneumonia


(2) Acute kidney injury superimposed on chronic kidney disease


Status:  Acute


(3) Leukocytosis


Status:  Acute


(4) Insulin dependent diabetes mellitus


Status:  Chronic


(5) Chest wall pain


Status:  Acute


(6) Hypotension


Status:  Acute


(7) Hypertension


Status:  Chronic


(8) Altered mental status


Status:  Acute





Clinical Quality Measures


AMI/AHF:


ASA po Prior to arrival:  No





DVT/VTE Risk/Contraindication:


Risk Factor Score Per Nursin


RFS Level Per Nursing on Admit:  4+=Very High











VIRAJ FAULKNER DO                 Jul 3, 2020 11:21

## 2020-07-03 NOTE — NUR
This RN into room to speak with pt about transferring to 4th floor. Noticed pt sitting in 
bed, tachypneic, using neck muscles et abd to assist breaths. HR 150s, BP 180s/110s, O2 sat 
88% on room air. Pt alert and oriented. eICU called, ABG et CXR obtained. Pt placed on BiPAP 
25%

## 2020-07-03 NOTE — NUR
This RN to room, temp 39.3, pt educated on fever, Tylenol offered. Pt refused, stating that 
she will use ice packs and wet washcloths to "start the process" of reducing her fever.  Pt 
again educated, therapeutic communication attempted, pt again refused Tylenol and aspirin, 
stating, "sometimes if you talk to your body, you get the results you want." More education 
attempted, ice packs placed, pt wiped down with cool, wet washcloth. Will continue to 
monitor.

## 2020-07-03 NOTE — NUR
This RN to room, temp 40.3. Fever-reducing medicine offered again, pt refused again.  Pt 
given the option of taking aspirin and Tylenol now, or waiting until the fever increases and 
seizure occurs, then getting the medication rectally. Pt opted to take po meds. Will 
continue to monitor.

## 2020-07-03 NOTE — NUR
RD ASSESSMENT 



PMHx: 

DM; CKD; DVT; GERD; pancreatitis; hx of DKA



PT INTERACTION: 

Pt was awake and pleasant during nutrition assessment. Pt states current appetite is not 
good, but it's getting better. Note avg PO intake 69% x2d, per chart review. Pt states 
following a regular diet at home, and has no issues with chewing/swallowing food. Note pt 
has missing teeth, per visual assessment. Pt states recent issues with nausea and vomiting. 
Pt states no recent issues with constipation or diarrhea, and that her last BM was "before I 
got here." Note pt not currently on bowel regimen per chart review. Pt states unsure of 
recent wt changes. Note recent 18# wt gain x3w per chart review. Pt states current DM 
management is "getting better." Note recent HbA1c of 12.0 taken on 6/18/20, per chart 
review. Note recent DM education given on 6/18/20 and 2/14/20, per chart review. 



ABNORMAL NUTRITION-RELATED LAB VALUES

LOW: Na 134; Ca 8.4; alb 2.8

HIGH: BUN 26; cr 2.07



Est. kcal needs: 8187-2665 kcal | 20-25 kcal/kg 

Est. Pro needs:  65-82 g Pro | 0.8-1.0 g Pro/kg 



PES STATEMENT: 

Inadequate oral intake (NI-2.1) related to loss of appetite | nausea | vomiting as evidenced 
by pt interview | avg PO intake 69% x2d



INTERVENTION:  

Continue with current diet order of CHO 60g/m 1snack diet. 

Pt may benefit from nutrition supplementation if PO intake declines. 

Encouraged pt to continue with DM management. 

Will continue to follow and reassess as pt needs, intake, and status change. 



MONITOR/EVALUATE:  

PO Intake; Plan of Care; Hydration Status; Weight Status; Lab Values 





NIKKI Lao, MS, RD, LD

## 2020-07-04 VITALS — SYSTOLIC BLOOD PRESSURE: 128 MMHG | DIASTOLIC BLOOD PRESSURE: 66 MMHG

## 2020-07-04 VITALS — SYSTOLIC BLOOD PRESSURE: 121 MMHG | DIASTOLIC BLOOD PRESSURE: 74 MMHG

## 2020-07-04 VITALS — DIASTOLIC BLOOD PRESSURE: 64 MMHG | SYSTOLIC BLOOD PRESSURE: 122 MMHG

## 2020-07-04 VITALS — SYSTOLIC BLOOD PRESSURE: 103 MMHG | DIASTOLIC BLOOD PRESSURE: 50 MMHG

## 2020-07-04 VITALS — SYSTOLIC BLOOD PRESSURE: 129 MMHG | DIASTOLIC BLOOD PRESSURE: 69 MMHG

## 2020-07-04 VITALS — SYSTOLIC BLOOD PRESSURE: 125 MMHG | DIASTOLIC BLOOD PRESSURE: 70 MMHG

## 2020-07-04 VITALS — DIASTOLIC BLOOD PRESSURE: 78 MMHG | SYSTOLIC BLOOD PRESSURE: 127 MMHG

## 2020-07-04 VITALS — DIASTOLIC BLOOD PRESSURE: 64 MMHG | SYSTOLIC BLOOD PRESSURE: 118 MMHG

## 2020-07-04 VITALS — DIASTOLIC BLOOD PRESSURE: 69 MMHG | SYSTOLIC BLOOD PRESSURE: 125 MMHG

## 2020-07-04 VITALS — DIASTOLIC BLOOD PRESSURE: 66 MMHG | SYSTOLIC BLOOD PRESSURE: 119 MMHG

## 2020-07-04 VITALS — SYSTOLIC BLOOD PRESSURE: 129 MMHG | DIASTOLIC BLOOD PRESSURE: 70 MMHG

## 2020-07-04 VITALS — DIASTOLIC BLOOD PRESSURE: 62 MMHG | SYSTOLIC BLOOD PRESSURE: 113 MMHG

## 2020-07-04 VITALS — SYSTOLIC BLOOD PRESSURE: 123 MMHG | DIASTOLIC BLOOD PRESSURE: 65 MMHG

## 2020-07-04 VITALS — SYSTOLIC BLOOD PRESSURE: 103 MMHG | DIASTOLIC BLOOD PRESSURE: 59 MMHG

## 2020-07-04 VITALS — DIASTOLIC BLOOD PRESSURE: 69 MMHG | SYSTOLIC BLOOD PRESSURE: 129 MMHG

## 2020-07-04 VITALS — SYSTOLIC BLOOD PRESSURE: 99 MMHG | DIASTOLIC BLOOD PRESSURE: 56 MMHG

## 2020-07-04 VITALS — DIASTOLIC BLOOD PRESSURE: 79 MMHG | SYSTOLIC BLOOD PRESSURE: 142 MMHG

## 2020-07-04 VITALS — DIASTOLIC BLOOD PRESSURE: 82 MMHG | SYSTOLIC BLOOD PRESSURE: 154 MMHG

## 2020-07-04 VITALS — SYSTOLIC BLOOD PRESSURE: 113 MMHG | DIASTOLIC BLOOD PRESSURE: 59 MMHG

## 2020-07-04 VITALS — SYSTOLIC BLOOD PRESSURE: 142 MMHG | DIASTOLIC BLOOD PRESSURE: 79 MMHG

## 2020-07-04 VITALS — SYSTOLIC BLOOD PRESSURE: 109 MMHG | DIASTOLIC BLOOD PRESSURE: 59 MMHG

## 2020-07-04 VITALS — SYSTOLIC BLOOD PRESSURE: 121 MMHG | DIASTOLIC BLOOD PRESSURE: 68 MMHG

## 2020-07-04 VITALS — DIASTOLIC BLOOD PRESSURE: 61 MMHG | SYSTOLIC BLOOD PRESSURE: 125 MMHG

## 2020-07-04 VITALS — DIASTOLIC BLOOD PRESSURE: 82 MMHG | SYSTOLIC BLOOD PRESSURE: 118 MMHG

## 2020-07-04 VITALS — DIASTOLIC BLOOD PRESSURE: 68 MMHG | SYSTOLIC BLOOD PRESSURE: 127 MMHG

## 2020-07-04 VITALS — SYSTOLIC BLOOD PRESSURE: 128 MMHG | DIASTOLIC BLOOD PRESSURE: 63 MMHG

## 2020-07-04 VITALS — DIASTOLIC BLOOD PRESSURE: 63 MMHG | SYSTOLIC BLOOD PRESSURE: 102 MMHG

## 2020-07-04 VITALS — DIASTOLIC BLOOD PRESSURE: 67 MMHG | SYSTOLIC BLOOD PRESSURE: 111 MMHG

## 2020-07-04 LAB
ARTERIAL PATENCY WRIST A: (no result)
ARTERIAL PATENCY WRIST A: (no result)
BASE EXCESS STD BLDA CALC-SCNC: -1.9 MMOL/L (ref -2.5–2.5)
BASE EXCESS STD BLDA CALC-SCNC: -2.3 MMOL/L (ref -2.5–2.5)
BASE EXCESS STD BLDA CALC-SCNC: -2.7 MMOL/L (ref -2.5–2.5)
BASOPHILS # BLD AUTO: 0 10^3/UL (ref 0–0.1)
BASOPHILS NFR BLD AUTO: 0 % (ref 0–10)
BDY SITE: (no result)
BODY TEMPERATURE: 37.7
BODY TEMPERATURE: 39.4
BODY TEMPERATURE: 40
BUN/CREAT SERPL: 14
CALCIUM SERPL-MCNC: 8.4 MG/DL (ref 8.5–10.1)
CHLORIDE SERPL-SCNC: 104 MMOL/L (ref 98–107)
CO2 BLDA CALC-SCNC: 21.2 MMOL/L (ref 21–31)
CO2 BLDA CALC-SCNC: 21.6 MMOL/L (ref 21–31)
CO2 BLDA CALC-SCNC: 21.9 MMOL/L (ref 21–31)
CO2 SERPL-SCNC: 16 MMOL/L (ref 21–32)
CREAT SERPL-MCNC: 2.11 MG/DL (ref 0.6–1.3)
EOSINOPHIL # BLD AUTO: 0.1 10^3/UL (ref 0–0.3)
EOSINOPHIL NFR BLD AUTO: 1 % (ref 0–10)
ERYTHROCYTE [DISTWIDTH] IN BLOOD BY AUTOMATED COUNT: 13.7 % (ref 10–14.5)
GFR SERPLBLD BASED ON 1.73 SQ M-ARVRAT: 24 ML/MIN
GLUCOSE SERPL-MCNC: 171 MG/DL (ref 70–105)
HCT VFR BLD CALC: 26 % (ref 35–52)
HGB BLD-MCNC: 8.7 G/DL (ref 11.5–16)
INHALED O2 FLOW RATE: (no result) L/MIN
LYMPHOCYTES # BLD AUTO: 1 X 10^3 (ref 1–4)
LYMPHOCYTES NFR BLD AUTO: 6 % (ref 12–44)
MAGNESIUM SERPL-MCNC: 1.7 MG/DL (ref 1.6–2.4)
MANUAL DIFFERENTIAL PERFORMED BLD QL: NO
MCH RBC QN AUTO: 30 PG (ref 25–34)
MCHC RBC AUTO-ENTMCNC: 33 G/DL (ref 32–36)
MCV RBC AUTO: 91 FL (ref 80–99)
MONOCYTES # BLD AUTO: 0.6 X 10^3 (ref 0–1)
MONOCYTES NFR BLD AUTO: 4 % (ref 0–12)
NEUTROPHILS # BLD AUTO: 14.5 X 10^3 (ref 1.8–7.8)
NEUTROPHILS NFR BLD AUTO: 89 % (ref 42–75)
PCO2 BLDA: 30 MMHG (ref 35–45)
PCO2 BLDA: 30 MMHG (ref 35–45)
PCO2 BLDA: 33 MMHG (ref 35–45)
PH BLDA: 7.42 [PH] (ref 7.37–7.43)
PH BLDA: 7.46 [PH] (ref 7.37–7.43)
PH BLDA: 7.46 [PH] (ref 7.37–7.43)
PHOSPHATE SERPL-MCNC: 2.4 MG/DL (ref 2.3–4.7)
PLATELET # BLD: 280 10^3/UL (ref 130–400)
PMV BLD AUTO: 10.6 FL (ref 7.4–10.4)
PO2 BLDA: 189 MMHG (ref 79–93)
PO2 BLDA: 239 MMHG (ref 79–93)
PO2 BLDA: 95 MMHG (ref 79–93)
POTASSIUM SERPL-SCNC: 4.1 MMOL/L (ref 3.6–5)
SAO2 % BLDA FROM PO2: 95 % (ref 94–100)
SAO2 % BLDA FROM PO2: 98 % (ref 94–100)
SAO2 % BLDA FROM PO2: 98 % (ref 94–100)
SODIUM SERPL-SCNC: 133 MMOL/L (ref 135–145)
VENTILATION MODE VENT: NO
VENTILATION MODE VENT: NO
VENTILATION MODE VENT: YES
WBC # BLD AUTO: 16.2 10^3/UL (ref 4.3–11)

## 2020-07-04 PROCEDURE — 0BH17EZ INSERTION OF ENDOTRACHEAL AIRWAY INTO TRACHEA, VIA NATURAL OR ARTIFICIAL OPENING: ICD-10-PCS | Performed by: INTERNAL MEDICINE

## 2020-07-04 PROCEDURE — 5A1945Z RESPIRATORY VENTILATION, 24-96 CONSECUTIVE HOURS: ICD-10-PCS | Performed by: INTERNAL MEDICINE

## 2020-07-04 PROCEDURE — 02HV33Z INSERTION OF INFUSION DEVICE INTO SUPERIOR VENA CAVA, PERCUTANEOUS APPROACH: ICD-10-PCS | Performed by: EMERGENCY MEDICINE

## 2020-07-04 RX ADMIN — FENTANYL CITRATE SCH MLS/HR: 50 INJECTION, SOLUTION INTRAMUSCULAR; INTRAVENOUS at 10:33

## 2020-07-04 RX ADMIN — MAGNESIUM SULFATE IN DEXTROSE SCH MLS/HR: 10 INJECTION, SOLUTION INTRAVENOUS at 08:46

## 2020-07-04 RX ADMIN — INSULIN ASPART SCH UNIT: 100 INJECTION, SOLUTION INTRAVENOUS; SUBCUTANEOUS at 23:19

## 2020-07-04 RX ADMIN — SODIUM CHLORIDE SCH MLS/HR: 4.5 INJECTION, SOLUTION INTRAVENOUS at 06:33

## 2020-07-04 RX ADMIN — INSULIN ASPART SCH UNIT: 100 INJECTION, SOLUTION INTRAVENOUS; SUBCUTANEOUS at 12:19

## 2020-07-04 RX ADMIN — ACETAMINOPHEN PRN MG: 325 TABLET ORAL at 16:39

## 2020-07-04 RX ADMIN — DEXTROSE MONOHYDRATE PRN ML: 25 INJECTION, SOLUTION INTRAVENOUS at 14:11

## 2020-07-04 RX ADMIN — METOPROLOL TARTRATE SCH MG: 1 INJECTION, SOLUTION INTRAVENOUS at 17:55

## 2020-07-04 RX ADMIN — ALBUTEROL SULFATE SCH PUFF: 90 AEROSOL, METERED RESPIRATORY (INHALATION) at 13:51

## 2020-07-04 RX ADMIN — METOPROLOL TARTRATE SCH MG: 1 INJECTION, SOLUTION INTRAVENOUS at 12:18

## 2020-07-04 RX ADMIN — DEXMEDETOMIDINE HYDROCHLORIDE SCH MLS/HR: 100 INJECTION, SOLUTION, CONCENTRATE INTRAVENOUS at 09:40

## 2020-07-04 RX ADMIN — SODIUM CHLORIDE SCH MLS/HR: 900 INJECTION INTRAVENOUS at 16:38

## 2020-07-04 RX ADMIN — PROPOFOL SCH MLS/HR: 10 INJECTION, EMULSION INTRAVENOUS at 12:20

## 2020-07-04 RX ADMIN — DEXMEDETOMIDINE HYDROCHLORIDE SCH MLS/HR: 100 INJECTION, SOLUTION, CONCENTRATE INTRAVENOUS at 19:31

## 2020-07-04 RX ADMIN — SODIUM CHLORIDE SCH MLS/HR: 900 INJECTION INTRAVENOUS at 09:40

## 2020-07-04 RX ADMIN — SODIUM CHLORIDE SCH MLS/HR: 900 INJECTION INTRAVENOUS at 23:25

## 2020-07-04 RX ADMIN — SODIUM CHLORIDE SCH MLS/HR: 4.5 INJECTION, SOLUTION INTRAVENOUS at 15:32

## 2020-07-04 RX ADMIN — POTASSIUM CHLORIDE SCH MEQ: 1500 TABLET, EXTENDED RELEASE ORAL at 06:00

## 2020-07-04 RX ADMIN — METOPROLOL TARTRATE SCH MG: 1 INJECTION, SOLUTION INTRAVENOUS at 06:00

## 2020-07-04 RX ADMIN — LORAZEPAM PRN MG: 2 INJECTION INTRAMUSCULAR; INTRAVENOUS at 05:38

## 2020-07-04 RX ADMIN — METOPROLOL TARTRATE SCH MG: 1 INJECTION, SOLUTION INTRAVENOUS at 23:25

## 2020-07-04 RX ADMIN — SODIUM CHLORIDE SCH MLS/HR: 900 INJECTION, SOLUTION INTRAVENOUS at 09:40

## 2020-07-04 RX ADMIN — ALBUTEROL SULFATE SCH PUFF: 90 AEROSOL, METERED RESPIRATORY (INHALATION) at 22:30

## 2020-07-04 RX ADMIN — PROPOFOL SCH MLS/HR: 10 INJECTION, EMULSION INTRAVENOUS at 19:30

## 2020-07-04 RX ADMIN — INSULIN ASPART SCH UNIT: 100 INJECTION, SOLUTION INTRAVENOUS; SUBCUTANEOUS at 06:00

## 2020-07-04 RX ADMIN — POTASSIUM CHLORIDE SCH MLS/HR: 200 INJECTION, SOLUTION INTRAVENOUS at 06:00

## 2020-07-04 RX ADMIN — ASPIRIN 81 MG CHEWABLE TABLET SCH MG: 81 TABLET CHEWABLE at 10:24

## 2020-07-04 RX ADMIN — ENOXAPARIN SODIUM SCH MG: 100 INJECTION SUBCUTANEOUS at 20:36

## 2020-07-04 RX ADMIN — ACETAMINOPHEN PRN MG: 650 SUPPOSITORY RECTAL at 04:17

## 2020-07-04 RX ADMIN — PANTOPRAZOLE SODIUM SCH MG: 40 INJECTION, POWDER, FOR SOLUTION INTRAVENOUS at 09:40

## 2020-07-04 RX ADMIN — ALBUTEROL SULFATE SCH PUFF: 90 AEROSOL, METERED RESPIRATORY (INHALATION) at 09:57

## 2020-07-04 RX ADMIN — SODIUM CHLORIDE SCH MLS/HR: 4.5 INJECTION, SOLUTION INTRAVENOUS at 03:00

## 2020-07-04 RX ADMIN — ALBUTEROL SULFATE SCH PUFF: 90 AEROSOL, METERED RESPIRATORY (INHALATION) at 03:10

## 2020-07-04 RX ADMIN — INSULIN ASPART SCH UNIT: 100 INJECTION, SOLUTION INTRAVENOUS; SUBCUTANEOUS at 17:55

## 2020-07-04 NOTE — NUR
THIS RN NOTIFIED TELE-ICU OF PATIENT'S SPIKE IN TEMPERATURE OF 40.0 CELCIUS (104.0 
FAHRENHEIT), HR 'S, INCREASED BP /82, RR 22, 02 96% ON BIPAP WITH SETTINGS OF: 
FIO2 OF 25%, 16/6 WITH SET RATE OF 16.  

NEW ORDER RECEIVED TO INCREASE BIPAP FIO2 TO 40% AT THIS TIME. THIS RN NOTIFIED TELE ICU 
THAT PATIENT IS STATING SHE "CANNOT BREATHE", PATIENT IS SITTING UP IN BED TRYING TO TAKE OF 
BIPAP. TELE-ICU DOCTOR ASSESSING PATIENT VIA BEDSIDE CAMERA. THIS RN ADMINISTERED 650MG 
TYLENOL SUPPOSITORY AND APPLIED COOLING MEASURES TO PATIENT VIA ICE PACKS TO BILATERAL 
GROIN, BILATERAL AXILLARY, BACK AND COOLING BLANKET TO LEGS. THIS RN WILL CONTINUE TO 
MONITOR.

## 2020-07-04 NOTE — NUR
PT HAS TWO CONSECUTIVE FSBS LESS THAN 60 DESPITE ADMINISTRATION OF D50.  E-ICU PROVIDER 
CALLED THIS RN AND ORDERS OBTAINED TO START TUBE FEEDINGS AND CHANGE BICARB TO D51/2NS TO 
HELP HYPOGLYCEMIA.  THIS RN HAS UPDATED PTS DAUGHTER, ZACARIAS OF PATIENTS CONDITION VIA 
TELEPHONE.

## 2020-07-04 NOTE — NUR
TIMELINE NOTE BELOW: 

0640- THIS RN NOTIFIED PATIENT'S MOM-MAGALIS AND DAUGHTER-ZACARIAS OF PATIENT'S DECLINING 
RESPIRATORY STATUS. TELEPHONE CONSENT RECEIVED AT THIS TIME FOR INTUBATION. WITNESSED BY 
THIS RN AND JERAMIE VILLAVICENCIO. 

0704- 2RT, THIS RN, AND SOUMYA RAY AT BEDSIDE DURING INTUBATION. 

0706- 20MG ETOMIDATE AND 60MG OF SUCCINYLCHOLINE GIVEN AT THIS TIME.

0708- PT INTUBATED BY FLOR DOOLEY. COLOR CHANGE NOTED ON C02 MONITOR. PT INTUBATED WITH A 8 
ET TUBE, 23 AT THE LIP. RT CONNECTED PATIENT TO VENTILATOR. 

0715- OG PLACED AND PLACEMENT CHECKED BY THIS RN. 

0720- RESTRAINTS PLACED. 

0730- DR. JOSEPH AT BEDSIDE FOR CENTRAL LINE PLACEMENT. 

0745- CXR OBTAINED FOR CONFIRMATION OF PLACEMENT. DR. JOSEPH REVIEWED CXR AND CONFIRMATION 
GIVEN OF PLACEMENT. 

0750- WILL CONTINUE TO MONITOR. 

0809- THIS RN CONTACTED PATIENT'S MOTHER, MAGALIS, AND GAVE UPDATE THAT INTUBATION WENT WELL 
AND PATIENT RESTING COMFORTABLY AT THIS TIME.

## 2020-07-04 NOTE — ANESTHESIA-PROCEDURE NOTE
Procedures/Interventions


Procedure Start/Stop/Diagnosis


Date of Procedure:  Jul 4, 2020


Start Time:  06:50


Stop Time:  07:15





Intubation


RSI:  Yes


100% pre-Ox, [id number]:  Yes


Intubation Method:  orotracheal


Videoscope used:  Yes


Grade View:  1


Medications:  Etomidate, Succinylcholine


Positive End Tide CO2:  Yes


Breath Sounds after Intubation:  bilateral-equal


Intubated with ease:  Yes


Intubation Complications:  no complications


Care turned over to:


RN/AISHAU











LAUREN COCHRAN CRNA             Jul 4, 2020 07:14

## 2020-07-04 NOTE — PROCEDURE/INTERVENTION NOTE
Procedures/Interventions


Lumen:  triple


Central Line Procedure:  betadine prep (chlorhexidine prep), sterile drapes 

applied, sterile dressing applied


Position:  internal jugular (R)


Anesthesia:  Lidocaine


Volume Anesthetic (ccs):  3


Complications:  none


Post Position:  sutured, good blood return, position confirmed w/ CXR


Emergent consent was given based on hypotension. Right time, patient and 

procedure.


Site was looked at using ultrasound and she had a large right internal jugular 

easily accessible. Skin was prepped in the usual fashion and she was draped out 

in the usual sterile fashion. Everybody present wasn't wearing appropriate PPE.


3 cc of 1% lidocaine was injected superficially over the right IJ. We used the 

introducer needle to access the right internal jugular under ultrasound 

guidance. We watch the tip of the needle as it entered the internal jugular. 

Guidewire was placed and no ectopy was seen on the monitor. A small nick was 

made in the skin at the base of the needle using the supplied 11 blade scalpel. 

Needle was removed and the dilator was placed over the guidewire and removed 

surgically. We then threaded the central lumen of the triple lumen catheter that

was previously flushed with sterile saline over the guidewire. He was placed at 

13 cm and sutured in place 2 different points. Sterile dressing with Biopatch 

was placed. Central lumen easily withdrew and flushed. Chest x-ray was obtained 

demonstrating good position without crossing the midline and no evidence pneu

mothorax. The tip of the central lumen terminated in the superior vena cava 

about a centimeter above the right atria.


Date of ETT Placement:  Jul 4, 2020


Time of ETT Placement:  0700


Intubation Method:  orotracheal


Tube Size:  8.00


Medications:  Etomidate, Succinylcholine


Positive End Tide CO2:  Yes


Breath Sounds after Intubation:  bilateral-equal


Intubation Complications:  no complications











SARAHI JOSEPH                  Jul 4, 2020 08:40

## 2020-07-04 NOTE — DIAGNOSTIC IMAGING REPORT
Indication: Shortness of breath



Portable chest 4:21 AM



Comparison with the previous day



There are infiltrates present in the right upper lobe and right

lower lobe. There is also some faint infiltrate and left lower

lung. These are not appreciably changed from the previous day.

There are no effusions or pneumothoraces.



IMPRESSION: Multifocal infiltrates consistent with pneumonia



Dictated by: 



  Dictated on workstation # [id number]

## 2020-07-04 NOTE — NUR
THIS RN NOTIFIED TELE ICU OF PATIENT'S CONTINUED FEVER  FAHRENHEIT DESPITE MEDICATION 
GIVEN TO REDUCE FEVER. PATIENT TRIPODING IN BED WITH CONTINUED INCREASED WORK OF BREATHING 
WITH ACCESSORY MUSCLES, NECK PULLING AND ABDOMINAL BREATHING.

## 2020-07-04 NOTE — DIAGNOSTIC IMAGING REPORT
INDICATION: Line placement



Since the earlier exam, the portable chest shows interval

placement of an ET tube which is below the thoracic inlet and

above the cyrus. There has been placement of an OG tube with tip

in the distal body of the stomach. There has been placement of a

right-sided IJ line with tip in the mid SVC level. There has been

improved aeration of the lungs. There remains some right upper

lobe atelectasis and infiltrate. There is no effusion or

pneumothorax.



IMPRESSION: Interval placement of support lines and tubes, as

described. There has been improved aeration of the lungs.



Dictated by: 



  Dictated on workstation # [id number]

## 2020-07-04 NOTE — PROGRESS NOTE - HOSPITALIST
Subjective


HPI/CC On Admission


Date Seen by Provider:  2020


Time Seen by Provider:  09:00


Pt is a 55yoCF with a PMH of IDDMII, CKD, chronic pain, HTN who presented to the

ER due to right sided chest pain. She states she started feeling poorly a couple

of days ago and it worsened last night. It is made worse by deep breaths and 

with any movement. She was unaware she had a fever until arrival to the ER. She 

was found to have a leukocytosis as well and was admitted for sepsis from 

presumed pneumonia. This morning she states she is still not feeling well and 

can tell she has a fever still. I checked her temperature and it was 38.8. She 

denies any sick contacts but has been in the hospital and at multiple doctors 

offices recently to obtain care.


Subjective/Events-last exam


Patient being managed by the ICU overnight developed progressive hypoxemia with 

mixed respiratory and metabolic acidosis.  She underwent uneventful mechanical 

ventilation on Diprovan is sedated breathing easily last ABG revealing oxygen 

over 200 allowing progressive decrease in oxygenation currently 45 percent with 

saturations 94-96 percent.  Mother at the bedside.





Focused Exam


Lactate Level


20 05:13: Lactic Acid Level 0.92


20 07:20: 








Objective


Exam


Vital Signs





Vital Signs








  Date Time  Temp Pulse Resp B/P (MAP) Pulse Ox O2 Delivery O2 Flow Rate FiO2


 


20 12:20  70  117/63    


 


20 12:00   32  98 NIV Bilevel 50.00 


 


20 12:00 37.3       


 


20 09:57        21





Capillary Refill : Greater Than 3 Seconds


General Appearance:  No Apparent Distress


Respiratory:  Chest Non Tender, Lungs Clear, Normal Breath Sounds, No Accessory 

Muscle Use, No Respiratory Distress, Other (Sedated on mechanical ventilation)


Cardiovascular:  Regular Rate, Rhythm, No Edema, No Gallop, No JVD, No Murmur, 

Normal Peripheral Pulses


Gastrointestinal:  Normal Bowel Sounds, No Organomegaly, No Pulsatile Mass, Non 

Tender, Soft


Extremity:  Other (1+ pedal and upper extremity edema noted.  The left lower 

extremity is warm with 2+ dorsalis pedis pulses.  The right foot is slightly 

cool it is not pallorous capillary refill is 3-4 seconds and are not able to 

appreciate dorsalis pedis or posterior tibial pulses.  No purpura is noted there

is no reaction to movement of the right lower extremity is unremarkable the calf

is not indurated nor erythematous.)





Results/Procedures


Lab


Laboratory Tests


20 04:07








Patient resulted labs reviewed.


Imaging:  Reviewed Imaging Report





Assessment/Plan


Assessment and Plan


Assess & Plan/Chief Complaint


1.  Presumed pneumonia with progressive respiratory failure requiring mechanical

intubation with stabilization respiratory status improving acidosis and signif

icant improved oxygenation status at rest today consider sedation vacation 

starting tomorrow but would confer with eICU.


2.  Acute on chronic renal insufficiency aggravated by likely underlying sepsis 

and type II diabetes mellitus.


3.  Extensive smoking history suspect underlying COPD.


4.  There is evidence for arterial insufficiency of the right lower extremity 

there are no overt signs to suggest that it is limited threatening at this time 

nor is there likely underlying dry gangrene but suspected peripheral vascular 

disease is more likely.  We'll continue to monitor and I discussed my concerns 

with the nurse and the need to check it for any signs of increasing coolness or 

pallor.  Overall prognosis remains poor which was discussed with her mother at 

the bedside.  Continue broad-spectrum antibiotics.





Critical Care


Critically Ill Patient





Clinical Quality Measures


AMI/AHF:


ASA po Prior to arrival:  No





DVT/VTE Risk/Contraindication:


Risk Factor Score Per Nursin


RFS Level Per Nursing on Admit:  4+=Very High











RANDY SHAH MD               2020 13:05

## 2020-07-04 NOTE — PHYSICAL THERAPY PROGRESS NOTE
Therapy Progress Note


Patient still on vent. Will continue to follow.











LAURA KINNEY PT             Jul 4, 2020 11:17

## 2020-07-05 VITALS — DIASTOLIC BLOOD PRESSURE: 64 MMHG | SYSTOLIC BLOOD PRESSURE: 124 MMHG

## 2020-07-05 VITALS — SYSTOLIC BLOOD PRESSURE: 148 MMHG | DIASTOLIC BLOOD PRESSURE: 82 MMHG

## 2020-07-05 VITALS — SYSTOLIC BLOOD PRESSURE: 119 MMHG | DIASTOLIC BLOOD PRESSURE: 65 MMHG

## 2020-07-05 VITALS — DIASTOLIC BLOOD PRESSURE: 70 MMHG | SYSTOLIC BLOOD PRESSURE: 132 MMHG

## 2020-07-05 VITALS — DIASTOLIC BLOOD PRESSURE: 64 MMHG | SYSTOLIC BLOOD PRESSURE: 92 MMHG

## 2020-07-05 VITALS — SYSTOLIC BLOOD PRESSURE: 106 MMHG | DIASTOLIC BLOOD PRESSURE: 58 MMHG

## 2020-07-05 VITALS — SYSTOLIC BLOOD PRESSURE: 139 MMHG | DIASTOLIC BLOOD PRESSURE: 75 MMHG

## 2020-07-05 VITALS — SYSTOLIC BLOOD PRESSURE: 131 MMHG | DIASTOLIC BLOOD PRESSURE: 78 MMHG

## 2020-07-05 VITALS — SYSTOLIC BLOOD PRESSURE: 146 MMHG | DIASTOLIC BLOOD PRESSURE: 82 MMHG

## 2020-07-05 VITALS — DIASTOLIC BLOOD PRESSURE: 78 MMHG | SYSTOLIC BLOOD PRESSURE: 132 MMHG

## 2020-07-05 VITALS — DIASTOLIC BLOOD PRESSURE: 58 MMHG | SYSTOLIC BLOOD PRESSURE: 105 MMHG

## 2020-07-05 VITALS — SYSTOLIC BLOOD PRESSURE: 110 MMHG | DIASTOLIC BLOOD PRESSURE: 62 MMHG

## 2020-07-05 VITALS — SYSTOLIC BLOOD PRESSURE: 132 MMHG | DIASTOLIC BLOOD PRESSURE: 75 MMHG

## 2020-07-05 VITALS — DIASTOLIC BLOOD PRESSURE: 64 MMHG | SYSTOLIC BLOOD PRESSURE: 119 MMHG

## 2020-07-05 VITALS — SYSTOLIC BLOOD PRESSURE: 136 MMHG | DIASTOLIC BLOOD PRESSURE: 72 MMHG

## 2020-07-05 VITALS — SYSTOLIC BLOOD PRESSURE: 135 MMHG | DIASTOLIC BLOOD PRESSURE: 72 MMHG

## 2020-07-05 VITALS — SYSTOLIC BLOOD PRESSURE: 107 MMHG | DIASTOLIC BLOOD PRESSURE: 59 MMHG

## 2020-07-05 VITALS — SYSTOLIC BLOOD PRESSURE: 106 MMHG | DIASTOLIC BLOOD PRESSURE: 59 MMHG

## 2020-07-05 VITALS — SYSTOLIC BLOOD PRESSURE: 133 MMHG | DIASTOLIC BLOOD PRESSURE: 72 MMHG

## 2020-07-05 VITALS — DIASTOLIC BLOOD PRESSURE: 55 MMHG | SYSTOLIC BLOOD PRESSURE: 100 MMHG

## 2020-07-05 VITALS — DIASTOLIC BLOOD PRESSURE: 74 MMHG | SYSTOLIC BLOOD PRESSURE: 142 MMHG

## 2020-07-05 VITALS — SYSTOLIC BLOOD PRESSURE: 137 MMHG | DIASTOLIC BLOOD PRESSURE: 72 MMHG

## 2020-07-05 VITALS — DIASTOLIC BLOOD PRESSURE: 64 MMHG | SYSTOLIC BLOOD PRESSURE: 120 MMHG

## 2020-07-05 VITALS — DIASTOLIC BLOOD PRESSURE: 79 MMHG | SYSTOLIC BLOOD PRESSURE: 131 MMHG

## 2020-07-05 VITALS — DIASTOLIC BLOOD PRESSURE: 60 MMHG | SYSTOLIC BLOOD PRESSURE: 114 MMHG

## 2020-07-05 VITALS — SYSTOLIC BLOOD PRESSURE: 100 MMHG | DIASTOLIC BLOOD PRESSURE: 56 MMHG

## 2020-07-05 VITALS — DIASTOLIC BLOOD PRESSURE: 68 MMHG | SYSTOLIC BLOOD PRESSURE: 126 MMHG

## 2020-07-05 VITALS — SYSTOLIC BLOOD PRESSURE: 143 MMHG | DIASTOLIC BLOOD PRESSURE: 73 MMHG

## 2020-07-05 VITALS — DIASTOLIC BLOOD PRESSURE: 79 MMHG | SYSTOLIC BLOOD PRESSURE: 140 MMHG

## 2020-07-05 LAB
ARTERIAL PATENCY WRIST A: POSITIVE
BASE EXCESS STD BLDA CALC-SCNC: 0.6 MMOL/L (ref -2.5–2.5)
BASOPHILS # BLD AUTO: 0 10^3/UL (ref 0–0.1)
BASOPHILS NFR BLD AUTO: 0 % (ref 0–10)
BDY SITE: (no result)
BODY TEMPERATURE: 38.3
BUN/CREAT SERPL: 13
CALCIUM SERPL-MCNC: 7.9 MG/DL (ref 8.5–10.1)
CHLORIDE SERPL-SCNC: 103 MMOL/L (ref 98–107)
CO2 BLDA CALC-SCNC: 25 MMOL/L (ref 21–31)
CO2 SERPL-SCNC: 20 MMOL/L (ref 21–32)
CREAT SERPL-MCNC: 2.25 MG/DL (ref 0.6–1.3)
EOSINOPHIL # BLD AUTO: 0.4 10^3/UL (ref 0–0.3)
EOSINOPHIL NFR BLD AUTO: 3 % (ref 0–10)
ERYTHROCYTE [DISTWIDTH] IN BLOOD BY AUTOMATED COUNT: 14.4 % (ref 10–14.5)
GFR SERPLBLD BASED ON 1.73 SQ M-ARVRAT: 22 ML/MIN
GLUCOSE SERPL-MCNC: 167 MG/DL (ref 70–105)
HCT VFR BLD CALC: 26 % (ref 35–52)
HGB BLD-MCNC: 8.4 G/DL (ref 11.5–16)
INHALED O2 FLOW RATE: 21 L/MIN
LYMPHOCYTES # BLD AUTO: 1.1 X 10^3 (ref 1–4)
LYMPHOCYTES NFR BLD AUTO: 9 % (ref 12–44)
MAGNESIUM SERPL-MCNC: 1.8 MG/DL (ref 1.6–2.4)
MANUAL DIFFERENTIAL PERFORMED BLD QL: NO
MCH RBC QN AUTO: 30 PG (ref 25–34)
MCHC RBC AUTO-ENTMCNC: 33 G/DL (ref 32–36)
MCV RBC AUTO: 92 FL (ref 80–99)
MONOCYTES # BLD AUTO: 0.4 X 10^3 (ref 0–1)
MONOCYTES NFR BLD AUTO: 3 % (ref 0–12)
NEUTROPHILS # BLD AUTO: 11.3 X 10^3 (ref 1.8–7.8)
NEUTROPHILS NFR BLD AUTO: 85 % (ref 42–75)
PCO2 BLDA: 36 MMHG (ref 35–45)
PH BLDA: 7.45 [PH] (ref 7.37–7.43)
PHOSPHATE SERPL-MCNC: 3.6 MG/DL (ref 2.3–4.7)
PLATELET # BLD: 253 10^3/UL (ref 130–400)
PMV BLD AUTO: 10.7 FL (ref 7.4–10.4)
PO2 BLDA: 69 MMHG (ref 79–93)
POTASSIUM SERPL-SCNC: 3.7 MMOL/L (ref 3.6–5)
SAO2 % BLDA FROM PO2: 92 % (ref 94–100)
SODIUM SERPL-SCNC: 135 MMOL/L (ref 135–145)
VENTILATION MODE VENT: YES
WBC # BLD AUTO: 13.2 10^3/UL (ref 4.3–11)

## 2020-07-05 RX ADMIN — INSULIN ASPART SCH UNIT: 100 INJECTION, SOLUTION INTRAVENOUS; SUBCUTANEOUS at 13:10

## 2020-07-05 RX ADMIN — ALBUTEROL SULFATE SCH PUFF: 90 AEROSOL, METERED RESPIRATORY (INHALATION) at 21:03

## 2020-07-05 RX ADMIN — PROPOFOL SCH MLS/HR: 10 INJECTION, EMULSION INTRAVENOUS at 02:32

## 2020-07-05 RX ADMIN — INSULIN ASPART SCH UNIT: 100 INJECTION, SOLUTION INTRAVENOUS; SUBCUTANEOUS at 03:40

## 2020-07-05 RX ADMIN — METOPROLOL TARTRATE SCH MG: 1 INJECTION, SOLUTION INTRAVENOUS at 18:06

## 2020-07-05 RX ADMIN — SODIUM CHLORIDE SCH MLS/HR: 900 INJECTION, SOLUTION INTRAVENOUS at 09:15

## 2020-07-05 RX ADMIN — SODIUM CHLORIDE SCH MLS/HR: 900 INJECTION INTRAVENOUS at 16:41

## 2020-07-05 RX ADMIN — SODIUM CHLORIDE SCH MLS/HR: 4.5 INJECTION, SOLUTION INTRAVENOUS at 14:49

## 2020-07-05 RX ADMIN — PROPOFOL SCH MLS/HR: 10 INJECTION, EMULSION INTRAVENOUS at 14:52

## 2020-07-05 RX ADMIN — ALBUTEROL SULFATE SCH PUFF: 90 AEROSOL, METERED RESPIRATORY (INHALATION) at 10:39

## 2020-07-05 RX ADMIN — POTASSIUM CHLORIDE SCH MEQ: 1500 TABLET, EXTENDED RELEASE ORAL at 03:39

## 2020-07-05 RX ADMIN — FENTANYL CITRATE SCH MLS/HR: 50 INJECTION, SOLUTION INTRAMUSCULAR; INTRAVENOUS at 03:03

## 2020-07-05 RX ADMIN — PROPOFOL SCH MLS/HR: 10 INJECTION, EMULSION INTRAVENOUS at 20:00

## 2020-07-05 RX ADMIN — ASPIRIN 81 MG CHEWABLE TABLET SCH MG: 81 TABLET CHEWABLE at 09:15

## 2020-07-05 RX ADMIN — ACETAMINOPHEN PRN MG: 325 TABLET ORAL at 04:44

## 2020-07-05 RX ADMIN — FENTANYL CITRATE SCH MLS/HR: 50 INJECTION, SOLUTION INTRAMUSCULAR; INTRAVENOUS at 20:06

## 2020-07-05 RX ADMIN — INSULIN ASPART SCH UNIT: 100 INJECTION, SOLUTION INTRAVENOUS; SUBCUTANEOUS at 23:35

## 2020-07-05 RX ADMIN — ENOXAPARIN SODIUM SCH MG: 100 INJECTION SUBCUTANEOUS at 20:01

## 2020-07-05 RX ADMIN — ALBUTEROL SULFATE SCH PUFF: 90 AEROSOL, METERED RESPIRATORY (INHALATION) at 14:30

## 2020-07-05 RX ADMIN — DEXMEDETOMIDINE HYDROCHLORIDE SCH MLS/HR: 100 INJECTION, SOLUTION, CONCENTRATE INTRAVENOUS at 20:01

## 2020-07-05 RX ADMIN — METOPROLOL TARTRATE SCH MG: 1 INJECTION, SOLUTION INTRAVENOUS at 23:47

## 2020-07-05 RX ADMIN — METOPROLOL TARTRATE SCH MG: 1 INJECTION, SOLUTION INTRAVENOUS at 13:10

## 2020-07-05 RX ADMIN — MAGNESIUM SULFATE IN DEXTROSE SCH MLS/HR: 10 INJECTION, SOLUTION INTRAVENOUS at 03:39

## 2020-07-05 RX ADMIN — DEXMEDETOMIDINE HYDROCHLORIDE SCH MLS/HR: 100 INJECTION, SOLUTION, CONCENTRATE INTRAVENOUS at 08:47

## 2020-07-05 RX ADMIN — METOPROLOL TARTRATE SCH MG: 1 INJECTION, SOLUTION INTRAVENOUS at 05:14

## 2020-07-05 RX ADMIN — SODIUM CHLORIDE SCH MLS/HR: 4.5 INJECTION, SOLUTION INTRAVENOUS at 23:48

## 2020-07-05 RX ADMIN — SODIUM CHLORIDE SCH MLS/HR: 4.5 INJECTION, SOLUTION INTRAVENOUS at 01:21

## 2020-07-05 RX ADMIN — SODIUM CHLORIDE SCH MLS/HR: 900 INJECTION INTRAVENOUS at 23:48

## 2020-07-05 RX ADMIN — PANTOPRAZOLE SODIUM SCH MG: 40 INJECTION, POWDER, FOR SOLUTION INTRAVENOUS at 09:14

## 2020-07-05 RX ADMIN — PROPOFOL SCH MLS/HR: 10 INJECTION, EMULSION INTRAVENOUS at 10:23

## 2020-07-05 RX ADMIN — INSULIN ASPART SCH UNIT: 100 INJECTION, SOLUTION INTRAVENOUS; SUBCUTANEOUS at 18:07

## 2020-07-05 RX ADMIN — SODIUM CHLORIDE SCH MLS/HR: 900 INJECTION INTRAVENOUS at 09:14

## 2020-07-05 RX ADMIN — ALBUTEROL SULFATE SCH PUFF: 90 AEROSOL, METERED RESPIRATORY (INHALATION) at 03:03

## 2020-07-05 RX ADMIN — POTASSIUM CHLORIDE SCH MLS/HR: 200 INJECTION, SOLUTION INTRAVENOUS at 03:39

## 2020-07-05 NOTE — PROGRESS NOTE - HOSPITALIST
Subjective


HPI/CC On Admission


Date Seen by Provider:  2020


Time Seen by Provider:  08:15


Pt is a 55yoCF with a PMH of IDDMII, CKD, chronic pain, HTN who presented to the

ER due to right sided chest pain. She states she started feeling poorly a couple

of days ago and it worsened last night. It is made worse by deep breaths and 

with any movement. She was unaware she had a fever until arrival to the ER. She 

was found to have a leukocytosis as well and was admitted for sepsis from 

presumed pneumonia. This morning she states she is still not feeling well and 

can tell she has a fever still. I checked her temperature and it was 38.8. She 

denies any sick contacts but has been in the hospital and at multiple doctors 

offices recently to obtain care.


Subjective/Events-last exam


Patient sedated on vent appears to be in no acute distress.  Nursing staff do 

report that with sedation vacation cutting back on different than her heart rate

and blood pressure do go up significantly.





Focused Exam


Lactate Level


20 11:50: Lactic Acid Level 1.13








Objective


Exam


Vital Signs





Vital Signs








  Date Time  Temp Pulse Resp B/P (MAP) Pulse Ox O2 Delivery O2 Flow Rate FiO2


 


20 11:22     91 Mechanical Ventilator  21


 


20 11:00  74 30 140/79 (99)   21.00 


 


20 08:00 36.7       





Capillary Refill : Less Than 3 Seconds


General Appearance:  No Apparent Distress


Respiratory:  Other (Coarse breath sounds noted with no wheezing few fine 

basilar rales no wheezing appreciated ventilating easily)


Cardiovascular:  Regular Rate, Rhythm, No Edema, No Gallop, No JVD, No Murmur


Gastrointestinal:  Normal Bowel Sounds, No Organomegaly, No Pulsatile Mass, Non 

Tender, Soft


Neurologic/Psychiatric:  Other (Right foot is warmer today and no mottling is 

noted still not able to appreciate dorsalis pedis or posterior tibial pulses 

there is no evidence for pallor left side normal)





Results/Procedures


Lab


Laboratory Tests


20 02:30








Patient resulted labs reviewed.


Imaging:  Reviewed Imaging Report





Assessment/Plan


Assessment and Plan


Assess & Plan/Chief Complaint


1.  Presumed pneumonia with progressive respiratory failure requiring mechanical

intubation with stabilization respiratory status improving acidosis and 

significant improved oxygenation status at rest today  sedation vacation likely 

later today with attempt for pressure support trial but will confer with eICU.


2.  Acute on chronic renal insufficiency aggravated by likely underlying sepsis 

and type II diabetes mellitus.


3.  Extensive smoking history suspect underlying COPD.


4.  There is evidence for arterial insufficiency of the right lower extremity 

there are no overt signs to suggest that it is limited threatening at this time 

nor is there likely underlying dry gangrene but suspected peripheral vascular 

disease is more likely.  Of the right foot is warmer today with normal 

coloration and no mottling.





Critical Care


Critically Ill Patient





Clinical Quality Measures


AMI/AHF:


ASA po Prior to arrival:  No





DVT/VTE Risk/Contraindication:


Risk Factor Score Per Nursin


RFS Level Per Nursing on Admit:  4+=Very High











RANDY SHAH MD               2020 12:29

## 2020-07-05 NOTE — DIAGNOSTIC IMAGING REPORT
INDICATION: Shortness of breath.



Comparison is made with prior examination from 07/04/2020.



FINDINGS: There is a right upper lobe and right perihilar

infiltrate. The heart size is normal. The lines and tubes are in

satisfactory position. There is no pleural effusion or

pneumothorax. Mediastinum is unremarkable.



IMPRESSION: Right upper lobe and right perihilar infiltrate

suspect for pneumonia.



Dictated by: 



  Dictated on workstation # [id number]

## 2020-07-05 NOTE — DIAGNOSTIC IMAGING REPORT
PROCEDURE: CT chest, abdomen, and pelvis without contrast.



TECHNIQUE: Multiple contiguous axial images were obtained through

the chest, abdomen, and pelvis without the use of intravenous

contrast. Auto Exposure Controls were utilized during the CT exam

to meet ALARA standards for radiation dose reduction. 



INDICATION: Fever of unknown origin. Pneumonia. Edema. Elevated

D-dimer.



CT CHEST: There is a right upper lobe infiltrate with some

consolidation seen superiorly and laterally. There is minimal

patchy infiltrate in the right middle lobe. Left upper lobe is

clear. There is bibasilar discoid atelectasis. There are

bilateral effusions layering to a depth of 1-2 cm on the left and

3-4 cm on the right. There is no mediastinal mass or adenopathy.

No bony destructive lesions are seen.



CT ABDOMEN AND PELVIS: Liver is normal. There is fluid around the

gallbladder. The bile ducts are not dilated. The spleen, pancreas

and adrenals are normal. The kidneys, ureters and bladder are

normal. No acute bowel abnormality is seen. There is an IVC

filter present just inferior to the renal vessels. There is

calcification of the abdominal aorta with no aneurysmal

dilatation. There is free fluid in the dependent portion of the

pelvis. There is no acute bony abnormality.



IMPRESSION: 

1. Consolidated infiltrate in the right upper lobe consistent

with pneumonia. There are bilateral effusions.

2. The gallbladder is distended with a thickened wall which may

be secondary to cholecystitis. There is free fluid in the

dependent portion of the pelvis, with no other acute abnormality

seen in the abdomen and pelvis.



Dictated by: 



  Dictated on workstation # [id number]

## 2020-07-06 VITALS — DIASTOLIC BLOOD PRESSURE: 76 MMHG | SYSTOLIC BLOOD PRESSURE: 169 MMHG

## 2020-07-06 VITALS — SYSTOLIC BLOOD PRESSURE: 178 MMHG | DIASTOLIC BLOOD PRESSURE: 95 MMHG

## 2020-07-06 VITALS — SYSTOLIC BLOOD PRESSURE: 117 MMHG | DIASTOLIC BLOOD PRESSURE: 69 MMHG

## 2020-07-06 VITALS — DIASTOLIC BLOOD PRESSURE: 63 MMHG | SYSTOLIC BLOOD PRESSURE: 124 MMHG

## 2020-07-06 VITALS — DIASTOLIC BLOOD PRESSURE: 62 MMHG | SYSTOLIC BLOOD PRESSURE: 110 MMHG

## 2020-07-06 VITALS — SYSTOLIC BLOOD PRESSURE: 125 MMHG | DIASTOLIC BLOOD PRESSURE: 69 MMHG

## 2020-07-06 VITALS — SYSTOLIC BLOOD PRESSURE: 106 MMHG | DIASTOLIC BLOOD PRESSURE: 61 MMHG

## 2020-07-06 VITALS — SYSTOLIC BLOOD PRESSURE: 113 MMHG | DIASTOLIC BLOOD PRESSURE: 80 MMHG

## 2020-07-06 VITALS — SYSTOLIC BLOOD PRESSURE: 170 MMHG | DIASTOLIC BLOOD PRESSURE: 95 MMHG

## 2020-07-06 VITALS — SYSTOLIC BLOOD PRESSURE: 124 MMHG | DIASTOLIC BLOOD PRESSURE: 67 MMHG

## 2020-07-06 VITALS — SYSTOLIC BLOOD PRESSURE: 111 MMHG | DIASTOLIC BLOOD PRESSURE: 55 MMHG

## 2020-07-06 VITALS — SYSTOLIC BLOOD PRESSURE: 156 MMHG | DIASTOLIC BLOOD PRESSURE: 71 MMHG

## 2020-07-06 VITALS — SYSTOLIC BLOOD PRESSURE: 108 MMHG | DIASTOLIC BLOOD PRESSURE: 59 MMHG

## 2020-07-06 VITALS — DIASTOLIC BLOOD PRESSURE: 83 MMHG | SYSTOLIC BLOOD PRESSURE: 173 MMHG

## 2020-07-06 VITALS — SYSTOLIC BLOOD PRESSURE: 167 MMHG | DIASTOLIC BLOOD PRESSURE: 77 MMHG

## 2020-07-06 VITALS — SYSTOLIC BLOOD PRESSURE: 175 MMHG | DIASTOLIC BLOOD PRESSURE: 91 MMHG

## 2020-07-06 VITALS — SYSTOLIC BLOOD PRESSURE: 135 MMHG | DIASTOLIC BLOOD PRESSURE: 71 MMHG

## 2020-07-06 VITALS — DIASTOLIC BLOOD PRESSURE: 60 MMHG | SYSTOLIC BLOOD PRESSURE: 85 MMHG

## 2020-07-06 VITALS — SYSTOLIC BLOOD PRESSURE: 101 MMHG | DIASTOLIC BLOOD PRESSURE: 59 MMHG

## 2020-07-06 VITALS — SYSTOLIC BLOOD PRESSURE: 176 MMHG | DIASTOLIC BLOOD PRESSURE: 78 MMHG

## 2020-07-06 VITALS — SYSTOLIC BLOOD PRESSURE: 163 MMHG | DIASTOLIC BLOOD PRESSURE: 75 MMHG

## 2020-07-06 VITALS — SYSTOLIC BLOOD PRESSURE: 170 MMHG

## 2020-07-06 VITALS — DIASTOLIC BLOOD PRESSURE: 64 MMHG | SYSTOLIC BLOOD PRESSURE: 119 MMHG

## 2020-07-06 VITALS — DIASTOLIC BLOOD PRESSURE: 78 MMHG | SYSTOLIC BLOOD PRESSURE: 171 MMHG

## 2020-07-06 VITALS — DIASTOLIC BLOOD PRESSURE: 56 MMHG | SYSTOLIC BLOOD PRESSURE: 108 MMHG

## 2020-07-06 LAB
ARTERIAL PATENCY WRIST A: POSITIVE
BASE EXCESS STD BLDA CALC-SCNC: 3.6 MMOL/L (ref -2.5–2.5)
BASOPHILS # BLD AUTO: 0 10^3/UL (ref 0–0.1)
BASOPHILS NFR BLD AUTO: 0 % (ref 0–10)
BDY SITE: (no result)
BODY TEMPERATURE: 36.8
BUN/CREAT SERPL: 16
CALCIUM SERPL-MCNC: 7.8 MG/DL (ref 8.5–10.1)
CHLORIDE SERPL-SCNC: 104 MMOL/L (ref 98–107)
CO2 BLDA CALC-SCNC: 27.9 MMOL/L (ref 21–31)
CO2 SERPL-SCNC: 23 MMOL/L (ref 21–32)
CREAT SERPL-MCNC: 2.16 MG/DL (ref 0.6–1.3)
EOSINOPHIL # BLD AUTO: 0.3 10^3/UL (ref 0–0.3)
EOSINOPHIL NFR BLD AUTO: 4 % (ref 0–10)
ERYTHROCYTE [DISTWIDTH] IN BLOOD BY AUTOMATED COUNT: 14.4 % (ref 10–14.5)
GFR SERPLBLD BASED ON 1.73 SQ M-ARVRAT: 23 ML/MIN
GLUCOSE SERPL-MCNC: 32 MG/DL (ref 70–105)
HCT VFR BLD CALC: 25 % (ref 35–52)
HGB BLD-MCNC: 8.2 G/DL (ref 11.5–16)
INHALED O2 FLOW RATE: 21 L/MIN
LYMPHOCYTES # BLD AUTO: 1.9 X 10^3 (ref 1–4)
LYMPHOCYTES NFR BLD AUTO: 21 % (ref 12–44)
MAGNESIUM SERPL-MCNC: 1.9 MG/DL (ref 1.6–2.4)
MANUAL DIFFERENTIAL PERFORMED BLD QL: NO
MCH RBC QN AUTO: 30 PG (ref 25–34)
MCHC RBC AUTO-ENTMCNC: 32 G/DL (ref 32–36)
MCV RBC AUTO: 93 FL (ref 80–99)
MONOCYTES # BLD AUTO: 0.5 X 10^3 (ref 0–1)
MONOCYTES NFR BLD AUTO: 6 % (ref 0–12)
NEUTROPHILS # BLD AUTO: 6.2 X 10^3 (ref 1.8–7.8)
NEUTROPHILS NFR BLD AUTO: 70 % (ref 42–75)
PCO2 BLDA: 35 MMHG (ref 35–45)
PH BLDA: 7.49 [PH] (ref 7.37–7.43)
PHOSPHATE SERPL-MCNC: 3.6 MG/DL (ref 2.3–4.7)
PLATELET # BLD: 298 10^3/UL (ref 130–400)
PMV BLD AUTO: 11 FL (ref 7.4–10.4)
PO2 BLDA: 65 MMHG (ref 79–93)
POTASSIUM SERPL-SCNC: 3.1 MMOL/L (ref 3.6–5)
SAO2 % BLDA FROM PO2: 93 % (ref 94–100)
SODIUM SERPL-SCNC: 139 MMOL/L (ref 135–145)
VENTILATION MODE VENT: YES
WBC # BLD AUTO: 8.9 10^3/UL (ref 4.3–11)

## 2020-07-06 RX ADMIN — INSULIN ASPART SCH UNIT: 100 INJECTION, SOLUTION INTRAVENOUS; SUBCUTANEOUS at 11:47

## 2020-07-06 RX ADMIN — SODIUM CHLORIDE SCH MLS/HR: 900 INJECTION INTRAVENOUS at 08:39

## 2020-07-06 RX ADMIN — PROPOFOL SCH MLS/HR: 10 INJECTION, EMULSION INTRAVENOUS at 04:03

## 2020-07-06 RX ADMIN — PANTOPRAZOLE SODIUM SCH MG: 40 INJECTION, POWDER, FOR SOLUTION INTRAVENOUS at 08:39

## 2020-07-06 RX ADMIN — DEXTROSE MONOHYDRATE AND SODIUM CHLORIDE SCH MLS/HR: 5; .9 INJECTION, SOLUTION INTRAVENOUS at 23:46

## 2020-07-06 RX ADMIN — SODIUM CHLORIDE SCH MLS/HR: 900 INJECTION, SOLUTION INTRAVENOUS at 09:04

## 2020-07-06 RX ADMIN — FENTANYL CITRATE PRN MCG: 50 INJECTION, SOLUTION INTRAMUSCULAR; INTRAVENOUS at 11:45

## 2020-07-06 RX ADMIN — POTASSIUM CHLORIDE SCH MLS/HR: 200 INJECTION, SOLUTION INTRAVENOUS at 10:05

## 2020-07-06 RX ADMIN — SODIUM CHLORIDE SCH MLS/HR: 900 INJECTION INTRAVENOUS at 23:45

## 2020-07-06 RX ADMIN — FENTANYL CITRATE PRN MCG: 50 INJECTION, SOLUTION INTRAMUSCULAR; INTRAVENOUS at 15:33

## 2020-07-06 RX ADMIN — MAGNESIUM SULFATE IN DEXTROSE SCH MLS/HR: 10 INJECTION, SOLUTION INTRAVENOUS at 04:35

## 2020-07-06 RX ADMIN — POTASSIUM CHLORIDE SCH MLS/HR: 200 INJECTION, SOLUTION INTRAVENOUS at 11:04

## 2020-07-06 RX ADMIN — INSULIN ASPART SCH UNIT: 100 INJECTION, SOLUTION INTRAVENOUS; SUBCUTANEOUS at 18:26

## 2020-07-06 RX ADMIN — ASPIRIN 81 MG CHEWABLE TABLET SCH MG: 81 TABLET CHEWABLE at 09:30

## 2020-07-06 RX ADMIN — ALBUTEROL SULFATE SCH GM: 90 AEROSOL, METERED RESPIRATORY (INHALATION) at 14:40

## 2020-07-06 RX ADMIN — METOPROLOL TARTRATE SCH MG: 1 INJECTION, SOLUTION INTRAVENOUS at 17:40

## 2020-07-06 RX ADMIN — ALBUTEROL SULFATE SCH GM: 90 AEROSOL, METERED RESPIRATORY (INHALATION) at 20:13

## 2020-07-06 RX ADMIN — POTASSIUM CHLORIDE SCH MLS/HR: 200 INJECTION, SOLUTION INTRAVENOUS at 06:45

## 2020-07-06 RX ADMIN — SODIUM CHLORIDE SCH MLS/HR: 900 INJECTION INTRAVENOUS at 15:33

## 2020-07-06 RX ADMIN — ALBUTEROL SULFATE SCH PUFF: 90 AEROSOL, METERED RESPIRATORY (INHALATION) at 02:24

## 2020-07-06 RX ADMIN — DEXTROSE MONOHYDRATE PRN ML: 25 INJECTION, SOLUTION INTRAVENOUS at 04:18

## 2020-07-06 RX ADMIN — POTASSIUM CHLORIDE SCH MLS/HR: 200 INJECTION, SOLUTION INTRAVENOUS at 04:35

## 2020-07-06 RX ADMIN — ALBUTEROL SULFATE SCH PUFF: 90 AEROSOL, METERED RESPIRATORY (INHALATION) at 09:39

## 2020-07-06 RX ADMIN — POTASSIUM CHLORIDE SCH MEQ: 1500 TABLET, EXTENDED RELEASE ORAL at 04:35

## 2020-07-06 RX ADMIN — POTASSIUM CHLORIDE SCH MLS/HR: 200 INJECTION, SOLUTION INTRAVENOUS at 21:40

## 2020-07-06 RX ADMIN — POTASSIUM CHLORIDE SCH MLS/HR: 200 INJECTION, SOLUTION INTRAVENOUS at 08:39

## 2020-07-06 RX ADMIN — FENTANYL CITRATE PRN MCG: 50 INJECTION, SOLUTION INTRAMUSCULAR; INTRAVENOUS at 23:46

## 2020-07-06 RX ADMIN — METOPROLOL TARTRATE SCH MG: 1 INJECTION, SOLUTION INTRAVENOUS at 12:20

## 2020-07-06 RX ADMIN — LORAZEPAM PRN MG: 0.5 TABLET ORAL at 20:42

## 2020-07-06 RX ADMIN — DEXTROSE MONOHYDRATE AND SODIUM CHLORIDE SCH MLS/HR: 5; .9 INJECTION, SOLUTION INTRAVENOUS at 12:20

## 2020-07-06 RX ADMIN — ENOXAPARIN SODIUM SCH MG: 100 INJECTION SUBCUTANEOUS at 20:42

## 2020-07-06 RX ADMIN — INSULIN ASPART SCH UNIT: 100 INJECTION, SOLUTION INTRAVENOUS; SUBCUTANEOUS at 04:35

## 2020-07-06 RX ADMIN — FENTANYL CITRATE PRN MCG: 50 INJECTION, SOLUTION INTRAMUSCULAR; INTRAVENOUS at 21:19

## 2020-07-06 RX ADMIN — DEXTROSE MONOHYDRATE PRN ML: 25 INJECTION, SOLUTION INTRAVENOUS at 11:35

## 2020-07-06 RX ADMIN — ASPIRIN 81 MG CHEWABLE TABLET SCH MG: 81 TABLET CHEWABLE at 08:40

## 2020-07-06 RX ADMIN — METOPROLOL TARTRATE SCH MG: 1 INJECTION, SOLUTION INTRAVENOUS at 05:23

## 2020-07-06 RX ADMIN — METOPROLOL TARTRATE SCH MG: 1 INJECTION, SOLUTION INTRAVENOUS at 23:45

## 2020-07-06 RX ADMIN — DEXTROSE MONOHYDRATE AND SODIUM CHLORIDE SCH MLS/HR: 5; .9 INJECTION, SOLUTION INTRAVENOUS at 21:51

## 2020-07-06 RX ADMIN — ACETAMINOPHEN PRN MG: 650 SUPPOSITORY RECTAL at 15:33

## 2020-07-06 RX ADMIN — CYCLOBENZAPRINE HYDROCHLORIDE PRN MG: 10 TABLET, FILM COATED ORAL at 22:48

## 2020-07-06 NOTE — PROGRESS NOTE
Subjective


Subjective/Events-last exam


Patient self extubated this AM. Sitting up in chair. States that she is having 

pain all over her body.


Review of Systems


General:  Fatigue


Pulmonary:  Dyspnea, Cough


Cardiovascular:  No: Chest Pain, Palpitations, Edema


Gastrointestinal:  No: Nausea, Vomiting, Abdominal Pain


Neurological:  Weakness, Confusion





Focused Exam


Lactate Level


20 11:50: Lactic Acid Level 1.13





Objective


Exam


Last Set of Vital Signs





Vital Signs








  Date Time  Temp Pulse Resp B/P (MAP) Pulse Ox O2 Delivery O2 Flow Rate FiO2


 


20 10:47 37.1 99   96   28


 


20 10:00   23   Nasal Cannula 2.00 





Capillary Refill : Less Than 3 Seconds


I&O











Intake and Output 


 


 20





 00:00


 


Intake Total 5180 ml


 


Output Total 645 ml


 


Balance 4535 ml


 


 


 


Intake Oral 0 ml


 


IV Total 4725 ml


 


Tube Feeding 325 ml


 


Other 130 ml


 


Output Urine Total 645 ml


 


# Bowel Movements 1








General:  Alert


Lungs:  Other (diminished breath sounds, increased work of breathing at rest, + 

wheezing )


Heart:  Regular Rate, No Murmurs


Abdomen:  Normal Bowel Sounds, Soft, No Tenderness, No Masses


Extremities:  No Edema, No Tenderness/Swelling


Neuro:  Sensation Intact, Cranial Nerves 3-12 NL


Psych/Mental Status:  Mental Status NL, Mood NL





Results/Procedures


Lab


Laboratory Tests


20 11:50: Glucometer 374H


20 17:31: Glucometer 335H


20 20:40: Glucometer 220H


20 23:33: Glucometer 110


20 03:15: 


White Blood Count 8.9, Red Blood Count 2.72L, Hemoglobin 8.2L, Hematocrit 25L, M

troy Corpuscular Volume 93, Mean Corpuscular Hemoglobin 30, Mean Corpuscular 

Hemoglobin Concent 32, Red Cell Distribution Width 14.4, Platelet Count 298, 

Mean Platelet Volume 11.0H, Neutrophils (%) (Auto) 70, Lymphocytes (%) (Auto) 

21, Monocytes (%) (Auto) 6, Eosinophils (%) (Auto) 4, Basophils (%) (Auto) 0, 

Neutrophils # (Auto) 6.2, Lymphocytes # (Auto) 1.9, Monocytes # (Auto) 0.5, 

Eosinophils # (Auto) 0.3, Basophils # (Auto) 0.0, Blood Gas Puncture Site RIGHT 

RADIAL, Blood Gas Patient Temperature 36.8, Arterial Blood pH 7.49H, Arterial 

Blood Partial Pressure CO2 35, Arterial Blood Partial Pressure O2 65L, Arterial 

Blood HCO3 27, Arterial Blood Total CO2 27.9, Arterial Blood Oxygen Saturation 

93L, Arterial Blood Base Excess 3.6H, Trent Test POSITIVE, Blood Gas Ventilator 

Setting YES, Blood Gas Inspired Oxygen 21, Sodium Level 139, Potassium Level 

3.1L, Chloride Level 104, Carbon Dioxide Level 23, Anion Gap 12, Blood Urea 

Nitrogen 35H, Creatinine 2.16H, Estimat Glomerular Filtration Rate 23, 

BUN/Creatinine Ratio 16, Glucose Level 32*L, Calcium Level 7.8L, Phosphorus 

Level 3.6, Magnesium Level 1.9, B-Type Natriuretic Peptide 357.3H, Procalcitonin

12.26H


20 11:30: Glucometer 61L





Microbiology


20 Gram Stain - Final, Complete


         


20 Sputum Culture - Final, Complete


         YEAST


7/3/20 Blood Culture - Preliminary, Resulted


         No growth





Assessment/Plan


Assessment/Plan





(1) Severe sepsis


Status:  Acute


Assessment & Plan:  : Vitals improved, labs improving, Continue IV 

antibiotics





(2) Acute respiratory failure


Status:  Acute


Assessment & Plan:  : Self extubated this AM, continue to monitor


Qualifiers:  


   Qualified Codes:  J96.01 - Acute respiratory failure with hypoxia


(3) Altered mental status


Status:  Acute


Qualifiers:  


   Qualified Codes:  R41.82 - Altered mental status, unspecified


(4) Aspiration pneumonia


Status:  Acute


Assessment & Plan:  : Continue IV antibiotcs, Will titrate oxygen as 

tolerated


Qualifiers:  


   Qualified Codes:  J69.0 - Pneumonitis due to inhalation of food and vomit


(5) Acute kidney injury superimposed on chronic kidney disease


Status:  Acute


Assessment & Plan:  : Continue IVFs, renally dose medications





(6) Insulin dependent diabetes mellitus


Status:  Chronic


(7) Hypokalemia


Status:  Acute


Assessment & Plan:  : replaced, repeat BMP in AM





(8) DVT prophylaxis


Status:  Acute


Assessment & Plan:  - Lovenox








Clinical Quality Measures


AMI/AHF:


ASA po Prior to arrival:  No





DVT/VTE Risk/Contraindication:


Risk Factor Score Per Nursin


RFS Level Per Nursing on Admit:  4+=Very High











BERTHA EDWARDS MD 6, 2020 11:44

## 2020-07-06 NOTE — NUR
0810 THIS RN INTO PT'S ROOM DUE TO ALARMING OF VENTILATOR, UPON ARRIVAL TO ROOM PT HAVE 
EXTUBATED SELF. THIS RN APPLIED 02 VIA NC AT 2 LITERS AND SUCTIONED MOUTH AND NOTIFIED RT, 
RESTRAINTS REMOVED. PT AWAKE, ALERT WAS ABLE TO FOLLOW COMMANDS. BED RAILS UP X 4 AND BED 
ALARM ON. CALL LIGHT AND OTHER PERSONAL ITEMS WITHIN REACH WILL CONTINUE TO MONITOR. 

-------------------------------------------------------------------------------

Addendum: 07/06/20 at 1032 by SP BRUMFIELD RN

-------------------------------------------------------------------------------

DR ASHRAF ON FLOOR AND NOTIFIED BY RY COKER RN DIRECTOR ICU.

## 2020-07-06 NOTE — DIAGNOSTIC IMAGING REPORT
PROCEDURE: 

US Venous Lower Ext Anam.



TECHNIQUE: 

Multiple Real-time grayscale images were obtained over the lower

extremities in various projections, bilaterally. Additional

duplex Doppler and color Doppler images were also obtained.



INDICATION: 

Elevated d-dimer.



FINDINGS:

There is no evidence of right or left lower extremity DVT. Both

lower extremity deep venous systems demonstrate normal

compressibility with normal response to augmentation and

Valsalva. No fluid collection or mass is detected.



IMPRESSION: 

No evidence of right or left lower extremity DVT.



Dictated by: 



  Dictated on workstation # [id number]

## 2020-07-06 NOTE — OCC THERAPY PROGRESS NOTE
Therapy Progress Note


Pt newly on ventilator. Will follow.











ASHLEY LEMUS OT               Jul 4, 2020 11:12
Therapy Progress Note


Pt. continues to be sedated and on vent support. Will continue to monitor and 

evaluate when medically stable. 





0832











DAWN WHITE OT            Jul 6, 2020 08:32
35
35

## 2020-07-06 NOTE — PULMONARY PROGRESS NOTE
Subjective


Time Seen by a Provider:  04:50


Subjective/Events-last exam


Pt is sedated on vent





Sepsis Event


Evaluation


Height, Weight, BMI


Height: 5'5.00"


Weight: 151lbs. 1.0oz. 68.532807pv; 26.10 BMI


Method:Stated





Focused Exam


Lactate Level


7/4/20 11:50: Lactic Acid Level 1.13





Exam


Exam





Vital Signs








  Date Time  Temp Pulse Resp B/P (MAP) Pulse Ox O2 Delivery O2 Flow Rate FiO2


 


7/6/20 04:03    124/63    


 


7/6/20 03:45      Mechanical Ventilator 40.00 


 


7/6/20 03:30     91 Mechanical Ventilator  21


 


7/6/20 03:20 37.1     Mechanical Ventilator 21.00 


 


7/6/20 03:00  73 28 111/55 (73) 91 Mechanical Ventilator 21.00 


 


7/6/20 02:24  75 29  100   21


 


7/6/20 02:00  67 20 101/59 (73) 95 Mechanical Ventilator 21.00 


 


7/6/20 01:00  68      


 


7/6/20 01:00  68 26 108/59 (75) 95 Mechanical Ventilator 21.00 


 


7/6/20 00:00  70 28 106/61 (76) 95 Mechanical Ventilator 21.00 


 


7/5/20 23:55     94 Mechanical Ventilator  21


 


7/5/20 23:30 37.9 71 26 105/58 (74) 95 Mechanical Ventilator 21.00 


 


7/5/20 23:00  70 26 106/58 (74) 95 Mechanical Ventilator 21.00 


 


7/5/20 22:00  71 28 107/59 (75) 94 Mechanical Ventilator 21.00 


 


7/5/20 21:11      Mechanical Ventilator 21.00 


 


7/5/20 21:04  75 29  100   30


 


7/5/20 21:00  73 26 100/56 (71) 99 Mechanical Ventilator 30.00 


 


7/5/20 20:01  64  99/53    


 


7/5/20 20:00  64 16 100/55 (70) 99 Mechanical Ventilator 30.00 


 


7/5/20 20:00  64  99/53    


 


7/5/20 19:37 36.9       


 


7/5/20 19:15     98 Mechanical Ventilator  30


 


7/5/20 19:00  64      


 


7/5/20 19:00 36.9 64 28 106/59 (75) 98 Mechanical Ventilator 30.00 


 


7/5/20 18:35  63 27  98   30


 


7/5/20 18:00  64 16 114/60 (78) 98 Mechanical Ventilator 21.00 


 


7/5/20 17:00  65 17 120/64 (82) 99 Mechanical Ventilator 21.00 


 


7/5/20 16:00 36.5       


 


7/5/20 16:00  67 5 124/64 (84) 98 Mechanical Ventilator 21.00 


 


7/5/20 15:27     94 Mechanical Ventilator  21


 


7/5/20 15:00  69 13 132/70 (90) 99 Mechanical Ventilator 21.00 


 


7/5/20 14:52  68  137/72    


 


7/5/20 14:31  68 30  99   35


 


7/5/20 14:00  67 28 143/73 (96) 99 Mechanical Ventilator 21.00 


 


7/5/20 13:01  70      


 


7/5/20 13:00  70 10 142/74 (96) 100 Mechanical Ventilator 21.00 


 


7/5/20 12:00 36.7       


 


7/5/20 12:00  71 28 146/82 (103) 100 Mechanical Ventilator 21.00 


 


7/5/20 11:22     91 Mechanical Ventilator  21


 


7/5/20 11:00  74 30 140/79 (99) 100 Mechanical Ventilator 21.00 


 


7/5/20 10:40  75 31  100   35


 


7/5/20 10:23  82  131/79    


 


7/5/20 10:00  82 22 131/79 (96) 100 Mechanical Ventilator 21.00 


 


7/5/20 09:00  75 28 148/82 (104) 100 Mechanical Ventilator 21.00 


 


7/5/20 08:47  80  134/76    


 


7/5/20 08:00  80 8 132/78 (96) 99 Mechanical Ventilator 21.00 


 


7/5/20 08:00     91 Mechanical Ventilator  21


 


7/5/20 08:00 36.7       


 


7/5/20 07:26  80 31  99   35


 


7/5/20 07:00  77      


 


7/5/20 07:00  77 30 133/72 (92) 93 Mechanical Ventilator 21.00 


 


7/5/20 06:45 38.7       


 


7/5/20 06:00  79 27 139/75 (96) 92 Mechanical Ventilator 21.00 


 


7/5/20 05:30 38.9       


 


7/5/20 05:14 38.9       


 


7/5/20 05:00  83 25 132/75 (94) 91 Mechanical Ventilator 21.00 














I & O 


 


 7/6/20





 07:00


 


Intake Total 3950 ml


 


Output Total 425 ml


 


Balance 3525 ml








Height & Weight


Height: 5'5.00"


Weight: 151lbs. 1.0oz. 68.124870jz; 26.10 BMI


Method:Stated


General Appearance:  No Apparent Distress


HEENT:  PERRL/EOMI, Moist Mucous Membranes; No Scleral Icterus (L), No Scleral 

Icterus (R)


Neck:  Normal Inspection, Supple; No Thyromegaly


Respiratory:  Other (Coarse breath sounds noted with no wheezing few fine 

basilar rales no wheezing appreciated ventilating easily)


Cardiovascular:  Regular Rate, Rhythm, No Edema, No Gallop, No JVD, No Murmur


Capillary Refill:  Less Than 3 Seconds


Gastrointestinal:  non tender, soft


Extremity:  Other (1+ pedal and upper extremity edema noted.  The left lower 

extremity is warm with 2+ dorsalis pedis pulses.  The right foot is slightly 

cool it is not pallorous capillary refill is 3-4 seconds and are not able to 

appreciate dorsalis pedis or posterior tibial pulses.  No purpura is noted there

is no reaction to movement of the right lower extremity is unremarkable the calf

is not indurated nor erythematous.)


Neurologic/Psychiatric:  Other (Right foot is warmer today and no mottling is 

noted still not able to appreciate dorsalis pedis or posterior tibial pulses 

there is no evidence for pallor left side normal)


Skin:  Normal Color, Warm/Dry





Results


Lab


Laboratory Tests


7/5/20 02:30








7/6/20 03:15











Assessment/Plan


Assessment/Plan


Acute respiratory failure Tm 38.9 


   - Intubated on 7/4 


      -Will do weaning trail 


   -COVID is neg x    


   -Check Bilateral dopplers 


Acute renal failure 


   -Change IVF to LR at 125


   -Currently on Bicarb gtt and pt is alkalotic 


      -D/C bicarb gtt 


Hypokalemia


   -replace 


Severe Sepsis with PNA


   - Merrem and azithromycin 


   -Eraxis


Metabolic acidosis 


   -Recheck LA 


Anemia


   -Monitor 


ADITI on CKD Stage 3


   Continue IVF


   baseline creatinine around 1.6


   


IDDMII


HTN


HLD











JUAN ASHRAF DO               Jul 6, 2020 04:51

## 2020-07-06 NOTE — SPEECH THERAPY PROGRESS NOTE
Therapy Progress Note


Order was acknowledged by .  There is no speech therapy 

services available this date.  Per policy, nurse bedside swallow was performed. 

Patient was able to pass swallow with neck placed into a flexed position.  

Nursing was instructed to position patient into cervical flexion when giving 

food or liquid.  Speech modified barium swallow study to be performed 7/7/20.











JULIET BARTLETT PT                 Jul 6, 2020 13:58

## 2020-07-06 NOTE — DIAGNOSTIC IMAGING REPORT
Indication: Shortness of breath



Portable chest 3:23 AM



ET tube projects over the trachea. NG tube enters the stomach.

Right jugular central line tip projects over the SVC. There is

infiltrate in the right upper lobe. There may be some patchy

infiltrate at the right lung base. Left lung is clear.



IMPRESSION:

Pneumonia in the right lung unchanged from previous day.



Dictated by: 



  Dictated on workstation # [id number]

## 2020-07-06 NOTE — NUR
PT C/O OF PAIN STATES " I HURT ALL OVER," DR EDWARDS NOTIFIED AND NEW ORDERS RECEIVED. SEE 
ORDER HX. DR EDWARDS ALSO NOTIFIED THAT PT FAILED DYSPHAGIA SWALLOW STUDY AT BEDSIDE.

## 2020-07-06 NOTE — PHYSICAL THERAPY EVALUATION
PT Evaluation-General


Medical Diagnosis


Admission Date


Jun 30, 2020 at 18:35


Medical Diagnosis:  Leukocytosis/Pleuritic CP


Onset Date:  Jun 30, 2020





Therapy Diagnosis


Therapy Diagnosis:  debility/weakness





Height/Weight


Height (Feet):  5


Height (Inches):  5.00


Weight (Pounds):  151


Weight (Ounces):  1.0





Precautions


Precautions/Isolations:  Fall Prevention, Standard Precautions





Referral


Physician:  Mary


Reason for Referral:  Evaluation/Treatment





Medical History


Pertinent Medical History:  DM, HTN, Renal Insufficiency, Rheumatoid Arthritis, 

Smoking


Additional Medical History


Personality disorder


Current History


ER secondary to CP


Reviewed History:  Yes





Social History


Home:  Apartment





Prior


Prior Level of Function


SCALE: Activities may be completed with or without assistive devices.





6-Indepedent-patient completes the activity by him/herself with no assistance 

from a helper.


5-Set-up or Clean-up Assistance-helper sets up or cleans up; patient completes 

activity. [place] assists only prior to or  


    following the activity.


4-Supervision or Touching Assistance-helper provides verbal cues and/or 

touching/steadying and/or contact guard assistance as patient completes activi

ty. Assistance may be provided   


    throughout the activity or intermittently.


3-Partial/Moderate Assistance-helper does LESS THAN HALF the effort. [place] 

lifts, holds or supports trunk or limbs, but provides less than half the effort.


2-Substantial/Maximal Assistance-helper does MORE THAN HALF the effort. [place] 

lifts or holds trunk or limbs and provides more than half the effort.


8-Hotsicjnn-zjzfrr does ALL the effort. Patient does none of the effort to 

complete the activity. Or, the assistance of 2 or more helpers is required for 

the patient to complete the  


    activity.


If activity was not attempted, code reason:


7-Patient Refused.


9-Not Applicable-not attempted and the patient did not perform the activity 

before the current illness, exacerbation or injury.


10-Not Attempted due to Environmental Limitations-(lack of equipment, weather 

restraints, etc.).


88-Not Attempted due to Medical Conditions or Safety Concerns.


Bed Mobility:  6


Transfers (B,C,W/C):  6


Gait:  6


Stairs:  6


Indoor Mobility (Ambulation):  Independent


Stairs:  Independent


Prior Devices Use:  None





PT Evaluation-Current


Subjective


Patient agrees to PT.  Currently crying in pain with RN present to issue pain 

medication.





Pain





   Numeric Pain Scale:  10-Worst Possible Pain


   Location:  Soft Tissue


   Location Body Site:  Generalized


   Pain Description:  Acute





Objective


Patient Orientation:  Person, Time, Situation


Attachments:  Oxygen, Mathur Catheter, IV





ROM/Strength


ROM Lower Extremities


bilateral LE WFL


Strength Lower Extremities


3/5 grossly bilateral LE





Integumentary/Posture


Integumentary


refer to nursing notes


Bowel Incontinence:  Yes


Bladder Incontinence:  Mathur Cath


Posture


WFL





Neuromuscular


(Tone, Coordination, Reflexes)


grossly intact





Sensory


Vision:  Functional


Hearing:  Functional


Sensation Right Lower Extremit:  Intact


Sensation Left Lower Extremity:  Intact





Transfers


Roll Left to Right (QC):  3


Sit to Lying (QC):  3


Lying to Sitting/Side of Bed(Q:  3


Sit to Stand (QC):  3


Chair/Bed-to-Chair Xfer(QC):  3





Gait


Does the Patient Walk?:  No and Walking Goal IS indicated


Mode of Locomotion:  Walk


Anticipated Mode of Locomotion:  Walk





Balance


Sitting Static:  Normal


Sitting Dynamic:  Normal


Standing Static:  Fair


 Standing Dynamic:  Fair





Assessment/Needs


55 y.o. female, will benefit from skilled PT to address functional strength and 

mobility to improve current LOF to safely return to home at maximum LOF.


Rehab Potential:  Fair





PT Long Term Goals


Long Term Goals


PT Long Term Goals Time Frame:  Aug 1, 2020


Roll Left & Right (QC):  6


Sit to Lying (QC):  6


Lying-Sitting on Side/Bed(QC):  6


Sit to Stand (QC):  6


Chair/Bed-to-Chair Xfer(QC):  6


Toilet Transfer (QC):  6


Does the Patient Walk:  Yes


Walk 10 feet (QC):  6


Walk 50ft with 2 Turns (QC):  6


Walk 150 ft (QC):  6





PT Plan


Problem List


Problem List:  Activity Tolerance, Functional Strength, Safety, Balance, Gait, 

Transfer, Bed Mobility





Treatment/Plan


Treatment Plan:  Continue Plan of Care


Treatment Plan:  Bed Mobility, Education, Functional Activity Chi, Functional 

Strength, Gait, Safety, Therapeutic Exercise, Transfers


Treatment Duration:  Aug 1, 2020


Frequency:  6 times per week


Estimated Hrs Per Day:  .25 hour per day (to .5)


Patient and/or Family Agrees t:  Yes





Time/GCodes


Time In:  833


Time Out:  849


Total Billed Treatment Time:  16


Total Billed Treatment


1 visit


EVMod 16 min











HALIE SHERIDAN PT               Jul 6, 2020 09:56

## 2020-07-07 VITALS — DIASTOLIC BLOOD PRESSURE: 72 MMHG | SYSTOLIC BLOOD PRESSURE: 155 MMHG

## 2020-07-07 VITALS — SYSTOLIC BLOOD PRESSURE: 156 MMHG | DIASTOLIC BLOOD PRESSURE: 73 MMHG

## 2020-07-07 VITALS — DIASTOLIC BLOOD PRESSURE: 92 MMHG | SYSTOLIC BLOOD PRESSURE: 156 MMHG

## 2020-07-07 VITALS — DIASTOLIC BLOOD PRESSURE: 88 MMHG | SYSTOLIC BLOOD PRESSURE: 180 MMHG

## 2020-07-07 VITALS — DIASTOLIC BLOOD PRESSURE: 81 MMHG | SYSTOLIC BLOOD PRESSURE: 177 MMHG

## 2020-07-07 VITALS — DIASTOLIC BLOOD PRESSURE: 81 MMHG | SYSTOLIC BLOOD PRESSURE: 172 MMHG

## 2020-07-07 VITALS — SYSTOLIC BLOOD PRESSURE: 134 MMHG | DIASTOLIC BLOOD PRESSURE: 63 MMHG

## 2020-07-07 VITALS — DIASTOLIC BLOOD PRESSURE: 67 MMHG | SYSTOLIC BLOOD PRESSURE: 131 MMHG

## 2020-07-07 VITALS — DIASTOLIC BLOOD PRESSURE: 82 MMHG | SYSTOLIC BLOOD PRESSURE: 169 MMHG

## 2020-07-07 VITALS — DIASTOLIC BLOOD PRESSURE: 78 MMHG | SYSTOLIC BLOOD PRESSURE: 165 MMHG

## 2020-07-07 VITALS — SYSTOLIC BLOOD PRESSURE: 167 MMHG | DIASTOLIC BLOOD PRESSURE: 89 MMHG

## 2020-07-07 VITALS — SYSTOLIC BLOOD PRESSURE: 171 MMHG | DIASTOLIC BLOOD PRESSURE: 98 MMHG

## 2020-07-07 VITALS — SYSTOLIC BLOOD PRESSURE: 158 MMHG | DIASTOLIC BLOOD PRESSURE: 98 MMHG

## 2020-07-07 LAB
BASOPHILS # BLD AUTO: 0 10^3/UL (ref 0–0.1)
BASOPHILS NFR BLD AUTO: 0 % (ref 0–10)
BUN/CREAT SERPL: 15
CALCIUM SERPL-MCNC: 8.1 MG/DL (ref 8.5–10.1)
CHLORIDE SERPL-SCNC: 107 MMOL/L (ref 98–107)
CO2 SERPL-SCNC: 22 MMOL/L (ref 21–32)
CREAT SERPL-MCNC: 1.85 MG/DL (ref 0.6–1.3)
EOSINOPHIL # BLD AUTO: 0.4 10^3/UL (ref 0–0.3)
EOSINOPHIL NFR BLD AUTO: 4 % (ref 0–10)
ERYTHROCYTE [DISTWIDTH] IN BLOOD BY AUTOMATED COUNT: 14.5 % (ref 10–14.5)
GFR SERPLBLD BASED ON 1.73 SQ M-ARVRAT: 28 ML/MIN
GLUCOSE SERPL-MCNC: 82 MG/DL (ref 70–105)
HCT VFR BLD CALC: 28 % (ref 35–52)
HGB BLD-MCNC: 9.1 G/DL (ref 11.5–16)
LYMPHOCYTES # BLD AUTO: 1.6 X 10^3 (ref 1–4)
LYMPHOCYTES NFR BLD AUTO: 17 % (ref 12–44)
MAGNESIUM SERPL-MCNC: 1.8 MG/DL (ref 1.6–2.4)
MANUAL DIFFERENTIAL PERFORMED BLD QL: NO
MCH RBC QN AUTO: 30 PG (ref 25–34)
MCHC RBC AUTO-ENTMCNC: 32 G/DL (ref 32–36)
MCV RBC AUTO: 94 FL (ref 80–99)
MONOCYTES # BLD AUTO: 0.6 X 10^3 (ref 0–1)
MONOCYTES NFR BLD AUTO: 7 % (ref 0–12)
NEUTROPHILS # BLD AUTO: 6.8 X 10^3 (ref 1.8–7.8)
NEUTROPHILS NFR BLD AUTO: 72 % (ref 42–75)
PHOSPHATE SERPL-MCNC: 2.6 MG/DL (ref 2.3–4.7)
PLATELET # BLD: 419 10^3/UL (ref 130–400)
PMV BLD AUTO: 10.4 FL (ref 7.4–10.4)
POTASSIUM SERPL-SCNC: 3 MMOL/L (ref 3.6–5)
SODIUM SERPL-SCNC: 142 MMOL/L (ref 135–145)
WBC # BLD AUTO: 9.3 10^3/UL (ref 4.3–11)

## 2020-07-07 RX ADMIN — FENTANYL CITRATE PRN MCG: 50 INJECTION, SOLUTION INTRAMUSCULAR; INTRAVENOUS at 06:21

## 2020-07-07 RX ADMIN — POTASSIUM CHLORIDE SCH MEQ: 1500 TABLET, EXTENDED RELEASE ORAL at 02:14

## 2020-07-07 RX ADMIN — METOPROLOL TARTRATE SCH MG: 1 INJECTION, SOLUTION INTRAVENOUS at 11:29

## 2020-07-07 RX ADMIN — POTASSIUM CHLORIDE SCH MLS/HR: 200 INJECTION, SOLUTION INTRAVENOUS at 14:58

## 2020-07-07 RX ADMIN — ENOXAPARIN SODIUM SCH MG: 100 INJECTION SUBCUTANEOUS at 21:33

## 2020-07-07 RX ADMIN — POTASSIUM CHLORIDE SCH MLS/HR: 200 INJECTION, SOLUTION INTRAVENOUS at 11:29

## 2020-07-07 RX ADMIN — SODIUM CHLORIDE SCH MLS/HR: 900 INJECTION INTRAVENOUS at 07:45

## 2020-07-07 RX ADMIN — INSULIN ASPART SCH UNIT: 100 INJECTION, SOLUTION INTRAVENOUS; SUBCUTANEOUS at 17:43

## 2020-07-07 RX ADMIN — MAGNESIUM SULFATE IN DEXTROSE SCH MLS/HR: 10 INJECTION, SOLUTION INTRAVENOUS at 02:14

## 2020-07-07 RX ADMIN — POTASSIUM CHLORIDE SCH MLS/HR: 200 INJECTION, SOLUTION INTRAVENOUS at 06:16

## 2020-07-07 RX ADMIN — SODIUM CHLORIDE SCH MLS/HR: 900 INJECTION, SOLUTION INTRAVENOUS at 08:54

## 2020-07-07 RX ADMIN — ALBUTEROL SULFATE SCH PUFF: 90 AEROSOL, METERED RESPIRATORY (INHALATION) at 20:55

## 2020-07-07 RX ADMIN — ASPIRIN 81 MG CHEWABLE TABLET SCH MG: 81 TABLET CHEWABLE at 11:28

## 2020-07-07 RX ADMIN — SODIUM CHLORIDE SCH MLS/HR: 900 INJECTION INTRAVENOUS at 16:21

## 2020-07-07 RX ADMIN — FENTANYL CITRATE PRN MCG: 50 INJECTION, SOLUTION INTRAMUSCULAR; INTRAVENOUS at 20:02

## 2020-07-07 RX ADMIN — POTASSIUM CHLORIDE SCH MLS/HR: 200 INJECTION, SOLUTION INTRAVENOUS at 07:45

## 2020-07-07 RX ADMIN — POTASSIUM CHLORIDE SCH MLS/HR: 200 INJECTION, SOLUTION INTRAVENOUS at 02:13

## 2020-07-07 RX ADMIN — DEXTROSE MONOHYDRATE AND SODIUM CHLORIDE SCH MLS/HR: 5; .9 INJECTION, SOLUTION INTRAVENOUS at 11:30

## 2020-07-07 RX ADMIN — PANTOPRAZOLE SODIUM SCH MG: 40 INJECTION, POWDER, FOR SOLUTION INTRAVENOUS at 08:54

## 2020-07-07 RX ADMIN — POTASSIUM CHLORIDE SCH MLS/HR: 200 INJECTION, SOLUTION INTRAVENOUS at 12:50

## 2020-07-07 RX ADMIN — INSULIN ASPART SCH UNIT: 100 INJECTION, SOLUTION INTRAVENOUS; SUBCUTANEOUS at 05:19

## 2020-07-07 RX ADMIN — FENTANYL CITRATE PRN MCG: 50 INJECTION, SOLUTION INTRAMUSCULAR; INTRAVENOUS at 13:35

## 2020-07-07 RX ADMIN — ALBUTEROL SULFATE SCH PUFF: 90 AEROSOL, METERED RESPIRATORY (INHALATION) at 07:22

## 2020-07-07 RX ADMIN — FENTANYL CITRATE PRN MCG: 50 INJECTION, SOLUTION INTRAMUSCULAR; INTRAVENOUS at 02:52

## 2020-07-07 RX ADMIN — POTASSIUM CHLORIDE SCH MLS/HR: 200 INJECTION, SOLUTION INTRAVENOUS at 13:50

## 2020-07-07 RX ADMIN — SODIUM CHLORIDE SCH MLS/HR: 900 INJECTION INTRAVENOUS at 16:24

## 2020-07-07 RX ADMIN — ASPIRIN 81 MG CHEWABLE TABLET SCH MG: 81 TABLET CHEWABLE at 11:37

## 2020-07-07 RX ADMIN — INSULIN ASPART SCH UNIT: 100 INJECTION, SOLUTION INTRAVENOUS; SUBCUTANEOUS at 11:30

## 2020-07-07 RX ADMIN — INSULIN ASPART SCH UNIT: 100 INJECTION, SOLUTION INTRAVENOUS; SUBCUTANEOUS at 00:13

## 2020-07-07 RX ADMIN — ALBUTEROL SULFATE SCH PUFF: 90 AEROSOL, METERED RESPIRATORY (INHALATION) at 14:48

## 2020-07-07 RX ADMIN — METOPROLOL TARTRATE SCH MG: 1 INJECTION, SOLUTION INTRAVENOUS at 06:16

## 2020-07-07 NOTE — NUR
CM/SS visited with patient for discharge planning. 



The patient seemed tired and just received her lunch. This ss had a short visit. 



She states that she still has the private caregiver list from her last hospital stay that 
this sw gave her; however, she states she did not have time to call. According to the 
patient, she has allotted hours for caregivers through [employer] but does not have any currently 
working. The patients care coordinator through [facility] is Dutch Leo ([phone]). CM/SS 
attempted to contact Dutch. This sw left a voice mail with call back number. 



CM/SS discussed different options for skilled therapies if patient does not improve with 
therapy. She verbalized understanding. CM/SS will continue to follow.

## 2020-07-07 NOTE — NUR
SPOKE WITH THE PT AND WENT THRU THE EXT MED HISTORY TO COMPLETE THE MED REC



[facility] 10MG- LAST FILLED ON 01- #15. WHEN PT WAS HERE RECENTLY AND I SPOKE WITH 
HER ON 06- SHE SAID SHE WAS STILL TAKING AND WAS GOING TO FOLLOW UP WITH ANY AS 
TO WHY THEY HAVE NOT FILLED IT (THEY BUBBLE PACK FOR HER) WHEN I SPOKE WITH HER TODAY PT 
INDICATED SHE HAD NOT TALKED WITH ANY YET- I CALLED ANY AND THEY WERE ABLE TO TELL 
ME THAT THE RX DOES NOT HAVE A REFILL. THE PRESCRIBER THEY HAVE ON FILE DOES NOT LOOK TO BE 
THE PCP OR A CURRENT PRESCRIBER. I LEFT AN ATTENTION TRIANGLE SO I CAN FOLLOW UP WITH EITHER 
HER ATTENDING DR OR ANDREWS TO SEE IF WE CAN GET HER A NEW RX.



OTC MEDS:

VIT C

VIT D

MELATONIN

POTASSIUM GLUC 99MG

## 2020-07-07 NOTE — ST DYSPHAGIA EVALUATION
Speech Evaluation-General


Medical Diagnosis


Sepsis with PNA


Onset Date:  Jun 30, 2020





Therapy Diagnosis


Therapy Diagnosis:  Oropharyngeal Dysphagia





Precautions


Precautions:  Aspiration





Referral


Referring Physician:  Dr. Foster





Medical History


Pertinent Medical History:  DM, HTN, Renal Insufficiency, Rheumatoid Arthritis, 

Smoking


Reviewed History:  Yes





Social History


Current Living Status:  Alone





Speech PLF/Current-Dysphagia


Prior Level of Function


Patient lived home alone where she was independent for her daily needs.





Subjective


Patient was cooperative with the Bedside Dysphagia Evaluation.





Cognitive Status


Patient Orientation:  Person, Place, Situation





Oral Motor Skills


Dentition:  Natural, Tumbled, Stained


Ability to Follow Directions:  Good


Patient NPO pending BDE


Oral Expression Ability:  Mild Impairment





Voice


Voice Phonatory-Based Quality:  Normal


Voice Pitch:  Normal


Voice Loudness:  Mildly Soft/Quiet





Face


Facial Symmetry:  Symmetrical





Oral-Facial Assessment


Oral-Facial Dentition:  Normal


Labial Seal Description:  Reduced ROM


Smile:  Reduced ROM


Puff Cheeks:  Reduced Strength


Lingual Protrusion:  Abnormal


Lingual ROM:  Abnormal


Lingual Strength:  Abnormal


Pharynx Velopharyngeal Move.:  Normal


Volitional Dry Swallow:  Yes


Voluntary Cough:  Yes


Can Clear Throat Volitionally:  Yes





Dysphagia Evaluation


Consistencies Presented:  Thin Liquid, Mechanical Soft, Pureed


Oral phase is grossly within normal limits for all consistencies presented.


Pharyngeal phase is within normal limits for all consistencies presented.


Dietary Recommendations:  Mechanical Soft


Liquid Recommendations:  Thin


Swallowing Precautions:  Alternate Liquids/Solids, Decreased Bolus 1/2 Tsp, 

Liquids from Straw, Small Bites and Sips, Sitting Upright 90 Degrees, Sitting 90

Degrees 30 Post Intake


Dysphagia Evaluation Summary


Patient is a 54 y/o female who was admitted to the hospital due to sepsis and 

pneumonia. Patient was sitting up in her chair for the BDE. Patient was 

presented 1/2 tsp of thin liquids x3 and small sips via straw x2 without 

difficulty or s/s of aspiration. Patient was given 1/2 tsp of puree and 

mechanical soft without difficulty. Patient is not ready for regular at this 

time. Patient is recommended for Dysphagia II with thin liquids. This 

information was given to her nurse, Che and written on the white board in her

room.





Speech Short Term Goals


Short Term Goals


Short Term Goals


1) Patient will tolerate least restrictive diet level without s/s of aspiration 

at 90% or greater.


2) Patient will utilize compensatory strategies for safe oral intake at 90% or 

greater with minimal cues.





Speech Long Term Goals


Long Term Goals


Patient will maintain adequate nutrition/hydration via safe effective swallow 

function.





Speech-Plan


Patient/Family Goals


Patient/Family Goals:  


Patient plans on returning to her home upon discharge.





Treatment Plan


Speech Therapy Treatment Plan:  Continue Plan of Care


Treatment Duration:  Jul 10, 2020


Frequency:  2 times per week


Estimated Hrs Per Day:  .25 hour per day (Patient)


Rehab Potential:  Fair


Barriers to Learning:  


Patient's medical status


Pt/Family Agrees to Plan:  Yes





Safety Risks/Education


Teaching Recipient:  Patient


Teaching Methods:  Demonstration, Discussion


Response to Teaching:  Verbalize Understanding, Return Demonstration


Education Topics Provided:  


Safety of oral intake and diet level





Time


Speech Therapy Time In:  09:30


Speech Therapy Time Out:  09:45


Total Billed Time:  15


Billed Treatment Time


[address]             Jul 7, 2020 15:25

## 2020-07-07 NOTE — PULMONARY PROGRESS NOTE
Subjective


Time Seen by a Provider:  04:54


Subjective/Events-last exam


PT extubated yesterday. Appears to be doing well off vent.





Sepsis Event


Evaluation


Height, Weight, BMI


Height: 5'5.00"


Weight: 151lbs. 1.0oz. 68.669694fw; 26.10 BMI


Method:Stated





Focused Exam


Lactate Level


7/4/20 11:50: Lactic Acid Level 1.13





Exam


Exam





Vital Signs








  Date Time  Temp Pulse Resp B/P (MAP) Pulse Ox O2 Delivery O2 Flow Rate FiO2


 


7/7/20 04:00     97 Room Air  


 


7/7/20 02:13     95 Room Air  


 


7/7/20 02:00  98 23 158/98 (118) 92 Room Air  


 


7/7/20 01:00  100      


 


7/7/20 01:00  99 26 167/89 (115) 94 Room Air  


 


7/7/20 00:00  98 20 180/88 (118) 96 Room Air  


 


7/7/20 00:00     97 Room Air  


 


7/6/20 23:59 36.6       


 


7/6/20 23:00  110 24 167/77 (107) 94 Room Air  


 


7/6/20 22:00  106 27 156/71 (99) 92 Room Air  


 


7/6/20 21:01 37.4 104   98   


 


7/6/20 21:00  112 27 178/95 (122) 94 Room Air  


 


7/6/20 20:14 37.4 104 22 170/95 (120) 95 Room Air  


 


7/6/20 20:13     98 Room Air  


 


7/6/20 20:12      Room Air  


 


7/6/20 20:00     96 Room Air  


 


7/6/20 20:00 37.6       


 


7/6/20 19:00  101      


 


7/6/20 19:00  103 22 170/95 (120) 99 Nasal Cannula 2.00 


 


7/6/20 18:00  92 24 173/83 (113) 95 Nasal Cannula 2.00 


 


7/6/20 17:00  104 19 163/75 (104) 94 Nasal Cannula 2.00 


 


7/6/20 16:19     95 Room Air  


 


7/6/20 16:16 37.4       


 


7/6/20 16:00  107 12 169/76 (107) 95 Nasal Cannula 2.00 


 


7/6/20 16:00 37.8       


 


7/6/20 15:33 37.8       


 


7/6/20 15:00  107 12 171/78 (109) 94 Nasal Cannula 2.00 


 


7/6/20 14:40     95 Room Air  


 


7/6/20 14:00  97 13 113/80 (91) 94 Nasal Cannula 2.00 


 


7/6/20 13:00  93 16 176/78 (110) 97 Nasal Cannula 2.00 


 


7/6/20 12:34      Nasal Cannula 2.00 


 


7/6/20 12:25  99      


 


7/6/20 12:00 37.6       


 


7/6/20 12:00  107  175/91 (119) 96 Nasal Cannula 2.00 


 


7/6/20 10:47 37.1 99   96   28


 


7/6/20 10:00  98 23  95 Nasal Cannula 2.00 


 


7/6/20 09:40     93 Nasal Cannula 2.00 


 


7/6/20 09:00  80 19 135/71 (92) 95 Nasal Cannula 2.00 


 


7/6/20 08:25     91 Mechanical Ventilator  21


 


7/6/20 08:22      Nasal Cannula 2.00 


 


7/6/20 08:00  74 14 117/69 (85) 100 Mechanical Ventilator 40.00 


 


7/6/20 07:12  72 25  96   


 


7/6/20 07:00  69      


 


7/6/20 07:00  66 26 124/67 (86) 99 Mechanical Ventilator 40.00 


 


7/6/20 06:00  65 13 125/69 (87) 100 Mechanical Ventilator 40.00 


 


7/6/20 05:00  67 22 119/64 (82) 100 Mechanical Ventilator 40.00 














I & O 


 


 7/7/20





 06:59


 


Intake Total 2130 ml


 


Output Total 825 ml


 


Balance 1305 ml








Height & Weight


Height: 5'5.00"


Weight: 151lbs. 1.0oz. 68.285393jl; 26.10 BMI


Method:Stated


General Appearance:  No Apparent Distress


HEENT:  PERRL/EOMI, Moist Mucous Membranes; No Scleral Icterus (L), No Scleral 

Icterus (R)


Neck:  Normal Inspection, Supple; No Thyromegaly


Respiratory:  Other (Coarse breath sounds noted with no wheezing few fine 

basilar rales no wheezing appreciated ventilating easily)


Cardiovascular:  Regular Rate, Rhythm, No Edema, No Gallop, No JVD, No Murmur


Capillary Refill:  Less Than 3 Seconds


Gastrointestinal:  non tender, soft


Extremity:  Other (1+ pedal and upper extremity edema noted.  The left lower 

extremity is warm with 2+ dorsalis pedis pulses.  The right foot is slightly 

cool it is not pallorous capillary refill is 3-4 seconds and are not able to 

appreciate dorsalis pedis or posterior tibial pulses.  No purpura is noted there

is no reaction to movement of the right lower extremity is unremarkable the calf

is not indurated nor erythematous.)


Neurologic/Psychiatric:  Other (Right foot is warmer today and no mottling is 

noted still not able to appreciate dorsalis pedis or posterior tibial pulses 

there is no evidence for pallor left side normal)


Skin:  Normal Color, Warm/Dry





Results


Lab


Laboratory Tests


7/6/20 03:15








7/7/20 02:40











Assessment/Plan


Assessment/Plan


Acute respiratory failure-- improving 


   Extubated yesterday 


   -COVID is neg x 2   


   -Check Bilateral dopplers 


RUL PNA


   -Will need repeat CT scan as out pt 8wks after discharge


Acute renal failure 


   - IVF LR at 125


Hypokalemia


   -replace 


Severe Sepsis with PNA


   - Currently on Merrem and azithromycin 


   -Eraxis


Metabolic acidosis 


   -Recheck LA 


Anemia


   -Monitor 


ADITI on CKD Stage 3


   Continue IVF


   baseline creatinine around 1.6


   


IDDMII


HTN


HLD











JUAN ASHRAF DO               Jul 7, 2020 05:00

## 2020-07-07 NOTE — NUR
PATIENT TO ROOM 413 VIA WHEELCHAIR ACCOMPANIED BY RN SEAN LOPEZ.  ICU RN LARRY GAVE 
REPORT TO  THEA WHO THEN PASSED REPORT TO THIS RN.  VITALS TAKEN BY [facility]. 
PATIENT DENIES ANY NEEDS AT THIS TIME.  WILL CONTINUE TO MONITOR.

## 2020-07-07 NOTE — OCCUPATIONAL THERAPY EVAL
OT Evaluation-General/PLF


Medical Diagnosis


Admission Date


Jun 30, 2020 at 18:35


Medical Diagnosis:  Sepsis with PNA


Onset Date:  Jun 30, 2020





Therapy Diagnosis


Therapy Diagnosis:  Weakness





Height/Weight


Height (Feet):  5


Height (Inches):  5.00


Weight (Pounds):  151


Weight (Ounces):  1.0





Precautions


Precautions/Isolations:  Fall Prevention, Standard Precautions





Weight Bear Status


Weight Bearing Restriction:  Weight Bearing/Tolerated





Referral


Physician:  Mary


Referral Reason:  Activity Tolerance, Self Care, Evaluation/Treatment, 

Strengthening/ROM





Medical History


Pertinent Medical History:  DM, HTN, Renal Insufficiency, Rheumatoid Arthritis, 

Smoking


Additional Medical History


Anemia, ARF, ADITI on CKD stage 3.


Current History


Pt. came to ER with right sided chest pain and fever.  Pt. had to be placed on 

vent and was extubated 7/6/20.


Reviewed History:  Yes





Social History


Home:  Single Level


Current Living Status:  Alone


Entry Into Home:  Stairs With Railing


 Steps Into Home:  3





ADL-Prior Level of Function


SCALE: Activities may be completed with or without assistive devices.





6-Indepedent-patient completes the activity by him/herself with no assistance 

from a helper.


5-Set-up or Clean-up Assistance-helper sets up or cleans up; patient completes 

activity. [place] assists only prior to or  


    following the activity.


4-Supervision or Touching Assistance-helper provides verbal cues and/or 

touching/steadying and/or contact guard assistance as patient completes 

activity. Assistance may be provided   


    throughout the activity or intermittently.


3-Partial/Moderate Assistance-helper does LESS THAN HALF the effort. [place] 

lifts, holds or supports trunk or limbs, but provides less than half the effort.


2-Substantial/Maximal Assistance-helper does MORE THAN HALF the effort. [place] 

lifts or holds trunk or limbs and provides more than half the effort.


6-Xziextmnr-jrssjw does ALL the effort. Patient does none of the effort to 

complete the activity. Or, the assistance of 2 or more helpers is required for 

the patient to complete the  


    activity.


If activity was not attempted, code reason:


7-Patient Refused.


9-Not Applicable-not attempted and the patient did not perform the activity 

before the current illness, exacerbation or injury.


10-Not Attempted due to Environmental Limitations-(lack of equipment, weather 

restraints, etc.).


88-Not Attempted due to Medical Conditions or Safety Concerns.


ADL PLOF Comments


Pt. reports that she has a caregiver that assists her.  She states that she has 

assistance with bathing, dressing, cooking, cleaning, and all other IADLs.  Pt. 

reports however that she still drives.  Reports that her caregiver stays with 

her every night and that she gets 32 during daytime hours throughout the week.


Self Care:  Needed Some Help


Functional Cognition:  Unknown


DME/Equipment:  Bath Chair, Tub/Shower


DME/Equipment Comments


Pt. reports that she uses a walker.


Drive Self:  Yes





OT Current Status


Subjective


Pt. reports pain, "all over" with movement.  Does not report pain level.  Pt. 

has had pain medication.





Appearance


Pt. in bed.  Pt. is alert and states that she is willing to work with therapy.





Mental Status/Objective


Patient Orientation:  Person


Attachments:  Mathur Catheter, IV, Telemetry





Current


Hand Dominance:  Right


Upper Extremity ROM


Pt. is limited in shoulder ROM.  Reports that she has a lot of difficulty with 

left shoulder due to RA, but seems limited with movement in both.


Upper Extremity Coordination


Impaired.


Pt. inconsistent with bilateral UE strength/ROM testing.  Pt. demonstrates 

difficulty making fist, but then is able to do with increased time and cues.  

Reports great difficulty with raising bilateral arms at shoulder level, but is 

able to do with time.  However, when she is handed a brush she is unable to 

bring it to her head.  Increased  strength noted in left hand when pt. is 

distracted.





Other Treatments


Pt. is agreeable to work with therapy.  Pt. reports that she would like to get 

better so that she can go home.  Pt. requires max assist for supine-sit.  Pt. 

yells out and reports that she has pain everywhere.  Pt. is able to maintain 

sitting balance.  Participated in UE assessment.  Pt. handed brush and 

encouraged to brush hair.  She attempts to bring to head but stops residential and 

reports she can't go further.  OT brushes hair for her.  When handed a warm 

washcloth and asked to wash her face, she is able to do so.  Pt. agrees to 

transfer to reclining chair.  Stands with min assist with walker and begins to 

cry as she transfers.  Pt. is encouraged.  Chair alarm set and all needs met.  

Pt. is alert and seems calm after set up in chair.  All needs are met.





Education


OT Patient Education:  Correct positioning, Modified ADL techniques, Progress 

toward Goal/Update tx plan, Purpose of tx/functional activities, Reviewed 

precautions, Rehab process, Transfer techniques


Teaching Recipient:  Patient


Teaching Methods:  Demonstration, Discussion


Response to Teaching:  Verbalize Understanding, Return Demonstration, Reinforce

ment Needed





OT Long Term Goals


Long Term Goals


Time Frame:  Jul 21, 2020


Eating (QC):  6


Oral Hygiene (QC):  5


Toileting Hygiene (QC):  4


Additional Goals:  1-Demonstrate ADL Tasks, 2-Verbalize Understanding, 3-

ImproveStrength/Chi


1=Demonstrate adherence to instructed precautions during ADL tasks.


2=Patient will verbalize/demonstrate understanding of assistive 

devices/modifications for ADL.


3=Patient will improve strength/tolerance for activity to enable patient to 

perform ADL's.


Pt. will improve overall strength and ADL transfer skills so that she can return

to prior baseline.  At prior baseline, pt. had caregiving assistance for all 

ADLs and IADLs.





OT Education/Plan


Problem List/Assessment


Assessment:  Decreased Activ Tolerance, Decreased Safety Aware, Decreased UE 

Strength, Dependent Transfers, Edema, Impaired Bed Mobility, Impaired Cognition,

Impaired Coordination, Impaired Funct Balance, Impaired I ADL's, Impaired Self-

Care Skills, Restricted Funct UE ROM





Discharge Recommendations


Plan/Recommendations:  Continue POC


Therapy Discharge Recommendati:  Scheduled Assistance, Home & Family, Post Acute

OT





Treatment Plan/Plan of Care


Treatment,Training & Education:  Yes


Patient would benefit from OT for education, treatment and training to promote 

independence in ADL's, mobility, safety and/or upper extremity function for 

ADL's.


Plan of Care:  ADL Retraining, Functional Mobility, UE Funct Exercise/Act


Treatment Duration:  Jul 21, 2020


Frequency:  5 times per week


Estimated Hrs Per Day:  .25 hour per day


Agreement:  Yes


Rehab Potential:  Fair





Time/GCodes


Start Time:  08:25


Stop Time:  08:45


Total Time Billed (hr/min):  20


Billed Treatment Time


1, DAWN MANCILLA OT            Jul 7, 2020 10:09

## 2020-07-07 NOTE — PROGRESS NOTE
Subjective


Subjective/Events-last exam


Patient states that she is feeling much more comfortable this AM. She is up 

sitting in chair. Tolerating PO diet. States that she is not sure if she has had

BM.


Review of Systems


Pulmonary:  Dyspnea, Cough


Cardiovascular:  No: Chest Pain, Palpitations


Gastrointestinal:  No: Nausea, Vomiting, Abdominal Pain


Musculoskeletal:  other (generalized pain)


Neurological:  Weakness, Incoordination





Objective


Exam


Last Set of Vital Signs





Vital Signs








  Date Time  Temp Pulse Resp B/P (MAP) Pulse Ox O2 Delivery O2 Flow Rate FiO2


 


20 19:39 37.6 122 20 165/78 (107) 93 Room Air  


 


20 19:00       2.00 


 


20 10:47        28





Capillary Refill : Less Than 3 Seconds


I&O











Intake and Output 


 


 20





 00:00


 


Intake Total 2630 ml


 


Output Total 1050 ml


 


Balance 1580 ml


 


 


 


Intake Oral 0 ml


 


IV Total 2540 ml


 


Tube Feeding 60 ml


 


Other 30 ml


 


Output Urine Total 1050 ml








General:  Alert, Oriented X3, Mild Distress (short of breath with minimal 

activity)


HEENT:  Mucous Memb Moist/Pink


Lungs:  Other (end exp wheezing and increased work of breathing)


Heart:  Regular Rate, No Murmurs


Abdomen:  Normal Bowel Sounds, Soft, No Tenderness, No Masses


Extremities:  No Edema, No Tenderness/Swelling


Skin:  No Rashes, No Breakdown


Neuro:  Normal Speech, Sensation Intact, Cranial Nerves 3-12 NL





Results/Procedures


Lab


Laboratory Tests


20 20:40: Glucometer 209H


20 23:51: Glucometer 146H


20 02:40: 


White Blood Count 9.3, Red Blood Count 3.03L, Hemoglobin 9.1L, Hematocrit 28L, 

Mean Corpuscular Volume 94, Mean Corpuscular Hemoglobin 30, Mean Corpuscular 

Hemoglobin Concent 32, Red Cell Distribution Width 14.5, Platelet Count 419H, 

Mean Platelet Volume 10.4, Neutrophils (%) (Auto) 72, Lymphocytes (%) (Auto) 17,

Monocytes (%) (Auto) 7, Eosinophils (%) (Auto) 4, Basophils (%) (Auto) 0, 

Neutrophils # (Auto) 6.8, Lymphocytes # (Auto) 1.6, Monocytes # (Auto) 0.6, 

Eosinophils # (Auto) 0.4H, Basophils # (Auto) 0.0, Sodium Level 142, Potassium 

Level 3.0L, Chloride Level 107, Carbon Dioxide Level 22, Anion Gap 13, Blood 

Urea Nitrogen 27H, Creatinine 1.85H, Estimat Glomerular Filtration Rate 28, 

BUN/Creatinine Ratio 15, Glucose Level 82, Calcium Level 8.1L, Phosphorus Level 

2.6, Magnesium Level 1.8


20 11:30: Glucometer 176H


20 17:32: Glucometer 402*H





Microbiology


20 Gram Stain - Final, Complete


         


20 Sputum Culture - Final, Complete


         YEAST


7/3/20 Blood Culture - Preliminary, Resulted


         No growth





Assessment/Plan


Assessment/Plan





(1) Severe sepsis


Status:  Acute


Assessment & Plan:  : Vitals improved, labs improving, Continue IV 

antibiotics





(2) Acute respiratory failure


Status:  Acute


Assessment & Plan:  : Self extubated this AM, continue to monitor


: No home O2 requirement, will continue to titrate as tolerated, interval 

increase in consolidation in RUL


Qualifiers:  


   Qualified Codes:  J96.01 - Acute respiratory failure with hypoxia


(3) Altered mental status


Status:  Resolved


Qualifiers:  


   Qualified Codes:  R41.82 - Altered mental status, unspecified


(4) Aspiration pneumonia


Status:  Acute


Assessment & Plan:  : Continue IV antibiotcs, Will titrate oxygen as 

tolerated


Qualifiers:  


   Qualified Codes:  J69.0 - Pneumonitis due to inhalation of food and vomit


(5) Acute kidney injury superimposed on chronic kidney disease


Status:  Acute


Assessment & Plan:  : Continue IVFs, renally dose medications


: BUN/Cr improving, continue to monitor





(6) Insulin dependent diabetes mellitus


Status:  Chronic


Assessment & Plan:  : Increased levemir today due to feeding and increase in 

blood sugars





(7) Hypokalemia


Status:  Acute


Assessment & Plan:  : replaced, repeat BMP in AM


: Replace and repeat BMP





(8) DVT prophylaxis


Status:  Acute


Assessment & Plan:  - Lovenox








Clinical Quality Measures


AMI/AHF:


ASA po Prior to arrival:  No





DVT/VTE Risk/Contraindication:


Risk Factor Score Per Nursin


RFS Level Per Nursing on Admit:  4+=Very High











BERTHA EDWARDS MD                2020 20:02

## 2020-07-07 NOTE — PHYSICAL THERAPY DAILY NOTE
PT Daily Note-Current


Subjective


Patient up in recliner and will transfer to [address] [place] [address] at this time.  Agrees to

PT.





Pain





   Numeric Pain Scale:  5-Moderate Pain


   Location:  Soft Tissue


   Location Body Site:  Generalized


   Comment:  FLACC/yelling





Mental Status


Patient Orientation:  Person, Time, Situation


Attachments:  Mathur Catheter, IV





Transfers


SCALE: Activities may be completed with or without assistive devices.





6-Indepedent-patient completes the activity by him/herself with no assistance 

from a helper.


5-Set-up or Clean-up Assistance-helper sets up or cleans up; patient completes 

activity. [place] assists only prior to or  


    following the activity.


4-Supervision or Touching Assistance-helper provides verbal cues and/or 

touching/steadying and/or contact guard assistance as patient completes 

activity. Assistance may be provided   


    throughout the activity or intermittently.


3-Partial/Moderate Assistance-helper does LESS THAN HALF the effort. [place] 

lifts, holds or supports trunk or limbs, but provides less than half the effort.


2-Substantial/Maximal Assistance-helper does MORE THAN HALF the effort. [place] 

lifts or holds trunk or limbs and provides more than half the effort.


0-Stehrrkmc-nbijrw does ALL the effort. Patient does none of the effort to 

complete the activity. Or, the assistance of 2 or more helpers is required for 

the patient to complete the  


    activity.


If activity was not attempted, code reason:


7-Patient Refused.


9-Not Applicable-not attempted and the patient did not perform the activity 

before the current illness, exacerbation or injury.


10-Not Attempted due to Environmental Limitations-(lack of equipment, weather 

restraints, etc.).


88-Not Attempted due to Medical Conditions or Safety Concerns.


Sit to Stand (QC):  4





Gait Training


Does the Patient Walk?:  Yes


Distance:  150'


Walk 10 feet (QC):  4


Walk 50 ft with 2 Turns(QC):  4


Walk 150 ft (QC):  4


Gait Assistive Device:  FWW


slow, steady gait sequence





Exercises


Seated Therapy Exercises:  Ankle pumps, Long arc quads


Seated Reps:  15





Assessment


Patient tolerated treatment well and appears to self limit.  Patient transferred

to [address] [place] [address] for continued care.  PT to increase activity as tolerated by 

patient.





PT Long Term Goals


Long Term Goals


PT Long Term Goals Time Frame:  Aug 1, 2020


Roll Left & Right (QC):  6


Sit to Lying (QC):  6


Lying-Sitting on Side/Bed(QC):  6


Sit to Stand (QC):  6


Chair/Bed-to-Chair Xfer(QC):  6


Toilet Transfer (QC):  6


Does the Patient Walk:  Yes


Walk 10 feet (QC):  6


Walk 50ft with 2 Turns (QC):  6


Walk 150 ft (QC):  6





PT Plan


Treatment/Plan


Treatment Plan:  Continue Plan of Care


Treatment Plan:  Bed Mobility, Education, Functional Activity Chi, Functional 

Strength, Gait, Safety, Therapeutic Exercise, Transfers


Treatment Duration:  Aug 1, 2020


Frequency:  6 times per week


Estimated Hrs Per Day:  .25 hour per day (to .5)


Patient and/or Family Agrees t:  Yes





Time/GCodes


Time In:  903


Time Out:  916


Total Billed Treatment Time:  13


Total Billed Treatment


1 visit


GT 13 min











HALIE SHERIDAN PT               Jul 7, 2020 10:13

## 2020-07-07 NOTE — DIAGNOSTIC IMAGING REPORT
INDICATION:  Post extubation, infiltrate.



TECHNIQUE:  Single view chest 3:26 AM.



CORRELATION STUDY:  07/06/2020



FINDINGS: 

Interval extubation removal of nasogastric tube and right IJ

central line. Heart size is stable.  

Extensive, dense infiltrate of the right upper lobe is present.

While the overall severity of consolidation appears slightly

diminished, the geographic area of infiltrate appears increased.



IMPRESSION: 

1. Interval extubation.

2. Persistent right upper lobe consolidation most compatible with

pneumonia. The overall regional area of involvement appears

slightly increased from prior.



Dictated by: 



  Dictated on workstation # [id number]

## 2020-07-08 VITALS — DIASTOLIC BLOOD PRESSURE: 74 MMHG | SYSTOLIC BLOOD PRESSURE: 141 MMHG

## 2020-07-08 VITALS — SYSTOLIC BLOOD PRESSURE: 164 MMHG | DIASTOLIC BLOOD PRESSURE: 85 MMHG

## 2020-07-08 VITALS — SYSTOLIC BLOOD PRESSURE: 133 MMHG | DIASTOLIC BLOOD PRESSURE: 71 MMHG

## 2020-07-08 VITALS — SYSTOLIC BLOOD PRESSURE: 131 MMHG | DIASTOLIC BLOOD PRESSURE: 75 MMHG

## 2020-07-08 VITALS — SYSTOLIC BLOOD PRESSURE: 129 MMHG | DIASTOLIC BLOOD PRESSURE: 69 MMHG

## 2020-07-08 VITALS — DIASTOLIC BLOOD PRESSURE: 86 MMHG | SYSTOLIC BLOOD PRESSURE: 142 MMHG

## 2020-07-08 VITALS — SYSTOLIC BLOOD PRESSURE: 128 MMHG | DIASTOLIC BLOOD PRESSURE: 70 MMHG

## 2020-07-08 RX ADMIN — ALBUTEROL SULFATE SCH PUFF: 90 AEROSOL, METERED RESPIRATORY (INHALATION) at 19:38

## 2020-07-08 RX ADMIN — ALBUTEROL SULFATE SCH PUFF: 90 AEROSOL, METERED RESPIRATORY (INHALATION) at 02:22

## 2020-07-08 RX ADMIN — INSULIN ASPART SCH UNIT: 100 INJECTION, SOLUTION INTRAVENOUS; SUBCUTANEOUS at 19:44

## 2020-07-08 RX ADMIN — SODIUM CHLORIDE SCH MLS/HR: 900 INJECTION INTRAVENOUS at 09:12

## 2020-07-08 RX ADMIN — POTASSIUM CHLORIDE SCH MEQ: 1500 TABLET, EXTENDED RELEASE ORAL at 09:49

## 2020-07-08 RX ADMIN — CYCLOBENZAPRINE HYDROCHLORIDE PRN MG: 10 TABLET, FILM COATED ORAL at 20:31

## 2020-07-08 RX ADMIN — ENOXAPARIN SODIUM SCH MG: 100 INJECTION SUBCUTANEOUS at 20:31

## 2020-07-08 RX ADMIN — INSULIN ASPART SCH UNIT: 100 INJECTION, SOLUTION INTRAVENOUS; SUBCUTANEOUS at 13:29

## 2020-07-08 RX ADMIN — METOPROLOL SUCCINATE SCH MG: 50 TABLET, EXTENDED RELEASE ORAL at 09:13

## 2020-07-08 RX ADMIN — ALBUTEROL SULFATE SCH PUFF: 90 AEROSOL, METERED RESPIRATORY (INHALATION) at 11:24

## 2020-07-08 RX ADMIN — ALBUTEROL SULFATE SCH PUFF: 90 AEROSOL, METERED RESPIRATORY (INHALATION) at 22:11

## 2020-07-08 RX ADMIN — INSULIN ASPART SCH UNIT: 100 INJECTION, SOLUTION INTRAVENOUS; SUBCUTANEOUS at 01:13

## 2020-07-08 RX ADMIN — SODIUM CHLORIDE SCH MLS/HR: 900 INJECTION INTRAVENOUS at 16:46

## 2020-07-08 RX ADMIN — FLUCONAZOLE SCH MG: 100 TABLET ORAL at 09:13

## 2020-07-08 RX ADMIN — INSULIN ASPART SCH UNIT: 100 INJECTION, SOLUTION INTRAVENOUS; SUBCUTANEOUS at 06:04

## 2020-07-08 RX ADMIN — SODIUM CHLORIDE SCH MLS/HR: 900 INJECTION INTRAVENOUS at 01:12

## 2020-07-08 RX ADMIN — ALBUTEROL SULFATE SCH PUFF: 90 AEROSOL, METERED RESPIRATORY (INHALATION) at 15:09

## 2020-07-08 RX ADMIN — CYCLOBENZAPRINE HYDROCHLORIDE PRN MG: 10 TABLET, FILM COATED ORAL at 03:36

## 2020-07-08 NOTE — NUR
CM/SS follow up. 



CM/SS spoke with April from [facility] who stated she will submit to insurance and spoke with the 
patient about requirements of physical therapy. 



CM/SS attempted to visit the patient; however, she was resting and asked this ss to come 
back tomorrow. 



AMANDA/SS spoke with patient care coordinator Dutch Leo about private caregivers. He 
reports he was under the assumption that the patient has had caregivers in the home or at 
least one. The patient reported to this ss that she does not have any. He stated she will 
need them to continue on with the benefit. CM/SS contacted CAROLYN to try and set up 
caregivers. A voice mail was left with call back number. 



CM/SS will continue to follow.

## 2020-07-08 NOTE — OCCUPATIONAL THER DAILY NOTE
OT Current Status-Daily Note


Subjective


Pt alert, sitting in recliner.  Pt agrees to therapy.  Pt talking to mother on 

phone and is excited that her mom is coming to visit.  No c/o pain.





Mental Status/Objective


Patient Orientation:  Person, Place, Time, Situation


Attachments:  Mathur Catheter, IV, Telemetry





ADL-Treatment


Therapy Code Descriptions/Definitions 





Functional [place] Measure:


0=Not Assessed/NA        4=Minimal Assistance


1=Total Assistance        5=Supervision or Setup


2=Maximal Assistance  6=Modified [place]


3=Moderate Assistance 7=Complete IndependenceSCALE: Activities may be completed 

with or without assistive devices.





6-Indepedent-patient completes the activity by him/herself with no assistance 

from a helper.


5-Set-up or Clean-up Assistance-helper sets up or cleans up; patient completes 

activity. [place] assists only prior to or  


    following the activity.


4-Supervision or Touching Assistance-helper provides verbal cues and/or erick

rene/steadying and/or contact guard assistance as patient completes activity. 

Assistance may be provided   


    throughout the activity or intermittently.


3-Partial/Moderate Assistance-helper does LESS THAN HALF the effort. [place] 

lifts, holds or supports trunk or limbs, but provides less than half the effort.


2-Substantial/Maximal Assistance-helper does MORE THAN HALF the effort. [place] 

lifts or holds trunk or limbs and provides more than half the effort.


0-Higxwxrcv-uccbpw does ALL the effort. Patient does none of the effort to 

complete the activity. Or, the assistance of 2 or more helpers is required for 

the patient to complete the  


    activity.


If activity was not attempted, code reason:


7-Patient Refused.


9-Not Applicable-not attempted and the patient did not perform the activity 

before the current illness, exacerbation or injury.


10-Not Attempted due to Environmental Limitations-(lack of equipment, weather 

restraints, etc.).


88-Not Attempted due to Medical Conditions or Safety Concerns.





Other Treatment


Pt given light resistance therapy sponges to increase strength and decrease 

edema in hands.  Pt demonstrated understanding of 2 exercises given, skilled 

instruction given for technique.  Pt also given built up handles for utensils 

due to decreased  per edema.  Pt demonstrated ability to grasp and bring 

fork to mouth without difficulty.  After session, pt sitting in recliner with 

call light/phone in reach.  All needs met in room.





OT Long Term Goals


Long Term Goals


Time Frame:  Jul 21, 2020


Eating (QC):  6


Oral Hygiene (QC):  5


Toileting Hygiene (QC):  4


Additional Goals:  1-Demonstrate ADL Tasks, 2-Verbalize Understanding, 3-

ImproveStrength/Chi


1=Demonstrate adherence to instructed precautions during ADL tasks.


2=Patient will verbalize/demonstrate understanding of assistive 

devices/modifications for ADL.


3=Patient will improve strength/tolerance for activity to enable patient to 

perform ADL's.





OT Education/Plan


Problem List/Assessment


Assessment:  Decreased Activ Tolerance, Decreased Safety Aware, Decreased UE 

Strength, Edema, Impaired Cognition, Impaired Coordination, Impaired Funct 

Balance, Impaired Self-Care Skills





Discharge Recommendations


Plan/Recommendations:  Continue POC





Treatment Plan/Plan of Care


Patient would benefit from OT for education, treatment and training to promote 

independence in ADL's, mobility, safety and/or upper extremity function for 

ADL's.


Plan of Care:  ADL Retraining, Functional Mobility, UE Funct Exercise/Act


Treatment Duration:  Jul 21, 2020


Frequency:  5 times per week


Estimated Hrs Per Day:  .25 hour per day


Agreement:  Yes


Rehab Potential:  Fair





Time/GCodes


Start Time:  11:03


Stop Time:  11:20


Total Time Billed (hr/min):  17


Billed Treatment Time


1 visit-FA 1 (17 min)











TRAVIS HASKINS                Jul 8, 2020 11:47

## 2020-07-08 NOTE — PHYSICAL THERAPY DAILY NOTE
PT Daily Note-Current


Subjective


Pt in recliner with mother in room. Pt Unwilling to do therapy but agrees to a 

few exercises in recliner.





Pain





   Numeric Pain Scale:  10-Worst Possible Pain


   Comment:  Generalized pain pt teary throughout tx





Mental Status


Patient Orientation:  Confused


Attachments:  Mathur Catheter, Other-See Comments (Air Cast on L UE), IV





Transfers


SCALE: Activities may be completed with or without assistive devices.





6-Indepedent-patient completes the activity by him/herself with no assistance 

from a helper.


5-Set-up or Clean-up Assistance-helper sets up or cleans up; patient completes 

activity. [place] assists only prior to or  


    following the activity.


4-Supervision or Touching Assistance-helper provides verbal cues and/or 

touching/steadying and/or contact guard assistance as patient completes 

activity. Assistance may be provided   


    throughout the activity or intermittently.


3-Partial/Moderate Assistance-helper does LESS THAN HALF the effort. [place] 

lifts, holds or supports trunk or limbs, but provides less than half the effort.


2-Substantial/Maximal Assistance-helper does MORE THAN HALF the effort. [place] 

lifts or holds trunk or limbs and provides more than half the effort.


6-Pvsmszdvt-gpxikz does ALL the effort. Patient does none of the effort to 

complete the activity. Or, the assistance of 2 or more helpers is required for 

the patient to complete the  


    activity.


If activity was not attempted, code reason:


7-Patient Refused.


9-Not Applicable-not attempted and the patient did not perform the activity 

before the current illness, exacerbation or injury.


10-Not Attempted due to Environmental Limitations-(lack of equipment, weather 

restraints, etc.).


88-Not Attempted due to Medical Conditions or Safety Concerns.





Exercises


Seated Therapy Exercises:  Ankle pumps, Long arc quads, Hip flexion, Kicking 

activity, Hip abd/add


Seated Reps:  10





Treatments


Pt performs seated exercises in recliner, pt left in recliner with all needs met

and call light in hand.





Assessment


Current Status:  Fair Progress


Pt needed encouragement to do tx from PTA.





PT Long Term Goals


Long Term Goals


PT Long Term Goals Time Frame:  Aug 1, 2020


Roll Left & Right (QC):  6


Sit to Lying (QC):  6


Lying-Sitting on Side/Bed(QC):  6


Sit to Stand (QC):  6


Chair/Bed-to-Chair Xfer(QC):  6


Toilet Transfer (QC):  6


Does the Patient Walk:  Yes


Walk 10 feet (QC):  6


Walk 50ft with 2 Turns (QC):  6


Walk 150 ft (QC):  6





PT Plan


Problem List


Problem List:  Activity Tolerance, Functional Strength, Safety, Balance, Gait, 

Transfer, Bed Mobility, ROM





Treatment/Plan


Treatment Plan:  Continue Plan of Care


Treatment Plan:  Bed Mobility, Education, Functional Activity Chi, Functional 

Strength, Gait, Safety, Therapeutic Exercise, Transfers


Treatment Duration:  Aug 1, 2020


Frequency:  6 times per week


Estimated Hrs Per Day:  .25 hour per day (to .5)


Patient and/or Family Agrees t:  Yes





Safety Risks/Education


Patient Education:  Correct Positioning, Safety Issues


Teaching Recipient:  Patient


Teaching Methods:  Discussion


Response to Teaching:  Reinforcement Needed





Time/GCodes


Time In:  1354


Time Out:  1405


Total Billed Treatment Time:  11


Total Billed Treatment


1, Ex











KHOIKRISTEN VILLARAH [facility] [place]               Jul 8, 2020 14:20

## 2020-07-08 NOTE — NUR
RD ASSESSMENT 



PMHx: 

DM CKD; DVT; GERD; pancreatitis; hx of DKA



PT INTERACTION: 

Pt was awake and pleasant during nutrition follow-up. Pt states she has been eating "so-so" 
since last assessment. Note avg PO intake <25% x2meal, per chart review. Pt states no issues 
with nausea, vomiting, constipation, or diarrhea since last assessment. Note last BM was 7/7 
and pt not currently on bowel regimen per chart review. 



ABNORMAL NUTRITION-RELATED LAB VALUES

LOW: K 3.0; Ca 8.1

HIGH: BUN 27; cr 1.85



Est. kcal needs: 1475 kcal | 15 kcal/kg  

Est. Pro needs:  79 g Pro | 0.8 g Pro/kg 



PES STATEMENT: 

Inadequate oral intake (NI-2.1) related to loss of appetite as evidenced by pt interview | 
avg PO intake <25% x2meal



INTERVENTION:  

Continue with current diet order of DYS2 Mechanically Altered diet. 

Pt may benefit from consistent CHO restriction if blood glucose levels become elevated. 

Add Glucerna (strawberry) to meals TID, for increased kcal intake. Provides 220 kcal and 10 
g Pro per serving. 

Reinforced previous educations on DM management. Pt verbalized understanding. 

Will continue to follow and reassess as pt needs, intake, and status change. 



MONITOR/EVALUATE:  

PO Intake; Plan of Care; Hydration Status; Weight Status; Lab Values 





NIKKI Lao, MS, RD, LD

## 2020-07-08 NOTE — PROGRESS NOTE
Subjective


Subjective/Events-last exam


Patient improving today. Still having alot of pain with swelling. She is up 

sitting in chair this AM. Tolerating PO diet


Review of Systems


Pulmonary:  Dyspnea, Cough


Cardiovascular:  No: Chest Pain, Palpitations


Gastrointestinal:  No: Nausea, Vomiting


Musculoskeletal:  arm pain


Neurological:  Weakness, Incoordination





Objective


Exam


Last Set of Vital Signs





Vital Signs








  Date Time  Temp Pulse Resp B/P (MAP) Pulse Ox O2 Delivery O2 Flow Rate FiO2


 


20 16:08 37.1 108 17 164/85 (111) 95 Room Air  


 


20 19:00       2.00 


 


20 10:47        28





Capillary Refill : Less Than 3 Seconds


I&O











Intake and Output 


 


 20





 00:00


 


Intake Total 2400 ml


 


Output Total 1450 ml


 


Balance 950 ml


 


 


 


Intake Oral 890 ml


 


IV Total 1510 ml


 


Output Urine Total 1450 ml


 


# Bowel Movements 1








General:  Alert, Oriented X3, Cooperative, Mild Distress


Lungs:  Clear to Auscultation, Normal Air Movement


Heart:  Regular Rate, No Murmurs


Abdomen:  Normal Bowel Sounds, Soft, No Tenderness, No Masses


Extremities:  Other (3+ pitting edema in LE and UE bilaterally)


Neuro:  Normal Speech, Sensation Intact, Cranial Nerves 3-12 NL


Psych/Mental Status:  Other (tearful during exam)





Results/Procedures


Lab


Laboratory Tests


20 17:32: Glucometer 402*H


20 21:27: Glucometer 293H


20 00:23: Glucometer 274H


20 05:41: Glucometer 68L


20 11:30: Glucometer 144H





Microbiology


20 Gram Stain - Final, Complete


         


20 Sputum Culture - Final, Complete


         YEAST


7/3/20 Blood Culture - Final, Complete


         No growth





Assessment/Plan


Assessment/Plan





(1) Severe sepsis


Status:  Acute


Assessment & Plan:  : Vitals improved, labs improving, Continue IV 

antibiotics


: Sepsis Resolved





(2) Acute respiratory failure


Status:  Acute


Assessment & Plan:  : Self extubated this AM, continue to monitor


: No home O2 requirement, will continue to titrate as tolerated, interval 

increase in consolidation in RUL


: Will need outpatient CT scan in 8 weeks


Qualifiers:  


   Qualified Codes:  J96.01 - Acute respiratory failure with hypoxia


(3) Acute kidney injury superimposed on chronic kidney disease


Status:  Acute


Assessment & Plan:  : Continue IVFs, renally dose medications


: BUN/Cr improving, continue to monitor


: Lasix 20 mg IV x1 to help with diuresis, monitor BMP





(4) Altered mental status


Status:  Resolved


Qualifiers:  


   Qualified Codes:  R41.82 - Altered mental status, unspecified


(5) Aspiration pneumonia


Status:  Acute


Assessment & Plan:  : Continue IV antibiotcs, Will titrate oxygen as 

tolerated


Qualifiers:  


   Qualified Codes:  J69.0 - Pneumonitis due to inhalation of food and vomit


(6) Insulin dependent diabetes mellitus


Status:  Chronic


Assessment & Plan:  : Increased levemir today due to feeding and increase in 

blood sugars





(7) Hypokalemia


Status:  Acute


Assessment & Plan:  : replaced, repeat BMP in AM


: Replace and repeat BMP





(8) DVT prophylaxis


Status:  Acute


Assessment & Plan:  - Lovenox








Clinical Quality Measures


AMI/AHF:


ASA po Prior to arrival:  No





DVT/VTE Risk/Contraindication:


Risk Factor Score Per Nursin


RFS Level Per Nursing on Admit:  4+=Very High











BERTHA EDWARDS MD                2020 17:29

## 2020-07-08 NOTE — SPEECH THERAPY DAILY NOTE
Speech Daily Progress Note


Subjective


Date Seen by Provider:  Jul 8, 2020


Time Seen by Provider:  00:15


Patient was up in recliner and eating cottage cheese.





Objective


Patient demo utilization of safe oral intake with compensatory strategies as 

trained at 80% with min to mod verbal cuing.





Assessment


Assessment Current Status:  Good Progress





Treatment Plan


Continue Plan of Care





Speech Short Term Goals


Short Term Goals


Short Term Goals


1) Patient will tolerate least restrictive diet level without s/s of aspiration 

at 90% or greater.


2) Patient will utilize compensatory strategies for safe oral intake at 90% or 

greater with minimal cues.





Speech Long Term Goals


Long Term Goals


Patient will maintain adequate nutrition/hydration via safe effective swallow 

function.





Speech-Plan


Patient/Family Goals


Patient/Family Goals:  


Patient plans on returning to her home where she lives alone. Her


discharge placement will be determined by her medical progress.





Treatment Plan


Speech Therapy Treatment Plan:  Continue Plan of Care


Patient was upset about the foods she was served. Room service came during our 

session and clinician helped to make her alternate choices for her lunch today.


Treatment Duration:  Jul 10, 2020


Frequency:  2 times per week


Estimated Hrs Per Day:  .25 hour per day (Patient)


Rehab Potential:  Fair


Barriers to Learning:  


Patient's medical status, dysphagia needs


Pt/Family Agrees to Plan:  Yes





Safety Risks/Education


Teaching Recipient:  Patient


Teaching Methods:  Demonstration, Discussion


Response to Teaching:  Verbalize Understanding, Return Demonstration


Education Topics Provided:  


Continued safety with intake





Time


Speech Therapy Time In:  08:15


Speech Therapy Time Out:  08:30


Total Billed Time:  15


Billed Treatment Time


1, SIM


WERNER Leon             Jul 8, 2020 08:57

## 2020-07-08 NOTE — NUR
IRF Evaluation 



Determination: Accepted, pending insurance authorization. 



Met with patient and mother to discuss details related to rehabilitation program. It was 
explained that the therapy regimen is a requirement, patient agreeable. Patient states she 
is interested and wants to get stronger. Patient gave this writer permission to submit 
clinical information to [facility], for review. 



CM/SS notified. Will continue to follow. 



Thank you for this referral.

## 2020-07-08 NOTE — PHYSICAL THERAPY PROGRESS NOTE
Therapy Progress Note


Patient in recliner.  She refuses physical therapy.  She starts saying she has 

to call her mother first for some reason, starts crying and getting nervous and 

frantic and it gets worse the longer she goes on.  Therapist does not want to 

make situation worse so leaves for patient to calm down.  Nurse is aware and in 

the room.  Will try back later today.











LITZY BERNAL PT                 Jul 8, 2020 10:51

## 2020-07-08 NOTE — PULMONARY PROGRESS NOTE
Subjective


Time Seen by a Provider:  07:32


Subjective/Events-last exam


Pt is doing better.





Sepsis Event


Evaluation


Height, Weight, BMI


Height: 5'5.00"


Weight: 151lbs. 1.0oz. 68.679055ow; 26.10 BMI


Method:Stated





Exam


Exam





Vital Signs








  Date Time  Temp Pulse Resp B/P (MAP) Pulse Ox O2 Delivery O2 Flow Rate FiO2


 


7/8/20 04:00 36.5 109 19 133/71 (91) 94 Room Air  


 


7/8/20 02:23     93 Room Air  


 


7/8/20 01:00  120      


 


7/8/20 00:15 37.5 113 20 141/74 (96) 93 Room Air  


 


7/7/20 20:56     93 Room Air  


 


7/7/20 20:00      Room Air  


 


7/7/20 19:39 37.6 122 20 165/78 (107) 93 Room Air  


 


7/7/20 19:00  120      


 


7/7/20 16:12 38.0 113 20 169/82 (111) 92 Room Air  


 


7/7/20 14:48     94 Room Air  


 


7/7/20 13:36  107      


 


7/7/20 11:26 37.4 108 20 177/81 (113) 94 Room Air  


 


7/7/20 09:17 37.4 112 20 156/73 (100) 96 Room Air  


 


7/7/20 09:00  104 18  93 Room Air  


 


7/7/20 08:00 38.0       


 


7/7/20 08:00     96 Room Air  


 


7/7/20 08:00  101 27 134/63 (86) 91 Room Air  














I & O 


 


 7/8/20





 07:00


 


Intake Total 2760 ml


 


Output Total 1475 ml


 


Balance 1285 ml








Height & Weight


Height: 5'5.00"


Weight: 151lbs. 1.0oz. 68.115086fk; 26.10 BMI


Method:Stated


General Appearance:  No Apparent Distress


HEENT:  PERRL/EOMI, Moist Mucous Membranes; No Scleral Icterus (L), No Scleral 

Icterus (R)


Neck:  Normal Inspection, Supple; No Thyromegaly


Respiratory:  Other (Coarse breath sounds noted with no wheezing few fine 

basilar rales no wheezing appreciated ventilating easily)


Cardiovascular:  Regular Rate, Rhythm, No Edema, No Gallop, No JVD, No Murmur


Capillary Refill:  Less Than 3 Seconds


Gastrointestinal:  non tender, soft


Extremity:  Other (1+ pedal and upper extremity edema noted.  The left lower 

extremity is warm with 2+ dorsalis pedis pulses.  The right foot is slightly 

cool it is not pallorous capillary refill is 3-4 seconds and are not able to 

appreciate dorsalis pedis or posterior tibial pulses.  No purpura is noted there

is no reaction to movement of the right lower extremity is unremarkable the calf

is not indurated nor erythematous.)


Neurologic/Psychiatric:  Other (Right foot is warmer today and no mottling is 

noted still not able to appreciate dorsalis pedis or posterior tibial pulses 

there is no evidence for pallor left side normal)


Skin:  Normal Color, Warm/Dry





Results


Lab


Laboratory Tests


7/7/20 02:40











Assessment/Plan


Assessment/Plan


Acute respiratory failure-- improving 


   s/p ventilator therapy 


   -COVID is neg x 2    


   -repeat labs pending 


RUL PNA


   -Will need repeat CT scan as out pt 8wks after discharge


Acute renal failure 


   - IVF LR at 125 


Severe Sepsis with PNA


   -Merrem and azithromycin 


   -Eraxis


Metabolic acidosis 


   -Recheck LA 


Anemia


   -Monitor 


ADITI on CKD Stage 3


   Continue IVF


   baseline creatinine around 1.6


   


IDDMII


HTN


HLD











JUAN ASHRAF DO               Jul 8, 2020 07:35

## 2020-07-09 VITALS — DIASTOLIC BLOOD PRESSURE: 77 MMHG | SYSTOLIC BLOOD PRESSURE: 166 MMHG

## 2020-07-09 VITALS — SYSTOLIC BLOOD PRESSURE: 160 MMHG | DIASTOLIC BLOOD PRESSURE: 66 MMHG

## 2020-07-09 VITALS — SYSTOLIC BLOOD PRESSURE: 134 MMHG | DIASTOLIC BLOOD PRESSURE: 71 MMHG

## 2020-07-09 LAB
BASOPHILS # BLD AUTO: 0 10^3/UL (ref 0–0.1)
BASOPHILS NFR BLD AUTO: 0 % (ref 0–10)
BUN/CREAT SERPL: 13
CALCIUM SERPL-MCNC: 8.3 MG/DL (ref 8.5–10.1)
CHLORIDE SERPL-SCNC: 105 MMOL/L (ref 98–107)
CO2 SERPL-SCNC: 23 MMOL/L (ref 21–32)
CREAT SERPL-MCNC: 1.47 MG/DL (ref 0.6–1.3)
EOSINOPHIL # BLD AUTO: 0.5 10^3/UL (ref 0–0.3)
EOSINOPHIL NFR BLD AUTO: 6 % (ref 0–10)
ERYTHROCYTE [DISTWIDTH] IN BLOOD BY AUTOMATED COUNT: 14.2 % (ref 10–14.5)
GFR SERPLBLD BASED ON 1.73 SQ M-ARVRAT: 37 ML/MIN
GLUCOSE SERPL-MCNC: 115 MG/DL (ref 70–105)
HCT VFR BLD CALC: 28 % (ref 35–52)
HGB BLD-MCNC: 8.8 G/DL (ref 11.5–16)
LYMPHOCYTES # BLD AUTO: 1.8 X 10^3 (ref 1–4)
LYMPHOCYTES NFR BLD AUTO: 20 % (ref 12–44)
MAGNESIUM SERPL-MCNC: 1.5 MG/DL (ref 1.6–2.4)
MANUAL DIFFERENTIAL PERFORMED BLD QL: NO
MCH RBC QN AUTO: 30 PG (ref 25–34)
MCHC RBC AUTO-ENTMCNC: 31 G/DL (ref 32–36)
MCV RBC AUTO: 94 FL (ref 80–99)
MONOCYTES # BLD AUTO: 0.6 X 10^3 (ref 0–1)
MONOCYTES NFR BLD AUTO: 7 % (ref 0–12)
NEUTROPHILS # BLD AUTO: 5.9 X 10^3 (ref 1.8–7.8)
NEUTROPHILS NFR BLD AUTO: 67 % (ref 42–75)
PHOSPHATE SERPL-MCNC: 2.7 MG/DL (ref 2.3–4.7)
PLATELET # BLD: 469 10^3/UL (ref 130–400)
PMV BLD AUTO: 9.4 FL (ref 7.4–10.4)
POTASSIUM SERPL-SCNC: 3.7 MMOL/L (ref 3.6–5)
SODIUM SERPL-SCNC: 138 MMOL/L (ref 135–145)
WBC # BLD AUTO: 8.9 10^3/UL (ref 4.3–11)

## 2020-07-09 RX ADMIN — POTASSIUM CHLORIDE SCH MEQ: 1500 TABLET, EXTENDED RELEASE ORAL at 09:01

## 2020-07-09 RX ADMIN — ALBUTEROL SULFATE SCH PUFF: 90 AEROSOL, METERED RESPIRATORY (INHALATION) at 11:03

## 2020-07-09 RX ADMIN — ALBUTEROL SULFATE SCH PUFF: 90 AEROSOL, METERED RESPIRATORY (INHALATION) at 06:28

## 2020-07-09 RX ADMIN — METOPROLOL SUCCINATE SCH MG: 50 TABLET, EXTENDED RELEASE ORAL at 08:59

## 2020-07-09 RX ADMIN — ASPIRIN 81 MG CHEWABLE TABLET SCH MG: 81 TABLET CHEWABLE at 09:01

## 2020-07-09 RX ADMIN — INSULIN ASPART SCH UNIT: 100 INJECTION, SOLUTION INTRAVENOUS; SUBCUTANEOUS at 00:08

## 2020-07-09 RX ADMIN — ASPIRIN 81 MG CHEWABLE TABLET SCH MG: 81 TABLET CHEWABLE at 08:59

## 2020-07-09 RX ADMIN — INSULIN ASPART SCH UNIT: 100 INJECTION, SOLUTION INTRAVENOUS; SUBCUTANEOUS at 06:01

## 2020-07-09 RX ADMIN — FLUCONAZOLE SCH MG: 100 TABLET ORAL at 08:58

## 2020-07-09 RX ADMIN — SODIUM CHLORIDE SCH MLS/HR: 900 INJECTION INTRAVENOUS at 00:08

## 2020-07-09 NOTE — PHYSICAL THERAPY DAILY NOTE
PT Daily Note-Current


Subjective


Patient states,"I hope to go home today.  One Doctor said I could."





Pain





   Numeric Pain Scale:  5-Moderate Pain


   Location:  Left


   Location Body Site:  Thigh


   Pain Description:  Ache





Mental Status


Patient Orientation:  Normal For Age


Attachments:  Mathur Catheter





Transfers


SCALE: Activities may be completed with or without assistive devices.





6-Indepedent-patient completes the activity by him/herself with no assistance 

from a helper.


5-Set-up or Clean-up Assistance-helper sets up or cleans up; patient completes 

activity. [place] assists only prior to or  


    following the activity.


4-Supervision or Touching Assistance-helper provides verbal cues and/or 

touching/steadying and/or contact guard assistance as patient completes 

activity. Assistance may be provided   


    throughout the activity or intermittently.


3-Partial/Moderate Assistance-helper does LESS THAN HALF the effort. [place] 

lifts, holds or supports trunk or limbs, but provides less than half the effort.


2-Substantial/Maximal Assistance-helper does MORE THAN HALF the effort. [place] 

lifts or holds trunk or limbs and provides more than half the effort.


0-Xokmivnxq-rddqjc does ALL the effort. Patient does none of the effort to 

complete the activity. Or, the assistance of 2 or more helpers is required for 

the patient to complete the  


    activity.


If activity was not attempted, code reason:


7-Patient Refused.


9-Not Applicable-not attempted and the patient did not perform the activity 

before the current illness, exacerbation or injury.


10-Not Attempted due to Environmental Limitations-(lack of equipment, weather 

restraints, etc.).


88-Not Attempted due to Medical Conditions or Safety Concerns.


Roll Left & Right (QC):  6


Sit to Lying (QC):  6


Lying to Sitting/Side of Bed(Q:  6


Sit to Stand (QC):  6





Gait Training


Does the Patient Walk?:  Yes


Distance:  500'


Walk 10 feet (QC):  6


Walk 50 ft with 2 Turns(QC):  6


Walk 150 ft (QC):  6


Gait Assistive Device:  FWW


safe and functional with no deviation





Exercises


Seated Therapy Exercises:  Ankle pumps, Long arc quads, Hip flexion


Seated Reps:  15





Assessment


Patient tolerated treatment well and desires to return to home today.  Patient 

demonstrated independent exercise program and ambulate with FWW, PLOF, 500'.  PT

will see patient x 1 more session to ensure patient LOF.





PT Long Term Goals


Long Term Goals


PT Long Term Goals Time Frame:  Aug 1, 2020


Roll Left & Right (QC):  6


Sit to Lying (QC):  6


Lying-Sitting on Side/Bed(QC):  6


Sit to Stand (QC):  6


Chair/Bed-to-Chair Xfer(QC):  6


Toilet Transfer (QC):  6


Does the Patient Walk:  Yes


Walk 10 feet (QC):  6


Walk 50ft with 2 Turns (QC):  6


Walk 150 ft (QC):  6





PT Plan


Treatment/Plan


Treatment Plan:  Continue Plan of Care


Treatment Plan:  Bed Mobility, Education, Functional Activity Chi, Functional 

Strength, Gait, Safety, Therapeutic Exercise, Transfers


Treatment Duration:  Aug 1, 2020


Frequency:  6 times per week


Estimated Hrs Per Day:  .25 hour per day (to .5)


Patient and/or Family Agrees t:  Yes





Time/GCodes


Time In:  915


Time Out:  929


Total Billed Treatment Time:  14


Total Billed Treatment


1 visit


FA 14 min











HALIE SHERIDAN PT               Jul 9, 2020 09:42

## 2020-07-09 NOTE — NUR
AMANDA/YUDI finalized discharge plan. 



Plan: The patient will return home with her private caregiver (daughter-in-law). 



AMANDA/SS spoke with Babs from [facility] to assist with setting patient up with caregivers. She 
states that she would like the patient to have at least 2 additional caregivers in the home 
to meet her hourly needs. 



AMANDA/SS did a phone call with the patient, Babs, and this sw to discuss options. The 
patient reports she has three workers lined up but they just need to fill out the paperwork. 
Babs stated that someone needed to  the paperwork from [facility] for potential 
employee's to fill out. The patient verbalized understanding. The employee's will be able to 
start right away after paper work is turned in. The daughter-in-law will continue care alone 
until additional workers are provided. 



AMANDA/SS attempted to contact the patients care coordinator Dutch Leo to give an update. A 
voice mail was left with call back number. 



Possible oxygen need. Will follow study. 

-------------------------------------------------------------------------------

Addendum: 07/09/20 at 1429 by CARMEL GARCES 

-------------------------------------------------------------------------------

The patient did not qualify for home o2. No further needs.

## 2020-07-09 NOTE — NUR
IRF



Discontinuation of inpatient rehabilitation evaluation and cancellation of prior 
authorization request to [facility], as patient is now ambulating (500ft, FWW) and completing 
bed mobility with independence. 



CM/SS notified.

## 2020-07-09 NOTE — NUR
DAYTON BLISS demonstrates understanding of discharge instructions and accurately returns 
instructions upon questioning.  Copy of Post-Discharge Instructions given to PT. DAYTON BLISS is able to manage continuing needs after discharge.  Patients belongings returned to PT.  
Patient discharged from [facility] on 7/9/20 at 1450 . DAYTON BLISS left floor via W/C, 
accompanied by STAFF AND CAREGIVER PER AUTO.

## 2020-07-09 NOTE — DISCHARGE SUMMARY
Diagnosis/Chief Complaint


Date of Admission


2020 at 18:35


Date of Discharge


2020


Admission Diagnosis


Admission Diagnosis


See Problem list





Discharge Diagnosis


See Below


Problems/Diagnosis:  


(1) Severe sepsis


Assessment & Plan:  : Vitals improved, labs improving, Continue IV 

antibiotics


: Sepsis Resolved


Status:  Acute


(2) Acute respiratory failure


Assessment & Plan:  : Self extubated this AM, continue to monitor


: No home O2 requirement, will continue to titrate as tolerated, interval 

increase in consolidation in RUL


: Will need outpatient CT scan in 8 weeks


: Patient doing well on RA, home oxygen study done with exertion and she did 

not require oxygen


Qualifiers:  


   Qualified Codes:  J96.01 - Acute respiratory failure with hypoxia


Status:  Acute


(3) Acute kidney injury superimposed on chronic kidney disease


Assessment & Plan:  : Continue IVFs, renally dose medications


: BUN/Cr improving, continue to monitor


: Lasix 20 mg IV x1 to help with diuresis, monitor BMP


: Patient at baseline Cr at time of d/c


Status:  Acute


(4) Altered mental status


Qualifiers:  


   Qualified Codes:  R41.82 - Altered mental status, unspecified


Status:  Resolved


(5) Aspiration pneumonia


Assessment & Plan:  : Continue IV antibiotcs, Will titrate oxygen as 

tolerated


Qualifiers:  


   Qualified Codes:  J69.0 - Pneumonitis due to inhalation of food and vomit


Status:  Acute


(6) Insulin dependent diabetes mellitus


Assessment & Plan:  : Increased levemir today due to feeding and increase in 

blood sugars


Status:  Chronic


(7) Hypokalemia


Assessment & Plan:  : replaced, repeat BMP in AM


: Replace and repeat BMP


Status:  Acute


(8) DVT prophylaxis


Assessment & Plan:  - Lovenox


Status:  Acute





Discharge Summary-Simple/Stand


Consultations


Dr South: Cardiology


Dr Andrade: Pulmonary Critical Care


Discharge Physical Examination


Allergies:  


Coded Allergies:  


     morphine (Verified  Allergy, Mild, Vomiting, 20)


     orange juice (Verified  Allergy, Mild, Nausea, 20)


     Sulfa (Sulfonamide Antibiotics) (Unverified  Allergy, Unknown, 6/25/15)


     codeine (Verified  Allergy, Unknown, TAKES ULTRAM AT HOME, 09)


     ketorolac (Verified  Allergy, Unknown, 08)


     tramadol (Verified  Allergy, Unknown, 11)


Vitals & I&Os





Vital Sign - Last 12Hours








  Date Time  Temp Pulse Resp B/P (MAP) Pulse Ox O2 Delivery O2 Flow Rate FiO2


 


20 08:42 37.4 114 18 160/66 (97) 94 Room Air  


 


20 19:00       2.00 


 


20 10:47        28














Intake and Output 


 


 20





 00:00


 


Intake Total 1640 ml


 


Output Total 1945 ml


 


Balance -305 ml








General Appearance:  Alert, Oriented X3, Cooperative, No Acute Distress


HEENT:  Mucous Memb Moist/Pink


Respiratory:  Normal Air Movement, Other (basilar wheezing, normal work of 

breathing)


Cardiovascular:  Regular Rate, No Murmurs


Abdominal:  Normal Bowel Sounds, Soft, No Tenderness, No Masses


Extremities:  Other (1+ pitting edema in LE and UE bilaterally)


Skin:  No Rashes, No Breakdown


Neuro:  Normal Speech, Strength at 5/5 X4 Ext, Sensation Intact, Cranial Nerves 

3-12 NL


Psych/Mental Status:  Mental Status NL, Other (Tearful and excited to go  home)





Hospital Course


Was the Problem List Reviewed?:  Yes


See final discharge diagnosis.


Other pending tests


NEEDS REPEAT CT CHEST IN 8 WEEKS





Discussion & Recommendations


54 yo F that presented to ER with chest pain and severe sepsis on admission. 

Patient was started on IV antibiotics and completed fluid resuscitation. She 

required intubation during this admission and self extubated 3 days prior to 

d/c. Patient had acute on chronic renal failure during this admission and her Cr

returned to baseline at time of discharge. She was treated for PNA. COVID 

testing during admission was negative.





Discharge


Condition at discharge


Stable


Instructions to patient/family


Please see electronic discharge instructions given to patient.


Discharge Medications


Reviewed and agree with Discharge Medication list on patient's Discharge 

Instruction sheet





Clinical Quality Measures


AMI/AHF:


ASA po Prior to arrival:  No





DVT/VTE Risk/Contraindication:


Risk Factor Score Per Nursin


RFS Level Per Nursing on Admit:  4+=Very High











BERTHA EDWARDS MD                2020 10:59

## 2020-07-09 NOTE — NUR
home 02 qualification



sp02 never dropped below 89% pt does not qualify for 02 att. 

-------------------------------------------------------------------------------

Addendum: 07/09/20 at 1418 by DENISE TORRES

-------------------------------------------------------------------------------

Amended: Links added.

## 2020-07-09 NOTE — DISCHARGE SUMMARY
Discharge [facility]


Reconcile Patient Problems


Problems Reviewed?:  Yes





Discharge Medications


New, Converted or Re-Newed RX:  Transmitted to Pharmacy


New Medications:  


Amoxicillin/Potassium Clav (Amox Tr-K Clv 875-125 mg Tab) 1 Each Tablet


875 MG PO BID WITH MEALS, #10 TAB





Aspirin (Aspirin) 81 Mg Tab.chew


81 MG PO DAILY, #30 TAB





Fluconazole (Fluconazole) 100 Mg Tablet


100 MG PO DAILY, #7 TAB





Metoprolol Succinate (Metoprolol Succinate) 50 Mg Tab.er.24h


50 MG PO DAILY, #30 TAB





 


Continued Medications:  


Albuterol Sulfate (Proair Hfa) 1 Puff Puff


2 PUFF IH TID PRN for SHORTNESS OF BREATH, INHALER





Ascorbic Acid (Vitamin C) 1,000 Mg Tablet


1000 MG PO DAILY, TAB





Cholecalciferol (Vitamin D3) (Vitamin D3) 25 Mcg Tablet


25 MCG PO BID, TAB





Diclofenac Sodium (Diclofenac Sodium) 100 Gm Gel..gram.


1 APPLIC TOP QID PRN for CRAMPS, TUBE


APPLY TO LEGS


Fluticasone Propionate (Flonase Allergy Relief) 9.9 Ml [place].susp


2 SPRAY NSEACH DAILY, EACH


1 SPRAY EACH NARE DAILY


Furosemide (Furosemide) 40 Mg Tablet


40 MG PO DAILY, TAB





Gabapentin (Neurontin) 300 Mg Capsule


300 MG PO TID, CAP





Insulin Detemir (Levemir Flextouch) 100 Unit/1 Ml Insuln.pen


24 UNITS SC HS, EA





Insulin Lispro (Humalog Kwikpen) 100 Unit/1 Ml Insuln.pen


5-15 UNITS SQ AC, EA





Melatonin (Melatonin) 10 Mg Tablet


10 MG PO HS, TAB





Metoclopramide HCl (Metoclopramide HCl) 10 Mg Tablet


10 MG PO QIDACHS, TAB





Montelukast Sodium (Montelukast Sodium) 10 Mg Tablet


10 MG PO HS, TAB


LAST FILLED 01- #15


Naloxone HCl (Narcan) 4 Mg Spray


1 SPRAY NS UD PRN for UNRESPONSIVE, EACH


ADMINSTER 1 SPRAY IN 1 NOSTRIL 1 TIME- MAY REPEAT IN ALTERNATING


 NOSTRIL EVERY 2-3 MINUTES UNTIL RESPONSIVE OR EMS ARRIVES


Omeprazole (Omeprazole) 20 Mg Capsule.dr


20 MG PO DAILY, CAP





Potassium Gluconate (Potassium) 99 Mg Tablet


99 MG PO DAILY, TAB





Sumatriptan Succinate (Sumatriptan Succinate) 50 Mg Tablet


50 MG PO Q2H PRN for MIGRAINE MDD 200MG , TAB


MAY REPEAT A DOSE IN 2 HOURS- DO NOT EXCEED 4 TABS (200MG) PER DAY


 


Discontinued Medications:  


Metoprolol Tartrate (Metoprolol Tartrate) 50 Mg Tablet


50 MG PO BID, TAB





Oxycodone HCl (Oxycodone IR) 5 Mg Tablet


5 MG PO DAILY PRN for PAIN-SEVERE (8-10), #6 TAB











Patient Instructions


Goal/Follow Up Appt:  


You need to f.u with your doctor in 1-2 weeks





Activity & Diet


Discharge Diet:  Soft Diet


Activity as Tolerated:  Yes











BERTHA EDWARDS MD                Jul 9, 2020 11:04

## 2020-07-09 NOTE — PULMONARY PROGRESS NOTE
Subjective


Time Seen by a Provider:  07:10


Subjective/Events-last exam


Pt is doing better.





Sepsis Event


Evaluation


Height, Weight, BMI


Height: 5'5.00"


Weight: 151lbs. 1.0oz. 68.903264ti; 26.10 BMI


Method:Stated





Exam


Exam





Vital Signs








  Date Time  Temp Pulse Resp B/P (MAP) Pulse Ox O2 Delivery O2 Flow Rate FiO2


 


7/9/20 06:29     97 Room Air  


 


7/9/20 04:10 36.8 98 18 134/71 (92) 93 Room Air  


 


7/9/20 01:00  105      


 


7/8/20 23:35 37.8 105 22 129/69 (89) 95 Room Air  


 


7/8/20 22:11     95 Room Air  


 


7/8/20 20:30      Room Air  


 


7/8/20 20:28  109      


 


7/8/20 20:20 37.1 110 16 142/86 (104) 93 Room Air  


 


7/8/20 19:39     95 Room Air  


 


7/8/20 16:08 37.1 108 17 164/85 (111) 95 Room Air  


 


7/8/20 12:46  101      


 


7/8/20 11:25     94 Room Air  


 


7/8/20 11:04 37.2 111 20 128/70 (89) 94 Room Air  


 


7/8/20 08:02 37.3 108 18 131/75 (93) 95 Room Air  


 


7/8/20 08:00      Room Air  














I & O 


 


 7/9/20





 07:00


 


Intake Total 2090 ml


 


Output Total 2520 ml


 


Balance -430 ml








Height & Weight


Height: 5'5.00"


Weight: 151lbs. 1.0oz. 68.651579ig; 26.10 BMI


Method:Stated


General Appearance:  No Apparent Distress


HEENT:  PERRL/EOMI, Moist Mucous Membranes; No Scleral Icterus (L), No Scleral 

Icterus (R)


Neck:  Normal Inspection, Supple; No Thyromegaly


Respiratory:  Other (Coarse breath sounds noted with no wheezing few fine 

basilar rales no wheezing appreciated ventilating easily)


Cardiovascular:  Regular Rate, Rhythm, No Edema, No Gallop, No JVD, No Murmur


Capillary Refill:  Less Than 3 Seconds


Gastrointestinal:  non tender, soft


Extremity:  Other (1+ pedal and upper extremity edema noted.  The left lower 

extremity is warm with 2+ dorsalis pedis pulses.  The right foot is slightly 

cool it is not pallorous capillary refill is 3-4 seconds and are not able to 

appreciate dorsalis pedis or posterior tibial pulses.  No purpura is noted there

is no reaction to movement of the right lower extremity is unremarkable the calf

is not indurated nor erythematous.)


Neurologic/Psychiatric:  Other (Right foot is warmer today and no mottling is 

noted still not able to appreciate dorsalis pedis or posterior tibial pulses 

there is no evidence for pallor left side normal)


Skin:  Normal Color, Warm/Dry





Assessment/Plan


Assessment/Plan


Acute respiratory failure-- improving 


   s/p ventilator therapy 


   -COVID is neg x 2    


RUL PNA


   -Will need repeat CT scan as out pt 8wks after discharge


   -Pt is now on RA 


Acute renal failure 


   -monitor 


Sepsis with PNA - improving 


   -Merrem - change to Augmentin 


Anemia


   -Monitor 


ADITI on CKD Stage 3


   baseline creatinine around 1.6


   


IDDMII


HTN


HLD











JUAN ASHRAF DO               Jul 9, 2020 07:15

## 2020-07-09 NOTE — SPEECH THERAPY DAILY NOTE
Speech Daily Progress Note


Subjective


Date Seen by Provider:  Jul 9, 2020


Time Seen by Provider:  00:25


Patient was upset about what she was served for breakfast when I entered her 

room.





Objective


Patient demo oral intake while utilizing compensatory strategies as trained at 

90% without s/s of aspiration.





Assessment


Assessment Current Status:  Good Progress





Treatment Plan


Continue Plan of Care





Speech Short Term Goals


Short Term Goals


Short Term Goals


1) Patient will tolerate least restrictive diet level without s/s of aspiration 

at 90% or greater.


2) Patient will utilize compensatory strategies for safe oral intake at 90% or 

greater with minimal cues.





Speech Long Term Goals


Long Term Goals


Patient will maintain adequate nutrition/hydration via safe effective swallow 

function.





Speech-Plan


Patient/Family Goals


Patient/Family Goals:  


Patient plans on returning to her home with family assistance as needed.





Treatment Plan


Speech Therapy Treatment Plan:  Continue Plan of Care


Patient was upgraded to regular texture today.


Treatment Duration:  Jul 13, 2020


Frequency:  2 times per week


Estimated Hrs Per Day:  .25 hour per day (Patient)


Rehab Potential:  Fair


Barriers to Learning:  


Patient's medical status


Pt/Family Agrees to Plan:  Yes





Safety Risks/Education


Teaching Recipient:  Patient


Teaching Methods:  Demonstration, Discussion


Response to Teaching:  Verbalize Understanding, Return Demonstration


Education Topics Provided:  


Continued safety with oral intake of upgraded diet





Time


Speech Therapy Time In:  08:30


Speech Therapy Time Out:  08:55


Total Billed Time:  25


Billed Treatment Time


TristanSIM BETH[address] ST             Jul 9, 2020 13:09

## 2020-09-16 ENCOUNTER — HOSPITAL ENCOUNTER (OUTPATIENT)
Dept: HOSPITAL 75 - LABNPT | Age: 55
End: 2020-09-16
Attending: NURSE PRACTITIONER
Payer: MEDICAID

## 2020-09-16 DIAGNOSIS — J98.4: Primary | ICD-10-CM

## 2020-09-16 DIAGNOSIS — J18.8: ICD-10-CM

## 2020-09-16 DIAGNOSIS — Z72.0: ICD-10-CM

## 2020-09-16 DIAGNOSIS — Z20.828: ICD-10-CM

## 2020-09-16 DIAGNOSIS — R91.8: ICD-10-CM

## 2020-09-22 ENCOUNTER — HOSPITAL ENCOUNTER (INPATIENT)
Dept: HOSPITAL 75 - ER | Age: 55
LOS: 3 days | Discharge: HOME | DRG: 637 | End: 2020-09-25
Attending: INTERNAL MEDICINE | Admitting: INTERNAL MEDICINE
Payer: MEDICAID

## 2020-09-22 VITALS — WEIGHT: 185.41 LBS | HEIGHT: 64.96 IN | BODY MASS INDEX: 30.89 KG/M2

## 2020-09-22 VITALS — SYSTOLIC BLOOD PRESSURE: 161 MMHG | DIASTOLIC BLOOD PRESSURE: 79 MMHG

## 2020-09-22 VITALS — DIASTOLIC BLOOD PRESSURE: 66 MMHG | SYSTOLIC BLOOD PRESSURE: 111 MMHG

## 2020-09-22 VITALS — DIASTOLIC BLOOD PRESSURE: 68 MMHG | SYSTOLIC BLOOD PRESSURE: 141 MMHG

## 2020-09-22 VITALS — SYSTOLIC BLOOD PRESSURE: 117 MMHG | DIASTOLIC BLOOD PRESSURE: 65 MMHG

## 2020-09-22 VITALS — DIASTOLIC BLOOD PRESSURE: 69 MMHG | SYSTOLIC BLOOD PRESSURE: 135 MMHG

## 2020-09-22 VITALS — SYSTOLIC BLOOD PRESSURE: 129 MMHG | DIASTOLIC BLOOD PRESSURE: 59 MMHG

## 2020-09-22 VITALS — DIASTOLIC BLOOD PRESSURE: 63 MMHG | SYSTOLIC BLOOD PRESSURE: 133 MMHG

## 2020-09-22 VITALS — SYSTOLIC BLOOD PRESSURE: 134 MMHG | DIASTOLIC BLOOD PRESSURE: 64 MMHG

## 2020-09-22 VITALS — DIASTOLIC BLOOD PRESSURE: 65 MMHG | SYSTOLIC BLOOD PRESSURE: 135 MMHG

## 2020-09-22 VITALS — SYSTOLIC BLOOD PRESSURE: 131 MMHG | DIASTOLIC BLOOD PRESSURE: 80 MMHG

## 2020-09-22 VITALS — DIASTOLIC BLOOD PRESSURE: 70 MMHG | SYSTOLIC BLOOD PRESSURE: 128 MMHG

## 2020-09-22 VITALS — SYSTOLIC BLOOD PRESSURE: 140 MMHG | DIASTOLIC BLOOD PRESSURE: 67 MMHG

## 2020-09-22 VITALS — SYSTOLIC BLOOD PRESSURE: 122 MMHG | DIASTOLIC BLOOD PRESSURE: 66 MMHG

## 2020-09-22 VITALS — DIASTOLIC BLOOD PRESSURE: 57 MMHG | SYSTOLIC BLOOD PRESSURE: 115 MMHG

## 2020-09-22 VITALS — SYSTOLIC BLOOD PRESSURE: 136 MMHG | DIASTOLIC BLOOD PRESSURE: 76 MMHG

## 2020-09-22 VITALS — SYSTOLIC BLOOD PRESSURE: 122 MMHG | DIASTOLIC BLOOD PRESSURE: 57 MMHG

## 2020-09-22 DIAGNOSIS — Z20.828: ICD-10-CM

## 2020-09-22 DIAGNOSIS — N18.3: ICD-10-CM

## 2020-09-22 DIAGNOSIS — N17.9: ICD-10-CM

## 2020-09-22 DIAGNOSIS — E87.1: ICD-10-CM

## 2020-09-22 DIAGNOSIS — E11.00: Primary | ICD-10-CM

## 2020-09-22 DIAGNOSIS — D72.0: ICD-10-CM

## 2020-09-22 DIAGNOSIS — I12.9: ICD-10-CM

## 2020-09-22 DIAGNOSIS — D63.1: ICD-10-CM

## 2020-09-22 DIAGNOSIS — J18.9: ICD-10-CM

## 2020-09-22 LAB
ALBUMIN SERPL-MCNC: 3.3 GM/DL (ref 3.2–4.5)
ALP SERPL-CCNC: 148 U/L (ref 40–136)
ALT SERPL-CCNC: 20 U/L (ref 0–55)
AMORPH SED URNS QL MICRO: (no result) /LPF
AMYLASE SERPL-CCNC: 42 U/L (ref 25–125)
APAP SERPL-MCNC: < 10 UG/ML (ref 10–30)
APTT BLD: 24 SEC (ref 24–35)
APTT PPP: YELLOW S
BACTERIA #/AREA URNS HPF: NEGATIVE /HPF
BARBITURATES UR QL: NEGATIVE
BASE EXCESS STD BLDA CALC-SCNC: -15.2 MMOL/L (ref -2.5–2.5)
BASOPHILS # BLD AUTO: 0 10^3/UL (ref 0–0.1)
BASOPHILS # BLD AUTO: 0.1 10^3/UL (ref 0–0.1)
BASOPHILS NFR BLD AUTO: 0 % (ref 0–10)
BASOPHILS NFR BLD AUTO: 0 % (ref 0–10)
BDY SITE: (no result)
BENZODIAZ UR QL SCN: NEGATIVE
BILIRUB SERPL-MCNC: 0.6 MG/DL (ref 0.1–1)
BILIRUB UR QL STRIP: NEGATIVE
BODY TEMPERATURE: 36.8
BUN/CREAT SERPL: 19
BUN/CREAT SERPL: 21
BUN/CREAT SERPL: 21
BUN/CREAT SERPL: 22
BUN/CREAT SERPL: 22
CALCIUM SERPL-MCNC: 7.6 MG/DL (ref 8.5–10.1)
CALCIUM SERPL-MCNC: 7.8 MG/DL (ref 8.5–10.1)
CALCIUM SERPL-MCNC: 7.9 MG/DL (ref 8.5–10.1)
CALCIUM SERPL-MCNC: 7.9 MG/DL (ref 8.5–10.1)
CALCIUM SERPL-MCNC: 9.2 MG/DL (ref 8.5–10.1)
CHLORIDE SERPL-SCNC: 105 MMOL/L (ref 98–107)
CHLORIDE SERPL-SCNC: 105 MMOL/L (ref 98–107)
CHLORIDE SERPL-SCNC: 106 MMOL/L (ref 98–107)
CHLORIDE SERPL-SCNC: 108 MMOL/L (ref 98–107)
CHLORIDE SERPL-SCNC: 91 MMOL/L (ref 98–107)
CO2 BLDA CALC-SCNC: 12.2 MMOL/L (ref 21–31)
CO2 SERPL-SCNC: 10 MMOL/L (ref 21–32)
CO2 SERPL-SCNC: 12 MMOL/L (ref 21–32)
CO2 SERPL-SCNC: 16 MMOL/L (ref 21–32)
CO2 SERPL-SCNC: 17 MMOL/L (ref 21–32)
CO2 SERPL-SCNC: 20 MMOL/L (ref 21–32)
COCAINE UR QL: NEGATIVE
CREAT SERPL-MCNC: 2.13 MG/DL (ref 0.6–1.3)
CREAT SERPL-MCNC: 2.15 MG/DL (ref 0.6–1.3)
CREAT SERPL-MCNC: 2.21 MG/DL (ref 0.6–1.3)
CREAT SERPL-MCNC: 2.35 MG/DL (ref 0.6–1.3)
CREAT SERPL-MCNC: 2.75 MG/DL (ref 0.6–1.3)
EOSINOPHIL # BLD AUTO: 0 10^3/UL (ref 0–0.3)
EOSINOPHIL # BLD AUTO: 0 10^3/UL (ref 0–0.3)
EOSINOPHIL NFR BLD AUTO: 0 % (ref 0–10)
EOSINOPHIL NFR BLD AUTO: 0 % (ref 0–10)
FIBRINOGEN PPP-MCNC: CLEAR MG/DL
GFR SERPLBLD BASED ON 1.73 SQ M-ARVRAT: 18 ML/MIN
GFR SERPLBLD BASED ON 1.73 SQ M-ARVRAT: 21 ML/MIN
GFR SERPLBLD BASED ON 1.73 SQ M-ARVRAT: 23 ML/MIN
GFR SERPLBLD BASED ON 1.73 SQ M-ARVRAT: 24 ML/MIN
GFR SERPLBLD BASED ON 1.73 SQ M-ARVRAT: 24 ML/MIN
GLUCOSE SERPL-MCNC: 1017 MG/DL (ref 70–105)
GLUCOSE SERPL-MCNC: 139 MG/DL (ref 70–105)
GLUCOSE SERPL-MCNC: 221 MG/DL (ref 70–105)
GLUCOSE SERPL-MCNC: 363 MG/DL (ref 70–105)
GLUCOSE SERPL-MCNC: 755 MG/DL (ref 70–105)
GLUCOSE UR STRIP-MCNC: (no result) MG/DL
HCT VFR BLD CALC: 30 % (ref 35–52)
HCT VFR BLD CALC: 37 % (ref 35–52)
HGB BLD-MCNC: 11.5 G/DL (ref 11.5–16)
HGB BLD-MCNC: 9.7 G/DL (ref 11.5–16)
INHALED O2 FLOW RATE: (no result) L/MIN
INR PPP: 1 (ref 0.8–1.4)
KETONES UR QL STRIP: (no result)
LEUKOCYTE ESTERASE UR QL STRIP: NEGATIVE
LIPASE SERPL-CCNC: 66 U/L (ref 8–78)
LYMPHOCYTES # BLD AUTO: 1.2 X 10^3 (ref 1–4)
LYMPHOCYTES # BLD AUTO: 1.5 X 10^3 (ref 1–4)
LYMPHOCYTES NFR BLD AUTO: 7 % (ref 12–44)
LYMPHOCYTES NFR BLD AUTO: 9 % (ref 12–44)
MAGNESIUM SERPL-MCNC: 2.4 MG/DL (ref 1.6–2.4)
MANUAL DIFFERENTIAL PERFORMED BLD QL: NO
MANUAL DIFFERENTIAL PERFORMED BLD QL: YES
MCH RBC QN AUTO: 29 PG (ref 25–34)
MCH RBC QN AUTO: 30 PG (ref 25–34)
MCHC RBC AUTO-ENTMCNC: 31 G/DL (ref 32–36)
MCHC RBC AUTO-ENTMCNC: 33 G/DL (ref 32–36)
MCV RBC AUTO: 91 FL (ref 80–99)
MCV RBC AUTO: 94 FL (ref 80–99)
METHADONE UR QL SCN: NEGATIVE
METHAMPHETAMINE SCREEN URINE S: NEGATIVE
MONOCYTES # BLD AUTO: 0.5 X 10^3 (ref 0–1)
MONOCYTES # BLD AUTO: 0.9 X 10^3 (ref 0–1)
MONOCYTES NFR BLD AUTO: 3 % (ref 0–12)
MONOCYTES NFR BLD AUTO: 5 % (ref 0–12)
MONOCYTES NFR BLD: 3 %
NEUTROPHILS # BLD AUTO: 14.1 X 10^3 (ref 1.8–7.8)
NEUTROPHILS # BLD AUTO: 14.4 X 10^3 (ref 1.8–7.8)
NEUTROPHILS NFR BLD AUTO: 86 % (ref 42–75)
NEUTROPHILS NFR BLD AUTO: 90 % (ref 42–75)
NEUTS BAND NFR BLD MANUAL: 87 %
NEUTS BAND NFR BLD: 3 %
NITRITE UR QL STRIP: NEGATIVE
OPIATES UR QL SCN: NEGATIVE
OXYCODONE UR QL: NEGATIVE
PCO2 BLDA: 30 MMHG (ref 35–45)
PH BLDA: 7.2 [PH] (ref 7.37–7.43)
PH UR STRIP: 6 [PH] (ref 5–9)
PLATELET # BLD: 347 10^3/UL (ref 130–400)
PLATELET # BLD: 410 10^3/UL (ref 130–400)
PMV BLD AUTO: 9.1 FL (ref 7.4–10.4)
PMV BLD AUTO: 9.6 FL (ref 7.4–10.4)
PO2 BLDA: 90 MMHG (ref 79–93)
POTASSIUM SERPL-SCNC: 3.9 MMOL/L (ref 3.6–5)
POTASSIUM SERPL-SCNC: 4 MMOL/L (ref 3.6–5)
POTASSIUM SERPL-SCNC: 4 MMOL/L (ref 3.6–5)
POTASSIUM SERPL-SCNC: 4.1 MMOL/L (ref 3.6–5)
POTASSIUM SERPL-SCNC: 5.1 MMOL/L (ref 3.6–5)
PROPOXYPH UR QL: NEGATIVE
PROT SERPL-MCNC: 7.8 GM/DL (ref 6.4–8.2)
PROT UR QL STRIP: (no result)
PROTHROMBIN TIME: 13.1 SEC (ref 12.2–14.7)
RBC #/AREA URNS HPF: (no result) /HPF
RBC MORPH BLD: NORMAL
SAO2 % BLDA FROM PO2: 91 % (ref 94–100)
SODIUM SERPL-SCNC: 126 MMOL/L (ref 135–145)
SODIUM SERPL-SCNC: 132 MMOL/L (ref 135–145)
SODIUM SERPL-SCNC: 133 MMOL/L (ref 135–145)
SP GR UR STRIP: 1.01 (ref 1.02–1.02)
TRICYCLICS UR QL SCN: NEGATIVE
VARIANT LYMPHS NFR BLD MANUAL: 7 %
VENTILATION MODE VENT: NO
WBC # BLD AUTO: 16 10^3/UL (ref 4.3–11)
WBC # BLD AUTO: 16.5 10^3/UL (ref 4.3–11)
WBC #/AREA URNS HPF: (no result) /HPF

## 2020-09-22 PROCEDURE — 83735 ASSAY OF MAGNESIUM: CPT

## 2020-09-22 PROCEDURE — 83690 ASSAY OF LIPASE: CPT

## 2020-09-22 PROCEDURE — 82150 ASSAY OF AMYLASE: CPT

## 2020-09-22 PROCEDURE — 84145 PROCALCITONIN (PCT): CPT

## 2020-09-22 PROCEDURE — 93041 RHYTHM ECG TRACING: CPT

## 2020-09-22 PROCEDURE — 85610 PROTHROMBIN TIME: CPT

## 2020-09-22 PROCEDURE — 85027 COMPLETE CBC AUTOMATED: CPT

## 2020-09-22 PROCEDURE — 87081 CULTURE SCREEN ONLY: CPT

## 2020-09-22 PROCEDURE — 94760 N-INVAS EAR/PLS OXIMETRY 1: CPT

## 2020-09-22 PROCEDURE — 94640 AIRWAY INHALATION TREATMENT: CPT

## 2020-09-22 PROCEDURE — 80320 DRUG SCREEN QUANTALCOHOLS: CPT

## 2020-09-22 PROCEDURE — 87040 BLOOD CULTURE FOR BACTERIA: CPT

## 2020-09-22 PROCEDURE — 82010 KETONE BODYS QUAN: CPT

## 2020-09-22 PROCEDURE — 80053 COMPREHEN METABOLIC PANEL: CPT

## 2020-09-22 PROCEDURE — 80048 BASIC METABOLIC PNL TOTAL CA: CPT

## 2020-09-22 PROCEDURE — 81000 URINALYSIS NONAUTO W/SCOPE: CPT

## 2020-09-22 PROCEDURE — 85025 COMPLETE CBC W/AUTO DIFF WBC: CPT

## 2020-09-22 PROCEDURE — 80306 DRUG TEST PRSMV INSTRMNT: CPT

## 2020-09-22 PROCEDURE — 80329 ANALGESICS NON-OPIOID 1 OR 2: CPT

## 2020-09-22 PROCEDURE — 99291 CRITICAL CARE FIRST HOUR: CPT

## 2020-09-22 PROCEDURE — 71045 X-RAY EXAM CHEST 1 VIEW: CPT

## 2020-09-22 PROCEDURE — 87088 URINE BACTERIA CULTURE: CPT

## 2020-09-22 PROCEDURE — 86769 SARS-COV-2 COVID-19 ANTIBODY: CPT

## 2020-09-22 PROCEDURE — 93005 ELECTROCARDIOGRAM TRACING: CPT

## 2020-09-22 PROCEDURE — 36415 COLL VENOUS BLD VENIPUNCTURE: CPT

## 2020-09-22 PROCEDURE — 83605 ASSAY OF LACTIC ACID: CPT

## 2020-09-22 PROCEDURE — 83880 ASSAY OF NATRIURETIC PEPTIDE: CPT

## 2020-09-22 PROCEDURE — 82962 GLUCOSE BLOOD TEST: CPT

## 2020-09-22 PROCEDURE — 84100 ASSAY OF PHOSPHORUS: CPT

## 2020-09-22 PROCEDURE — 83036 HEMOGLOBIN GLYCOSYLATED A1C: CPT

## 2020-09-22 PROCEDURE — 85730 THROMBOPLASTIN TIME PARTIAL: CPT

## 2020-09-22 PROCEDURE — 84484 ASSAY OF TROPONIN QUANT: CPT

## 2020-09-22 PROCEDURE — 82805 BLOOD GASES W/O2 SATURATION: CPT

## 2020-09-22 PROCEDURE — 85007 BL SMEAR W/DIFF WBC COUNT: CPT

## 2020-09-22 PROCEDURE — 51702 INSERT TEMP BLADDER CATH: CPT

## 2020-09-22 RX ADMIN — SODIUM CHLORIDE SCH MLS/HR: 4.5 INJECTION, SOLUTION INTRAVENOUS at 20:42

## 2020-09-22 RX ADMIN — SODIUM CHLORIDE SCH MLS/HR: 4.5 INJECTION, SOLUTION INTRAVENOUS at 13:22

## 2020-09-22 RX ADMIN — POTASSIUM CHLORIDE SCH MLS/HR: 200 INJECTION, SOLUTION INTRAVENOUS at 09:43

## 2020-09-22 RX ADMIN — DEXTROSE MONOHYDRATE AND SODIUM CHLORIDE SCH MLS/HR: 5; .45 INJECTION, SOLUTION INTRAVENOUS at 16:07

## 2020-09-22 RX ADMIN — SODIUM CHLORIDE SCH MLS/HR: 4.5 INJECTION, SOLUTION INTRAVENOUS at 09:43

## 2020-09-22 RX ADMIN — POTASSIUM CHLORIDE SCH MLS/HR: 200 INJECTION, SOLUTION INTRAVENOUS at 22:29

## 2020-09-22 RX ADMIN — POTASSIUM CHLORIDE SCH MLS/HR: 200 INJECTION, SOLUTION INTRAVENOUS at 13:22

## 2020-09-22 RX ADMIN — POTASSIUM CHLORIDE SCH MLS/HR: 200 INJECTION, SOLUTION INTRAVENOUS at 17:46

## 2020-09-22 RX ADMIN — POTASSIUM CHLORIDE SCH MLS/HR: 200 INJECTION, SOLUTION INTRAVENOUS at 15:43

## 2020-09-22 RX ADMIN — SODIUM CHLORIDE SCH MLS/HR: 4.5 INJECTION, SOLUTION INTRAVENOUS at 16:50

## 2020-09-22 RX ADMIN — POTASSIUM CHLORIDE SCH MLS/HR: 200 INJECTION, SOLUTION INTRAVENOUS at 16:07

## 2020-09-22 RX ADMIN — DEXTROSE MONOHYDRATE AND SODIUM CHLORIDE SCH MLS/HR: 5; .45 INJECTION, SOLUTION INTRAVENOUS at 20:17

## 2020-09-22 RX ADMIN — POTASSIUM CHLORIDE SCH MLS/HR: 200 INJECTION, SOLUTION INTRAVENOUS at 20:00

## 2020-09-22 NOTE — HISTORY & PHYSICAL-HOSPITALIST
History of Present Illness


HPI/Chief Complaint


Pt is a [id number] well known to me from previous admissions from [facility], [facility] who presented

to the ER due to elevated blood sugars. She states it all started a couple of 

days ago when she got in trouble from the city for trying to have a garage sale 

and they made her move all of her stuff and her trailer. She states she was tehn

very tired so was spacing out her insulin because she was too tired to take it. 

She then developed nausea and vomiting. Her blood sugar overnight at home was 

too high to reading prompting her to seek evaluation in the ER. She was found to

be in DKA and admitted for insulin gtt. She states she is already feeling better

and would like some water because she is very thirsty. Otherwise she has no comp

laints.


Source:  patient


Date Seen


20


Time Seen by a Provider:  12:04


Attending Physician


Patrick Calhoun MD


PCP


No,Local Physician


Referring Physician





Date of Admission


Sep 22, 2020 at 07:19





Home Medications & Allergies


Home Medications


Reviewed patient Home Medication Reconciliation performed by pharmacy medication

reconciliations technician and/or nursing.


Patients Allergies have been reviewed.





Allergies





Allergies


Coded Allergies


  morphine (Verified Allergy, Mild, Vomiting, 20)


  orange juice (Verified Allergy, Mild, Nausea, 20)


  Sulfa (Sulfonamide Antibiotics) (Unverified Allergy, Unknown, 6/25/15)


  codeine (Verified Allergy, Unknown, TAKES ULTRAM AT HOME, 09)


  ketorolac (Verified Allergy, Unknown, 08)


  tramadol (Verified Allergy, Unknown, 11)








Past Medical-Social-Family Hx


Past Med/Social Hx:  Reviewed and Corrections made


Patient Social History


Alcohol Use:  Regular Use (DRINKS "A COUPLE OF DRINKS" SEVERAL DAYS/WEEK)


Recreational Drug Use:  Yes (METH, THC)


Drug of Choice:  METH, THC


Smoking Status:  Current Everyday Smoker (> 1PPD)


Type Used:  Cigarettes


2nd Hand Smoke Exposure:  No


Recent Foreign Travel:  No


Contact w/other who traveled:  No


Recent Hopitalizations:  No


Recent Infectious Disease Expo:  No





Immunizations Up To Date


Tetanus Booster (TDap):  Unknown


Date of Pneumonia Vaccine:  Oct 1, 2012


Date of Influenza Vaccine:  Oct 1, 2012





Seasonal Allergies


Seasonal Allergies:  No





Past Medical History


Surgeries:  Abdominal, Adenoidectomy, Ear Surgery, Hysterectomy, Renal, 

Tonsillectomy, Tubal Ligation


Cardiac:  Deep Vein Thrombosis, High Cholesterol, Hypertension


Neurological:  Neuropathy


Reproductive:  Yes


Female Reproductive Disorders:  Menstrual Problems


Hysterectomy, Menopausal


Genitourinary:  Kidney Stones, Renal Failure


Gastrointestinal:  Gastroesophageal Reflux, Pancreatitis, Ulcer


Musculoskeletal:  Arthritis, Rheumatoid Arthritis, Fractures


Endocrine:  Diabetes, Insulin dep


HEENT:  Tonsilitis, Glaucoma


Loss of Vision:  Denies


Hearing Impairment:  Denies


Psychosocial:  Personality Disorder


History of Blood Disorders:  No


Adverse Reaction to Blood Ramirez:  No





Family History





FH: thyroid cancer


  G8 SISTER


FHx: macular degeneration


  19 MOTHER


Glaucoma


  G8 BROTHER


Heart Disease, Cancer, Diabetes





SOCIAL HISTORY:


-ETOH--REGULAR USE--"COUPLE OF DRINKS" SEVERAL NIGHTS A WEEK


-DRUGS--+ METH AND THC USE


-SMOKES > 1 PPD





PAST SURGICAL HISTORY: 


-TONSILLECTOMY AND ADENOIDECTOMY


-BILATERAL TUBAL LIGATION


-HYSTERECTOMY


-EXPLORATORY LAPAROSCOPY


-URETERAL STENTS FOR KIDNEY STONES/CYSTOSCOPIES


-EAR SURGERY





Review of Systems


Constitutional:  No chills, No fever


EENTM:  no symptoms reported


Respiratory:  No cough, No short of breath


Cardiovascular:  No chest pain, No palpitations


Gastrointestinal:  abdominal pain, nausea, vomiting


Genitourinary:  No decreased output


Musculoskeletal:  no symptoms reported


Skin:  no symptoms reported


Psychiatric/Neurological:  No Symptoms Reported





Physical Exam


Physical Exam


Vital Signs





Vital Signs - First Documented








 20





 05:21


 


Temp 36.9


 


Pulse 120


 


Resp 22


 


B/P (MAP) 203/103 (136)


 


Pulse Ox 95


 


O2 Delivery Room Air





Capillary Refill : Less Than 3 Seconds


Height, Weight, BMI


Height: 5'5.00"


Weight: 151lbs. 1.0oz. 68.326564ao; 28.00 BMI


Method:Stated


General Appearance:  No Apparent Distress, Chronically ill


HEENT:  PERRL/EOMI, Moist Mucous Membranes


Neck:  Normal Inspection, Supple


Respiratory:  Lungs Clear, No Accessory Muscle Use, No Respiratory Distress


Cardiovascular:  Regular Rate, Rhythm, No Murmur


Gastrointestinal:  Normal Bowel Sounds, Non Tender, Soft


Extremity:  Normal Capillary Refill, No Calf Tenderness, Pedal Edema


Neurologic/Psychiatric:  Alert, Oriented x3, Normal Mood/Affect


Skin:  Normal Color, Warm/Dry





Results


Results/Procedures


Labs


Laboratory Tests


20 05:24








20 09:19








Patient resulted labs reviewed.





Assessment/Plan


Admission Diagnosis


DKA


Admission Status:  Inpatient Order (span 2 midnights)


Reason for Inpatient Admission:  


see below





Assessment and Plan


DKA


IDDMII


   Presented with BS 1017 with bicarb of 10 and elevated 4.32


   Continue DKA protocol, BS improving


   A1c pending, last one in  was 12.0





Acute on CKD stage 3a


Anemia of CKD


   Crt 2.75, baseline is around 1.5


   Continue IVF


   Monitor UOP


   Hgb 9.7, trend





HTN


   BP currently well controlled, trend





DVT ppx: Lovenox





Clinical Quality Measures


DVT/VTE Risk/Contraindication:


Risk Factor Score Per Nursin


RFS Level Per Nursing on Admit:  4+=Very High











MISHA DEVRIES MD              Sep 22, 2020 12:08

## 2020-09-22 NOTE — NUR
DR DEVRIES NOTIFIED OF PT'S BLOOD SUGAR  NEW ORDERS RECEIVED TO LEAVE DRIP AT CURRENT 
RATE (10ML/HR) AND RECHECK BLOOD SUGAR IN 1 HOUR.

## 2020-09-22 NOTE — NUR
LATE ENTRY: ORDERS RECEIVED TO COVID SWAB PT, DR ASHRAF NOTIFIED THAT PT WAS TESTED LAST 
THURSDAY SEPT 17TH AND TEST WAS A SEND OUT, AND TEST RESULTS CAME BACK NEGATIVE. PT NOTED TO 
BE AFEBRILE, PT INITIALLY STATED " ER TOLD ME I HAD A 99 TEMP", NO WHERE IS IT NOTED THAT PT 
HAD A LOW GRADE TEMP.  

ORDERS TO CANCEL COVID SWAB RECEIVED BY DR ASHRAF.

## 2020-09-22 NOTE — NUR
LATE ENTRY: 1100 BLOOD SUGAR 488 DOWN FROM 755 INSULIN DRIP HELD X 30 MIN AND RESTARTED AT 
6ML/HR

1245  BLOOD SUGAR 377 DOWN, DRIP HELD X 30 MIN AND RESTARTED AT 3.5ML/HR

1500 BLOOD SUGAR 273 HELD X 30 MIN WILL RECHECK BLOOD SUGAR 

-------------------------------------------------------------------------------

Addendum: 09/22/20 at 1514 by SP BRUMFIELD RN

-------------------------------------------------------------------------------

ALL BLOOD SUGAR CHANGES VERIFIED BY THIS RN AND RN SUPERVISOR SOPHIE

## 2020-09-22 NOTE — ED GENERAL
General


Chief Complaint:  Glucose Problems


Stated Complaint:  DIARRHEA,VOMITING


Nursing Triage Note:  


Pt here with n/v/d. Also c/o body aches which is normal for her due to RA. Pt 


states she recently had pneumonia but had a negative Covid test 3 days ago.


Nursing Sepsis Screen:  No Definite Risk


Source of Information:  Patient (PT IS SOMEWHAT LIMITED HISTORIAN), EMS, Old 

Records





History of Present Illness


Date Seen by Provider:  Sep 22, 2020


Time Seen by Provider:  05:12


Initial Comments


PT ARRIVES VIA EMS FROM HOME


EMS CALLED FOR PT WITH NAUSEA AND VOMITING


PT STATES SHE BEGAN VOMITING YESTERDAY AFTERNOON--IS UNABLE TO STATE HOW MANY 

EPISODES OF VOMITING SHE HAS HAD--STATES "EVERY 15 MINUTES" 


DENIES DIARRHEA OR ABDOMINAL PAIN 


STATES SHE "HURTS ALL OVER" 








PT IS INSULIN-DEPENDENT DIABETIC, WITH LONG HISTORY OF NON-COMPLIANCE AND 

MULTIPLE EPISODES OF DKA--THIS IS 6TH ADMIT HERE IN THE LAST 13 MONTHS FOR DKA


LAST VISIT AND ADMIT HERE /30-07/09/20--PT WAS ADMITTED FOR SEPSIS, DKA, 

RENAL FAILURE, ACUTE RESPIRATORY FAILURE, ASPIRATION PNEUMONIA--REQUIRED 

INTUBATION AND CENTRAL LINE PLACEMENT; AMONG MULTIPLE OTHER ASSOCIATED DIAGNOSES





PT STATES SHE HAS NOT CHECKED HER BLOOD GLUCOSE RECENTLY, AND DOES NOT RECALL 

WHEN SHE LAST TOOK HER INSULIN--STATES "MAYBE YESTERDAY, BUT I'M NOT SURE" 

--ADMITS THAT SHE FREQUENTLY "FORGETS" TO TAKE HER INSULIN "WHEN I'M UNDER 

EXTREME PRESSURE LIKE I AM NOW" --THEN RAMBLES ON AT LENGTH ABOUT GETTING READY 

TO HAVE A GARAGE SALE AND HER [place] AND THE [place] "ARE COMING DOWN REAL HARD 

ON ME" 


PT STATES SHE DID NOT EAT AT ALL YESTERDAY, BUT HAS BEEN DRINKING ALOT OF WATER,

AND HAS BEEN URINATING EVERY 15-30 MINUTES SINCE YESTERDAY AND ALL NIGHT LONG. 








NO REPORTED FEVER


NO COUGH


DENIES SHORTNESS OF BREATH





HAD OUTPATIENT COVID TEST 09/17/20 AND WAS NEGATIVE. 


COVID TEST WAS NEGATIVE WITH ADMIT 06/30/20





PT ALSO HAS AN EXTENSIVE HISTORY OF DRUG ABUSE, INCLUDING METH AND THC--CLAIMS 

SHE HAS NOT USED DRUGS "NOT FOR A LONG TIME" . PT ADMITS TO REGULAR ETOH, BUT 

DENIES USE IN LAST 24 HOURS. PT CONTINUES TO SMOKE > 1 PPD





PCP: PT STATES SHE NOW SEES A DR BANDA AT [facility], [place] IN [place] FOR PRIMARY 

CARE





Allergies and Home Medications


Allergies


Coded Allergies:  


     morphine (Verified  Allergy, Mild, Vomiting, 7/4/20)


     orange juice (Verified  Allergy, Mild, Nausea, 7/8/20)


     Sulfa (Sulfonamide Antibiotics) (Unverified  Allergy, Unknown, 6/25/15)


     codeine (Verified  Allergy, Unknown, TAKES ULTRAM AT HOME, 1/2/09)


     ketorolac (Verified  Allergy, Unknown, 11/19/08)


     tramadol (Verified  Allergy, Unknown, 12/17/11)





Home Medications


Albuterol Sulfate 1 Puff Puff, 2 PUFF IH TID PRN for SHORTNESS OF BREATH, 

(Reported)


Amoxicillin/Potassium Clav 1 Each Tablet, 875 MG PO BID WITH MEALS


   Prescribed by: BERTHA EDWARDS on 7/9/20 1103


Ascorbic Acid 1,000 Mg Tablet, 1,000 MG PO DAILY, (Reported)


Aspirin 81 Mg Tab.chew, 81 MG PO DAILY


   Prescribed by: BERTHA EDWARDS on 7/9/20 1103


Cholecalciferol (Vitamin D3) 25 Mcg Tablet, 25 MCG PO BID, (Reported)


Diclofenac Sodium 100 Gm Gel..gram., 1 APPLIC TOP QID PRN for CRAMPS, (Reported)


   APPLY TO LEGS 


Fluconazole 100 Mg Tablet, 100 MG PO DAILY


   Prescribed by: BERTHA EDWARDS on 7/9/20 1103


Fluticasone Propionate 9.9 Ml [place].susp, 2 SPRAY NSEACH DAILY, (Reported)


   1 SPRAY EACH NARE DAILY 


Furosemide 40 Mg Tablet, 40 MG PO DAILY, (Reported)


Gabapentin 300 Mg Capsule, 300 MG PO TID, (Reported)


Insulin Detemir 100 Unit/1 Ml Insuln.pen, 24 UNITS SC HS, (Reported)


Insulin Lispro 100 Unit/1 Ml Insuln.pen, 5-15 UNITS SQ AC, (Reported)


Melatonin 10 Mg Tablet, 10 MG PO HS, (Reported)


Metoclopramide HCl 10 Mg Tablet, 10 MG PO QIDACHS, (Reported)


Metoprolol Succinate 50 Mg Tab.er.24h, 50 MG PO DAILY


   Prescribed by: BERTHA EDWARDS on 7/9/20 1103


Montelukast Sodium 10 Mg Tablet, 10 MG PO HS, (Reported)


   LAST FILLED 01- #15 


Naloxone HCl 4 Mg Spray, 1 SPRAY NS UD PRN for UNRESPONSIVE, (Reported)


   ADMINSTER 1 SPRAY IN 1 NOSTRIL 1 TIME- MAY REPEAT IN ALTERNATING NOSTRIL 

EVERY 2-3 MINUTES UNTIL RESPONSIVE OR EMS ARRIVES 


Omeprazole 20 Mg Capsule.dr, 20 MG PO DAILY, (Reported)


Potassium Gluconate 99 Mg Tablet, 99 MG PO DAILY, (Reported)


Sumatriptan Succinate 50 Mg Tablet, 50 MG PO Q2H PRN for MIGRAINE, (Reported)


   MAY REPEAT A DOSE IN 2 HOURS- DO NOT EXCEED 4 TABS (200MG) PER DAY 





Patient Home Medication List


Home Medication List Reviewed:  Yes





Review of Systems


Review of Systems


Constitutional:  No chills, No diaphoresis, No fever; malaise, weakness


EENTM:  no symptoms reported


Respiratory:  no symptoms reported; No cough, No short of breath


Cardiovascular:  no symptoms reported; No chest pain


Gastrointestinal:  see HPI; No abdominal pain, No constipation, No diarrhea; 

loss of appetite, nausea, vomiting


Genitourinary:  see HPI; No dysuria; frequency


Musculoskeletal:  see HPI (HURTS ALL OVER)


Skin:  no symptoms reported


Psychiatric/Neurological:  No Symptoms Reported


Hematologic/Lymphatic:  No Symptoms Reported





Past Medical-Social-Family Hx


Past Med/Social Hx:  Reviewed and Corrections made


Patient Social History


Alcohol Use:  Regular Use (DRINKS "A COUPLE OF DRINKS" SEVERAL DAYS/WEEK)


Recreational Drug Use:  Yes (METH, THC)


Drug of Choice:  METH, THC


Smoking Status:  Current Everyday Smoker (> 1PPD)


Type Used:  Cigarettes


2nd Hand Smoke Exposure:  No


Recent Foreign Travel:  No


Contact w/Someone Who Travel:  No


Recent Infectious Disease Expo:  No


Recent Hopitalizations:  No





Immunizations Up To Date


Tetanus Booster (TDap):  Unknown


Date of Pneumonia Vaccine:  Oct 1, 2012


Date of Influenza Vaccine:  Oct 1, 2012





Seasonal Allergies


Seasonal Allergies:  No





Past Medical History


Surgeries:  Yes (URETERAL STENTS)


Abdominal, Adenoidectomy, Ear Surgery, Hysterectomy, Renal, Tonsillectomy, Tubal

Ligation


Respiratory:  Yes (INTUBATED 06/30/20 WITH DKA/RESP FAILURE)


Asthma, Pneumonia, Chronic Bronchitis


Cardiac:  Yes


Deep Vein Thrombosis, High Cholesterol, Hypertension


Neurological:  Yes


Neuropathy


Reproductive Disorders:  Yes


Female Reproductive Disorders:  Menstrual Problems


GYN History:  Hysterectomy, Menopausal


Genitourinary:  Yes (NO  DIALYSIS; URETERAL STENTS FOR KIDNEY STONES)


Kidney Stones, Renal Failure


Gastrointestinal:  Yes (gastroparesis)


Gastroesophageal Reflux, Pancreatitis, Ulcer


Musculoskeletal:  Yes (CHRONIC GENERALIZED PAIN)


Arthritis, Rheumatoid Arthritis, Fractures


Endocrine:  Yes (NON-COMPLIANT; MULTIPLE EPISODES OF DKA)


Diabetes, Insulin dep


HEENT:  Yes (S/P T&A)


Tonsilitis, Glaucoma


Loss of Vision:  Denies


Hearing Impairment:  Denies


Cancer:  No


Psychosocial:  Yes (POLYSUBSTANCE ABUSE)


Personality Disorder


Integumentary:  No


Blood Disorders:  No


Adverse Reaction/Blood Tranf:  No





Family Medical History





FH: thyroid cancer


  G8 SISTER


FHx: macular degeneration


  19 MOTHER


Glaucoma


  G8 BROTHER


Heart Disease, Cancer, Diabetes





SOCIAL HISTORY:


-ETOH--REGULAR USE--"COUPLE OF DRINKS" SEVERAL NIGHTS A WEEK


-DRUGS--+ METH AND THC USE


-SMOKES > 1 PPD





PAST SURGICAL HISTORY: 


-TONSILLECTOMY AND ADENOIDECTOMY


-BILATERAL TUBAL LIGATION


-HYSTERECTOMY


-EXPLORATORY LAPAROSCOPY


-URETERAL STENTS FOR KIDNEY STONES/CYSTOSCOPIES


-EAR SURGERY





Physical Exam


Vital Signs





Vital Signs - First Documented








 9/22/20





 05:21


 


Temp 36.9


 


Pulse 120


 


Resp 22


 


B/P (MAP) 203/103 (136)


 


Pulse Ox 95


 


O2 Delivery Room Air





Capillary Refill : Less Than 3 Seconds


Height, Weight, BMI


Height: 5'5.00"


Weight: 151lbs. 1.0oz. [id number]; 28.00 BMI


Method:Stated


General Appearance:  WD/WN, Other (HYPERVENTILATING, MOANING, DIRTY, UNKEMPT, 

REEKS OF CIGARETTES)


HEENT:  PERRL/EOMI, Other (POOR DENTITION, ORAL MUCOSA DRY)


Neck:  Normal Inspection


Respiratory:  Normal Breath Sounds, No Accessory Muscle Use, No Respiratory 

Distress, Other (HYPERVENTILATING, WITH RR IN 30'S)


Cardiovascular:  No Edema, No Murmur, Normal Peripheral Pulses, Tachycardia (IN 

140'S)


Gastrointestinal:  Non Tender, Soft


Back:  No CVA Tenderness


Extremity:  Normal Capillary Refill, No Pedal Edema


Neurologic/Psychiatric:  Alert, Oriented x3, No Motor/Sensory Deficits, CNs II-

XII Norm as Tested


Skin:  Normal Color, Warm/Dry; No Rash





Focused Exam


Lactate Level


9/22/20 05:24: Lactic Acid Level 2.00





Lactic Acid Level





Laboratory Tests








Test


 9/22/20


05:24


 


Lactic Acid Level


 2.00 MMOL/L


(0.50-2.00)











Procedures/Interventions


Date of ETT Placement:  Jul 4, 2020


Time of ETT Placement:  0700





Progress/Results/Core Measures


Suspected Sepsis


Recent Fever Within 48 Hours:  No


Infection Criteria Present:  None


New/Unexplained  Altered Menta:  No


Sepsis Screen:  No Definite Risk


SIRS


Temperature: 


Pulse: 120 


Respiratory Rate: 22


 


Laboratory Tests


9/22/20 05:24: White Blood Count 16.0H


Blood Pressure 203 /103 


Mean: 136


 


9/22/20 05:24: Lactic Acid Level 2.00


Laboratory Tests


9/22/20 05:24: 


Creatinine 2.75H, INR Comment 1.0, Platelet Count 410H, Total Bilirubin 0.6








Results/Orders


Lab Results





Laboratory Tests








Test


 9/22/20


05:23 9/22/20


05:24 9/22/20


05:43 9/22/20


06:41 Range/Units


 


 


Glucometer > 600 *H   > 600 *H   MG/DL


 


White Blood Count


 


 16.0 H


 


 


 4.3-11.0


10^3/uL


 


Red Blood Count


 


 3.92 L


 


 


 4.35-5.85


10^6/uL


 


Hemoglobin  11.5    11.5-16.0  G/DL


 


Hematocrit  37    35-52  %


 


Mean Corpuscular Volume  94    80-99  FL


 


Mean Corpuscular Hemoglobin  29    25-34  PG


 


Mean Corpuscular Hemoglobin


Concent 


 31 L


 


 


 32-36  G/DL





 


Red Cell Distribution Width  14.9 H   10.0-14.5  %


 


Platelet Count


 


 410 H


 


 


 130-400


10^3/uL


 


Mean Platelet Volume  9.6    7.4-10.4  FL


 


Neutrophils (%) (Auto)  90 H   42-75  %


 


Lymphocytes (%) (Auto)  7 L   12-44  %


 


Monocytes (%) (Auto)  3    0-12  %


 


Eosinophils (%) (Auto)  0    0-10  %


 


Basophils (%) (Auto)  0    0-10  %


 


Neutrophils # (Auto)  14.4 H   1.8-7.8  X 10^3


 


Lymphocytes # (Auto)  1.2    1.0-4.0  X 10^3


 


Monocytes # (Auto)  0.5    0.0-1.0  X 10^3


 


Eosinophils # (Auto)


 


 0.0 


 


 


 0.0-0.3


10^3/uL


 


Basophils # (Auto)


 


 0.1 


 


 


 0.0-0.1


10^3/uL


 


Neutrophils % (Manual)  87     %


 


Lymphocytes % (Manual)  7     %


 


Monocytes % (Manual)  3     %


 


Band Neutrophils  3     %


 


Blood Morphology Comment  NORMAL     


 


Prothrombin Time  13.1    12.2-14.7  SEC


 


INR Comment  1.0    0.8-1.4  


 


Activated Partial


Thromboplast Time 


 24 


 


 


 24-35  SEC





 


Sodium Level  126 L   135-145  MMOL/L


 


Potassium Level  5.1 H   3.6-5.0  MMOL/L


 


Chloride Level  91 L     MMOL/L


 


Carbon Dioxide Level  10 L   21-32  MMOL/L


 


Anion Gap  25 H   5-14  MMOL/L


 


Blood Urea Nitrogen  53 H   7-18  MG/DL


 


Creatinine


 


 2.75 H


 


 


 0.60-1.30


MG/DL


 


Estimat Glomerular Filtration


Rate 


 18 


 


 


  





 


BUN/Creatinine Ratio  19     


 


Glucose Level  1017 *H     MG/DL


 


Lactic Acid Level


 


 2.00 


 


 


 0.50-2.00


MMOL/L


 


Calcium Level  9.2    8.5-10.1  MG/DL


 


Corrected Calcium  9.8    8.5-10.1  MG/DL


 


Magnesium Level  2.4    1.6-2.4  MG/DL


 


Total Bilirubin  0.6    0.1-1.0  MG/DL


 


Aspartate Amino Transf


(AST/SGOT) 


 17 


 


 


 5-34  U/L





 


Alanine Aminotransferase


(ALT/SGPT) 


 20 


 


 


 0-55  U/L





 


Alkaline Phosphatase  148 H     U/L


 


Troponin I  < 0.028    <0.028  NG/ML


 


Total Protein  7.8    6.4-8.2  GM/DL


 


Albumin  3.3    3.2-4.5  GM/DL


 


Amylase Level  42      U/L


 


Lipase  66    8-78  U/L


 


Acetaminophen Level  < 10 L   10-30  UG/ML


 


Serum Alcohol  < 10    <10  MG/DL


 


Blood Gas Puncture Site   RIGHT BRACHIAL    


 


Blood Gas Patient Temperature   36.8    


 


Arterial Blood pH   7.20 *L  7.37-7.43  


 


Arterial Blood Partial


Pressure CO2 


 


 30 L


 


 35-45  MMHG





 


Arterial Blood Partial


Pressure O2 


 


 90 


 


 79-93  MMHG





 


Arterial Blood HCO3   11 *L  23-27  MMOL/L


 


Arterial Blood Total CO2


 


 


 12.2 L


 


 21.0-31.0


MMOL/L


 


Arterial Blood Oxygen


Saturation 


 


 91 L


 


   %





 


Arterial Blood Base Excess


 


 


 -15.2 L


 


 -2.5-2.5


MMOL/L


 


Trent Test   NA    


 


Blood Gas Ventilator Setting   NO    


 


Blood Gas Inspired Oxygen   ROOM AIR    








My Orders





Orders - DAYTON BARBER DO


Accucheck Stat ONCE (9/22/20 05:13)


Ed Iv/Invasive Line Start (9/22/20 05:13)


Ekg Tracing (9/22/20 05:13)


Catheter(Urinary) Insert & Ass 03,15 (9/22/20 05:13)


Monitor-Rhythm Ecg Trace Only (9/22/20 05:13)


Acetaminophen (9/22/20 05:13)


Alcohol (9/22/20 05:13)


Amylase (9/22/20 05:13)


Arterial Blood Gas (9/22/20 05:40)


Cbc With Automated Diff (9/22/20 05:13)


Comprehensive Metabolic Panel (9/22/20 05:13)


Drug Screen Stat (Urine) (9/22/20 05:13)


Lactic Acid Analyzer (9/22/20 05:13)


Lipase (9/22/20 05:13)


Magnesium (9/22/20 05:13)


Protime With Inr (9/22/20 05:13)


Partial Thromboplastin Time (9/22/20 05:13)


Ua Culture If Indicated (9/22/20 05:13)


Troponin I (9/22/20 05:13)


Ondansetron Injection (Zofran Injectio (9/22/20 05:15)


Ed Iv/Invasive Line Start (9/22/20 05:13)


Ns Iv 1000 Ml (Sodium Chloride 0.9%) (9/22/20 05:13)


Ed Iv/Invasive Line Start (9/22/20 05:28)


Ns Iv 1000 Ml (Sodium Chloride 0.9%) (9/22/20 05:28)


Manual Differential (9/22/20 05:24)


Insulin Regular Drip (Myxredlin 100 Unit (9/22/20 06:15)


Hydralazine Injection (Apresoline Inject (9/22/20 06:15)





Medications Given in ED





Current Medications








 Medications  Dose


 Ordered  Sig/Corin


 Route  Start Time


 Stop Time Status Last Admin


Dose Admin


 


 Hydralazine HCl  10 mg  ONCE  ONCE


 IV  9/22/20 06:15


 9/22/20 06:16 DC 9/22/20 06:23


10 MG


 


 Ondansetron HCl  8 mg  ONCE  ONCE


 IVP  9/22/20 05:15


 9/22/20 05:17 DC 9/22/20 05:36


8 MG








Vital Signs/I&O











 9/22/20





 05:21


 


Temp 36.9


 


Pulse 120


 


Resp 22


 


B/P (MAP) 203/103 (136)


 


Pulse Ox 95


 


O2 Delivery Room Air





Capillary Refill : Less Than 3 Seconds








Blood Pressure Mean:                    136








Progress Note :  


Progress Note


GIVEN IV FLUIDS, AND THEN STARTED ON INSULIN DRIP


GIVEN ZOFRAN--NO VOMITING DURING ER STAY


GIVEN HYDRALAZINE FOR PERSISTENTLY ELEVATED BLOOD PRESSURE OF > 200 SYSTOLIC/ > 

100 DIASTOLIC--BP DOWN SIGNIFICANTLY


O2 SATS 91% ON ROOM AIR--UP TO 97% ON 2L/NC





ECG


Initial ECG Impression Date:  Sep 22, 2020


Initial ECG Impression Time:  06:24


Initial ECG Rate:  144


Initial ECG Rhythm:  S.Tach


Initial ECG Impression:  Nonspecific Changes





Departure


Impression





   Primary Impression:  


   DKA (diabetic ketoacidoses)


   Additional Impressions:  


   Dehydration


   Electrolyte imbalance


   Acute renal failure


   Diabetes mellitus, insulin dependent (IDDM), uncontrolled


Disposition:  09 ADMITTED AS INPATIENT


Condition:  Stable





Departure-Patient Inst.


Referrals:  


NO,LOCAL PHYSICIAN (PCP/Family)


Primary Care Physician











DAYTON BARBER DO                 Sep 22, 2020 05:31
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
## 2020-09-22 NOTE — NUR
SPOKE WITH THE PT (SHE HAS HER MEDS WITH HER)WENT THRU THE EXT MED HISTORY AND LOOKED AT HER 
PAST DISCHARGE ORDERS TO COMPLETE THE MED REC



AT THE PATIENTS LAST DISCHARGE METOPROLOL TART 50MG HAD BEEN DISCONTINUED AND METOPROLOL 
SUCC 50MG WAS STARTED. [facility] FILLED THE METOPROLOL SUCC ON 2020 #28/28DS BUT AFTER 
THAT FILL PHARMACY WENT BACK AND CONTINUED TO FILL THE METOPROLOL TART (AND THAT IS 
CURRENTLY WHAT THE PT HAS WITH HER). FOR THIS REASON I AM ADDING THE METOPROLOL TART BACK TO 
THE MED REC AND REMOVING THE METOPROLOL SUCC



GABAPENTIN 300MG- PT IS DOING AN INCREASE IN HER DOSE AND AT THIS TIME SHE IS TAKING 1 TAB 
DAILY AND 2 TABS AT NOON & HS X 5 DAYS (SHE JUST STARTED THESE DIRECTIONS) AFTER 5 DAYS SHE 
WILL THEN TAKE 2 TABS TID



MONTELUKAST 10MG- SHE HAS A BOTTLE DATED 2020 #15/15DS- SHE REPORTS SHE JUST FOUND 
THIS BOTTLE AND HAS STARTED TAKING AGAIN. ACCORDING TO THE PT AT ALL HER DOCTORS 
APPOINTMENTS SHE IS FORGETTING TO ASK FOR REFILLS. I DID INCLUDE THE PAST DUE FILL ON THE 
MED REC



IMITREX- PT HAS A MEDICATION CARD/DOSEPAK OF THE 100MG WITH HER HOWEVER THIS HAS AN 
EXPIRATION DATE OF 2008. SHE HAS RECENTLY FILLED THE 50MG AT [facility], THEREFORE I DID 
NOT INCLUDE THE  100MG AND KEPT THE 50MG ON THE MED REC



THE FOLLOWING ARE MEDICATIONS THE PT SAYS SHE TAKES BUT ARE NOT WITH HER:

VITAMIN C

VITAMIN D3

DICLOFENAC

FLONASE

LEVEMIR

HUMALOG KWIKPEN

POTASSIUM GLUC

## 2020-09-23 VITALS — DIASTOLIC BLOOD PRESSURE: 75 MMHG | SYSTOLIC BLOOD PRESSURE: 124 MMHG

## 2020-09-23 VITALS — DIASTOLIC BLOOD PRESSURE: 63 MMHG | SYSTOLIC BLOOD PRESSURE: 115 MMHG

## 2020-09-23 VITALS — SYSTOLIC BLOOD PRESSURE: 146 MMHG | DIASTOLIC BLOOD PRESSURE: 76 MMHG

## 2020-09-23 VITALS — SYSTOLIC BLOOD PRESSURE: 121 MMHG | DIASTOLIC BLOOD PRESSURE: 63 MMHG

## 2020-09-23 VITALS — DIASTOLIC BLOOD PRESSURE: 67 MMHG | SYSTOLIC BLOOD PRESSURE: 128 MMHG

## 2020-09-23 VITALS — DIASTOLIC BLOOD PRESSURE: 77 MMHG | SYSTOLIC BLOOD PRESSURE: 150 MMHG

## 2020-09-23 VITALS — SYSTOLIC BLOOD PRESSURE: 150 MMHG | DIASTOLIC BLOOD PRESSURE: 80 MMHG

## 2020-09-23 VITALS — SYSTOLIC BLOOD PRESSURE: 124 MMHG | DIASTOLIC BLOOD PRESSURE: 75 MMHG

## 2020-09-23 VITALS — SYSTOLIC BLOOD PRESSURE: 143 MMHG | DIASTOLIC BLOOD PRESSURE: 83 MMHG

## 2020-09-23 VITALS — SYSTOLIC BLOOD PRESSURE: 158 MMHG | DIASTOLIC BLOOD PRESSURE: 82 MMHG

## 2020-09-23 VITALS — DIASTOLIC BLOOD PRESSURE: 83 MMHG | SYSTOLIC BLOOD PRESSURE: 148 MMHG

## 2020-09-23 VITALS — SYSTOLIC BLOOD PRESSURE: 158 MMHG | DIASTOLIC BLOOD PRESSURE: 72 MMHG

## 2020-09-23 VITALS — SYSTOLIC BLOOD PRESSURE: 154 MMHG | DIASTOLIC BLOOD PRESSURE: 87 MMHG

## 2020-09-23 VITALS — SYSTOLIC BLOOD PRESSURE: 135 MMHG | DIASTOLIC BLOOD PRESSURE: 70 MMHG

## 2020-09-23 VITALS — SYSTOLIC BLOOD PRESSURE: 156 MMHG | DIASTOLIC BLOOD PRESSURE: 107 MMHG

## 2020-09-23 LAB
APTT PPP: YELLOW S
BACTERIA #/AREA URNS HPF: (no result) /HPF
BILIRUB UR QL STRIP: NEGATIVE
BUN/CREAT SERPL: 21
BUN/CREAT SERPL: 21
CALCIUM SERPL-MCNC: 7.6 MG/DL (ref 8.5–10.1)
CALCIUM SERPL-MCNC: 7.6 MG/DL (ref 8.5–10.1)
CHLORIDE SERPL-SCNC: 109 MMOL/L (ref 98–107)
CHLORIDE SERPL-SCNC: 110 MMOL/L (ref 98–107)
CO2 SERPL-SCNC: 16 MMOL/L (ref 21–32)
CO2 SERPL-SCNC: 16 MMOL/L (ref 21–32)
CREAT SERPL-MCNC: 1.98 MG/DL (ref 0.6–1.3)
CREAT SERPL-MCNC: 2.03 MG/DL (ref 0.6–1.3)
FIBRINOGEN PPP-MCNC: CLEAR MG/DL
GFR SERPLBLD BASED ON 1.73 SQ M-ARVRAT: 25 ML/MIN
GFR SERPLBLD BASED ON 1.73 SQ M-ARVRAT: 26 ML/MIN
GLUCOSE SERPL-MCNC: 107 MG/DL (ref 70–105)
GLUCOSE SERPL-MCNC: 154 MG/DL (ref 70–105)
GLUCOSE UR STRIP-MCNC: (no result) MG/DL
KETONES UR QL STRIP: NEGATIVE
LEUKOCYTE ESTERASE UR QL STRIP: (no result)
MAGNESIUM SERPL-MCNC: 1.8 MG/DL (ref 1.6–2.4)
NITRITE UR QL STRIP: NEGATIVE
PH UR STRIP: 6.5 [PH] (ref 5–9)
PHOSPHATE SERPL-MCNC: 2.8 MG/DL (ref 2.3–4.7)
POTASSIUM SERPL-SCNC: 4 MMOL/L (ref 3.6–5)
POTASSIUM SERPL-SCNC: 4 MMOL/L (ref 3.6–5)
PROT UR QL STRIP: (no result)
RBC #/AREA URNS HPF: (no result) /HPF
SODIUM SERPL-SCNC: 133 MMOL/L (ref 135–145)
SODIUM SERPL-SCNC: 134 MMOL/L (ref 135–145)
SP GR UR STRIP: <=1.005 (ref 1.02–1.02)
SQUAMOUS #/AREA URNS HPF: (no result) /HPF
WBC #/AREA URNS HPF: (no result) /HPF

## 2020-09-23 RX ADMIN — ONDANSETRON PRN MG: 2 INJECTION, SOLUTION INTRAMUSCULAR; INTRAVENOUS at 18:25

## 2020-09-23 RX ADMIN — POTASSIUM CHLORIDE SCH MLS/HR: 200 INJECTION, SOLUTION INTRAVENOUS at 00:32

## 2020-09-23 RX ADMIN — SODIUM CHLORIDE SCH MLS/HR: 4.5 INJECTION, SOLUTION INTRAVENOUS at 10:38

## 2020-09-23 RX ADMIN — IPRATROPIUM BROMIDE AND ALBUTEROL SULFATE SCH ML: .5; 3 SOLUTION RESPIRATORY (INHALATION) at 14:57

## 2020-09-23 RX ADMIN — POTASSIUM CHLORIDE SCH MLS/HR: 200 INJECTION, SOLUTION INTRAVENOUS at 05:27

## 2020-09-23 RX ADMIN — SODIUM CHLORIDE SCH MLS/HR: 4.5 INJECTION, SOLUTION INTRAVENOUS at 03:23

## 2020-09-23 RX ADMIN — SODIUM CHLORIDE SCH MLS/HR: 4.5 INJECTION, SOLUTION INTRAVENOUS at 00:35

## 2020-09-23 RX ADMIN — INSULIN ASPART SCH UNIT: 100 INJECTION, SOLUTION INTRAVENOUS; SUBCUTANEOUS at 10:58

## 2020-09-23 RX ADMIN — DEXTROSE MONOHYDRATE AND SODIUM CHLORIDE SCH MLS/HR: 5; .45 INJECTION, SOLUTION INTRAVENOUS at 03:20

## 2020-09-23 RX ADMIN — POTASSIUM CHLORIDE SCH MLS/HR: 200 INJECTION, SOLUTION INTRAVENOUS at 03:23

## 2020-09-23 RX ADMIN — DEXTROSE MONOHYDRATE AND SODIUM CHLORIDE SCH MLS/HR: 5; .45 INJECTION, SOLUTION INTRAVENOUS at 07:20

## 2020-09-23 RX ADMIN — INSULIN ASPART SCH UNIT: 100 INJECTION, SOLUTION INTRAVENOUS; SUBCUTANEOUS at 16:04

## 2020-09-23 RX ADMIN — SODIUM CHLORIDE SCH MLS/HR: 900 INJECTION, SOLUTION INTRAVENOUS at 18:25

## 2020-09-23 RX ADMIN — IPRATROPIUM BROMIDE AND ALBUTEROL SULFATE SCH ML: .5; 3 SOLUTION RESPIRATORY (INHALATION) at 11:25

## 2020-09-23 RX ADMIN — IPRATROPIUM BROMIDE AND ALBUTEROL SULFATE SCH ML: .5; 3 SOLUTION RESPIRATORY (INHALATION) at 19:07

## 2020-09-23 RX ADMIN — INSULIN ASPART SCH UNIT: 100 INJECTION, SOLUTION INTRAVENOUS; SUBCUTANEOUS at 20:27

## 2020-09-23 RX ADMIN — ENOXAPARIN SODIUM SCH MG: 100 INJECTION SUBCUTANEOUS at 12:29

## 2020-09-23 RX ADMIN — IPRATROPIUM BROMIDE AND ALBUTEROL SULFATE SCH ML: .5; 3 SOLUTION RESPIRATORY (INHALATION) at 08:23

## 2020-09-23 NOTE — PULMONARY PROGRESS NOTE
Subjective


Time Seen by a Provider:  04:54


Subjective/Events-last exam


No complications noted.





Sepsis Event


Evaluation


Height, Weight, BMI


Height: 5'5.00"


Weight: 151lbs. 1.0oz. 68.853589jn; 28.00 BMI


Method:Stated





Focused Exam


Lactate Level


9/22/20 05:24: Lactic Acid Level 2.00





Exam


Exam





Vital Signs








  Date Time  Temp Pulse Resp B/P (MAP) Pulse Ox O2 Delivery O2 Flow Rate FiO2


 


9/23/20 04:00      Room Air  


 


9/23/20 03:00  96 10 150/77 (101) 95 Room Air  


 


9/23/20 02:00  94 13 156/107 (123) 93 Room Air  


 


9/23/20 01:00  92      


 


9/23/20 01:00  93 20 154/87 (109) 93 Room Air  


 


9/23/20 00:00  93 20 150/80 (103) 93 Room Air  


 


9/23/20 00:00      Room Air  


 


9/22/20 23:00  95 19 122/66 (84) 94 Room Air  


 


9/22/20 22:00  103 19 136/76 (96) 94 Room Air  


 


9/22/20 21:00  102 25 131/80 (97) 95 Room Air  


 


9/22/20 20:00 37.2       


 


9/22/20 20:00      Room Air  


 


9/22/20 20:00  101 19 128/70 (89) 94 Room Air  


 


9/22/20 19:00  111  141/68 (92) 92 Room Air  


 


9/22/20 19:00  111      


 


9/22/20 18:00  109 23 135/65 (88)  Room Air  


 


9/22/20 17:00  108 23 117/65 (82) 90 Room Air  


 


9/22/20 16:35     95 Room Air  


 


9/22/20 16:00  110 23 140/67 (91) 92 Room Air  


 


9/22/20 16:00 37.1       


 


9/22/20 15:00  113 24 135/69 (91) 89 Room Air  


 


9/22/20 14:16      Room Air  


 


9/22/20 14:00  115 23 134/64 (87) 93 Nasal Cannula 2.00 


 


9/22/20 13:00  114 28 129/59 (82) 96 Nasal Cannula 2.00 


 


9/22/20 12:51  116      


 


9/22/20 12:00     95 Room Air  


 


9/22/20 12:00  115 18 111/66 (81) 96 Nasal Cannula 2.00 


 


9/22/20 11:00  113 21 115/57 (76) 95 Nasal Cannula 2.00 


 


9/22/20 10:00  111 22 122/57 (78) 94 Nasal Cannula 2.00 


 


9/22/20 09:52 36.5       


 


9/22/20 09:00  115 26 133/63 (86) 97 Nasal Cannula 2.00 


 


9/22/20 08:21  122      


 


9/22/20 08:15  123 21 161/79 (106) 97 Nasal Cannula 2.00 


 


9/22/20 08:15     98 Nasal Cannula 2.00 


 


9/22/20 07:17  146 22 136/69 (136) 94   


 


9/22/20 05:21 36.9 120 22 203/103 (136) 95 Room Air  














I & O 


 


 9/23/20





 07:00


 


Intake Total 1740 ml


 


Output Total 2325 ml


 


Balance -585 ml








Height & Weight


Height: 5'5.00"


Weight: 151lbs. 1.0oz. 68.170025qt; 28.00 BMI


Method:Stated


General Appearance:  No Apparent Distress, Chronically ill


HEENT:  PERRL/EOMI, Moist Mucous Membranes


Neck:  Normal Inspection, Supple


Respiratory:  Lungs Clear, No Accessory Muscle Use, No Respiratory Distress


Cardiovascular:  Regular Rate, Rhythm, No Murmur


Capillary Refill:  Less Than 3 Seconds


Peripheral Pulses:  2+ Dorsalis Pedis (R), 2+ Left Dors-Pedis (L), 2+ Radial 

Pulses (R) (posterior tibial), 2+ Radial Pulses (L) (posterior tibial)


Gastrointestinal:  normal bowel sounds, soft, no organomegaly, tenderness 

(epigastric and RLQ)


Extremity:  Normal Capillary Refill, No Calf Tenderness, Pedal Edema


Neurologic/Psychiatric:  Alert, Oriented x3, Normal Mood/Affect


Skin:  Normal Color, Warm/Dry





Results


Lab


Laboratory Tests


9/22/20 05:24








9/22/20 09:19








9/22/20 13:20








9/22/20 16:19








9/22/20 20:04








9/23/20 00:18














Assessment/Plan


Assessment/Plan


DKA 


  -  DKA protocol currently 


   -Check Beta hydroxybuterate and urine ketones- if negative will d/c insulin 

gtt 


PNA 


   -elevated leukocytosis and PCT 


   -Start Zosyn 


SOB with N/V/D and fever per pt r/o PNA


   -Pan cultures - pending 


   -Check Influenza - ordered 9/23


Anemia


   -Monitor 


ARF


  -  IVF, monitor


Hyponatremia


  -  continue IVF, monitor











JUAN ASHRAF DO              Sep 23, 2020 04:58

## 2020-09-23 NOTE — NUR
CM/SS visited with patient for discharge planning. 



Plan: Patient will return home at time of discharge.



Home: Patient is living in [employer] housing and is living alone. 



Caregivers: The patient still has her daughter-in-law working as a caregiver through [employer] 
but has not found additional workers. She had 2 workers pick out to start work from last 
visit in the hospital but they never went to fill out the paperwork. Her son also assist 
with any needs that she may have. 



Home Health: The patient reports that she has not had home health. 



CM/SS discussed different ideas for possible solutions on insulin shots. She reports that 
she will try setting an alarm on her phone at 8 a.m. and 8 p.m. This CM/SS discussed having 
a calendar or a sheet of paper with a box to check off when the insulin is given each 
morning and night. It was also discussed keeping her packaged medications next to the fridge 
to help her remember. 



CM/SS will continue to follow.

## 2020-09-23 NOTE — NUR
PT TRANSFERRED TO ROOM 409 VIA W/C ACCOMPANIED BY THIS RN, ALL PERSONAL BELONGINGS SENT DOWN 
WITH PT. REPORT GIVEN TO SHOLA AT BEDSIDE.

## 2020-09-23 NOTE — NUR
RD ASSESSMENT 



PMHx: DVT; hypercholesterolemia; GERD; HTN; pancreatitis; RA; DM(hx of non-compliance and 
DKA); polysubstance use (methamphetamine, THC)



PT INTERACTION: Pt was awake and pleasant during nutrition assessment. Pt states current 
appetite is pretty good. Note avg PO intake 100% x3meal, per chart review. Pt states 
following a "low-salt" diet at home and has no issues with chewing/swallowing food. Note pt 
has poor dentition, per visual assessment. Pt states some recent issues with nausea, 
vomiting, and diarrhea, and that her last BM was 9/21. Note pt not currently on bowel 
regimen per chart review. Pt states recent wt changes "going up and down." Note recent 11# 
wt loss x2mon, per chart review. Pt states current DM management is "pretty good. I have 
been following your advice from the last time I was here." Note recent HbA1c of 12.0 
(06/2020), per chart review. Note pt has hx of non-compliance and DKA, per chart review. 





ABNORMAL NUTRITION-RELATED LAB VALUES

LOW: Na 134; Ca 7.6; 

HIGH: Cl 110; BUN 41; cr 1.98; glu 154



Est. kcal needs: 1650 kcal | 20 kcal/kg  

Est. Pro needs:  66 g Pro | 0.8 g Pro/kg 



PES STATEMENT: Food- and nutrition-related knowledge deficit (NB-1.1) related to 
unwilling/disinterested in applying nutrition information as evidenced by pt interview | hx 
of non-compliance



INTERVENTION:  

Continue with current diet order of CHO 60g/m 3snack diet. 

Offered and reinforced previous diet educations. Pt verbalized understanding of previous 
diet educations and was able to talk back information that was presented in 06/2020. 
Discussed importance of CHO counting and portion control. 

Will continue to follow and reassess as pt needs, intake, and status change. 





NIKKI Lao, MS, RD, LD

## 2020-09-23 NOTE — DIAGNOSTIC IMAGING REPORT
INDICATION: Dyspnea



Upright portable chest shows the heart size and vascularity to be

upper normal. There is mild prominence of interstitium with no

mass or alveolar infiltrate seen. There is no effusion or

pneumothorax. There is no change from 09/22/2020 study.



IMPRESSION: Stable chest.



Dictated by: 



  Dictated on workstation # [id number]

## 2020-09-24 VITALS — SYSTOLIC BLOOD PRESSURE: 181 MMHG | DIASTOLIC BLOOD PRESSURE: 74 MMHG

## 2020-09-24 VITALS — DIASTOLIC BLOOD PRESSURE: 77 MMHG | SYSTOLIC BLOOD PRESSURE: 162 MMHG

## 2020-09-24 VITALS — SYSTOLIC BLOOD PRESSURE: 155 MMHG | DIASTOLIC BLOOD PRESSURE: 91 MMHG

## 2020-09-24 VITALS — SYSTOLIC BLOOD PRESSURE: 152 MMHG | DIASTOLIC BLOOD PRESSURE: 78 MMHG

## 2020-09-24 VITALS — SYSTOLIC BLOOD PRESSURE: 159 MMHG | DIASTOLIC BLOOD PRESSURE: 84 MMHG

## 2020-09-24 VITALS — SYSTOLIC BLOOD PRESSURE: 174 MMHG | DIASTOLIC BLOOD PRESSURE: 84 MMHG

## 2020-09-24 LAB
ALBUMIN SERPL-MCNC: 2.5 GM/DL (ref 3.2–4.5)
ALP SERPL-CCNC: 89 U/L (ref 40–136)
ALT SERPL-CCNC: 15 U/L (ref 0–55)
BASOPHILS # BLD AUTO: 0.1 10^3/UL (ref 0–0.1)
BASOPHILS NFR BLD AUTO: 1 % (ref 0–10)
BILIRUB SERPL-MCNC: 0.3 MG/DL (ref 0.1–1)
BUN/CREAT SERPL: 14
CALCIUM SERPL-MCNC: 7.9 MG/DL (ref 8.5–10.1)
CHLORIDE SERPL-SCNC: 109 MMOL/L (ref 98–107)
CO2 SERPL-SCNC: 16 MMOL/L (ref 21–32)
CREAT SERPL-MCNC: 2.23 MG/DL (ref 0.6–1.3)
EOSINOPHIL # BLD AUTO: 0.3 10^3/UL (ref 0–0.3)
EOSINOPHIL NFR BLD AUTO: 4 % (ref 0–10)
GFR SERPLBLD BASED ON 1.73 SQ M-ARVRAT: 23 ML/MIN
GLUCOSE SERPL-MCNC: 229 MG/DL (ref 70–105)
HCT VFR BLD CALC: 29 % (ref 35–52)
HGB BLD-MCNC: 9.2 G/DL (ref 11.5–16)
LYMPHOCYTES # BLD AUTO: 1.8 10^3/UL (ref 1–4)
LYMPHOCYTES NFR BLD AUTO: 24 % (ref 12–44)
MAGNESIUM SERPL-MCNC: 1.9 MG/DL (ref 1.6–2.4)
MAGNESIUM SERPL-MCNC: 1.9 MG/DL (ref 1.6–2.4)
MANUAL DIFFERENTIAL PERFORMED BLD QL: NO
MCH RBC QN AUTO: 30 PG (ref 25–34)
MCHC RBC AUTO-ENTMCNC: 32 G/DL (ref 32–36)
MCV RBC AUTO: 94 FL (ref 80–99)
MONOCYTES # BLD AUTO: 0.5 10^3/UL (ref 0–1)
MONOCYTES NFR BLD AUTO: 7 % (ref 0–12)
NEUTROPHILS # BLD AUTO: 4.7 10^3/UL (ref 1.8–7.8)
NEUTROPHILS NFR BLD AUTO: 64 % (ref 42–75)
PHOSPHATE SERPL-MCNC: 2.7 MG/DL (ref 2.3–4.7)
PHOSPHATE SERPL-MCNC: 3.8 MG/DL (ref 2.3–4.7)
PLATELET # BLD: 272 10^3/UL (ref 130–400)
PMV BLD AUTO: 9.1 FL (ref 9–12.2)
POTASSIUM SERPL-SCNC: 4.1 MMOL/L (ref 3.6–5)
PROT SERPL-MCNC: 6 GM/DL (ref 6.4–8.2)
SODIUM SERPL-SCNC: 134 MMOL/L (ref 135–145)
WBC # BLD AUTO: 7.4 10^3/UL (ref 4.3–11)

## 2020-09-24 RX ADMIN — GABAPENTIN SCH MG: 600 TABLET, FILM COATED ORAL at 20:47

## 2020-09-24 RX ADMIN — METOCLOPRAMIDE SCH MG: 10 TABLET ORAL at 12:20

## 2020-09-24 RX ADMIN — GABAPENTIN SCH MG: 600 TABLET, FILM COATED ORAL at 12:20

## 2020-09-24 RX ADMIN — Medication SCH MCG: at 09:45

## 2020-09-24 RX ADMIN — INSULIN ASPART SCH UNIT: 100 INJECTION, SOLUTION INTRAVENOUS; SUBCUTANEOUS at 12:21

## 2020-09-24 RX ADMIN — IPRATROPIUM BROMIDE AND ALBUTEROL SULFATE SCH ML: .5; 3 SOLUTION RESPIRATORY (INHALATION) at 10:54

## 2020-09-24 RX ADMIN — INSULIN ASPART SCH UNIT: 100 INJECTION, SOLUTION INTRAVENOUS; SUBCUTANEOUS at 06:10

## 2020-09-24 RX ADMIN — METOPROLOL TARTRATE SCH MG: 50 TABLET, FILM COATED ORAL at 09:45

## 2020-09-24 RX ADMIN — ASPIRIN 81 MG CHEWABLE TABLET SCH MG: 81 TABLET CHEWABLE at 09:45

## 2020-09-24 RX ADMIN — SODIUM CHLORIDE SCH MLS/HR: 900 INJECTION, SOLUTION INTRAVENOUS at 10:50

## 2020-09-24 RX ADMIN — SODIUM CHLORIDE SCH MLS/HR: 900 INJECTION, SOLUTION INTRAVENOUS at 22:21

## 2020-09-24 RX ADMIN — FLUTICASONE PROPIONATE SCH SPRAY: 50 SPRAY, METERED NASAL at 09:44

## 2020-09-24 RX ADMIN — IPRATROPIUM BROMIDE AND ALBUTEROL SULFATE SCH ML: .5; 3 SOLUTION RESPIRATORY (INHALATION) at 07:55

## 2020-09-24 RX ADMIN — INSULIN ASPART SCH UNIT: 100 INJECTION, SOLUTION INTRAVENOUS; SUBCUTANEOUS at 17:07

## 2020-09-24 RX ADMIN — METOCLOPRAMIDE SCH MG: 10 TABLET ORAL at 20:47

## 2020-09-24 RX ADMIN — IPRATROPIUM BROMIDE AND ALBUTEROL SULFATE SCH ML: .5; 3 SOLUTION RESPIRATORY (INHALATION) at 19:05

## 2020-09-24 RX ADMIN — METOPROLOL TARTRATE SCH MG: 50 TABLET, FILM COATED ORAL at 20:47

## 2020-09-24 RX ADMIN — GABAPENTIN SCH MG: 300 CAPSULE ORAL at 09:45

## 2020-09-24 RX ADMIN — SODIUM CHLORIDE SCH MLS/HR: 900 INJECTION, SOLUTION INTRAVENOUS at 03:35

## 2020-09-24 RX ADMIN — FUROSEMIDE SCH MG: 40 TABLET ORAL at 09:46

## 2020-09-24 RX ADMIN — INSULIN ASPART SCH UNIT: 100 INJECTION, SOLUTION INTRAVENOUS; SUBCUTANEOUS at 20:47

## 2020-09-24 RX ADMIN — METOCLOPRAMIDE SCH MG: 10 TABLET ORAL at 17:07

## 2020-09-24 RX ADMIN — ONDANSETRON PRN MG: 2 INJECTION, SOLUTION INTRAMUSCULAR; INTRAVENOUS at 10:17

## 2020-09-24 RX ADMIN — IPRATROPIUM BROMIDE AND ALBUTEROL SULFATE SCH ML: .5; 3 SOLUTION RESPIRATORY (INHALATION) at 14:44

## 2020-09-24 RX ADMIN — Medication SCH MCG: at 20:47

## 2020-09-24 RX ADMIN — SODIUM CHLORIDE SCH MLS/HR: 900 INJECTION, SOLUTION INTRAVENOUS at 18:57

## 2020-09-24 RX ADMIN — ENOXAPARIN SODIUM SCH MG: 100 INJECTION SUBCUTANEOUS at 00:58

## 2020-09-24 NOTE — PHYSICAL THERAPY PROGRESS NOTE
Therapy Progress Note


Patient is currently ambulating independently in hallway without difficulty.  PT

did talk  with patient and patient has no concerns on her mobility.  Due to 

this, no skilled therapy indicated.


1 visit











HALIE SHERIDAN PT              Sep 24, 2020 13:45
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
## 2020-09-24 NOTE — NUR
AMANDA/YUDI follow up. 



Updated number: [id number]



CM/YUDI spoke with physician regarding Home Health and if patient would benefit. It was 
determined that the patient could benefit from Diabetes education and medication management 
due to readmissions. 



Home Health:CM/SS provided the patient with a patient preference form. She chose [facility]. AMANDA/SS contacted Gina from [place] to make referral. The patient Primary Care 
Physician is Dr. Dove at the [facility]. AMANDA/YUDI attempted x5 to get in contact 
with staff to verify that the physician will follow home health orders. AMANDA/SS left voice 
mail with contact information. 



CM/SS will continue to follow.

## 2020-09-24 NOTE — PULMONARY PROGRESS NOTE
Subjective


Time Seen by a Provider:  07:23


Subjective/Events-last exam


PT is doing better.





Sepsis Event


Evaluation


Height, Weight, BMI


Height: 5'5.00"


Weight: 151lbs. 1.0oz. 68.082803mz; 28.00 BMI


Method:Stated





Focused Exam


Lactate Level


9/22/20 05:24: Lactic Acid Level 2.00





Exam


Exam





Vital Signs








  Date Time  Temp Pulse Resp B/P (MAP) Pulse Ox O2 Delivery O2 Flow Rate FiO2


 


9/24/20 03:49 36.8 94 20 152/78 (102) 93 Room Air  


 


9/24/20 00:00 37.0 101 18 162/77 (105) 92 Room Air  


 


9/23/20 19:40      Room Air  


 


9/23/20 19:25 37.2 101 20 158/72 (100) 98 Room Air  


 


9/23/20 19:08     95 Room Air  


 


9/23/20 15:48 37.2 96 16 158/82 (107) 97 Room Air  


 


9/23/20 14:59     97 Room Air  


 


9/23/20 13:20 36.3 94 18 124/75 (91) 97 Room Air  


 


9/23/20 12:00      Room Air  


 


9/23/20 12:00 36.3 94 18 124/75 (91) 97 Room Air  


 


9/23/20 11:48 36.3 94 18 124/75 (91) 97 Room Air  


 


9/23/20 11:25     97 Room Air  


 


9/23/20 10:00  95 19 115/63 (80) 92 Room Air  


 


9/23/20 09:00  98 25 128/67 (87) 97 Room Air  


 


9/23/20 08:24     97 Room Air  


 


9/23/20 08:00  101 13 143/83 (103) 99 Room Air  


 


9/23/20 08:00      Room Air  














I & O 


 


 9/24/20





 07:00


 


Intake Total 3960 ml


 


Output Total 1700 ml


 


Balance 2260 ml








Height & Weight


Height: 5'5.00"


Weight: 151lbs. 1.0oz. 68.592582sq; 28.00 BMI


Method:Stated


General Appearance:  No Apparent Distress, Chronically ill


HEENT:  PERRL/EOMI, Moist Mucous Membranes


Neck:  Normal Inspection, Supple


Respiratory:  Lungs Clear, No Respiratory Distress


Cardiovascular:  Regular Rate, Rhythm, No Murmur


Capillary Refill:  Less Than 3 Seconds


Peripheral Pulses:  2+ Dorsalis Pedis (R), 2+ Left Dors-Pedis (L), 2+ Radial 

Pulses (R) (posterior tibial), 2+ Radial Pulses (L) (posterior tibial)


Gastrointestinal:  normal bowel sounds, soft, no organomegaly, tenderness 

(epigastric and RLQ)


Extremity:  Normal Capillary Refill, No Calf Tenderness, Pedal Edema


Neurologic/Psychiatric:  Alert, Oriented x3


Skin:  Normal Color, Warm/Dry





Results


Lab


Laboratory Tests


9/22/20 09:19








9/22/20 13:20








9/22/20 16:19








9/22/20 20:04








9/23/20 00:18








9/23/20 04:11











Assessment/Plan


Assessment/Plan


DKA - resolved 


PNA 


   -Continue Zosyn 


   -Repeat Labs 


SOB with N/V/D and fever per pt r/o PNA


   -Pan cultures - pending 


Anemia


   -Monitor 


ARF


  -  IVF, monitor


Hyponatremia


  -  continue IVF, monitor





I am going to sign off please call with any questions.











JUAN ASHRAF DO              Sep 24, 2020 07:27

## 2020-09-25 VITALS — DIASTOLIC BLOOD PRESSURE: 85 MMHG | SYSTOLIC BLOOD PRESSURE: 145 MMHG

## 2020-09-25 VITALS — SYSTOLIC BLOOD PRESSURE: 197 MMHG | DIASTOLIC BLOOD PRESSURE: 94 MMHG

## 2020-09-25 VITALS — SYSTOLIC BLOOD PRESSURE: 145 MMHG | DIASTOLIC BLOOD PRESSURE: 85 MMHG

## 2020-09-25 VITALS — DIASTOLIC BLOOD PRESSURE: 76 MMHG | SYSTOLIC BLOOD PRESSURE: 137 MMHG

## 2020-09-25 VITALS — SYSTOLIC BLOOD PRESSURE: 190 MMHG | DIASTOLIC BLOOD PRESSURE: 88 MMHG

## 2020-09-25 LAB
BASOPHILS # BLD AUTO: 0.1 10^3/UL (ref 0–0.1)
BASOPHILS NFR BLD AUTO: 1 % (ref 0–10)
BUN/CREAT SERPL: 16
CALCIUM SERPL-MCNC: 8.5 MG/DL (ref 8.5–10.1)
CHLORIDE SERPL-SCNC: 106 MMOL/L (ref 98–107)
CO2 SERPL-SCNC: 17 MMOL/L (ref 21–32)
CREAT SERPL-MCNC: 2.24 MG/DL (ref 0.6–1.3)
EOSINOPHIL # BLD AUTO: 0.4 10^3/UL (ref 0–0.3)
EOSINOPHIL NFR BLD AUTO: 5 % (ref 0–10)
GFR SERPLBLD BASED ON 1.73 SQ M-ARVRAT: 23 ML/MIN
GLUCOSE SERPL-MCNC: 204 MG/DL (ref 70–105)
HCT VFR BLD CALC: 33 % (ref 35–52)
HGB BLD-MCNC: 10.3 G/DL (ref 11.5–16)
LYMPHOCYTES # BLD AUTO: 2 10^3/UL (ref 1–4)
LYMPHOCYTES NFR BLD AUTO: 29 % (ref 12–44)
MAGNESIUM SERPL-MCNC: 1.7 MG/DL (ref 1.6–2.4)
MANUAL DIFFERENTIAL PERFORMED BLD QL: NO
MCH RBC QN AUTO: 30 PG (ref 25–34)
MCHC RBC AUTO-ENTMCNC: 31 G/DL (ref 32–36)
MCV RBC AUTO: 94 FL (ref 80–99)
MONOCYTES # BLD AUTO: 0.5 10^3/UL (ref 0–1)
MONOCYTES NFR BLD AUTO: 8 % (ref 0–12)
NEUTROPHILS # BLD AUTO: 4 10^3/UL (ref 1.8–7.8)
NEUTROPHILS NFR BLD AUTO: 57 % (ref 42–75)
PHOSPHATE SERPL-MCNC: 3.8 MG/DL (ref 2.3–4.7)
PLATELET # BLD: 280 10^3/UL (ref 130–400)
PMV BLD AUTO: 9.3 FL (ref 9–12.2)
POTASSIUM SERPL-SCNC: 4.4 MMOL/L (ref 3.6–5)
SODIUM SERPL-SCNC: 134 MMOL/L (ref 135–145)
WBC # BLD AUTO: 7 10^3/UL (ref 4.3–11)

## 2020-09-25 RX ADMIN — INSULIN ASPART SCH UNIT: 100 INJECTION, SOLUTION INTRAVENOUS; SUBCUTANEOUS at 06:15

## 2020-09-25 RX ADMIN — GABAPENTIN SCH MG: 300 CAPSULE ORAL at 06:15

## 2020-09-25 RX ADMIN — INSULIN ASPART SCH UNIT: 100 INJECTION, SOLUTION INTRAVENOUS; SUBCUTANEOUS at 11:27

## 2020-09-25 RX ADMIN — METOCLOPRAMIDE SCH MG: 10 TABLET ORAL at 06:15

## 2020-09-25 RX ADMIN — FUROSEMIDE SCH MG: 40 TABLET ORAL at 08:39

## 2020-09-25 RX ADMIN — SODIUM CHLORIDE SCH MLS/HR: 900 INJECTION, SOLUTION INTRAVENOUS at 07:39

## 2020-09-25 RX ADMIN — GABAPENTIN SCH MG: 600 TABLET, FILM COATED ORAL at 12:29

## 2020-09-25 RX ADMIN — METOPROLOL TARTRATE SCH MG: 50 TABLET, FILM COATED ORAL at 08:40

## 2020-09-25 RX ADMIN — ASPIRIN 81 MG CHEWABLE TABLET SCH MG: 81 TABLET CHEWABLE at 08:40

## 2020-09-25 RX ADMIN — IPRATROPIUM BROMIDE AND ALBUTEROL SULFATE SCH ML: .5; 3 SOLUTION RESPIRATORY (INHALATION) at 07:34

## 2020-09-25 RX ADMIN — IPRATROPIUM BROMIDE AND ALBUTEROL SULFATE SCH ML: .5; 3 SOLUTION RESPIRATORY (INHALATION) at 10:33

## 2020-09-25 RX ADMIN — SODIUM CHLORIDE SCH MLS/HR: 900 INJECTION, SOLUTION INTRAVENOUS at 10:57

## 2020-09-25 RX ADMIN — SODIUM CHLORIDE SCH MLS/HR: 900 INJECTION, SOLUTION INTRAVENOUS at 03:08

## 2020-09-25 RX ADMIN — FLUTICASONE PROPIONATE SCH SPRAY: 50 SPRAY, METERED NASAL at 08:38

## 2020-09-25 RX ADMIN — Medication SCH MCG: at 08:39

## 2020-09-25 RX ADMIN — SODIUM CHLORIDE SCH MLS/HR: 900 INJECTION, SOLUTION INTRAVENOUS at 08:49

## 2020-09-25 RX ADMIN — METOCLOPRAMIDE SCH MG: 10 TABLET ORAL at 12:29

## 2020-09-25 NOTE — DISCHARGE SUMMARY
Diagnosis/Chief Complaint


Date of Admission


Sep 22, 2020 at 07:19


Date of Discharge





Discharge Date:  Sep 25, 2020


Admission Diagnosis


DKA


Primary Care


No,Local Physician





Discharge Summary


Procedures/Consulations


Dr Paul Garcia





Discharge Physical Exam


Allergies:  


Coded Allergies:  


     morphine (Verified  Allergy, Mild, Vomiting, 20)


     orange juice (Verified  Allergy, Mild, Nausea, 20)


     Sulfa (Sulfonamide Antibiotics) (Unverified  Allergy, Unknown, 6/25/15)


     codeine (Verified  Allergy, Unknown, TAKES ULTRAM AT HOME, 09)


     ketorolac (Verified  Allergy, Unknown, 08)


     tramadol (Verified  Allergy, Unknown, 11)


Vitals & I&Os





Vital Signs








  Date Time  Temp Pulse Resp B/P (MAP) Pulse Ox O2 Delivery O2 Flow Rate FiO2


 


20 15:05 37.0 88 18 145/85 97 Room Air  


 


20 14:00       2.00 








General Appearance:  No Apparent Distress, Chronically ill


Cardiovascular:  Regular Rate, Rhythm, No Murmur


Neurologic/Psychiatric:  Alert, Oriented x3





Hospital Course


Pt was admitted due to DKA with severe hyperglycemia. She was treated per DKA 

protocol with insulin gtt and recovered well. She was treated for persistent 

pneumonia as well and did well. She had an uneventful hospital stay. She was 

discharged home in stable condition to follow up with her PCP Dr Dove. She was

discharged with nursing services for home health as she was spacing out her 

insulin inappropriately prior to admission.


Labs (last 24 hrs)





Microbiology


20 Urine Culture - Final, Complete


          NO GROWTH


20 Blood Culture - Preliminary, Resulted


          No growth


20 MRSA Screen - Final, Complete


          MRSA not isolated


Patient resulted labs reviewed.


Pending Labs








Discussion & Recommendations


Discharge Planning:  >30 minutes discharge planning





Discharge


Home Medications:





Active Scripts


Active


Augmentin 875-125 Tablet (Amoxicillin/Potassium Clav) 1 Each Tablet 1 Each PO 

BID


Reported


Metoprolol Tartrate 50 Mg Tablet 50 Mg PO BID


Aspirin 81 Mg Tab.chew 81 Mg PO DAILY


Neurontin (Gabapentin) 300 Mg Capsule 600 Mg PO 1200,2200


     TAKES 2 (300MG) TABS


Neurontin (Gabapentin) 300 Mg Capsule 300 Mg PO DAILY


Sumatriptan Succinate 50 Mg Tablet 50 Mg PO Q2H PRN MDD 200MG 


     MAY REPEAT A DOSE IN 2 HOURS- DO NOT EXCEED 4 TABS (200MG) PER DAY


Vitamin D3 (Cholecalciferol (Vitamin D3)) 25 Mcg Tablet 25 Mcg PO BID


Vitamin C (Ascorbic Acid) 1,000 Mg Tablet 1,000 Mg PO DAILY


Melatonin 10 Mg Tablet 10 Mg PO HS


Diclofenac Sodium 100 Gm Gel..gram. 1 Applic TOP QID PRN


     APPLY TO LEGS


Levemir Flextouch (Insulin Detemir) 100 Unit/1 Ml Insuln.pen 24 Units SC HS


Flonase Allergy Relief (Fluticasone Propionate) 9.9 Ml Spray.susp 2 Preston NSEACH

DAILY


     1 SPRAY EACH NARE DAILY


Furosemide 40 Mg Tablet 40 Mg PO DAILY


Metoclopramide HCl 10 Mg Tablet 10 Mg PO QIDACHS


Narcan (Naloxone HCl) 4 Mg Spray 1 Preston NS UD PRN


     ADMINSTER 1 SPRAY IN 1 NOSTRIL 1 TIME- MAY REPEAT IN ALTERNATING


     NOSTRIL EVERY 2-3 MINUTES UNTIL RESPONSIVE OR EMS ARRIVES


Omeprazole 20 Mg Capsule.dr 20 Mg PO DAILY


Humalog Kwikpen (Insulin Lispro) 100 Unit/1 Ml Insuln.pen 5-15 Units SQ AC


Proair Hfa (Albuterol Sulfate) 1 Puff Puff 2 Puff IH TID PRN


Potassium (Potassium Gluconate) 99 Mg Tablet 99 Mg PO Q48H


Montelukast Sodium 10 Mg Tablet 10 Mg PO HS


     LAST FILLED 2020 #15





Instructions to patient/family


Please see electronic discharge instructions given to patient.





Clinical Quality Measures


DVT/VTE Risk/Contraindication:


Risk Factor Score Per Nursin


RFS Level Per Nursing on Admit:  4+=Very High











MISHA DEVRIES MD              Sep 25, 2020 11:26

## 2020-09-25 NOTE — NUR
CM/SS finalized discharge. 



Plan: The patient will discharge home with new home health today 9/25. Patient reports her 
son will most likely be picking her up. 



Home Health: CM/SS followed up with Gina from [facility]. They will follow up with 
Dr. Larose to make sure she will follow home health orders. 



AMANDA/SS visited with patient. She reports that she is doing well today and is excited to have 
home health. She denies any further questions or concerns at this time.

## 2020-10-31 ENCOUNTER — HOSPITAL ENCOUNTER (OUTPATIENT)
Dept: HOSPITAL 75 - LAB | Age: 55
End: 2020-10-31
Payer: MEDICAID

## 2020-10-31 DIAGNOSIS — D63.1: ICD-10-CM

## 2020-10-31 DIAGNOSIS — E87.1: ICD-10-CM

## 2020-10-31 DIAGNOSIS — N18.4: Primary | ICD-10-CM

## 2020-10-31 DIAGNOSIS — R60.1: ICD-10-CM

## 2020-10-31 DIAGNOSIS — E55.9: ICD-10-CM

## 2020-10-31 DIAGNOSIS — E87.2: ICD-10-CM

## 2020-10-31 LAB
AMORPH SED URNS QL MICRO: (no result) /LPF
APTT PPP: YELLOW S
BACTERIA #/AREA URNS HPF: (no result) /HPF
BILIRUB UR QL STRIP: NEGATIVE
FIBRINOGEN PPP-MCNC: CLEAR MG/DL
GLUCOSE UR STRIP-MCNC: (no result) MG/DL
KETONES UR QL STRIP: NEGATIVE
LEUKOCYTE ESTERASE UR QL STRIP: NEGATIVE
NITRITE UR QL STRIP: NEGATIVE
PH UR STRIP: 6 [PH] (ref 5–9)
PROT UR QL STRIP: (no result)
RBC #/AREA URNS HPF: (no result) /HPF
RETICS #: 75 10E9/UL (ref 24–90)
RETICS/RBC NFR: 2.09 % (ref 0.5–2.4)
SP GR UR STRIP: 1.01 (ref 1.02–1.02)
SQUAMOUS #/AREA URNS HPF: (no result) /HPF
WBC #/AREA URNS HPF: (no result) /HPF

## 2020-10-31 PROCEDURE — 84155 ASSAY OF PROTEIN SERUM: CPT

## 2020-10-31 PROCEDURE — 86335 IMMUNFIX E-PHORSIS/URINE/CSF: CPT

## 2020-10-31 PROCEDURE — 85045 AUTOMATED RETICULOCYTE COUNT: CPT

## 2020-10-31 PROCEDURE — 86431 RHEUMATOID FACTOR QUANT: CPT

## 2020-10-31 PROCEDURE — 82728 ASSAY OF FERRITIN: CPT

## 2020-10-31 PROCEDURE — 80074 ACUTE HEPATITIS PANEL: CPT

## 2020-10-31 PROCEDURE — 82043 UR ALBUMIN QUANTITATIVE: CPT

## 2020-10-31 PROCEDURE — 86021 WBC ANTIBODY IDENTIFICATION: CPT

## 2020-10-31 PROCEDURE — 82746 ASSAY OF FOLIC ACID SERUM: CPT

## 2020-10-31 PROCEDURE — 84165 PROTEIN E-PHORESIS SERUM: CPT

## 2020-10-31 PROCEDURE — 83520 IMMUNOASSAY QUANT NOS NONAB: CPT

## 2020-10-31 PROCEDURE — 81000 URINALYSIS NONAUTO W/SCOPE: CPT

## 2020-10-31 PROCEDURE — 83540 ASSAY OF IRON: CPT

## 2020-10-31 PROCEDURE — 36415 COLL VENOUS BLD VENIPUNCTURE: CPT

## 2020-10-31 PROCEDURE — 86235 NUCLEAR ANTIGEN ANTIBODY: CPT

## 2020-10-31 PROCEDURE — 82607 VITAMIN B-12: CPT

## 2020-10-31 PROCEDURE — 82595 ASSAY OF CRYOGLOBULIN: CPT

## 2020-11-05 ENCOUNTER — HOSPITAL ENCOUNTER (OUTPATIENT)
Dept: HOSPITAL 75 - RAD | Age: 55
End: 2020-11-05
Attending: NURSE PRACTITIONER
Payer: MEDICAID

## 2020-11-05 DIAGNOSIS — J18.8: ICD-10-CM

## 2020-11-05 DIAGNOSIS — Z01.812: Primary | ICD-10-CM

## 2020-11-05 LAB
BUN/CREAT SERPL: 25
CREAT SERPL-MCNC: 2.75 MG/DL (ref 0.6–1.3)
GFR SERPLBLD BASED ON 1.73 SQ M-ARVRAT: 18 ML/MIN

## 2020-11-05 PROCEDURE — 82565 ASSAY OF CREATININE: CPT

## 2020-11-05 PROCEDURE — 36415 COLL VENOUS BLD VENIPUNCTURE: CPT

## 2020-11-05 PROCEDURE — 84520 ASSAY OF UREA NITROGEN: CPT

## 2020-11-05 PROCEDURE — 71250 CT THORAX DX C-: CPT

## 2020-11-05 NOTE — DIAGNOSTIC IMAGING REPORT
PROCEDURE: 

CT chest without contrast.



TECHNIQUE: 

Multiple contiguous axial images were obtained through the chest

without the use of intravenous contrast. Auto Exposure Controls

were utilized during the CT exam to meet ALARA standards for

radiation dose reduction. 



DATE: 

November 5, 2020.



COMPARISON: 

CT chest September 4, 2020. 



INDICATION: 

55-year-old female, shortness of breath. Pneumonia.



PROCEDURE: 

Axial noncontrasted CT images of the chest. Noncontrasted limits

the evaluation of the mediastinum and vascular structures. 



FINDINGS: 

There are predominantly linear areas of opacification in the

anterior aspect of the right upper lobe on axial image 34 at site

of previously noted airspace consolidation which previously was

not particularly linear in appearance. This is an improved

appearance since comparison exam and may relate to residual

atelectasis or improving previous consolidation suggesting the

prior process was infectious or potentially inflammatory in

etiology. There is also improved aeration in the right upper lobe

at site of previously noted more lateral consolidation on prior

axial image 29. There is predominantly linear opacification in

the right lower lobe which is unchanged on axial image 29 and

adjacent sequential images. At this location, there is a somewhat

nodular appearing opacity on axial image 29 which measures 11 mm

in size. There is no pneumothorax. There is a small right pleural

effusion which is new since comparison exam. There is mild

atelectasis in the left upper lobe.



The heart is not enlarged. There is no pericardial effusion.



There is no identified abnormally enlarged mediastinal or

axillary lymph node meeting CT size criteria for adenopathy.



There are bilateral renal artery stents. There is no identified

acute bony abnormality.



IMPRESSION: 

1. Improved consolidation in the right upper lobe which likely

previously related to pneumonia or inflammatory process. There is

residual predominantly linear opacification in the right upper

lobe, likely relating to atelectasis, scarring, or incompletely

resolved prior infiltrate.



2. New small right pleural effusion.









Dictated by: 



  Dictated on workstation # [id number]

## 2020-12-14 ENCOUNTER — HOSPITAL ENCOUNTER (OUTPATIENT)
Dept: HOSPITAL 75 - LAB | Age: 55
End: 2020-12-14
Attending: INTERNAL MEDICINE
Payer: MEDICAID

## 2020-12-14 DIAGNOSIS — N04.9: ICD-10-CM

## 2020-12-14 DIAGNOSIS — B17.11: Primary | ICD-10-CM

## 2020-12-14 DIAGNOSIS — N18.4: ICD-10-CM

## 2020-12-14 LAB
ALBUMIN SERPL-MCNC: 3.1 GM/DL (ref 3.2–4.5)
ALP SERPL-CCNC: 112 U/L (ref 40–136)
ALT SERPL-CCNC: 19 U/L (ref 0–55)
BASOPHILS # BLD AUTO: 0.1 10^3/UL (ref 0–0.1)
BASOPHILS NFR BLD AUTO: 1 % (ref 0–10)
BILIRUB SERPL-MCNC: 0.3 MG/DL (ref 0.1–1)
BUN/CREAT SERPL: 26
CALCIUM SERPL-MCNC: 8.5 MG/DL (ref 8.5–10.1)
CHLORIDE SERPL-SCNC: 100 MMOL/L (ref 98–107)
CO2 SERPL-SCNC: 24 MMOL/L (ref 21–32)
CREAT SERPL-MCNC: 2.64 MG/DL (ref 0.6–1.3)
EOSINOPHIL # BLD AUTO: 0.2 10^3/UL (ref 0–0.3)
EOSINOPHIL NFR BLD AUTO: 2 % (ref 0–10)
GFR SERPLBLD BASED ON 1.73 SQ M-ARVRAT: 19 ML/MIN
GLUCOSE SERPL-MCNC: 251 MG/DL (ref 70–105)
HCT VFR BLD CALC: 34 % (ref 35–52)
HGB BLD-MCNC: 10.2 G/DL (ref 11.5–16)
LYMPHOCYTES # BLD AUTO: 2.2 10^3/UL (ref 1–4)
LYMPHOCYTES NFR BLD AUTO: 22 % (ref 12–44)
MANUAL DIFFERENTIAL PERFORMED BLD QL: NO
MCH RBC QN AUTO: 29 PG (ref 25–34)
MCHC RBC AUTO-ENTMCNC: 30 G/DL (ref 32–36)
MCV RBC AUTO: 97 FL (ref 80–99)
MONOCYTES # BLD AUTO: 0.7 10^3/UL (ref 0–1)
MONOCYTES NFR BLD AUTO: 7 % (ref 0–12)
NEUTROPHILS # BLD AUTO: 7 10^3/UL (ref 1.8–7.8)
NEUTROPHILS NFR BLD AUTO: 69 % (ref 42–75)
PLATELET # BLD: 368 10^3/UL (ref 130–400)
PMV BLD AUTO: 9.4 FL (ref 9–12.2)
POTASSIUM SERPL-SCNC: 4.7 MMOL/L (ref 3.6–5)
PROT SERPL-MCNC: 7.2 GM/DL (ref 6.4–8.2)
SODIUM SERPL-SCNC: 134 MMOL/L (ref 135–145)
WBC # BLD AUTO: 10.2 10^3/UL (ref 4.3–11)

## 2020-12-14 PROCEDURE — 87522 HEPATITIS C REVRS TRNSCRPJ: CPT

## 2020-12-14 PROCEDURE — 86709 HEPATITIS A IGM ANTIBODY: CPT

## 2020-12-14 PROCEDURE — 36415 COLL VENOUS BLD VENIPUNCTURE: CPT

## 2020-12-14 PROCEDURE — 86703 HIV-1/HIV-2 1 RESULT ANTBDY: CPT

## 2020-12-14 PROCEDURE — 80053 COMPREHEN METABOLIC PANEL: CPT

## 2020-12-14 PROCEDURE — 85025 COMPLETE CBC W/AUTO DIFF WBC: CPT

## 2020-12-14 PROCEDURE — 87902 NFCT AGT GNTYP ALYS HEP C: CPT

## 2020-12-14 PROCEDURE — 86706 HEP B SURFACE ANTIBODY: CPT

## 2020-12-14 PROCEDURE — 86708 HEPATITIS A ANTIBODY: CPT

## 2021-01-10 ENCOUNTER — HOSPITAL ENCOUNTER (OUTPATIENT)
Dept: HOSPITAL 75 - ER | Age: 56
Setting detail: OBSERVATION
LOS: 2 days | Discharge: HOME | End: 2021-01-12
Attending: INTERNAL MEDICINE | Admitting: INTERNAL MEDICINE
Payer: MEDICAID

## 2021-01-10 VITALS — SYSTOLIC BLOOD PRESSURE: 130 MMHG | DIASTOLIC BLOOD PRESSURE: 76 MMHG

## 2021-01-10 VITALS — WEIGHT: 193.12 LBS | HEIGHT: 65 IN | BODY MASS INDEX: 32.18 KG/M2

## 2021-01-10 VITALS — DIASTOLIC BLOOD PRESSURE: 68 MMHG | SYSTOLIC BLOOD PRESSURE: 108 MMHG

## 2021-01-10 DIAGNOSIS — E86.1: ICD-10-CM

## 2021-01-10 DIAGNOSIS — Z88.6: ICD-10-CM

## 2021-01-10 DIAGNOSIS — F60.9: ICD-10-CM

## 2021-01-10 DIAGNOSIS — I27.20: ICD-10-CM

## 2021-01-10 DIAGNOSIS — Z79.4: ICD-10-CM

## 2021-01-10 DIAGNOSIS — E78.00: ICD-10-CM

## 2021-01-10 DIAGNOSIS — E11.40: ICD-10-CM

## 2021-01-10 DIAGNOSIS — N18.9: ICD-10-CM

## 2021-01-10 DIAGNOSIS — I08.0: ICD-10-CM

## 2021-01-10 DIAGNOSIS — R55: ICD-10-CM

## 2021-01-10 DIAGNOSIS — K21.9: ICD-10-CM

## 2021-01-10 DIAGNOSIS — N17.9: Primary | ICD-10-CM

## 2021-01-10 DIAGNOSIS — M19.91: ICD-10-CM

## 2021-01-10 DIAGNOSIS — Z79.891: ICD-10-CM

## 2021-01-10 DIAGNOSIS — F17.210: ICD-10-CM

## 2021-01-10 DIAGNOSIS — Z86.718: ICD-10-CM

## 2021-01-10 DIAGNOSIS — Z79.82: ICD-10-CM

## 2021-01-10 DIAGNOSIS — E11.22: ICD-10-CM

## 2021-01-10 DIAGNOSIS — I50.9: ICD-10-CM

## 2021-01-10 DIAGNOSIS — R56.9: ICD-10-CM

## 2021-01-10 DIAGNOSIS — I13.0: ICD-10-CM

## 2021-01-10 DIAGNOSIS — M06.9: ICD-10-CM

## 2021-01-10 DIAGNOSIS — Z88.2: ICD-10-CM

## 2021-01-10 DIAGNOSIS — J45.909: ICD-10-CM

## 2021-01-10 DIAGNOSIS — E11.65: ICD-10-CM

## 2021-01-10 DIAGNOSIS — B19.20: ICD-10-CM

## 2021-01-10 DIAGNOSIS — I95.89: ICD-10-CM

## 2021-01-10 LAB
ALBUMIN SERPL-MCNC: 3.6 GM/DL (ref 3.2–4.5)
ALP SERPL-CCNC: 129 U/L (ref 40–136)
ALT SERPL-CCNC: 20 U/L (ref 0–55)
APTT BLD: 23 SEC (ref 24–35)
APTT PPP: YELLOW S
BACTERIA #/AREA URNS HPF: (no result) /HPF
BASOPHILS # BLD AUTO: 0 10^3/UL (ref 0–0.1)
BASOPHILS NFR BLD AUTO: 0 % (ref 0–10)
BILIRUB SERPL-MCNC: 0.4 MG/DL (ref 0.1–1)
BILIRUB UR QL STRIP: NEGATIVE
BUN/CREAT SERPL: 26
BUN/CREAT SERPL: 28
CALCIUM SERPL-MCNC: 8.5 MG/DL (ref 8.5–10.1)
CALCIUM SERPL-MCNC: 8.9 MG/DL (ref 8.5–10.1)
CHLORIDE SERPL-SCNC: 89 MMOL/L (ref 98–107)
CHLORIDE SERPL-SCNC: 94 MMOL/L (ref 98–107)
CO2 SERPL-SCNC: 18 MMOL/L (ref 21–32)
CO2 SERPL-SCNC: 24 MMOL/L (ref 21–32)
CREAT SERPL-MCNC: 3.43 MG/DL (ref 0.6–1.3)
CREAT SERPL-MCNC: 3.93 MG/DL (ref 0.6–1.3)
EOSINOPHIL # BLD AUTO: 0.1 10^3/UL (ref 0–0.3)
EOSINOPHIL NFR BLD AUTO: 1 % (ref 0–10)
FIBRINOGEN PPP-MCNC: CLEAR MG/DL
GFR SERPLBLD BASED ON 1.73 SQ M-ARVRAT: 12 ML/MIN
GFR SERPLBLD BASED ON 1.73 SQ M-ARVRAT: 14 ML/MIN
GLUCOSE SERPL-MCNC: 144 MG/DL (ref 70–105)
GLUCOSE SERPL-MCNC: 476 MG/DL (ref 70–105)
GLUCOSE UR STRIP-MCNC: (no result) MG/DL
HCT VFR BLD CALC: 36 % (ref 35–52)
HGB BLD-MCNC: 12 G/DL (ref 11.5–16)
INR PPP: 1 (ref 0.8–1.4)
KETONES UR QL STRIP: NEGATIVE
LEUKOCYTE ESTERASE UR QL STRIP: NEGATIVE
LYMPHOCYTES # BLD AUTO: 2.6 10^3/UL (ref 1–4)
LYMPHOCYTES NFR BLD AUTO: 23 % (ref 12–44)
MAGNESIUM SERPL-MCNC: 2 MG/DL (ref 1.6–2.4)
MANUAL DIFFERENTIAL PERFORMED BLD QL: NO
MCH RBC QN AUTO: 30 PG (ref 25–34)
MCHC RBC AUTO-ENTMCNC: 34 G/DL (ref 32–36)
MCV RBC AUTO: 89 FL (ref 80–99)
MONOCYTES # BLD AUTO: 1 10^3/UL (ref 0–1)
MONOCYTES NFR BLD AUTO: 9 % (ref 0–12)
NEUTROPHILS # BLD AUTO: 7.3 10^3/UL (ref 1.8–7.8)
NEUTROPHILS NFR BLD AUTO: 66 % (ref 42–75)
NITRITE UR QL STRIP: NEGATIVE
PH UR STRIP: 6 [PH] (ref 5–9)
PLATELET # BLD: 379 10^3/UL (ref 130–400)
PMV BLD AUTO: 9.9 FL (ref 9–12.2)
POTASSIUM SERPL-SCNC: 3.5 MMOL/L (ref 3.6–5)
POTASSIUM SERPL-SCNC: 3.6 MMOL/L (ref 3.6–5)
PROT SERPL-MCNC: 8.2 GM/DL (ref 6.4–8.2)
PROT UR QL STRIP: (no result)
PROTHROMBIN TIME: 13.2 SEC (ref 12.2–14.7)
RBC #/AREA URNS HPF: (no result) /HPF
RENAL EPI CELLS #/AREA URNS HPF: (no result) /HPF
SODIUM SERPL-SCNC: 130 MMOL/L (ref 135–145)
SODIUM SERPL-SCNC: 135 MMOL/L (ref 135–145)
SP GR UR STRIP: 1.01 (ref 1.02–1.02)
TSH SERPL DL<=0.05 MIU/L-ACNC: 1.21 UIU/ML (ref 0.35–4.94)
WBC # BLD AUTO: 11.2 10^3/UL (ref 4.3–11)
WBC #/AREA URNS HPF: (no result) /HPF

## 2021-01-10 PROCEDURE — 82010 KETONE BODYS QUAN: CPT

## 2021-01-10 PROCEDURE — 93306 TTE W/DOPPLER COMPLETE: CPT

## 2021-01-10 PROCEDURE — 36415 COLL VENOUS BLD VENIPUNCTURE: CPT

## 2021-01-10 PROCEDURE — 84443 ASSAY THYROID STIM HORMONE: CPT

## 2021-01-10 PROCEDURE — 81000 URINALYSIS NONAUTO W/SCOPE: CPT

## 2021-01-10 PROCEDURE — 84100 ASSAY OF PHOSPHORUS: CPT

## 2021-01-10 PROCEDURE — 87088 URINE BACTERIA CULTURE: CPT

## 2021-01-10 PROCEDURE — 85610 PROTHROMBIN TIME: CPT

## 2021-01-10 PROCEDURE — 93005 ELECTROCARDIOGRAM TRACING: CPT

## 2021-01-10 PROCEDURE — 80048 BASIC METABOLIC PNL TOTAL CA: CPT

## 2021-01-10 PROCEDURE — 82962 GLUCOSE BLOOD TEST: CPT

## 2021-01-10 PROCEDURE — 83735 ASSAY OF MAGNESIUM: CPT

## 2021-01-10 PROCEDURE — 84484 ASSAY OF TROPONIN QUANT: CPT

## 2021-01-10 PROCEDURE — 80053 COMPREHEN METABOLIC PANEL: CPT

## 2021-01-10 PROCEDURE — 85025 COMPLETE CBC W/AUTO DIFF WBC: CPT

## 2021-01-10 PROCEDURE — 85730 THROMBOPLASTIN TIME PARTIAL: CPT

## 2021-01-10 PROCEDURE — 71045 X-RAY EXAM CHEST 1 VIEW: CPT

## 2021-01-10 PROCEDURE — 72125 CT NECK SPINE W/O DYE: CPT

## 2021-01-10 PROCEDURE — 70450 CT HEAD/BRAIN W/O DYE: CPT

## 2021-01-10 RX ADMIN — ENOXAPARIN SODIUM SCH MG: 30 INJECTION SUBCUTANEOUS at 20:21

## 2021-01-10 RX ADMIN — MONTELUKAST SCH MG: 10 TABLET, FILM COATED ORAL at 20:19

## 2021-01-10 RX ADMIN — DEXTROSE MONOHYDRATE AND SODIUM CHLORIDE ONE MLS/HR: 5; .45 INJECTION, SOLUTION INTRAVENOUS at 13:56

## 2021-01-10 RX ADMIN — INSULIN ASPART SCH UNIT: 100 INJECTION, SOLUTION INTRAVENOUS; SUBCUTANEOUS at 20:21

## 2021-01-10 RX ADMIN — METOCLOPRAMIDE SCH MG: 10 TABLET ORAL at 20:19

## 2021-01-10 RX ADMIN — METOPROLOL TARTRATE SCH MG: 50 TABLET, FILM COATED ORAL at 20:20

## 2021-01-10 RX ADMIN — SODIUM CHLORIDE ONE MLS/HR: 900 INJECTION INTRAVENOUS at 13:48

## 2021-01-10 RX ADMIN — SODIUM CHLORIDE ONE MLS/HR: 900 INJECTION INTRAVENOUS at 13:44

## 2021-01-10 RX ADMIN — SENNOSIDES SCH MG: 8.6 TABLET, FILM COATED ORAL at 20:19

## 2021-01-10 RX ADMIN — GABAPENTIN SCH MG: 400 CAPSULE ORAL at 20:21

## 2021-01-10 RX ADMIN — DEXTROSE MONOHYDRATE AND SODIUM CHLORIDE ONE MLS/HR: 5; .45 INJECTION, SOLUTION INTRAVENOUS at 13:47

## 2021-01-10 RX ADMIN — ACETAMINOPHEN, DIPHENHYDRAMINE HCL, PHENYLEPHRINE HCL SCH MG: 325; 25; 5 TABLET ORAL at 20:19

## 2021-01-10 RX ADMIN — SODIUM CHLORIDE, SODIUM LACTATE, POTASSIUM CHLORIDE, AND CALCIUM CHLORIDE SCH MLS/HR: 600; 310; 30; 20 INJECTION, SOLUTION INTRAVENOUS at 16:29

## 2021-01-10 RX ADMIN — DOCUSATE SODIUM SCH MG: 100 CAPSULE ORAL at 20:21

## 2021-01-10 NOTE — NUR
Patient blood glucose is 573. Call to update Dr Zavala. Order to give highest dose of SSI 
(9UNITS) and recheck blood glucose at 0100 and give SSI as ordered. Patient is requesting a 
snack and Dr Zavala said a snack is fine.

## 2021-01-10 NOTE — NUR
DAYTON BLISS admitted to room 416-1, with an admitting diagnosis of ACUTE ONCHRONIC RENAL 
FAILURE, SYNCOPE, on 01/10/21 from AM via , accompanied by STAFF.DAYTON BLISS introduced 
to surroundings, call light, bed controls, phone, TV, temperature control, lights, meal 
times, smoking policy, visitor policy, side rail policy, bathrooms and showers.  Patient 
Rights given to patient in the handbook. DAYTON BLISS verbalizes understanding that [facility] is not responsible for the loss or damage to any personal effects or valuables that 
are kept in the patients posession during their hospitalization.  DAYTON BLISS verbalizes 
understanding of Interdisciplinary Patient Education. Patient and/or family were informed 
about the Rapid Response Team and its purpose.

## 2021-01-10 NOTE — DIAGNOSTIC IMAGING REPORT
EXAMINATION: Chest 1 view



HISTORY: Wheezing, Syncope



COMPARISON: Chest radiograph of 09/23/2020.



FINDINGS: 



Heart size and pulmonary vasculature are normal. Calcifications

of the aorta. The lungs are clear without consolidation, pleural

effusion, or pneumothorax. The osseous structures are intact.



IMPRESSION: 



1. No acute radiographic abnormality in the chest.



Dictated by: 



  Dictated on workstation # [id number]

## 2021-01-10 NOTE — ED SYNCOPE
General


Chief Complaint:  Neurological Problems


Stated Complaint:  SEIZURE


Nursing Triage Note:  


TO ED PER EMS. EMS WAS CALLED FOR SYNCOPE. PATIENT REPORTS THAT WAS SITTING AT 


KITCHEN TABLE AND PASSED OUT. REPORTS THIS HAPPENED A WEEK AGO.  DID EAT AFTER 


AND FEELING BETTER. AND DID TAKE INSULIN. DENIES ANY COVID EXPOSURE OR SYMPTOMS.




BS TO HI TO READ BY EMS 


 (ALBERT AVILA MED STUDENT)





History of Present Illness


Date Seen by Provider:  Ozzy 10, 2021


Time Seen by Provider:  10:50


Initial Comments


This is a 54 y/o F who presents to the Emergency Department via EMS for a chief 

complaint of syncope. Her blood glucose this morning was elevated and she took 

20 units of insulin at 7AM. On the way to the couch, her daughter said she had a

seizure. The patient states this is the second time she had an episode and the 

seizure like condition is new to her since this month. According to her 

daughter, last Wednesday, the patient was walking to her car when she passed out

and seized again. She knew she was going to pass out. She states she feels calm 

now but her head and neck still hurt. She has an old abdominal pain and denies 

back pain. Recently she has tried to drink 5 oz of water every hour to help her 

kidney and has been peeing a lot. She states she took medications this morning 

and noticed she lost 2.6 lbs overnight for a total of about 9 lbs of weight loss

in 3d since her doctor's appointment on Wednesday. Her rapid weight loss has 

concerned her. 





Per her daughter, she got lightheaded and started trembling and passed out 2x 

today.


Mother (Mary) [phone]





PCP Dr. Dove in [place]


Cardiologist Dr. Anderson at [facility]





PMHx: Hepatitis C, Diabetes type 1, congestive heart failure


 (ALBERT AVILA MED STUDENT)


Initial Comments


To elaborate on comments above, the 3 syncopal events described involved a 

prodrome of lightheadedness and tremoring.  Patient then went unresponsive for 

less than 1 minute after each episode.  Patient had no convulsions or tremors 

during the unresponsive episode.  It seems that the tremoring may be part of the

prodrome to syncope rather than seizure activity.  Patient has no history of 

seizure activity in the past.  She has reported excessive urination recently 

with high blood sugars which may have caused dehydration.  She had 1 syncopal 

episode while in the car with her daughter on Wednesday.  Daughter noted that 

her hand began to tremor and she then dropped a cigarette on her chest.  Her 

tremoring became more rapid.  They pulled over and patient went unresponsive and

stopped moving.  Blood sugar at that time was 98.  Patient then had 2 episodes 

today.  She was feeling lightheaded while sitting at breakfast table.  Her 

daughter helped her to the living room where she collapsed and was lowered to 

the floor.  She reported that her blood sugar prior to that was 543 and she took

20 units of subcutaneous insulin.  She was unresponsive for less than a minute. 

She was then alert and sweating.  She did not appear to have any post ictal stat

e.  She ate soup and then 10 minutes later became lightheaded again and 

trembling ensued.  She then was unresponsive for about 10 seconds.  Patient was 

found to have a tender posterior cervical spine.  C-collar was applied.


 (GULSHAN CARRANZA MD)





Allergies and Home Medications


Allergies


Coded Allergies:  


     morphine (Verified  Allergy, Mild, Vomiting, 20)


     orange juice (Verified  Allergy, Mild, Nausea, 20)


     Sulfa (Sulfonamide Antibiotics) (Unverified  Allergy, Unknown, 6/25/15)


     codeine (Verified  Allergy, Unknown, TAKES ULTRAM AT HOME, 09)


     ketorolac (Verified  Allergy, Unknown, 08)


     tramadol (Verified  Allergy, Unknown, 11)





Home Medications


Albuterol Sulfate 1 Puff Puff, 2 PUFF IH TID PRN for SHORTNESS OF BREATH, 

(Reported)


Aspirin 81 Mg Tab.chew, 81 MG PO DAILY, (Reported)


Diclofenac Sodium 100 Gm Gel..gram., 1 APPLIC TOP QID PRN for CRAMPS, (Reported)


   APPLY TO LEGS 


Ergocalciferol (Vitamin D2) 1,250 Mcg Capsule, 1,250 MCG PO THUR, (Reported)


Fluticasone Propionate 9.9 Ml Diamond.susp, 2 SPRAY NSEACH DAILY, (Reported)


Furosemide 80 Mg Tablet, 80 MG PO BID, (Reported)


Gabapentin 300 Mg Capsule, 600 MG PO Q8H, (Reported)


   TAKES 2 (300MG) TABS 


Insulin Determir 1,000 Units/10 Ml Soln, 30 UNITS SQ HS, (Reported)


Insulin Lispro 100 Unit/1 Ml Insuln.pen, 5-15 UNITS SQ AC, (Reported)


Isosorbide Mononitrate 30 Mg Tab.er.24h, 30 MG PO DAILY, (Reported)


Lorazepam 1 Mg Tablet, 1 MG PO TID PRN for ANXIETY, (Reported)


Magnesium Oxide 400 Mg Tablet, 400 MG PO BID, (Reported)


Melatonin 10 Mg Tablet, 10 MG PO HS, (Reported)


Metoclopramide HCl 10 Mg Tablet, 10 MG PO QIDACHS, (Reported)


Metolazone 5 Mg Tablet, 5 MG PO MON,WE,FR, (Reported)


Metoprolol Tartrate 50 Mg Tablet, 75 MG PO BID, (Reported)


   TAKES 1 &  (50MG) TABS 


Montelukast Sodium 10 Mg Tablet, 10 MG PO HS, (Reported)


Multivit-Min/FA/Lycopene/Lut 1 Each Tablet, 1 EA PO DAILY, (Reported)


Naloxone HCl 4 Mg Spray, 1 SPRAY NS UD PRN for UNRESPONSIVE, (Reported)


   ADMINSTER 1 SPRAY IN 1 NOSTRIL 1 TIME- MAY REPEAT IN ALTERNATING NOSTRIL 

EVERY 2-3 MINUTES UNTIL RESPONSIVE OR EMS ARRIVES 


Omeprazole 20 Mg Capsule.dr, 20 MG PO DAILY, (Reported)


Oxycodone HCl 5 Mg Tablet, 5 MG PO DAILY PRN for PAIN-SEVERE (8-10), (Reported)


Torsemide 100 Mg Tablet, 100 MG PO 0800,1500, (Reported)


[Iron Slow Release] 45 TAB, 45 MG PO DAILY, (Reported)





Patient Home Medication List


Home Medication List Reviewed:  Yes


 (GULSHAN CARRANZA MD)





Review of Systems


EENTM:  other (dry mouth)


Gastrointestinal:  No abdominal pain


Musculoskeletal:  No back pain; neck pain


Psychiatric/Neurological:  Headache (ALBERT AVILA MED STUDENT)


Constitutional:  see HPI, weakness, weight loss


EENTM:  other (dry mouth)


Respiratory:  no symptoms reported


Cardiovascular:  see HPI


Gastrointestinal:  abdominal pain


Genitourinary:  see HPI


Skin:  no symptoms reported


Psychiatric/Neurological:  See HPI (GULSHAN CARRANZA MD)





Past Medical-Social-Family Hx


Past Med/Social Hx:  Reviewed Nursing Past Med/Soc Hx


 (GULSHAN CARRANZA MD)


Patient Social History


Alcohol Use:  Denies Use


Recreational Drug Use:  No


Drug of Choice:  METH, THC


Smoking Status:  Current Everyday Smoker


Type Used:  Cigarettes


2nd Hand Smoke Exposure:  No


Recent Foreign Travel:  No


Contact w/Someone Who Travel:  No


Recent Infectious Disease Expo:  No


Recent Hopitalizations:  No


 (ALBERT AVILA)





Immunizations Up To Date


Tetanus Booster (TDap):  Unknown


Date of Pneumonia Vaccine:  Oct 1, 2012


Date of Influenza Vaccine:  Oct 1, 2012


 (ALBERT AVILA)





Seasonal Allergies


Seasonal Allergies:  No


 (ALBERT AVILA Scryer RAVEN)





Past Medical History


Surgeries:  Yes (URETERAL STENTS)


Abdominal, Adenoidectomy, Ear Surgery, Hysterectomy, Renal, Tonsillectomy, Tubal

Ligation


Respiratory:  Yes (INTUBATED 20 WITH DKA/RESP FAILURE)


Asthma, Pneumonia, Chronic Bronchitis


Cardiac:  Yes


Deep Vein Thrombosis, High Cholesterol, Hypertension


Neurological:  Yes


Neuropathy


Reproductive Disorders:  Yes


Female Reproductive Disorders:  Menstrual Problems


GYN History:  Hysterectomy, Menopausal


Genitourinary:  Yes (NO  DIALYSIS; URETERAL STENTS FOR KIDNEY STONES)


Kidney Stones, Renal Failure


Gastrointestinal:  Yes (gastroparesis)


Gastroesophageal Reflux, Pancreatitis, Ulcer


Musculoskeletal:  Yes (CHRONIC GENERALIZED PAIN)


Arthritis, Rheumatoid Arthritis, Fractures


Endocrine:  Yes (NON-COMPLIANT; MULTIPLE EPISODES OF DKA)


Diabetes, Insulin dep


HEENT:  Yes (S/P T&A)


Tonsilitis, Glaucoma


Loss of Vision:  Denies


Hearing Impairment:  Denies


Cancer:  No


Psychosocial:  Yes (POLYSUBSTANCE ABUSE)


Personality Disorder


Integumentary:  No


Blood Disorders:  No


Adverse Reaction/Blood Tranf:  No


 (ALBERT AVILA)


Cardiac:  Yes (CHF)


Gastrointestinal:  Yes


Hepatitis (hep C)


 (GULSHAN CARRANZA MD)





Family Medical History





FH: thyroid cancer


  G8 SISTER


FHx: macular degeneration


  19 MOTHER


Glaucoma


  G8 BROTHER


Heart Disease, Cancer, Diabetes





SOCIAL HISTORY:


-ETOH--REGULAR USE--"COUPLE OF DRINKS" SEVERAL NIGHTS A WEEK


-DRUGS--+ METH AND THC USE


-SMOKES > 1 PPD





PAST SURGICAL HISTORY: 


-TONSILLECTOMY AND ADENOIDECTOMY


-BILATERAL TUBAL LIGATION


-HYSTERECTOMY


-EXPLORATORY LAPAROSCOPY


-URETERAL STENTS FOR KIDNEY STONES/CYSTOSCOPIES


-EAR SURGERY


 (ALBERT AVILA)





Physical Exam


Vital Signs





Vital Signs - First Documented








 1/10/21





 10:16


 


Temp 35.0


 


Pulse 76


 


Resp 18


 


B/P (MAP) 107/71 (83)


 


Pulse Ox 98


 


O2 Delivery Nasal Cannula


 


O2 Flow Rate 2.00





 (GULSHAN CARRANZA MD)


Vital Signs


Capillary Refill : Less Than 3 Seconds 


 (ALBERT AVILA MED STUDENT)


Height, Weight, BMI


Height: 5'5.00"


Weight: 151lbs. 1.0oz. 68.940221lc; 28.00 BMI


Method:Stated


 


 (ALBERT AVILA STUDENT)


General Appearance:  No Apparent Distress, WD/WN


HEENT:  PERRL/EOMI, Normal ENT Inspection, Other (Dry oropharynx)


Neck:  Normal Inspection, Tender Midline


Cardiovascular:  Regular Rate, Rhythm, No Edema, No Murmur


Respiratory:  Lungs Clear, Normal Breath Sounds, No Accessory Muscle Use, No 

Respiratory Distress


Gastrointestinal:  Normal Bowel Sounds, Soft, Tenderness (Mild generalized 

tenderness)


Extremities:  Normal Inspection, No Pedal Edema


Neurologic/Psychiatric:  Alert, Oriented x3, No Motor/Sensory Deficits, Normal 

Mood/Affect, CNs II-XII Norm as Tested


Cranial Nerves:  Normal Speech, PERRL


Motor/Sensory:  No Motor Deficit, No Sensory Deficit


Skin:  Normal Color, Warm/Dry (GULSHAN CARRANZA MD)





Procedures/Interventions


Date of ETT Placement:  2020


Time of ETT Placement:  0700


 (ALBERT AVILA STUDENT)





Progress/Results/Core Measures


Results/Orders


Lab Results





Laboratory Tests








Test


 1/10/21


10:20 1/10/21


10:22 1/10/21


12:26 Range/Units


 


 


Glucometer 435 *H  304 H   MG/DL


 


White Blood Count


 


 11.2 H


 


 4.3-11.0


10^3/uL


 


Red Blood Count


 


 4.04 


 


 3.80-5.11


10^6/uL


 


Hemoglobin  12.0   11.5-16.0  g/dL


 


Hematocrit  36   35-52  %


 


Mean Corpuscular Volume  89   80-99  fL


 


Mean Corpuscular Hemoglobin  30   25-34  pg


 


Mean Corpuscular Hemoglobin


Concent 


 34 


 


 32-36  g/dL





 


Red Cell Distribution Width  13.5   10.0-14.5  %


 


Platelet Count


 


 379 


 


 130-400


10^3/uL


 


Mean Platelet Volume  9.9   9.0-12.2  fL


 


Immature Granulocyte % (Auto)  1    %


 


Neutrophils (%) (Auto)  66   42-75  %


 


Lymphocytes (%) (Auto)  23   12-44  %


 


Monocytes (%) (Auto)  9   0-12  %


 


Eosinophils (%) (Auto)  1   0-10  %


 


Basophils (%) (Auto)  0   0-10  %


 


Neutrophils # (Auto)


 


 7.3 


 


 1.8-7.8


10^3/uL


 


Lymphocytes # (Auto)


 


 2.6 


 


 1.0-4.0


10^3/uL


 


Monocytes # (Auto)


 


 1.0 


 


 0.0-1.0


10^3/uL


 


Eosinophils # (Auto)


 


 0.1 


 


 0.0-0.3


10^3/uL


 


Basophils # (Auto)


 


 0.0 


 


 0.0-0.1


10^3/uL


 


Immature Granulocyte # (Auto)


 


 0.1 


 


 0.0-0.1


10^3/uL


 


Prothrombin Time  13.2   12.2-14.7  SEC


 


INR Comment  1.0   0.8-1.4  


 


Activated Partial


Thromboplast Time 


 23 L


 


 24-35  SEC





 


Sodium Level  130 L  135-145  MMOL/L


 


Potassium Level  3.6   3.6-5.0  MMOL/L


 


Chloride Level  89 L    MMOL/L


 


Carbon Dioxide Level  18 L  21-32  MMOL/L


 


Anion Gap  23 H  5-14  MMOL/L


 


Blood Urea Nitrogen  102 *H  7-18  MG/DL


 


Creatinine


 


 3.93 H


 


 0.60-1.30


MG/DL


 


Estimat Glomerular Filtration


Rate 


 12 


 


  





 


BUN/Creatinine Ratio  26    


 


Glucose Level  476 *H    MG/DL


 


Calcium Level  8.9   8.5-10.1  MG/DL


 


Corrected Calcium  9.2   8.5-10.1  MG/DL


 


Magnesium Level  2.0   1.6-2.4  MG/DL


 


Total Bilirubin  0.4   0.1-1.0  MG/DL


 


Aspartate Amino Transf


(AST/SGOT) 


 17 


 


 5-34  U/L





 


Alanine Aminotransferase


(ALT/SGPT) 


 20 


 


 0-55  U/L





 


Alkaline Phosphatase  129     U/L


 


Troponin I  < 0.028   <0.028  NG/ML


 


Total Protein  8.2   6.4-8.2  GM/DL


 


Albumin  3.6   3.2-4.5  GM/DL


 


Beta-Hydroxybutyrate (Chem


panel) 


 0.13 


 


 0.00-0.27


MMOL/L


 


TSH Fidelity Testing


 


 1.21 


 


 0.35-4.94


UIU/ML





 (GULSHAN CARRANZA MD)


My Orders





Orders - GULSHAN CARRANZA MD


Cbc With Automated Diff (1/10/21 10:59)


Comprehensive Metabolic Panel (1/10/21 10:59)


Magnesium (1/10/21 10:59)


Ua Culture If Indicated (1/10/21 10:59)


Protime With Inr (1/10/21 11:14)


Partial Thromboplastin Time (1/10/21 11:14)


Accucheck Stat ONCE (1/10/21 11:14)


Accucheck Stat ONCE (1/10/21 11:14)


Ct Head/Cervical Spine Wo (1/10/21 11:14)


Chest 1 View, Ap/Pa Only (1/10/21 11:17)


Ed Iv/Invasive Line Start (1/10/21 11:17)


Ekg Tracing (1/10/21 11:17)


Monitor-Rhythm Ecg Trace Only (1/10/21 11:17)


Thyroid Analyzer (1/10/21 10:22)


Troponin I (1/10/21 10:22)


Ns Iv 1000 Ml (Sodium Chloride 0.9%) (1/10/21 12:30)


Beta Hydroxybutyrate (1/10/21 12:26)


Insulin (Regular) Human (Novolin R (Per (1/10/21 12:30)


 (GULSHAN CARRANZA MD)


Medications Given in ED


 (GULSHAN CARRANZA MD)


Vital Signs/I&O











 1/10/21 1/10/21





 10:16 10:55


 


Temp 35.0 


 


Pulse 76 72


 


Resp 18 18


 


B/P (MAP) 107/71 (83) 128/76 (93)


 


Pulse Ox 98 99


 


O2 Delivery Nasal Cannula Nasal Cannula


 


O2 Flow Rate 2.00 2.00





 (GULSHAN CARRANZA MD)








Blood Pressure Mean:                    93











FSBG Bedside Testing


Finger Stick Blood Glucose:  435


 (ALBERT AVILA MED STUDENT)





Progress


Progress Note :  


   Time:  11:35


Progress Note


- Dr. Carranza spoke on the phone with the patient's daughter to obtain 

additional history information. IV line established. EKG ordered, results 

pending. Cardiac panel has been ordered. CXR and CT of head and neck have been 

ordered, results pending. PT, INR, CBC, CMP, and Mg have been ordered, results 

pending.


 (ALBERT AVILA MED STUDENT)


Progress Note :  


Progress Note


C-spine was cleared after reviewing report.  Patient received 2 L of IV normal 

saline.  She received 5 units of IV insulin.  Blood sugar was rapidly dropping. 

We have now changed to D5 1/2 normal saline as blood sugar dropped down to 185. 

I discussed the case with Dr. Garcia and Dr. Obando.  Patient will be admitted.





A repeat BMP demonstrated improvement in anion gap and CO2.  Blood glucose is 

well controlled.  Patient was admitted to the floor with SSI.


 (GULSHAN CARRANZA MD)


Initial ECG Impression Date:  Ozzy 10, 2021


Initial ECG Impression Time:  11:17


Initial ECG Rate:  72


Initial ECG Rhythm:  Normal Sinus


Initial ECG Intervals:  Normal


Initial ECG Impression:  Normal


Comment


Normal sinus rhythm with no ST elevation or depression.  No abnormal intervals 

or axis deviation.


 (GULSHAN CARRANZA MD)





Diagnostic Imaging





   Diagonstic Imaging:  CT


   Plain Films/CT/US/NM/MRI:  c-spine, head


Comments





NAME:   DAYTON BLISS


MED REC#:   [id number]


ACCOUNT#:   [id number]


PT STATUS:   REG ER


:   1965


PHYSICIAN:   GULSHAN CARRANZA MD


ADMIT DATE:   01/10/21/ER


***Signed***


Date of Exam:01/10/21





CT HEAD/CERVICAL SPINE WO








PROCEDURE: CT head and CT cervical spine without contrast.





TECHNIQUE: Multiple contiguous axial images were obtained through


the brain and cervical spine without the use of intravenous


contrast. Sagittal and coronal reformations through the cervical


spine were then performed. Auto Exposure Controls were utilized


during the CT exam to meet ALARA standards for radiation dose


reduction. 





INDICATION: Trauma, head and neck pain.





FINDINGS: The ventricles are normal in size, shape, and position.


There is no acute parenchymal hemorrhage, edema, or mass. There


is no extra-axial mass or hemorrhage. There is no acute bony


abnormality.





There is normal height and alignment of the cervical vertebral


bodies. There is advanced degenerative change of the disc at


C4-C5 and slightly less so at C5-C6 with complete loss of disc


space. There is sclerosis and cyst formation on the endplates.


There is spondylosis. No fracture or other acute abnormality is


seen at any level.





IMPRESSION:


1. Normal CT of the head.


2. CT of the cervical spine shows degenerative changes present


with no acute abnormality seen.





Dictated by: 





  Dictated on workstation # [id number]








Dict:   01/10/21 1157


Trans:   01/10/21 1206


AS6 [id number]





Interpreted by:     ZULEMA TRIVEDI MD


Electronically signed by: ZULEMA TRIVEDI MD 01/10/21 1206


   Reviewed:  Reviewed by Me








   Diagonstic Imaging:  Xray


   Plain Films/CT/US/NM/MRI:  chest


Comments


NAME:   DAYTON BLISS


MED REC#:   [id number]


ACCOUNT#:   [id number]


PT STATUS:   ADM IN


:   1965


PHYSICIAN:   GULSHAN CARRANZA MD


ADMIT DATE:   01/10/21/ICU


***Signed***


Date of Exam:01/10/21





CHEST 1 VIEW, AP/PA ONLY





EXAMINATION: Chest 1 view





HISTORY: Wheezing, Syncope





COMPARISON: Chest radiograph of 2020.





FINDINGS: 





Heart size and pulmonary vasculature are normal. Calcifications


of the aorta. The lungs are clear without consolidation, pleural


effusion, or pneumothorax. The osseous structures are intact.





IMPRESSION: 





1. No acute radiographic abnormality in the chest.





Dictated by: 





  Dictated on workstation # [id number]





Dict:   01/10/21 1209


Trans:   01/10/21 1350


AS6 [id number]





Interpreted by:     ZULEMA MORENO DO


Electronically signed by: ZULEMA MORENO DO 01/10/21 1350


 (GULSHAN CARRANZA MD)





Departure


Communication (Admissions)


Time/Spoke to Admitting Phy:  12:35


Dr. Zavala


Time/Spoke to Consulting Phy:  12:35


Dr. Obando


 (GULSHAN CARRANZA MD)





Impression





   Primary Impression:  


   Acute kidney injury superimposed on chronic kidney disease


   Additional Impressions:  


   Hyperglycemia


   Syncope and collapse


Disposition:   ADMITTED AS INPATIENT


Condition:  Improved





Admissions


Decision to Admit Reason:  Admit from ER (General)


Decision to Admit/Date:  Ozzy 10, 2021


Time/Decision to Admit Time:  11:00


 (GULSHAN CARRANZA MD)





Departure-Patient Inst.


Referrals:  


NO,LOCAL PHYSICIAN (PCP/Family)


Primary Care Physician











ALBERT AVILA MED STUDENT Ozzy 10, 2021 11:35


GULSHAN CARRANZA MD        Ozzy 10, 2021 12:51

## 2021-01-10 NOTE — NUR
LARRY CALLED IN ICU INFORMED THAT WILL REPEAT LAB IF BETTER PATIENT WILL GO 
TO FLOOR INSTEAD OF ICU LAB DRAW AND SENT

## 2021-01-10 NOTE — DIAGNOSTIC IMAGING REPORT
PROCEDURE: CT head and CT cervical spine without contrast.



TECHNIQUE: Multiple contiguous axial images were obtained through

the brain and cervical spine without the use of intravenous

contrast. Sagittal and coronal reformations through the cervical

spine were then performed. Auto Exposure Controls were utilized

during the CT exam to meet ALARA standards for radiation dose

reduction. 



INDICATION: Trauma, head and neck pain.



FINDINGS: The ventricles are normal in size, shape, and position.

There is no acute parenchymal hemorrhage, edema, or mass. There

is no extra-axial mass or hemorrhage. There is no acute bony

abnormality.



There is normal height and alignment of the cervical vertebral

bodies. There is advanced degenerative change of the disc at

C4-C5 and slightly less so at C5-C6 with complete loss of disc

space. There is sclerosis and cyst formation on the endplates.

There is spondylosis. No fracture or other acute abnormality is

seen at any level.



IMPRESSION:

1. Normal CT of the head.

2. CT of the cervical spine shows degenerative changes present

with no acute abnormality seen.



Dictated by: 



  Dictated on workstation # [id number]

## 2021-01-11 VITALS — DIASTOLIC BLOOD PRESSURE: 75 MMHG | SYSTOLIC BLOOD PRESSURE: 124 MMHG

## 2021-01-11 VITALS — SYSTOLIC BLOOD PRESSURE: 115 MMHG | DIASTOLIC BLOOD PRESSURE: 70 MMHG

## 2021-01-11 VITALS — SYSTOLIC BLOOD PRESSURE: 150 MMHG | DIASTOLIC BLOOD PRESSURE: 90 MMHG

## 2021-01-11 VITALS — SYSTOLIC BLOOD PRESSURE: 117 MMHG | DIASTOLIC BLOOD PRESSURE: 74 MMHG

## 2021-01-11 VITALS — DIASTOLIC BLOOD PRESSURE: 82 MMHG | SYSTOLIC BLOOD PRESSURE: 137 MMHG

## 2021-01-11 VITALS — DIASTOLIC BLOOD PRESSURE: 68 MMHG | SYSTOLIC BLOOD PRESSURE: 108 MMHG

## 2021-01-11 LAB
BUN/CREAT SERPL: 28
CALCIUM SERPL-MCNC: 8.9 MG/DL (ref 8.5–10.1)
CHLORIDE SERPL-SCNC: 96 MMOL/L (ref 98–107)
CO2 SERPL-SCNC: 23 MMOL/L (ref 21–32)
CREAT SERPL-MCNC: 3.23 MG/DL (ref 0.6–1.3)
GFR SERPLBLD BASED ON 1.73 SQ M-ARVRAT: 15 ML/MIN
GLUCOSE SERPL-MCNC: 111 MG/DL (ref 70–105)
MAGNESIUM SERPL-MCNC: 1.8 MG/DL (ref 1.6–2.4)
PHOSPHATE SERPL-MCNC: 4.9 MG/DL (ref 2.3–4.7)
POTASSIUM SERPL-SCNC: 3.5 MMOL/L (ref 3.6–5)
SODIUM SERPL-SCNC: 137 MMOL/L (ref 135–145)

## 2021-01-11 RX ADMIN — DOCUSATE SODIUM SCH MG: 100 CAPSULE ORAL at 09:14

## 2021-01-11 RX ADMIN — INSULIN ASPART SCH UNIT: 100 INJECTION, SOLUTION INTRAVENOUS; SUBCUTANEOUS at 06:24

## 2021-01-11 RX ADMIN — GABAPENTIN SCH MG: 400 CAPSULE ORAL at 09:14

## 2021-01-11 RX ADMIN — DOCUSATE SODIUM SCH MG: 100 CAPSULE ORAL at 20:11

## 2021-01-11 RX ADMIN — INSULIN ASPART SCH UNIT: 100 INJECTION, SOLUTION INTRAVENOUS; SUBCUTANEOUS at 20:12

## 2021-01-11 RX ADMIN — SODIUM CHLORIDE, SODIUM LACTATE, POTASSIUM CHLORIDE, AND CALCIUM CHLORIDE SCH MLS/HR: 600; 310; 30; 20 INJECTION, SOLUTION INTRAVENOUS at 09:15

## 2021-01-11 RX ADMIN — SODIUM CHLORIDE, SODIUM LACTATE, POTASSIUM CHLORIDE, AND CALCIUM CHLORIDE SCH MLS/HR: 600; 310; 30; 20 INJECTION, SOLUTION INTRAVENOUS at 15:14

## 2021-01-11 RX ADMIN — METOCLOPRAMIDE SCH MG: 10 TABLET ORAL at 06:28

## 2021-01-11 RX ADMIN — ASPIRIN 81 MG CHEWABLE TABLET SCH MG: 81 TABLET CHEWABLE at 09:14

## 2021-01-11 RX ADMIN — ENOXAPARIN SODIUM SCH MG: 30 INJECTION SUBCUTANEOUS at 20:11

## 2021-01-11 RX ADMIN — MONTELUKAST SCH MG: 10 TABLET, FILM COATED ORAL at 20:11

## 2021-01-11 RX ADMIN — METOPROLOL TARTRATE SCH MG: 50 TABLET, FILM COATED ORAL at 09:14

## 2021-01-11 RX ADMIN — ACETAMINOPHEN, DIPHENHYDRAMINE HCL, PHENYLEPHRINE HCL SCH MG: 325; 25; 5 TABLET ORAL at 20:12

## 2021-01-11 RX ADMIN — PANTOPRAZOLE SODIUM SCH MG: 40 TABLET, DELAYED RELEASE ORAL at 09:15

## 2021-01-11 RX ADMIN — GABAPENTIN SCH MG: 400 CAPSULE ORAL at 20:11

## 2021-01-11 RX ADMIN — METOCLOPRAMIDE SCH MG: 10 TABLET ORAL at 10:46

## 2021-01-11 RX ADMIN — METOCLOPRAMIDE SCH MG: 10 TABLET ORAL at 17:03

## 2021-01-11 RX ADMIN — FLUTICASONE PROPIONATE SCH SPRAY: 50 SPRAY, METERED NASAL at 09:15

## 2021-01-11 RX ADMIN — INSULIN ASPART SCH UNIT: 100 INJECTION, SOLUTION INTRAVENOUS; SUBCUTANEOUS at 10:46

## 2021-01-11 RX ADMIN — SENNOSIDES SCH MG: 8.6 TABLET, FILM COATED ORAL at 09:14

## 2021-01-11 RX ADMIN — METOPROLOL TARTRATE SCH MG: 50 TABLET, FILM COATED ORAL at 20:12

## 2021-01-11 RX ADMIN — SODIUM CHLORIDE, SODIUM LACTATE, POTASSIUM CHLORIDE, AND CALCIUM CHLORIDE SCH MLS/HR: 600; 310; 30; 20 INJECTION, SOLUTION INTRAVENOUS at 22:10

## 2021-01-11 RX ADMIN — SODIUM CHLORIDE, SODIUM LACTATE, POTASSIUM CHLORIDE, AND CALCIUM CHLORIDE SCH MLS/HR: 600; 310; 30; 20 INJECTION, SOLUTION INTRAVENOUS at 01:16

## 2021-01-11 RX ADMIN — SENNOSIDES SCH MG: 8.6 TABLET, FILM COATED ORAL at 20:11

## 2021-01-11 RX ADMIN — INSULIN ASPART SCH UNIT: 100 INJECTION, SOLUTION INTRAVENOUS; SUBCUTANEOUS at 15:27

## 2021-01-11 RX ADMIN — METOCLOPRAMIDE SCH MG: 10 TABLET ORAL at 20:11

## 2021-01-11 NOTE — PHYSICAL THERAPY EVALUATION
PT Evaluation-General


Medical Diagnosis


Admission Date


Ozzy 10, 2021 at 12:40


Medical Diagnosis:  syncope


Onset Date:  Ozzy 10, 2021





Therapy Diagnosis


Therapy Diagnosis:  impaired mobility, strength, endurance





Height/Weight


Height (Feet):  5


Height (Inches):  5.00


Weight (Pounds):  151


Weight (Ounces):  1.0





Precautions


Precautions/Isolations:  Seizure, Standard Precautions





Referral


Physician:  Hector


Reason for Referral:  Evaluation/Treatment





Medical History


Pertinent Medical History:  DM, HTN, Renal Insufficiency, Rheumatoid Arthritis, 

Smoking


Additional Medical History


Past Medical History


Surgeries:  Yes (URETERAL STENTS)


Abdominal, Adenoidectomy, Ear Surgery, Hysterectomy, Renal, Tonsillectomy, Tubal

Ligation


Respiratory:  Yes (INTUBATED 06/30/20 WITH DKA/RESP FAILURE)


Asthma, Pneumonia, Chronic Bronchitis


Cardiac:  Yes


Deep Vein Thrombosis, High Cholesterol, Hypertension


Neurological:  Yes


Neuropathy


Reproductive Disorders:  Yes


Female Reproductive Disorders:  Menstrual Problems


GYN History:  Hysterectomy, Menopausal


Genitourinary:  Yes (NO  DIALYSIS; URETERAL STENTS FOR KIDNEY STONES)


Kidney Stones, Renal Failure


Gastrointestinal:  Yes (gastroparesis)


Gastroesophageal Reflux, Pancreatitis, Ulcer


Musculoskeletal:  Yes (CHRONIC GENERALIZED PAIN)


Arthritis, Rheumatoid Arthritis, Fractures


Endocrine:  Yes (NON-COMPLIANT; MULTIPLE EPISODES OF DKA)


Diabetes, Insulin dep


HEENT:  Yes (S/P T&A)


Tonsilitis, Glaucoma


Loss of Vision:  Denies


Hearing Impairment:  Denies


Cancer:  No


Psychosocial:  Yes (POLYSUBSTANCE ABUSE)


Personality Disorder





Social History


Current Living Status:  


Entry Into Home:  Stairs With Railing


PT Steps Into Home:  6


patient states she lives with her mother





Prior


Prior Level of Function


SCALE: Activities may be completed with or without assistive devices.





6-Indepedent-patient completes the activity by him/herself with no assistance 

from a helper.


5-Set-up or Clean-up Assistance-helper sets up or cleans up; patient completes 

activity. [place] assists only prior to or  


    following the activity.


4-Supervision or Touching Assistance-helper provides verbal cues and/or 

touching/steadying and/or contact guard assistance as patient completes 

activity. Assistance may be provided   


    throughout the activity or intermittently.


3-Partial/Moderate Assistance-helper does LESS THAN HALF the effort. [place] 

lifts, holds or supports trunk or limbs, but provides less than half the effort.


2-Substantial/Maximal Assistance-helper does MORE THAN HALF the effort. [place] 

lifts or holds trunk or limbs and provides more than half the effort.


6-Pextmeleu-hmmbsv does ALL the effort. Patient does none of the effort to 

complete the activity. Or, the assistance of 2 or more helpers is required for 

the patient to complete the  


    activity.


If activity was not attempted, code reason:


7-Patient Refused.


9-Not Applicable-not attempted and the patient did not perform the activity b

efore the current illness, exacerbation or injury.


10-Not Attempted due to Environmental Limitations-(lack of equipment, weather 

restraints, etc.).


88-Not Attempted due to Medical Conditions or Safety Concerns.


Bed Mobility:  6


Transfers (B,C,W/C):  6


Gait:  6


Stairs:  6


Indoor Mobility (Ambulation):  Independent


Stairs:  Independent





PT Evaluation-Current


Subjective


Patient in bed pre tx, agrees to PT, has 5/10 headache and 7/10 pain in both 

legs she says it is neuropathy





Pt/Family Goals


to be independent at home





Objective


Patient Orientation:  Person, Place, Situation





ROM/Strength


ROM Lower Extremities


WNL


Strength Lower Extremities


LLE (hip flexion 3+/5, knee flexion 4+/5, knee extension 4+/5, dorsiflexion 

4/5), RLE (hip flexion 3+/5, knee flexion 4+/5, knee extension 4+/5, 

dorsiflexion 4/5)





Sensory


Sensation Right Lower Extremit:  Impaired


Sensation Left Lower Extremity:  Impaired





Transfers


Roll Left to Right (QC):  6


Sit to Lying (QC):  6


Lying to Sitting/Side of Bed(Q:  6


Sit to Stand (QC):  4


Chair/Bed-to-Chair Xfer(QC):  4





Gait


Does the Patient Walk?:  Yes


Mode of Locomotion:  Walk


Anticipated Mode of Locomotion:  Walk


Walk 10 feet (QC):  4


Walk 50 ft with 2 Turns(QC):  4


Walk 150 ft (QC):  4


Distance:  250'


Gait Assistive Device:  FWW


Comments/Gait Description


CGA, slow, unsteady, has some scissoring of feet, had one standing rest break





Balance


Sitting Static:  Normal


Sitting Dynamic:  Normal


Standing Static:  Fair


 Standing Dynamic:  Poor





Treatment


seated BLE exercise x20 (AP, LAQ)





Assessment/Needs


Patient sitting EOB post tx with nurse call, phone, tray, all needs met.  

Patient has unsteadiness during ambulation, needs somebody with her.


Rehab Potential:  Fair





PT Long Term Goals


Long Term Goals


PT Long Term Goals Time Frame:  Jan 18, 2021


Roll Left & Right (QC):  6


Sit to Lying (QC):  6


Lying-Sitting on Side/Bed(QC):  6


Sit to Stand (QC):  6


Chair/Bed-to-Chair Xfer(QC):  6


Toilet Transfer (QC):  6


Walk 10 feet (QC):  5


Walk 50ft with 2 Turns (QC):  5


Walk 150 ft (QC):  5





PT Plan


Problem List


Problem List:  Activity Tolerance, Functional Strength, Safety, Balance, Gait, 

Transfer





Treatment/Plan


Treatment Plan:  Continue Plan of Care


Treatment Plan:  Education, Functional Activity Chi, Functional Strength, 

Gait, Safety, Therapeutic Exercise, Transfers


Treatment Duration:  Jan 18, 2021


Frequency:  6 times per week


Estimated Hrs Per Day:  .25 hour per day


Patient and/or Family Agrees t:  Yes





Safety Risks/Education


Patient Education:  Gait Training, Transfer Techniques, Safety Issues


Teaching Recipient:  Patient


Teaching Methods:  Demonstration, Discussion


Response to Teaching:  Reinforcement Needed





Discharge Recommendations


Plan


Patient will perform bed mobility and transfer training, balance and endurance 

training, functional strengthening, stair training, gait training, and 

education, to improve functional mobility and independence at home.


Therapy Discharge Recommendati:  Home & Family





Time/GCodes


Time In:  1405


Time Out:  1418


Total Billed Treatment Time:  13


Total Billed Treatment


1 visit


LITZY VALENTINE PT                Jan 11, 2021 14:27

## 2021-01-11 NOTE — OCCUPATIONAL THERAPY EVAL
OT Evaluation-General/PLF


Medical Diagnosis


Admission Date


Ozzy 10, 2021 at 12:40


Medical Diagnosis:  syncope


Onset Date:  Ozzy 10, 2021





Therapy Diagnosis


Therapy Diagnosis:  weakness





Height/Weight


Height (Feet):  5


Height (Inches):  5.00


Weight (Pounds):  151


Weight (Ounces):  1.0





Precautions


Precautions/Isolations:  Seizure, Standard Precautions





Referral


Physician:  Hector


Referral Reason:  Evaluation/Treatment





Medical History


Pertinent Medical History:  DM, HTN, Renal Insufficiency, Rheumatoid Arthritis, 

Smoking


Additional Medical History


meth & THC use, smoker, DVT, neuropathy, hysterectomy, renal failure, GERD, 

liver disease, hepatitis, RA, arthritis, DM, anxiety/depression, personality 

disorder.


Current History


ED due to syncope





Social History


Current Living Status:  mother


Entry Into Home:  Stairs With Railing


 Steps Into Home:  6





ADL-Prior Level of Function


SCALE: Activities may be completed with or without assistive devices.





6-Indepedent-patient completes the activity by him/herself with no assistance 

from a helper.


5-Set-up or Clean-up Assistance-helper sets up or cleans up; patient completes 

activity. [place] assists only prior to or  


    following the activity.


4-Supervision or Touching Assistance-helper provides verbal cues and/or 

touching/steadying and/or contact guard assistance as patient completes 

activity. Assistance may be provided   


    throughout the activity or intermittently.


3-Partial/Moderate Assistance-helper does LESS THAN HALF the effort. [place] 

lifts, holds or supports trunk or limbs, but provides less than half the effort.


2-Substantial/Maximal Assistance-helper does MORE THAN HALF the effort. [place] 

lifts or holds trunk or limbs and provides more than half the effort.


6-Gdxnrtvfd-bijqeu does ALL the effort. Patient does none of the effort to 

complete the activity. Or, the assistance of 2 or more helpers is required for 

the patient to complete the  


    activity.


If activity was not attempted, code reason:


7-Patient Refused.


9-Not Applicable-not attempted and the patient did not perform the activity 

before the current illness, exacerbation or injury.


10-Not Attempted due to Environmental Limitations-(lack of equipment, weather 

restraints, etc.).


88-Not Attempted due to Medical Conditions or Safety Concerns.


ADL PLOF Comments


Pt indicates she is currently staying with her mother, although she does not 

feel like she will be there long term. Pt indicates she is independent with 

bathing and dressing at OF, and she has SKIL assistance throughout the week 

for cooking and cleaning. Pt currently does not have a SKIL worker, but is in 

the process of finding another worker. She was independent with functional 

mobility using a walker primarily, but did sometimes use a cane.


Self Care:  Needed Some Help


Functional Cognition:  Independent


DME/Equipment:  Tub/Shower





OT Current Status


Subjective


Pt seated in bed, agreeable to OT evaluation and tx.





Mental Status/Objective


Patient Orientation:  Person, Place, Time, Situation


Attachments:  IV, Telemetry





Current


Glasses/Contacts:  Yes


Hearing Aids:  No


Dentures/Partials:  No


Hand Dominance:  Right


Upper Extremity ROM


WFL, BUE shoulder flexion to approx 150 degrees


Upper Extremity Coordination


WFL


Upper Extremity Sensation


Pt reports some numbness bilateral forearms along radial aspect.


Upper Extremity Strength


grossly 4/5





ADL-Treatment


Eating (QC):  6 (Pt indicates she is independent with eating. Able to open 

containers, cut food and bring food to mouth.)


On/Off Footwear (QC):  4 (supervision, pt able to don/doff gripper socks.)





Other Treatments


Pt seated in bed, long sitting with legs in front of her. Pt agreeable to OT 

evaluation and tx. OT educated pt on purpose and benefit of OT, she verbalized 

understanding, then she provided information about PLOF and home set up. Pt also

participated in UE screen. Pt indicates she is independent with feeding. She was

able to doff/don socks sitting EOB with supervision. OT provided pt with 

washcloth, educating her on completing  squeezes and wringing of cloth to 

increase hand strength, pt demo'd understanding. OT educated pt on OT POC while 

she is in the hospital with focus on increasing independence with ADLS and 

functional mobility as well as increasing BUE strength and functional endurance,

she verbalized understanding. OT educated pt on benefits of bath bench vs chair,

pt indicates she would feel safer getting a bath bench. OT also educated pt on 

safety performing skin checks, especially on the bottom of her feet due to 

neuropathy, she verbalized understanding. Post tx, pt seated EOB, call light in 

reach and all needs met.





Education


OT Patient Education:  Correct positioning, Modified ADL techniques, Progress 

toward Goal/Update tx plan, Purpose of tx/functional activities, Rehab process


Teaching Recipient:  Patient


Teaching Methods:  Discussion


Response to Teaching:  Verbalize Understanding





OT Long Term Goals


Long Term Goals


Time Frame:  Jan 22, 2021


Eating (QC):  6


Oral Hygiene (QC):  6


Toileting Hygiene (QC):  6


Shower/Bathe Self (QC):  6


Upper Body Dressing (QC):  6


Lower Body Dressing (QC):  6


On/Off Footwear (QC):  6


1=Demonstrate adherence to instructed precautions during ADL tasks.


2=Patient will verbalize/demonstrate understanding of assistive 

devices/modifications for ADL.


3=Patient will improve strength/tolerance for activity to enable patient to 

perform ADL's.





OT Education/Plan


Problem List/Assessment


Assessment:  Decreased Activ Tolerance, Decreased UE Strength, Impaired I ADL's,

Impaired Self-Care Skills





Discharge Recommendations


Plan/Recommendations:  Continue POC





Treatment Plan/Plan of Care


Patient would benefit from OT for education, treatment and training to promote 

independence in ADL's, mobility, safety and/or upper extremity function for 

ADL's.


Plan of Care:  ADL Retraining, Functional Mobility, UE Funct Exercise/Act


Treatment Duration:  Jan 22, 2021


Frequency:  5 times per week


Estimated Hrs Per Day:  .25 hour per day


Rehab Potential:  Fair





Time/GCodes


Start Time:  14:30


Stop Time:  14:45


Total Time Billed (hr/min):  15


Billed Treatment Time


1CARMEN ADDISON OT          Jan 11, 2021 15:20

## 2021-01-11 NOTE — NUR
CM/SS visited with patient for social service consult. 



The patient was lying in bed resting when this sw came to visit. She woke easily and was 
pleasant and willing to speak with this sw. 



Home: The patient was living alone at last admission; however, she is now living with her 
mother. She reports it has been going really well and she enjoys being around her mother. 
The patient reports that she helps her mother and her mother helps her. 



Diabetic Supplies: CM/SS received referral regarding affording diabetic supplies. The 
patient reports that she is currently able to afford her insulin and supplies. The patient 
has Medicaid insurance to assist with cost of medications. CM/SS discussed what the patient 
is doing at home to ensure she remembers her insulin. She states that her mother helps 
remind her daily. CM/SS addressed this due to it being a goal from last visit. 



Home Health: The patient was discharged in September with home health through [facility] at 
Home. CM/SS contacted [facility] and spoke with Sofia. She reports the patient declined 
services. CM/SS spoke with the patient. She states that she did not have a good experience 
with the nurse and did not like being able to only go 3 places ([facility], [facility], [employer] 
appointments). The patient verbalized that she cancelled the service after day 2. 



CM/SS will continue to follow.

## 2021-01-11 NOTE — NUR
Patient blood glucose at 0100 is 286. 7UNITS sliding scale insulin administered per phone 
order from Dr Zavala.

## 2021-01-11 NOTE — NUR
RD ASSESSMENT 



PMHx: pneumonia; chronic bronchitis; DVT; hypercholesterolemia; HTN; renal failure; GERD; 
pancreatitis; RA; DM (hx of DKA); polysubstance use (methamphetamine, THC, tobacco); 



PT INTERACTION: Pt was awake and pleasant during nutrition assessment. Pt states current 
appetite is good. Note avg PO intake 100% x2meal, per chart review. Pt states following a 
low-CHO diet at home, and has no issues with chewing/swallowing food. Pt states  no recent 
issues with nausea, vomiting, constipation, or diarrhea, and that her last BM was 1/9. Note 
pt currently on bowel regimen of colace BID, and senna BID, per chart review. Pt states no 
recent wt changes. Note recent 7# wt gain x4mon, per chart review. Pt states current DM 
management is pretty good, following previous diet educations "as best I can." Note recent 
HbA1c of 12.5 (9/22/20), per chart review. 



Note abnormal lab values of K 3.5 L, Cl 96 L, BUN 89 H, cr 3.23 H, glu 111 H, and phos 4.9 
H, per chart review. 



Est. kcal needs: 5758-5252 kcal | 15-18 kcal/kg  

Est. Pro needs:  53-70 g Pro | 0.6-0.8 g Pro/kg 



PES STATEMENT: Given current PO intake, no nutrition diagnosis at this time (NO-1.1). 



INTERVENTION:  

Continue with current diet order of CHO 60g/m 3snack diet. Pt may benefit from switching to 
Renal diet, d/t current admission fo ADITI. 

Discussed and provided education and suggestions of renal diet management. Discussed common 
foods in pt's diet that are rich in phosphorus and potassium. Pt verbalized understanding of 
information provided. Will follow-up prior to discharge to see if pt has any questions. 

Will continue to follow and reassess as pt needs, intake, and status change. 





AL POOLE, MS RD LD 

[phone] cell

## 2021-01-11 NOTE — NUR
SPOKE WITH THE PT, WENT THRU THE EXT MED HISTORY AND CALLED [facility] TO COMPLETE THE MED REC



PT INDICATES SHE GETS HER MEDS PRE-PACKED FROM [facility].



OMEPRAZOLE 20MG WAS LAST FILLED 01- #28/28DS- THIS IS NOT SHOWN ON THE EXT MED 
HISTORY



ACCORDING TO THE PT SHE IS NO LONGER TAKING VALSARTAN 320MG AND LET ME KNOW IT WAS 
DISCONTINUED WHEN SHE WAS DISCHARGED FROM [place] ON 12-. 

FARXIGA 5MG- PT SAYS SHE HAS NOT BEEN TAKING THIS MEDICATION DUE TO POSSIBLE SIDE EFFECTS, 
BUT SHE IS INTERESTED IN STARTING THIS MED AGAIN WHILE SHE IS HERE

## 2021-01-11 NOTE — HISTORY & PHYSICAL-HOSPITALIST
JOVANNI HAINES MED STUDENT 21 1014:


History of Present Illness


HPI/Chief Complaint


Radha is a 54yo female presenting with syncope. She states that on Wednesday, she

experienced dizziness/fainting while in her car. Yesterday she experienced 

another episode of dizziness/fainting at home after taking 20units of insulin 

for 543 blood sugar. She recently started a regimen of 5oz water hourly to help 

with kidney function and has had high urine output for the past few weeks. She 

has a history of poor blood sugar control and states that she has experienced 

similar episodes in the past. She complains of a headache 8/10 located on the 

posterior neck up to the back of the head, that started 2 days ago. Denies chest

pain, N/V, abd pain. States that she feels short of breath and would like her 

Isosorb.


Source:  patient


Exam Limitations:  no limitations


Date Seen


21


Time Seen by a Provider:  09:00


Attending Physician


Inessa Zavala MD


PCP


No,Local Physician


Referring Physician





Date of Admission


Ozzy 10, 2021 at 12:40





Home Medications & Allergies


Home Medications


Reviewed patient Home Medication Reconciliation performed by pharmacy medication

reconciliations technician and/or nursing.


Patients Allergies have been reviewed.





Allergies





Allergies


Coded Allergies


  morphine (Verified Allergy, Mild, Vomiting, 20)


  orange juice (Verified Allergy, Mild, Nausea, 20)


  Sulfa (Sulfonamide Antibiotics) (Unverified Allergy, Unknown, 6/25/15)


  codeine (Verified Allergy, Unknown, TAKES ULTRAM AT HOME, 09)


  ketorolac (Verified Allergy, Unknown, 08)


  tramadol (Verified Allergy, Unknown, 11)





Past Medical-Social-Family Hx


Past Med/Social Hx:  Reviewed Nursing Past Med/Soc Hx


Patient Social History


Alcohol Use:  Rarely Uses


Recreational Drug Use:  Yes


Drug of Choice:  METH, THC


Smoking Status:  Current Everyday Smoker


Type Used:  Cigarettes


2nd Hand Smoke Exposure:  No


Physical Abuse Screen:  No


Sexual Abuse:  No


Recent Foreign Travel:  No


Contact w/other who traveled:  No


Recent Hopitalizations:  No


Recent Infectious Disease Expo:  No





Immunizations Up To Date


Tetanus Booster (TDap):  Unknown


Date of Pneumonia Vaccine:  Oct 1, 2012


Date of Influenza Vaccine:  Oct 1, 2012





Seasonal Allergies


Seasonal Allergies:  Yes





Past Medical History


Surgeries:  Abdominal, Adenoidectomy, Ear Surgery, Hysterectomy, Renal, 

Tonsillectomy, Tubal Ligation


Currently Using CPAP:  No


Currently Using BIPAP:  No


Cardiac:  Deep Vein Thrombosis, High Cholesterol, Hypertension


Neurological:  Neuropathy


Reproductive:  Yes


Female Reproductive Disorders:  Menstrual Problems


Hysterectomy, Menopausal


Genitourinary:  Renal Failure


Gastrointestinal:  Gastroesophageal Reflux, Liver Disease/Jaundice, 

Pancreatitis, Hepatitis, Polyps, Ulcer


Musculoskeletal:  Arthritis, Rheumatoid Arthritis, Fractures


Endocrine:  Diabetes, Insulin dep


Are Your Blood Sugars Over 250:  Yes


HEENT:  Tonsilitis, Glaucoma


Loss of Vision:  Denies


Hearing Impairment:  Denies


Psychosocial:  Anxiety, Personality Disorder, Depression


History of Blood Disorders:  No


Adverse Reaction to Blood Ramirez:  No





Family History





FH: thyroid cancer


  G8 SISTER


FHx: macular degeneration


  19 MOTHER


Glaucoma


  G8 BROTHER


Heart Disease, Cancer, Diabetes





SOCIAL HISTORY:


-ETOH--REGULAR USE--"COUPLE OF DRINKS" SEVERAL NIGHTS A WEEK


-DRUGS--+ METH AND THC USE


-SMOKES > 1 PPD





PAST SURGICAL HISTORY: 


-TONSILLECTOMY AND ADENOIDECTOMY


-BILATERAL TUBAL LIGATION


-HYSTERECTOMY


-EXPLORATORY LAPAROSCOPY


-URETERAL STENTS FOR KIDNEY STONES/CYSTOSCOPIES


-EAR SURGERY





Review of Systems


Constitutional:  No chills; dizziness; No fever; weakness, weight loss


EENTM:  other (pt states that she has had hearing changes, and visual 

"blurriness" occurring within the past month)


Respiratory:  dyspnea on exertion; No hemoptysis; short of breath


Cardiovascular:  No chest pain, No palpitations; syncope


Gastrointestinal:  No abdominal pain, No constipation, No diarrhea, No nausea, 

No vomiting


Genitourinary:  No dysuria, No hematuria


Musculoskeletal:  muscle pain (complains of pain along her L shin r/t SCD's); No

muscle stiffness, No muscle cramps


Skin:  No change in color, No lesions, No pruritus, No rash


Psychiatric/Neurological:  Anxiety, Headache, Numbness (BUE, BLE), Paresthesia 

(BUE, BLE)





Physical Exam


Physical Exam


Vital Signs





Vital Signs - First Documented








 1/10/21





 10:16


 


Temp 35.0


 


Pulse 76


 


Resp 18


 


B/P (MAP) 107/71 (83)


 


Pulse Ox 98


 


O2 Delivery Nasal Cannula


 


O2 Flow Rate 2.00





Capillary Refill : Less Than 3 Seconds


Height, Weight, BMI


Height: 5'5.00"


Weight: 151lbs. 1.0oz. 68.051975xj; 29.16 BMI


Method:Stated


General Appearance:  No Apparent Distress, Chronically ill


HEENT:  PERRL/EOMI, Normal ENT Inspection, Other (missing multiple teeth)


Neck:  Full Range of Motion, Normal Inspection, Non Tender, Supple


Respiratory:  Chest Non Tender, Lungs Clear, Normal Breath Sounds, No Accessory 

Muscle Use, No Respiratory Distress


Cardiovascular:  Regular Rate, Rhythm, No Edema, No Gallop, No JVD, No Murmur, 

Normal Peripheral Pulses


Gastrointestinal:  Normal Bowel Sounds, Non Tender, Soft


Rectal:  Deferred


Back:  Normal Inspection, No CVA Tenderness, No Vertebral Tenderness


Extremity:  Normal Capillary Refill, Normal Inspection, Normal Range of Motion, 

Non Tender


Neurologic/Psychiatric:  Alert, Oriented x3, Normal Mood/Affect, Sensory Deficit

(impaired sensation BUE/BLE)


Skin:  Normal Color, Warm/Dry


Lymphatic:  No Adenopathy





Results


Results/Procedures


Labs


Laboratory Tests


1/10/21 10:22








1/10/21 14:01








21 05:23








Patient resulted labs reviewed.





Assessment/Plan


Admission Diagnosis


Syncope, acute on chronic renal failure





Assessment and Plan


Acute on chronic renal failure - Creatinine 3.23


Leukocytosis - WBC 11.2


IDDM - poorly controlled





Pt complains of some dizziness but no syncopal episodes today. 


Continue to monitor labs/VS, IV fluids, sliding scale insulin





Clinical Quality Measures


DVT/VTE Risk/Contraindication:


Risk Factor Score Per Nursin


RFS Level Per Nursing on Admit:  4+=Very High





MISHA YOUNG MD 21 1920:


Past Medical-Social-Family Hx


Family History





FH: thyroid cancer


  G8 SISTER


FHx: macular degeneration


  19 MOTHER


Glaucoma


  G8 BROTHER





Assessment/Plan


Admission Diagnosis


Admission Status:  Inpatient Order (span 2 midnights)


Reason for Inpatient Admission:  


see below





Assessment and Plan


Acute on Chronic Renal Failure


   Baseline creatinine ~2.5-2.7


   Improved to 3.2


   Reports follows with nephrology as an outpatient and not yet on HD but close,

GFR normally in high teens per patient


   Continue IVF as likely prerenal 


   Monitor UOP


   Continue home meds





IDDMI


   Hypoglycemia of 60 this AM


   Decrease Levemir


   SSI


   Incredibly brittle but discussed that for now high is safer than low so will 

watch closely





HTN


   BP well controlled, trend





Supervisory-Addendum Brief


Verification & Attestation


Participated in pt care:  history, MDM, physical


Personally performed:  exam, history, MDM, supervision of care


Care discussed with:  Medical Student


Procedures:  n/a


Results interpretation:  Verified all documentation


Verification and Attestation of Medical Student E/M Service





A medical student performed and documented this service in my presence. I 

reviewed and verified all information documented by the medical student and made

modifications to such information, when appropriate. I personally performed the 

physical exam and medical decision making. 





 Misha Young, 2021,19:21











JOVANNI HAINES MED STUDENT       2021 10:14


MISHA YOUNG MD              2021 19:20

## 2021-01-12 VITALS — SYSTOLIC BLOOD PRESSURE: 145 MMHG | DIASTOLIC BLOOD PRESSURE: 84 MMHG

## 2021-01-12 VITALS — SYSTOLIC BLOOD PRESSURE: 123 MMHG | DIASTOLIC BLOOD PRESSURE: 78 MMHG

## 2021-01-12 VITALS — SYSTOLIC BLOOD PRESSURE: 139 MMHG | DIASTOLIC BLOOD PRESSURE: 81 MMHG

## 2021-01-12 VITALS — SYSTOLIC BLOOD PRESSURE: 143 MMHG | DIASTOLIC BLOOD PRESSURE: 76 MMHG

## 2021-01-12 LAB
BUN/CREAT SERPL: 24
CALCIUM SERPL-MCNC: 8 MG/DL (ref 8.5–10.1)
CHLORIDE SERPL-SCNC: 100 MMOL/L (ref 98–107)
CO2 SERPL-SCNC: 23 MMOL/L (ref 21–32)
CREAT SERPL-MCNC: 2.55 MG/DL (ref 0.6–1.3)
GFR SERPLBLD BASED ON 1.73 SQ M-ARVRAT: 19 ML/MIN
GLUCOSE SERPL-MCNC: 265 MG/DL (ref 70–105)
POTASSIUM SERPL-SCNC: 4.5 MMOL/L (ref 3.6–5)
SODIUM SERPL-SCNC: 133 MMOL/L (ref 135–145)

## 2021-01-12 RX ADMIN — GABAPENTIN SCH MG: 400 CAPSULE ORAL at 08:12

## 2021-01-12 RX ADMIN — PANTOPRAZOLE SODIUM SCH MG: 40 TABLET, DELAYED RELEASE ORAL at 08:12

## 2021-01-12 RX ADMIN — METOCLOPRAMIDE SCH MG: 10 TABLET ORAL at 06:05

## 2021-01-12 RX ADMIN — FLUTICASONE PROPIONATE SCH SPRAY: 50 SPRAY, METERED NASAL at 08:14

## 2021-01-12 RX ADMIN — DOCUSATE SODIUM SCH MG: 100 CAPSULE ORAL at 08:13

## 2021-01-12 RX ADMIN — ASPIRIN 81 MG CHEWABLE TABLET SCH MG: 81 TABLET CHEWABLE at 08:12

## 2021-01-12 RX ADMIN — INSULIN ASPART SCH UNIT: 100 INJECTION, SOLUTION INTRAVENOUS; SUBCUTANEOUS at 09:24

## 2021-01-12 RX ADMIN — INSULIN ASPART SCH UNIT: 100 INJECTION, SOLUTION INTRAVENOUS; SUBCUTANEOUS at 06:05

## 2021-01-12 RX ADMIN — METOPROLOL TARTRATE SCH MG: 50 TABLET, FILM COATED ORAL at 08:12

## 2021-01-12 RX ADMIN — SENNOSIDES SCH MG: 8.6 TABLET, FILM COATED ORAL at 08:13

## 2021-01-12 NOTE — PHYSICAL THERAPY DAILY NOTE
PT Daily Note-Current


Subjective


Patient in recliner pre tx, agrees to PT, had 5/10 low back pain but got pain 

meds and she says it is better.





Appearance


Patient in recliner post tx with nurse call, phone, tray, all needs met.





Mental Status


Patient Orientation:  Person, Place, Situation





Transfers


SCALE: Activities may be completed with or without assistive devices.





6-Indepedent-patient completes the activity by him/herself with no assistance 

from a helper.


5-Set-up or Clean-up Assistance-helper sets up or cleans up; patient completes 

activity. [place] assists only prior to or  


    following the activity.


4-Supervision or Touching Assistance-helper provides verbal cues and/or 

touching/steadying and/or contact guard assistance as patient completes 

activity. Assistance may be provided   


    throughout the activity or intermittently.


3-Partial/Moderate Assistance-helper does LESS THAN HALF the effort. [place] 

lifts, holds or supports trunk or limbs, but provides less than half the effort.


2-Substantial/Maximal Assistance-helper does MORE THAN HALF the effort. [place] 

lifts or holds trunk or limbs and provides more than half the effort.


6-Hfhbtjjxx-ymyofy does ALL the effort. Patient does none of the effort to 

complete the activity. Or, the assistance of 2 or more helpers is required for 

the patient to complete the  


    activity.


If activity was not attempted, code reason:


7-Patient Refused.


9-Not Applicable-not attempted and the patient did not perform the activity 

before the current illness, exacerbation or injury.


10-Not Attempted due to Environmental Limitations-(lack of equipment, weather 

restraints, etc.).


88-Not Attempted due to Medical Conditions or Safety Concerns.


Sit to Stand (QC):  4


Chair/Bed-to-Chair Xfer(QC):  4


SBA





Gait Training


Distance:  250'


Walk 10 feet (QC):  4


Walk 50 ft with 2 Turns(QC):  4


Walk 150 ft (QC):  4


Gait Persons Needed:  1


Gait Assistive Device:  FWW


SBA, no unsteadiness, no rest break





Exercises


Seated Therapy Exercises:  Ankle pumps, Long arc quads, Hip flexion


Seated Reps:  20





Treatments


transfers, ambulation, LE exercise





Assessment


Current Status:  Fair Progress


improved endurance





PT Long Term Goals


Long Term Goals


PT Long Term Goals Time Frame:  Jan 18, 2021


Roll Left & Right (QC):  6


Sit to Lying (QC):  6


Lying-Sitting on Side/Bed(QC):  6


Sit to Stand (QC):  6


Chair/Bed-to-Chair Xfer(QC):  6


Toilet Transfer (QC):  6


Walk 10 feet (QC):  5


Walk 50ft with 2 Turns (QC):  5


Walk 150 ft (QC):  5





PT Plan


Problem List


Problem List:  Activity Tolerance, Functional Strength, Safety, Balance, Gait, 

Transfer





Treatment/Plan


Treatment Plan:  Continue Plan of Care


Treatment Plan:  Education, Functional Activity Chi, Functional Strength, 

Gait, Safety, Therapeutic Exercise, Transfers


Treatment Duration:  Jan 18, 2021


Frequency:  6 times per week


Estimated Hrs Per Day:  .25 hour per day


Patient and/or Family Agrees t:  Yes





Safety Risks/Education


Patient Education:  Gait Training, Transfer Techniques, Correct Positioning, 

Safety Issues


Teaching Recipient:  Patient


Teaching Methods:  Demonstration, Discussion


Response to Teaching:  Reinforcement Needed





Time/GCodes


Time In:  0815


Time Out:  0825


Total Billed Treatment Time:  10


Total Billed Treatment


1 visit


GT 10'











LITZY BERNAL PT                Jan 12, 2021 08:42

## 2021-01-12 NOTE — OCCUPATIONAL THER DAILY NOTE
OT Current Status-Daily Note


Subjective


Pt drowsy in bed. Pt states she just woke up from nap. Pt agrees to tx, Ox4. Pt 

states moderate pain in mid-back, nursing notified. No c/o dizziness throughout.





Mental Status/Objective


Patient Orientation:  Person, Place, Situation


Attachments:  Telemetry





ADL-Treatment


Therapy Code Descriptions/Definitions 





Functional [place] Measure:


0=Not Assessed/NA        4=Minimal Assistance


1=Total Assistance        5=Supervision or Setup


2=Maximal Assistance  6=Modified [place]


3=Moderate Assistance 7=Complete IndependenceSCALE: Activities may be completed 

with or without assistive devices.





6-Indepedent-patient completes the activity by him/herself with no assistance 

from a helper.


5-Set-up or Clean-up Assistance-helper sets up or cleans up; patient completes 

activity. [place] assists only prior to or  


    following the activity.


4-Supervision or Touching Assistance-helper provides verbal cues and/or 

touching/steadying and/or contact guard assistance as patient completes 

activity. Assistance may be provided   


    throughout the activity or intermittently.


3-Partial/Moderate Assistance-helper does LESS THAN HALF the effort. [place] 

lifts, holds or supports trunk or limbs, but provides less than half the effort.


2-Substantial/Maximal Assistance-helper does MORE THAN HALF the effort. [place] 

lifts or holds trunk or limbs and provides more than half the effort.


6-Adjvtzlsf-cfzcry does ALL the effort. Patient does none of the effort to 

complete the activity. Or, the assistance of 2 or more helpers is required for 

the patient to complete the  


    activity.


If activity was not attempted, code reason:


7-Patient Refused.


9-Not Applicable-not attempted and the patient did not perform the activity 

before the current illness, exacerbation or injury.


10-Not Attempted due to Environmental Limitations-(lack of equipment, weather 

restraints, etc.).


88-Not Attempted due to Medical Conditions or Safety Concerns.


Eating (QC):  6


Bathing Location:  L Arm, R Arm, L Upper Leg, R Upper Leg, L Lower Leg 

(including foot), R Lower Leg (including foot), Chest, Abdomen, Buttocks, 

Perineal Area


Shower/Bathe Self (QC):  6 (IND sponge bath. )


Upper Body Dressing (QC):  5 (s/u)


Lower Body Dressing (QC):  4 (SUP for underwear doff/ donning in stance. )


On/Off Footwear:  6 (IND EOB.)


Toileting Hygiene (QC):  6 (IND)


Toilet Transfer (QC):  4 (SBA- able to complete safely. )





Other Treatment


Pt bed mob SBA. Ambulates to toilet with SBA. Completes toileting with IND. 

Returns to EOB, completes sponge bath as outlined. Based on pt's level of 

function/ safety during tasks, OT to d/c this date as pt is at OF. Pt agrees, 

all needs met, call light in reach, pt is educated to continue to press call 

light for bathroom needs, returns to bed.





Education


OT Patient Education:  Correct positioning, Progress toward Goal/Update tx plan,

Purpose of tx/functional activities


Teaching Recipient:  Patient


Teaching Methods:  Demonstration, Discussion


Response to Teaching:  Verbalize Understanding, Return Demonstration





OT Long Term Goals


Long Term Goals


Time Frame:  Jan 22, 2021


Eating (QC):  6


Oral Hygiene (QC):  6


Toileting Hygiene (QC):  6


Shower/Bathe Self (QC):  6


Upper Body Dressing (QC):  6


Lower Body Dressing (QC):  6


On/Off Footwear (QC):  6


1=Demonstrate adherence to instructed precautions during ADL tasks.


2=Patient will verbalize/demonstrate understanding of assistive devices/mo

difications for ADL.


3=Patient will improve strength/tolerance for activity to enable patient to 

perform ADL's.





OT Education/Plan


Problem List/Assessment


Assessment:  Decreased Activ Tolerance, Impaired I ADL's





Discharge Recommendations


Plan/Recommendations:  Discharge/Goals Met


Therapy Discharge Recommendati:  Home & Family


Equpiment Recommendations-D/C:  Extended Bath Bench





Treatment Plan/Plan of Care


Treatment,Training & Education:  Yes


Patient would benefit from OT for education, treatment and training to promote 

independence in ADL's, mobility, safety and/or upper extremity function for 

ADL's.


Plan of Care:  ADL Retraining, Functional Mobility, UE Funct Exercise/Act


Treatment Duration:  Jan 22, 2021


Frequency:  5 times per week


Estimated Hrs Per Day:  .25 hour per day


Rehab Potential:  Fair





Time/GCodes


Start Time:  09:16


Stop Time:  09:36


Total Time Billed (hr/min):  20


Billed Treatment Time


1, ADL (20)


D/c.











BETTIE TORRES OTR                Jan 12, 2021 09:43

## 2021-01-12 NOTE — NUR
CM/SS finalized discharge. 

 

Plan: The patient discharged home self care. No needs. 



Patient did not want home health at this time. No further needs.

## 2021-01-12 NOTE — CARDIOLOGY PROGRESS NOTE
Subjective


Date Seen by Provider:  2021


Time Seen by Provider:  08:00


Subjective/Events-last exam


Patient is sitting in chair, denies any dizziness or lightheadedness.


Review of Systems


General:  No Chills, No Night Sweats, No Fatigue, No Malaise, No Appetite, No 

Other


HEENT:  No Head Aches, No Visual Changes, No Eye Pain, No Ear Pain, No 

Dysphasia, No Sinus Congestion, No Post Nasal Drip, No Sore Throat, No Other


Pulmonary:  No Dyspnea, No Cough, No Pleuritic Chest Pain, No Other


Cardiovascular:  No: Chest Pain, Palpitations, Orthopnea, Paroxysmal Noc. 

Dyspnea, Edema, Lt Headedness, Other





Objective-Cardiology


Exam


Last Set of Vital Signs





Vital Signs








 1/10/21 1/12/21





 16:40 08:00


 


Temp  36.1


 


Pulse  77


 


Resp  20


 


B/P (MAP)  123/78 (93)


 


Pulse Ox  96


 


O2 Delivery  Room Air


 


O2 Flow Rate 0.00 





 0.00 





Capillary Refill : Less Than 3 SecondsLess Than 3 Seconds


I&O











Intake and Output 


 


 21





 00:00


 


Intake Total 3130 ml


 


Balance 3130 ml


 


 


 


Intake Oral 2130 ml


 


IV Total 1000 ml


 


# Voids 10








General:  Alert, Oriented X3, Cooperative


HEENT:  Atraumatic, PERRLA


Neck:  Supple, No JVD, No Thyromegaly


Lungs:  Clear to Auscultation, Normal Air Movement


Heart:  Regular Rate, Normal S1, Normal S2, No Murmurs


Abdomen:  Normal Bowel Sounds, Soft, No Tenderness, No Hepatosplenomegaly, No 

Masses


Extremities:  No Clubbing


Skin:  No Rashes, No Significant Lesion


Neuro:  Normal Speech, Strength at 5/5 X4 Ext


Psych/Mental Status:  Mental Status NL, Mood NL





Results


Lab








Laboratory Tests








Test


 21


14:51 21


20:03 21


05:55 Range/Units


 


 


Glucometer 229 H 398 H 234 H   MG/DL











A/P-Cardiology


Admission Diagnosis


Syncope


CHF


HTN


HLP





Assessment/Plan


Syncope, probably hypotensive episode secondary to hypovolemia and hypotension. 

Patient was educated in length about increasing fluid intake





CHF, follows with Dr. Anderson.  2d Echo done yesterday with EF 55-65%, mod AR. 

Maintained on beta blocker. Unable to tolerate ACE-I/ARB secondary to CKD





HTN, controlled., continue to monitor. 





HLP, continue to monitor as outpatient. 





Acute on CKD, follows with nephrology, instructed to increase fluid intake





DM, poorly controlled. 





Tobaccoism, educated on the importance of smoking cessation. 





Patient was seen and evaluated with Monique, examination performed, management 

plan was discussed, agree with the current scribed note, I made few changes to 

the note using Italic font


Patient was seen at bedside, feeling better.  No new complaint.


Echo showed normal LV function, mild diastolic dysfunction with left atrial 

dilatation, moderate aortic regurgitation, she has been maintained on beta 

blockers.


Educated in length about increasing fluid intake


Okay for discharge from cardiology standpoint, follow-up as an outpatient with 

her primary cardiologist and nephrologist





Clinical Quality Measures


DVT/VTE Risk/Contraindication:


Risk Factor Score Per Nursin


RFS Level Per Nursing on Admit:  4+=Very High











MONIQUE LIANG  2021 08:23


PEDRO LUIS CEDILLO MD              2021 09:02

## 2021-01-12 NOTE — DISCHARGE INST-SIMPLE/STANDARD
Discharge Inst-Standard


Patient Instructions/Follow Up


Plan of Care/Instructions/FU:  


Please continue to take your medications as written. Please follow up with


your primary care doctor and your kidney doctor to follow up this hospital


stay. I decreased your Levemir down to 15 units here because your blood


sugars were low but if they continue to rise you will need to talk to your


doctor about increasing back up.


Activity as Tolerated:  Yes


Discharge Diet:  ADA Diet


Return to The Hospital For:  


Chest pain, shortness of breath, confusion, weakness, syncope, fever, if


you feel you are getting worse.











MISHA DEVRIES MD              Jan 12, 2021 12:35

## 2021-01-12 NOTE — DISCHARGE SUMMARY
JOVANNI HAINES MED STUDENT 21 1648:


Diagnosis/Chief Complaint


Date of Admission


Ozzy 10, 2021 at 12:40


Date of Discharge


2021 at 13:45


Discharge Date:  2021


Discharge Time:  12:49


Admission Diagnosis


Acute on chronic renal failure, syncope


Primary Care


No,Local Physician


Discharge Diagnosis


Chronic renal failure


IDDM


Pulmonary HTN


AV and MV stenosis





Discharge Summary


Procedures/Consulations


Cardiology consulted





Discharge Physical Exam


Allergies:  


Coded Allergies:  


     morphine (Verified  Allergy, Mild, Vomiting, 20)


     orange juice (Verified  Allergy, Mild, Nausea, 20)


     Sulfa (Sulfonamide Antibiotics) (Unverified  Allergy, Unknown, 6/25/15)


     codeine (Verified  Allergy, Unknown, TAKES ULTRAM AT HOME, 09)


     ketorolac (Verified  Allergy, Unknown, 08)


     tramadol (Verified  Allergy, Unknown, 11)


Vitals & I&Os





Vital Signs








  Date Time  Temp Pulse Resp B/P (MAP) Pulse Ox O2 Delivery O2 Flow Rate FiO2


 


21 12:00 36.5 61 20 143/76 (98) 98 Room Air  


 


21 08:00       0.00 








General Appearance:  No Apparent Distress, Chronically ill, Obese


HEENT:  PERRL/EOMI, Normal ENT Inspection, Pharynx Normal, Moist Mucous 

Membranes


Respiratory:  Chest Non Tender, Lungs Clear, Normal Breath Sounds, No Accessory 

Muscle Use, No Respiratory Distress


Cardiovascular:  Regular Rate, Rhythm, No Edema, No Gallop, No JVD, No Murmur, 

Normal Peripheral Pulses


Gastrointestinal:  Normal Bowel Sounds, Non Tender, Soft


Extremity:  Normal Capillary Refill, Normal Inspection, Normal Range of Motion, 

Non Tender, No Pedal Edema


Skin:  Normal Color, Warm/Dry


Neurologic/Psychiatric:  Alert, Oriented x3, Normal Mood/Affect, Sensory Deficit

(numbness/tingling to extremities x4)





Hospital Course


Radha was admitted on 1/10/21 due to complaint of syncope x2 episodes over the 

prior week. On w/u she was found to have acute on chronic renal failure and was 

started on IVF. Her blood sugars were poorly controlled, with glucose ranging 

from  initially. Levemir was titrated down to 15units from 20units, which 

stabilized glucose to 177-234. Cardiology was consulted for medical history of 

CHF, HTN, HLD. TTE was performed and showed mild-moderate MV and AV stenosis, 

and PA pressure 60-65. She experienced dizziness/lightheadedness on the first 

night, but was asymptomatic on the night prior to discharge.


Labs (last 24 hrs)


Laboratory Tests


21 20:03: Glucometer 398H


21 05:55: Glucometer 234H


21 09:23: Glucometer 177H


21 12:17: 


Sodium Level 133L, Potassium Level 4.5, Chloride Level 100, Carbon Dioxide Level

23, Anion Gap 10, Blood Urea Nitrogen 62H, Creatinine 2.55#H, Estimat Glomerular

Filtration Rate 19, BUN/Creatinine Ratio 24, Glucose Level 265H, Calcium Level 

8.0L


Patient resulted labs reviewed.


Pending Labs


Laboratory Tests


21 09:23: Glucometer 177


21 12:17: 


Sodium Level 133, Potassium Level 4.5, Chloride Level 100, Carbon Dioxide Level 

23, Anion Gap 10, Blood Urea Nitrogen 62, Creatinine 2.55, Estimat Glomerular 

Filtration Rate 19, BUN/Creatinine Ratio 24, Glucose Level 265, Calcium Level 

8.0





Radiology Reviewed


CXR, CT Head/neck


Imaging:  Reviewed Imaging Films, Reviewed Imaging Report





Discharge


Home Medications:





Active Scripts


Active


Levemir (Insulin Determir) 1,000 Units/10 Ml Soln 15 Units SQ HS


Reported


Oxycodone HCl 5 Mg Tablet 5 Mg PO DAILY PRN


Vitamin D2 (Ergocalciferol (Vitamin D2)) 1,250 Mcg Capsule 1,250 Mcg PO THUR


[Iron Slow Release] 45 Tab 45 Mg PO DAILY


Certavite Sr-Antioxidant Tab (Multivit-Min/FA/Lycopene/Lut) 1 Each Tablet 1 Ea 

PO DAILY


Furosemide 80 Mg Tablet 80 Mg PO BID


Magnesium Oxide 400 Mg Tablet 400 Mg PO BID


Isosorbide Mononitrate ER (Isosorbide Mononitrate) 30 Mg Tab.er.24h 30 Mg PO 

DAILY


Ativan (Lorazepam) 1 Mg Tablet 1 Mg PO TID PRN


Metolazone 5 Mg Tablet 5 Mg PO MON,WE,FR


Torsemide 100 Mg Tablet 100 Mg PO 0800,1500


Metoprolol Tartrate 50 Mg Tablet 75 Mg PO BID


     TAKES 1 &  (50MG) TABS


Aspirin 81 Mg Tab.chew 81 Mg PO DAILY


Neurontin (Gabapentin) 300 Mg Capsule 600 Mg PO Q8H


     TAKES 2 (300MG) TABS


Melatonin 10 Mg Tablet 10 Mg PO HS


Diclofenac Sodium 100 Gm Gel..gram. 1 Applic TOP QID PRN


     APPLY TO LEGS


Flonase Allergy Relief (Fluticasone Propionate) 9.9 Ml Spray.susp 2 [place] NSEACH

DAILY


Metoclopramide HCl 10 Mg Tablet 10 Mg PO QIDACHS


Narcan (Naloxone HCl) 4 Mg Spray 1 [place] NS UD PRN


     ADMINSTER 1 SPRAY IN 1 NOSTRIL 1 TIME- MAY REPEAT IN ALTERNATING


     NOSTRIL EVERY 2-3 MINUTES UNTIL RESPONSIVE OR EMS ARRIVES


Omeprazole 20 Mg Capsule.dr 20 Mg PO DAILY


Humalog Kwikpen (Insulin Lispro) 100 Unit/1 Ml Insuln.pen 5-15 Units SQ AC


Proair Hfa (Albuterol Sulfate) 1 Puff Puff 2 Puff IH TID PRN


Montelukast Sodium 10 Mg Tablet 10 Mg PO HS





Instructions to patient/family


Please see electronic discharge instructions given to patient.





Clinical Quality Measures


DVT/VTE Risk/Contraindication:


Risk Factor Score Per Nursin


RFS Level Per Nursing on Admit:  4+=Very High





MISHA YOUNG MD 21:


Discharge Summary


Discharge Physical Exam


Allergies:  


Coded Allergies:  


     morphine (Verified  Allergy, Mild, Vomiting, 20)


     orange juice (Verified  Allergy, Mild, Nausea, 20)


     Sulfa (Sulfonamide Antibiotics) (Unverified  Allergy, Unknown, 6/25/15)


     codeine (Verified  Allergy, Unknown, TAKES ULTRAM AT HOME, 09)


     ketorolac (Verified  Allergy, Unknown, 08)


     tramadol (Verified  Allergy, Unknown, 11)





Discussion & Recommendations


Discharge Planning:  >30 minutes discharge planning





Supervisory-Addendum Brief


Verification & Attestation


Participated in pt care:  history, MDM, physical


Personally performed:  exam, history, MDM, supervision of care


Care discussed with:  Medical Student


Procedures:  n/a


Results interpretation:  Verified all documentation


Verification and Attestation of Medical Student E/M Service





A medical student performed and documented this service in my presence. I 

reviewed and verified all information documented by the medical student and made

modifications to such information, when appropriate. I personally performed the 

physical exam and medical decision making. 





 Misha Young, 2021,20:46











JOVANNI HAINES MED STUDENT       2021 16:48


MISHA YOUNG MD              2021 20:45

## 2021-01-12 NOTE — PROGRESS NOTE - HOSPITALIST
ZAKIJVOANNI MED STUDENT 21 0631:


Subjective


HPI/CC On Admission


Date Seen by Provider:  2021


Time Seen by Provider:  06:10


Radha is a 54yo female presenting with syncope. She states that on Wednesday, she

experienced dizziness/fainting while in her car. Yesterday she experienced 

another episode of dizziness/fainting at home after taking 20units of insulin 

for 543 blood sugar. She recently started a regimen of 5oz water hourly to help 

with kidney function and has had high urine output for the past few weeks. She 

has a history of poor blood sugar control and states that she has experienced 

similar episodes in the past. She complains of a headache 8/10 located on the 

posterior neck up to the back of the head, that started 2 days ago. Denies chest

pain, N/V, abd pain. States that she feels short of breath and would like her 

Isosorb.


Subjective/Events-last exam


Pt seen and examined. She was awake, sitting up in bed, NAD. She says that she's

feeling better today with no more episodes of fainting/dizziness overnight. 

Denies chest pain, abd pain, N/V, SOB. She is asking about having her labs drawn

to see if her renal function was improving.





Review of Systems


General:  No Chills


HEENT:  No Head Aches


Pulmonary:  No Dyspnea


Cardiovascular:  No: Chest Pain, Lt Headedness


Gastrointestinal:  No: Nausea, Vomiting, Abdominal Pain


Genitourinary:  No Dysuria


Neurological:  Numbness





Objective


Exam


Vital Signs





Vital Signs








  Date Time  Temp Pulse Resp B/P (MAP) Pulse Ox O2 Delivery O2 Flow Rate FiO2


 


21 08:00 36.1 77 20 123/78 (93) 96 Room Air  


 


21 08:00       0.00 





Capillary Refill : Less Than 3 SecondsLess Than 3 Seconds


General Appearance:  No Apparent Distress, Chronically ill, Obese


HEENT:  PERRL/EOMI, Normal ENT Inspection


Neck:  Full Range of Motion, Normal Inspection, Non Tender, Supple


Respiratory:  Chest Non Tender, Lungs Clear, Normal Breath Sounds, No Accessory 

Muscle Use, No Respiratory Distress


Cardiovascular:  Regular Rate, Rhythm, No Edema, No Gallop, No JVD, No Murmur, 

Normal Peripheral Pulses


Gastrointestinal:  Normal Bowel Sounds, Non Tender, Soft


Rectal:  Deferred


Back:  Normal Inspection, No Vertebral Tenderness


Extremity:  Normal Capillary Refill, Normal Inspection, Normal Range of Motion, 

Non Tender, No Pedal Edema


Neurologic/Psychiatric:  Alert, Oriented x3, Normal Mood/Affect, Sensory Deficit

(numbness/tingling to BUE/BLE)


Skin:  Normal Color, Warm/Dry





Results/Procedures


Lab


Patient resulted labs reviewed.





Assessment/Plan


Assessment and Plan


Assess & Plan/Chief Complaint


Acute on chronic renal failure


   Cr 3.23 yesterday, pending today


   Continue IV fluids





Pulmonary HTN


   21 Echo showed normal EF with mild-moderate MV and AV stenosis, PA 

pressure 60-65





Leukocytosis


   WBC 11.2 yesterday





IDDM


   Poorly controlled


   Levemir adjusted to 15units yesterday with improvement


   Continue to monitor


   


DVT prophylaxis


   Continue Lovenox, SCD's





No further complaints of dizziness/fainting overnight


Continue to monitor labs/VS, IV fluids, sliding scale insulin





Clinical Quality Measures


DVT/VTE Risk/Contraindication:


Risk Factor Score Per Nursin


RFS Level Per Nursing on Admit:  4+=Very High





MISHA DEVRIES MD 21 1114:








JOVANNI HAINES MED STUDENT       2021 06:31


MISHA DEVRIES MD              2021 11:14

## 2021-03-05 ENCOUNTER — HOSPITAL ENCOUNTER (OUTPATIENT)
Dept: HOSPITAL 75 - LAB | Age: 56
End: 2021-03-05
Attending: INTERNAL MEDICINE
Payer: MEDICAID

## 2021-03-05 DIAGNOSIS — D63.1: ICD-10-CM

## 2021-03-05 DIAGNOSIS — E55.9: ICD-10-CM

## 2021-03-05 DIAGNOSIS — B17.11: Primary | ICD-10-CM

## 2021-03-05 DIAGNOSIS — N05.8: ICD-10-CM

## 2021-03-05 DIAGNOSIS — N18.4: ICD-10-CM

## 2021-03-05 DIAGNOSIS — N04.9: ICD-10-CM

## 2021-03-05 DIAGNOSIS — E11.21: ICD-10-CM

## 2021-03-05 LAB
ALBUMIN SERPL-MCNC: 3.8 GM/DL (ref 3.2–4.5)
ALP SERPL-CCNC: 83 U/L (ref 40–136)
ALT SERPL-CCNC: 14 U/L (ref 0–55)
BASOPHILS # BLD AUTO: 0 10^3/UL (ref 0–0.1)
BASOPHILS NFR BLD AUTO: 0 % (ref 0–10)
BILIRUB SERPL-MCNC: 0.3 MG/DL (ref 0.1–1)
BUN/CREAT SERPL: 30
BUN/CREAT SERPL: 30
CALCIUM SERPL-MCNC: 8.4 MG/DL (ref 8.5–10.1)
CALCIUM SERPL-MCNC: 8.5 MG/DL (ref 8.5–10.1)
CHLORIDE SERPL-SCNC: 77 MMOL/L (ref 98–107)
CHLORIDE SERPL-SCNC: 78 MMOL/L (ref 98–107)
CO2 SERPL-SCNC: 31 MMOL/L (ref 21–32)
CO2 SERPL-SCNC: 32 MMOL/L (ref 21–32)
CREAT SERPL-MCNC: 3.86 MG/DL (ref 0.6–1.3)
CREAT SERPL-MCNC: 3.89 MG/DL (ref 0.6–1.3)
CREAT UR-MCNC: 26 MG/DL (ref 30–125)
EOSINOPHIL # BLD AUTO: 0.3 10^3/UL (ref 0–0.3)
EOSINOPHIL NFR BLD AUTO: 3 % (ref 0–10)
GFR SERPLBLD BASED ON 1.73 SQ M-ARVRAT: 12 ML/MIN
GFR SERPLBLD BASED ON 1.73 SQ M-ARVRAT: 12 ML/MIN
GLUCOSE SERPL-MCNC: 220 MG/DL (ref 70–105)
GLUCOSE SERPL-MCNC: 221 MG/DL (ref 70–105)
HCT VFR BLD CALC: 38 % (ref 35–52)
HGB BLD-MCNC: 12.8 G/DL (ref 11.5–16)
LYMPHOCYTES # BLD AUTO: 2.2 10^3/UL (ref 1–4)
LYMPHOCYTES NFR BLD AUTO: 19 % (ref 12–44)
MANUAL DIFFERENTIAL PERFORMED BLD QL: NO
MCH RBC QN AUTO: 30 PG (ref 25–34)
MCHC RBC AUTO-ENTMCNC: 34 G/DL (ref 32–36)
MCV RBC AUTO: 90 FL (ref 80–99)
MONOCYTES # BLD AUTO: 0.7 10^3/UL (ref 0–1)
MONOCYTES NFR BLD AUTO: 6 % (ref 0–12)
NEUTROPHILS # BLD AUTO: 8.3 10^3/UL (ref 1.8–7.8)
NEUTROPHILS NFR BLD AUTO: 72 % (ref 42–75)
PHOSPHATE SERPL-MCNC: 6.4 MG/DL (ref 2.3–4.7)
PLATELET # BLD: 435 10^3/UL (ref 130–400)
PMV BLD AUTO: 9.8 FL (ref 9–12.2)
POTASSIUM SERPL-SCNC: 3.4 MMOL/L (ref 3.6–5)
POTASSIUM SERPL-SCNC: 3.5 MMOL/L (ref 3.6–5)
PROT SERPL-MCNC: 8.3 GM/DL (ref 6.4–8.2)
PROT UR-MCNC: 91 MG/DL (ref 6–12)
SODIUM SERPL-SCNC: 126 MMOL/L (ref 135–145)
SODIUM SERPL-SCNC: 128 MMOL/L (ref 135–145)
WBC # BLD AUTO: 11.5 10^3/UL (ref 4.3–11)

## 2021-03-05 PROCEDURE — 87902 NFCT AGT GNTYP ALYS HEP C: CPT

## 2021-03-05 PROCEDURE — 83036 HEMOGLOBIN GLYCOSYLATED A1C: CPT

## 2021-03-05 PROCEDURE — 80048 BASIC METABOLIC PNL TOTAL CA: CPT

## 2021-03-05 PROCEDURE — 86707 HEPATITIS BE ANTIBODY: CPT

## 2021-03-05 PROCEDURE — 87522 HEPATITIS C REVRS TRNSCRPJ: CPT

## 2021-03-05 PROCEDURE — 86709 HEPATITIS A IGM ANTIBODY: CPT

## 2021-03-05 PROCEDURE — 85025 COMPLETE CBC W/AUTO DIFF WBC: CPT

## 2021-03-05 PROCEDURE — 82043 UR ALBUMIN QUANTITATIVE: CPT

## 2021-03-05 PROCEDURE — 82570 ASSAY OF URINE CREATININE: CPT

## 2021-03-05 PROCEDURE — 84100 ASSAY OF PHOSPHORUS: CPT

## 2021-03-05 PROCEDURE — 86703 HIV-1/HIV-2 1 RESULT ANTBDY: CPT

## 2021-03-05 PROCEDURE — 80053 COMPREHEN METABOLIC PANEL: CPT

## 2021-03-05 PROCEDURE — 80069 RENAL FUNCTION PANEL: CPT

## 2021-03-05 PROCEDURE — 36415 COLL VENOUS BLD VENIPUNCTURE: CPT

## 2021-03-05 PROCEDURE — 84156 ASSAY OF PROTEIN URINE: CPT

## 2021-03-10 ENCOUNTER — HOSPITAL ENCOUNTER (OUTPATIENT)
Dept: HOSPITAL 75 - RAD | Age: 56
End: 2021-03-10
Attending: INTERNAL MEDICINE
Payer: MEDICAID

## 2021-03-10 DIAGNOSIS — B18.2: Primary | ICD-10-CM

## 2021-03-10 PROCEDURE — 76705 ECHO EXAM OF ABDOMEN: CPT

## 2021-05-03 ENCOUNTER — HOSPITAL ENCOUNTER (EMERGENCY)
Dept: HOSPITAL 75 - ER | Age: 56
Discharge: ANOTHER HOSPITAL | End: 2021-05-03
Payer: MEDICAID

## 2021-05-03 VITALS — HEIGHT: 64.96 IN | WEIGHT: 177.91 LBS | BODY MASS INDEX: 29.64 KG/M2

## 2021-05-03 VITALS — SYSTOLIC BLOOD PRESSURE: 134 MMHG | DIASTOLIC BLOOD PRESSURE: 74 MMHG

## 2021-05-03 DIAGNOSIS — A41.9: ICD-10-CM

## 2021-05-03 DIAGNOSIS — Z79.4: ICD-10-CM

## 2021-05-03 DIAGNOSIS — Z99.2: ICD-10-CM

## 2021-05-03 DIAGNOSIS — F41.9: ICD-10-CM

## 2021-05-03 DIAGNOSIS — Z79.82: ICD-10-CM

## 2021-05-03 DIAGNOSIS — Z88.2: ICD-10-CM

## 2021-05-03 DIAGNOSIS — I12.0: ICD-10-CM

## 2021-05-03 DIAGNOSIS — I50.9: ICD-10-CM

## 2021-05-03 DIAGNOSIS — Z88.6: ICD-10-CM

## 2021-05-03 DIAGNOSIS — N18.6: ICD-10-CM

## 2021-05-03 DIAGNOSIS — Z88.5: ICD-10-CM

## 2021-05-03 DIAGNOSIS — Z79.899: ICD-10-CM

## 2021-05-03 DIAGNOSIS — Z20.822: ICD-10-CM

## 2021-05-03 DIAGNOSIS — I13.2: ICD-10-CM

## 2021-05-03 DIAGNOSIS — J44.9: ICD-10-CM

## 2021-05-03 DIAGNOSIS — E11.22: ICD-10-CM

## 2021-05-03 DIAGNOSIS — E11.10: Primary | ICD-10-CM

## 2021-05-03 LAB
ALBUMIN SERPL-MCNC: 4.4 GM/DL (ref 3.2–4.5)
ALP SERPL-CCNC: 122 U/L (ref 40–136)
ALT SERPL-CCNC: 14 U/L (ref 0–55)
ANISOCYTOSIS BLD QL SMEAR: SLIGHT
APTT BLD: 26 SEC (ref 24–35)
APTT PPP: YELLOW S
ARTERIAL PATENCY WRIST A: (no result)
BACTERIA #/AREA URNS HPF: NEGATIVE /HPF
BASE EXCESS STD BLDA CALC-SCNC: -11.2 MMOL/L (ref -2.5–2.5)
BASOPHILS # BLD AUTO: 0.1 10^3/UL (ref 0–0.1)
BASOPHILS NFR BLD AUTO: 0 % (ref 0–10)
BDY SITE: (no result)
BILIRUB SERPL-MCNC: 1 MG/DL (ref 0.1–1)
BILIRUB UR QL STRIP: NEGATIVE
BODY TEMPERATURE: 37
BUN/CREAT SERPL: 16
CALCIUM SERPL-MCNC: 9.8 MG/DL (ref 8.5–10.1)
CHLORIDE SERPL-SCNC: 76 MMOL/L (ref 98–107)
CO2 BLDA CALC-SCNC: 15.1 MMOL/L (ref 21–31)
CO2 SERPL-SCNC: 15 MMOL/L (ref 21–32)
CREAT SERPL-MCNC: 5.34 MG/DL (ref 0.6–1.3)
EOSINOPHIL # BLD AUTO: 0 10^3/UL (ref 0–0.3)
EOSINOPHIL NFR BLD AUTO: 0 % (ref 0–10)
FIBRINOGEN PPP-MCNC: CLEAR MG/DL
GFR SERPLBLD BASED ON 1.73 SQ M-ARVRAT: 8 ML/MIN
GLUCOSE SERPL-MCNC: 1178 MG/DL (ref 70–105)
GLUCOSE UR STRIP-MCNC: (no result) MG/DL
HCT VFR BLD CALC: 43 % (ref 35–52)
HGB BLD-MCNC: 14 G/DL (ref 11.5–16)
INHALED O2 FLOW RATE: (no result) L/MIN
INR PPP: 1 (ref 0.8–1.4)
KETONES UR QL STRIP: (no result)
LEUKOCYTE ESTERASE UR QL STRIP: NEGATIVE
LYMPHOCYTES # BLD AUTO: 1.1 10^3/UL (ref 1–4)
LYMPHOCYTES NFR BLD AUTO: 5 % (ref 12–44)
MAGNESIUM SERPL-MCNC: 2.4 MG/DL (ref 1.6–2.4)
MANUAL DIFFERENTIAL PERFORMED BLD QL: YES
MCH RBC QN AUTO: 32 PG (ref 25–34)
MCHC RBC AUTO-ENTMCNC: 33 G/DL (ref 32–36)
MCV RBC AUTO: 97 FL (ref 80–99)
MONOCYTES # BLD AUTO: 0.8 10^3/UL (ref 0–1)
MONOCYTES NFR BLD AUTO: 4 % (ref 0–12)
MONOCYTES NFR BLD: 2 %
NEUTROPHILS # BLD AUTO: 20.5 10^3/UL (ref 1.8–7.8)
NEUTROPHILS NFR BLD AUTO: 91 % (ref 42–75)
NEUTS BAND NFR BLD MANUAL: 89 %
NEUTS BAND NFR BLD: 1 %
NITRITE UR QL STRIP: NEGATIVE
PCO2 BLDA: 31 MMHG (ref 35–45)
PH BLDA: 7.29 [PH] (ref 7.37–7.43)
PH UR STRIP: 6 [PH] (ref 5–9)
PLATELET # BLD: 278 10^3/UL (ref 130–400)
PMV BLD AUTO: 10.7 FL (ref 9–12.2)
PO2 BLDA: 70 MMHG (ref 79–93)
POTASSIUM SERPL-SCNC: 4.9 MMOL/L (ref 3.6–5)
PROT SERPL-MCNC: 8.8 GM/DL (ref 6.4–8.2)
PROT UR QL STRIP: (no result)
PROTHROMBIN TIME: 13.9 SEC (ref 12.2–14.7)
RBC #/AREA URNS HPF: (no result) /HPF
SAO2 % BLDA FROM PO2: 91 % (ref 94–100)
SODIUM SERPL-SCNC: 128 MMOL/L (ref 135–145)
SP GR UR STRIP: 1.01 (ref 1.02–1.02)
SQUAMOUS #/AREA URNS HPF: (no result) /HPF
VARIANT LYMPHS NFR BLD MANUAL: 8 %
VENTILATION MODE VENT: NO
WBC # BLD AUTO: 22.7 10^3/UL (ref 4.3–11)
WBC #/AREA URNS HPF: (no result) /HPF

## 2021-05-03 PROCEDURE — 36415 COLL VENOUS BLD VENIPUNCTURE: CPT

## 2021-05-03 PROCEDURE — 74176 CT ABD & PELVIS W/O CONTRAST: CPT

## 2021-05-03 PROCEDURE — 82947 ASSAY GLUCOSE BLOOD QUANT: CPT

## 2021-05-03 PROCEDURE — 85730 THROMBOPLASTIN TIME PARTIAL: CPT

## 2021-05-03 PROCEDURE — 85610 PROTHROMBIN TIME: CPT

## 2021-05-03 PROCEDURE — 81000 URINALYSIS NONAUTO W/SCOPE: CPT

## 2021-05-03 PROCEDURE — 87040 BLOOD CULTURE FOR BACTERIA: CPT

## 2021-05-03 PROCEDURE — 83605 ASSAY OF LACTIC ACID: CPT

## 2021-05-03 PROCEDURE — 71045 X-RAY EXAM CHEST 1 VIEW: CPT

## 2021-05-03 PROCEDURE — 82805 BLOOD GASES W/O2 SATURATION: CPT

## 2021-05-03 PROCEDURE — 85027 COMPLETE CBC AUTOMATED: CPT

## 2021-05-03 PROCEDURE — 83735 ASSAY OF MAGNESIUM: CPT

## 2021-05-03 PROCEDURE — 87088 URINE BACTERIA CULTURE: CPT

## 2021-05-03 PROCEDURE — 93005 ELECTROCARDIOGRAM TRACING: CPT

## 2021-05-03 PROCEDURE — 84703 CHORIONIC GONADOTROPIN ASSAY: CPT

## 2021-05-03 PROCEDURE — 87804 INFLUENZA ASSAY W/OPTIC: CPT

## 2021-05-03 PROCEDURE — 82274 ASSAY TEST FOR BLOOD FECAL: CPT

## 2021-05-03 PROCEDURE — 80053 COMPREHEN METABOLIC PANEL: CPT

## 2021-05-03 PROCEDURE — 36600 WITHDRAWAL OF ARTERIAL BLOOD: CPT

## 2021-05-03 PROCEDURE — 87635 SARS-COV-2 COVID-19 AMP PRB: CPT

## 2021-05-03 PROCEDURE — 84484 ASSAY OF TROPONIN QUANT: CPT

## 2021-05-03 PROCEDURE — 85007 BL SMEAR W/DIFF WBC COUNT: CPT

## 2021-05-03 PROCEDURE — 83690 ASSAY OF LIPASE: CPT

## 2021-05-03 PROCEDURE — 51702 INSERT TEMP BLADDER CATH: CPT

## 2021-05-03 NOTE — ED GENERAL
General


Chief Complaint:  Fever-Adult/Adol


Stated Complaint:  WEAKNESS,NAUSEA


Source of Information:  Patient


Exam Limitations:  No Limitations





History of Present Illness


Date Seen by Provider:  May 3, 2021


Time Seen by Provider:  12:40


Initial Comments


This is a chronically ill 56-year-old female presents to the ER via POV with 

complaints of nausea, not feeling well, vomiting x3 days.  States that she was 

placed to have dialysis today however she did not feel well enough to go.  

Additionally states that she has been feeling very weak and is unable to 

tolerate any p.o. intake.  States that she has not had any fluid taken off 

during dialysis and she is negative at this time.  She is concerned she is 

dehydrated.  The patient reports that she had her mahurkar dialysis catheter 

replaced last week due to her prior clotting off.  She was placed on Eliquis 

twice a day after procedure. She denies fevers, chills, shortness of breath, 

chest pain.





Allergies and Home Medications


Allergies


Coded Allergies:  


     morphine (Verified  Allergy, Mild, Vomiting, 20)


     orange juice (Verified  Allergy, Mild, Nausea, 20)


     Sulfa (Sulfonamide Antibiotics) (Unverified  Allergy, Unknown, 6/25/15)


     codeine (Verified  Allergy, Unknown, TAKES ULTRAM AT HOME, 09)


     ketorolac (Verified  Allergy, Unknown, 08)


     tramadol (Verified  Allergy, Unknown, 11)





Home Medications


Albuterol Sulfate 1 Puff Puff, 2 PUFF IH TID PRN for SHORTNESS OF BREATH, 

(Reported)


Aspirin 81 Mg Tab.chew, 81 MG PO DAILY, (Reported)


Diclofenac Sodium 100 Gm Gel..gram., 1 APPLIC TOP QID PRN for CRAMPS, (Reported)


   APPLY TO LEGS 


Ergocalciferol (Vitamin D2) 1,250 Mcg Capsule, 1,250 MCG PO THUR, (Reported)


Fluticasone Propionate 9.9 Ml Ovid.susp, 2 SPRAY NSEACH DAILY, (Reported)


Furosemide 80 Mg Tablet, 80 MG PO BID, (Reported)


Gabapentin 300 Mg Capsule, 600 MG PO Q8H, (Reported)


   TAKES 2 (300MG) TABS 


Insulin Determir 1,000 Units/10 Ml Soln, 15 UNITS SQ HS


   Prescribed by: MISHA DEVRIES on 21 1229


Insulin Lispro 100 Unit/1 Ml Insuln.pen, 5-15 UNITS SQ AC, (Reported)


Isosorbide Mononitrate 30 Mg Tab.er.24h, 30 MG PO DAILY, (Reported)


Lorazepam 1 Mg Tablet, 1 MG PO TID PRN for ANXIETY, (Reported)


Magnesium Oxide 400 Mg Tablet, 400 MG PO BID, (Reported)


Melatonin 10 Mg Tablet, 10 MG PO HS, (Reported)


Metoclopramide HCl 10 Mg Tablet, 10 MG PO QIDACHS, (Reported)


Metolazone 5 Mg Tablet, 5 MG PO MON,WE,FR, (Reported)


Metoprolol Tartrate 50 Mg Tablet, 75 MG PO BID, (Reported)


   TAKES 1 &  (50MG) TABS 


Montelukast Sodium 10 Mg Tablet, 10 MG PO HS, (Reported)


Multivit-Min/FA/Lycopene/Lut 1 Each Tablet, 1 EA PO DAILY, (Reported)


Naloxone HCl 4 Mg Spray, 1 SPRAY NS UD PRN for UNRESPONSIVE, (Reported)


   ADMINSTER 1 SPRAY IN 1 NOSTRIL 1 TIME- MAY REPEAT IN ALTERNATING NOSTRIL 

EVERY 2-3 MINUTES UNTIL RESPONSIVE OR EMS ARRIVES 


Omeprazole 20 Mg Capsule.dr, 20 MG PO DAILY, (Reported)


Oxycodone HCl 5 Mg Tablet, 5 MG PO DAILY PRN for PAIN-SEVERE (8-10), (Reported)


Torsemide 100 Mg Tablet, 100 MG PO 0800,1500, (Reported)


[Iron Slow Release] 45 TAB, 45 MG PO DAILY, (Reported)





Patient Home Medication List


Home Medication List Reviewed:  Yes





Review of Systems


Review of Systems


Constitutional:  see HPI


EENTM:  see HPI


Respiratory:  see HPI


Cardiovascular:  see HPI


Gastrointestinal:  see HPI


Genitourinary:  no symptoms reported


Musculoskeletal:  see HPI


Skin:  no symptoms reported


Psychiatric/Neurological:  No Symptoms Reported


Hematologic/Lymphatic:  See HPI


Immunological/Allergic:  no symptoms reported





Past Medical-Social-Family Hx


Patient Social History


Drug of Choice:  METH, THC


Type Used:  Cigarettes


2nd Hand Smoke Exposure:  No


Recent Hopitalizations:  No





Immunizations Up To Date


Tetanus Booster (TDap):  Unknown


Date of Pneumonia Vaccine:  Oct 1, 2012


Date of Influenza Vaccine:  Oct 1, 2012





Seasonal Allergies


Seasonal Allergies:  Yes





Past Medical History


Surgeries:  Yes (URETERAL STENTS)


Abdominal, Adenoidectomy, Ear Surgery, Hysterectomy, Renal, Tonsillectomy, Tubal

Ligation


Respiratory:  Yes (INTUBATED 20 WITH DKA/RESP FAILURE)


Asthma, Pneumonia, Chronic Bronchitis, COPD


Currently Using CPAP:  No


Currently Using BIPAP:  No


Cardiac:  Yes (CHF)


Deep Vein Thrombosis, High Cholesterol, Hypertension


Neurological:  Yes


Neuropathy


Reproductive Disorders:  Yes


Female Reproductive Disorders:  Menstrual Problems


GYN History:  Hysterectomy, Menopausal


Genitourinary:  Yes (NO  DIALYSIS; URETERAL STENTS FOR KIDNEY STONES)


Renal Failure


Gastrointestinal:  Yes


Hepatitis


Musculoskeletal:  Yes (CHRONIC GENERALIZED PAIN)


Arthritis, Rheumatoid Arthritis, Fractures


Endocrine:  Yes (NON-COMPLIANT; MULTIPLE EPISODES OF DKA)


Diabetes, Insulin dep


HEENT:  Yes (S/P T&A)


Tonsilitis, Glaucoma


Loss of Vision:  Denies


Hearing Impairment:  Denies


Cancer:  No


Psychosocial:  Yes (POLYSUBSTANCE ABUSE)


Anxiety, Personality Disorder, Depression


Integumentary:  No


Blood Disorders:  No


Adverse Reaction/Blood Tranf:  No





Family Medical History





FH: thyroid cancer


  G8 SISTER


FHx: macular degeneration


  19 MOTHER


Glaucoma


  G8 BROTHER


Heart Disease, Cancer, Diabetes





SOCIAL HISTORY:


-ETOH--REGULAR USE--"COUPLE OF DRINKS" SEVERAL NIGHTS A WEEK


-DRUGS--+ METH AND THC USE


-SMOKES > 1 PPD





PAST SURGICAL HISTORY: 


-TONSILLECTOMY AND ADENOIDECTOMY


-BILATERAL TUBAL LIGATION


-HYSTERECTOMY


-EXPLORATORY LAPAROSCOPY


-URETERAL STENTS FOR KIDNEY STONES/CYSTOSCOPIES


-EAR SURGERY





Physical Exam


Vital Signs





Vital Signs - First Documented








 5/3/21





 12:38


 


Temp 35.9


 


Pulse 127


 


Resp 20


 


B/P (MAP) 200/88 (125)


 


Pulse Ox 89


 


O2 Delivery Room Air


 


O2 Flow Rate 2.00





Capillary Refill :


Height, Weight, BMI


Height: 5'5.00"


Weight: 151lbs. 1.0oz. 68.146737yg; 29.16 BMI


Method:Stated


General Appearance:  No Apparent Distress, Chronically ill


Eyes:  Bilateral Eye Normal Inspection, Bilateral Eye PERRL, Bilateral Eye EOMI


HEENT:  PERRL/EOMI, Normal ENT Inspection; No Moist Mucous Membranes


Neck:  Normal Inspection, Supple


Respiratory:  Lungs Clear, Normal Breath Sounds, No Accessory Muscle Use, No 

Respiratory Distress, Other (Mahukar right chest wall )


Cardiovascular:  Regular Rate, Rhythm, No Edema, Normal Peripheral Pulses


Gastrointestinal:  Normal Bowel Sounds, Soft, Tenderness (diffuse)


Back:  Normal Inspection, No Vertebral Tenderness


Extremity:  Normal Capillary Refill, Normal Inspection; No No Pedal Edema


Neurologic/Psychiatric:  Alert, Oriented x3, No Motor/Sensory Deficits, Normal 

Mood/Affect


Skin:  Normal Color, Warm/Dry





Focused Exam


Lactate Level


5/3/21 00:00: Lactic Acid Level 4.42*H


5/3/21 13:07: Lactic Acid Level 4.41*H





Lactic Acid Level





Laboratory Tests








Test


 5/3/21


00:00 5/3/21


13:07


 


Lactic Acid Level


 4.42 MMOL/L


(0.50-2.00)  *H 4.41 MMOL/L


(0.50-2.00)  *H











Procedures/Interventions


Date of ETT Placement:  2020


Time of ETT Placement:  0700





Progress/Results/Core Measures


Suspected Sepsis


SIRS


Temperature: 


Pulse:  


Respiratory Rate: 


 


Laboratory Tests


5/3/21 13:07: White Blood Count 22.7H


Blood Pressure  / 


Mean: 


 





5/3/21 00:00: Lactic Acid Level 4.42*H


5/3/21 13:07: Lactic Acid Level 4.41*H


Laboratory Tests


5/3/21 13:07: 


Creatinine 5.34H, INR Comment 1.0, Platelet Count 278, Total Bilirubin 1.0








Results/Orders


Lab Results





Laboratory Tests








Test


 5/3/21


00:00 5/3/21


12:45 5/3/21


13:07 5/3/21


15:40 Range/Units


 


 


Lactic Acid Level


 4.42 *H


 


 4.41 *H


 


 0.50-2.00


MMOL/L


 


Coronavirus 2019 (NINFA)  Not Detected    Not Detecte  


 


White Blood Count


 


 


 22.7 H


 


 4.3-11.0


10^3/uL


 


Red Blood Count


 


 


 4.43 


 


 3.80-5.11


10^6/uL


 


Hemoglobin   14.0   11.5-16.0  g/dL


 


Hematocrit   43   35-52  %


 


Mean Corpuscular Volume   97   80-99  fL


 


Mean Corpuscular Hemoglobin   32   25-34  pg


 


Mean Corpuscular Hemoglobin


Concent 


 


 33 


 


 32-36  g/dL





 


Red Cell Distribution Width   13.9   10.0-14.5  %


 


Platelet Count


 


 


 278 


 


 130-400


10^3/uL


 


Mean Platelet Volume   10.7   9.0-12.2  fL


 


Immature Granulocyte % (Auto)   1    %


 


Neutrophils (%) (Auto)   91 H  42-75  %


 


Lymphocytes (%) (Auto)   5 L  12-44  %


 


Monocytes (%) (Auto)   4   0-12  %


 


Eosinophils (%) (Auto)   0   0-10  %


 


Basophils (%) (Auto)   0   0-10  %


 


Neutrophils # (Auto)


 


 


 20.5 H


 


 1.8-7.8


10^3/uL


 


Lymphocytes # (Auto)


 


 


 1.1 


 


 1.0-4.0


10^3/uL


 


Monocytes # (Auto)


 


 


 0.8 


 


 0.0-1.0


10^3/uL


 


Eosinophils # (Auto)


 


 


 0.0 


 


 0.0-0.3


10^3/uL


 


Basophils # (Auto)


 


 


 0.1 


 


 0.0-0.1


10^3/uL


 


Immature Granulocyte # (Auto)


 


 


 0.2 H


 


 0.0-0.1


10^3/uL


 


Neutrophils % (Manual)   89    %


 


Lymphocytes % (Manual)   8    %


 


Monocytes % (Manual)   2    %


 


Band Neutrophils   1    %


 


Anisocytosis   SLIGHT    


 


Prothrombin Time   13.9   12.2-14.7  SEC


 


INR Comment   1.0   0.8-1.4  


 


Activated Partial


Thromboplast Time 


 


 26 


 


 24-35  SEC





 


Urine Color   YELLOW    


 


Urine Clarity   CLEAR    


 


Urine pH   6.0   5-9  


 


Urine Specific Gravity   1.010 L  1.016-1.022  


 


Urine Protein   2+ H  NEGATIVE  


 


Urine Glucose (UA)   3+ H  NEGATIVE  


 


Urine Ketones   1+ H  NEGATIVE  


 


Urine Nitrite   NEGATIVE   NEGATIVE  


 


Urine Bilirubin   NEGATIVE   NEGATIVE  


 


Urine Urobilinogen   0.2   < = 1.0  MG/DL


 


Urine Leukocyte Esterase   NEGATIVE   NEGATIVE  


 


Urine RBC (Auto)   TRACE-I   NEGATIVE  


 


Urine RBC   RARE    /HPF


 


Urine WBC   NONE    /HPF


 


Urine Squamous Epithelial


Cells 


 


 RARE 


 


  /HPF





 


Urine Crystals   NONE    /LPF


 


Urine Bacteria   NEGATIVE    /HPF


 


Urine Casts   NONE    /LPF


 


Urine Mucus   NEGATIVE    /LPF


 


Urine Culture Indicated   NO    


 


Sodium Level   128 L  135-145  MMOL/L


 


Potassium Level   4.9   3.6-5.0  MMOL/L


 


Chloride Level   76 L    MMOL/L


 


Carbon Dioxide Level   15 L  21-32  MMOL/L


 


Anion Gap   37 H  5-14  MMOL/L


 


Blood Urea Nitrogen   83 H  7-18  MG/DL


 


Creatinine


 


 


 5.34 H


 


 0.60-1.30


MG/DL


 


Estimat Glomerular Filtration


Rate 


 


 8 


 


  





 


BUN/Creatinine Ratio   16    


 


Glucose Level   1178 *H 953 *H   MG/DL


 


Calcium Level   9.8   8.5-10.1  MG/DL


 


Corrected Calcium   9.5   8.5-10.1  MG/DL


 


Magnesium Level   2.4   1.6-2.4  MG/DL


 


Total Bilirubin   1.0   0.1-1.0  MG/DL


 


Aspartate Amino Transf


(AST/SGOT) 


 


 17 


 


 5-34  U/L





 


Alanine Aminotransferase


(ALT/SGPT) 


 


 14 


 


 0-55  U/L





 


Alkaline Phosphatase   122     U/L


 


Troponin I   0.100 H  <0.028  NG/ML


 


Total Protein   8.8 H  6.4-8.2  GM/DL


 


Albumin   4.4   3.2-4.5  GM/DL


 


Lipase   37   8-78  U/L


 


Serum Pregnancy Test,


Qualitative 


 


 NEGATIVE 


 


 NEGATIVE  





 


Test


 5/3/21


15:45 


 


 


 Range/Units


 


 


Blood Gas Puncture Site L RADIAL      


 


Blood Gas Patient Temperature 37      


 


Arterial Blood pH 7.29 *L    7.37-7.43  


 


Arterial Blood Partial


Pressure CO2 31 L


 


 


 


 35-45  MMHG





 


Arterial Blood Partial


Pressure O2 70 L


 


 


 


 79-93  MMHG





 


Arterial Blood HCO3 14 *L    23-27  MMOL/L


 


Arterial Blood Total CO2


 15.1 L


 


 


 


 21.0-31.0


MMOL/L


 


Arterial Blood Oxygen


Saturation 91 L


 


 


 


   %





 


Arterial Blood Base Excess


 -11.2 L


 


 


 


 -2.5-2.5


MMOL/L


 


Trent Test YES-POS      


 


Blood Gas Ventilator Setting NO      


 


Blood Gas Inspired Oxygen ROOM AIR      








Micro Results





Microbiology


5/3/21 Influenza Types A,B Antigen (KEYA) - Final, Complete


         





My Orders





Orders - VINNIE ALEXANDER


Influenza A And B Antigens (5/3/21 12:48)


Covid 19 Inhouse Test (5/3/21 12:48)


Cbc With Automated Diff (5/3/21 12:48)


Comprehensive Metabolic Panel (5/3/21 12:48)


Urinalysis (5/3/21 12:48)


Hcg,Qualitative Serum (5/3/21 12:48)


Ekg Tracing (5/3/21 12:48)


Troponin I (5/3/21 12:48)


Magnesium (5/3/21 12:48)


Ondansetron Injection (Zofran Injectio (5/3/21 13:00)


Blood Culture (5/3/21 13:08)


Urine Culture (5/3/21 13:08)


Protime With Inr (5/3/21 13:08)


Partial Thromboplastin Time (5/3/21 13:08)


Chest 1 View, Ap/Pa Only (5/3/21 13:08)


Ed Iv/Invasive Line Start (5/3/21 13:08)


Vital Signs Adult Sepsis Patie Q15M (5/3/21 13:08)


O2 (5/3/21 13:08)


Remove Rings In Anticipation O (5/3/21 13:08)


Lactic Acid Analyzer (5/3/21 13:08)


Ns Iv 500 Ml (Sodium Chloride 0.9%) (5/3/21 13:15)


Ns Iv 1000 Ml (Sodium Chloride 0.9%) (5/3/21 13:02)


Manual Differential (5/3/21 13:07)


Metoprolol Tartrate Injection (Lopressor (5/3/21 14:00)


Cefepime Injection (Maxipime Injection) (5/3/21 14:00)


Ct Abdomen/Pelvis Wo (5/3/21 13:58)


Pantoprazole Injection (Protonix Injecti (5/3/21 14:15)


Pantoprazole Injection (Protonix Injecti (5/3/21 13:57)


Insulin Regular Drip (Myxredlin 100 Unit (5/3/21 14:15)


Insulin (Regular) Human (Novolin R (Per (5/3/21 14:15)


Ns W/Kcl 20 Meq/L (Ns Iv W/Kcl 20 Meq/L) (5/3/21 14:15)


Lipase (5/3/21 14:39)


Ondansetron Injection (Zofran Injectio (5/3/21 15:15)


Glucose (5/3/21 15:40)


Ns Iv 1000 Ml (Sodium Chloride 0.9%) (5/3/21 16:15)


Metoprolol Tartrate Injection (Lopressor (5/3/21 17:00)


Arterial Blood Draw (5/3/21 )





Medications Given in ED





Current Medications








 Medications  Dose


 Ordered  Sig/Corin


 Route  Start Time


 Stop Time Status Last Admin


Dose Admin


 


 Cefepime HCl 1000


 mg/Sterile Water  10 ml @ 


 200 mls/hr  ONCE  ONCE


 IV  5/3/21 14:00


 5/3/21 14:02 DC 5/3/21 13:59


200 MLS/HR


 


 Insulin Human


 Regular  10 unit  ONCE  ONCE


 IV  5/3/21 14:15


 5/3/21 14:18 DC 5/3/21 14:25


10 UNIT


 


 Metoprolol


 Tartrate  5 mg  ONCE  ONCE


 IV  5/3/21 14:00


 5/3/21 14:01 DC 5/3/21 13:56


5 MG


 


 Metoprolol


 Tartrate  5 mg  ONCE  ONCE


 IV  5/3/21 17:00


 5/3/21 17:01 DC 5/3/21 17:05


5 MG


 


 Ondansetron HCl  4 mg  ONCE  ONCE


 IVP  5/3/21 13:00


 5/3/21 13:01 DC 5/3/21 13:12


4 MG


 


 Ondansetron HCl  4 mg  ONCE  ONCE


 IVP  5/3/21 15:15


 5/3/21 15:16 DC 5/3/21 15:21


4 MG


 


 Pantoprazole  40 mg  ONCE  ONCE


 IV  5/3/21 14:15


 5/3/21 14:16 DC 5/3/21 14:05


40 MG


 


 Sodium Chloride  1,000 ml @ 


 999 mls/hr  Q1H  ONCE


 IV  5/3/21 16:15


 5/3/21 17:15 DC 5/3/21 16:13


999 MLS/HR








Vital Signs/I&O











 5/3/21 5/3/21 5/3/21





 12:38 12:38 17:24


 


Temp 35.9  


 


Pulse 127  105


 


Resp 20  16


 


B/P (MAP) 200/88 (125)  134/74


 


Pulse Ox 89 89 96


 


O2 Delivery Room Air Nasal Cannula Nasal Cannula


 


O2 Flow Rate  2.00 2.00





Capillary Refill :


Progress Note :  


Progress Note


Patient examined and appears very ill.  Basic labs, cardiac workup and UA 

initially ordered with Covid and influenza.  However after further discussion 

with her we will do sepsis work-up due to recent surgical procedure.  

Additionally throughout ED course she reported pain in the right side of her 

chest and in her abdomen. 





Labs indicative of DKA, sepsis unknown source.  Has history of ESRD. Orders 

placed for IV fluid replacement, given 1 L normal saline, Zofran 8 mg IV push 

total, pantoprazole 40 mg IV due to brownish emesis and recent placement of 

anticoagulants.  Additionally given cefepime 1 g IV.  Chest x-ray, urine, and CT

abdomen pelvis showed no acute findings.  She was given regular insulin IV bolus

10 units and initiated insulin drip.  Case was discussed with Dr. Melvin with 

cardiology who recommended continuing use of beta-blockers for her tachycardia, 

she was given Lopressor 5 mg IV push. 





Patient here Dr. Kuo accepted patient transfer to TCU unit at [facility].





Initiated another liter of normal saline after repeat lactic came back >4, also 

given another 5 mg of Lopressor due to heart rate 125, blood pressure 160/90.  

Patient stable at this time.





ECG


Initial ECG Impression Date:  May 3, 2021


Initial ECG Impression Time:  12:58


Initial ECG Rate:  137


Initial ECG Rhythm:  S.Tach


Initial ECG Impression:  Nonspecific Changes





Diagnostic Imaging





   Diagonstic Imaging:  Xray


   Plain Films/CT/US/NM/MRI:  chest


Comments





NAME:   DAYTON BLISS


MED REC#:   [id number]


ACCOUNT#:   [id number]


PT STATUS:   REG ER


:   1965


PHYSICIAN:   VINNIE ALEXANDER APRN


ADMIT DATE:   21/ER


***Draft***


Date of Exam:21





CHEST 1 VIEW, AP/PA ONLY








INDICATION: 


Sepsis.





COMPARISON: 


01/10/2021.





FINDINGS: 


A single frontal view of the chest demonstrates normal heart size


and pulmonary vascularity. The lungs are well aerated and clear.


No large pleural effusion or pneumothorax is seen. The visualized


osseous structures show no acute abnormalities. A right internal


jugular dual-lumen central venous catheter is seen. The tip


terminates at the cavoatrial junction.





IMPRESSION: 


No acute cardiopulmonary process.  








  Dictated on workstation # [id number]








Dict:   21 [id number]


Trans:   21 1340


 [id number]





Interpreted by:     RIO GAVIN MD


Electronically signed by:


   Reviewed:  Reviewed by Me








   Diagonstic Imaging:  CT


   Plain Films/CT/US/NM/MRI:  abdomen


Comments


NAME:   DAYTON BLISS


MED REC#:   [id number]


ACCOUNT#:   [id number]


PT STATUS:   REG ER


:   1965


PHYSICIAN:   VINNIE ALEXANDER


ADMIT DATE:   21/ER


***Draft***


Date of Exam:21





CT ABDOMEN/PELVIS WO








EXAMINATION: CT abdomen/pelvis wo.





TECHNIQUE: Unenhanced CT imaging of the abdomen and pelvis was


performed. 2-D reformats are created and submitted for


interpretation. Automatic exposure controls were utilized to


optimize patient dose.





INDICATION: Fever, shortness of air and abdominal pain.





COMPARISON: 2020





FINDINGS: Evaluation of the abdominal viscera is mildly limited


without contrast.





Lower chest: The lung bases are clear. No pericardial or pleural


effusion. 





Peritoneum: No free intraperitoneal air or fluid.  





Liver and biliary system: Unenhanced liver is normal. 


Gallbladder is contracted, limiting assessment. No biliary duct


dilatation.





Spleen and Pancreas: Spleen is normal.  Unenhanced pancreas is


grossly normal. 





Adrenals: Normal.





 tract: No renal or ureteral calculi. No obstructive uropathy. 


 There is a partially exophytic 12 x 15 mm cyst in the mid aspect


of the right kidney which previously had hyperdense hemorrhage


within it and is likely a complicated cyst. The urinary bladder


is normally filled without wall thickening. Status post


hysterectomy. No adnexal mass.





GI tract: Stomach is filled with fluid and has no wall


thickening. No bowel obstruction.  No pericolonic inflammatory


changes.  Normal appendix.





Vasculature and Lymph nodes: Normal caliber aorta with extensive


atherosclerotic plaquing. Infrarenal IVC filter is in place. No


abdominal or pelvic lymphadenopathy.





Musculoskeletal: No concerning osseous lesion. 





IMPRESSION: 


1. No urinary tract calculi or obstructive uropathy.


2. No bowel obstruction.


3. Fluid-filled stomach and small bowel with liquid stool in the


colon may be due to a gastroenteritis.





  Dictated on workstation # [id number]








Dict:   21 1431


Trans:   21 1442


[facility] [phone]





Interpreted by:     GAMAL KEYS MD


Electronically signed by:


   Reviewed:  Reviewed by Me





Departure


Communication (Admissions)


Time/Spoke to Consulting Phy:  14:55


Reviewed EKG and troponin findings with Dr. Melvin, recommended continue 

treatment with beta-blockers as appropriate per patient condition.  And to 

transfer to higher level of care due to multiple comorbidities.





Impression





   Primary Impression:  


   DKA (diabetic ketoacidosis)


   Additional Impressions:  


   ESRD (end stage renal disease) on dialysis


   Sepsis


Disposition:   XF S[facility]-TRM HOSP


Condition:  Stable





Transfer


Transfer Reason:  Exceeds level of care


Time Spoke to Accepting Phy:  15:08


Transfer Progress Notes


Case reviewed with Dr. Kuo at [facility].  Accepted patient transfer at 

this time.


Transfer Facility:  


[facility]


Method of Transfer:  EMS





Departure-Patient Inst.


Referrals:  


NO,LOCAL PHYSICIAN (PCP/Family)


Primary Care Physician











VINNIE ALEXANDER            May 3, 2021 12:50

## 2021-05-03 NOTE — DIAGNOSTIC IMAGING REPORT
EXAMINATION: CT abdomen/pelvis wo.



TECHNIQUE: Unenhanced CT imaging of the abdomen and pelvis was

performed. 2-D reformats are created and submitted for

interpretation. Automatic exposure controls were utilized to

optimize patient dose.



INDICATION: Fever, shortness of air and abdominal pain.



COMPARISON: 07/05/2020



FINDINGS: Evaluation of the abdominal viscera is mildly limited

without contrast.



Lower chest: The lung bases are clear. No pericardial or pleural

effusion. 



Peritoneum: No free intraperitoneal air or fluid.  



Liver and biliary system: Unenhanced liver is normal. 

Gallbladder is contracted, limiting assessment. No biliary duct

dilatation.



Spleen and Pancreas: Spleen is normal.  Unenhanced pancreas is

grossly normal. 



Adrenals: Normal.



 tract: No renal or ureteral calculi. No obstructive uropathy. 

 There is a partially exophytic 12 x 15 mm cyst in the mid aspect

of the right kidney which previously had hyperdense hemorrhage

within it and is likely a complicated cyst. The urinary bladder

is normally filled without wall thickening. Status post

hysterectomy. No adnexal mass.



GI tract: Stomach is filled with fluid and has no wall

thickening. No bowel obstruction.  No pericolonic inflammatory

changes.  Normal appendix.



Vasculature and Lymph nodes: Normal caliber aorta with extensive

atherosclerotic plaquing. Infrarenal IVC filter is in place. No

abdominal or pelvic lymphadenopathy.



Musculoskeletal: No concerning osseous lesion. 



IMPRESSION: 

1. No urinary tract calculi or obstructive uropathy.

2. No bowel obstruction.

3. Fluid-filled stomach and small bowel with liquid stool in the

colon may be due to a gastroenteritis.



Dictated by: 



  Dictated on workstation # [id number]

## 2021-05-03 NOTE — DIAGNOSTIC IMAGING REPORT
INDICATION: 

Sepsis.



COMPARISON: 

01/10/2021.



FINDINGS: 

A single frontal view of the chest demonstrates normal heart size

and pulmonary vascularity. The lungs are well aerated and clear.

No large pleural effusion or pneumothorax is seen. The visualized

osseous structures show no acute abnormalities. A right internal

jugular dual-lumen central venous catheter is seen. The tip

terminates at the cavoatrial junction.



IMPRESSION: 

No acute cardiopulmonary process.  



Dictated by: 



  Dictated on workstation # [id number]

## 2021-05-18 ENCOUNTER — HOSPITAL ENCOUNTER (EMERGENCY)
Dept: HOSPITAL 75 - ER | Age: 56
Discharge: HOME | End: 2021-05-18
Payer: MEDICAID

## 2021-05-18 VITALS — HEIGHT: 64.96 IN | WEIGHT: 174.17 LBS | BODY MASS INDEX: 29.02 KG/M2

## 2021-05-18 VITALS — SYSTOLIC BLOOD PRESSURE: 144 MMHG | DIASTOLIC BLOOD PRESSURE: 68 MMHG

## 2021-05-18 DIAGNOSIS — M79.601: ICD-10-CM

## 2021-05-18 DIAGNOSIS — I50.9: ICD-10-CM

## 2021-05-18 DIAGNOSIS — E11.65: Primary | ICD-10-CM

## 2021-05-18 DIAGNOSIS — N18.6: ICD-10-CM

## 2021-05-18 DIAGNOSIS — Z79.4: ICD-10-CM

## 2021-05-18 DIAGNOSIS — Z88.5: ICD-10-CM

## 2021-05-18 DIAGNOSIS — Z88.2: ICD-10-CM

## 2021-05-18 DIAGNOSIS — Z79.82: ICD-10-CM

## 2021-05-18 DIAGNOSIS — R11.2: ICD-10-CM

## 2021-05-18 DIAGNOSIS — F32.9: ICD-10-CM

## 2021-05-18 DIAGNOSIS — M06.9: ICD-10-CM

## 2021-05-18 DIAGNOSIS — Z99.2: ICD-10-CM

## 2021-05-18 DIAGNOSIS — I13.2: ICD-10-CM

## 2021-05-18 DIAGNOSIS — J44.9: ICD-10-CM

## 2021-05-18 DIAGNOSIS — Z79.51: ICD-10-CM

## 2021-05-18 DIAGNOSIS — E11.40: ICD-10-CM

## 2021-05-18 DIAGNOSIS — E11.22: ICD-10-CM

## 2021-05-18 DIAGNOSIS — F41.9: ICD-10-CM

## 2021-05-18 DIAGNOSIS — Z79.899: ICD-10-CM

## 2021-05-18 DIAGNOSIS — F17.210: ICD-10-CM

## 2021-05-18 LAB
ALBUMIN SERPL-MCNC: 3.6 GM/DL (ref 3.2–4.5)
ALP SERPL-CCNC: 109 U/L (ref 40–136)
ALT SERPL-CCNC: 15 U/L (ref 0–55)
AMORPH SED URNS QL MICRO: (no result) /LPF
ANISOCYTOSIS BLD QL SMEAR: SLIGHT
APTT BLD: 25 SEC (ref 24–35)
APTT PPP: YELLOW S
BACTERIA #/AREA URNS HPF: NEGATIVE /HPF
BASOPHILS # BLD AUTO: 0.1 10^3/UL (ref 0–0.1)
BASOPHILS NFR BLD AUTO: 1 % (ref 0–10)
BASOPHILS NFR BLD MANUAL: 1 %
BILIRUB SERPL-MCNC: 0.5 MG/DL (ref 0.1–1)
BILIRUB UR QL STRIP: NEGATIVE
BUN/CREAT SERPL: 11
CALCIUM SERPL-MCNC: 8.9 MG/DL (ref 8.5–10.1)
CHLORIDE SERPL-SCNC: 85 MMOL/L (ref 98–107)
CO2 SERPL-SCNC: 11 MMOL/L (ref 21–32)
CREAT SERPL-MCNC: 3.61 MG/DL (ref 0.6–1.3)
D DIMER PPP FEU-MCNC: 1.29 UG/ML (ref 0–0.49)
EOSINOPHIL # BLD AUTO: 0 10^3/UL (ref 0–0.3)
EOSINOPHIL NFR BLD AUTO: 0 % (ref 0–10)
FIBRINOGEN PPP-MCNC: CLEAR MG/DL
GFR SERPLBLD BASED ON 1.73 SQ M-ARVRAT: 13 ML/MIN
GLUCOSE SERPL-MCNC: 897 MG/DL (ref 70–105)
GLUCOSE UR STRIP-MCNC: (no result) MG/DL
HCT VFR BLD CALC: 35 % (ref 35–52)
HGB BLD-MCNC: 11.1 G/DL (ref 11.5–16)
INR PPP: 1.1 (ref 0.8–1.4)
KETONES UR QL STRIP: (no result)
LEUKOCYTE ESTERASE UR QL STRIP: NEGATIVE
LYMPHOCYTES # BLD AUTO: 1.1 10^3/UL (ref 1–4)
LYMPHOCYTES NFR BLD AUTO: 7 % (ref 12–44)
MACROCYTES BLD QL SMEAR: SLIGHT
MANUAL DIFFERENTIAL PERFORMED BLD QL: YES
MCH RBC QN AUTO: 32 PG (ref 25–34)
MCHC RBC AUTO-ENTMCNC: 32 G/DL (ref 32–36)
MCV RBC AUTO: 101 FL (ref 80–99)
MONOCYTES # BLD AUTO: 0.5 10^3/UL (ref 0–1)
MONOCYTES NFR BLD AUTO: 3 % (ref 0–12)
MONOCYTES NFR BLD: 1 %
NEUTROPHILS # BLD AUTO: 13.2 10^3/UL (ref 1.8–7.8)
NEUTROPHILS NFR BLD AUTO: 88 % (ref 42–75)
NEUTS BAND NFR BLD MANUAL: 90 %
NEUTS BAND NFR BLD: 1 %
NITRITE UR QL STRIP: NEGATIVE
PH UR STRIP: 5.5 [PH] (ref 5–9)
PLATELET # BLD: 349 10^3/UL (ref 130–400)
PMV BLD AUTO: 10.1 FL (ref 9–12.2)
POTASSIUM SERPL-SCNC: 4.6 MMOL/L (ref 3.6–5)
PROT SERPL-MCNC: 7.3 GM/DL (ref 6.4–8.2)
PROT UR QL STRIP: (no result)
PROTHROMBIN TIME: 14.2 SEC (ref 12.2–14.7)
RBC #/AREA URNS HPF: (no result) /HPF
SODIUM SERPL-SCNC: 125 MMOL/L (ref 135–145)
SP GR UR STRIP: 1.01 (ref 1.02–1.02)
SQUAMOUS #/AREA URNS HPF: (no result) /HPF
VARIANT LYMPHS NFR BLD MANUAL: 7 %
WBC # BLD AUTO: 14.9 10^3/UL (ref 4.3–11)
WBC #/AREA URNS HPF: (no result) /HPF

## 2021-05-18 PROCEDURE — 80053 COMPREHEN METABOLIC PANEL: CPT

## 2021-05-18 PROCEDURE — 85007 BL SMEAR W/DIFF WBC COUNT: CPT

## 2021-05-18 PROCEDURE — 85379 FIBRIN DEGRADATION QUANT: CPT

## 2021-05-18 PROCEDURE — 85610 PROTHROMBIN TIME: CPT

## 2021-05-18 PROCEDURE — 87040 BLOOD CULTURE FOR BACTERIA: CPT

## 2021-05-18 PROCEDURE — 85730 THROMBOPLASTIN TIME PARTIAL: CPT

## 2021-05-18 PROCEDURE — 81000 URINALYSIS NONAUTO W/SCOPE: CPT

## 2021-05-18 PROCEDURE — 85027 COMPLETE CBC AUTOMATED: CPT

## 2021-05-18 PROCEDURE — 71045 X-RAY EXAM CHEST 1 VIEW: CPT

## 2021-05-18 PROCEDURE — 87088 URINE BACTERIA CULTURE: CPT

## 2021-05-18 PROCEDURE — 84145 PROCALCITONIN (PCT): CPT

## 2021-05-18 PROCEDURE — 86141 C-REACTIVE PROTEIN HS: CPT

## 2021-05-18 PROCEDURE — 36415 COLL VENOUS BLD VENIPUNCTURE: CPT

## 2021-05-18 PROCEDURE — 82947 ASSAY GLUCOSE BLOOD QUANT: CPT

## 2021-05-18 PROCEDURE — 83605 ASSAY OF LACTIC ACID: CPT

## 2021-05-18 PROCEDURE — 87636 SARSCOV2 & INF A&B AMP PRB: CPT

## 2021-05-18 NOTE — ED GENERAL
General


Stated Complaint:  ELEVATED BLOOD SUGAR


Source of Information:  Patient, EMS


Exam Limitations:  No Limitations





History of Present Illness


Date Seen by Provider:  May 18, 2021


Time Seen by Provider:  11:45


Initial Comments


Here by EMS with report of elevated blood sugar, tachycardic and febrile.  Had 

dialysis shunt placed last week.  Gets dialysis on Monday, Wednesday and Friday 

and follows with [facility].  Complains of pain to the surgery site 

of the arm with some surrounding erythema.  Denies Covid symptoms or exposure.  

Did have Covid testing last week.  Denies other exposures overall.  Denies 

significant problems with bowel movement or urination and states that she still 

urinates quite a bit.  She has not been eating or drinking well because of 

vomiting and has been unable to take her pain medicine.  Complains of pain in 

her back and right arm.


Timing/Duration:  2-3 Days, Getting Worse


Severity:  Moderate


Associated Systoms:  No Chest Pain, No Cough; Fever/Chills, Loss of Appetite, 

Nausea/Vomiting; No Shortness of Air; Weakness





Allergies and Home Medications


Allergies


Coded Allergies:  


     morphine (Verified  Allergy, Mild, Vomiting, 20)


     orange juice (Verified  Allergy, Mild, Nausea, 20)


     Sulfa (Sulfonamide Antibiotics) (Unverified  Allergy, Unknown, 6/25/15)


     codeine (Verified  Allergy, Unknown, TAKES ULTRAM AT HOME, 09)


     ketorolac (Verified  Allergy, Unknown, 08)


     tramadol (Verified  Allergy, Unknown, 11)





Home Medications


Albuterol Sulfate 1 Puff Puff, 2 PUFF IH TID PRN for SHORTNESS OF BREATH, 

(Reported)


Aspirin 81 Mg Tab.chew, 81 MG PO DAILY, (Reported)


Diclofenac Sodium 100 Gm Gel..gram., 1 APPLIC TOP QID PRN for CRAMPS, (Reported)


   APPLY TO LEGS 


Ergocalciferol (Vitamin D2) 1,250 Mcg Capsule, 1,250 MCG PO THUR, (Reported)


Fluticasone Propionate 9.9 Ml [place].susp, 2 SPRAY NSEACH DAILY, (Reported)


Furosemide 80 Mg Tablet, 80 MG PO BID, (Reported)


Gabapentin 300 Mg Capsule, 600 MG PO Q8H, (Reported)


   TAKES 2 (300MG) TABS 


Insulin Determir 1,000 Units/10 Ml Soln, 15 UNITS SQ HS


   Prescribed by: MISHA DEVRIES on 21 1229


Insulin Lispro 100 Unit/1 Ml Insuln.pen, 5-15 UNITS SQ AC, (Reported)


Isosorbide Mononitrate 30 Mg Tab.er.24h, 30 MG PO DAILY, (Reported)


Lorazepam 1 Mg Tablet, 1 MG PO TID PRN for ANXIETY, (Reported)


Magnesium Oxide 400 Mg Tablet, 400 MG PO BID, (Reported)


Melatonin 10 Mg Tablet, 10 MG PO HS, (Reported)


Metoclopramide HCl 10 Mg Tablet, 10 MG PO QIDACHS, (Reported)


Metolazone 5 Mg Tablet, 5 MG PO MON,WE,FR, (Reported)


Metoprolol Tartrate 50 Mg Tablet, 75 MG PO BID, (Reported)


   TAKES 1 &  (50MG) TABS 


Montelukast Sodium 10 Mg Tablet, 10 MG PO HS, (Reported)


Multivit-Min/FA/Lycopene/Lut 1 Each Tablet, 1 EA PO DAILY, (Reported)


Naloxone HCl 4 Mg Spray, 1 SPRAY NS UD PRN for UNRESPONSIVE, (Reported)


   ADMINSTER 1 SPRAY IN 1 NOSTRIL 1 TIME- MAY REPEAT IN ALTERNATING NOSTRIL 

EVERY 2-3 MINUTES UNTIL RESPONSIVE OR EMS ARRIVES 


Omeprazole 20 Mg Capsule.dr, 20 MG PO DAILY, (Reported)


Oxycodone HCl 5 Mg Tablet, 5 MG PO DAILY PRN for PAIN-SEVERE (8-10), (Reported)


Torsemide 100 Mg Tablet, 100 MG PO 0800,1500, (Reported)


[Iron Slow Release] 45 TAB, 45 MG PO DAILY, (Reported)





Patient Home Medication List


Home Medication List Reviewed:  Yes





Review of Systems


Review of Systems


Constitutional:  see HPI; No chills; fever, weakness


EENTM:  No nose congestion, No throat pain


Respiratory:  No cough, No short of breath


Cardiovascular:  No chest pain, No edema


Gastrointestinal:  No abdominal pain; nausea, vomiting


Genitourinary:  no symptoms reported


Musculoskeletal:  see HPI, joint pain, muscle pain


Skin:  change in color, lesions


Psychiatric/Neurological:  Denies Headache; Weakness





All Other Systems Reviewed


Negative Unless Noted:  Yes





Past Medical-Social-Family Hx


Past Med/Social Hx:  Reviewed Nursing Past Med/Soc Hx


Patient Social History


Alcohol Use:  Occasionally Uses


Drug of Choice:  METH, THC


Smoking Status:  Current Everyday Smoker


Type Used:  Cigarettes


2nd Hand Smoke Exposure:  No


Recent Hopitalizations:  No





Immunizations Up To Date


Tetanus Booster (TDap):  Unknown


Date of Pneumonia Vaccine:  Oct 1, 2012


Date of Influenza Vaccine:  Oct 1, 2012





Seasonal Allergies


Seasonal Allergies:  Yes





Past Medical History


Surgeries:  Yes (URETERAL STENTS)


Abdominal, Adenoidectomy, Ear Surgery, Hysterectomy, Renal, Tonsillectomy, Tubal

Ligation


Respiratory:  Yes (INTUBATED 20 WITH DKA/RESP FAILURE)


Asthma, Pneumonia, Chronic Bronchitis, COPD


Currently Using CPAP:  No


Currently Using BIPAP:  No


Cardiac:  Yes (CHF)


Deep Vein Thrombosis, High Cholesterol, Hypertension


Neurological:  Yes


Neuropathy


Reproductive Disorders:  Yes


Female Reproductive Disorders:  Menstrual Problems


GYN History:  Hysterectomy, Menopausal


Genitourinary:  Yes (NO  DIALYSIS; URETERAL STENTS FOR KIDNEY STONES)


Renal Failure


Gastrointestinal:  Yes


Hepatitis


Musculoskeletal:  Yes (CHRONIC GENERALIZED PAIN)


Arthritis, Rheumatoid Arthritis, Fractures


Endocrine:  Yes (NON-COMPLIANT; MULTIPLE EPISODES OF DKA)


Diabetes, Insulin dep


HEENT:  Yes (S/P T&A)


Tonsilitis, Glaucoma


Loss of Vision:  Denies


Hearing Impairment:  Denies


Cancer:  No


Psychosocial:  Yes (POLYSUBSTANCE ABUSE)


Anxiety, Personality Disorder, Depression


Integumentary:  No


Blood Disorders:  No


Adverse Reaction/Blood Tranf:  No





Family Medical History


Reviewed Nursing Family Hx





FH: thyroid cancer


  G8 SISTER


FHx: macular degeneration


  19 MOTHER


Glaucoma


  G8 BROTHER


Heart Disease, Cancer, Diabetes





SOCIAL HISTORY:


-ETOH--REGULAR USE--"COUPLE OF DRINKS" SEVERAL NIGHTS A WEEK


-DRUGS--+ METH AND THC USE


-SMOKES > 1 PPD





PAST SURGICAL HISTORY: 


-TONSILLECTOMY AND ADENOIDECTOMY


-BILATERAL TUBAL LIGATION


-HYSTERECTOMY


-EXPLORATORY LAPAROSCOPY


-URETERAL STENTS FOR KIDNEY STONES/CYSTOSCOPIES


-EAR SURGERY





Physical Exam-Suspected Sepsis


Physical Exam


Vital Signs





Vital Signs - First Documented








 21





 11:30


 


Temp 37.1


 


Pulse 125


 


Resp 24


 


B/P (MAP) 144/71 (95)


 


Pulse Ox 98





Capillary Refill :


Height, Weight, BMI


Height: 5'5.00"


Weight: 151lbs. 1.0oz. [id number]; 29.00 BMI


Method:Stated


General Appearance:  No Apparent Distress, WD/WN


HEENT:  PERRL/EOMI, Pharynx Normal


Neck:  Non Tender, Supple


Respiratory:  Lungs Clear, Normal Breath Sounds


Cardiovascular:  No Murmur, Tachycardia


Gastrointestinal:  Non Tender, Soft


Back:  Normal Inspection, No CVA Tenderness, No Vertebral Tenderness


Extremity:  Normal Range of Motion, No Calf Tenderness, No Pedal Edema, Other 

(Tenderness to the right arm.  Surgical wound covered by island dressing with 

surrounding erythema.  No obvious purulent drainage.)


Neurologic/Psychiatric:  Alert, Oriented x3, No Motor/Sensory Deficits


Skin:  warm/dry; No rash, No ulcerations; other (Erythema has described above.  

Island dressing removed and shows the wound line to be stapled and clean, dry 

and intact without purulent drainage.)





Focused Exam


Lactate Level


21 11:42: Lactic Acid Level 2.42*H


21 14:23: Lactic Acid Level 1.76





Lactic Acid Level





Laboratory Tests








Test


 21


14:23


 


Lactic Acid Level


 1.76 MMOL/L


(0.50-2.00)











Procedures/Interventions


Date of ETT Placement:  2020


Time of ETT Placement:  0700





Progress/Results/Core Measures


Suspected Sepsis


SIRS


Temperature: 


Pulse:  


Respiratory Rate: 


 


Laboratory Tests


21 11:42: White Blood Count 14.9H


Blood Pressure  / 


Mean: 


 


21 11:42: Lactic Acid Level 2.42*H


21 14:23: Lactic Acid Level 1.76


Laboratory Tests


21 11:42: 


Creatinine 3.61H, INR Comment 1.1, Platelet Count 349, Total Bilirubin 0.5








Results/Orders


Lab Results





Laboratory Tests








Test


 21


11:42 21


14:21 21


14:23 21


14:55 Range/Units


 


 


White Blood Count


 14.9 H


 


 


 


 4.3-11.0


10^3/uL


 


Red Blood Count


 3.47 L


 


 


 


 3.80-5.11


10^6/uL


 


Hemoglobin 11.1 L    11.5-16.0  g/dL


 


Hematocrit 35     35-52  %


 


Mean Corpuscular Volume 101 H    80-99  fL


 


Mean Corpuscular Hemoglobin 32     25-34  pg


 


Mean Corpuscular Hemoglobin


Concent 32 


 


 


 


 32-36  g/dL





 


Red Cell Distribution Width 14.1     10.0-14.5  %


 


Platelet Count


 349 


 


 


 


 130-400


10^3/uL


 


Mean Platelet Volume 10.1     9.0-12.2  fL


 


Immature Granulocyte % (Auto) 1      %


 


Neutrophils (%) (Auto) 88 H    42-75  %


 


Lymphocytes (%) (Auto) 7 L    12-44  %


 


Monocytes (%) (Auto) 3     0-12  %


 


Eosinophils (%) (Auto) 0     0-10  %


 


Basophils (%) (Auto) 1     0-10  %


 


Neutrophils # (Auto)


 13.2 H


 


 


 


 1.8-7.8


10^3/uL


 


Lymphocytes # (Auto)


 1.1 


 


 


 


 1.0-4.0


10^3/uL


 


Monocytes # (Auto)


 0.5 


 


 


 


 0.0-1.0


10^3/uL


 


Eosinophils # (Auto)


 0.0 


 


 


 


 0.0-0.3


10^3/uL


 


Basophils # (Auto)


 0.1 


 


 


 


 0.0-0.1


10^3/uL


 


Immature Granulocyte # (Auto)


 0.1 


 


 


 


 0.0-0.1


10^3/uL


 


Neutrophils % (Manual) 90      %


 


Lymphocytes % (Manual) 7      %


 


Monocytes % (Manual) 1      %


 


Basophils % (Manual) 1      %


 


Band Neutrophils 1      %


 


Anisocytosis SLIGHT      


 


Macrocytosis SLIGHT      


 


Prothrombin Time 14.2     12.2-14.7  SEC


 


INR Comment 1.1     0.8-1.4  


 


Activated Partial


Thromboplast Time 25 


 


 


 


 24-35  SEC





 


D-Dimer


 1.29 H


 


 


 


 0.00-0.49


UG/ML


 


Sodium Level 125 *L    135-145  MMOL/L


 


Potassium Level 4.6     3.6-5.0  MMOL/L


 


Chloride Level 85 L      MMOL/L


 


Carbon Dioxide Level 11 L    21-32  MMOL/L


 


Anion Gap 29 H    5-14  MMOL/L


 


Blood Urea Nitrogen 39 H    7-18  MG/DL


 


Creatinine


 3.61 H


 


 


 


 0.60-1.30


MG/DL


 


Estimat Glomerular Filtration


Rate 13 


 


 


 


  





 


BUN/Creatinine Ratio 11      


 


Glucose Level 897 *H      MG/DL


 


Lactic Acid Level


 2.42 *H


 


 1.76 


 


 0.50-2.00


MMOL/L


 


Calcium Level 8.9     8.5-10.1  MG/DL


 


Corrected Calcium 9.2     8.5-10.1  MG/DL


 


Total Bilirubin 0.5     0.1-1.0  MG/DL


 


Aspartate Amino Transf


(AST/SGOT) 14 


 


 


 


 5-34  U/L





 


Alanine Aminotransferase


(ALT/SGPT) 15 


 


 


 


 0-55  U/L





 


Alkaline Phosphatase 109       U/L


 


C-Reactive Protein High


Sensitivity 5.54 H


 


 


 


 0.00-0.50


MG/DL


 


Total Protein 7.3     6.4-8.2  GM/DL


 


Albumin 3.6     3.2-4.5  GM/DL


 


Procalcitonin 0.25 H    <0.10  NG/ML


 


SARS-CoV-2 RNA (RT-PCR) Not Detected     Not Detecte  


 


Glucometer  588 *H     MG/DL


 


Urine Color    YELLOW   


 


Urine Clarity    CLEAR   


 


Urine pH    5.5  5-9  


 


Urine Specific Gravity    1.015 L 1.016-1.022  


 


Urine Protein    1+ H NEGATIVE  


 


Urine Glucose (UA)    3+ H NEGATIVE  


 


Urine Ketones    2+ H NEGATIVE  


 


Urine Nitrite    NEGATIVE  NEGATIVE  


 


Urine Bilirubin    NEGATIVE  NEGATIVE  


 


Urine Urobilinogen    0.2  < = 1.0  MG/DL


 


Urine Leukocyte Esterase    NEGATIVE  NEGATIVE  


 


Urine RBC (Auto)    NEGATIVE  NEGATIVE  


 


Urine RBC    NONE   /HPF


 


Urine WBC    NONE   /HPF


 


Urine Squamous Epithelial


Cells 


 


 


 2-5 


  /HPF





 


Urine Crystals    PRESENT H  /LPF


 


Urine Amorphous Sediment


 


 


 


 LARGE NATALIE


URATES H  /LPF





 


Urine Bacteria    NEGATIVE   /HPF


 


Urine Casts    NONE   /LPF


 


Urine Mucus    NEGATIVE   /LPF


 


Urine Culture Indicated    NO   


 


Test


 21


16:07 


 


 


 Range/Units


 


 


Glucometer 466 *H      MG/DL








My Orders





Orders - MELODY AGUIRRE MD


Cbc With Automated Diff (21 11:55)


Comprehensive Metabolic Panel (21 11:55)


Blood Culture (21 11:55)


Sputum Culture (21 11:55)


Urinalysis (21 11:55)


Urine Culture (21 11:55)


Protime With Inr (21 11:55)


Partial Thromboplastin Time (21 11:55)


Chest 1 View, Ap/Pa Only (21 11:55)


Ed Iv/Invasive Line Start (21 11:55)


Vital Signs Adult Sepsis Patie Q15M (21 11:55)


O2 (21 11:55)


Remove Rings In Anticipation O (21 11:55)


Lactic Acid Analyzer (21 11:55)


Fibrin Degradation Products (21 11:55)


Procalcitonin (Pct) (21 11:55)


Hs C Reactive Protein (21 11:55)


Covid 19 Inhouse Test (21 11:55)


Fentanyl  Inj (Sublimaze Injection) (21 11:55)


Manual Differential (21 11:42)


Insulin (Regular) Human (Novolin R (Per (21 12:19)


Insulin (Regular) Human (Novolin R (Per (21 12:12)


Fentanyl  Inj (Sublimaze Injection) (21 14:04)


Oxycodone Immediate Rel Tablet (Oxyir Ta (21 16:15)


Accucheck Stat ONCE (21 16:04)


Insulin (Regular) Human (Novolin R (Per (21 16:09)





Medications Given in ED





Current Medications








 Medications  Dose


 Ordered  Sig/Corin


 Route  Start Time


 Stop Time Status Last Admin


Dose Admin


 


 Oxycodone HCl  5 mg  ONCE  ONCE


 PO  21 16:15


 21 16:16 DC 21 16:14


5 MG








Vital Signs/I&O











 21





 11:30


 


Temp 37.1


 


Pulse 125


 


Resp 24


 


B/P (MAP) 144/71 (95)


 


Pulse Ox 98





Capillary Refill :


Progress Note :  


Progress Note


Seen and evaluated on arrival by EMS.  IV established by EMS which we will 

continue at approximately 125-150/ml an hour.  Sepsis protocol initiated and we 

will also repeat Covid testing.  This was negative last week when she had her 

procedure done.  EMS did report febrile on their testing as well as blood sugars

too high to read on their meter.  Fentanyl 50 mcg IV ordered for pain.  Monitor 

patient.  1420: Evaluation is concerning for possibility of infection including 

elevated lactic acid.  Patient's blood sugar is quite uncontrolled.  Blood sugar

now down to 500s after 10 units of insulin IV.  We are still pending UA.  We do 

not have dialysis capability here to continue management of this patient if she 

needs admission and she would need to be transferred.  This was discussed with 

the patient and she has history and relationship at [facility] in [place], [place].  This is the center that she would like to go to if needed.  Patient 

did get 75 mcg of fentanyl IV for persistent right upper extremity pain.  Mon

itor patient.  1604: Patient is actually feeling a little better now.  UA is 

negative for infection.  Chest x-ray is also negative for pneumonia or volume 

overload.  Arm wound appears appropriate and without significant infection.  If 

patient can tolerate p.o. then she might be able to go home versus 

admission/transfer.  We will recheck her blood sugar and adjust insulin as 

needed and give her oxycodone 5 mg 1 tab p.o. now and see if she can tolerate 

p.o.  Patient is fully in agreement with this plan and would prefer to go home 

if possible.  1656: Patient is tolerated med without difficulty and is overall 

feeling better.  She would like to go home and I think this is safe to do.  She 

will follow-up for her dialysis tomorrow.  Her daughter will bring her home.  

Discharged home with return precautions.  Patient verbalized understanding 

instructions and agreement with plan.





Diagnostic Imaging





   Diagonstic Imaging:  Xray


   Plain Films/CT/US/NM/MRI:  chest


Comments


                 [facility].


                                [place], [place]





NAME:   DAYTON BLISS


MED REC#:   [id number]


ACCOUNT#:   [id number]


PT STATUS:   REG ER


:   1965


PHYSICIAN:   MELODY AGUIRRE MD


ADMIT DATE:   21/ER


                                   ***Draft***


Date of Exam:21





CHEST 1 VIEW, AP/PA ONLY








INDICATION: Sepsis.





EXAMINATION: Portable chest at 12:52 p.m.





FINDINGS: Heart size and pulmonary vascularity are normal. Lungs


are clear. There are no effusions or pneumothoraces. There is a


dual-lumen right IJ central line with tip projecting over the


cavoatrial junction.





IMPRESSION: No acute abnormalities in the chest.





  Dictated on workstation # [id number]








Dict:   21 1320


Trans:   21 1336


[id number] [id number]





Interpreted by:     MELODY MORENO MD


Electronically signed by:





Departure


Impression





   Primary Impression:  


   Uncontrolled diabetes mellitus with hyperglycemia


   Qualified Codes:  E13.65 - Other specified diabetes mellitus with 

   hyperglycemia


   Additional Impressions:  


   Nausea and vomiting


   Qualified Codes:  R11.2 - Nausea with vomiting, unspecified


   End stage renal disease on dialysis


Disposition:  01 HOME, SELF-CARE


Condition:  Stable





Departure-Patient Inst.


Decision time for Depature:  16:58


Referrals:  


NO,LOCAL PHYSICIAN (PCP/Family)


Primary Care Physician


Patient Instructions:  Nausea and Vomiting, Adult, Hyperglycemia, Adult (DC), 

Chronic Kidney Disease (DC)





Add. Discharge Instructions:  


Continue home medications as previously prescribed.  Keep your dialysis 

appointment tomorrow.  Follow-up with your surgeon for recheck of the wound as 

previously scheduled.  Return for worse pain, fever, vomiting, weakness, 

breathing problems or other concerns as needed.











MELODY AGUIRRE MD          May 18, 2021 12:01

## 2021-05-18 NOTE — DIAGNOSTIC IMAGING REPORT
INDICATION: Sepsis.



EXAMINATION: Portable chest at 12:52 p.m.



FINDINGS: Heart size and pulmonary vascularity are normal. Lungs

are clear. There are no effusions or pneumothoraces. There is a

dual-lumen right IJ central line with tip projecting over the

cavoatrial junction.



IMPRESSION: No acute abnormalities in the chest.



Dictated by: 



  Dictated on workstation # [id number]

## 2021-07-22 ENCOUNTER — HOSPITAL ENCOUNTER (EMERGENCY)
Dept: HOSPITAL 75 - ER | Age: 56
Discharge: HOME | End: 2021-07-22
Payer: MEDICAID

## 2021-07-22 VITALS — HEIGHT: 64.96 IN | WEIGHT: 158.73 LBS | BODY MASS INDEX: 26.45 KG/M2

## 2021-07-22 VITALS — SYSTOLIC BLOOD PRESSURE: 112 MMHG | DIASTOLIC BLOOD PRESSURE: 84 MMHG

## 2021-07-22 DIAGNOSIS — I50.9: ICD-10-CM

## 2021-07-22 DIAGNOSIS — E11.9: ICD-10-CM

## 2021-07-22 DIAGNOSIS — I11.0: ICD-10-CM

## 2021-07-22 DIAGNOSIS — F41.9: ICD-10-CM

## 2021-07-22 DIAGNOSIS — J44.9: ICD-10-CM

## 2021-07-22 DIAGNOSIS — Z79.4: ICD-10-CM

## 2021-07-22 DIAGNOSIS — Z79.899: ICD-10-CM

## 2021-07-22 DIAGNOSIS — Z79.82: ICD-10-CM

## 2021-07-22 DIAGNOSIS — L50.9: Primary | ICD-10-CM

## 2021-07-22 NOTE — ED GENERAL
General


Stated Complaint:  ALLERGIC REACTION


Source of Information:  Patient


Exam Limitations:  No Limitations





History of Present Illness


Date Seen by Provider:  Jul 22, 2021


Time Seen by Provider:  13:36


Initial Comments


With bilateral MRI and generalized facial redness swelling and itching sudden 

onset 4 days ago.  This began after drinking some different pineapple juice that

she is not used to.  However she has not had any more of it and the rash 

persists.  She has been taking 2 Benadryl every 4-6 hours


Timing/Duration:  1-2 Days


Severity:  Moderate


Associated Systoms:  Nausea/Vomiting





Allergies and Home Medications


Allergies


Coded Allergies:  


     morphine (Verified  Allergy, Mild, Vomiting, 7/4/20)


     orange juice (Verified  Allergy, Mild, Nausea, 7/8/20)


     Sulfa (Sulfonamide Antibiotics) (Unverified  Allergy, Unknown, 6/25/15)


     codeine (Verified  Allergy, Unknown, TAKES ULTRAM AT HOME, 1/2/09)


     ketorolac (Verified  Allergy, Unknown, 11/19/08)


     tramadol (Verified  Allergy, Unknown, 12/17/11)





Home Medications


Albuterol Sulfate 1 Puff Puff, 2 PUFF IH TID PRN for SHORTNESS OF BREATH, 

(Reported)


Aspirin 81 Mg Tab.chew, 81 MG PO DAILY, (Reported)


Diclofenac Sodium 100 Gm Gel..gram., 1 APPLIC TOP QID PRN for CRAMPS, (Reported)


   APPLY TO LEGS 


Ergocalciferol (Vitamin D2) 1,250 Mcg Capsule, 1,250 MCG PO THUR, (Reported)


Fluticasone Propionate 9.9 Ml [place].susp, 2 SPRAY NSEACH DAILY, (Reported)


Furosemide 80 Mg Tablet, 80 MG PO BID, (Reported)


Gabapentin 300 Mg Capsule, 600 MG PO Q8H, (Reported)


   TAKES 2 (300MG) TABS 


Insulin Determir 1,000 Units/10 Ml Soln, 15 UNITS SQ HS


   Prescribed by: MISHA DEVRIES on 1/12/21 1229


Insulin Lispro 100 Unit/1 Ml Insuln.pen, 5-15 UNITS SQ AC, (Reported)


Isosorbide Mononitrate 30 Mg Tab.er.24h, 30 MG PO DAILY, (Reported)


Lorazepam 1 Mg Tablet, 1 MG PO TID PRN for ANXIETY, (Reported)


Magnesium Oxide 400 Mg Tablet, 400 MG PO BID, (Reported)


Melatonin 10 Mg Tablet, 10 MG PO HS, (Reported)


Metoclopramide HCl 10 Mg Tablet, 10 MG PO QIDACHS, (Reported)


Metolazone 5 Mg Tablet, 5 MG PO MON,WE,FR, (Reported)


Metoprolol Tartrate 50 Mg Tablet, 75 MG PO BID, (Reported)


   TAKES 1 &  (50MG) TABS 


Montelukast Sodium 10 Mg Tablet, 10 MG PO HS, (Reported)


Multivit-Min/FA/Lycopene/Lut 1 Each Tablet, 1 EA PO DAILY, (Reported)


Naloxone HCl 4 Mg Spray, 1 SPRAY NS UD PRN for UNRESPONSIVE, (Reported)


   ADMINSTER 1 SPRAY IN 1 NOSTRIL 1 TIME- MAY REPEAT IN ALTERNATING NOSTRIL 

EVERY 2-3 MINUTES UNTIL RESPONSIVE OR EMS ARRIVES 


Omeprazole 20 Mg Capsule.dr, 20 MG PO DAILY, (Reported)


Oxycodone HCl 5 Mg Tablet, 5 MG PO DAILY PRN for PAIN-SEVERE (8-10), (Reported)


Torsemide 100 Mg Tablet, 100 MG PO 0800,1500, (Reported)


[Iron Slow Release] 45 TAB, 45 MG PO DAILY, (Reported)





Patient Home Medication List


Home Medication List Reviewed:  Yes





Review of Systems


Review of Systems


Constitutional:  see HPI


EENTM:  see HPI


Respiratory:  no symptoms reported


Cardiovascular:  no symptoms reported


Genitourinary:  no symptoms reported


Musculoskeletal:  no symptoms reported


Skin:  see HPI, pruritus, rash


Psychiatric/Neurological:  No Symptoms Reported


Hematologic/Lymphatic:  No Symptoms Reported


Immunological/Allergic:  no symptoms reported





Past Medical-Social-Family Hx


Immunizations Up To Date


Tetanus Booster (TDap):  Unknown





Seasonal Allergies


Seasonal Allergies:  Yes





Past Medical History


Surgeries:  Yes (URETERAL STENTS)


Abdominal, Adenoidectomy, Ear Surgery, Hysterectomy, Renal, Tonsillectomy, Tubal

Ligation


Respiratory:  Yes (INTUBATED 06/30/20 WITH DKA/RESP FAILURE)


Asthma, Pneumonia, Chronic Bronchitis, COPD


Currently Using CPAP:  No


Currently Using BIPAP:  No


Cardiac:  Yes (CHF)


Deep Vein Thrombosis, High Cholesterol, Hypertension


Neurological:  Yes


Neuropathy


Reproductive Disorders:  Yes


Female Reproductive Disorders:  Menstrual Problems


GYN History:  Hysterectomy, Menopausal


Genitourinary:  Yes (NO  DIALYSIS; URETERAL STENTS FOR KIDNEY STONES)


Renal Failure


Gastrointestinal:  Yes


Hepatitis


Musculoskeletal:  Yes (CHRONIC GENERALIZED PAIN)


Arthritis, Rheumatoid Arthritis, Fractures


Endocrine:  Yes (NON-COMPLIANT; MULTIPLE EPISODES OF DKA)


Diabetes, Insulin dep


HEENT:  Yes (S/P T&A)


Tonsilitis, Glaucoma


Loss of Vision:  Denies


Hearing Impairment:  Denies


Cancer:  No


Psychosocial:  Yes (POLYSUBSTANCE ABUSE)


Anxiety, Personality Disorder, Depression


Integumentary:  No


Blood Disorders:  No


Adverse Reaction/Blood Tranf:  No





Family Medical History





FH: thyroid cancer


  G8 SISTER


FHx: macular degeneration


  19 MOTHER


Glaucoma


  G8 BROTHER


Heart Disease, Cancer, Diabetes





SOCIAL HISTORY:


-ETOH--REGULAR USE--"COUPLE OF DRINKS" SEVERAL NIGHTS A WEEK


-DRUGS--+ METH AND THC USE


-SMOKES > 1 PPD





PAST SURGICAL HISTORY: 


-TONSILLECTOMY AND ADENOIDECTOMY


-BILATERAL TUBAL LIGATION


-HYSTERECTOMY


-EXPLORATORY LAPAROSCOPY


-URETERAL STENTS FOR KIDNEY STONES/CYSTOSCOPIES


-EAR SURGERY





Physical Exam


Vital Signs


Capillary Refill :


Height, Weight, BMI


Height: 5'5.00"


Weight: 151lbs. 1.0oz. [id number]; 29.00 BMI


Method:Stated


General Appearance:  No Apparent Distress, WD/WN, Other (No intraoral swelling 

or edema.  There is bilateral periorbital edema and erythema.)


Eyes:  Bilateral Eye Normal Inspection, Bilateral Eye PERRL


HEENT:  PERRL/EOMI, TMs Normal


Neck:  Full Range of Motion, Normal Inspection


Respiratory:  Normal Breath Sounds, No Accessory Muscle Use, No Respiratory Dis

tress


Cardiovascular:  Regular Rate, Rhythm, Normal Peripheral Pulses


Gastrointestinal:  Normal Bowel Sounds, Non Tender, Soft


Extremity:  Normal Capillary Refill, Normal Inspection


Neurologic/Psychiatric:  Alert, Oriented x3


Skin:  Normal Color, Warm/Dry





Procedures/Interventions


Date of ETT Placement:  Jul 4, 2020


Time of ETT Placement:  0700





Progress/Results/Core Measures


Suspected Sepsis


SIRS


Temperature: 


Pulse:  


Respiratory Rate: 


 


Blood Pressure  / 


Mean:





Results/Orders


My Orders





Orders - LIAM GAONA


Diphenhydramine Injection (Benadryl Inje (7/22/21 13:45)


Methylprednisolone Sod Succ (Solu-Medrol (7/22/21 13:45)


Famotidine Injection (Pepcid Injection) (7/22/21 13:45)





Vital Signs/I&O


Capillary Refill :





Departure


Communication (Admissions)


Given that there is minimal improvement with Benadryl use at home, she has end-

stage renal disease formerly on hemodialysis.  See anything that confirmed 

infectious etiology but I will do some Keflex for a couple of days in addition 

to steroids.  Hello





Impression





   Primary Impression:  


   Urticaria


Disposition:  01 HOME, SELF-CARE


Condition:  Stable





Departure-Patient Inst.


Decision time for Depature:  13:39


Referrals:  


ABIGAIL BANDA DO (PCP/Family)


Primary Care Physician


Patient Instructions:  Hives





Add. Discharge Instructions:  


Because of the low risk but possibility of a bacterial infection contributing to

this, take the antibiotics as directed in addition to steroids.  Continue with 

Benadryl.


Scripts


Cephalexin (Cephalexin) 500 Mg Tablet


500 MG PO TID, #10 TAB


   Prov: LIAM GAONA         7/22/21 


Prednisone (Prednisone) 20 Mg Tab


40 MG PO DAILY, #6 TAB 0 Refills


   Prov: LIAM GAONA         7/22/21











LIAM GAONA             Jul 22, 2021 13:42

## 2022-01-15 ENCOUNTER — HOSPITAL ENCOUNTER (EMERGENCY)
Dept: HOSPITAL 75 - ER | Age: 57
Discharge: ANOTHER HOSPITAL | End: 2022-01-15
Payer: MEDICAID

## 2022-01-15 VITALS — DIASTOLIC BLOOD PRESSURE: 70 MMHG | SYSTOLIC BLOOD PRESSURE: 129 MMHG

## 2022-01-15 VITALS — HEIGHT: 65 IN | BODY MASS INDEX: 27 KG/M2 | WEIGHT: 162.04 LBS

## 2022-01-15 DIAGNOSIS — E10.40: ICD-10-CM

## 2022-01-15 DIAGNOSIS — Z79.899: ICD-10-CM

## 2022-01-15 DIAGNOSIS — F32.A: ICD-10-CM

## 2022-01-15 DIAGNOSIS — J44.9: ICD-10-CM

## 2022-01-15 DIAGNOSIS — Z91.19: ICD-10-CM

## 2022-01-15 DIAGNOSIS — F15.90: ICD-10-CM

## 2022-01-15 DIAGNOSIS — E87.5: ICD-10-CM

## 2022-01-15 DIAGNOSIS — Z79.4: ICD-10-CM

## 2022-01-15 DIAGNOSIS — N18.6: ICD-10-CM

## 2022-01-15 DIAGNOSIS — F10.10: ICD-10-CM

## 2022-01-15 DIAGNOSIS — E10.22: ICD-10-CM

## 2022-01-15 DIAGNOSIS — I12.0: ICD-10-CM

## 2022-01-15 DIAGNOSIS — E10.10: Primary | ICD-10-CM

## 2022-01-15 DIAGNOSIS — F60.9: ICD-10-CM

## 2022-01-15 DIAGNOSIS — F41.9: ICD-10-CM

## 2022-01-15 DIAGNOSIS — F17.210: ICD-10-CM

## 2022-01-15 DIAGNOSIS — E78.00: ICD-10-CM

## 2022-01-15 DIAGNOSIS — E87.1: ICD-10-CM

## 2022-01-15 DIAGNOSIS — Z20.822: ICD-10-CM

## 2022-01-15 DIAGNOSIS — Z99.2: ICD-10-CM

## 2022-01-15 LAB
ALBUMIN SERPL-MCNC: 4 GM/DL (ref 3.2–4.5)
ALP SERPL-CCNC: 89 U/L (ref 40–136)
ALT SERPL-CCNC: 23 U/L (ref 0–55)
AMYLASE SERPL-CCNC: 24 U/L (ref 25–125)
APTT BLD: 29 SEC (ref 24–35)
APTT PPP: YELLOW S
BACTERIA #/AREA URNS HPF: (no result) /HPF
BARBITURATES UR QL: NEGATIVE
BASE EXCESS STD BLDA CALC-SCNC: -17.3 MMOL/L (ref -2.5–2.5)
BASOPHILS # BLD AUTO: 0.1 10^3/UL (ref 0–0.1)
BASOPHILS NFR BLD AUTO: 1 % (ref 0–10)
BDY SITE: (no result)
BENZODIAZ UR QL SCN: NEGATIVE
BILIRUB SERPL-MCNC: 0.7 MG/DL (ref 0.1–1)
BILIRUB UR QL STRIP: NEGATIVE
BODY TEMPERATURE: 36.6
BUN/CREAT SERPL: 16
BUN/CREAT SERPL: 17
BUN/CREAT SERPL: 18
CALCIUM SERPL-MCNC: 8.6 MG/DL (ref 8.5–10.1)
CALCIUM SERPL-MCNC: 9.1 MG/DL (ref 8.5–10.1)
CALCIUM SERPL-MCNC: 9.4 MG/DL (ref 8.5–10.1)
CHLORIDE SERPL-SCNC: 63 MMOL/L (ref 98–107)
CHLORIDE SERPL-SCNC: 70 MMOL/L (ref 98–107)
CHLORIDE SERPL-SCNC: 83 MMOL/L (ref 98–107)
CK MB SERPL-MCNC: 2.3 NG/ML (ref ?–6.6)
CK SERPL-CCNC: 49 U/L (ref 29–168)
CO2 BLDA CALC-SCNC: 9.6 MMOL/L (ref 21–31)
CO2 SERPL-SCNC: 10 MMOL/L (ref 21–32)
CO2 SERPL-SCNC: 21 MMOL/L (ref 21–32)
CO2 SERPL-SCNC: 7 MMOL/L (ref 21–32)
COCAINE UR QL: NEGATIVE
CREAT SERPL-MCNC: 5.05 MG/DL (ref 0.6–1.3)
CREAT SERPL-MCNC: 5.13 MG/DL (ref 0.6–1.3)
CREAT SERPL-MCNC: 5.18 MG/DL (ref 0.6–1.3)
D DIMER PPP FEU-MCNC: 0.42 UG/ML (ref 0–0.49)
EOSINOPHIL # BLD AUTO: 0 10^3/UL (ref 0–0.3)
EOSINOPHIL NFR BLD AUTO: 0 % (ref 0–10)
ERYTHROCYTE [SEDIMENTATION RATE] IN BLOOD: 29 MM/HR (ref 0–30)
FIBRINOGEN PPP-MCNC: CLEAR MG/DL
GFR SERPLBLD BASED ON 1.73 SQ M-ARVRAT: 9 ML/MIN
GLUCOSE SERPL-MCNC: 1302 MG/DL (ref 70–105)
GLUCOSE SERPL-MCNC: 1445 MG/DL (ref 70–105)
GLUCOSE SERPL-MCNC: 779 MG/DL (ref 70–105)
GLUCOSE UR STRIP-MCNC: (no result) MG/DL
HCT VFR BLD CALC: 42 % (ref 35–52)
HGB BLD-MCNC: 12.3 G/DL (ref 11.5–16)
HYALINE CASTS #/AREA URNS LPF: (no result) /LPF
INHALED O2 FLOW RATE: (no result) L/MIN
INR PPP: 1.4 (ref 0.8–1.4)
KETONES UR QL STRIP: (no result)
LEUKOCYTE ESTERASE UR QL STRIP: NEGATIVE
LIPASE SERPL-CCNC: 22 U/L (ref 8–78)
LYMPHOCYTES # BLD AUTO: 1.4 10^3/UL (ref 1–4)
LYMPHOCYTES NFR BLD AUTO: 7 % (ref 12–44)
MACROCYTES BLD QL SMEAR: (no result)
MAGNESIUM SERPL-MCNC: 2.9 MG/DL (ref 1.6–2.4)
MAGNESIUM SERPL-MCNC: 3.5 MG/DL (ref 1.6–2.4)
MANUAL DIFFERENTIAL PERFORMED BLD QL: YES
MCH RBC QN AUTO: 33 PG (ref 25–34)
MCHC RBC AUTO-ENTMCNC: 29 G/DL (ref 32–36)
MCV RBC AUTO: 113 FL (ref 80–99)
METHADONE UR QL SCN: NEGATIVE
METHAMPHETAMINE SCREEN URINE S: POSITIVE
MONOCYTES # BLD AUTO: 1.6 10^3/UL (ref 0–1)
MONOCYTES NFR BLD AUTO: 8 % (ref 0–12)
MONOCYTES NFR BLD: 6 %
NEUTROPHILS # BLD AUTO: 16.2 10^3/UL (ref 1.8–7.8)
NEUTROPHILS NFR BLD AUTO: 83 % (ref 42–75)
NEUTS BAND NFR BLD MANUAL: 85 %
NITRITE UR QL STRIP: NEGATIVE
OPIATES UR QL SCN: NEGATIVE
OXYCODONE UR QL: NEGATIVE
PCO2 BLDA: 22 MMHG (ref 35–45)
PH BLDA: 7.24 [PH] (ref 7.37–7.43)
PH UR STRIP: 6 [PH] (ref 5–9)
PLATELET # BLD: 309 10^3/UL (ref 130–400)
PMV BLD AUTO: 11.2 FL (ref 9–12.2)
PO2 BLDA: 84 MMHG (ref 79–93)
POTASSIUM SERPL-SCNC: 3.8 MMOL/L (ref 3.6–5)
POTASSIUM SERPL-SCNC: 4.6 MMOL/L (ref 3.6–5)
POTASSIUM SERPL-SCNC: 6.2 MMOL/L (ref 3.6–5)
PROPOXYPH UR QL: NEGATIVE
PROT SERPL-MCNC: 7.4 GM/DL (ref 6.4–8.2)
PROT UR QL STRIP: (no result)
PROTHROMBIN TIME: 17.3 SEC (ref 12.2–14.7)
RBC #/AREA URNS HPF: (no result) /HPF
SAO2 % BLDA FROM PO2: 93 % (ref 94–100)
SODIUM SERPL-SCNC: 118 MMOL/L (ref 135–145)
SODIUM SERPL-SCNC: 124 MMOL/L (ref 135–145)
SODIUM SERPL-SCNC: 128 MMOL/L (ref 135–145)
SP GR UR STRIP: 1.02 (ref 1.02–1.02)
SQUAMOUS #/AREA URNS HPF: (no result) /HPF
TRICYCLICS UR QL SCN: NEGATIVE
VARIANT LYMPHS NFR BLD MANUAL: 9 %
VENTILATION MODE VENT: NO
WBC # BLD AUTO: 19.6 10^3/UL (ref 4.3–11)
WBC #/AREA URNS HPF: (no result) /HPF

## 2022-01-15 PROCEDURE — 83690 ASSAY OF LIPASE: CPT

## 2022-01-15 PROCEDURE — 80306 DRUG TEST PRSMV INSTRMNT: CPT

## 2022-01-15 PROCEDURE — 86141 C-REACTIVE PROTEIN HS: CPT

## 2022-01-15 PROCEDURE — 85027 COMPLETE CBC AUTOMATED: CPT

## 2022-01-15 PROCEDURE — 80053 COMPREHEN METABOLIC PANEL: CPT

## 2022-01-15 PROCEDURE — 83735 ASSAY OF MAGNESIUM: CPT

## 2022-01-15 PROCEDURE — 84145 PROCALCITONIN (PCT): CPT

## 2022-01-15 PROCEDURE — 93005 ELECTROCARDIOGRAM TRACING: CPT

## 2022-01-15 PROCEDURE — 83874 ASSAY OF MYOGLOBIN: CPT

## 2022-01-15 PROCEDURE — 93041 RHYTHM ECG TRACING: CPT

## 2022-01-15 PROCEDURE — 85379 FIBRIN DEGRADATION QUANT: CPT

## 2022-01-15 PROCEDURE — 85730 THROMBOPLASTIN TIME PARTIAL: CPT

## 2022-01-15 PROCEDURE — 36415 COLL VENOUS BLD VENIPUNCTURE: CPT

## 2022-01-15 PROCEDURE — 80320 DRUG SCREEN QUANTALCOHOLS: CPT

## 2022-01-15 PROCEDURE — 80048 BASIC METABOLIC PNL TOTAL CA: CPT

## 2022-01-15 PROCEDURE — 84484 ASSAY OF TROPONIN QUANT: CPT

## 2022-01-15 PROCEDURE — 82947 ASSAY GLUCOSE BLOOD QUANT: CPT

## 2022-01-15 PROCEDURE — 96365 THER/PROPH/DIAG IV INF INIT: CPT

## 2022-01-15 PROCEDURE — 96366 THER/PROPH/DIAG IV INF ADDON: CPT

## 2022-01-15 PROCEDURE — 96376 TX/PRO/DX INJ SAME DRUG ADON: CPT

## 2022-01-15 PROCEDURE — 81000 URINALYSIS NONAUTO W/SCOPE: CPT

## 2022-01-15 PROCEDURE — 85610 PROTHROMBIN TIME: CPT

## 2022-01-15 PROCEDURE — 96361 HYDRATE IV INFUSION ADD-ON: CPT

## 2022-01-15 PROCEDURE — 82550 ASSAY OF CK (CPK): CPT

## 2022-01-15 PROCEDURE — 87040 BLOOD CULTURE FOR BACTERIA: CPT

## 2022-01-15 PROCEDURE — 85652 RBC SED RATE AUTOMATED: CPT

## 2022-01-15 PROCEDURE — 71045 X-RAY EXAM CHEST 1 VIEW: CPT

## 2022-01-15 PROCEDURE — 83880 ASSAY OF NATRIURETIC PEPTIDE: CPT

## 2022-01-15 PROCEDURE — 82553 CREATINE MB FRACTION: CPT

## 2022-01-15 PROCEDURE — 83605 ASSAY OF LACTIC ACID: CPT

## 2022-01-15 PROCEDURE — 82805 BLOOD GASES W/O2 SATURATION: CPT

## 2022-01-15 PROCEDURE — 82150 ASSAY OF AMYLASE: CPT

## 2022-01-15 PROCEDURE — 96375 TX/PRO/DX INJ NEW DRUG ADDON: CPT

## 2022-01-15 PROCEDURE — 85007 BL SMEAR W/DIFF WBC COUNT: CPT

## 2022-01-15 PROCEDURE — 87636 SARSCOV2 & INF A&B AMP PRB: CPT

## 2022-01-15 NOTE — DIAGNOSTIC IMAGING REPORT
PATIENT HISTORY: N/V. 



TECHNIQUE: Single frontal view of the chest.



COMPARISON: 05/18/2021



FINDINGS:



The lung volumes are normal. No focal consolidation is seen. No

large pleural effusion or pneumothorax is seen. The

cardiomediastinal silhouette is normal in size and contour. No

acute osseous abnormality is seen.  The right-sided dual-lumen

catheter has been removed.



IMPRESSION:



No acute pulmonary abnormality seen.



Dictated by: 



  Dictated on workstation # [id number]

## 2022-01-15 NOTE — ED GENERAL
General


Chief Complaint:  Glucose Problems


Stated Complaint:  DIABETES


Source of Information:  Patient (SOMEWHAT LIMITED HISTORIAN), Old Records


 (DAYTON ANGUIANO )





History of Present Illness


Date Seen by Provider:  Ozzy 15, 2022


Time Seen by Provider:  03:21


Initial Comments


PT ARRIVES VIA EMS FROM HOME


C/O NAUSEA AND VOMITING FOR THE LAST 24 HOURS


PT WITH LONGSTANDING HISTORY OF NON-COMPLAINCY, WITH DIABETES, WITH MULTIPLE 

EPISODES OF DKA


IS UNCLEAR WHEN SHE LAST TOOK ANY INSULIN OR ANY OF HER MEDICATIONS


GLUCOMETER READ "HIGH" FOR EMS 


PT ALSO HAS ESRD ON DIALYSIS, AND IS DUE FOR DIALYSIS TODAY. 


STATES SHE HAS BEEN CONSTANTLY DRINKING WATER AND KEEPS VOMITING


NO DIARRHEA


NO ABDOMINAL PAIN 


STATES SHE HAS "FELT HOT" BUT HAS NOT CHECKED HER TEMP


STATES SHE STILL MAKES URINE AND HAS BEEN URINATING ALOT


C/O GENERALIZED WEAKNESS





PT HAS NOT HAD COVID OR FLU VACCINES


DENIES ANY RESPIRATORY SYMPTOMS





PT STATES SHE HAD AN APPOINTMENT WITH HER PCP, DR. RORY BANDA, IN [place] 

YESTERDAY, BUT DID NOT GO. 





PT WITH LONGSTANDING HISTORY OF HEAVY SMOKING, ALCOHOL ABUSE AND IV METH  AND 

THC USE. 


LONGSTANDING HISTORY OF NONCOMPLIANCE IN ALL ASPECTS OF CARE





PCP: DR. ABIGAIL BANDA IN [place]


 (DAYTON ANGUIANO DO)





Allergies and Home Medications


Allergies


Coded Allergies:  


     morphine (Verified  Allergy, Mild, Vomiting, 20)


     orange juice (Verified  Allergy, Mild, Nausea, 20)


     Sulfa (Sulfonamide Antibiotics) (Unverified  Allergy, Unknown, 6/25/15)


     codeine (Verified  Allergy, Unknown, TAKES ULTRAM AT HOME, 09)


     ketorolac (Verified  Allergy, Unknown, 08)


     tramadol (Verified  Allergy, Unknown, 11)





Patient Home Medication List


Home Medication List Reviewed:  Yes


 (GULSHAN FLORES MD)


Albuterol Sulfate (Proair Hfa) 1 Puff Puff, 2 PUFF IH TID PRN for SHORTNESS OF 

BREATH, (Reported)


   Entered as Reported by: SHWETHA GONZALEZ on 19 0913


Aspirin (Aspirin) 81 Mg Tab.chew, 81 MG PO DAILY, (Reported)


   Entered as Reported by: CARLOS JONES on 20 1012


Cephalexin (Cephalexin) 500 Mg Tablet, 500 MG PO TID


   Prescribed by: LIAM GAONA on 21 1342


Diclofenac Sodium (Diclofenac Sodium) 100 Gm Gel..gram., 1 APPLIC TOP QID PRN 

for CRAMPS, (Reported)


   Entered as Reported by: CARLOS JONES on 20 1349


Ergocalciferol (Vitamin D2) (Vitamin D2) 1,250 Mcg Capsule, 1,250 MCG PO THUR, 

(Reported)


   Entered as Reported by: CARLOS JONES on 21 1104


Fluticasone Propionate (Flonase Allergy Relief) 9.9 Ml [place].susp, 2 SPRAY 

NSEACH DAILY, (Reported)


   Entered as Reported by: CARLOS JONES on 20 1349


Furosemide (Furosemide) 80 Mg Tablet, 80 MG PO BID, (Reported)


   Entered as Reported by: CARLOS JONES on 21 110


Gabapentin (Neurontin) 300 Mg Capsule, 600 MG PO Q8H, (Reported)


   Entered as Reported by: CARLOS JONES on 20 1012


Insulin Determir (Levemir) 1,000 Units/10 Ml Soln, 15 UNITS SQ HS


   Prescribed by: MISHA DEVRIES on 21 1229


Insulin Lispro (Humalog Kwikpen) 100 Unit/1 Ml Insuln.pen, 5-15 UNITS SQ AC, 

(Reported)


   Entered as Reported by: CARLOS JONES on 2/10/20 1551


Isosorbide Mononitrate (Isosorbide Mononitrate ER) 30 Mg Tab.er.24h, 30 MG PO 

DAILY, (Reported)


   Entered as Reported by: CARLOS JONES on 21 110


Lorazepam (Ativan) 1 Mg Tablet, 1 MG PO TID PRN for ANXIETY, (Reported)


   Entered as Reported by: CARLOS JONES on 21 110


Magnesium Oxide (Magnesium Oxide) 400 Mg Tablet, 400 MG PO BID, (Reported)


   Entered as Reported by: CARLOS JONES on 21 110


Melatonin (Melatonin) 10 Mg Tablet, 10 MG PO HS, (Reported)


   Entered as Reported by: CARLOS JONES on 20 1349


Metoclopramide HCl (Metoclopramide HCl) 10 Mg Tablet, 10 MG PO QIDACHS, 

(Reported)


   Entered as Reported by: CARLOS JONES on 20 1349


Metolazone (Metolazone) 5 Mg Tablet, 5 MG PO MON,WE,FR, (Reported)


   Entered as Reported by: JONATHAN RODRIGUEZ on 1/10/21 1600


Metoprolol Tartrate (Metoprolol Tartrate) 50 Mg Tablet, 75 MG PO BID, (Reported)


   Entered as Reported by: CARLOS JONES on 20 1012


Montelukast Sodium (Montelukast Sodium) 10 Mg Tablet, 10 MG PO HS, (Reported)


   Entered as Reported by: ALDO LOPEZ on 19 1144


Multivit-Min/FA/Lycopene/Lut (Certavite Sr-Antioxidant Tab) 1 Each Tablet, 1 EA 

PO DAILY, (Reported)


   Entered as Reported by: CARLOS JONES on 21 1104


Naloxone HCl (Narcan) 4 Mg Spray, 1 SPRAY NS UD PRN for UNRESPONSIVE, (Reported)


   Entered as Reported by: CARLOS JONES on 20 1349


Omeprazole (Omeprazole) 20 Mg Capsule.dr, 20 MG PO DAILY, (Reported)


   Entered as Reported by: CARLOS JONES on 20 1349


Oxycodone HCl (Oxycodone HCl) 5 Mg Tablet, 5 MG PO DAILY PRN for PAIN-SEVERE (8-

10), (Reported)


   Entered as Reported by: CARLOS JONES on 21 1104


Prednisone (Prednisone) 20 Mg Tab, 40 MG PO DAILY


   Prescribed by: LIAM GAONA on 21 1342


Torsemide (Torsemide) 100 Mg Tablet, 100 MG PO 0800,1500, (Reported)


   Entered as Reported by: JONATHAN RODRIGUEZ on 1/10/21 1600


[Iron Slow Release] 45 TAB, 45 MG PO DAILY, (Reported)


   Entered as Reported by: CARLOS JONES on 21 1104





Review of Systems


Review of Systems


Constitutional:  malaise, weakness


EENTM:  other (DRY MOUTH)


Respiratory:  no symptoms reported; No cough


Cardiovascular:  no symptoms reported


Gastrointestinal:  No abdominal pain, No diarrhea; loss of appetite, nausea, 

vomiting


Genitourinary:  see HPI, frequency


Musculoskeletal:  no symptoms reported


Skin:  no symptoms reported


Psychiatric/Neurological:  No Symptoms Reported


Hematologic/Lymphatic:  No Symptoms Reported


Immunological/Allergic:  no symptoms reported (DAYTON ANGUIANO DO)





Past Medical-Social-Family Hx


Patient Social History


Tobacco Use?:  Yes


Tobacco type used:  Cigarettes


Smoking Status:  Current Everyday Smoker


Substance use?:  Yes


Substance type:  Methamphetamine, Marijuana


Additional substance use comme:  + IV METH, THCH


Alcohol Use?:  Yes


 (DAYTON ANGUIANO DO)





Immunizations Up To Date


Tetanus Booster (TDap):  Unknown


 (DAYTON ANGUIANO DO)





Seasonal Allergies


Seasonal Allergies:  Yes


 (DAYTON ANGUIANO DO)





Past Medical History


Surgery/Hospitalization HX:  


DIALYSIS GRAFT/AV FISTULA IN RIGHT UPPER ARM


Surgeries:  Yes (URETERAL STENTS)


Abdominal, Adenoidectomy, Dialysis, Ear Surgery, Hysterectomy, Renal, 

Tonsillectomy, Tubal Ligation, Vascular Surgery


Respiratory:  Yes (INTUBATED 20 WITH DKA/RESP FAILURE)


Asthma, Pneumonia, Chronic Bronchitis, COPD


Currently Using CPAP:  No


Currently Using BIPAP:  No


Cardiac:  Yes (CHF)


Deep Vein Thrombosis, High Cholesterol, Hypertension


Neurological:  Yes


Neuropathy


Reproductive Disorders:  Yes


Female Reproductive Disorders:  Menstrual Problems


GYN History:  Hysterectomy, Menopausal


Genitourinary:  Yes (DIALYSIS --SAT; URETERAL STENTS FOR KIDNEY STONES)


Renal Failure, Dialysis


Gastrointestinal:  Yes


Hepatitis


Musculoskeletal:  Yes (CHRONIC GENERALIZED PAIN)


Arthritis, Rheumatoid Arthritis, Fractures


Endocrine:  Yes (NON-COMPLIANT; MULTIPLE EPISODES OF DKA)


Diabetes, Insulin dep


HEENT:  Yes (S/P T&A)


Tonsilitis, Glaucoma


Loss of Vision:  Denies


Hearing Impairment:  Denies


Cancer:  No


Psychosocial:  Yes (POLYSUBSTANCE ABUSE)


Anxiety, Personality Disorder, Depression


Integumentary:  No


Blood Disorders:  No


Adverse Reaction/Blood Tranf:  No


 (DAYTON ANGUIANO DO)





Family Medical History





FH: thyroid cancer


  G8 SISTER


FHx: macular degeneration


  19 MOTHER


Glaucoma


  G8 BROTHER


Heart Disease, Cancer, Diabetes





SOCIAL HISTORY:


-ETOH--REGULAR USE--"COUPLE OF DRINKS" SEVERAL NIGHTS A WEEK


-DRUGS--+ METH AND THC USE


-SMOKES > 1 PPD





PAST SURGICAL HISTORY: 


-TONSILLECTOMY AND ADENOIDECTOMY


-BILATERAL TUBAL LIGATION


-HYSTERECTOMY


-EXPLORATORY LAPAROSCOPY


-URETERAL STENTS FOR KIDNEY STONES/CYSTOSCOPIES


-EAR SURGERY


-RIGHT UPPER ARM DIALYSIS GRAFT/AV FISTULA


 (DAYTON ANGUIANO DO)





Physical Exam


Vital Signs





Vital Signs - First Documented




















 (GULSHAN FLORES MD)


Vital Signs


Capillary Refill :  


 (DAYTON ANGUIANO DO)


Height, Weight, BMI


Height: 5'5.00"


Weight: 151lbs. 1.0oz. 68.345612uk; 26.00 BMI


Method:Stated


General Appearance:  Other (LETHARGIC, MOANING, BUT IS AWAKE AND ABLE TO ANSWER 

ALL QUESTIONS AND FOLLOW COMMANDS. PT IS NOT CONFUSED. SPEECH IS CLEAR. UNKEMPT)


HEENT:  Other (DRY ORAL MUCOSA. POOR DENTITION)


Neck:  Normal Inspection


Respiratory:  Normal Breath Sounds, Other (MILDLY TACHYPNEIC)


Cardiovascular:  No Edema, No JVD, No Murmur, Normal Peripheral Pulses, 

Tachycardia


Gastrointestinal:  Non Tender, Soft


Extremity:  No Pedal Edema


Neurologic/Psychiatric:  Alert, Oriented x3


Skin:  Warm/Dry, Pallor (DAYTON ANGUIANO DO)





Focused Exam


Sepsis Stage:  Sepsis


Possible Source:  Unknown


 (DAYTON ANGUIANO DO)


Lactate Level


1/15/22 03:30: Lactic Acid Level 10.53*H


1/15/22 09:50: Lactic Acid Level 2.92*H


 (GULSHAN FLORES MD)


Time of Focused Exam:  05:00


Respiratory:  Normal Breath Sounds, No Accessory Muscle Use, No Respiratory 

Distress


Cardiovascular:  No Edema, Tachycardia


Capillary Refill:  Less Than 3 Seconds


Skin:  normal color, warm/dry (DAYTON ANGUIANO DO)


Lactic Acid Level





Laboratory Tests








Test


 1/15/22


03:30 1/15/22


09:50


 


Lactic Acid Level


 10.53 MMOL/L


(0.50-2.00)  *H 2.92 MMOL/L


(0.50-2.00)  *H





 (GULSHAN FLORES MD)


Within 3hrs of presentation:  Admin fluids, Admin ABX, Blood cultures prior to 

ABX's, Focus exam, Lactate level


 (DAYTON ANGUIANO DO)





Procedures/Interventions


Date of ETT Placement:  2020


Time of ETT Placement:  0700


 (DAYTON ANGUIANO DO)





Progress/Results/Core Measures


Suspected Sepsis


SIRS


Temperature: 


Pulse:  


Respiratory Rate: 


 


Laboratory Tests


1/15/22 03:25: White Blood Count 19.6H


Blood Pressure  / 


Mean: 


 


1/15/22 03:30: Lactic Acid Level 10.53*H


Laboratory Tests


1/15/22 03:25: 


Creatinine 5.18H, INR Comment 1.4, Platelet Count 309, Total Bilirubin 0.7


 (DAYTON ANGUIANO DO)





Results/Orders


Lab Results





Laboratory Tests








Test


 1/15/22


03:25 1/15/22


03:30 1/15/22


03:40 1/15/22


04:00 Range/Units


 


 


White Blood Count


 19.6 H


 


 


 


 4.3-11.0


10^3/uL


 


Red Blood Count


 3.74 L


 


 


 


 3.80-5.11


10^6/uL


 


Hemoglobin 12.3     11.5-16.0  g/dL


 


Hematocrit 42     35-52  %


 


Mean Corpuscular Volume 113 H    80-99  fL


 


Mean Corpuscular Hemoglobin 33     25-34  pg


 


Mean Corpuscular Hemoglobin


Concent 29 L


 


 


 


 32-36  g/dL





 


Red Cell Distribution Width 13.2     10.0-14.5  %


 


Platelet Count


 309 


 


 


 


 130-400


10^3/uL


 


Mean Platelet Volume 11.2     9.0-12.2  fL


 


Immature Granulocyte % (Auto) 2      %


 


Neutrophils (%) (Auto) 83 H    42-75  %


 


Lymphocytes (%) (Auto) 7 L    12-44  %


 


Monocytes (%) (Auto) 8     0-12  %


 


Eosinophils (%) (Auto) 0     0-10  %


 


Basophils (%) (Auto) 1     0-10  %


 


Neutrophils # (Auto)


 16.2 H


 


 


 


 1.8-7.8


10^3/uL


 


Lymphocytes # (Auto)


 1.4 


 


 


 


 1.0-4.0


10^3/uL


 


Monocytes # (Auto)


 1.6 H


 


 


 


 0.0-1.0


10^3/uL


 


Eosinophils # (Auto)


 0.0 


 


 


 


 0.0-0.3


10^3/uL


 


Basophils # (Auto)


 0.1 


 


 


 


 0.0-0.1


10^3/uL


 


Immature Granulocyte # (Auto)


 0.3 H


 


 


 


 0.0-0.1


10^3/uL


 


Neutrophils % (Manual) 85      %


 


Lymphocytes % (Manual) 9      %


 


Monocytes % (Manual) 6      %


 


Macrocytosis MODERATE      


 


Erythrocyte Sedimentation Rate 29     0-30  MM/HR


 


Prothrombin Time 17.3 H    12.2-14.7  SEC


 


INR Comment 1.4     0.8-1.4  


 


Activated Partial


Thromboplast Time 29 


 


 


 


 24-35  SEC





 


D-Dimer


 0.42 


 


 


 


 0.00-0.49


UG/ML


 


Sodium Level 118 *L    135-145  MMOL/L


 


Potassium Level 6.2 H    3.6-5.0  MMOL/L


 


Chloride Level 63 L      MMOL/L


 


Carbon Dioxide Level 7 *L    21-32  MMOL/L


 


Anion Gap 48 H    5-14  MMOL/L


 


Blood Urea Nitrogen 82 H    7-18  MG/DL


 


Creatinine


 5.18 H


 


 


 


 0.60-1.30


MG/DL


 


Estimat Glomerular Filtration


Rate 9 


 


 


 


  





 


BUN/Creatinine Ratio 16      


 


Glucose Level 1445 *H      MG/DL


 


Calcium Level 9.4     8.5-10.1  MG/DL


 


Corrected Calcium 9.4     8.5-10.1  MG/DL


 


Magnesium Level 3.5 H    1.6-2.4  MG/DL


 


Total Bilirubin 0.7     0.1-1.0  MG/DL


 


Aspartate Amino Transf


(AST/SGOT) 15 


 


 


 


 5-34  U/L





 


Alanine Aminotransferase


(ALT/SGPT) 23 


 


 


 


 0-55  U/L





 


Alkaline Phosphatase 89       U/L


 


Total Creatine Kinase 49       U/L


 


Creatine Kinase MB 2.3     <6.6  NG/ML


 


Myoglobin


 161.7 H


 


 


 


 10.0-92.0


NG/ML


 


Troponin I 0.031 H    <0.028  NG/ML


 


C-Reactive Protein High


Sensitivity 0.48 


 


 


 


 0.00-0.50


MG/DL


 


B-Type Natriuretic Peptide 2236.2 H    <100.0  PG/ML


 


Total Protein 7.4     6.4-8.2  GM/DL


 


Albumin 4.0     3.2-4.5  GM/DL


 


Amylase Level 24 L      U/L


 


Lipase 22     8-78  U/L


 


Procalcitonin 1.88 H    <0.10  NG/ML


 


Serum Alcohol < 10     <10  MG/DL


 


Influenza Type A (RT-PCR) Not Detected     Not Detecte  


 


Influenza Type B (RT-PCR) Not Detected     Not Detecte  


 


SARS-CoV-2 RNA (RT-PCR) Not Detected     Not Detecte  


 


Lactic Acid Level


 


 10.53 *H


 


 


 0.50-2.00


MMOL/L


 


Blood Gas Puncture Site   LEFT BRACHIAL    


 


Blood Gas Patient Temperature   36.6    


 


Arterial Blood pH   7.24 *L  7.37-7.43  


 


Arterial Blood Partial


Pressure CO2 


 


 22 L


 


 35-45  MMHG





 


Arterial Blood Partial


Pressure O2 


 


 84 


 


 79-93  MMHG





 


Arterial Blood HCO3   9 *L  23-27  MMOL/L


 


Arterial Blood Total CO2


 


 


 9.6 *L


 


 21.0-31.0


MMOL/L


 


Arterial Blood Oxygen


Saturation 


 


 93 L


 


   %





 


Arterial Blood Base Excess


 


 


 -17.3 L


 


 -2.5-2.5


MMOL/L


 


Trent Test   NA    


 


Blood Gas Ventilator Setting   NO    


 


Blood Gas Inspired Oxygen   2L    


 


Urine Color    YELLOW   


 


Urine Clarity    CLEAR   


 


Urine pH    6.0  5-9  


 


Urine Specific Gravity    1.020  1.016-1.022  


 


Urine Protein    1+ H NEGATIVE  


 


Urine Glucose (UA)    3+ H NEGATIVE  


 


Urine Ketones    TRACE H NEGATIVE  


 


Urine Nitrite    NEGATIVE  NEGATIVE  


 


Urine Bilirubin    NEGATIVE  NEGATIVE  


 


Urine Urobilinogen    0.2  < = 1.0  MG/DL


 


Urine Leukocyte Esterase    NEGATIVE  NEGATIVE  


 


Urine RBC (Auto)    TRACE-I H NEGATIVE  


 


Urine RBC    0-2   /HPF


 


Urine WBC    NONE   /HPF


 


Urine Squamous Epithelial


Cells 


 


 


 0-2 


  /HPF





 


Urine Crystals    NONE   /LPF


 


Urine Bacteria    TRACE   /HPF


 


Urine Casts    PRESENT   /LPF


 


Urine Hyaline Casts    0-2 H  /LPF


 


Urine Mucus    NEGATIVE   /LPF


 


Urine Culture Indicated    NO   


 


Urine Opiates Screen    NEGATIVE  NEGATIVE  


 


Urine Oxycodone Screen    NEGATIVE  NEGATIVE  


 


Urine Methadone Screen    NEGATIVE  NEGATIVE  


 


Urine Propoxyphene Screen    NEGATIVE  NEGATIVE  


 


Urine Barbiturates Screen    NEGATIVE  NEGATIVE  


 


Ur Tricyclic Antidepressants


Screen 


 


 


 NEGATIVE 


 NEGATIVE  





 


Urine Phencyclidine Screen    NEGATIVE  NEGATIVE  


 


Urine Amphetamines Screen    POSITIVE H NEGATIVE  


 


Urine Methamphetamines Screen    POSITIVE H NEGATIVE  


 


Urine Benzodiazepines Screen    NEGATIVE  NEGATIVE  


 


Urine Cocaine Screen    NEGATIVE  NEGATIVE  


 


Urine Cannabinoids Screen    NEGATIVE  NEGATIVE  


 


Test


 1/15/22


06:40 1/15/22


09:50 1/15/22


11:00 


 Range/Units


 


 


Sodium Level 124 *L  128 L  135-145  MMOL/L


 


Potassium Level 4.6   3.8   3.6-5.0  MMOL/L


 


Chloride Level 70 L  83 L    MMOL/L


 


Carbon Dioxide Level 10 L  21   21-32  MMOL/L


 


Anion Gap 44 H  24 H  5-14  MMOL/L


 


Blood Urea Nitrogen 86 H  91 H  7-18  MG/DL


 


Creatinine


 5.13 H


 


 5.05 H


 


 0.60-1.30


MG/DL


 


Estimat Glomerular Filtration


Rate 9 


 


 9 


 


  





 


BUN/Creatinine Ratio 17   18    


 


Glucose Level 1302 *H  779 *H    MG/DL


 


Calcium Level 9.1   8.6   8.5-10.1  MG/DL


 


Lactic Acid Level


 


 2.92 *H


 


 


 0.50-2.00


MMOL/L


 


Magnesium Level   2.9 H  1.6-2.4  MG/DL





 (GULSHAN FLORES MD)


My Orders





Orders - GULSHAN FLORES MD


Basic Metabolic Panel (1/15/22 06:51)


Ondansetron Injection (Zofran Injectio (1/15/22 08:45)


Ondansetron Injection (Zofran Injectio (1/15/22 09:15)


Ns Iv 1000 Ml (Sodium Chloride 0.9%) (1/15/22 09:26)


Accucheck Stat ONCE (1/15/22 09:27)


Basic Metabolic Panel (1/15/22 11:00)


Magnesium (1/15/22 11:00)


Ns Iv 1000 Ml (Sodium Chloride 0.9%) (1/15/22 10:45)


 (GULSHAN FLORES MD)


Medications Given in ED





Current Medications








 Medications  Dose


 Ordered  Sig/Corin


 Route  Start Time


 Stop Time Status Last Admin


Dose Admin


 


 Calcium Gluconate  4.65 meq  ONCE  ONCE


 IV  1/15/22 04:15


 1/15/22 04:18 DC 1/15/22 05:28


4.65 MEQ


 


 Cefepime HCl 1000


 mg/Sodium Chloride  50 ml @ 


 100 mls/hr  ONCE  ONCE


 IV  1/15/22 04:45


 1/15/22 05:14 DC 1/15/22 06:20


100 MLS/HR


 


 Insulin Human


 Regular  20 unit  ONCE  ONCE


 IV  1/15/22 04:30


 1/15/22 04:31 DC 1/15/22 05:30


20 UNIT


 


 Ondansetron HCl  4 mg  ONCE  ONCE


 IVP  1/15/22 08:45


 1/15/22 08:46 DC 1/15/22 09:08


4 MG


 


 Sodium


 Polystyrene


 Sulfonate  15 gm  ONCE  ONCE


 PO  1/15/22 04:15


 1/15/22 04:18 DC 1/15/22 05:28


15 GM


 


 Sodium Bicarbonate  100 meq  ONCE  ONCE


 IV  1/15/22 04:15


 1/15/22 04:16 DC 1/15/22 05:28


100 MEQ





 (GULSHAN FLORES MD)


Vital Signs/I&O











 1/15/22 1/15/22





 03:20 03:20


 


Temp 36.6 


 


Pulse 124 


 


Resp 18 


 


B/P (MAP) 94/58 (70) 


 


Pulse Ox 97 97


 


O2 Delivery Nasal Cannula Nasal Cannula


 


O2 Flow Rate 2.00 2.00





 (GULSHAN FLORES MD)


Vital Signs/I&O


Capillary Refill :  


 (DAYTON ANGUIANO DO)


Progress Note :  


Progress Note


PLACED IN ISOLATION ROOM


PPE WORN AT ALL TIMES


COVID AND FLU TESTING DONE





ACCUCHECK READS "HIGH" 





GIVEN IV FLUIDS AND ZOFRAN WITH IMPROVEMENT IN NAUSEA





GIVEN ALBUTEROL, KAYEXELATE, CALCIUM GLUCONATE, AND INSULIN FOR HYPERKALEMIA





GIVEN BICARB FOR ACIDOSIS





STARTED ON INSULIN DRIP





GIVEN CEFEPIME AND VANCOMYCIN PER SEPSIS PROTOCOL





0600--CARE TURNED OVER TO DR. FLORES. 





PT UP TO > 100 SYSTOLIC





NO HYPOXIA


NO DYSPNEA


NO FEVER


NO DECREASED MENTATION


NO VOMITING OR DIARRHEA


NO COUGH











 (DAYTON ANGUIANO DO)


Progress Note :  


   Time:  10:54


Progress Note


Care of this patient was assumed from Dr. Anguiano at shift change.  She has 

continued on insulin drip.  We have increased the rate from the 5 units/h to 10 

units/h.  She has made some minor progress based on 0640 labs.  Additional labs 

are being obtained now.  I have contacted [facility] in [place] and they 

are able to accept this transfer to Dr. Pa's.  This is greatly appreciated.  

Fingerstick blood sugars have remained "high".  She received 2 L of IV fluid and

now we are running normal saline at 30 mL/h to keep the line open.  Sepsis was 

initially suspected but this seems unlikely now as no source of infection has 

been identified.  Nausea and vomiting is well controlled.  Patient is tolerating

ice chips.  She did not receive a second blood culture due to venous access 

problems.  She received a dose of cefepime.  Vancomycin had been ordered but has

not been received yet due to vascular access problems.


 (GULSHAN FLORES MD)


ECG


Initial ECG Impression Date:  Ozzy 15, 2022


Initial ECG Impression Time:  03:33


Initial ECG Rate:  117


Initial ECG Rhythm:  S.Tach


Initial ECG Impression:  Nonspecific Changes


 (DAYTON ANGUIANO DO)





Diagnostic Imaging





   Diagonstic Imaging:  Xray


   Plain Films/CT/US/NM/MRI:  chest


Comments


NAME:   DAYTON BLISS


MED REC#:   [id number]


ACCOUNT#:   [id number]


PT STATUS:   REG ER


:   1965


PHYSICIAN:   DAYTON ANGUIANO DO


ADMIT DATE:   01/15/22/ER


                                  ***Signed***


Date of Exam:01/15/22





CHEST 1 VIEW, AP/PA ONLY








PATIENT HISTORY: N/V. 





TECHNIQUE: Single frontal view of the chest.





COMPARISON: 2021





FINDINGS:





The lung volumes are normal. No focal consolidation is seen. No


large pleural effusion or pneumothorax is seen. The


cardiomediastinal silhouette is normal in size and contour. No


acute osseous abnormality is seen.  The right-sided dual-lumen


catheter has been removed.





IMPRESSION:





No acute pulmonary abnormality seen.





Dictated by: 





  Dictated on workstation # [id number]








Dict:   01/15/22 0715


Trans:   01/15/22 0919


CVB [id number]





Interpreted by:     AUGUSTIN CASH MD


Electronically signed by: AUGUSTIN CASH MD 01/15/22 0919


 (GULSHAN FLORES MD)





Departure


Communication (Admissions)


0353--CALLED [facility], ON COMPLETE DIVERSION/NO BEDS AVAILABLE AT THIS TIME. 

ADVISED TO CALL BACK LATER THIS MORNING


0354--CALLED [facility], NO BEDS AT [facility]. ADVISED TO CALL BACK LATER

THIS MORNING. 


0425--CALLED [facility] IN [place], NO BEDS AVAILABLE. 


0427--CALLED , NO ICU BEDS, BUT IF PT HAS SOME IMPROVEMENT IN LAB (ESPECIALLY 

LACTIC ACID AND SODIUM LEVELS) , MAY CALL BACK AFTER 0800, AND POSSIBLY ADMIT TO

MEDICAL FLOOR IF HER SHE IS IMPROVED, AS THEY CAN MAINTAIN INSULIN DRIP ON 

MEDICAL FLOOR AT THEIR FACILITY.   


 (DAYTON ANGUIANO DO)





Impression





   Primary Impression:  


   DKA (diabetic ketoacidosis)


   Qualified Codes:  E10.10 - Type 1 diabetes mellitus with ketoacidosis without

   coma


   Additional Impressions:  


   End stage renal disease on dialysis


   Nausea and vomiting


   Qualified Codes:  R11.2 - Nausea with vomiting, unspecified


   Electrolyte imbalance


   Hyperkalemia


   Hyponatremia


   Lactic acidosis


   Methamphetamine use


Disposition:   XFER SHT-TRM HOSP


Condition:  Improved





Transfer


Transfer Reason:  Exceeds level of care


Time Spoke to Accepting Phy:  10:40


Transfer Progress Notes


Transfer accepted by Dr. Padilla at [facility] in [place]


Transfer Time:  11:47


Transfer Facility:  


[facility]


Method of Transfer:  [facility]


 (GULSHAN FLORES MD)





Departure-Patient Inst.


Referrals:  


ABIGAIL BANDA DO (PCP/Family)


Primary Care Physician











DAYTON ANGUIANO DO                 Ozzy 15, 2022 04:00


GULSHAN FLORES MD        Ozzy 15, 2022 10:58

## 2022-04-10 ENCOUNTER — HOSPITAL ENCOUNTER (EMERGENCY)
Dept: HOSPITAL 75 - ER | Age: 57
Discharge: ANOTHER HOSPITAL | End: 2022-04-10
Payer: MEDICAID

## 2022-04-10 VITALS — HEIGHT: 64.02 IN | BODY MASS INDEX: 27.33 KG/M2 | WEIGHT: 160.06 LBS

## 2022-04-10 VITALS — DIASTOLIC BLOOD PRESSURE: 71 MMHG | SYSTOLIC BLOOD PRESSURE: 124 MMHG

## 2022-04-10 VITALS — SYSTOLIC BLOOD PRESSURE: 130 MMHG | DIASTOLIC BLOOD PRESSURE: 62 MMHG

## 2022-04-10 DIAGNOSIS — Z99.2: ICD-10-CM

## 2022-04-10 DIAGNOSIS — I50.9: ICD-10-CM

## 2022-04-10 DIAGNOSIS — J96.00: ICD-10-CM

## 2022-04-10 DIAGNOSIS — N18.6: ICD-10-CM

## 2022-04-10 DIAGNOSIS — E10.11: ICD-10-CM

## 2022-04-10 DIAGNOSIS — E10.40: ICD-10-CM

## 2022-04-10 DIAGNOSIS — I13.2: Primary | ICD-10-CM

## 2022-04-10 DIAGNOSIS — F15.10: ICD-10-CM

## 2022-04-10 DIAGNOSIS — E10.22: ICD-10-CM

## 2022-04-10 DIAGNOSIS — F17.210: ICD-10-CM

## 2022-04-10 LAB
ALBUMIN SERPL-MCNC: 4.2 GM/DL (ref 3.2–4.5)
ALP SERPL-CCNC: 97 U/L (ref 40–136)
ALT SERPL-CCNC: 18 U/L (ref 0–55)
APTT BLD: 29 SEC (ref 24–35)
APTT PPP: YELLOW S
BACTERIA #/AREA URNS HPF: (no result) /HPF
BARBITURATES UR QL: NEGATIVE
BASE EXCESS STD BLDA CALC-SCNC: -23.5 MMOL/L (ref -2.5–2.5)
BASOPHILS # BLD AUTO: 0.2 10^3/UL (ref 0–0.1)
BASOPHILS NFR BLD AUTO: 1 % (ref 0–10)
BASOPHILS NFR BLD MANUAL: 0 %
BDY SITE: (no result)
BENZODIAZ UR QL SCN: NEGATIVE
BILIRUB SERPL-MCNC: 0.5 MG/DL (ref 0.1–1)
BILIRUB UR QL STRIP: NEGATIVE
BODY TEMPERATURE: 36.1
BUN/CREAT SERPL: 13
BUN/CREAT SERPL: 14
CALCIUM SERPL-MCNC: 8 MG/DL (ref 8.5–10.1)
CALCIUM SERPL-MCNC: 9.4 MG/DL (ref 8.5–10.1)
CHLORIDE SERPL-SCNC: 69 MMOL/L (ref 98–107)
CHLORIDE SERPL-SCNC: 81 MMOL/L (ref 98–107)
CO2 BLDA CALC-SCNC: 9 MMOL/L (ref 21–31)
CO2 SERPL-SCNC: 7 MMOL/L (ref 21–32)
CO2 SERPL-SCNC: < 5 MMOL/L (ref 21–32)
COCAINE UR QL: NEGATIVE
CREAT SERPL-MCNC: 4.48 MG/DL (ref 0.6–1.3)
CREAT SERPL-MCNC: 4.94 MG/DL (ref 0.6–1.3)
EOSINOPHIL # BLD AUTO: 0 10^3/UL (ref 0–0.3)
EOSINOPHIL NFR BLD AUTO: 0 % (ref 0–10)
EOSINOPHIL NFR BLD MANUAL: 0 %
FIBRINOGEN PPP-MCNC: CLEAR MG/DL
GFR SERPLBLD BASED ON 1.73 SQ M-ARVRAT: 10 ML/MIN
GFR SERPLBLD BASED ON 1.73 SQ M-ARVRAT: 11 ML/MIN
GLUCOSE SERPL-MCNC: 1130 MG/DL (ref 70–105)
GLUCOSE SERPL-MCNC: 1540 MG/DL (ref 70–105)
GLUCOSE UR STRIP-MCNC: (no result) MG/DL
HCT VFR BLD CALC: 48 % (ref 35–52)
HGB BLD-MCNC: 13.6 G/DL (ref 11.5–16)
INHALED O2 FLOW RATE: (no result) L/MIN
INR PPP: 1.2 (ref 0.8–1.4)
KETONES UR QL STRIP: (no result)
LEUKOCYTE ESTERASE UR QL STRIP: NEGATIVE
LYMPHOCYTES # BLD AUTO: 3.5 10^3/UL (ref 1–4)
LYMPHOCYTES NFR BLD AUTO: 13 % (ref 12–44)
MACROCYTES BLD QL SMEAR: (no result)
MANUAL DIFFERENTIAL PERFORMED BLD QL: YES
MCH RBC QN AUTO: 34 PG (ref 25–34)
MCHC RBC AUTO-ENTMCNC: 29 G/DL (ref 32–36)
MCV RBC AUTO: 118 FL (ref 80–99)
METHADONE UR QL SCN: NEGATIVE
METHAMPHETAMINE SCREEN URINE S: POSITIVE
MONOCYTES # BLD AUTO: 1.6 10^3/UL (ref 0–1)
MONOCYTES NFR BLD AUTO: 6 % (ref 0–12)
MONOCYTES NFR BLD: 6 %
NEUTROPHILS # BLD AUTO: 21 10^3/UL (ref 1.8–7.8)
NEUTROPHILS NFR BLD AUTO: 78 % (ref 42–75)
NEUTS BAND NFR BLD MANUAL: 81 %
NEUTS BAND NFR BLD: 4 %
NITRITE UR QL STRIP: NEGATIVE
OPIATES UR QL SCN: NEGATIVE
OXYCODONE UR QL: NEGATIVE
PCO2 BLDA: 47 MMHG (ref 35–45)
PH BLDA: 6.83 [PH] (ref 7.37–7.43)
PH UR STRIP: 5.5 [PH] (ref 5–9)
PLATELET # BLD: 345 10^3/UL (ref 130–400)
PMV BLD AUTO: 11 FL (ref 9–12.2)
PO2 BLDA: 440 MMHG (ref 79–93)
POTASSIUM SERPL-SCNC: 5.3 MMOL/L (ref 3.6–5)
POTASSIUM SERPL-SCNC: 5.6 MMOL/L (ref 3.6–5)
PROPOXYPH UR QL: NEGATIVE
PROT SERPL-MCNC: 7.6 GM/DL (ref 6.4–8.2)
PROT UR QL STRIP: (no result)
PROTHROMBIN TIME: 15.6 SEC (ref 12.2–14.7)
RBC #/AREA URNS HPF: (no result) /HPF
SAO2 % BLDA FROM PO2: 99 % (ref 94–100)
SODIUM SERPL-SCNC: 125 MMOL/L (ref 135–145)
SODIUM SERPL-SCNC: 128 MMOL/L (ref 135–145)
SP GR UR STRIP: 1.01 (ref 1.02–1.02)
SQUAMOUS #/AREA URNS HPF: (no result) /HPF
TRICYCLICS UR QL SCN: NEGATIVE
VARIANT LYMPHS NFR BLD MANUAL: 9 %
VENTILATION MODE VENT: YES
WBC # BLD AUTO: 26.9 10^3/UL (ref 4.3–11)
WBC #/AREA URNS HPF: (no result) /HPF

## 2022-04-10 PROCEDURE — 82805 BLOOD GASES W/O2 SATURATION: CPT

## 2022-04-10 PROCEDURE — 51702 INSERT TEMP BLADDER CATH: CPT

## 2022-04-10 PROCEDURE — 80048 BASIC METABOLIC PNL TOTAL CA: CPT

## 2022-04-10 PROCEDURE — 36415 COLL VENOUS BLD VENIPUNCTURE: CPT

## 2022-04-10 PROCEDURE — 85007 BL SMEAR W/DIFF WBC COUNT: CPT

## 2022-04-10 PROCEDURE — 85027 COMPLETE CBC AUTOMATED: CPT

## 2022-04-10 PROCEDURE — 87077 CULTURE AEROBIC IDENTIFY: CPT

## 2022-04-10 PROCEDURE — 87635 SARS-COV-2 COVID-19 AMP PRB: CPT

## 2022-04-10 PROCEDURE — 36600 WITHDRAWAL OF ARTERIAL BLOOD: CPT

## 2022-04-10 PROCEDURE — 87088 URINE BACTERIA CULTURE: CPT

## 2022-04-10 PROCEDURE — 81000 URINALYSIS NONAUTO W/SCOPE: CPT

## 2022-04-10 PROCEDURE — 87184 SC STD DISK METHOD PER PLATE: CPT

## 2022-04-10 PROCEDURE — 85730 THROMBOPLASTIN TIME PARTIAL: CPT

## 2022-04-10 PROCEDURE — 85610 PROTHROMBIN TIME: CPT

## 2022-04-10 PROCEDURE — 87186 SC STD MICRODIL/AGAR DIL: CPT

## 2022-04-10 PROCEDURE — 80306 DRUG TEST PRSMV INSTRMNT: CPT

## 2022-04-10 PROCEDURE — 80053 COMPREHEN METABOLIC PANEL: CPT

## 2022-04-10 PROCEDURE — 31500 INSERT EMERGENCY AIRWAY: CPT

## 2022-04-10 PROCEDURE — 87636 SARSCOV2 & INF A&B AMP PRB: CPT

## 2022-04-10 PROCEDURE — 87804 INFLUENZA ASSAY W/OPTIC: CPT

## 2022-04-10 PROCEDURE — 83605 ASSAY OF LACTIC ACID: CPT

## 2022-04-10 PROCEDURE — 99291 CRITICAL CARE FIRST HOUR: CPT

## 2022-04-10 PROCEDURE — 71045 X-RAY EXAM CHEST 1 VIEW: CPT

## 2022-04-10 PROCEDURE — 87040 BLOOD CULTURE FOR BACTERIA: CPT

## 2022-04-10 NOTE — DIAGNOSTIC IMAGING REPORT
INDICATION: Shortness of breath.



Comparison made with prior examination of 01/15/2022.



FINDINGS: The heart size is normal. Lungs are clear. No pleural

effusion or pneumothorax. Lines and tubes are in satisfactory

position. There is minimal venous congestion.



IMPRESSION: The ET, NG and right frontal gyrus and venous

catheters appear to be in satisfactory position.



Mild central pulmonary venous congestion.



Dictated by: 



  Dictated on workstation # [id number]

## 2022-04-10 NOTE — ED GENERAL
General


Stated Complaint:  AMS


Source of Information:  Patient


Exam Limitations:  Physical Impairments





History of Present Illness


Date Seen by Provider:  Apr 10, 2022


Time Seen by Provider:  12:42


Initial Comments


Here with caregiver who reports that she has had altered mental status this 

morning.  Last noted well last night.  Patient is trying to talk but is mumbling

and seems to be saying DKA.  Patient does have history of DKA as well as end-

stage renal disease on dialysis 3 times per week.  She has been seen multiple 

times here for this before with transfer to another facility due to renal 

disease and dialysis needs.  Patient otherwise unable to provide details and 

caregivers do not have much further detail at this time.


Timing/Duration:  12 Hours


Severity:  Severe


Associated Systoms:  No Fever/Chills; Shortness of Air, Weakness





Allergies and Home Medications


Allergies


Coded Allergies:  


     morphine (Verified  Allergy, Mild, Vomiting, 20)


     orange juice (Verified  Allergy, Mild, Nausea, 20)


     Sulfa (Sulfonamide Antibiotics) (Unverified  Allergy, Unknown, 6/25/15)


     codeine (Verified  Allergy, Unknown, TAKES ULTRAM AT HOME, 09)


     ketorolac (Verified  Allergy, Unknown, 08)


     tramadol (Verified  Allergy, Unknown, 11)





Patient Home Medication List


Home Medication List Reviewed:  Yes


Albuterol Sulfate (Proair Hfa) 1 Puff Puff, 2 PUFF IH TID PRN for SHORTNESS OF 

BREATH, (Reported)


   Entered as Reported by: SHWETHA GONZAELZ on 19 0913


Aspirin (Aspirin) 81 Mg Tab.chew, 81 MG PO DAILY, (Reported)


   Entered as Reported by: CARLOS JONES on 20 1012


Cephalexin (Cephalexin) 500 Mg Tablet, 500 MG PO TID


   Prescribed by: LIAM GAONA on 21 1342


Diclofenac Sodium (Diclofenac Sodium) 100 Gm Gel..gram., 1 APPLIC TOP QID PRN 

for CRAMPS, (Reported)


   Entered as Reported by: CARLOS JONES on 20 1349


Ergocalciferol (Vitamin D2) (Vitamin D2) 1,250 Mcg Capsule, 1,250 MCG PO THUR, 

(Reported)


   Entered as Reported by: CARLOS JONES on 21 1104


Fluticasone Propionate (Flonase Allergy Relief) 9.9 Ml [place].susp, 2 SPRAY 

NSEACH DAILY, (Reported)


   Entered as Reported by: CARLOS JONES on 20 1349


Furosemide (Furosemide) 80 Mg Tablet, 80 MG PO BID, (Reported)


   Entered as Reported by: CARLOS JONES on 21 1104


Gabapentin (Neurontin) 300 Mg Capsule, 600 MG PO Q8H, (Reported)


   Entered as Reported by: CARLOS JONES on 20 1012


Insulin Determir (Levemir) 1,000 Units/10 Ml Soln, 15 UNITS SQ HS


   Prescribed by: MISHA DEVRIES on 21 1229


Insulin Lispro (Humalog Kwikpen) 100 Unit/1 Ml Insuln.pen, 5-15 UNITS SQ AC, 

(Reported)


   Entered as Reported by: CARLOS JONES on 2/10/20 1551


Isosorbide Mononitrate (Isosorbide Mononitrate ER) 30 Mg Tab.er.24h, 30 MG PO 

DAILY, (Reported)


   Entered as Reported by: CARLOS JONES on 21 1104


Lorazepam (Ativan) 1 Mg Tablet, 1 MG PO TID PRN for ANXIETY, (Reported)


   Entered as Reported by: CARLOS JONES on 21 1104


Magnesium Oxide (Magnesium Oxide) 400 Mg Tablet, 400 MG PO BID, (Reported)


   Entered as Reported by: CARLOS JONES on 21 1104


Melatonin (Melatonin) 10 Mg Tablet, 10 MG PO HS, (Reported)


   Entered as Reported by: CARLOS JONES on 20 1349


Metoclopramide HCl (Metoclopramide HCl) 10 Mg Tablet, 10 MG PO QIDACHS, 

(Reported)


   Entered as Reported by: CARLOS JONES on 20 1349


Metolazone (Metolazone) 5 Mg Tablet, 5 MG PO MON,WE,FR, (Reported)


   Entered as Reported by: JONATHAN RODRIGUEZ on 1/10/21 1600


Metoprolol Tartrate (Metoprolol Tartrate) 50 Mg Tablet, 75 MG PO BID, (Reported)


   Entered as Reported by: CARLOS JONES on 20 1012


Montelukast Sodium (Montelukast Sodium) 10 Mg Tablet, 10 MG PO HS, (Reported)


   Entered as Reported by: ALDO LOPEZ on 19 1144


Multivit-Min/FA/Lycopene/Lut (Certavite Sr-Antioxidant Tab) 1 Each Tablet, 1 EA 

PO DAILY, (Reported)


   Entered as Reported by: CARLOS JONES on 21 1104


Naloxone HCl (Narcan) 4 Mg Spray, 1 SPRAY NS UD PRN for UNRESPONSIVE, (Reported)


   Entered as Reported by: CARLOS JONES on 20 1349


Omeprazole (Omeprazole) 20 Mg Capsule.dr, 20 MG PO DAILY, (Reported)


   Entered as Reported by: CARLOS JONES on 20 1349


Oxycodone HCl (Oxycodone HCl) 5 Mg Tablet, 5 MG PO DAILY PRN for PAIN-SEVERE (8-

10), (Reported)


   Entered as Reported by: CARLOS JONES on 21 1104


Prednisone (Prednisone) 20 Mg Tab, 40 MG PO DAILY


   Prescribed by: LIAM GAONA on 21 1342


Torsemide (Torsemide) 100 Mg Tablet, 100 MG PO 0800,1500, (Reported)


   Entered as Reported by: JONATHAN RODRIGUEZ on 1/10/21 1600


[Iron Slow Release] 45 TAB, 45 MG PO DAILY, (Reported)


   Entered as Reported by: CARLOS JONES on 21 1104





Review of Systems


Review of Systems


Constitutional:  see HPI; No fever


Respiratory:  short of breath


Psychiatric/Neurological:  See HPI, Anxiety, Weakness


Unable to complete review of systems due to altered mental status.





Past Medical-Social-Family Hx


Patient Social History


Tobacco Use?:  Yes


Tobacco type used:  Cigarettes


Substance use?:  Yes


Substance type:  Methamphetamine


Alcohol Use?:  Yes


Alcohol Frequency:  Once in a while





Immunizations Up To Date


Tetanus Booster (TDap):  Unknown





Seasonal Allergies


Seasonal Allergies:  Yes





Past Medical History


Surgery/Hospitalization HX:  


DIALYSIS GRAFT/AV FISTULA IN RIGHT UPPER ARM


Surgeries:  Yes (URETERAL STENTS)


Abdominal, Adenoidectomy, Dialysis, Ear Surgery, Hysterectomy, Renal, 

Tonsillectomy, Tubal Ligation, Vascular Surgery


Respiratory:  Yes (INTUBATED 20 WITH DKA/RESP FAILURE)


Asthma, Pneumonia, Chronic Bronchitis, COPD


Currently Using CPAP:  No


Currently Using BIPAP:  No


Cardiac:  Yes (CHF)


Deep Vein Thrombosis, High Cholesterol, Hypertension


Neurological:  Yes


Neuropathy


Reproductive Disorders:  Yes


Female Reproductive Disorders:  Menstrual Problems


GYN History:  Hysterectomy, Menopausal


Genitourinary:  Yes (DIALYSIS TU-TH-SAT; URETERAL STENTS FOR KIDNEY STONES)


Renal Failure, Dialysis


Gastrointestinal:  Yes


Hepatitis


Musculoskeletal:  Yes (CHRONIC GENERALIZED PAIN)


Arthritis, Rheumatoid Arthritis, Fractures


Endocrine:  Yes (NON-COMPLIANT; MULTIPLE EPISODES OF DKA)


Diabetes, Insulin dep


HEENT:  Yes (S/P T&A)


Tonsilitis, Glaucoma


Loss of Vision:  Denies


Hearing Impairment:  Denies


Cancer:  No


Psychosocial:  Yes (POLYSUBSTANCE ABUSE)


Anxiety, Personality Disorder, Depression


Integumentary:  No


Blood Disorders:  No


Adverse Reaction/Blood Tranf:  No





Family Medical History





FH: thyroid cancer


  G8 SISTER


FHx: macular degeneration


  19 MOTHER


Glaucoma


  G8 BROTHER


Heart Disease, Cancer, Diabetes





SOCIAL HISTORY:


-ETOH--REGULAR USE--"COUPLE OF DRINKS" SEVERAL NIGHTS A WEEK


-DRUGS--+ METH AND THC USE


-SMOKES > 1 PPD





PAST SURGICAL HISTORY: 


-TONSILLECTOMY AND ADENOIDECTOMY


-BILATERAL TUBAL LIGATION


-HYSTERECTOMY


-EXPLORATORY LAPAROSCOPY


-URETERAL STENTS FOR KIDNEY STONES/CYSTOSCOPIES


-EAR SURGERY


-RIGHT UPPER ARM DIALYSIS GRAFT/AV FISTULA





Physical Exam


Vital Signs





Vital Signs - First Documented








 4/10/22 4/10/22 4/10/22





 12:40 13:25 17:34


 


Temp 36.1  


 


Pulse 120  


 


Resp 28  


 


B/P (MAP) 126/91 (103)  


 


Pulse Ox 100  


 


O2 Delivery Room Air  


 


O2 Flow Rate   50.00


 


FiO2  100 





Capillary Refill :


Height, Weight, BMI


Height: 5'5.00"


Weight: 151lbs. 1.0oz. 68.502414as; 26.00 BMI


Method:Stated


General Appearance:  Anxious, Moderate Distress, Other (Patient flailing about 

in the bed.  Mumbling incoherently.  Does not answer questions well or follow 

commands.)


HEENT:  PERRL/EOMI, Other (Mucous membranes very dry)


Neck:  Non Tender, Supple


Respiratory:  Lungs Clear, Normal Breath Sounds


Cardiovascular:  No Murmur, Tachycardia


Gastrointestinal:  Non Tender, Soft


Back:  Normal Inspection, No CVA Tenderness, No Vertebral Tenderness


Extremity:  Normal Range of Motion, Non Tender, No Pedal Edema


Neurologic/Psychiatric:  Other (Moves all extremities.  Mumbling incoherently.  

Does not appear to have focal weakness.)


Skin:  Normal Color, Warm/Dry





Focused Exam


Lactate Level


4/10/22 12:50: Lactic Acid Level 14.07*H


4/10/22 16:25: Lactic Acid Level 7.76*H





Lactic Acid Level





Laboratory Tests








Test


 4/10/22


12:50 4/10/22


16:25


 


Lactic Acid Level


 14.07 MMOL/L


(0.50-2.00)  *H 7.76 MMOL/L


(0.50-2.00)  *H











Procedures/Interventions


Lumen:  triple


Central Line Procedure:  betadine prep


Position:  internal jugular (R)


Complications:  none


Post Position:  sutured, good blood return, position confirmed w/ CXR


Placed via ultrasound guidance x1 stick without complications.  Sutured in 

place.  Good flush and return.  Confirmed with x-ray.  No pneumothorax.  

Tolerated procedure well with no complications.


Date of ETT Placement:  Apr 10, 2022


Time of ETT Placement:  13:21


Tube Size:  7.5


Medications:  Etomidate, Rocuronium


Positive End Tide CO2:  Yes


Breath Sounds after Intubation:  bilateral-equal


Intubation Complications:  no complications


Post Intubation Xray:  Yes


Tube in good position


Placed via Fabian videoscope x2 attempts.  Initial attempt at esophageal which was

noted at time of placement.  Tube withdrawn and preoxygenated.  Replaced easily.

 End-tidal CO2 noted on monitor at 23 with O2 saturation 100% via bagging.  

Confirmed via chest x-ray.





Progress/Results/Core Measures


Suspected Sepsis


SIRS


Temperature: 


Pulse:  


Respiratory Rate: 


 


Laboratory Tests


4/10/22 12:50: White Blood Count 26.9H


Blood Pressure  / 


Mean: 


 


4/10/22 12:50: Lactic Acid Level 14.07*H


4/10/22 16:25: Lactic Acid Level 7.76*H


Laboratory Tests


4/10/22 12:50: 


Creatinine 4.94H, INR Comment 1.2, Platelet Count 345, Total Bilirubin 0.5


4/10/22 15:33: Creatinine 4.48#H








Results/Orders


Lab Results





Laboratory Tests








Test


 4/10/22


12:50 4/10/22


13:11 4/10/22


13:50 4/10/22


15:33 Range/Units


 


 


White Blood Count


 26.9 H


 


 


 


 4.3-11.0


10^3/uL


 


Red Blood Count


 4.04 


 


 


 


 3.80-5.11


10^6/uL


 


Hemoglobin 13.6     11.5-16.0  g/dL


 


Hematocrit 48     35-52  %


 


Mean Corpuscular Volume 118 H    80-99  fL


 


Mean Corpuscular Hemoglobin 34     25-34  pg


 


Mean Corpuscular Hemoglobin


Concent 29 L


 


 


 


 32-36  g/dL





 


Red Cell Distribution Width 13.0     10.0-14.5  %


 


Platelet Count


 345 


 


 


 


 130-400


10^3/uL


 


Mean Platelet Volume 11.0     9.0-12.2  fL


 


Immature Granulocyte % (Auto) 2      %


 


Neutrophils (%) (Auto) 78 H    42-75  %


 


Lymphocytes (%) (Auto) 13     12-44  %


 


Monocytes (%) (Auto) 6     0-12  %


 


Eosinophils (%) (Auto) 0     0-10  %


 


Basophils (%) (Auto) 1     0-10  %


 


Neutrophils # (Auto)


 21.0 H


 


 


 


 1.8-7.8


10^3/uL


 


Lymphocytes # (Auto)


 3.5 


 


 


 


 1.0-4.0


10^3/uL


 


Monocytes # (Auto)


 1.6 H


 


 


 


 0.0-1.0


10^3/uL


 


Eosinophils # (Auto)


 0.0 


 


 


 


 0.0-0.3


10^3/uL


 


Basophils # (Auto)


 0.2 H


 


 


 


 0.0-0.1


10^3/uL


 


Immature Granulocyte # (Auto)


 0.6 H


 


 


 


 0.0-0.1


10^3/uL


 


Neutrophils % (Manual) 81      %


 


Lymphocytes % (Manual) 9      %


 


Monocytes % (Manual) 6      %


 


Eosinophils % (Manual) 0      %


 


Basophils % (Manual) 0      %


 


Band Neutrophils 4      %


 


Macrocytosis MODERATE      


 


Prothrombin Time 15.6 H    12.2-14.7  SEC


 


INR Comment 1.2     0.8-1.4  


 


Activated Partial


Thromboplast Time 29 


 


 


 


 24-35  SEC





 


Urine Color YELLOW      


 


Urine Clarity CLEAR      


 


Urine pH 5.5     5-9  


 


Urine Specific Gravity 1.015 L    1.016-1.022  


 


Urine Protein TRACE H    NEGATIVE  


 


Urine Glucose (UA) 3+ H    NEGATIVE  


 


Urine Ketones 2+ H    NEGATIVE  


 


Urine Nitrite NEGATIVE     NEGATIVE  


 


Urine Bilirubin NEGATIVE     NEGATIVE  


 


Urine Urobilinogen 0.2     < = 1.0  MG/DL


 


Urine Leukocyte Esterase NEGATIVE     NEGATIVE  


 


Urine RBC (Auto) TRACE-I H    NEGATIVE  


 


Urine RBC RARE      /HPF


 


Urine WBC 2-5      /HPF


 


Urine Squamous Epithelial


Cells RARE 


 


 


 


  /HPF





 


Urine Crystals NONE      /LPF


 


Urine Bacteria TRACE      /HPF


 


Urine Casts NONE      /LPF


 


Urine Mucus NEGATIVE      /LPF


 


Urine Culture Indicated


 CULTURE


PENDING 


 


 


  





 


Sodium Level 125 *L   128 L 135-145  MMOL/L


 


Potassium Level 5.6 H   5.3 H 3.6-5.0  MMOL/L


 


Chloride Level 69 L   81 L   MMOL/L


 


Carbon Dioxide Level < 5 *L   7 *L 21-32  MMOL/L


 


Anion Gap 51 H   40 H 5-14  MMOL/L


 


Blood Urea Nitrogen 65 H   63 H 7-18  MG/DL


 


Creatinine


 4.94 H


 


 


 4.48 #H


 0.60-1.30


MG/DL


 


Estimat Glomerular Filtration


Rate 10 


 


 


 11 


  





 


BUN/Creatinine Ratio 13    14   


 


Glucose Level 1540 *H   1130 *H   MG/DL


 


Lactic Acid Level


 14.07 *H


 


 


 


 0.50-2.00


MMOL/L


 


Calcium Level 9.4    8.0 L 8.5-10.1  MG/DL


 


Corrected Calcium 9.2     8.5-10.1  MG/DL


 


Total Bilirubin 0.5     0.1-1.0  MG/DL


 


Aspartate Amino Transf


(AST/SGOT) 16 


 


 


 


 5-34  U/L





 


Alanine Aminotransferase


(ALT/SGPT) 18 


 


 


 


 0-55  U/L





 


Alkaline Phosphatase 97       U/L


 


Total Protein 7.6     6.4-8.2  GM/DL


 


Albumin 4.2     3.2-4.5  GM/DL


 


Urine Opiates Screen NEGATIVE     NEGATIVE  


 


Urine Oxycodone Screen NEGATIVE     NEGATIVE  


 


Urine Methadone Screen NEGATIVE     NEGATIVE  


 


Urine Propoxyphene Screen NEGATIVE     NEGATIVE  


 


Urine Barbiturates Screen NEGATIVE     NEGATIVE  


 


Ur Tricyclic Antidepressants


Screen NEGATIVE 


 


 


 


 NEGATIVE  





 


Urine Phencyclidine Screen NEGATIVE     NEGATIVE  


 


Urine Amphetamines Screen POSITIVE H    NEGATIVE  


 


Urine Methamphetamines Screen POSITIVE H    NEGATIVE  


 


Urine Benzodiazepines Screen NEGATIVE     NEGATIVE  


 


Urine Cocaine Screen NEGATIVE     NEGATIVE  


 


Urine Cannabinoids Screen NEGATIVE     NEGATIVE  


 


SARS-CoV-2 RNA (RT-PCR)  Negative    Negative  


 


Blood Gas Puncture Site   L BRACH    


 


Blood Gas Patient Temperature   36.1    


 


Arterial Blood pH   6.83 *L  7.37-7.43  


 


Arterial Blood Partial


Pressure CO2 


 


 47 H


 


 35-45  MMHG





 


Arterial Blood Partial


Pressure O2 


 


 440 H


 


 79-93  MMHG





 


Arterial Blood HCO3   8 *L  23-27  MMOL/L


 


Arterial Blood Total CO2


 


 


 9.0 *L


 


 21.0-31.0


MMOL/L


 


Arterial Blood Oxygen


Saturation 


 


 99 


 


   %





 


Arterial Blood Base Excess


 


 


 -23.5 L


 


 -2.5-2.5


MMOL/L


 


Trent Test   NA    


 


Blood Gas Ventilator Setting   YES    


 


Blood Gas Inspired Oxygen   100%    


 


Test


 4/10/22


16:25 4/10/22


16:51 


 


 Range/Units


 


 


Lactic Acid Level


 7.76 *H


 


 


 


 0.50-2.00


MMOL/L


 


Influenza Type A Antigen  NEGATIVE    NEGATIVE  


 


Influenza Type B Antigen  NEGATIVE    NEGATIVE  








My Orders





Orders - MELODY AGUIRRE MD Iv 1000 Ml (Sodium Chloride 0.9%) (4/10/22 12:46)


Lactated Ringers (Lr 1000 Ml Iv Solution (4/10/22 12:46)


Accucheck Stat ONCE (4/10/22 12:46)


Cbc With Automated Diff (4/10/22 12:46)


Comprehensive Metabolic Panel (4/10/22 12:46)


Blood Culture (4/10/22 12:46)


Sputum Culture (4/10/22 12:46)


Urinalysis (4/10/22 12:46)


Urine Culture (4/10/22 12:46)


Protime With Inr (4/10/22 12:46)


Partial Thromboplastin Time (4/10/22 12:46)


Chest 1 View, Ap/Pa Only (4/10/22 12:46)


Ed Iv/Invasive Line Start (4/10/22 12:46)


Ed Iv/Invasive Line Start (4/10/22 12:46)


Vital Signs Adult Sepsis Patie Q15M (4/10/22 12:46)


O2 (4/10/22 12:46)


Remove Rings In Anticipation O (4/10/22 12:46)


Lactic Acid Analyzer (4/10/22 12:46)


Catheter(Urinary) Insert & Ass 03,15 (4/10/22 12:46)


Lorazepam Injection (Ativan Injection) (4/10/22 13:00)


Lorazepam Injection (Ativan Injection) (4/10/22 12:52)


Manual Differential (4/10/22 12:50)


Norepinephrine 8 Mg/250 Ml (Norepinephri (4/10/22 13:08)


Norepinephrine 8 Mg/250 Ml (Norepinephri (4/10/22 13:15)


Drug Screen Stat (Urine) (4/10/22 13:12)


Insulin (Regular) Human (Novolin R (Per (4/10/22 13:45)


Insulin Regular Drip (Myxredlin 100 Unit (4/10/22 13:45)


Arterial Blood Gas (4/10/22 13:53)


Insulin (Regular) Human (Novolin R (Per (4/10/22 13:50)


Insulin Regular Drip (Myxredlin 100 Unit (4/10/22 13:50)


Covid 19 Inhouse Test (4/10/22 14:15)


Ventilator Settings Order (4/10/22 13:25)


Propofol Drip (Icu) (Diprivan Drip (Icu) (4/10/22 14:45)


Ns Iv 1000 Ml (Sodium Chloride 0.9%) (4/10/22 14:39)


Lactated Ringers (Lr 1000 Ml Iv Solution (4/10/22 14:39)


Sodium Bicarbonate 8.4% Syr (Sodium Bica (4/10/22 14:45)


Arterial Blood Draw - Obtain (4/10/22 )


Basic Metabolic Panel (4/10/22 15:05)


Sodium Bicarbonate 8.4% Vial (Sodium Bic (4/10/22 15:21)


Midazolam Injection (Versed Injection) (4/10/22 16:00)


Midazolam Injection (Versed Injection) (4/10/22 15:49)


Rocuronium 5 Ml Syringe (Rocuronium 5 Ml (4/10/22 16:15)


Coronavirus Sars-Cov-2 So  (4/10/22 16:48)


Influenza A & B Antigens (4/10/22 16:51)


Fentanyl  Inj (Sublimaze Injection) (4/10/22 17:10)


Midazolam Injection (Versed Injection) (4/10/22 17:15)


Propofol Drip (Icu) (Diprivan Drip (Icu) (4/10/22 17:30)





Medications Given in ED





Current Medications








 Medications  Dose


 Ordered  Sig/Corin


 Route  Start Time


 Stop Time Status Last Admin


Dose Admin


 


 Insulin Human


 Regular  7 unit  ONCE  ONCE


 IV  4/10/22 13:45


 4/10/22 13:54 DC 4/10/22 14:00


7 UNIT


 


 Lorazepam  1 mg  ONCE  ONCE


 IVP  4/10/22 13:00


 4/10/22 13:01 DC 4/10/22 12:52


1 MG


 


 Midazolam HCl  5 mg  ONCE  ONCE


 IVP  4/10/22 16:00


 4/10/22 16:01 DC 4/10/22 15:50


5 MG


 


 Midazolam HCl  5 mg  ONCE  ONCE


 IVP  4/10/22 17:15


 4/10/22 17:16 DC 4/10/22 17:16


5 MG


 


 Rocuronium Bromide  50 mg  ONCE  ONCE


 IV  4/10/22 16:15


 4/10/22 16:16 DC 4/10/22 16:14


50 MG


 


 Sodium Bicarbonate  50 meq  STK-MED ONCE


 .ROUTE  4/10/22 15:21


 4/10/22 15:24 DC 4/10/22 15:26


100 MEQ








Vital Signs/I&O











 4/10/22 4/10/22 4/10/22 4/10/22





 12:40 13:15 13:25 14:44


 


Temp 36.1   


 


Pulse 120 122 122 


 


Resp 28  16 


 


B/P (MAP) 126/91 (103) 129/54  


 


Pulse Ox 100   


 


O2 Delivery Room Air   


 


FiO2   100 50


 


    





 4/10/22 4/10/22 4/10/22 





 15:07 17:31 17:34 


 


Temp   37.0 


 


Pulse 137 147 150 


 


Resp   22 


 


B/P (MAP) 101/55 124/71 124/71 


 


Pulse Ox   96 


 


O2 Delivery   Mechanical Ventilator 


 


O2 Flow Rate   50.00 





Capillary Refill :





Point of Care Testing


Finger Stick Blood Glucose:  1000


Blood Glucose Action Taken:  GLUCOMETER READS "HI"


Progress Note :  


Progress Note


Seen and evaluated.  IV x2, labs, blood cultures, lactic acid, fingerstick blood

sugar, normal saline 1 L bolus, LR 1 L bolus and Mathur catheter ordered.  

Patient is in extremis and seems quite agitated.  Does have history of 

prescribed benzodiazepine use.  We will go ahead and give Ativan 1 mg IV.  1320:

Patient has progressively declined despite fluids and and other attempts at 

redirection.  Blood sugar too high to read.  Labs have been drawn and are 

pending.  Given patient's extremitas and altered mental status and the need for 

further fluid support and concerns for DKA and airway protection, we have 

elected to intubate the patient.  1325: Patient intubated without difficulty.  

We will proceed with central line to gain access.  Does have apparent dialysis 

shunt to the right arm but does appear to have track marks.  Unsure if this is 

from dialysis or if patient is injecting.  Does have history of drug abuse.  UDS

added.  1345: Central line placed and complete.  Placed via ultrasound guidance 

without difficulty.  We have initiated 3 L of fluid with insulin 7 units IV now 

and we will initiate insulin drip.  Patient has had some episodes of 

hypotension.  We have Levophed available if needed.  1422: ABG very concerning 

for acidosis and consistent with DKA.  Blood sugar 1540.  Insulin drip running. 

We will continue IV fluids.  Given patient's underlying renal failure and the 

need for dialysis, patient will need transfer.  We will review for which center 

she typically goes to and initiate transfer proceedings.  1443: I did discuss 

the case with Dr. Henriquez, intensiveness at [facility] in [place].  Case discussed in full.  He is recommending that change to 24 on the 

rate as well as addition of 2 A of sodium bicarb.  He is excepted the patient to

their facility.  Pending bed number.  We will initiate those changes.  I have 

ordered propofol drip and we will use Levophed as needed to maintain appropriate

blood pressure.  Monitor patient.  1538: I have made adjustments of the 

initially tolerated 24 and then back down to 22 as patient seemed to be stacking

a little bit.  We did increase sedation and now have propofol up to 50 

mcg/kg/min.  Patient has been bucking the vent.  Head of bed elevated.  O2 

saturation had started to decline but now is at 99% on 70% on vent with rate of 

22, tidal volume 400 and PEEP of 5.  Patient did receive 5 mg of Versed and 50 

mcg of fentanyl earlier as sedation was increased.  We are still pending bed 

assignment.  Patient's daughter did call and did note that her dialysis is on 

Tuesday, Thursday and Saturday.  She apparently did receive dialysis yesterday. 

Monitor patient.  1711: Flight crew here.  Report given to them by me.  Patient 

is starting to be a little bit more awake again and bucking the vent.  Versed 5 

mg IV and fentanyl 50 mcg IV ordered.  Propofol drip up to 80 mcg/kg/min.  She 

is on her Levophed drip and that has been titrated to 0.1 and down to 0.05 

mcg/kg/min.  We will continue to monitor that and titrate as needed to affect 

with other agents.  Patient has been accepted to the ICU and will go by flight.





Diagnostic Imaging





   Diagonstic Imaging:  Xray


   Plain Films/CT/US/NM/MRI:  chest


Comments


                 [facility] [place] [place]





NAME:   DAYTON BLISS


MED REC#:   [id number]


ACCOUNT#:   [id number]


PT STATUS:   REG ER


:   1965


PHYSICIAN:   MELODY AGUIRRE MD


ADMIT DATE:   04/10/22/ER


                                   ***Draft***


Date of Exam:04/10/22





CHEST 1 VIEW, AP/PA ONLY








INDICATION: Shortness of breath.





Comparison made with prior examination of 01/15/2022.





FINDINGS: The heart size is normal. Lungs are clear. No pleural


effusion or pneumothorax. Lines and tubes are in satisfactory


position. There is minimal venous congestion.





IMPRESSION: The ET, NG and right frontal gyrus and venous


catheters appear to be in satisfactory position.





Mild central pulmonary venous congestion.





  Dictated on workstation # [id number]








Dict:   04/10/22 1432


Trans:   04/10/22 1436


CVB [id number]





Interpreted by:     BYRON RICHARDS MD


Electronically signed by:





Critical Care Note


Critical Care


Start Time:  12:42


Stop Time:  17:11


Total Time (minutes)


60 minutes critical care time.  Time excludes separately billable procedures 

including central line and intubation.  Time includes evaluation, management, 

transfer proceedings and report and continued evaluation and management of this 

critical patient pending transport to critical care facility.





Departure


Impression





   Primary Impression:  


   DKA (diabetic ketoacidosis)


   Qualified Codes:  E10.11 - Type 1 diabetes mellitus with ketoacidosis with 

   coma


   Additional Impressions:  


   End stage renal disease on dialysis


   Respiratory failure


   Qualified Codes:  J96.00 - Acute respiratory failure, unspecified whether 

   with hypoxia or hypercapnia


   Methamphetamine abuse


Disposition:   [facility]


Condition:  Critical (ERASED)





Transfer


Transfer Reason:  Exceeds level of care


Time Spoke to Accepting Phy:  14:31


Transfer Progress Notes


[place], [place], Dr. Henriquez accepting.


Transfer Time:  17:20


Transfer Facility:  


[place], [place]


Method of Transfer:  EMS ([facility])





Departure-Patient Inst.


Referrals:  


ABIGAIL BANDA DO (PCP/Family)


Primary Care Physician











MELODY AGUIRRE MD          Apr 10, 2022 13:12

## 2022-10-14 ENCOUNTER — HOSPITAL ENCOUNTER (EMERGENCY)
Dept: HOSPITAL 75 - ER | Age: 57
Discharge: ANOTHER HOSPITAL | End: 2022-10-14
Payer: MEDICAID

## 2022-10-14 VITALS — SYSTOLIC BLOOD PRESSURE: 111 MMHG | DIASTOLIC BLOOD PRESSURE: 52 MMHG

## 2022-10-14 VITALS — HEIGHT: 64.96 IN | WEIGHT: 150.36 LBS | BODY MASS INDEX: 25.05 KG/M2

## 2022-10-14 DIAGNOSIS — E11.10: ICD-10-CM

## 2022-10-14 DIAGNOSIS — E11.22: ICD-10-CM

## 2022-10-14 DIAGNOSIS — I13.2: ICD-10-CM

## 2022-10-14 DIAGNOSIS — J18.9: Primary | ICD-10-CM

## 2022-10-14 DIAGNOSIS — Z99.2: ICD-10-CM

## 2022-10-14 DIAGNOSIS — N18.6: ICD-10-CM

## 2022-10-14 DIAGNOSIS — J96.90: ICD-10-CM

## 2022-10-14 DIAGNOSIS — E87.5: ICD-10-CM

## 2022-10-14 DIAGNOSIS — Z79.4: ICD-10-CM

## 2022-10-14 DIAGNOSIS — I50.9: ICD-10-CM

## 2022-10-14 LAB
ALBUMIN SERPL-MCNC: 3.6 GM/DL (ref 3.2–4.5)
ALP SERPL-CCNC: 87 U/L (ref 40–136)
ALT SERPL-CCNC: 23 U/L (ref 0–55)
APTT BLD: 31 SEC (ref 24–35)
APTT PPP: YELLOW S
ARTERIAL PATENCY WRIST A: (no result)
BACTERIA #/AREA URNS HPF: (no result) /HPF
BARBITURATES UR QL: NEGATIVE
BASE EXCESS STD BLDA CALC-SCNC: -25.8 MMOL/L (ref -2.5–2.5)
BASOPHILS # BLD AUTO: 0.1 10^3/UL (ref 0–0.1)
BASOPHILS NFR BLD AUTO: 1 % (ref 0–10)
BASOPHILS NFR BLD MANUAL: 0 %
BDY SITE: (no result)
BENZODIAZ UR QL SCN: NEGATIVE
BILIRUB SERPL-MCNC: 0.5 MG/DL (ref 0.1–1)
BILIRUB UR QL STRIP: NEGATIVE
BODY TEMPERATURE: 33.4
BUN/CREAT SERPL: 11
CALCIUM SERPL-MCNC: 8.1 MG/DL (ref 8.5–10.1)
CHLORIDE SERPL-SCNC: 72 MMOL/L (ref 98–107)
CO2 BLDA CALC-SCNC: 5.6 MMOL/L (ref 21–31)
CO2 SERPL-SCNC: < 5 MMOL/L (ref 21–32)
COCAINE UR QL: NEGATIVE
CREAT SERPL-MCNC: 5.24 MG/DL (ref 0.6–1.3)
EOSINOPHIL # BLD AUTO: 0.1 10^3/UL (ref 0–0.3)
EOSINOPHIL NFR BLD AUTO: 0 % (ref 0–10)
EOSINOPHIL NFR BLD MANUAL: 0 %
FIBRINOGEN PPP-MCNC: CLEAR MG/DL
GFR SERPLBLD BASED ON 1.73 SQ M-ARVRAT: 9 ML/MIN
GLUCOSE SERPL-MCNC: 1973 MG/DL (ref 70–105)
GLUCOSE UR STRIP-MCNC: (no result) MG/DL
HCT VFR BLD CALC: 38 % (ref 35–52)
HGB BLD-MCNC: 9.8 G/DL (ref 11.5–16)
INHALED O2 FLOW RATE: (no result) L/MIN
INR PPP: 1.5 (ref 0.8–1.4)
KETONES UR QL STRIP: (no result)
LEUKOCYTE ESTERASE UR QL STRIP: NEGATIVE
LYMPHOCYTES # BLD AUTO: 2.4 10^3/UL (ref 1–4)
LYMPHOCYTES NFR BLD AUTO: 10 % (ref 12–44)
MACROCYTES BLD QL SMEAR: (no result)
MANUAL DIFFERENTIAL PERFORMED BLD QL: YES
MCH RBC QN AUTO: 33 PG (ref 25–34)
MCHC RBC AUTO-ENTMCNC: 26 G/DL (ref 32–36)
MCV RBC AUTO: 127 FL (ref 80–99)
METHADONE UR QL SCN: NEGATIVE
MONOCYTES # BLD AUTO: 1.5 10^3/UL (ref 0–1)
MONOCYTES NFR BLD AUTO: 6 % (ref 0–12)
MONOCYTES NFR BLD: 6 %
MYELOCYTES NFR BLD: 1 %
NEUTROPHILS # BLD AUTO: 19 10^3/UL (ref 1.8–7.8)
NEUTROPHILS NFR BLD AUTO: 79 % (ref 42–75)
NEUTS BAND NFR BLD MANUAL: 78 %
NEUTS BAND NFR BLD: 4 %
NITRITE UR QL STRIP: NEGATIVE
OPIATES UR QL SCN: NEGATIVE
OXYCODONE UR QL: NEGATIVE
PCO2 BLDA: 24 MMHG (ref 35–45)
PH BLDA: 6.89 [PH] (ref 7.37–7.43)
PH UR STRIP: 5 [PH] (ref 5–9)
PLATELET # BLD: 262 10^3/UL (ref 130–400)
PMV BLD AUTO: 11.3 FL (ref 9–12.2)
PO2 BLDA: 133 MMHG (ref 79–93)
POTASSIUM SERPL-SCNC: 7.5 MMOL/L (ref 3.6–5)
PROPOXYPH UR QL: NEGATIVE
PROT SERPL-MCNC: 6.4 GM/DL (ref 6.4–8.2)
PROT UR QL STRIP: (no result)
PROTHROMBIN TIME: 18.6 SEC (ref 12.2–14.7)
RBC #/AREA URNS HPF: (no result) /HPF
SAO2 % BLDA FROM PO2: 97 % (ref 94–100)
SODIUM SERPL-SCNC: 115 MMOL/L (ref 135–145)
SP GR UR STRIP: 1.01 (ref 1.02–1.02)
SQUAMOUS #/AREA URNS HPF: (no result) /HPF
TRICYCLICS UR QL SCN: NEGATIVE
VARIANT LYMPHS NFR BLD MANUAL: 11 %
VENTILATION MODE VENT: YES
WBC # BLD AUTO: 24 10^3/UL (ref 4.3–11)
WBC #/AREA URNS HPF: (no result) /HPF

## 2022-10-14 PROCEDURE — 83605 ASSAY OF LACTIC ACID: CPT

## 2022-10-14 PROCEDURE — 71045 X-RAY EXAM CHEST 1 VIEW: CPT

## 2022-10-14 PROCEDURE — 36415 COLL VENOUS BLD VENIPUNCTURE: CPT

## 2022-10-14 PROCEDURE — 84145 PROCALCITONIN (PCT): CPT

## 2022-10-14 PROCEDURE — 87088 URINE BACTERIA CULTURE: CPT

## 2022-10-14 PROCEDURE — 80306 DRUG TEST PRSMV INSTRMNT: CPT

## 2022-10-14 PROCEDURE — 86141 C-REACTIVE PROTEIN HS: CPT

## 2022-10-14 PROCEDURE — 81000 URINALYSIS NONAUTO W/SCOPE: CPT

## 2022-10-14 PROCEDURE — 31500 INSERT EMERGENCY AIRWAY: CPT

## 2022-10-14 PROCEDURE — 85027 COMPLETE CBC AUTOMATED: CPT

## 2022-10-14 PROCEDURE — 99291 CRITICAL CARE FIRST HOUR: CPT

## 2022-10-14 PROCEDURE — 80053 COMPREHEN METABOLIC PANEL: CPT

## 2022-10-14 PROCEDURE — 87040 BLOOD CULTURE FOR BACTERIA: CPT

## 2022-10-14 PROCEDURE — 85730 THROMBOPLASTIN TIME PARTIAL: CPT

## 2022-10-14 PROCEDURE — 82805 BLOOD GASES W/O2 SATURATION: CPT

## 2022-10-14 PROCEDURE — 85007 BL SMEAR W/DIFF WBC COUNT: CPT

## 2022-10-14 PROCEDURE — 51702 INSERT TEMP BLADDER CATH: CPT

## 2022-10-14 PROCEDURE — 85610 PROTHROMBIN TIME: CPT

## 2022-10-14 NOTE — ED GENERAL
General


Chief Complaint:  Altered Mental Status


Stated Complaint:  HIGH BS


Nursing Triage Note:  


PT TO ED ROOM 3 PER EMS. EMS STATES SHE MISSED DIALYSIS THIS AM AND WAS FOUND 


UNRESPONSIVE/MOANING BY HER SKIL WORKER. PT CONTINUOUSLY MOANING, DOES RESPOND 


TO SOME QUESTIONS.


Source of Information:  Patient


Exam Limitations:  No Limitations





History of Present Illness


Date Seen by Provider:  Oct 14, 2022


Time Seen by Provider:  13:43


Initial Comments


Here by EMS with report of altered mental status and missed dialysis this 

morning.  She was found unresponsive and/or moaning by skilled worker today.  

This is all she is doing currently.  She is not answering questions or following

commands.  She seems quite disoriented.  Known diabetic with blood sugar greater

than 600 and known end-stage renal disease on dialysis.  Had similar 

presentation in April requiring intubation and transfer due to severe DKA.  No 

report of recent illness.  Unknown other review of systems.  Daughter called 

later and states that she does this sometimes and it is sometimes associated 

with methamphetamine abuse.


Timing/Duration:  24 Hours


Severity:  Severe





Allergies and Home Medications


Allergies


Coded Allergies:  


     morphine (Verified  Allergy, Mild, Vomiting, 20)


     orange juice (Verified  Allergy, Mild, Nausea, 20)


     Sulfa (Sulfonamide Antibiotics) (Unverified  Allergy, Unknown, 6/25/15)


     codeine (Verified  Allergy, Unknown, TAKES ULTRAM AT HOME, 09)


     ketorolac (Verified  Allergy, Unknown, 08)


     tramadol (Verified  Allergy, Unknown, 11)





Patient Home Medication List


Home Medication List Reviewed:  Yes


Albuterol Sulfate (Proair Hfa) 1 Puff Puff, 2 PUFF IH TID PRN for SHORTNESS OF 

BREATH, (Reported)


   Entered as Reported by: SHWETHA GONZALEZ on 19 0913


Aspirin (Aspirin) 81 Mg Tab.chew, 81 MG PO DAILY, (Reported)


   Entered as Reported by: CARLOS JONES on 20 1012


Cephalexin (Cephalexin) 500 Mg Tablet, 500 MG PO TID


   Prescribed by: LIAM GAONA on 21 1342


Diclofenac Sodium (Diclofenac Sodium) 100 Gm Gel..gram., 1 APPLIC TOP QID PRN 

for CRAMPS, (Reported)


   Entered as Reported by: CARLOS JONES on 20 1349


Ergocalciferol (Vitamin D2) (Vitamin D2) 1,250 Mcg Capsule, 1,250 MCG PO THUR, 

(Reported)


   Entered as Reported by: CARLOS JONES on 21 1104


Fluticasone Propionate (Flonase Allergy Relief) 9.9 Ml [place].susp, 2 SPRAY 

NSEACH DAILY, (Reported)


   Entered as Reported by: CARLOS JONES on 20 1349


Furosemide (Furosemide) 80 Mg Tablet, 80 MG PO BID, (Reported)


   Entered as Reported by: CARLOS JONES on 21 1104


Gabapentin (Neurontin) 300 Mg Capsule, 600 MG PO Q8H, (Reported)


   Entered as Reported by: CARLOS JONES on 20 1012


Insulin Determir (Levemir) 1,000 Units/10 Ml Soln, 15 UNITS SQ HS


   Prescribed by: MISHA DEVRIES on 21 1229


Insulin Lispro (Humalog Kwikpen) 100 Unit/1 Ml Insuln.pen, 5-15 UNITS SQ AC, 

(Reported)


   Entered as Reported by: CARLOS JONES on 2/10/20 1551


Isosorbide Mononitrate (Isosorbide Mononitrate ER) 30 Mg Tab.er.24h, 30 MG PO 

DAILY, (Reported)


   Entered as Reported by: CARLOS JONES on 21 1104


Lorazepam (Ativan) 1 Mg Tablet, 1 MG PO TID PRN for ANXIETY, (Reported)


   Entered as Reported by: CARLOS JONES on 21 110


Magnesium Oxide (Magnesium Oxide) 400 Mg Tablet, 400 MG PO BID, (Reported)


   Entered as Reported by: CARLOS JONES on 21 1104


Melatonin (Melatonin) 10 Mg Tablet, 10 MG PO HS, (Reported)


   Entered as Reported by: CARLOS JONES on 20 1349


Metoclopramide HCl (Metoclopramide HCl) 10 Mg Tablet, 10 MG PO QIDACHS, 

(Reported)


   Entered as Reported by: CARLOS JONES on 20 1349


Metolazone (Metolazone) 5 Mg Tablet, 5 MG PO MON,WE,FR, (Reported)


   Entered as Reported by: JONATHAN RODRIGUEZ on 1/10/21 1600


Metoprolol Tartrate (Metoprolol Tartrate) 50 Mg Tablet, 75 MG PO BID, (Reported)


   Entered as Reported by: CARLOS JONES on 20 1012


Montelukast Sodium (Montelukast Sodium) 10 Mg Tablet, 10 MG PO HS, (Reported)


   Entered as Reported by: ALDO LOPEZ on 19 1144


Multivit-Min/FA/Lycopene/Lut (Certavite Sr-Antioxidant Tab) 1 Each Tablet, 1 EA 

PO DAILY, (Reported)


   Entered as Reported by: CARLOS JONES on 21 1104


Naloxone HCl (Narcan) 4 Mg Spray, 1 SPRAY NS UD PRN for UNRESPONSIVE, (Reported)


   Entered as Reported by: CARLOS JONES on 20 1349


Omeprazole (Omeprazole) 20 Mg Capsule.dr, 20 MG PO DAILY, (Reported)


   Entered as Reported by: CARLOS JONES on 20 1349


Oxycodone HCl (Oxycodone HCl) 5 Mg Tablet, 5 MG PO DAILY PRN for PAIN-SEVERE (8-

10), (Reported)


   Entered as Reported by: CARLOS JONES on 21 1104


Prednisone (Prednisone) 20 Mg Tab, 40 MG PO DAILY


   Prescribed by: LIAM GAONA on 21 1342


Torsemide (Torsemide) 100 Mg Tablet, 100 MG PO 0800,1500, (Reported)


   Entered as Reported by: JONATHAN RODRIGUEZ on 1/10/21 1600


[Iron Slow Release] 45 TAB, 45 MG PO DAILY, (Reported)


   Entered as Reported by: CARLOS JONES on 21 1104





Review of Systems


Review of Systems


Constitutional:  see HPI


Unable to complete review of systems due to altered mental status and clinical 

condition





Past Medical-Social-Family Hx


Immunizations Up To Date


Tetanus Booster (TDap):  Unknown


First/Initial COVID19 Vaccinat:  UNK


Second COVID19 Vaccination Juan:  UNK


Third COVID19 Vaccination Date:  UNK





Seasonal Allergies


Seasonal Allergies:  Yes





Past Medical History


Surgery/Hospitalization HX:  


DIALYSIS GRAFT/AV FISTULA IN RIGHT UPPER ARM, DIALYSIS 3X/WK


Surgeries:  Yes (URETERAL STENTS)


Abdominal, Adenoidectomy, Dialysis, Ear Surgery, Hysterectomy, Renal, 

Tonsillectomy, Tubal Ligation, Vascular Surgery


Respiratory:  Yes (INTUBATED 20 WITH DKA/RESP FAILURE)


Asthma, Pneumonia, Chronic Bronchitis, COPD


Currently Using CPAP:  No


Currently Using BIPAP:  No


Cardiac:  Yes (CHF)


Deep Vein Thrombosis, High Cholesterol, Hypertension


Neurological:  Yes


Neuropathy


Reproductive Disorders:  Yes


Female Reproductive Disorders:  Menstrual Problems


GYN History:  Hysterectomy, Menopausal


Genitourinary:  Yes (DIALYSIS --SAT; URETERAL STENTS FOR KIDNEY STONES)


Renal Failure, Dialysis


Gastrointestinal:  Yes


Hepatitis


Musculoskeletal:  Yes (CHRONIC GENERALIZED PAIN)


Arthritis, Rheumatoid Arthritis, Fractures


Endocrine:  Yes (NON-COMPLIANT; MULTIPLE EPISODES OF DKA)


Diabetes, Insulin dep


HEENT:  Yes (S/P T&A)


Tonsilitis, Glaucoma


Loss of Vision:  Denies


Hearing Impairment:  Denies


Cancer:  No


Psychosocial:  Yes (POLYSUBSTANCE ABUSE)


Anxiety, Personality Disorder, Depression


Integumentary:  No


Blood Disorders:  No


Adverse Reaction/Blood Tranf:  No





Family Medical History


Reviewed Nursing Family Hx





FH: thyroid cancer


  G8 SISTER


FHx: macular degeneration


  19 MOTHER


Glaucoma


  G8 BROTHER


Heart Disease, Cancer, Diabetes





SOCIAL HISTORY:


-ETOH--REGULAR USE--"COUPLE OF DRINKS" SEVERAL NIGHTS A WEEK


-DRUGS--+ METH AND THC USE


-SMOKES > 1 PPD





PAST SURGICAL HISTORY: 


-TONSILLECTOMY AND ADENOIDECTOMY


-BILATERAL TUBAL LIGATION


-HYSTERECTOMY


-EXPLORATORY LAPAROSCOPY


-URETERAL STENTS FOR KIDNEY STONES/CYSTOSCOPIES


-EAR SURGERY


-RIGHT UPPER ARM DIALYSIS GRAFT/AV FISTULA





Physical Exam


Vital Signs





Vital Signs - First Documented








 10/14/22





 13:34


 


Temp 33.4


 


Pulse 94


 


Resp 30


 


B/P (MAP) 110/97 (101)


 


Pulse Ox 100


 


O2 Delivery Non Rebreather


 


O2 Flow Rate 10.00


 


FiO2 100





Capillary Refill : Less Than 3 Seconds


Height, Weight, BMI


Height: 5'5.00"


Weight: 151lbs. 1.0oz. [id number]; 25.00 BMI


Method:Stated


General Appearance:  WD/WN, Moderate Distress


HEENT:  PERRL/EOMI, Other


Neck:  Full Range of Motion, Supple


Respiratory:  Lungs Clear, Normal Breath Sounds


Cardiovascular:  No Murmur, Tachycardia


Gastrointestinal:  No Pulsatile Mass, Soft


Back:  No CVA Tenderness


Extremity:  No Pedal Edema, Pelvis Stable


Neurologic/Psychiatric:  Disoriented (X3), Other (Does appear to be moving all 

extremities)


Skin:  Normal Color, Cool; No Diaphoresis; Ecchymosis (Right upper extremity 

near dialysis shunt)





Focused Exam


Lactate Level


10/14/22 13:50: Lactic Acid Level 12.55*H


10/14/22 16:55: Lactic Acid Level 6.51*H





Lactic Acid Level





Laboratory Tests








Test


 10/14/22


13:50 10/14/22


16:55


 


Lactic Acid Level


 12.55 MMOL/L


(0.50-2.00)  *H 6.51 MMOL/L


(0.50-2.00)  *H











Procedures/Interventions


Lumen:  triple


Central Line Procedure:  betadine prep, sterile drapes applied, sterile dressing

applied


Position:  internal jugular (R)


Complications:  none


Post Position:  sutured, good blood return, position confirmed w/ CXR


Central line placed via ultrasound guidance x1 stick without complications.  

Followed Seldinger technique.  Sterile dressing applied by me.  Good draw and 

flush.  Confirmed with chest x-ray.  No complications.


Date of ETT Placement:  Oct 14, 2022


Time of ETT Placement:  14:04


Tube Size:  7.50


Medications:  Etomidate, Rocuronium


Positive End Tide CO2:  Yes


Breath Sounds after Intubation:  bilateral-equal


Intubation Complications:  no complications


Post Intubation Xray:  Yes


ET tube in good position


Intubated due to emergent condition with altered mental status and concern for 

respiratory compromise.  Intubated via glide scope x1 attempt without 

complication.  Oral mucosa very dry.  Tube viewed through cords.  Positive end-

tidal CO2 color change and bilateral breath sounds without epigastric sounds and

confirmed with chest x-ray.  O2 saturations remained 100% throughout intubation 

attempt and intubation.  Tolerated procedure well with no complications.





Progress/Results/Core Measures


Suspected Sepsis


SIRS


Temperature: 


Pulse: 103 


Respiratory Rate: 18


 


Laboratory Tests


10/14/22 13:48: White Blood Count 24.0H


Blood Pressure 110 /97 


Mean: 101


 


10/14/22 13:50: Lactic Acid Level 12.55*H


10/14/22 16:55: Lactic Acid Level 6.51*H


Laboratory Tests


10/14/22 13:48: 


Creatinine 5.24H, INR Comment 1.5H, Platelet Count 262, Total Bilirubin 0.5








Results/Orders


Lab Results





Laboratory Tests








Test


 10/14/22


13:46 10/14/22


13:48 10/14/22


13:50 10/14/22


14:28 Range/Units


 


 


Urine Opiates Screen NEGATIVE     NEGATIVE  


 


Urine Oxycodone Screen NEGATIVE     NEGATIVE  


 


Urine Methadone Screen NEGATIVE     NEGATIVE  


 


Urine Propoxyphene Screen NEGATIVE     NEGATIVE  


 


Urine Barbiturates Screen NEGATIVE     NEGATIVE  


 


Ur Tricyclic Antidepressants


Screen NEGATIVE 


 


 


 


 NEGATIVE  





 


Urine Phencyclidine Screen NEGATIVE     NEGATIVE  


 


Urine Amphetamines Screen POSITIVE H    NEGATIVE  


 


Urine Methamphetamines Screen POSITIVE H    NEGATIVE  


 


Urine Benzodiazepines Screen NEGATIVE     NEGATIVE  


 


Urine Cocaine Screen NEGATIVE     NEGATIVE  


 


Urine Cannabinoids Screen NEGATIVE     NEGATIVE  


 


White Blood Count


 


 24.0 H


 


 


 4.3-11.0


10^3/uL


 


Red Blood Count


 


 3.01 L


 


 


 3.80-5.11


10^6/uL


 


Hemoglobin  9.8 L   11.5-16.0  g/dL


 


Hematocrit  38    35-52  %


 


Mean Corpuscular Volume  127 H   80-99  fL


 


Mean Corpuscular Hemoglobin  33    25-34  pg


 


Mean Corpuscular Hemoglobin


Concent 


 26 L


 


 


 32-36  g/dL





 


Red Cell Distribution Width  13.7    10.0-14.5  %


 


Platelet Count


 


 262 


 


 


 130-400


10^3/uL


 


Mean Platelet Volume  11.3    9.0-12.2  fL


 


Immature Granulocyte % (Auto)  4     %


 


Neutrophils (%) (Auto)  79 H   42-75  %


 


Lymphocytes (%) (Auto)  10 L   12-44  %


 


Monocytes (%) (Auto)  6    0-12  %


 


Eosinophils (%) (Auto)  0    0-10  %


 


Basophils (%) (Auto)  1    0-10  %


 


Neutrophils # (Auto)


 


 19.0 H


 


 


 1.8-7.8


10^3/uL


 


Lymphocytes # (Auto)


 


 2.4 


 


 


 1.0-4.0


10^3/uL


 


Monocytes # (Auto)


 


 1.5 H


 


 


 0.0-1.0


10^3/uL


 


Eosinophils # (Auto)


 


 0.1 


 


 


 0.0-0.3


10^3/uL


 


Basophils # (Auto)


 


 0.1 


 


 


 0.0-0.1


10^3/uL


 


Immature Granulocyte # (Auto)


 


 1.0 H


 


 


 0.0-0.1


10^3/uL


 


Neutrophils % (Manual)  78     %


 


Lymphocytes % (Manual)  11     %


 


Monocytes % (Manual)  6     %


 


Eosinophils % (Manual)  0     %


 


Basophils % (Manual)  0     %


 


Myelocytes %  1     %


 


Band Neutrophils  4     %


 


Macrocytosis  MODERATE     


 


Prothrombin Time  18.6 H   12.2-14.7  SEC


 


INR Comment  1.5 H   0.8-1.4  


 


Activated Partial


Thromboplast Time 


 31 


 


 


 24-35  SEC





 


Urine Color  YELLOW     


 


Urine Clarity  CLEAR     


 


Urine pH  5.0    5-9  


 


Urine Specific Gravity  1.015 L   1.016-1.022  


 


Urine Protein  1+ H   NEGATIVE  


 


Urine Glucose (UA)  3+ H   NEGATIVE  


 


Urine Ketones  TRACE H   NEGATIVE  


 


Urine Nitrite  NEGATIVE    NEGATIVE  


 


Urine Bilirubin  NEGATIVE    NEGATIVE  


 


Urine Urobilinogen  0.2    < = 1.0  MG/DL


 


Urine Leukocyte Esterase  NEGATIVE    NEGATIVE  


 


Urine RBC (Auto)  TRACE-I H   NEGATIVE  


 


Urine RBC  0-2     /HPF


 


Urine WBC  0-2     /HPF


 


Urine Squamous Epithelial


Cells 


 0-2 


 


 


  /HPF





 


Urine Crystals  NONE     /LPF


 


Urine Bacteria  FEW H    /HPF


 


Urine Casts  NONE     /LPF


 


Urine Mucus  NEGATIVE     /LPF


 


Urine Culture Indicated  NO     


 


Sodium Level  115 *L   135-145  MMOL/L


 


Potassium Level  7.5 *H   3.6-5.0  MMOL/L


 


Chloride Level  72 L     MMOL/L


 


Carbon Dioxide Level  < 5 *L   21-32  MMOL/L


 


Anion Gap  38 H   5-14  MMOL/L


 


Blood Urea Nitrogen  58 H   7-18  MG/DL


 


Creatinine


 


 5.24 H


 


 


 0.60-1.30


MG/DL


 


Estimat Glomerular Filtration


Rate 


 9 


 


 


  





 


BUN/Creatinine Ratio  11     


 


Glucose Level  1973 *H     MG/DL


 


Calcium Level  8.1 L   8.5-10.1  MG/DL


 


Corrected Calcium  8.4 L   8.5-10.1  MG/DL


 


Total Bilirubin  0.5    0.1-1.0  MG/DL


 


Aspartate Amino Transf


(AST/SGOT) 


 33 


 


 


 5-34  U/L





 


Alanine Aminotransferase


(ALT/SGPT) 


 23 


 


 


 0-55  U/L





 


Alkaline Phosphatase  87      U/L


 


C-Reactive Protein High


Sensitivity 


 2.37 H


 


 


 0.00-0.50


MG/DL


 


Total Protein  6.4    6.4-8.2  GM/DL


 


Albumin  3.6    3.2-4.5  GM/DL


 


Procalcitonin  22.92 H   <0.10  NG/ML


 


Lactic Acid Level


 


 


 12.55 *H


 


 0.50-2.00


MMOL/L


 


Blood Gas Puncture Site    UNK   


 


Blood Gas Patient Temperature    33.4   


 


Arterial Blood pH    6.89 *L 7.37-7.43  


 


Arterial Blood Partial


Pressure CO2 


 


 


 24 L


 35-45  MMHG





 


Arterial Blood Partial


Pressure O2 


 


 


 133 H


 79-93  MMHG





 


Arterial Blood HCO3    5 *L 23-27  MMOL/L


 


Arterial Blood Total CO2


 


 


 


 5.6 *L


 21.0-31.0


MMOL/L


 


Arterial Blood Oxygen


Saturation 


 


 


 97 


   %





 


Arterial Blood Base Excess


 


 


 


 -25.8 L


 -2.5-2.5


MMOL/L


 


Trent Test    UNK   


 


Blood Gas Ventilator Setting    YES   


 


Blood Gas Inspired Oxygen    UNK   


 


Test


 10/14/22


16:55 


 


 


 Range/Units


 


 


Lactic Acid Level


 6.51 *H


 


 


 


 0.50-2.00


MMOL/L








My Orders





Orders - MELODY AGUIRRE MD


Ekg Tracing (10/14/22 13:36)


Cbc With Automated Diff (10/14/22 13:53)


Comprehensive Metabolic Panel (10/14/22 13:53)


Blood Culture (10/14/22 13:53)


Sputum Culture (10/14/22 13:53)


Urinalysis (10/14/22 13:53)


Urine Culture (10/14/22 13:53)


Protime With Inr (10/14/22 13:53)


Partial Thromboplastin Time (10/14/22 13:53)


Chest 1 View, Ap/Pa Only (10/14/22 13:53)


Ed Iv/Invasive Line Start (10/14/22 13:53)


Vital Signs Adult Sepsis Patie Q15M (10/14/22 13:53)


O2 (10/14/22 13:53)


Remove Rings In Anticipation O (10/14/22 13:53)


Lactic Acid Analyzer (10/14/22 13:53)


Arterial Blood Gas (10/14/22 13:53)


Hs C Reactive Protein (10/14/22 13:53)


Procalcitonin (Pct) (10/14/22 13:53)


Catheter(Urinary) Insert & Ass 03,15 (10/14/22 13:53)


Etomidate Injection (Amidate Injection) (10/14/22 14:15)


Rocuronium 5 Ml Syringe (Rocuronium 5 Ml (10/14/22 14:15)


Manual Differential (10/14/22 13:48)


Ns Iv 1000 Ml (Sodium Chloride 0.9%) (10/14/22 14:04)


Midazolam Injection (Versed Injection) (10/14/22 14:15)


Fentanyl  Inj (Sublimaze Injection) (10/14/22 14:15)


Arterial Blood Gas (10/14/22 14:23)


Insulin (Regular) Human (Novolin R (Per (10/14/22 14:33)


Sodium Bicarbonate 8.4% Syr (Sodium Bica (10/14/22 14:33)


Propofol Drip (Icu) (Diprivan Drip (Icu) (10/14/22 14:45)


Calcium Chloride 10% Injection (Calcium (10/14/22 15:00)


Piperacillin Sodium/Tazobactam (Zosyn Vi (10/14/22 15:00)


Insulin Regular Drip (Myxredlin 100 Unit (10/14/22 15:33)


Sodium Bicarbonate 8.4% Syr (Sodium Bica (10/14/22 16:15)


Midazolam Injection (Versed Injection) (10/14/22 16:00)


Fentanyl  Inj (Sublimaze Injection) (10/14/22 16:00)


Norepinephrine 8 Mg/250 Ml (Norepinephri (10/14/22 17:09)


Norepinephrine 8 Mg/250 Ml (Norepinephri (10/14/22 17:15)


Drug Screen Stat (Urine) (10/14/22 17:30)


Midazolam Injection (Versed Injection) (10/14/22 17:45)


Rocuronium 5 Ml Syringe (Rocuronium 5 Ml (10/14/22 18:00)


Ns Iv 1000 Ml (Sodium Chloride 0.9%) (10/14/22 18:15)


Ns Iv 1000 Ml (Sodium Chloride 0.9%) (10/14/22 18:01)


Ns Iv 1000 Ml (Sodium Chloride 0.9%) (10/14/22 18:01)


Etomidate Injection (Amidate Injection) (10/14/22 13:36)


Fentanyl  Inj (Sublimaze Injection) (10/14/22 13:36)


Midazolam Injection (Versed Injection) (10/14/22 13:36)


Rocuronium 5 Ml Syringe (Rocuronium 5 Ml (10/14/22 13:36)


Calcium Chloride 10% Injection (Calcium (10/14/22 13:36)


Sodium Bicarbonate 8.4% Vial (Sodium Bic (10/14/22 13:36)


Fentanyl  Inj (Sublimaze Injection) (10/14/22 18:16)


Propofol Drip (Icu) (Diprivan Drip (Icu) (10/14/22 18:16)





Medications Given in ED





Current Medications








 Medications  Dose


 Ordered  Sig/Corin


 Route  Start Time


 Stop Time Status Last Admin


Dose Admin


 


 Calcium Chloride  1 gm  ONCE  ONCE


 INJ  10/14/22 15:00


 10/14/22 15:01 DC 10/14/22 14:48


1 GM


 


 Etomidate  20 mg  ONCE  ONCE


 IV  10/14/22 14:15


 10/14/22 14:16 DC 10/14/22 14:02


20 MG


 


 Fentanyl Citrate  50 mcg  ONCE  ONCE


 IVP  10/14/22 14:15


 10/14/22 14:16 DC 10/14/22 14:11


50 MCG


 


 Fentanyl Citrate  100 mcg  STK-MED ONCE


 .ROUTE  10/14/22 16:00


 10/14/22 16:04 DC 10/14/22 16:07


50 MCG


 


 Fentanyl Citrate  100 mcg  STK-MED ONCE


 .ROUTE  10/14/22 18:16


 10/14/22 18:19 DC 10/14/22 18:21


100 MCG


 


 Insulin Human


 Regular  1 unit  STK-MED ONCE


 .ROUTE  10/14/22 14:33


 10/14/22 14:37 DC 10/14/22 14:45


10 UNIT


 


 Midazolam HCl  5 mg  ONCE  ONCE


 IVP  10/14/22 14:15


 10/14/22 14:16 DC 10/14/22 14:11


5 MG


 


 Midazolam HCl  5 mg  ONCE  ONCE


 IVP  10/14/22 17:45


 10/14/22 17:46 DC 10/14/22 17:46


5 MG


 


 Midazolam HCl  5 mg  STK-MED ONCE


 .ROUTE  10/14/22 16:00


 10/14/22 16:04 DC 10/14/22 16:06


5 MG


 


 Piperacillin Sod/


 Tazobactam Sod


 4.5 gm/Sodium


 Chloride  100 ml @ 


 200 mls/hr  ONCE  ONCE


 IV  10/14/22 15:00


 10/14/22 15:29 DC 10/14/22 15:26


200 MLS/HR


 


 Rocuronium Bromide  50 mg  ONCE  ONCE


 IV  10/14/22 14:15


 10/14/22 14:16 DC 10/14/22 14:03


50 MG


 


 Rocuronium Bromide  50 mg  ONCE  ONCE


 IV  10/14/22 18:00


 10/14/22 18:01 DC 10/14/22 17:54


50 MG


 


 Sodium Bicarbonate  50 meq  STK-MED ONCE


 .ROUTE  10/14/22 14:33


 10/14/22 14:37 DC 10/14/22 14:46


50 MEQ


 


 Sodium Bicarbonate  100 meq  ONCE  ONCE


 IV  10/14/22 16:15


 10/14/22 16:16 DC 10/14/22 16:08


100 MEQ


 


 Sodium Chloride  1,000 ml @ 


   STK-MED ONCE


 .ROUTE  10/14/22 14:04


 10/14/22 14:07 DC 10/14/22 14:14


999 MLS/HR


 


 Sodium Chloride  1,000 ml @ 


   STK-MED ONCE


 .ROUTE  10/14/22 18:01


 10/14/22 18:04 DC 10/14/22 18:04


1,000 MLS/HR








Vital Signs/I&O











 10/14/22 10/14/22 10/14/22 10/14/22





 13:34 13:34 14:22 14:24


 


Temp 33.4  35.3 


 


Pulse 94   103


 


Resp 30   18


 


B/P (MAP) 110/97 (101)   


 


Pulse Ox 100   100


 


O2 Delivery Non Rebreather Non Rebreather  


 


O2 Flow Rate 10.00 10.00  


 


FiO2  100  60


 


    





 10/14/22 10/14/22 10/14/22 10/14/22





 14:57 17:40 17:50 17:57


 


Temp    38.1


 


Pulse 110 120 122 125


 


Resp   28 18


 


B/P (MAP) 117/83 92/62  111/52


 


Pulse Ox   100 100


 


O2 Delivery    Mechanical Ventilator


 


FiO2   60 





Capillary Refill : Less Than 3 Seconds








Blood Pressure Mean:                    101











Point of Care Testing


Finger Stick Blood Glucose:  600


Blood Glucose Action Taken:  HI >600, DR NOTIFIED


Progress Note :  


Progress Note


Seen and evaluated.  Patient is obviously in extremis with significant altered 

mental status and concerns for respiratory failure.  IV x2 established, labs, 

blood cultures and lactic acid ordered.  EKG and chest x-ray ordered.  Given 

concerns for respiratory failure, we will moved to intubation and then 

subsequently we will moved to central line placement.  We have normal saline 1 L

bolus running and we will repeat that.  Records review shows that she had very 

similar presentation in early April with similar concerns and requirements 

necessitating transfer.  Patient does have end-stage renal disease and is on 

hemodialysis.  She did miss dialysis today.  1445: Central line complete and 

position verified.  We are using central line.  Labs reviewed thus far.  We have

added 10 units of insulin IV and 1 amp of calcium citrate IV followed by 1 amp 

of sodium bicarb.  EKG does show peaked T waves.  We will initiate transfer.  

Daughter has called and did mention that patient will sometimes go into this if 

she uses methamphetamine.  I have added urine drug screen.  We will initiate 

transfer proceedings to [facility] in [place], [place].  1458: [facility] is on diversion for ICU and MedSurg currently.  We will initiate 

transfer to [facility].  1502: I have called [facility] and am waiting to discuss

with transfer nurse.1510: Information initiated for transfer for this patient.  

I did discuss all current pertinent findings and treatment.  She will call back 

with intensivist.  1527: We have initiated hold for helicopter transport as we 

have multiple transfers from the ER currently and this patient is quite sick on 

fluids, antibiotics, sedative and monitoring for need for norepinephrine.  We 

will initiate insulin drip.  1600: I have spoken with Dr. Joaquin, intensivist at 

[facility] in [facility].  We did review the case in entirety and he h

as accepted patient for transfer to their ICU.  Patient will go by flight.  He 

is recommending 2 additional amps of sodium bicarbonate which were ordered.  He 

agrees to plan thus far.  Patient remains comfortable on propofol drip with 

blood pressure greater than 110 systolic.  Propofol drip increased to 40 

mcg/kg/min.  I did repeat bolus Versed 5 mg IV and fentanyl 50 mcg IV.  1815: I 

have continue to manage the patient and we have adjusted propofol drip as needed

for sedation.  We have given rocuronium 50 mg IV as patient was biting on tube 

and overbreathing significantly.  Vital signs are improved on propofol drip and 

insulin drip.  We did have norepinephrine at 0.05 mcg/kg/min but that has been 

stopped as her blood pressure is 130 systolic currently.  Flight crew is here 

for transfer and report given to them by me.  I did discuss critical nature of 

condition with family.  They are at bedside and we will go ahead and go over to 

[place] now.





ECG


Initial ECG Impression Date:  Oct 14, 2022


Initial ECG Impression Time:  14:10


Initial ECG Rate:  164


Initial ECG Rhythm:  S.Tach


Comment


Sinus rhythm with peaked T waves with rate of approximately 80-90 (missed read 

on EKG due to counting peaked T waves).  Right ventricular conduction delay 

noted.  Right axis deviation.  No evidence of ST elevation MI.  Interpreted by 

me.





Diagnostic Imaging





   Diagonstic Imaging:  Xray


   Plain Films/CT/US/NM/MRI:  chest


Comments


                 [facility].


                                [place], [place]





NAME:   DAYTON BLISS


MED REC#:   [id number]


ACCOUNT#:   [id number]


PT STATUS:   REG ER


:   1965


PHYSICIAN:   MELODY AGUIRRE MD


ADMIT DATE:   10/14/22/ER


                                   ***Draft***


Date of Exam:10/14/22





CHEST 1 VIEW, AP/PA ONLY








EXAMINATION: Chest 1 view





HISTORY: AMS





COMPARISON: 04/10/2022.





FINDINGS: 





Heart size and pulmonary vasculature are normal. There are


diffuse interstitial opacities seen throughout the lungs. No


pleural effusion or pneumothorax. Right IJ central line is


present overlying the SVC. An endotracheal tube is present above


the cyrus. An enteric catheter courses below the diaphragm. The


osseous structures are intact.





IMPRESSION: 





1. Diffuse interstitial opacities throughout the lungs which can


be seen with atypical infection or pulmonary edema.





  Dictated on workstation # [id number]








Dict:   10/14/22 [id number]


Trans:   10/14/22 1448


[id number]





Interpreted by:     ZULEMA MORENO DO


Electronically signed by:





Critical Care Note


Critical Care


Start Time:  13:43


Stop Time:  18:15


Total Time (minutes)


60min critical care time excluding separately billable procedures for 

evaluation, management, critical care, transfer discussions and checkout to 

flight crew.  See progress note for details.





Departure


Impression





   Primary Impression:  


   DKA (diabetic ketoacidosis)


   Qualified Codes:  E10.11 - Type 1 diabetes mellitus with ketoacidosis with 

   coma


   Additional Impressions:  


   Acute respiratory failure


   Qualified Codes:  J96.00 - Acute respiratory failure, unspecified whether 

   with hypoxia or hypercapnia


   Hyperkalemia


   End stage renal disease on dialysis


   Pneumonia


   Qualified Codes:  J18.9 - Pneumonia, unspecified organism


Disposition:   [facility]


Condition:  Critical





Transfer


Transfer Reason:  Exceeds level of care


Time Spoke to Accepting Phy:  16:00


Transfer Progress Notes


Excepted at [facility], Dr. Joaquin accepting.  Patient will 

go by helicopter EMS


Transfer Time:  18:15


Transfer Facility:  


[place], [place].


Method of Transfer:  Air





Departure-Patient Inst.


Referrals:  


BANDA,ABIGAIL C DO (PCP/Family)


Primary Care Physician











MELODY AGUIRRE MD          Oct 14, 2022 14:49

## 2022-10-14 NOTE — DIAGNOSTIC IMAGING REPORT
EXAMINATION: Chest 1 view



HISTORY: AMS



COMPARISON: 04/10/2022.



FINDINGS: 



Heart size and pulmonary vasculature are normal. There are

diffuse interstitial opacities seen throughout the lungs. No

pleural effusion or pneumothorax. Right IJ central line is

present overlying the SVC. An endotracheal tube is present above

the cyrus. An enteric catheter courses below the diaphragm. The

osseous structures are intact.



IMPRESSION: 



1. Diffuse interstitial opacities throughout the lungs which can

be seen with atypical infection or pulmonary edema.



Dictated by: 



  Dictated on workstation # [id number]

## 2023-02-13 NOTE — PROGRESS NOTE - HOSPITALIST
Subjective


HPI/CC On Admission


Date Seen by Provider:  2020


Time Seen by Provider:  13:34


Pt is a 54yoCF witha PMH of IDDMII, methamphetamine use, CKD who presented to 

the ER due to altered mental status. She is currently treated and sedated and 

unable to provide any review of systems.  Her mother is at bedside but is 

limited use in history other than that she is a type I diabetic and had been 

very thirsty lately..  All history is obtained from review of records.  

Reportedly she was found by her son on the ground unresponsive and moaning.  EMS

was called and she was found to have a temperature of 93F.  She was also found 

to have a blood sugar reading of high per their device.  On arrival to the ER 

she was found to be in severe DKA with blood sugars are greater than 1000 and a 

bicarbonate of less than 5.  She was found to be 7.05.  She was intubated in the

ER and admitted to the ICU for DKA protocol.


Subjective/Events-last exam


Pt reports feeling much better. Had some fecal incontinence this morning. 

Otherwise no concerns. Was able to walk with PT but now very tired.





Objective


Exam


Vital Signs





Vital Signs








  Date Time  Temp Pulse Resp B/P (MAP) Pulse Ox O2 Delivery O2 Flow Rate FiO2


 


20 12:14  106      


 


20 12:08 37.1  20 156/80 (105) 97 Room Air  


 


20 09:00       2.00 100





Capillary Refill : Less Than 3 Seconds


General Appearance:  No Apparent Distress, Chronically ill


Respiratory:  Lungs Clear, No Respiratory Distress


Cardiovascular:  Regular Rate, Rhythm, No Murmur


Neurologic/Psychiatric:  Alert, Oriented x3





Results/Procedures


Lab


Laboratory Tests


20 05:48








Patient resulted labs reviewed.


Imaging:  Reviewed Imaging Report





Assessment/Plan


Assessment and Plan


Assess & Plan/Chief Complaint


DKA- resolved


IDDMI


   Continue bolus insulin


   A1c 14.8, reports not taking her insulin as prescribed


   Was hypoglycemic overnight so Levemir decreased





Hypovolemic shock- resolved


ADITI on CKD


   Good UOP


   Creatinine improved





Acute Respiratory Failure


   Resolved


   Continue Augmentin





LLE edema


h/o of DVT


   USG negative for DVT


   Lasix as above





Normocytic anemia- stable


   PPI


   Consult Surgery, appreciate recs


   Hgb stable





Methamphetamine use


   Reviewed UDS- has been positive for meth multiple times since 


    consulted


   Patient reports no longer smoking but around it and that's why she thinks 

here UDS is positive





Debility


   PT/OT





Critical Care


Critically Ill Patient





Diagnosis/Problems


Diagnosis/Problems





(1) Acute respiratory failure requiring reintubation


Status:  Acute


(2) Methamphetamine abuse


Status:  Acute


(3) Diabetic ketoacidosis


Status:  Acute


Qualifiers:  


   Diabetes mellitus type:  type 2  Diabetes mellitus complication detail:  with

coma  Qualified Codes:  E11.11 - Type 2 diabetes mellitus with ketoacidosis with

coma


(4) Acute kidney injury superimposed on chronic kidney disease


Status:  Acute


(5) High anion gap metabolic acidosis


Status:  Acute


(6) Leukocytosis


Status:  Acute


(7) Lactic acidosis


Status:  Acute


(8) Dehydration


Status:  Acute


(9) Hypotension


Status:  Acute


(10) Altered mental status


Status:  Acute





Clinical Quality Measures


DVT/VTE Risk/Contraindication:


Risk Factor Score Per Nursin


RFS Level Per Nursing on Admit:  4+=Very High











MISHA DEVRIES MD              2020 13:38 Attirbuted to adrenal insufficiency. Follows care [facility]   Was admitted at [facility] 1/2023 for syncope w/u. AM Cortisol low at 3.9, cosyntropin stim test, 60min post 16.2, slightly below cutoff of 18. DHEAS level low, supportive of diagnosis of adrenal insufficiency  Recent TTE from [facility] shows: EF 63%, mild LV dilatation, diastolic dysfunction but indeterminate grade and LA pressure s/p tricuspid valve repair (surgical) inadequate tricuspid regurgitation to assess RV systolic pressure. Normal RV size and function. Takes Hydrocortisone 10mg BID    - C/w Hydrocortisone  - 50mg IV hydrocort pre-EGD

## 2023-03-11 NOTE — D/C HH FACE TO FACE ORDER
D/C  Face to Face Orders


Instructions for Patient


[facility], [phone]


Patient Instructions/FollowUp:  


Follow up with your primary care doctor to follow up this hospital stay


Physician to follow Patient:  Dr Dove


Discharge Diet for Home:  ADA Diet





Patient Data-Allergies,Ht & Wt


Patient Allergies:  


Coded Allergies:  


     morphine (Verified  Allergy, Mild, Vomiting, 7/4/20)


     orange juice (Verified  Allergy, Mild, Nausea, 7/8/20)


     Sulfa (Sulfonamide Antibiotics) (Unverified  Allergy, Unknown, 6/25/15)


     codeine (Verified  Allergy, Unknown, TAKES ULTRAM AT HOME, 1/2/09)


     ketorolac (Verified  Allergy, Unknown, 11/19/08)


     tramadol (Verified  Allergy, Unknown, 12/17/11)


Height (Feet):  5


Height (Inches):  5.00


Weight (Pounds):  151


Weight (Ounces):  1.0





Home Health Need/Face to Face


Date of Face to Face:  Sep 25, 2020


Clinical Findings:  Generalized weakness and fatigue


I have seen Pt face-to-face:  Yes


Discharged To:  Home


Diagnosis/Conditions:  


DKA, IDDMII, HTN, CKD


Patient is Homebound due to:  Muscle weakness


Homebound Status


   Due to the above stated illness, injury or surgical procedure (medical 

condition or diagnosis) and associated clinical findings, the patient is 

homebound because of his/her inability to leave home except with aid of a 

supportive device and/or person AND leaving the home requires a considerable and

taxing effort or is medically contraindicated.


Pt req the following assistanc:  Aid of another person





Home Health Nursing Orders


Home Health Services Order:  Nursing Services





Home Health Infusion Therapy


Line Start Date:  Sep 22, 2020


Certify Stmt


I certify that this patient is under my care and that I, a nurse practitioner or

a physician; a assistant working with me, had a face to face encounter that -

meets the physician face to face encounter requirements with this patient as 

dated.











MISHA DEVRIES MD              Sep 25, 2020 11:20
normal

## 2025-06-30 NOTE — NUR
CM/SS visited with the patient for discharge planning. 



The patient reports that she was planning to discharge; however, her blood glucose was too 
low. CM/SS asked the patient what brought her into the hospital, she reports that she could 
not find her insulin so she couldn't take it. According to the patient her blind mother was 
also present at the house but could not help her search for it. Per the patient, she has 
been admitted to our facility x3 and [facility] x[facility] since December for this problem. 



Caregivers: The patient has caregivers through [employer]. She is allotted 32 hours weekly for 
day time help and has 7 nights a week of sleep support. She is looking for additional 
caregivers to help fill the gap during the days but hasn't found someone she trust. CM/SS 
provided the patient with a private caregiver list and caregiver agency list. She verbalized 
understanding. The patient has not had home health in the past. 



Transportation: The patient uses [employer] for transportation to all of her doctors 
appointments. The patient reports that [facility] helped her set up a primary care and multiple 
other physicians. She states shes had about 2 appointments at least every week.



Medicine: The patient reports that she is able to afford all of her Diabetic supplies at 
this time and has not had to go without. The patient has Medicaid that helps her pay for 
prescriptions. 



Equipment: The patient reports that she does have a front wheeled walker that she uses but 
is unsure if it is in her sons car. She states she has to use that for the majority of the 
time when ambulating. The patient verbalized the possibility of needing a motorized scooter. 
CM/SS instructed her to call insurance to see what equipment is covered vs self pay. She 
verbalized understanding. The patient also has a cane accessible if needed. 



Support: The patient states that she has a good support system through family. 1 son and 1 
daughter, her grandchildren, and her mother. She states that her grandchildren have "given 
her something to live for". She also stated that sometimes her family is the root of the 
stress but they are all she has. 



No further needs at this time. Chart review, assessment, discussion with team, discharge planning including coordination with medicine.